# Patient Record
Sex: MALE | Race: WHITE | NOT HISPANIC OR LATINO | Employment: OTHER | URBAN - METROPOLITAN AREA
[De-identification: names, ages, dates, MRNs, and addresses within clinical notes are randomized per-mention and may not be internally consistent; named-entity substitution may affect disease eponyms.]

---

## 2017-01-09 DIAGNOSIS — E78.5 HYPERLIPIDEMIA, UNSPECIFIED HYPERLIPIDEMIA TYPE: ICD-10-CM

## 2017-01-09 DIAGNOSIS — R73.9 HYPERGLYCEMIA: ICD-10-CM

## 2017-01-09 DIAGNOSIS — I10 ESSENTIAL HYPERTENSION: Primary | ICD-10-CM

## 2017-01-09 RX ORDER — METOPROLOL SUCCINATE 50 MG/1
TABLET, EXTENDED RELEASE ORAL
Qty: 90 TABLET | Refills: 1 | Status: SHIPPED | OUTPATIENT
Start: 2017-01-09 | End: 2017-07-08 | Stop reason: SDUPTHER

## 2017-01-11 LAB
ALBUMIN SERPL-MCNC: 4.2 G/DL (ref 3.5–5.2)
ALBUMIN/GLOB SERPL: 1.8 G/DL
ALP SERPL-CCNC: 86 U/L (ref 39–117)
ALT SERPL-CCNC: 22 U/L (ref 1–41)
AST SERPL-CCNC: 20 U/L (ref 1–40)
BILIRUB SERPL-MCNC: 0.4 MG/DL (ref 0.1–1.2)
BUN SERPL-MCNC: 22 MG/DL (ref 8–23)
BUN/CREAT SERPL: 30.6 (ref 7–25)
CALCIUM SERPL-MCNC: 9.2 MG/DL (ref 8.6–10.5)
CHLORIDE SERPL-SCNC: 104 MMOL/L (ref 98–107)
CHOLEST SERPL-MCNC: 144 MG/DL (ref 0–200)
CO2 SERPL-SCNC: 28.8 MMOL/L (ref 22–29)
CREAT SERPL-MCNC: 0.72 MG/DL (ref 0.76–1.27)
GLOBULIN SER CALC-MCNC: 2.4 GM/DL
GLUCOSE SERPL-MCNC: 107 MG/DL (ref 65–99)
HBA1C MFR BLD: 5.68 % (ref 4.8–5.6)
HDLC SERPL-MCNC: 60 MG/DL (ref 40–60)
LDLC SERPL CALC-MCNC: 70 MG/DL (ref 0–100)
LDLC/HDLC SERPL: 1.17 {RATIO}
POTASSIUM SERPL-SCNC: 4.5 MMOL/L (ref 3.5–5.2)
PROT SERPL-MCNC: 6.6 G/DL (ref 6–8.5)
SODIUM SERPL-SCNC: 146 MMOL/L (ref 136–145)
TRIGL SERPL-MCNC: 69 MG/DL (ref 0–150)
VLDLC SERPL CALC-MCNC: 13.8 MG/DL (ref 5–40)

## 2017-01-18 ENCOUNTER — OFFICE VISIT (OUTPATIENT)
Dept: FAMILY MEDICINE CLINIC | Facility: CLINIC | Age: 82
End: 2017-01-18

## 2017-01-18 VITALS
TEMPERATURE: 98 F | WEIGHT: 276 LBS | BODY MASS INDEX: 44.36 KG/M2 | OXYGEN SATURATION: 96 % | SYSTOLIC BLOOD PRESSURE: 140 MMHG | HEIGHT: 66 IN | DIASTOLIC BLOOD PRESSURE: 72 MMHG | RESPIRATION RATE: 16 BRPM | HEART RATE: 75 BPM

## 2017-01-18 DIAGNOSIS — R73.9 HYPERGLYCEMIA: ICD-10-CM

## 2017-01-18 DIAGNOSIS — E66.01 MORBID OBESITY DUE TO EXCESS CALORIES (HCC): ICD-10-CM

## 2017-01-18 DIAGNOSIS — I10 ESSENTIAL HYPERTENSION: ICD-10-CM

## 2017-01-18 DIAGNOSIS — E78.5 HYPERLIPIDEMIA, UNSPECIFIED HYPERLIPIDEMIA TYPE: Primary | ICD-10-CM

## 2017-01-18 PROCEDURE — 99213 OFFICE O/P EST LOW 20 MIN: CPT | Performed by: INTERNAL MEDICINE

## 2017-01-18 NOTE — PROGRESS NOTES
Subjective   Harry Potts is a 86 y.o. male. Patient is here today for   Chief Complaint   Patient presents with   • Hypertension     lab f/u    • Hyperlipidemia          Vitals:    01/18/17 0826   BP: 140/72   Pulse:    Resp:    Temp:    SpO2:        Past Medical History   Diagnosis Date   • GERD (gastroesophageal reflux disease)    • Hyperglycemia    • Hyperlipidemia    • Hypertension    • Obesity    • Spondylolysis, lumbar region       Allergies   Allergen Reactions   • Morphine And Related       Social History     Social History   • Marital status:      Spouse name: N/A   • Number of children: N/A   • Years of education: N/A     Occupational History   • Not on file.     Social History Main Topics   • Smoking status: Never Smoker   • Smokeless tobacco: Never Used   • Alcohol use No   • Drug use: No   • Sexual activity: Not on file     Other Topics Concern   • Not on file     Social History Narrative        Current Outpatient Prescriptions:   •  amLODIPine (NORVASC) 10 MG tablet, TAKE 1 TABLET DAILY, Disp: 90 tablet, Rfl: 1  •  atorvastatin (LIPITOR) 20 MG tablet, TAKE 1 TABLET DAILY, Disp: 90 tablet, Rfl: 1  •  celecoxib (CeleBREX) 200 MG capsule, TAKE 1 CAPSULE DAILY AS NEEDED, Disp: 90 capsule, Rfl: 2  •  doxazosin (CARDURA) 4 MG tablet, TAKE 1 TABLET DAILY AS DIRECTED, Disp: 90 tablet, Rfl: 3  •  esomeprazole (nexIUM) 40 MG capsule, TAKE 1 CAPSULE DAILY, Disp: 90 capsule, Rfl: 2  •  loratadine (CLARITIN) 10 MG tablet, Take  by mouth., Disp: , Rfl:   •  metaxalone (SKELAXIN) 800 MG tablet, Take 1 tablet by mouth 3 (Three) Times a Day., Disp: 270 tablet, Rfl: 4  •  metoprolol succinate XL (TOPROL-XL) 50 MG 24 hr tablet, TAKE 1 TABLET DAILY, Disp: 90 tablet, Rfl: 1  •  traMADol (ULTRAM) 50 MG tablet, Take 1 tablet by mouth every 6 (six) hours as needed for moderate pain (4-6)., Disp: 40 tablet, Rfl: 3  •  triamcinolone (KENALOG) 0.1 % cream, , Disp: , Rfl:   •  valsartan-hydrochlorothiazide (DIOVAN-HCT)  320-25 MG per tablet, TAKE 1 TABLET DAILY, Disp: 90 tablet, Rfl: 1  •  ZETIA 10 MG tablet, TAKE 1 TABLET DAILY, Disp: 90 tablet, Rfl: 2     Objective     HPI Comments: He is here to follow-up on his hypertension, hyperglycemia, and hyper cholesterolemia.    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    Hypertension     Hyperlipidemia          Review of Systems   Constitutional: Negative.    HENT: Negative.    Respiratory: Negative.    Cardiovascular: Negative.    Psychiatric/Behavioral: Negative.        Physical Exam   Constitutional: He is oriented to person, place, and time.   Neatly groomed, alert, pleasant.   HENT:   Head: Normocephalic and atraumatic.   Cardiovascular: Normal rate.    Pulmonary/Chest: Effort normal.   Neurological: He is alert and oriented to person, place, and time.   Psychiatric: He has a normal mood and affect. His behavior is normal. Judgment and thought content normal.   Nursing note and vitals reviewed.        Problem List Items Addressed This Visit        Cardiovascular and Mediastinum    Hyperlipidemia - Primary    Hypertension       Digestive    Morbid obesity       Other    Hyperglycemia            PLAN  His hypertension is well-controlled.    His hypercholesterolemia is well controlled.    He is obese and his work a lot on losing weight and in fact is lost about 14 pounds since he was here last fall.  I encouraged him to keep up his best efforts there.    I like to see him back in about 4 months to recheck a lipid profile and comprehensive metabolic panel and hemoglobin A1c and urinalysis and CBC.      Return in about 4 months (around 5/18/2017) for with labs.

## 2017-01-18 NOTE — MR AVS SNAPSHOT
Harry Potts   1/18/2017 8:15 AM   Office Visit    Dept Phone:  [phone]   Encounter #:  [id number]    Provider:  Harry Luis MD   Department:  [facility]                Your Full Care Plan              Your Updated Medication List          This list is accurate as of: 1/18/17  8:39 AM.  Always use your most recent med list.                amLODIPine 10 MG tablet   Commonly known as:  NORVASC   TAKE 1 TABLET DAILY       atorvastatin 20 MG tablet   Commonly known as:  LIPITOR   TAKE 1 TABLET DAILY       celecoxib 200 MG capsule   Commonly known as:  CeleBREX   TAKE 1 CAPSULE DAILY AS NEEDED       doxazosin 4 MG tablet   Commonly known as:  CARDURA   TAKE 1 TABLET DAILY AS DIRECTED       esomeprazole 40 MG capsule   Commonly known as:  nexIUM   TAKE 1 CAPSULE DAILY       loratadine 10 MG tablet   Commonly known as:  CLARITIN       metaxalone 800 MG tablet   Commonly known as:  SKELAXIN   Take 1 tablet by mouth 3 (Three) Times a Day.       metoprolol succinate XL 50 MG 24 hr tablet   Commonly known as:  TOPROL-XL   TAKE 1 TABLET DAILY       traMADol 50 MG tablet   Commonly known as:  ULTRAM   Take 1 tablet by mouth every 6 (six) hours as needed for moderate pain (4-6).       triamcinolone 0.1 % cream   Commonly known as:  KENALOG       valsartan-hydrochlorothiazide 320-25 MG per tablet   Commonly known as:  DIOVAN-HCT   TAKE 1 TABLET DAILY       ZETIA 10 MG tablet   Generic drug:  ezetimibe   TAKE 1 TABLET DAILY               Instructions     None    Patient Instructions History      Upcoming Appointments     Visit Type Date Time Department    FOLLOW UP 1/18/2017  8:15 AM HANANE CAMPOS      [facility] Signup     [facility] [employer] allows you to send messages to your doctor, view your test results, renew your prescriptions, schedule appointments, and more. To sign up, go to [employer] and click on the Sign Up Now link in  "the New User? box. Enter your [employer] Activation Code exactly as it appears below along with the last four digits of your Social Security Number and your Date of Birth () to complete the sign-up process. If you do not sign up before the expiration date, you must request a new code.    [employer] Activation Code: [email or web address]  Expires: 2017 10:18 AM    If you have questions, you can email [email or web address] or call [phone] to talk to our [employer] staff. Remember, [employer] is NOT to be used for urgent needs. For medical emergencies, dial 911.               Other Info from Your Visit           Allergies     Morphine And Related        Reason for Visit     Hypertension lab f/u     Hyperlipidemia           Vital Signs     Blood Pressure Pulse Temperature Respirations Height Weight    140/72 75 98 °F (36.7 °C) 16 66\" (167.6 cm) 276 lb (125 kg)    Oxygen Saturation Body Mass Index Smoking Status             96% 44.55 kg/m2 Never Smoker           "

## 2017-01-31 RX ORDER — DOXAZOSIN MESYLATE 4 MG/1
TABLET ORAL
Qty: 90 TABLET | Refills: 2 | Status: SHIPPED | OUTPATIENT
Start: 2017-01-31 | End: 2017-10-28 | Stop reason: SDUPTHER

## 2017-03-17 RX ORDER — EZETIMIBE 10 MG/1
TABLET ORAL
Qty: 90 TABLET | Refills: 1 | Status: SHIPPED | OUTPATIENT
Start: 2017-03-17 | End: 2017-09-13 | Stop reason: SDUPTHER

## 2017-03-17 RX ORDER — CELECOXIB 200 MG/1
CAPSULE ORAL
Qty: 90 CAPSULE | Refills: 1 | Status: SHIPPED | OUTPATIENT
Start: 2017-03-17 | End: 2017-09-13 | Stop reason: SDUPTHER

## 2017-04-13 RX ORDER — ATORVASTATIN CALCIUM 20 MG/1
TABLET, FILM COATED ORAL
Qty: 90 TABLET | Refills: 0 | Status: SHIPPED | OUTPATIENT
Start: 2017-04-13 | End: 2017-07-12 | Stop reason: SDUPTHER

## 2017-05-01 RX ORDER — VALSARTAN AND HYDROCHLOROTHIAZIDE 320; 25 MG/1; MG/1
TABLET, FILM COATED ORAL
Qty: 90 TABLET | Refills: 0 | Status: SHIPPED | OUTPATIENT
Start: 2017-05-01 | End: 2017-07-30 | Stop reason: SDUPTHER

## 2017-05-01 RX ORDER — AMLODIPINE BESYLATE 10 MG/1
TABLET ORAL
Qty: 90 TABLET | Refills: 0 | Status: SHIPPED | OUTPATIENT
Start: 2017-05-01 | End: 2017-07-30 | Stop reason: SDUPTHER

## 2017-05-16 DIAGNOSIS — I10 ESSENTIAL HYPERTENSION: Primary | ICD-10-CM

## 2017-05-16 DIAGNOSIS — E78.5 HYPERLIPIDEMIA, UNSPECIFIED HYPERLIPIDEMIA TYPE: ICD-10-CM

## 2017-05-16 DIAGNOSIS — R73.9 HYPERGLYCEMIA: ICD-10-CM

## 2017-05-18 LAB
ALBUMIN SERPL-MCNC: 4.2 G/DL (ref 3.5–5.2)
ALBUMIN/GLOB SERPL: 1.8 G/DL
ALP SERPL-CCNC: 70 U/L (ref 39–117)
ALT SERPL-CCNC: 19 U/L (ref 1–41)
APPEARANCE UR: CLEAR
AST SERPL-CCNC: 23 U/L (ref 1–40)
BACTERIA #/AREA URNS HPF: NORMAL /HPF
BASOPHILS # BLD AUTO: 0.03 10*3/MM3 (ref 0–0.2)
BASOPHILS NFR BLD AUTO: 0.5 % (ref 0–1.5)
BILIRUB SERPL-MCNC: 0.4 MG/DL (ref 0.1–1.2)
BILIRUB UR QL STRIP: NEGATIVE
BUN SERPL-MCNC: 27 MG/DL (ref 8–23)
BUN/CREAT SERPL: 23.7 (ref 7–25)
CALCIUM SERPL-MCNC: 9.4 MG/DL (ref 8.6–10.5)
CASTS URNS MICRO: NORMAL
CHLORIDE SERPL-SCNC: 102 MMOL/L (ref 98–107)
CHOLEST SERPL-MCNC: 140 MG/DL (ref 0–200)
CO2 SERPL-SCNC: 26.6 MMOL/L (ref 22–29)
COLOR UR: YELLOW
CREAT SERPL-MCNC: 1.14 MG/DL (ref 0.76–1.27)
EOSINOPHIL # BLD AUTO: 0.24 10*3/MM3 (ref 0–0.7)
EOSINOPHIL NFR BLD AUTO: 4.1 % (ref 0.3–6.2)
EPI CELLS #/AREA URNS HPF: NORMAL /HPF
ERYTHROCYTE [DISTWIDTH] IN BLOOD BY AUTOMATED COUNT: 14 % (ref 11.5–14.5)
GLOBULIN SER CALC-MCNC: 2.4 GM/DL
GLUCOSE SERPL-MCNC: 112 MG/DL (ref 65–99)
GLUCOSE UR QL: NEGATIVE
HBA1C MFR BLD: 5.8 % (ref 4.8–5.6)
HCT VFR BLD AUTO: 44.9 % (ref 40.4–52.2)
HDLC SERPL-MCNC: 52 MG/DL (ref 40–60)
HGB BLD-MCNC: 14.3 G/DL (ref 13.7–17.6)
HGB UR QL STRIP: NEGATIVE
IMM GRANULOCYTES # BLD: 0 10*3/MM3 (ref 0–0.03)
IMM GRANULOCYTES NFR BLD: 0 % (ref 0–0.5)
KETONES UR QL STRIP: NEGATIVE
LDLC SERPL CALC-MCNC: 71 MG/DL (ref 0–100)
LDLC/HDLC SERPL: 1.37 {RATIO}
LEUKOCYTE ESTERASE UR QL STRIP: NEGATIVE
LYMPHOCYTES # BLD AUTO: 2.18 10*3/MM3 (ref 0.9–4.8)
LYMPHOCYTES NFR BLD AUTO: 37.2 % (ref 19.6–45.3)
MCH RBC QN AUTO: 29.2 PG (ref 27–32.7)
MCHC RBC AUTO-ENTMCNC: 31.8 G/DL (ref 32.6–36.4)
MCV RBC AUTO: 91.8 FL (ref 79.8–96.2)
MONOCYTES # BLD AUTO: 0.47 10*3/MM3 (ref 0.2–1.2)
MONOCYTES NFR BLD AUTO: 8 % (ref 5–12)
NEUTROPHILS # BLD AUTO: 2.94 10*3/MM3 (ref 1.9–8.1)
NEUTROPHILS NFR BLD AUTO: 50.2 % (ref 42.7–76)
NITRITE UR QL STRIP: NEGATIVE
PH UR STRIP: 7 [PH] (ref 5–8)
PLATELET # BLD AUTO: 137 10*3/MM3 (ref 140–500)
POTASSIUM SERPL-SCNC: 4.5 MMOL/L (ref 3.5–5.2)
PROT SERPL-MCNC: 6.6 G/DL (ref 6–8.5)
PROT UR QL STRIP: NEGATIVE
RBC # BLD AUTO: 4.89 10*6/MM3 (ref 4.6–6)
RBC #/AREA URNS HPF: NORMAL /HPF
SODIUM SERPL-SCNC: 143 MMOL/L (ref 136–145)
SP GR UR: (no result) (ref 1–1.03)
TRIGL SERPL-MCNC: 85 MG/DL (ref 0–150)
UROBILINOGEN UR STRIP-MCNC: (no result) MG/DL
VLDLC SERPL CALC-MCNC: 17 MG/DL (ref 5–40)
WBC # BLD AUTO: 5.86 10*3/MM3 (ref 4.5–10.7)
WBC #/AREA URNS HPF: NORMAL /HPF

## 2017-06-02 ENCOUNTER — OFFICE VISIT (OUTPATIENT)
Dept: FAMILY MEDICINE CLINIC | Facility: CLINIC | Age: 82
End: 2017-06-02

## 2017-06-02 VITALS
HEART RATE: 71 BPM | BODY MASS INDEX: 44.2 KG/M2 | DIASTOLIC BLOOD PRESSURE: 64 MMHG | TEMPERATURE: 98 F | HEIGHT: 66 IN | OXYGEN SATURATION: 93 % | SYSTOLIC BLOOD PRESSURE: 130 MMHG | WEIGHT: 275 LBS | RESPIRATION RATE: 16 BRPM

## 2017-06-02 DIAGNOSIS — E66.01 MORBID OBESITY DUE TO EXCESS CALORIES (HCC): ICD-10-CM

## 2017-06-02 DIAGNOSIS — E78.5 HYPERLIPIDEMIA, UNSPECIFIED HYPERLIPIDEMIA TYPE: Primary | ICD-10-CM

## 2017-06-02 DIAGNOSIS — I10 ESSENTIAL HYPERTENSION: ICD-10-CM

## 2017-06-02 PROCEDURE — 99213 OFFICE O/P EST LOW 20 MIN: CPT | Performed by: INTERNAL MEDICINE

## 2017-06-06 NOTE — PROGRESS NOTES
Subjective   Harry Potts is a 86 y.o. male. Patient is here today for   Chief Complaint   Patient presents with   • Hypertension     lab f/u   • Hyperlipidemia          Vitals:    06/02/17 0939   BP: 130/64   Pulse:    Resp:    Temp:    SpO2:        Past Medical History:   Diagnosis Date   • GERD (gastroesophageal reflux disease)    • Hyperglycemia    • Hyperlipidemia    • Hypertension    • Obesity    • Spondylolysis, lumbar region       Allergies   Allergen Reactions   • Morphine And Related       Social History     Social History   • Marital status:      Spouse name: N/A   • Number of children: N/A   • Years of education: N/A     Occupational History   • Not on file.     Social History Main Topics   • Smoking status: Never Smoker   • Smokeless tobacco: Never Used   • Alcohol use No   • Drug use: No   • Sexual activity: Not on file     Other Topics Concern   • Not on file     Social History Narrative        Current Outpatient Prescriptions:   •  amLODIPine (NORVASC) 10 MG tablet, TAKE 1 TABLET DAILY, Disp: 90 tablet, Rfl: 0  •  atorvastatin (LIPITOR) 20 MG tablet, TAKE 1 TABLET DAILY, Disp: 90 tablet, Rfl: 0  •  celecoxib (CeleBREX) 200 MG capsule, TAKE 1 CAPSULE DAILY AS NEEDED, Disp: 90 capsule, Rfl: 1  •  doxazosin (CARDURA) 4 MG tablet, TAKE 1 TABLET DAILY AS DIRECTED, Disp: 90 tablet, Rfl: 2  •  esomeprazole (nexIUM) 40 MG capsule, TAKE 1 CAPSULE DAILY, Disp: 90 capsule, Rfl: 2  •  ezetimibe (ZETIA) 10 MG tablet, TAKE 1 TABLET DAILY, Disp: 90 tablet, Rfl: 1  •  loratadine (CLARITIN) 10 MG tablet, Take  by mouth., Disp: , Rfl:   •  metaxalone (SKELAXIN) 800 MG tablet, Take 1 tablet by mouth 3 (Three) Times a Day., Disp: 270 tablet, Rfl: 4  •  metoprolol succinate XL (TOPROL-XL) 50 MG 24 hr tablet, TAKE 1 TABLET DAILY, Disp: 90 tablet, Rfl: 1  •  traMADol (ULTRAM) 50 MG tablet, Take 1 tablet by mouth every 6 (six) hours as needed for moderate pain (4-6)., Disp: 40 tablet, Rfl: 3  •  triamcinolone  (KENALOG) 0.1 % cream, , Disp: , Rfl:   •  valsartan-hydrochlorothiazide (DIOVAN-HCT) 320-25 MG per tablet, TAKE 1 TABLET DAILY, Disp: 90 tablet, Rfl: 0     Objective     HPI Comments: He is here to follow-up with hypertension and hypercholesterolemia.  He has severe degenerative arthritis in the lumbar spine but he doesn't complain about that today.        Hypertension     Hyperlipidemia          Review of Systems   Constitutional: Negative.    HENT: Negative.    Respiratory: Negative.    Cardiovascular: Negative.    Musculoskeletal: Negative.    Psychiatric/Behavioral: Negative.        Physical Exam   Constitutional: He is oriented to person, place, and time. He appears well-developed and well-nourished.   Pleasant, neatly groomed, BMI 44.   HENT:   Head: Normocephalic and atraumatic.   Cardiovascular: Normal rate and regular rhythm.    Pulmonary/Chest: Effort normal.   Neurological: He is alert and oriented to person, place, and time.   Psychiatric: He has a normal mood and affect.   Nursing note and vitals reviewed.        Problem List Items Addressed This Visit        Cardiovascular and Mediastinum    Hyperlipidemia - Primary    Hypertension       Digestive    Morbid obesity            PLAN  His hypertension is hypercholesterolemia are well controlled.  I urged him to do his best to lose a bit of weight.  Regular aerobic activity is going to be out of the question for him secondary to his severe advanced degenerative arthritis.  He could cut back on his caloric intake however.    It's always a pleasure to see Mr. Olvera.  I asked him to follow-up with me in about 6 months or as needed.    He ought follow-up for a once yearly Medicare wellness visit.  No Follow-up on file.

## 2017-07-11 RX ORDER — METOPROLOL SUCCINATE 50 MG/1
TABLET, EXTENDED RELEASE ORAL
Qty: 90 TABLET | Refills: 0 | Status: SHIPPED | OUTPATIENT
Start: 2017-07-11 | End: 2017-10-09 | Stop reason: SDUPTHER

## 2017-07-12 ENCOUNTER — TELEPHONE (OUTPATIENT)
Dept: FAMILY MEDICINE CLINIC | Facility: CLINIC | Age: 82
End: 2017-07-12

## 2017-07-12 RX ORDER — ATORVASTATIN CALCIUM 20 MG/1
TABLET, FILM COATED ORAL
Qty: 90 TABLET | Refills: 1 | Status: SHIPPED | OUTPATIENT
Start: 2017-07-12 | End: 2018-01-08 | Stop reason: SDUPTHER

## 2017-07-12 NOTE — TELEPHONE ENCOUNTER
Spoke to patient and informed him that two prescriptions were sent recently.  He expressed understanding   ----- Message from Giselle Wolfe sent at 7/11/2017  4:07 PM EDT -----  PT IS CALLING SAYING [facility] SAYS THEY AENT US RENEWAL ON HIS MEDICATION AND HASNT HEARD BACK FROM THE OFFICE     HE SAYS THERE ARE 4 AND HE HAS NO IDEA WHAT THEY ARE     PLEASE CALL PT WITH ANY QUESTIONS     [phone]

## 2017-07-31 RX ORDER — VALSARTAN AND HYDROCHLOROTHIAZIDE 320; 25 MG/1; MG/1
TABLET, FILM COATED ORAL
Qty: 90 TABLET | Refills: 2 | Status: SHIPPED | OUTPATIENT
Start: 2017-07-31 | End: 2018-04-27 | Stop reason: SDUPTHER

## 2017-07-31 RX ORDER — AMLODIPINE BESYLATE 10 MG/1
TABLET ORAL
Qty: 90 TABLET | Refills: 2 | Status: SHIPPED | OUTPATIENT
Start: 2017-07-31 | End: 2018-04-27 | Stop reason: SDUPTHER

## 2017-08-11 RX ORDER — ESOMEPRAZOLE MAGNESIUM 40 MG/1
CAPSULE, DELAYED RELEASE ORAL
Qty: 90 CAPSULE | Refills: 1 | Status: SHIPPED | OUTPATIENT
Start: 2017-08-11 | End: 2018-02-07 | Stop reason: SDUPTHER

## 2017-09-13 RX ORDER — EZETIMIBE 10 MG/1
TABLET ORAL
Qty: 90 TABLET | Refills: 1 | Status: SHIPPED | OUTPATIENT
Start: 2017-09-13 | End: 2018-03-12 | Stop reason: SDUPTHER

## 2017-09-13 RX ORDER — CELECOXIB 200 MG/1
CAPSULE ORAL
Qty: 90 CAPSULE | Refills: 1 | Status: SHIPPED | OUTPATIENT
Start: 2017-09-13 | End: 2018-03-12 | Stop reason: SDUPTHER

## 2017-09-26 DIAGNOSIS — E78.5 HYPERLIPIDEMIA, UNSPECIFIED HYPERLIPIDEMIA TYPE: ICD-10-CM

## 2017-09-26 DIAGNOSIS — I10 ESSENTIAL HYPERTENSION: Primary | ICD-10-CM

## 2017-10-04 LAB
ALBUMIN SERPL-MCNC: 4.5 G/DL (ref 3.5–5.2)
ALBUMIN/GLOB SERPL: 2 G/DL
ALP SERPL-CCNC: 68 U/L (ref 39–117)
ALT SERPL-CCNC: 26 U/L (ref 1–41)
AST SERPL-CCNC: 24 U/L (ref 1–40)
BASOPHILS # BLD AUTO: 0.02 10*3/MM3 (ref 0–0.2)
BASOPHILS NFR BLD AUTO: 0.3 % (ref 0–1.5)
BILIRUB SERPL-MCNC: 0.5 MG/DL (ref 0.1–1.2)
BUN SERPL-MCNC: 22 MG/DL (ref 8–23)
BUN/CREAT SERPL: 29.7 (ref 7–25)
CALCIUM SERPL-MCNC: 9.9 MG/DL (ref 8.6–10.5)
CHLORIDE SERPL-SCNC: 101 MMOL/L (ref 98–107)
CHOLEST SERPL-MCNC: 213 MG/DL (ref 0–200)
CO2 SERPL-SCNC: 29 MMOL/L (ref 22–29)
CREAT SERPL-MCNC: 0.74 MG/DL (ref 0.76–1.27)
EOSINOPHIL # BLD AUTO: 0.21 10*3/MM3 (ref 0–0.7)
EOSINOPHIL NFR BLD AUTO: 3.6 % (ref 0.3–6.2)
ERYTHROCYTE [DISTWIDTH] IN BLOOD BY AUTOMATED COUNT: 13.5 % (ref 11.5–14.5)
GLOBULIN SER CALC-MCNC: 2.3 GM/DL
GLUCOSE SERPL-MCNC: 107 MG/DL (ref 65–99)
HCT VFR BLD AUTO: 45.1 % (ref 40.4–52.2)
HDLC SERPL-MCNC: 52 MG/DL (ref 40–60)
HGB BLD-MCNC: 14.5 G/DL (ref 13.7–17.6)
IMM GRANULOCYTES # BLD: 0 10*3/MM3 (ref 0–0.03)
IMM GRANULOCYTES NFR BLD: 0 % (ref 0–0.5)
LDLC SERPL CALC-MCNC: 144 MG/DL (ref 0–100)
LDLC/HDLC SERPL: 2.77 {RATIO}
LYMPHOCYTES # BLD AUTO: 2.15 10*3/MM3 (ref 0.9–4.8)
LYMPHOCYTES NFR BLD AUTO: 37.1 % (ref 19.6–45.3)
MCH RBC QN AUTO: 30.2 PG (ref 27–32.7)
MCHC RBC AUTO-ENTMCNC: 32.2 G/DL (ref 32.6–36.4)
MCV RBC AUTO: 94 FL (ref 79.8–96.2)
MONOCYTES # BLD AUTO: 0.5 10*3/MM3 (ref 0.2–1.2)
MONOCYTES NFR BLD AUTO: 8.6 % (ref 5–12)
NEUTROPHILS # BLD AUTO: 2.92 10*3/MM3 (ref 1.9–8.1)
NEUTROPHILS NFR BLD AUTO: 50.4 % (ref 42.7–76)
PLATELET # BLD AUTO: 150 10*3/MM3 (ref 140–500)
POTASSIUM SERPL-SCNC: 4.7 MMOL/L (ref 3.5–5.2)
PROT SERPL-MCNC: 6.8 G/DL (ref 6–8.5)
RBC # BLD AUTO: 4.8 10*6/MM3 (ref 4.6–6)
SODIUM SERPL-SCNC: 144 MMOL/L (ref 136–145)
TRIGL SERPL-MCNC: 86 MG/DL (ref 0–150)
VLDLC SERPL CALC-MCNC: 17.2 MG/DL (ref 5–40)
WBC # BLD AUTO: 5.8 10*3/MM3 (ref 4.5–10.7)

## 2017-10-09 RX ORDER — METOPROLOL SUCCINATE 50 MG/1
TABLET, EXTENDED RELEASE ORAL
Qty: 90 TABLET | Refills: 0 | Status: SHIPPED | OUTPATIENT
Start: 2017-10-09 | End: 2018-01-07 | Stop reason: SDUPTHER

## 2017-10-11 ENCOUNTER — OFFICE VISIT (OUTPATIENT)
Dept: FAMILY MEDICINE CLINIC | Facility: CLINIC | Age: 82
End: 2017-10-11

## 2017-10-11 VITALS
TEMPERATURE: 98 F | HEIGHT: 66 IN | OXYGEN SATURATION: 98 % | HEART RATE: 70 BPM | WEIGHT: 278 LBS | DIASTOLIC BLOOD PRESSURE: 80 MMHG | RESPIRATION RATE: 16 BRPM | SYSTOLIC BLOOD PRESSURE: 138 MMHG | BODY MASS INDEX: 44.68 KG/M2

## 2017-10-11 DIAGNOSIS — E66.01 MORBID OBESITY (HCC): ICD-10-CM

## 2017-10-11 DIAGNOSIS — E78.5 HYPERLIPIDEMIA, UNSPECIFIED HYPERLIPIDEMIA TYPE: ICD-10-CM

## 2017-10-11 DIAGNOSIS — I10 ESSENTIAL HYPERTENSION: Primary | ICD-10-CM

## 2017-10-11 PROCEDURE — 99213 OFFICE O/P EST LOW 20 MIN: CPT | Performed by: INTERNAL MEDICINE

## 2017-10-11 NOTE — PROGRESS NOTES
Subjective   Harry Potts is a 86 y.o. male. Patient is here today for   Chief Complaint   Patient presents with   • Hypertension     lab f/u    • Hyperlipidemia          Vitals:    10/11/17 0835   BP: 138/80   Pulse: 70   Resp: 16   Temp: 98 °F (36.7 °C)   SpO2: 98%       Past Medical History:   Diagnosis Date   • GERD (gastroesophageal reflux disease)    • Hyperglycemia    • Hyperlipidemia    • Hypertension    • Obesity    • Spondylolysis, lumbar region       Allergies   Allergen Reactions   • Morphine And Related       Social History     Social History   • Marital status:      Spouse name: N/A   • Number of children: N/A   • Years of education: N/A     Occupational History   • Not on file.     Social History Main Topics   • Smoking status: Never Smoker   • Smokeless tobacco: Never Used   • Alcohol use No   • Drug use: No   • Sexual activity: Not on file     Other Topics Concern   • Not on file     Social History Narrative        Current Outpatient Prescriptions:   •  amLODIPine (NORVASC) 10 MG tablet, TAKE 1 TABLET DAILY, Disp: 90 tablet, Rfl: 2  •  atorvastatin (LIPITOR) 20 MG tablet, TAKE 1 TABLET DAILY, Disp: 90 tablet, Rfl: 1  •  celecoxib (CeleBREX) 200 MG capsule, TAKE 1 CAPSULE DAILY AS NEEDED, Disp: 90 capsule, Rfl: 1  •  doxazosin (CARDURA) 4 MG tablet, TAKE 1 TABLET DAILY AS DIRECTED, Disp: 90 tablet, Rfl: 2  •  esomeprazole (nexIUM) 40 MG capsule, TAKE 1 CAPSULE DAILY, Disp: 90 capsule, Rfl: 1  •  ezetimibe (ZETIA) 10 MG tablet, TAKE 1 TABLET DAILY, Disp: 90 tablet, Rfl: 1  •  loratadine (CLARITIN) 10 MG tablet, Take  by mouth., Disp: , Rfl:   •  metaxalone (SKELAXIN) 800 MG tablet, Take 1 tablet by mouth 3 (Three) Times a Day., Disp: 270 tablet, Rfl: 4  •  metoprolol succinate XL (TOPROL-XL) 50 MG 24 hr tablet, TAKE 1 TABLET DAILY, Disp: 90 tablet, Rfl: 0  •  traMADol (ULTRAM) 50 MG tablet, Take 1 tablet by mouth every 6 (six) hours as needed for moderate pain (4-6)., Disp: 40 tablet, Rfl:  3  •  triamcinolone (KENALOG) 0.1 % cream, , Disp: , Rfl:   •  valsartan-hydrochlorothiazide (DIOVAN-HCT) 320-25 MG per tablet, TAKE 1 TABLET DAILY, Disp: 90 tablet, Rfl: 2     Objective     HPI Comments: He is here to follow-up with hypertension and hypercholesterolemia.    He tells me that he feels well.    Hypertension     Hyperlipidemia          Review of Systems   Constitutional: Negative.    HENT: Negative.    Respiratory: Negative.    Cardiovascular: Negative.    Musculoskeletal: Negative.    Psychiatric/Behavioral: Negative.        Physical Exam   Constitutional: He is oriented to person, place, and time. He appears well-developed and well-nourished.   Pleasant, neatly groomed, BMI 44.   HENT:   Head: Normocephalic and atraumatic.   Pulmonary/Chest: Effort normal.   Musculoskeletal:   Ambulates with a stooped over posture.  He also ambulates with a walker.   Neurological: He is alert and oriented to person, place, and time.   Psychiatric: He has a normal mood and affect. His behavior is normal.   Nursing note and vitals reviewed.        Problem List Items Addressed This Visit        Cardiovascular and Mediastinum    Hyperlipidemia    Hypertension - Primary       Digestive    Morbid obesity            PLAN  His hypertension is well-controlled.    His hypercholesterolemia is well-controlled.    He does have hyperglycemia, and this appears to be stable.    He has morbid obesity and I encouraged him to do his best to lose weight via regular aerobic activity decreased caloric intake.    I like him back in 3-4 months to see how is getting along and to check some fasting labs including: Lipid profile, comprehensive metabolic panel, urinalysis, CBC.  No Follow-up on file.

## 2017-10-30 RX ORDER — DOXAZOSIN MESYLATE 4 MG/1
TABLET ORAL
Qty: 90 TABLET | Refills: 2 | Status: SHIPPED | OUTPATIENT
Start: 2017-10-30 | End: 2018-07-27 | Stop reason: SDUPTHER

## 2018-01-02 DIAGNOSIS — I10 ESSENTIAL HYPERTENSION: Primary | ICD-10-CM

## 2018-01-02 DIAGNOSIS — E78.5 HYPERLIPIDEMIA, UNSPECIFIED HYPERLIPIDEMIA TYPE: ICD-10-CM

## 2018-01-04 LAB
ALBUMIN SERPL-MCNC: 4 G/DL (ref 3.5–5.2)
ALBUMIN/GLOB SERPL: 1.4 G/DL
ALP SERPL-CCNC: 77 U/L (ref 39–117)
ALT SERPL-CCNC: 27 U/L (ref 1–41)
APPEARANCE UR: CLEAR
AST SERPL-CCNC: 25 U/L (ref 1–40)
BACTERIA #/AREA URNS HPF: NORMAL /HPF
BASOPHILS # BLD AUTO: 0.03 10*3/MM3 (ref 0–0.2)
BASOPHILS NFR BLD AUTO: 0.5 % (ref 0–1.5)
BILIRUB SERPL-MCNC: 0.4 MG/DL (ref 0.1–1.2)
BILIRUB UR QL STRIP: NEGATIVE
BUN SERPL-MCNC: 22 MG/DL (ref 8–23)
BUN/CREAT SERPL: 31.9 (ref 7–25)
CALCIUM SERPL-MCNC: 9.1 MG/DL (ref 8.6–10.5)
CASTS URNS MICRO: NORMAL
CHLORIDE SERPL-SCNC: 103 MMOL/L (ref 98–107)
CHOLEST SERPL-MCNC: 206 MG/DL (ref 0–200)
CO2 SERPL-SCNC: 27.2 MMOL/L (ref 22–29)
COLOR UR: YELLOW
CREAT SERPL-MCNC: 0.69 MG/DL (ref 0.76–1.27)
EOSINOPHIL # BLD AUTO: 0.24 10*3/MM3 (ref 0–0.7)
EOSINOPHIL NFR BLD AUTO: 4.3 % (ref 0.3–6.2)
EPI CELLS #/AREA URNS HPF: NORMAL /HPF
ERYTHROCYTE [DISTWIDTH] IN BLOOD BY AUTOMATED COUNT: 13.8 % (ref 11.5–14.5)
GLOBULIN SER CALC-MCNC: 2.8 GM/DL
GLUCOSE SERPL-MCNC: 112 MG/DL (ref 65–99)
GLUCOSE UR QL: NEGATIVE
HCT VFR BLD AUTO: 45.3 % (ref 40.4–52.2)
HDLC SERPL-MCNC: 54 MG/DL (ref 40–60)
HGB BLD-MCNC: 14.1 G/DL (ref 13.7–17.6)
HGB UR QL STRIP: NEGATIVE
IMM GRANULOCYTES # BLD: 0 10*3/MM3 (ref 0–0.03)
IMM GRANULOCYTES NFR BLD: 0 % (ref 0–0.5)
KETONES UR QL STRIP: NEGATIVE
LDLC SERPL CALC-MCNC: 137 MG/DL (ref 0–100)
LDLC/HDLC SERPL: 2.54 {RATIO}
LEUKOCYTE ESTERASE UR QL STRIP: NEGATIVE
LYMPHOCYTES # BLD AUTO: 1.8 10*3/MM3 (ref 0.9–4.8)
LYMPHOCYTES NFR BLD AUTO: 32.5 % (ref 19.6–45.3)
MCH RBC QN AUTO: 29.5 PG (ref 27–32.7)
MCHC RBC AUTO-ENTMCNC: 31.1 G/DL (ref 32.6–36.4)
MCV RBC AUTO: 94.8 FL (ref 79.8–96.2)
MONOCYTES # BLD AUTO: 0.47 10*3/MM3 (ref 0.2–1.2)
MONOCYTES NFR BLD AUTO: 8.5 % (ref 5–12)
NEUTROPHILS # BLD AUTO: 2.99 10*3/MM3 (ref 1.9–8.1)
NEUTROPHILS NFR BLD AUTO: 54.2 % (ref 42.7–76)
NITRITE UR QL STRIP: NEGATIVE
PH UR STRIP: 6.5 [PH] (ref 5–8)
PLATELET # BLD AUTO: 157 10*3/MM3 (ref 140–500)
POTASSIUM SERPL-SCNC: 4 MMOL/L (ref 3.5–5.2)
PROT SERPL-MCNC: 6.8 G/DL (ref 6–8.5)
PROT UR QL STRIP: (no result)
RBC # BLD AUTO: 4.78 10*6/MM3 (ref 4.6–6)
RBC #/AREA URNS HPF: NORMAL /HPF
SODIUM SERPL-SCNC: 142 MMOL/L (ref 136–145)
SP GR UR: 1.03 (ref 1–1.03)
TRIGL SERPL-MCNC: 74 MG/DL (ref 0–150)
UROBILINOGEN UR STRIP-MCNC: (no result) MG/DL
VLDLC SERPL CALC-MCNC: 14.8 MG/DL (ref 5–40)
WBC # BLD AUTO: 5.53 10*3/MM3 (ref 4.5–10.7)
WBC #/AREA URNS HPF: NORMAL /HPF

## 2018-01-08 RX ORDER — METOPROLOL SUCCINATE 50 MG/1
TABLET, EXTENDED RELEASE ORAL
Qty: 90 TABLET | Refills: 0 | Status: SHIPPED | OUTPATIENT
Start: 2018-01-08 | End: 2018-04-07 | Stop reason: SDUPTHER

## 2018-01-08 RX ORDER — ATORVASTATIN CALCIUM 20 MG/1
TABLET, FILM COATED ORAL
Qty: 90 TABLET | Refills: 1 | Status: SHIPPED | OUTPATIENT
Start: 2018-01-08 | End: 2018-07-07 | Stop reason: SDUPTHER

## 2018-01-11 ENCOUNTER — OFFICE VISIT (OUTPATIENT)
Dept: FAMILY MEDICINE CLINIC | Facility: CLINIC | Age: 83
End: 2018-01-11

## 2018-01-11 VITALS
OXYGEN SATURATION: 95 % | WEIGHT: 276 LBS | RESPIRATION RATE: 16 BRPM | SYSTOLIC BLOOD PRESSURE: 140 MMHG | BODY MASS INDEX: 41.83 KG/M2 | DIASTOLIC BLOOD PRESSURE: 70 MMHG | HEART RATE: 68 BPM | HEIGHT: 68 IN

## 2018-01-11 DIAGNOSIS — I10 ESSENTIAL HYPERTENSION: Primary | ICD-10-CM

## 2018-01-11 DIAGNOSIS — R05.9 COUGH: ICD-10-CM

## 2018-01-11 DIAGNOSIS — M47.896 OTHER OSTEOARTHRITIS OF SPINE, LUMBAR REGION: ICD-10-CM

## 2018-01-11 DIAGNOSIS — E78.5 HYPERLIPIDEMIA, UNSPECIFIED HYPERLIPIDEMIA TYPE: ICD-10-CM

## 2018-01-11 PROCEDURE — 99213 OFFICE O/P EST LOW 20 MIN: CPT | Performed by: INTERNAL MEDICINE

## 2018-01-11 RX ORDER — METAXALONE 800 MG/1
800 TABLET ORAL 3 TIMES DAILY
Qty: 270 TABLET | Refills: 4 | Status: SHIPPED | OUTPATIENT
Start: 2018-01-11 | End: 2019-04-16 | Stop reason: SDUPTHER

## 2018-01-12 ENCOUNTER — TELEPHONE (OUTPATIENT)
Dept: FAMILY MEDICINE CLINIC | Facility: CLINIC | Age: 83
End: 2018-01-12

## 2018-01-12 DIAGNOSIS — M79.601 PAIN OF RIGHT UPPER EXTREMITY: Primary | ICD-10-CM

## 2018-01-12 NOTE — TELEPHONE ENCOUNTER
ORDER WAS PLACED AND PATIENT WAS INFORMED TO  Monday 1/15       from Giselle Wolfe sent at 1/11/2018  1:51 PM EST -----  DR GUTIÉRREZ WANTED PT ON HIS ARM AND HE WANTS TO GO TO [facility] [address] [place] [address] [phone]    HE NEEDS A ORDER PUT IN THE COMPUTER FOR THIS     PLEASE CALL PT WHEN THE ORDER IS COMPLETES SO WE CAN PRINT IT FOR HIM AND HE CAN TAKE THE THE APPT WITH HIM     [phone]

## 2018-01-14 NOTE — PROGRESS NOTES
Subjective   Harry Potts is a 87 y.o. male. Patient is here today for   Chief Complaint   Patient presents with   • Cough   • Hyperlipidemia          Vitals:    01/11/18 0810   BP: 140/70   Pulse: 68   Resp: 16   SpO2: 95%       Past Medical History:   Diagnosis Date   • GERD (gastroesophageal reflux disease)    • Hyperglycemia    • Hyperlipidemia    • Hypertension    • Obesity    • Spondylolysis, lumbar region       Allergies   Allergen Reactions   • Morphine And Related       Social History     Social History   • Marital status:      Spouse name: N/A   • Number of children: N/A   • Years of education: N/A     Occupational History   • Not on file.     Social History Main Topics   • Smoking status: Never Smoker   • Smokeless tobacco: Never Used   • Alcohol use No   • Drug use: No   • Sexual activity: Not on file     Other Topics Concern   • Not on file     Social History Narrative        Current Outpatient Prescriptions:   •  amLODIPine (NORVASC) 10 MG tablet, TAKE 1 TABLET DAILY, Disp: 90 tablet, Rfl: 2  •  atorvastatin (LIPITOR) 20 MG tablet, TAKE 1 TABLET DAILY, Disp: 90 tablet, Rfl: 1  •  celecoxib (CeleBREX) 200 MG capsule, TAKE 1 CAPSULE DAILY AS NEEDED, Disp: 90 capsule, Rfl: 1  •  doxazosin (CARDURA) 4 MG tablet, TAKE 1 TABLET DAILY AS DIRECTED, Disp: 90 tablet, Rfl: 2  •  esomeprazole (nexIUM) 40 MG capsule, TAKE 1 CAPSULE DAILY, Disp: 90 capsule, Rfl: 1  •  ezetimibe (ZETIA) 10 MG tablet, TAKE 1 TABLET DAILY, Disp: 90 tablet, Rfl: 1  •  loratadine (CLARITIN) 10 MG tablet, Take  by mouth., Disp: , Rfl:   •  metaxalone (SKELAXIN) 800 MG tablet, Take 1 tablet by mouth 3 (Three) Times a Day., Disp: 270 tablet, Rfl: 4  •  metoprolol succinate XL (TOPROL-XL) 50 MG 24 hr tablet, TAKE 1 TABLET DAILY, Disp: 90 tablet, Rfl: 0  •  traMADol (ULTRAM) 50 MG tablet, Take 1 tablet by mouth every 6 (six) hours as needed for moderate pain (4-6)., Disp: 40 tablet, Rfl: 3  •  triamcinolone (KENALOG) 0.1 % cream, ,  Disp: , Rfl:   •  valsartan-hydrochlorothiazide (DIOVAN-HCT) 320-25 MG per tablet, TAKE 1 TABLET DAILY, Disp: 90 tablet, Rfl: 2     Objective     HPI Comments: He had a little bit of a cough last week which was nonproductive.  It's much better now and he feels it's on his way out.    He is here today to follow-up on his hypercholesterolemia and hypertension.    Apart from his severe chronic lumbar spine pain secondary to degenerative arthritis of the lumbar spine, he has complaints.    Cough     Hyperlipidemia          Review of Systems   Constitutional: Negative.    HENT: Negative.    Respiratory: Positive for cough.    Cardiovascular: Negative.    Musculoskeletal:        He has severe chronic lumbar spine pain secondary to degenerative arthritis.   Psychiatric/Behavioral: Negative.        Physical Exam   Constitutional: He is oriented to person, place, and time. He appears well-developed and well-nourished.   Pleasant, neatly groomed, ambulates with the aid we'll walker.   HENT:   Head: Normocephalic and atraumatic.   Cardiovascular: Normal rate and regular rhythm.    Pulmonary/Chest: Effort normal.   Neurological: He is alert and oriented to person, place, and time.   Psychiatric: He has a normal mood and affect. His behavior is normal.   Nursing note and vitals reviewed.        Problem List Items Addressed This Visit        Cardiovascular and Mediastinum    Hyperlipidemia    Hypertension - Primary       Respiratory    Cough       Musculoskeletal and Integument    Osteoarthritis of lumbar spine            PLAN  His cough is secondary to passing viral upper respiratory tract infection.  He appears to be getting better and he will let me know if he takes a turn for the worse.    His hypertension is well-controlled.    His hypercholesterolemia is with excellent control on atorvastatin.    He has severe degenerative disc disease and osteoarthritis of the lumbar spine.  He takes tramadol infrequently for this  pain.    I asked him to follow-up in about 6 months.   No Follow-up on file.

## 2018-02-07 RX ORDER — ESOMEPRAZOLE MAGNESIUM 40 MG/1
CAPSULE, DELAYED RELEASE ORAL
Qty: 90 CAPSULE | Refills: 1 | Status: SHIPPED | OUTPATIENT
Start: 2018-02-07 | End: 2018-08-06 | Stop reason: SDUPTHER

## 2018-03-12 RX ORDER — EZETIMIBE 10 MG/1
TABLET ORAL
Qty: 90 TABLET | Refills: 1 | Status: SHIPPED | OUTPATIENT
Start: 2018-03-12 | End: 2018-09-08 | Stop reason: SDUPTHER

## 2018-03-12 RX ORDER — CELECOXIB 200 MG/1
CAPSULE ORAL
Qty: 90 CAPSULE | Refills: 1 | Status: SHIPPED | OUTPATIENT
Start: 2018-03-12 | End: 2018-09-08 | Stop reason: SDUPTHER

## 2018-04-02 ENCOUNTER — OFFICE VISIT (OUTPATIENT)
Dept: FAMILY MEDICINE CLINIC | Facility: CLINIC | Age: 83
End: 2018-04-02

## 2018-04-02 VITALS
DIASTOLIC BLOOD PRESSURE: 70 MMHG | BODY MASS INDEX: 42.89 KG/M2 | HEIGHT: 68 IN | RESPIRATION RATE: 20 BRPM | WEIGHT: 283 LBS | TEMPERATURE: 97.8 F | SYSTOLIC BLOOD PRESSURE: 142 MMHG | HEART RATE: 84 BPM | OXYGEN SATURATION: 94 %

## 2018-04-02 DIAGNOSIS — J40 BRONCHITIS: ICD-10-CM

## 2018-04-02 DIAGNOSIS — R05.9 COUGH: Primary | ICD-10-CM

## 2018-04-02 PROCEDURE — 99214 OFFICE O/P EST MOD 30 MIN: CPT | Performed by: INTERNAL MEDICINE

## 2018-04-02 PROCEDURE — 71046 X-RAY EXAM CHEST 2 VIEWS: CPT | Performed by: INTERNAL MEDICINE

## 2018-04-02 RX ORDER — AZITHROMYCIN 250 MG/1
TABLET, FILM COATED ORAL
Qty: 6 TABLET | Refills: 0 | Status: SHIPPED | OUTPATIENT
Start: 2018-04-02 | End: 2018-04-11

## 2018-04-02 NOTE — PROGRESS NOTES
Subjective   Harry Potts is a 87 y.o. male. Patient is here today for   Chief Complaint   Patient presents with   • Bronchitis     follow up          Vitals:    04/02/18 1259   BP: 142/70   Pulse: 84   Resp: 20   Temp: 97.8 °F (36.6 °C)   SpO2: 94%     The following portions of the patient's history were reviewed and updated as appropriate: allergies, current medications, past family history, past medical history, past social history, past surgical history and problem list.    Past Medical History:   Diagnosis Date   • GERD (gastroesophageal reflux disease)    • Hyperglycemia    • Hyperlipidemia    • Hypertension    • Obesity    • Spondylolysis, lumbar region       Allergies   Allergen Reactions   • Morphine And Related       Social History     Social History   • Marital status:      Spouse name: N/A   • Number of children: N/A   • Years of education: N/A     Occupational History   • Not on file.     Social History Main Topics   • Smoking status: Never Smoker   • Smokeless tobacco: Never Used   • Alcohol use No   • Drug use: No   • Sexual activity: Not on file     Other Topics Concern   • Not on file     Social History Narrative   • No narrative on file        Current Outpatient Prescriptions:   •  amLODIPine (NORVASC) 10 MG tablet, TAKE 1 TABLET DAILY, Disp: 90 tablet, Rfl: 2  •  atorvastatin (LIPITOR) 20 MG tablet, TAKE 1 TABLET DAILY, Disp: 90 tablet, Rfl: 1  •  celecoxib (CeleBREX) 200 MG capsule, TAKE 1 CAPSULE DAILY AS NEEDED, Disp: 90 capsule, Rfl: 1  •  doxazosin (CARDURA) 4 MG tablet, TAKE 1 TABLET DAILY AS DIRECTED, Disp: 90 tablet, Rfl: 2  •  esomeprazole (nexIUM) 40 MG capsule, TAKE 1 CAPSULE DAILY, Disp: 90 capsule, Rfl: 1  •  ezetimibe (ZETIA) 10 MG tablet, TAKE 1 TABLET DAILY, Disp: 90 tablet, Rfl: 1  •  loratadine (CLARITIN) 10 MG tablet, Take  by mouth., Disp: , Rfl:   •  metaxalone (SKELAXIN) 800 MG tablet, Take 1 tablet by mouth 3 (Three) Times a Day., Disp: 270 tablet, Rfl: 4  •   metoprolol succinate XL (TOPROL-XL) 50 MG 24 hr tablet, TAKE 1 TABLET DAILY, Disp: 90 tablet, Rfl: 0  •  traMADol (ULTRAM) 50 MG tablet, Take 1 tablet by mouth every 6 (six) hours as needed for moderate pain (4-6)., Disp: 40 tablet, Rfl: 3  •  triamcinolone (KENALOG) 0.1 % cream, , Disp: , Rfl:   •  valsartan-hydrochlorothiazide (DIOVAN-HCT) 320-25 MG per tablet, TAKE 1 TABLET DAILY, Disp: 90 tablet, Rfl: 2  •  azithromycin (ZITHROMAX) 250 MG tablet, Take 2 tablets the first day, then 1 tablet daily for 4 days., Disp: 6 tablet, Rfl: 0     Objective     History of Present Illness Harry complains of nasal congestion and cough that started about a week ago.  He went to an urgent care and was given prednisone.  He continues to cough and have some wheezing.  He really doesn't feel bad.  He denies fever, chills, headache, shortness of breath, or myalgias.    Review of Systems   HENT: Positive for congestion.    Respiratory: Positive for cough and wheezing. Negative for shortness of breath.    Neurological: Negative.    Psychiatric/Behavioral: Negative.        Physical Exam   Constitutional: He appears well-developed and well-nourished.   HENT:   Nose: Nose normal.   Mouth/Throat: Oropharynx is clear and moist.   Cardiovascular: Normal rate and regular rhythm.    Pulmonary/Chest: Effort normal. He has no wheezes. He has rales (left base).   Vitals reviewed.      ASSESSMENT     Problem List Items Addressed This Visit        Respiratory    Cough - Primary    Relevant Orders    XR Chest PA & Lateral (Completed)    Bronchitis      Other Visit Diagnoses    None.         PLAN  Patient Instructions   Chest x-ray is suboptimal due to patient rotation do not appreciate any infiltrates.  Drink plenty of fluids and start azithromycin as instructed.    Return if symptoms worsen or fail to improve.

## 2018-04-02 NOTE — PATIENT INSTRUCTIONS
Chest x-ray is suboptimal due to patient rotation do not appreciate any infiltrates.  Drink plenty of fluids and start azithromycin as instructed.

## 2018-04-10 RX ORDER — METOPROLOL SUCCINATE 50 MG/1
TABLET, EXTENDED RELEASE ORAL
Qty: 90 TABLET | Refills: 0 | Status: SHIPPED | OUTPATIENT
Start: 2018-04-10 | End: 2018-07-07 | Stop reason: SDUPTHER

## 2018-04-11 ENCOUNTER — OFFICE VISIT (OUTPATIENT)
Dept: FAMILY MEDICINE CLINIC | Facility: CLINIC | Age: 83
End: 2018-04-11

## 2018-04-11 VITALS
DIASTOLIC BLOOD PRESSURE: 77 MMHG | WEIGHT: 287 LBS | RESPIRATION RATE: 20 BRPM | TEMPERATURE: 98.3 F | SYSTOLIC BLOOD PRESSURE: 142 MMHG | HEIGHT: 68 IN | BODY MASS INDEX: 43.5 KG/M2

## 2018-04-11 DIAGNOSIS — J06.9 VIRAL URI WITH COUGH: Primary | ICD-10-CM

## 2018-04-11 PROCEDURE — 99213 OFFICE O/P EST LOW 20 MIN: CPT | Performed by: INTERNAL MEDICINE

## 2018-04-12 ENCOUNTER — TELEPHONE (OUTPATIENT)
Dept: FAMILY MEDICINE CLINIC | Facility: CLINIC | Age: 83
End: 2018-04-12

## 2018-04-12 NOTE — TELEPHONE ENCOUNTER
Attempted to call pt and let him know he is not due for labs till July. Please reschedule pt for July.

## 2018-04-13 DIAGNOSIS — R73.9 HYPERGLYCEMIA: ICD-10-CM

## 2018-04-13 DIAGNOSIS — E78.5 HYPERLIPIDEMIA, UNSPECIFIED HYPERLIPIDEMIA TYPE: ICD-10-CM

## 2018-04-13 LAB
ALBUMIN SERPL-MCNC: 3.9 G/DL (ref 3.5–5.2)
ALBUMIN/GLOB SERPL: 1.6 G/DL
ALP SERPL-CCNC: 71 U/L (ref 39–117)
ALT SERPL-CCNC: 28 U/L (ref 1–41)
AST SERPL-CCNC: 23 U/L (ref 1–40)
BASOPHILS # BLD AUTO: 0.02 10*3/MM3 (ref 0–0.2)
BASOPHILS NFR BLD AUTO: 0.3 % (ref 0–1.5)
BILIRUB SERPL-MCNC: 0.4 MG/DL (ref 0.1–1.2)
BUN SERPL-MCNC: 23 MG/DL (ref 8–23)
BUN/CREAT SERPL: 35.4 (ref 7–25)
CALCIUM SERPL-MCNC: 9.1 MG/DL (ref 8.6–10.5)
CHLORIDE SERPL-SCNC: 101 MMOL/L (ref 98–107)
CHOLEST SERPL-MCNC: 155 MG/DL (ref 0–200)
CO2 SERPL-SCNC: 30.6 MMOL/L (ref 22–29)
CREAT SERPL-MCNC: 0.65 MG/DL (ref 0.76–1.27)
EOSINOPHIL # BLD AUTO: 0.31 10*3/MM3 (ref 0–0.7)
EOSINOPHIL NFR BLD AUTO: 4.4 % (ref 0.3–6.2)
ERYTHROCYTE [DISTWIDTH] IN BLOOD BY AUTOMATED COUNT: 14 % (ref 11.5–14.5)
GFR SERPLBLD CREATININE-BSD FMLA CKD-EPI: 116 ML/MIN/1.73
GFR SERPLBLD CREATININE-BSD FMLA CKD-EPI: 141 ML/MIN/1.73
GLOBULIN SER CALC-MCNC: 2.5 GM/DL
GLUCOSE SERPL-MCNC: 106 MG/DL (ref 65–99)
HCT VFR BLD AUTO: 44.6 % (ref 40.4–52.2)
HDLC SERPL-MCNC: 57 MG/DL (ref 40–60)
HGB BLD-MCNC: 14.2 G/DL (ref 13.7–17.6)
IMM GRANULOCYTES # BLD: 0.02 10*3/MM3 (ref 0–0.03)
IMM GRANULOCYTES NFR BLD: 0.3 % (ref 0–0.5)
LDLC SERPL CALC-MCNC: 84 MG/DL (ref 0–100)
LDLC/HDLC SERPL: 1.47 {RATIO}
LYMPHOCYTES # BLD AUTO: 1.98 10*3/MM3 (ref 0.9–4.8)
LYMPHOCYTES NFR BLD AUTO: 28.1 % (ref 19.6–45.3)
MCH RBC QN AUTO: 30 PG (ref 27–32.7)
MCHC RBC AUTO-ENTMCNC: 31.8 G/DL (ref 32.6–36.4)
MCV RBC AUTO: 94.3 FL (ref 79.8–96.2)
MONOCYTES # BLD AUTO: 0.56 10*3/MM3 (ref 0.2–1.2)
MONOCYTES NFR BLD AUTO: 8 % (ref 5–12)
NEUTROPHILS # BLD AUTO: 4.15 10*3/MM3 (ref 1.9–8.1)
NEUTROPHILS NFR BLD AUTO: 58.9 % (ref 42.7–76)
PLATELET # BLD AUTO: 150 10*3/MM3 (ref 140–500)
POTASSIUM SERPL-SCNC: 4.4 MMOL/L (ref 3.5–5.2)
PROT SERPL-MCNC: 6.4 G/DL (ref 6–8.5)
RBC # BLD AUTO: 4.73 10*6/MM3 (ref 4.6–6)
SODIUM SERPL-SCNC: 143 MMOL/L (ref 136–145)
TRIGL SERPL-MCNC: 70 MG/DL (ref 0–150)
VLDLC SERPL CALC-MCNC: 14 MG/DL (ref 5–40)
WBC # BLD AUTO: 7.04 10*3/MM3 (ref 4.5–10.7)

## 2018-04-20 ENCOUNTER — OFFICE VISIT (OUTPATIENT)
Dept: FAMILY MEDICINE CLINIC | Facility: CLINIC | Age: 83
End: 2018-04-20

## 2018-04-20 VITALS
WEIGHT: 285 LBS | OXYGEN SATURATION: 97 % | HEART RATE: 63 BPM | SYSTOLIC BLOOD PRESSURE: 124 MMHG | RESPIRATION RATE: 16 BRPM | DIASTOLIC BLOOD PRESSURE: 70 MMHG | HEIGHT: 68 IN | BODY MASS INDEX: 43.19 KG/M2 | TEMPERATURE: 97.5 F

## 2018-04-20 DIAGNOSIS — I10 ESSENTIAL HYPERTENSION: ICD-10-CM

## 2018-04-20 DIAGNOSIS — E78.00 HYPERCHOLESTEROLEMIA: Primary | ICD-10-CM

## 2018-04-20 PROCEDURE — 99213 OFFICE O/P EST LOW 20 MIN: CPT | Performed by: INTERNAL MEDICINE

## 2018-04-20 NOTE — PROGRESS NOTES
Subjective   Harry Potts is a 87 y.o. male. Patient is here today for   Chief Complaint   Patient presents with   • Follow-up     hyperlipidemia           Vitals:    04/20/18 0830   BP: 124/70   Pulse: 63   Resp: 16   Temp: 97.5 °F (36.4 °C)   SpO2: 97%       Past Medical History:   Diagnosis Date   • GERD (gastroesophageal reflux disease)    • Hyperglycemia    • Hyperlipidemia    • Hypertension    • Obesity    • Spondylolysis, lumbar region       Allergies   Allergen Reactions   • Morphine And Related       Social History     Social History   • Marital status:      Spouse name: N/A   • Number of children: N/A   • Years of education: N/A     Occupational History   • Not on file.     Social History Main Topics   • Smoking status: Never Smoker   • Smokeless tobacco: Never Used   • Alcohol use No   • Drug use: No   • Sexual activity: Not on file     Other Topics Concern   • Not on file     Social History Narrative   • No narrative on file        Current Outpatient Prescriptions:   •  amLODIPine (NORVASC) 10 MG tablet, TAKE 1 TABLET DAILY, Disp: 90 tablet, Rfl: 2  •  atorvastatin (LIPITOR) 20 MG tablet, TAKE 1 TABLET DAILY, Disp: 90 tablet, Rfl: 1  •  celecoxib (CeleBREX) 200 MG capsule, TAKE 1 CAPSULE DAILY AS NEEDED, Disp: 90 capsule, Rfl: 1  •  doxazosin (CARDURA) 4 MG tablet, TAKE 1 TABLET DAILY AS DIRECTED, Disp: 90 tablet, Rfl: 2  •  esomeprazole (nexIUM) 40 MG capsule, TAKE 1 CAPSULE DAILY, Disp: 90 capsule, Rfl: 1  •  ezetimibe (ZETIA) 10 MG tablet, TAKE 1 TABLET DAILY, Disp: 90 tablet, Rfl: 1  •  loratadine (CLARITIN) 10 MG tablet, Take  by mouth., Disp: , Rfl:   •  metaxalone (SKELAXIN) 800 MG tablet, Take 1 tablet by mouth 3 (Three) Times a Day., Disp: 270 tablet, Rfl: 4  •  metoprolol succinate XL (TOPROL-XL) 50 MG 24 hr tablet, TAKE 1 TABLET DAILY, Disp: 90 tablet, Rfl: 0  •  traMADol (ULTRAM) 50 MG tablet, Take 1 tablet by mouth every 6 (six) hours as needed for moderate pain (4-6)., Disp: 40  tablet, Rfl: 3  •  triamcinolone (KENALOG) 0.1 % cream, , Disp: , Rfl:   •  valsartan-hydrochlorothiazide (DIOVAN-HCT) 320-25 MG per tablet, TAKE 1 TABLET DAILY, Disp: 90 tablet, Rfl: 2     Objective     He is here to follow-up on his hypercholesterolemia and hypertension.    She has no complaints.    He does ambulate with a 3 wheeled walker.  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  Nevertheless, he does not complain about it.         Review of Systems   Constitutional: Negative.    HENT: Negative.    Respiratory: Negative.    Cardiovascular: Negative.    Musculoskeletal: Negative.    Psychiatric/Behavioral: Negative.        Physical Exam   Constitutional: He is oriented to person, place, and time. He appears well-developed and well-nourished.   HENT:   Head: Normocephalic and atraumatic.   Pulmonary/Chest: Effort normal.   Neurological: He is alert and oriented to person, place, and time.   Psychiatric: He has a normal mood and affect.   Nursing note and vitals reviewed.        Problem List Items Addressed This Visit        Cardiovascular and Mediastinum    Hypercholesterolemia - Primary    Hypertension      Other Visit Diagnoses    None.           PLAN  His hypercholesterolemia is well-controlled.    His hypertension is well-controlled.    I like to see him back in about 4 months.  No Follow-up on file.

## 2018-04-27 RX ORDER — VALSARTAN AND HYDROCHLOROTHIAZIDE 320; 25 MG/1; MG/1
TABLET, FILM COATED ORAL
Qty: 90 TABLET | Refills: 2 | Status: SHIPPED | OUTPATIENT
Start: 2018-04-27 | End: 2018-08-23 | Stop reason: ALTCHOICE

## 2018-04-27 RX ORDER — AMLODIPINE BESYLATE 10 MG/1
TABLET ORAL
Qty: 90 TABLET | Refills: 2 | Status: SHIPPED | OUTPATIENT
Start: 2018-04-27 | End: 2019-01-22 | Stop reason: SDUPTHER

## 2018-05-01 PROBLEM — J06.9 VIRAL URI WITH COUGH: Status: ACTIVE | Noted: 2018-05-01

## 2018-05-01 NOTE — PROGRESS NOTES
Subjective   Harry Potts is a 87 y.o. male. Patient is here today for   Chief Complaint   Patient presents with   • Follow-up     bronchitius    • Cough          Vitals:    04/11/18 1512   BP: 142/77   Resp: 20   Temp: 98.3 °F (36.8 °C)       Past Medical History:   Diagnosis Date   • GERD (gastroesophageal reflux disease)    • Hyperglycemia    • Hyperlipidemia    • Hypertension    • Obesity    • Spondylolysis, lumbar region       Allergies   Allergen Reactions   • Morphine And Related       Social History     Social History   • Marital status:      Spouse name: N/A   • Number of children: N/A   • Years of education: N/A     Occupational History   • Not on file.     Social History Main Topics   • Smoking status: Never Smoker   • Smokeless tobacco: Never Used   • Alcohol use No   • Drug use: No   • Sexual activity: Not on file     Other Topics Concern   • Not on file     Social History Narrative   • No narrative on file        Current Outpatient Prescriptions:   •  amLODIPine (NORVASC) 10 MG tablet, TAKE 1 TABLET DAILY, Disp: 90 tablet, Rfl: 2  •  atorvastatin (LIPITOR) 20 MG tablet, TAKE 1 TABLET DAILY, Disp: 90 tablet, Rfl: 1  •  celecoxib (CeleBREX) 200 MG capsule, TAKE 1 CAPSULE DAILY AS NEEDED, Disp: 90 capsule, Rfl: 1  •  doxazosin (CARDURA) 4 MG tablet, TAKE 1 TABLET DAILY AS DIRECTED, Disp: 90 tablet, Rfl: 2  •  esomeprazole (nexIUM) 40 MG capsule, TAKE 1 CAPSULE DAILY, Disp: 90 capsule, Rfl: 1  •  ezetimibe (ZETIA) 10 MG tablet, TAKE 1 TABLET DAILY, Disp: 90 tablet, Rfl: 1  •  loratadine (CLARITIN) 10 MG tablet, Take  by mouth., Disp: , Rfl:   •  metaxalone (SKELAXIN) 800 MG tablet, Take 1 tablet by mouth 3 (Three) Times a Day., Disp: 270 tablet, Rfl: 4  •  metoprolol succinate XL (TOPROL-XL) 50 MG 24 hr tablet, TAKE 1 TABLET DAILY, Disp: 90 tablet, Rfl: 0  •  traMADol (ULTRAM) 50 MG tablet, Take 1 tablet by mouth every 6 (six) hours as needed for moderate pain (4-6)., Disp: 40 tablet, Rfl: 3  •   triamcinolone (KENALOG) 0.1 % cream, , Disp: , Rfl:   •  valsartan-hydrochlorothiazide (DIOVAN-HCT) 320-25 MG per tablet, TAKE 1 TABLET DAILY, Disp: 90 tablet, Rfl: 2     Objective     He has had a cough for a week or so.  Since improved.  He denies any production to his cough.    He denies any dyspnea, fevers, chills, wheezing etc.      Cough          Review of Systems   Constitutional: Negative.    HENT: Negative.    Respiratory: Positive for cough.    Cardiovascular: Negative.    Musculoskeletal: Negative.    Psychiatric/Behavioral: Negative.        Physical Exam   Constitutional: He appears well-developed and well-nourished.   HENT:   Head: Normocephalic and atraumatic.   Cardiovascular: Normal rate, regular rhythm, normal heart sounds and intact distal pulses.  Exam reveals no gallop and no friction rub.    No murmur heard.  Pulmonary/Chest: Effort normal and breath sounds normal. No respiratory distress. He has no wheezes. He has no rales. He exhibits no tenderness.   Skin: Skin is warm and dry.   Psychiatric: He has a normal mood and affect.   Nursing note and vitals reviewed.        Problem List Items Addressed This Visit        Respiratory    Viral URI with cough - Primary      Other Visit Diagnoses    None.           PLAN  He is had a viral upper respiratory tract infection with a cough for the last week to 10 days which appears to be getting better.  He will let me know if he takes a turn for the worse or fails to improve 100 percent.    Follow-up as previously arranged.  No Follow-up on file.

## 2018-07-09 RX ORDER — ATORVASTATIN CALCIUM 20 MG/1
TABLET, FILM COATED ORAL
Qty: 90 TABLET | Refills: 1 | Status: SHIPPED | OUTPATIENT
Start: 2018-07-09 | End: 2019-01-05 | Stop reason: SDUPTHER

## 2018-07-09 RX ORDER — METOPROLOL SUCCINATE 50 MG/1
TABLET, EXTENDED RELEASE ORAL
Qty: 90 TABLET | Refills: 0 | Status: SHIPPED | OUTPATIENT
Start: 2018-07-09 | End: 2018-10-07 | Stop reason: SDUPTHER

## 2018-07-27 RX ORDER — DOXAZOSIN MESYLATE 4 MG/1
TABLET ORAL
Qty: 90 TABLET | Refills: 2 | Status: SHIPPED | OUTPATIENT
Start: 2018-07-27 | End: 2019-04-23 | Stop reason: SDUPTHER

## 2018-08-06 RX ORDER — ESOMEPRAZOLE MAGNESIUM 40 MG/1
CAPSULE, DELAYED RELEASE ORAL
Qty: 90 CAPSULE | Refills: 1 | Status: SHIPPED | OUTPATIENT
Start: 2018-08-06 | End: 2019-02-02 | Stop reason: SDUPTHER

## 2018-08-14 DIAGNOSIS — E78.00 HYPERCHOLESTEROLEMIA: Primary | ICD-10-CM

## 2018-08-14 DIAGNOSIS — R73.9 HYPERGLYCEMIA: ICD-10-CM

## 2018-08-15 LAB
ALBUMIN SERPL-MCNC: 4.2 G/DL (ref 3.5–5.2)
ALBUMIN/GLOB SERPL: 1.7 G/DL
ALP SERPL-CCNC: 70 U/L (ref 39–117)
ALT SERPL-CCNC: 22 U/L (ref 1–41)
APPEARANCE UR: CLEAR
AST SERPL-CCNC: 22 U/L (ref 1–40)
BACTERIA #/AREA URNS HPF: NORMAL /HPF
BASOPHILS # BLD AUTO: 0.03 10*3/MM3 (ref 0–0.2)
BASOPHILS NFR BLD AUTO: 0.5 % (ref 0–1.5)
BILIRUB SERPL-MCNC: 0.6 MG/DL (ref 0.1–1.2)
BILIRUB UR QL STRIP: NEGATIVE
BUN SERPL-MCNC: 24 MG/DL (ref 8–23)
BUN/CREAT SERPL: 32.4 (ref 7–25)
CALCIUM SERPL-MCNC: 9.3 MG/DL (ref 8.6–10.5)
CASTS URNS MICRO: NORMAL
CHLORIDE SERPL-SCNC: 99 MMOL/L (ref 98–107)
CHOLEST SERPL-MCNC: 149 MG/DL (ref 0–200)
CO2 SERPL-SCNC: 30 MMOL/L (ref 22–29)
COLOR UR: (no result)
CREAT SERPL-MCNC: 0.74 MG/DL (ref 0.76–1.27)
EOSINOPHIL # BLD AUTO: 0.3 10*3/MM3 (ref 0–0.7)
EOSINOPHIL NFR BLD AUTO: 4.6 % (ref 0.3–6.2)
EPI CELLS #/AREA URNS HPF: NORMAL /HPF
ERYTHROCYTE [DISTWIDTH] IN BLOOD BY AUTOMATED COUNT: 13.7 % (ref 11.5–14.5)
GLOBULIN SER CALC-MCNC: 2.5 GM/DL
GLUCOSE SERPL-MCNC: 106 MG/DL (ref 65–99)
GLUCOSE UR QL: NEGATIVE
HBA1C MFR BLD: 5.84 % (ref 4.8–5.6)
HCT VFR BLD AUTO: 46 % (ref 40.4–52.2)
HDLC SERPL-MCNC: 54 MG/DL (ref 40–60)
HGB BLD-MCNC: 14.7 G/DL (ref 13.7–17.6)
HGB UR QL STRIP: NEGATIVE
IMM GRANULOCYTES # BLD: 0.01 10*3/MM3 (ref 0–0.03)
IMM GRANULOCYTES NFR BLD: 0.2 % (ref 0–0.5)
KETONES UR QL STRIP: NEGATIVE
LDLC SERPL CALC-MCNC: 77 MG/DL (ref 0–100)
LDLC/HDLC SERPL: 1.42 {RATIO}
LEUKOCYTE ESTERASE UR QL STRIP: NEGATIVE
LYMPHOCYTES # BLD AUTO: 2.32 10*3/MM3 (ref 0.9–4.8)
LYMPHOCYTES NFR BLD AUTO: 35.2 % (ref 19.6–45.3)
MCH RBC QN AUTO: 29.9 PG (ref 27–32.7)
MCHC RBC AUTO-ENTMCNC: 32 G/DL (ref 32.6–36.4)
MCV RBC AUTO: 93.5 FL (ref 79.8–96.2)
MONOCYTES # BLD AUTO: 0.54 10*3/MM3 (ref 0.2–1.2)
MONOCYTES NFR BLD AUTO: 8.2 % (ref 5–12)
NEUTROPHILS # BLD AUTO: 3.4 10*3/MM3 (ref 1.9–8.1)
NEUTROPHILS NFR BLD AUTO: 51.5 % (ref 42.7–76)
NITRITE UR QL STRIP: NEGATIVE
PH UR STRIP: 6 [PH] (ref 5–8)
PLATELET # BLD AUTO: 157 10*3/MM3 (ref 140–500)
POTASSIUM SERPL-SCNC: 4.2 MMOL/L (ref 3.5–5.2)
PROT SERPL-MCNC: 6.7 G/DL (ref 6–8.5)
PROT UR QL STRIP: NEGATIVE
RBC # BLD AUTO: 4.92 10*6/MM3 (ref 4.6–6)
RBC #/AREA URNS HPF: NORMAL /HPF
SODIUM SERPL-SCNC: 141 MMOL/L (ref 136–145)
SP GR UR: 1.03 (ref 1–1.03)
TRIGL SERPL-MCNC: 91 MG/DL (ref 0–150)
UROBILINOGEN UR STRIP-MCNC: (no result) MG/DL
VLDLC SERPL CALC-MCNC: 18.2 MG/DL (ref 5–40)
WBC # BLD AUTO: 6.59 10*3/MM3 (ref 4.5–10.7)
WBC #/AREA URNS HPF: NORMAL /HPF

## 2018-08-23 ENCOUNTER — OFFICE VISIT (OUTPATIENT)
Dept: FAMILY MEDICINE CLINIC | Facility: CLINIC | Age: 83
End: 2018-08-23

## 2018-08-23 VITALS
RESPIRATION RATE: 16 BRPM | DIASTOLIC BLOOD PRESSURE: 64 MMHG | WEIGHT: 282.4 LBS | HEIGHT: 68 IN | BODY MASS INDEX: 42.8 KG/M2 | SYSTOLIC BLOOD PRESSURE: 120 MMHG | HEART RATE: 62 BPM | OXYGEN SATURATION: 95 % | TEMPERATURE: 97.7 F

## 2018-08-23 DIAGNOSIS — M47.896 OTHER OSTEOARTHRITIS OF SPINE, LUMBAR REGION: ICD-10-CM

## 2018-08-23 DIAGNOSIS — R73.9 HYPERGLYCEMIA: ICD-10-CM

## 2018-08-23 DIAGNOSIS — I10 ESSENTIAL HYPERTENSION: Primary | ICD-10-CM

## 2018-08-23 DIAGNOSIS — E78.00 HYPERCHOLESTEROLEMIA: ICD-10-CM

## 2018-08-23 DIAGNOSIS — E66.01 MORBID OBESITY (HCC): ICD-10-CM

## 2018-08-23 PROCEDURE — 99214 OFFICE O/P EST MOD 30 MIN: CPT | Performed by: INTERNAL MEDICINE

## 2018-08-23 RX ORDER — ASPIRIN 81 MG/1
81 TABLET ORAL DAILY
Status: ON HOLD | COMMUNITY

## 2018-08-26 NOTE — PROGRESS NOTES
Subjective   Harry Potts is a 87 y.o. male. Patient is here today for   Chief Complaint   Patient presents with   • Follow-up     hypercholesterolemia           Vitals:    08/23/18 0801   BP: 120/64   Pulse: 62   Resp: 16   Temp: 97.7 °F (36.5 °C)   SpO2: 95%       Past Medical History:   Diagnosis Date   • GERD (gastroesophageal reflux disease)    • Hyperglycemia    • Hyperlipidemia    • Hypertension    • Obesity    • Spondylolysis, lumbar region       Allergies   Allergen Reactions   • Morphine And Related       Social History     Social History   • Marital status:      Spouse name: N/A   • Number of children: N/A   • Years of education: N/A     Occupational History   • Not on file.     Social History Main Topics   • Smoking status: Never Smoker   • Smokeless tobacco: Never Used   • Alcohol use No   • Drug use: No   • Sexual activity: Not on file     Other Topics Concern   • Not on file     Social History Narrative   • No narrative on file        Current Outpatient Prescriptions:   •  amLODIPine (NORVASC) 10 MG tablet, TAKE 1 TABLET DAILY, Disp: 90 tablet, Rfl: 2  •  aspirin 81 MG EC tablet, Take 81 mg by mouth Daily., Disp: , Rfl:   •  atorvastatin (LIPITOR) 20 MG tablet, TAKE 1 TABLET DAILY, Disp: 90 tablet, Rfl: 1  •  celecoxib (CeleBREX) 200 MG capsule, TAKE 1 CAPSULE DAILY AS NEEDED, Disp: 90 capsule, Rfl: 1  •  doxazosin (CARDURA) 4 MG tablet, TAKE 1 TABLET DAILY AS DIRECTED, Disp: 90 tablet, Rfl: 2  •  esomeprazole (nexIUM) 40 MG capsule, TAKE 1 CAPSULE DAILY, Disp: 90 capsule, Rfl: 1  •  ezetimibe (ZETIA) 10 MG tablet, TAKE 1 TABLET DAILY, Disp: 90 tablet, Rfl: 1  •  loratadine (CLARITIN) 10 MG tablet, Take  by mouth., Disp: , Rfl:   •  metaxalone (SKELAXIN) 800 MG tablet, Take 1 tablet by mouth 3 (Three) Times a Day., Disp: 270 tablet, Rfl: 4  •  metoprolol succinate XL (TOPROL-XL) 50 MG 24 hr tablet, TAKE 1 TABLET DAILY, Disp: 90 tablet, Rfl: 0     Objective     Is here today to follow-up on  hypertension and hypercholesterolemia.    He is recently .    He is happy and feels that he is doing relatively well.    He has severe degenerative arthritis affecting the back, knees, hips etc.  In spite of this, he has no complaints today.         Review of Systems   Constitutional: Negative.    HENT: Negative.    Respiratory: Negative.    Cardiovascular: Negative.    Musculoskeletal:        He has severe lumbar spine pain, hip pain, knee pain bilaterally.  He ambulates with a wheeled walker.   Psychiatric/Behavioral: Negative.        Physical Exam   Constitutional: He is oriented to person, place, and time. He appears well-developed and well-nourished.   Pleasant, neatly groomed, BMI 42.   HENT:   Head: Normocephalic and atraumatic.   Cardiovascular: Normal rate, regular rhythm and normal heart sounds.  Exam reveals no friction rub.    No murmur heard.  Pulmonary/Chest: Effort normal and breath sounds normal. No respiratory distress.   Neurological: He is alert and oriented to person, place, and time.   Psychiatric: He has a normal mood and affect. His behavior is normal.   Nursing note and vitals reviewed.        Problem List Items Addressed This Visit        Cardiovascular and Mediastinum    Hypercholesterolemia    Hypertension - Primary       Digestive    Morbid obesity (CMS/HCC)       Musculoskeletal and Integument    Osteoarthritis of lumbar spine       Other    Hyperglycemia            PLAN  She has good control of his hypertension today.    He has hyperglycemia and is obese.  We will just watch his glucoses for now.    I've encouraged weight loss via decrease caloric intake.    He has osteoarthritis diffusely.  He takes Celebrex occasionally and find it useful.    Hypercholesterolemia with excellent control.    I asked him to follow-up for a Medicare wellness visit once yearly.    I like to see him back in 4 months.  No Follow-up on file.

## 2018-09-10 RX ORDER — CELECOXIB 200 MG/1
CAPSULE ORAL
Qty: 90 CAPSULE | Refills: 1 | Status: SHIPPED | OUTPATIENT
Start: 2018-09-10 | End: 2019-03-09 | Stop reason: SDUPTHER

## 2018-09-10 RX ORDER — EZETIMIBE 10 MG/1
TABLET ORAL
Qty: 90 TABLET | Refills: 1 | Status: SHIPPED | OUTPATIENT
Start: 2018-09-10 | End: 2019-03-09 | Stop reason: SDUPTHER

## 2018-10-08 RX ORDER — METOPROLOL SUCCINATE 50 MG/1
TABLET, EXTENDED RELEASE ORAL
Qty: 90 TABLET | Refills: 0 | Status: SHIPPED | OUTPATIENT
Start: 2018-10-08 | End: 2019-01-05 | Stop reason: SDUPTHER

## 2018-11-13 DIAGNOSIS — E78.00 HYPERCHOLESTEROLEMIA: Primary | ICD-10-CM

## 2018-11-13 DIAGNOSIS — R73.9 HYPERGLYCEMIA: ICD-10-CM

## 2018-11-14 LAB
ALBUMIN SERPL-MCNC: 4.3 G/DL (ref 3.5–5.2)
ALBUMIN/GLOB SERPL: 1.6 G/DL
ALP SERPL-CCNC: 77 U/L (ref 39–117)
ALT SERPL-CCNC: 22 U/L (ref 1–41)
APPEARANCE UR: CLEAR
AST SERPL-CCNC: 20 U/L (ref 1–40)
BACTERIA #/AREA URNS HPF: ABNORMAL /HPF
BASOPHILS # BLD AUTO: 0.03 10*3/MM3 (ref 0–0.2)
BASOPHILS NFR BLD AUTO: 0.6 % (ref 0–1.5)
BILIRUB SERPL-MCNC: 0.5 MG/DL (ref 0.1–1.2)
BILIRUB UR QL STRIP: NEGATIVE
BUN SERPL-MCNC: 20 MG/DL (ref 8–23)
BUN/CREAT SERPL: 25.3 (ref 7–25)
CALCIUM SERPL-MCNC: 9.6 MG/DL (ref 8.6–10.5)
CASTS URNS MICRO: ABNORMAL
CHLORIDE SERPL-SCNC: 102 MMOL/L (ref 98–107)
CHOLEST SERPL-MCNC: 152 MG/DL (ref 0–200)
CO2 SERPL-SCNC: 31.3 MMOL/L (ref 22–29)
COLOR UR: YELLOW
CREAT SERPL-MCNC: 0.79 MG/DL (ref 0.76–1.27)
EOSINOPHIL # BLD AUTO: 0.2 10*3/MM3 (ref 0–0.7)
EOSINOPHIL NFR BLD AUTO: 3.8 % (ref 0.3–6.2)
EPI CELLS #/AREA URNS HPF: ABNORMAL /HPF
ERYTHROCYTE [DISTWIDTH] IN BLOOD BY AUTOMATED COUNT: 13.5 % (ref 11.5–14.5)
GLOBULIN SER CALC-MCNC: 2.7 GM/DL
GLUCOSE SERPL-MCNC: 112 MG/DL (ref 65–99)
GLUCOSE UR QL: NEGATIVE
HBA1C MFR BLD: 5.97 % (ref 4.8–5.6)
HCT VFR BLD AUTO: 46.2 % (ref 40.4–52.2)
HDLC SERPL-MCNC: 55 MG/DL (ref 40–60)
HGB BLD-MCNC: 14.7 G/DL (ref 13.7–17.6)
HGB UR QL STRIP: NEGATIVE
IMM GRANULOCYTES # BLD: 0.01 10*3/MM3 (ref 0–0.03)
IMM GRANULOCYTES NFR BLD: 0.2 % (ref 0–0.5)
KETONES UR QL STRIP: (no result)
LDLC SERPL CALC-MCNC: 83 MG/DL (ref 0–100)
LDLC/HDLC SERPL: 1.51 {RATIO}
LEUKOCYTE ESTERASE UR QL STRIP: NEGATIVE
LYMPHOCYTES # BLD AUTO: 1.74 10*3/MM3 (ref 0.9–4.8)
LYMPHOCYTES NFR BLD AUTO: 32.8 % (ref 19.6–45.3)
MCH RBC QN AUTO: 29.9 PG (ref 27–32.7)
MCHC RBC AUTO-ENTMCNC: 31.8 G/DL (ref 32.6–36.4)
MCV RBC AUTO: 94.1 FL (ref 79.8–96.2)
MONOCYTES # BLD AUTO: 0.59 10*3/MM3 (ref 0.2–1.2)
MONOCYTES NFR BLD AUTO: 11.1 % (ref 5–12)
NEUTROPHILS # BLD AUTO: 2.75 10*3/MM3 (ref 1.9–8.1)
NEUTROPHILS NFR BLD AUTO: 51.7 % (ref 42.7–76)
NITRITE UR QL STRIP: NEGATIVE
PH UR STRIP: 5.5 [PH] (ref 5–8)
PLATELET # BLD AUTO: 139 10*3/MM3 (ref 140–500)
POTASSIUM SERPL-SCNC: 4.7 MMOL/L (ref 3.5–5.2)
PROT SERPL-MCNC: 7 G/DL (ref 6–8.5)
PROT UR QL STRIP: (no result)
RBC # BLD AUTO: 4.91 10*6/MM3 (ref 4.6–6)
RBC #/AREA URNS HPF: ABNORMAL /HPF
SODIUM SERPL-SCNC: 142 MMOL/L (ref 136–145)
SP GR UR: 1.03 (ref 1–1.03)
TRIGL SERPL-MCNC: 70 MG/DL (ref 0–150)
UROBILINOGEN UR STRIP-MCNC: (no result) MG/DL
VLDLC SERPL CALC-MCNC: 14 MG/DL (ref 5–40)
WBC # BLD AUTO: 5.31 10*3/MM3 (ref 4.5–10.7)
WBC #/AREA URNS HPF: ABNORMAL /HPF

## 2018-11-15 ENCOUNTER — OFFICE VISIT (OUTPATIENT)
Dept: FAMILY MEDICINE CLINIC | Facility: CLINIC | Age: 83
End: 2018-11-15

## 2018-11-15 VITALS
BODY MASS INDEX: 43.19 KG/M2 | OXYGEN SATURATION: 94 % | SYSTOLIC BLOOD PRESSURE: 158 MMHG | WEIGHT: 285 LBS | HEIGHT: 68 IN | TEMPERATURE: 98.2 F | RESPIRATION RATE: 20 BRPM | DIASTOLIC BLOOD PRESSURE: 80 MMHG | HEART RATE: 68 BPM

## 2018-11-15 DIAGNOSIS — J40 BRONCHITIS: Primary | ICD-10-CM

## 2018-11-15 PROCEDURE — 99213 OFFICE O/P EST LOW 20 MIN: CPT | Performed by: INTERNAL MEDICINE

## 2018-11-15 RX ORDER — PREDNISONE 20 MG/1
20 TABLET ORAL 2 TIMES DAILY
Qty: 10 TABLET | Refills: 0 | Status: SHIPPED | OUTPATIENT
Start: 2018-11-15 | End: 2018-11-21

## 2018-11-15 RX ORDER — AZITHROMYCIN 250 MG/1
TABLET, FILM COATED ORAL
Qty: 6 TABLET | Refills: 0 | Status: SHIPPED | OUTPATIENT
Start: 2018-11-15 | End: 2018-11-21

## 2018-11-15 NOTE — PROGRESS NOTES
Subjective   Harry Potts is a 88 y.o. male. Patient is here today for   Chief Complaint   Patient presents with   • Cough     x 2 days           Vitals:    11/15/18 1251   BP: 158/80   Pulse: 68   Resp: 20   Temp: 98.2 °F (36.8 °C)   SpO2: 94%     The following portions of the patient's history were reviewed and updated as appropriate: allergies, current medications, past family history, past medical history, past social history, past surgical history and problem list.    Past Medical History:   Diagnosis Date   • GERD (gastroesophageal reflux disease)    • Hyperglycemia    • Hyperlipidemia    • Hypertension    • Obesity    • Spondylolysis, lumbar region       Allergies   Allergen Reactions   • Morphine And Related       Social History     Socioeconomic History   • Marital status:      Spouse name: Not on file   • Number of children: Not on file   • Years of education: Not on file   • Highest education level: Not on file   Social Needs   • Financial resource strain: Not on file   • Food insecurity - worry: Not on file   • Food insecurity - inability: Not on file   • Transportation needs - medical: Not on file   • Transportation needs - non-medical: Not on file   Occupational History   • Not on file   Tobacco Use   • Smoking status: Never Smoker   • Smokeless tobacco: Never Used   Substance and Sexual Activity   • Alcohol use: No   • Drug use: No   • Sexual activity: Not on file   Other Topics Concern   • Not on file   Social History Narrative   • Not on file        Current Outpatient Medications:   •  amLODIPine (NORVASC) 10 MG tablet, TAKE 1 TABLET DAILY, Disp: 90 tablet, Rfl: 2  •  aspirin 81 MG EC tablet, Take 81 mg by mouth Daily., Disp: , Rfl:   •  atorvastatin (LIPITOR) 20 MG tablet, TAKE 1 TABLET DAILY, Disp: 90 tablet, Rfl: 1  •  celecoxib (CeleBREX) 200 MG capsule, TAKE 1 CAPSULE DAILY AS NEEDED, Disp: 90 capsule, Rfl: 1  •  doxazosin (CARDURA) 4 MG tablet, TAKE 1 TABLET DAILY AS DIRECTED, Disp:  90 tablet, Rfl: 2  •  esomeprazole (nexIUM) 40 MG capsule, TAKE 1 CAPSULE DAILY, Disp: 90 capsule, Rfl: 1  •  ezetimibe (ZETIA) 10 MG tablet, TAKE 1 TABLET DAILY, Disp: 90 tablet, Rfl: 1  •  loratadine (CLARITIN) 10 MG tablet, Take  by mouth., Disp: , Rfl:   •  metaxalone (SKELAXIN) 800 MG tablet, Take 1 tablet by mouth 3 (Three) Times a Day., Disp: 270 tablet, Rfl: 4  •  metoprolol succinate XL (TOPROL-XL) 50 MG 24 hr tablet, TAKE 1 TABLET DAILY, Disp: 90 tablet, Rfl: 0  •  azithromycin (ZITHROMAX) 250 MG tablet, Take 2 tablets the first day, then 1 tablet daily for 4 days., Disp: 6 tablet, Rfl: 0  •  predniSONE (DELTASONE) 20 MG tablet, Take 1 tablet by mouth 2 (Two) Times a Day., Disp: 10 tablet, Rfl: 0     Objective     History of Present Illness Harry complains of a cough and wheezing that started a day or so ago.  He was on an airplane returning from [place].  Days ago.  He denies fever, chest pain, or shortness of breath.  He denies sore throat or sinus congestion.  He has been taking Robitussin-DM.  He had a CBC done yesterday and his white blood cell count was normal.    Review of Systems   Constitutional: Negative for fatigue and fever.   HENT: Negative for congestion and sore throat.    Respiratory: Positive for cough and wheezing. Negative for shortness of breath.        Physical Exam   Constitutional: He appears well-developed and well-nourished.   HENT:   Right Ear: Tympanic membrane normal.   Left Ear: Tympanic membrane normal.   Nose: Nose normal.   Mouth/Throat: Oropharynx is clear and moist.   Pulmonary/Chest: Effort normal. He has wheezes. He has no rales.   Vitals reviewed.      ASSESSMENT     Problem List Items Addressed This Visit        Respiratory    Bronchitis - Primary          PLAN  Patient Instructions   And plenty of fluids.  Start prednisone and azithromycin as directed.    Return if symptoms worsen or fail to improve.

## 2018-11-21 ENCOUNTER — OFFICE VISIT (OUTPATIENT)
Dept: FAMILY MEDICINE CLINIC | Facility: CLINIC | Age: 83
End: 2018-11-21

## 2018-11-21 VITALS
DIASTOLIC BLOOD PRESSURE: 82 MMHG | TEMPERATURE: 99.4 F | OXYGEN SATURATION: 95 % | HEIGHT: 68 IN | WEIGHT: 285.2 LBS | SYSTOLIC BLOOD PRESSURE: 138 MMHG | RESPIRATION RATE: 16 BRPM | BODY MASS INDEX: 43.22 KG/M2 | HEART RATE: 74 BPM

## 2018-11-21 DIAGNOSIS — E78.00 HYPERCHOLESTEROLEMIA: ICD-10-CM

## 2018-11-21 DIAGNOSIS — R05.9 COUGH: ICD-10-CM

## 2018-11-21 DIAGNOSIS — R73.9 HYPERGLYCEMIA: ICD-10-CM

## 2018-11-21 DIAGNOSIS — I10 ESSENTIAL HYPERTENSION: ICD-10-CM

## 2018-11-21 DIAGNOSIS — R06.2 WHEEZING: Primary | ICD-10-CM

## 2018-11-21 PROCEDURE — 99214 OFFICE O/P EST MOD 30 MIN: CPT | Performed by: INTERNAL MEDICINE

## 2018-11-21 PROCEDURE — 85027 COMPLETE CBC AUTOMATED: CPT | Performed by: INTERNAL MEDICINE

## 2018-11-21 PROCEDURE — 71046 X-RAY EXAM CHEST 2 VIEWS: CPT | Performed by: INTERNAL MEDICINE

## 2018-11-28 ENCOUNTER — OFFICE VISIT (OUTPATIENT)
Dept: FAMILY MEDICINE CLINIC | Facility: CLINIC | Age: 83
End: 2018-11-28

## 2018-11-28 VITALS
DIASTOLIC BLOOD PRESSURE: 72 MMHG | OXYGEN SATURATION: 94 % | WEIGHT: 285 LBS | RESPIRATION RATE: 16 BRPM | TEMPERATURE: 98.4 F | HEIGHT: 68 IN | SYSTOLIC BLOOD PRESSURE: 138 MMHG | BODY MASS INDEX: 43.19 KG/M2 | HEART RATE: 72 BPM

## 2018-11-28 DIAGNOSIS — J40 BRONCHITIS: Primary | ICD-10-CM

## 2018-11-28 PROCEDURE — 99213 OFFICE O/P EST LOW 20 MIN: CPT | Performed by: INTERNAL MEDICINE

## 2018-12-02 NOTE — PROGRESS NOTES
Subjective   Harry Potts is a 88 y.o. male. Patient is here today for   Chief Complaint   Patient presents with   • Follow-up     wheezing           Vitals:    11/28/18 0840   BP: 138/72   Pulse: 72   Resp: 16   Temp: 98.4 °F (36.9 °C)   SpO2: 94%       Past Medical History:   Diagnosis Date   • GERD (gastroesophageal reflux disease)    • Hyperglycemia    • Hyperlipidemia    • Hypertension    • Obesity    • Spondylolysis, lumbar region       Allergies   Allergen Reactions   • Morphine And Related       Social History     Socioeconomic History   • Marital status:      Spouse name: Not on file   • Number of children: Not on file   • Years of education: Not on file   • Highest education level: Not on file   Social Needs   • Financial resource strain: Not on file   • Food insecurity - worry: Not on file   • Food insecurity - inability: Not on file   • Transportation needs - medical: Not on file   • Transportation needs - non-medical: Not on file   Occupational History   • Not on file   Tobacco Use   • Smoking status: Never Smoker   • Smokeless tobacco: Never Used   Substance and Sexual Activity   • Alcohol use: No   • Drug use: No   • Sexual activity: Not on file   Other Topics Concern   • Not on file   Social History Narrative   • Not on file        Current Outpatient Medications:   •  amLODIPine (NORVASC) 10 MG tablet, TAKE 1 TABLET DAILY, Disp: 90 tablet, Rfl: 2  •  aspirin 81 MG EC tablet, Take 81 mg by mouth Daily., Disp: , Rfl:   •  atorvastatin (LIPITOR) 20 MG tablet, TAKE 1 TABLET DAILY, Disp: 90 tablet, Rfl: 1  •  celecoxib (CeleBREX) 200 MG capsule, TAKE 1 CAPSULE DAILY AS NEEDED, Disp: 90 capsule, Rfl: 1  •  doxazosin (CARDURA) 4 MG tablet, TAKE 1 TABLET DAILY AS DIRECTED, Disp: 90 tablet, Rfl: 2  •  esomeprazole (nexIUM) 40 MG capsule, TAKE 1 CAPSULE DAILY, Disp: 90 capsule, Rfl: 1  •  ezetimibe (ZETIA) 10 MG tablet, TAKE 1 TABLET DAILY, Disp: 90 tablet, Rfl: 1  •  loratadine (CLARITIN) 10 MG  tablet, Take  by mouth., Disp: , Rfl:   •  metaxalone (SKELAXIN) 800 MG tablet, Take 1 tablet by mouth 3 (Three) Times a Day., Disp: 270 tablet, Rfl: 4  •  metoprolol succinate XL (TOPROL-XL) 50 MG 24 hr tablet, TAKE 1 TABLET DAILY, Disp: 90 tablet, Rfl: 0     Objective     He is here to follow-up on his acute bronchitis/asthmatic bronchitis at his last visit week ago.    He feels much better.  He no longer has a cough.         Review of Systems   Constitutional: Negative.    HENT: Negative.    Respiratory: Negative.    Cardiovascular: Negative.    Musculoskeletal: Negative.    Psychiatric/Behavioral: Negative.        Physical Exam   Constitutional: He is oriented to person, place, and time. He appears well-developed and well-nourished.   HENT:   Head: Normocephalic and atraumatic.   Cardiovascular: Normal rate, regular rhythm and normal heart sounds.   Pulmonary/Chest: Effort normal and breath sounds normal. He has no wheezes. He has no rales.   Neurological: He is alert and oriented to person, place, and time.   Psychiatric: He has a normal mood and affect. His behavior is normal.   Nursing note and vitals reviewed.        Problem List Items Addressed This Visit        Respiratory    Bronchitis - Primary            MARIAH is here follow-up on his asthmatic bronchitis from last week.  He feels much better.  I had meant for him to get an in-home many nebulizer to use 4 times a day when necessary.  This has not yet been arranged for him.  I don't feel like he needs it any longer.    His bronchitis appears to have resolved.    Follow-up as previously arranged.    No Follow-up on file.

## 2019-01-07 RX ORDER — ATORVASTATIN CALCIUM 20 MG/1
TABLET, FILM COATED ORAL
Qty: 90 TABLET | Refills: 2 | Status: SHIPPED | OUTPATIENT
Start: 2019-01-07 | End: 2019-10-06 | Stop reason: SDUPTHER

## 2019-01-07 RX ORDER — METOPROLOL SUCCINATE 50 MG/1
TABLET, EXTENDED RELEASE ORAL
Qty: 90 TABLET | Refills: 2 | Status: SHIPPED | OUTPATIENT
Start: 2019-01-07 | End: 2019-10-06 | Stop reason: SDUPTHER

## 2019-01-22 RX ORDER — VALSARTAN AND HYDROCHLOROTHIAZIDE 320; 25 MG/1; MG/1
TABLET, FILM COATED ORAL
Qty: 90 TABLET | Refills: 2 | OUTPATIENT
Start: 2019-01-22

## 2019-01-22 RX ORDER — AMLODIPINE BESYLATE 10 MG/1
TABLET ORAL
Qty: 90 TABLET | Refills: 2 | Status: SHIPPED | OUTPATIENT
Start: 2019-01-22 | End: 2019-10-20 | Stop reason: SDUPTHER

## 2019-02-04 RX ORDER — ESOMEPRAZOLE MAGNESIUM 40 MG/1
CAPSULE, DELAYED RELEASE ORAL
Qty: 90 CAPSULE | Refills: 2 | Status: SHIPPED | OUTPATIENT
Start: 2019-02-04 | End: 2019-11-01 | Stop reason: SDUPTHER

## 2019-02-26 DIAGNOSIS — R73.9 HYPERGLYCEMIA: ICD-10-CM

## 2019-02-26 DIAGNOSIS — E78.00 HYPERCHOLESTEROLEMIA: ICD-10-CM

## 2019-02-26 DIAGNOSIS — E78.00 HYPERCHOLESTEROLEMIA: Primary | ICD-10-CM

## 2019-02-28 LAB
ALBUMIN SERPL-MCNC: 4.3 G/DL (ref 3.5–5.2)
ALBUMIN/GLOB SERPL: 1.8 G/DL
ALP SERPL-CCNC: 65 U/L (ref 39–117)
ALT SERPL-CCNC: 23 U/L (ref 1–41)
APPEARANCE UR: CLEAR
AST SERPL-CCNC: 23 U/L (ref 1–40)
BACTERIA #/AREA URNS HPF: NORMAL /HPF
BASOPHILS # BLD AUTO: 0.03 10*3/MM3 (ref 0–0.2)
BASOPHILS # BLD MANUAL: 0 10*3/MM3 (ref 0–0.2)
BASOPHILS NFR BLD AUTO: 0.5 % (ref 0–1.5)
BASOPHILS NFR BLD MANUAL: 0 % (ref 0–1.5)
BILIRUB SERPL-MCNC: 0.5 MG/DL (ref 0.1–1.2)
BILIRUB UR QL STRIP: NEGATIVE
BUN SERPL-MCNC: 30 MG/DL (ref 8–23)
BUN/CREAT SERPL: 40 (ref 7–25)
CALCIUM SERPL-MCNC: 9.5 MG/DL (ref 8.6–10.5)
CASTS URNS MICRO: NORMAL
CHLORIDE SERPL-SCNC: 99 MMOL/L (ref 98–107)
CHOLEST SERPL-MCNC: 145 MG/DL (ref 0–200)
CO2 SERPL-SCNC: 27.5 MMOL/L (ref 22–29)
COLOR UR: YELLOW
CREAT SERPL-MCNC: 0.75 MG/DL (ref 0.76–1.27)
DIFFERENTIAL COMMENT: NORMAL
EOSINOPHIL # BLD AUTO: 0.24 10*3/MM3 (ref 0–0.4)
EOSINOPHIL # BLD MANUAL: 0.29 10*3/MM3 (ref 0–0.4)
EOSINOPHIL NFR BLD AUTO: 4.1 % (ref 0.3–6.2)
EOSINOPHIL NFR BLD MANUAL: 5 % (ref 0.3–6.2)
EPI CELLS #/AREA URNS HPF: NORMAL /HPF
ERYTHROCYTE [DISTWIDTH] IN BLOOD BY AUTOMATED COUNT: 13.4 % (ref 12.3–15.4)
GLOBULIN SER CALC-MCNC: 2.4 GM/DL
GLUCOSE SERPL-MCNC: 110 MG/DL (ref 65–99)
GLUCOSE UR QL: NEGATIVE
HBA1C MFR BLD: 5.93 % (ref 4.8–5.6)
HCT VFR BLD AUTO: 48.6 % (ref 37.5–51)
HDLC SERPL-MCNC: 53 MG/DL (ref 40–60)
HGB BLD-MCNC: 14.9 G/DL (ref 13–17.7)
HGB UR QL STRIP: NEGATIVE
IMM GRANULOCYTES # BLD AUTO: 0.01 10*3/MM3 (ref 0–0.05)
IMM GRANULOCYTES NFR BLD AUTO: 0.2 % (ref 0–0.5)
KETONES UR QL STRIP: NEGATIVE
LDLC SERPL CALC-MCNC: 73 MG/DL (ref 0–100)
LDLC/HDLC SERPL: 1.38 {RATIO}
LEUKOCYTE ESTERASE UR QL STRIP: NEGATIVE
LYMPHOCYTES # BLD AUTO: 2.09 10*3/MM3 (ref 0.7–3.1)
LYMPHOCYTES # BLD MANUAL: 1.88 10*3/MM3 (ref 0.7–3.1)
LYMPHOCYTES NFR BLD AUTO: 35.6 % (ref 19.6–45.3)
LYMPHOCYTES NFR BLD MANUAL: 32 % (ref 19.6–45.3)
MCH RBC QN AUTO: 28.9 PG (ref 26.6–33)
MCHC RBC AUTO-ENTMCNC: 30.7 G/DL (ref 31.5–35.7)
MCV RBC AUTO: 94.2 FL (ref 79–97)
MONOCYTES # BLD AUTO: 0.51 10*3/MM3 (ref 0.1–0.9)
MONOCYTES # BLD MANUAL: 0.35 10*3/MM3 (ref 0.1–0.9)
MONOCYTES NFR BLD AUTO: 8.7 % (ref 5–12)
MONOCYTES NFR BLD MANUAL: 6 % (ref 5–12)
NEUTROPHILS # BLD AUTO: 2.99 10*3/MM3 (ref 1.4–7)
NEUTROPHILS # BLD MANUAL: 3.35 10*3/MM3 (ref 1.4–7)
NEUTROPHILS NFR BLD AUTO: 50.9 % (ref 42.7–76)
NEUTROPHILS NFR BLD MANUAL: 57 % (ref 42.7–76)
NITRITE UR QL STRIP: NEGATIVE
PH UR STRIP: 6 [PH] (ref 5–8)
PLATELET # BLD AUTO: 138 10*3/MM3 (ref 140–450)
PLATELET BLD QL SMEAR: NORMAL
POTASSIUM SERPL-SCNC: 4.4 MMOL/L (ref 3.5–5.2)
PROT SERPL-MCNC: 6.7 G/DL (ref 6–8.5)
PROT UR QL STRIP: NEGATIVE
RBC # BLD AUTO: 5.16 10*6/MM3 (ref 4.14–5.8)
RBC #/AREA URNS HPF: NORMAL /HPF
RBC MORPH BLD: NORMAL
SODIUM SERPL-SCNC: 141 MMOL/L (ref 136–145)
SP GR UR: 1.03 (ref 1–1.03)
TRIGL SERPL-MCNC: 93 MG/DL (ref 0–150)
UROBILINOGEN UR STRIP-MCNC: (no result) MG/DL
VLDLC SERPL CALC-MCNC: 18.6 MG/DL (ref 5–40)
WBC # BLD AUTO: 5.87 10*3/MM3 (ref 3.4–10.8)
WBC #/AREA URNS HPF: NORMAL /HPF

## 2019-03-06 ENCOUNTER — OFFICE VISIT (OUTPATIENT)
Dept: FAMILY MEDICINE CLINIC | Facility: CLINIC | Age: 84
End: 2019-03-06

## 2019-03-06 VITALS
SYSTOLIC BLOOD PRESSURE: 140 MMHG | DIASTOLIC BLOOD PRESSURE: 76 MMHG | OXYGEN SATURATION: 95 % | RESPIRATION RATE: 16 BRPM | BODY MASS INDEX: 43.95 KG/M2 | HEIGHT: 68 IN | WEIGHT: 290 LBS | HEART RATE: 65 BPM

## 2019-03-06 DIAGNOSIS — E78.00 HYPERCHOLESTEROLEMIA: ICD-10-CM

## 2019-03-06 DIAGNOSIS — E66.01 MORBID OBESITY (HCC): ICD-10-CM

## 2019-03-06 DIAGNOSIS — R73.9 HYPERGLYCEMIA: ICD-10-CM

## 2019-03-06 DIAGNOSIS — I10 ESSENTIAL HYPERTENSION: Primary | ICD-10-CM

## 2019-03-06 PROCEDURE — 99214 OFFICE O/P EST MOD 30 MIN: CPT | Performed by: INTERNAL MEDICINE

## 2019-03-10 NOTE — PROGRESS NOTES
Subjective   Harry Potts is a 88 y.o. male. Patient is here today for   Chief Complaint   Patient presents with   • Follow-up     hypercholesterolemia           Vitals:    03/06/19 0909   BP: 140/76   Pulse: 65   Resp: 16   SpO2: 95%       Past Medical History:   Diagnosis Date   • GERD (gastroesophageal reflux disease)    • Hyperglycemia    • Hyperlipidemia    • Hypertension    • Obesity    • Spondylolysis, lumbar region       Allergies   Allergen Reactions   • Morphine And Related       Social History     Socioeconomic History   • Marital status:      Spouse name: Not on file   • Number of children: Not on file   • Years of education: Not on file   • Highest education level: Not on file   Social Needs   • Financial resource strain: Not on file   • Food insecurity - worry: Not on file   • Food insecurity - inability: Not on file   • Transportation needs - medical: Not on file   • Transportation needs - non-medical: Not on file   Occupational History   • Not on file   Tobacco Use   • Smoking status: Never Smoker   • Smokeless tobacco: Never Used   Substance and Sexual Activity   • Alcohol use: No   • Drug use: No   • Sexual activity: Not on file   Other Topics Concern   • Not on file   Social History Narrative   • Not on file        Current Outpatient Medications:   •  amLODIPine (NORVASC) 10 MG tablet, TAKE 1 TABLET DAILY, Disp: 90 tablet, Rfl: 2  •  aspirin 81 MG EC tablet, Take 81 mg by mouth Daily., Disp: , Rfl:   •  atorvastatin (LIPITOR) 20 MG tablet, TAKE 1 TABLET DAILY, Disp: 90 tablet, Rfl: 2  •  celecoxib (CeleBREX) 200 MG capsule, TAKE 1 CAPSULE DAILY AS NEEDED, Disp: 90 capsule, Rfl: 1  •  doxazosin (CARDURA) 4 MG tablet, TAKE 1 TABLET DAILY AS DIRECTED, Disp: 90 tablet, Rfl: 2  •  esomeprazole (nexIUM) 40 MG capsule, TAKE 1 CAPSULE DAILY, Disp: 90 capsule, Rfl: 2  •  ezetimibe (ZETIA) 10 MG tablet, TAKE 1 TABLET DAILY, Disp: 90 tablet, Rfl: 1  •  loratadine (CLARITIN) 10 MG tablet, Take  by  mouth., Disp: , Rfl:   •  metaxalone (SKELAXIN) 800 MG tablet, Take 1 tablet by mouth 3 (Three) Times a Day., Disp: 270 tablet, Rfl: 4  •  metoprolol succinate XL (TOPROL-XL) 50 MG 24 hr tablet, TAKE 1 TABLET DAILY, Disp: 90 tablet, Rfl: 2     Objective     This pleasant patient is here to follow-up on lab work done last week.    He never complains but he does have severe lumbar spinal stenosis.  He has severe diffuse degenerative arthritis of multiple joints.  He ambulates with the aid of a wheeled walker.         Review of Systems   Constitutional: Negative.    HENT: Negative.    Respiratory: Negative.    Cardiovascular: Negative.    Musculoskeletal:        He has back pain.   Psychiatric/Behavioral: Negative.        Physical Exam   Constitutional: He is oriented to person, place, and time. He appears well-developed and well-nourished.   Pleasant, neatly groomed, BMI 44.   HENT:   Head: Normocephalic and atraumatic.   Neck:   No carotid bruits   Cardiovascular: Normal rate, regular rhythm and normal heart sounds.   No murmur heard.  Pulmonary/Chest: Effort normal and breath sounds normal. No respiratory distress. He has no wheezes. He has no rales.   Neurological: He is alert and oriented to person, place, and time.   Psychiatric: He has a normal mood and affect. His behavior is normal.   Nursing note and vitals reviewed.        Problem List Items Addressed This Visit        Cardiovascular and Mediastinum    Hypercholesterolemia    Hypertension - Primary       Digestive    Morbid obesity (CMS/HCC)       Other    Hyperglycemia            PLAN  He and I reviewed his labs.    He has excellent control of his hypertension.    His hypercholesterolemia is well controlled.    He is morbidly obese.  We briefly discussed the need for weight loss.    He has hyperglycemia which is stable.  We will continue to monitor this.    I would like to have him back in about 4-6 months to see how he is getting along.    He should  follow-up for a Medicare wellness visit once yearly.  No Follow-up on file.

## 2019-03-11 RX ORDER — CELECOXIB 200 MG/1
CAPSULE ORAL
Qty: 90 CAPSULE | Refills: 1 | Status: SHIPPED | OUTPATIENT
Start: 2019-03-11 | End: 2019-09-07 | Stop reason: SDUPTHER

## 2019-03-11 RX ORDER — EZETIMIBE 10 MG/1
TABLET ORAL
Qty: 90 TABLET | Refills: 1 | Status: SHIPPED | OUTPATIENT
Start: 2019-03-11 | End: 2019-09-07 | Stop reason: SDUPTHER

## 2019-03-13 ENCOUNTER — TELEPHONE (OUTPATIENT)
Dept: FAMILY MEDICINE CLINIC | Facility: CLINIC | Age: 84
End: 2019-03-13

## 2019-03-13 NOTE — TELEPHONE ENCOUNTER
ATTEMPTED TO CALL PT LM, WAITING FOR CALL BACK    PT WILL NEED A SLEEP STUDY. HE WILL ALSO NEED TO FOLLOW UP IN OFFICE TO SEE DR. VALDEZ AFTER GETTING SLEEP STUDY DONE TO GO OVER SLEEP STUDY. TO SHOW THAT HE HAS BEEN CPAP COMPLIANT IN DR. VALDEZ'S NOTES.    ----- Message from Clary Jj sent at 3/12/2019  2:54 PM EDT -----  PT NEEDS SCRIPT FOR NEW CPAP MACHINE PER CURRENT ONE IS OUT OF DATE.  PLEASE SEND SCRIPT FOR NEW CPAP MACHINE TO [facility] [address] [place] [address].  PHONE: [phone] FAX: [phone] PLEASE SEND ATTN: AKILAH ROGERS.    IF YOU HAVE ANY QUESTIONS PLEASE CONTACT PT [phone]

## 2019-04-17 RX ORDER — METAXALONE 800 MG/1
TABLET ORAL
Qty: 270 TABLET | Refills: 4 | Status: SHIPPED | OUTPATIENT
Start: 2019-04-17 | End: 2020-08-21

## 2019-04-23 RX ORDER — DOXAZOSIN MESYLATE 4 MG/1
TABLET ORAL
Qty: 90 TABLET | Refills: 2 | Status: SHIPPED | OUTPATIENT
Start: 2019-04-23 | End: 2020-01-20

## 2019-07-05 DIAGNOSIS — R73.9 HYPERGLYCEMIA: ICD-10-CM

## 2019-07-05 DIAGNOSIS — E78.00 HYPERCHOLESTEROLEMIA: Primary | ICD-10-CM

## 2019-07-11 LAB
ALBUMIN SERPL-MCNC: 4.1 G/DL (ref 3.5–5.2)
ALBUMIN/GLOB SERPL: 1.8 G/DL
ALP SERPL-CCNC: 74 U/L (ref 39–117)
ALT SERPL-CCNC: 19 U/L (ref 1–41)
APPEARANCE UR: CLEAR
AST SERPL-CCNC: 22 U/L (ref 1–40)
BACTERIA #/AREA URNS HPF: NORMAL /HPF
BASOPHILS # BLD AUTO: 0.04 10*3/MM3 (ref 0–0.2)
BASOPHILS NFR BLD AUTO: 0.7 % (ref 0–1.5)
BILIRUB SERPL-MCNC: 0.6 MG/DL (ref 0.2–1.2)
BILIRUB UR QL STRIP: NEGATIVE
BUN SERPL-MCNC: 13 MG/DL (ref 8–23)
BUN/CREAT SERPL: 19.1 (ref 7–25)
CALCIUM SERPL-MCNC: 9.1 MG/DL (ref 8.6–10.5)
CASTS URNS MICRO: NORMAL
CHLORIDE SERPL-SCNC: 103 MMOL/L (ref 98–107)
CHOLEST SERPL-MCNC: 139 MG/DL (ref 0–200)
CO2 SERPL-SCNC: 32.6 MMOL/L (ref 22–29)
COLOR UR: YELLOW
CREAT SERPL-MCNC: 0.68 MG/DL (ref 0.76–1.27)
EOSINOPHIL # BLD AUTO: 0.28 10*3/MM3 (ref 0–0.4)
EOSINOPHIL NFR BLD AUTO: 5.2 % (ref 0.3–6.2)
EPI CELLS #/AREA URNS HPF: NORMAL /HPF
ERYTHROCYTE [DISTWIDTH] IN BLOOD BY AUTOMATED COUNT: 13.6 % (ref 12.3–15.4)
GLOBULIN SER CALC-MCNC: 2.3 GM/DL
GLUCOSE SERPL-MCNC: 105 MG/DL (ref 65–99)
GLUCOSE UR QL: NEGATIVE
HBA1C MFR BLD: 6.1 % (ref 4.8–5.6)
HCT VFR BLD AUTO: 46.5 % (ref 37.5–51)
HDLC SERPL-MCNC: 53 MG/DL (ref 40–60)
HGB BLD-MCNC: 14.3 G/DL (ref 13–17.7)
HGB UR QL STRIP: NEGATIVE
IMM GRANULOCYTES # BLD AUTO: 0.01 10*3/MM3 (ref 0–0.05)
IMM GRANULOCYTES NFR BLD AUTO: 0.2 % (ref 0–0.5)
KETONES UR QL STRIP: NEGATIVE
LDLC SERPL CALC-MCNC: 71 MG/DL (ref 0–100)
LDLC/HDLC SERPL: 1.35 {RATIO}
LEUKOCYTE ESTERASE UR QL STRIP: NEGATIVE
LYMPHOCYTES # BLD AUTO: 1.72 10*3/MM3 (ref 0.7–3.1)
LYMPHOCYTES NFR BLD AUTO: 31.7 % (ref 19.6–45.3)
MCH RBC QN AUTO: 29.1 PG (ref 26.6–33)
MCHC RBC AUTO-ENTMCNC: 30.8 G/DL (ref 31.5–35.7)
MCV RBC AUTO: 94.7 FL (ref 79–97)
MONOCYTES # BLD AUTO: 0.43 10*3/MM3 (ref 0.1–0.9)
MONOCYTES NFR BLD AUTO: 7.9 % (ref 5–12)
NEUTROPHILS # BLD AUTO: 2.95 10*3/MM3 (ref 1.7–7)
NEUTROPHILS NFR BLD AUTO: 54.3 % (ref 42.7–76)
NITRITE UR QL STRIP: NEGATIVE
NRBC BLD AUTO-RTO: 0 /100 WBC (ref 0–0.2)
PH UR STRIP: 7 [PH] (ref 5–8)
PLATELET # BLD AUTO: 140 10*3/MM3 (ref 140–450)
POTASSIUM SERPL-SCNC: 4.4 MMOL/L (ref 3.5–5.2)
PROT SERPL-MCNC: 6.4 G/DL (ref 6–8.5)
PROT UR QL STRIP: NEGATIVE
RBC # BLD AUTO: 4.91 10*6/MM3 (ref 4.14–5.8)
RBC #/AREA URNS HPF: NORMAL /HPF
SODIUM SERPL-SCNC: 142 MMOL/L (ref 136–145)
SP GR UR: 1.02 (ref 1–1.03)
TRIGL SERPL-MCNC: 73 MG/DL (ref 0–150)
UROBILINOGEN UR STRIP-MCNC: (no result) MG/DL
VLDLC SERPL CALC-MCNC: 14.6 MG/DL
WBC # BLD AUTO: 5.43 10*3/MM3 (ref 3.4–10.8)
WBC #/AREA URNS HPF: NORMAL /HPF

## 2019-07-17 ENCOUNTER — OFFICE VISIT (OUTPATIENT)
Dept: FAMILY MEDICINE CLINIC | Facility: CLINIC | Age: 84
End: 2019-07-17

## 2019-07-17 VITALS
TEMPERATURE: 97.6 F | RESPIRATION RATE: 16 BRPM | SYSTOLIC BLOOD PRESSURE: 138 MMHG | DIASTOLIC BLOOD PRESSURE: 68 MMHG | HEIGHT: 68 IN | HEART RATE: 35 BPM | WEIGHT: 289.2 LBS | BODY MASS INDEX: 43.83 KG/M2 | OXYGEN SATURATION: 95 %

## 2019-07-17 DIAGNOSIS — J30.9 ALLERGIC RHINITIS, UNSPECIFIED SEASONALITY, UNSPECIFIED TRIGGER: Primary | ICD-10-CM

## 2019-07-17 DIAGNOSIS — E66.01 MORBID OBESITY (HCC): ICD-10-CM

## 2019-07-17 DIAGNOSIS — R73.9 HYPERGLYCEMIA: ICD-10-CM

## 2019-07-17 DIAGNOSIS — E78.00 HYPERCHOLESTEROLEMIA: ICD-10-CM

## 2019-07-17 DIAGNOSIS — M47.16 OSTEOARTHRITIS OF LUMBAR SPINE WITH MYELOPATHY: ICD-10-CM

## 2019-07-17 DIAGNOSIS — I10 ESSENTIAL HYPERTENSION: ICD-10-CM

## 2019-07-17 PROCEDURE — 99214 OFFICE O/P EST MOD 30 MIN: CPT | Performed by: INTERNAL MEDICINE

## 2019-07-17 RX ORDER — CHLORAL HYDRATE 500 MG
CAPSULE ORAL
Status: ON HOLD | COMMUNITY

## 2019-07-17 NOTE — PROGRESS NOTES
Subjective   Harry Potts is a 88 y.o. male. Patient is here today for   Chief Complaint   Patient presents with   • Follow-up     HYPERCHOLESTEREOLEMIA           Vitals:    07/17/19 0837   BP: 138/68   Pulse: (!) 35   Resp: 16   Temp: 97.6 °F (36.4 °C)   SpO2: 95%       Past Medical History:   Diagnosis Date   • GERD (gastroesophageal reflux disease)    • Hyperglycemia    • Hyperlipidemia    • Hypertension    • Obesity    • Spondylolysis, lumbar region       Allergies   Allergen Reactions   • Morphine And Related       Social History     Socioeconomic History   • Marital status:      Spouse name: Not on file   • Number of children: Not on file   • Years of education: Not on file   • Highest education level: Not on file   Tobacco Use   • Smoking status: Never Smoker   • Smokeless tobacco: Never Used   Substance and Sexual Activity   • Alcohol use: No   • Drug use: No        Current Outpatient Medications:   •  amLODIPine (NORVASC) 10 MG tablet, TAKE 1 TABLET DAILY, Disp: 90 tablet, Rfl: 2  •  aspirin 81 MG EC tablet, Take 81 mg by mouth Daily., Disp: , Rfl:   •  atorvastatin (LIPITOR) 20 MG tablet, TAKE 1 TABLET DAILY, Disp: 90 tablet, Rfl: 2  •  celecoxib (CeleBREX) 200 MG capsule, TAKE 1 CAPSULE DAILY AS NEEDED, Disp: 90 capsule, Rfl: 1  •  doxazosin (CARDURA) 4 MG tablet, TAKE 1 TABLET DAILY AS DIRECTED, Disp: 90 tablet, Rfl: 2  •  esomeprazole (nexIUM) 40 MG capsule, TAKE 1 CAPSULE DAILY, Disp: 90 capsule, Rfl: 2  •  ezetimibe (ZETIA) 10 MG tablet, TAKE 1 TABLET DAILY, Disp: 90 tablet, Rfl: 1  •  loratadine (CLARITIN) 10 MG tablet, Take  by mouth., Disp: , Rfl:   •  metaxalone (SKELAXIN) 800 MG tablet, TAKE 1 TABLET THREE TIMES A DAY, Disp: 270 tablet, Rfl: 4  •  metoprolol succinate XL (TOPROL-XL) 50 MG 24 hr tablet, TAKE 1 TABLET DAILY, Disp: 90 tablet, Rfl: 2  •  Omega-3 Fatty Acids (FISH OIL) 1000 MG capsule capsule, Take  by mouth Daily With Breakfast., Disp: , Rfl:      Objective     This patient  is here accompanied by his wife.  She is concerned that he is gained weight.    He has terrible pain in his lumbar spine.  He has lumbar degenerative joint disease and degenerative disc.    He does not wish to take any medication for pain.         Review of Systems   Constitutional: Negative.    HENT: Negative.    Respiratory: Negative.    Cardiovascular: Negative.    Musculoskeletal: Negative.    Psychiatric/Behavioral: Negative.        Physical Exam   Constitutional: He is oriented to person, place, and time. He appears well-developed and well-nourished.   Pleasant, neatly groomed, BMI 44.   HENT:   Head: Normocephalic and atraumatic.   Neck:   No carotid bruit   Cardiovascular: Normal rate, regular rhythm and normal heart sounds. Exam reveals no friction rub.   No murmur heard.  Pulmonary/Chest: Effort normal and breath sounds normal.   Musculoskeletal:   He has a stooped over posture.  He ambulates with wheeled walker.   Neurological: He is alert and oriented to person, place, and time.   Psychiatric: He has a normal mood and affect. His behavior is normal.   Nursing note and vitals reviewed.        Problem List Items Addressed This Visit        Cardiovascular and Mediastinum    Hypercholesterolemia    Hypertension       Digestive    Morbid obesity (CMS/HCC)       Nervous and Auditory    Osteoarthritis of lumbar spine with myelopathy       Other    Hyperglycemia      Other Visit Diagnoses     Allergic rhinitis, unspecified seasonality, unspecified trigger    -  Primary    Relevant Orders    Ambulatory Referral to Allergy            PLAN  He and I reviewed his labs.  His hypercholesterolemia is well controlled.    His hypertension is relatively well controlled.    Arthritis of his lumbar spine.  He has severe pain as a consequence.  He does not wish to go for physical therapy, he does not wish to take a narcotic analgesic to help control his pain.    He has hyperglycemia.    He is morbidly obese with a BMI of 44.   I have encouraged his weight loss.  I recommended weight watchers.    He is allergic rhinitis.  His symptoms have been giving him considerable difficulty this year.  I asked him to follow-up with Dr. Steph French and have made a referral for him.    I asked him to follow-up in about 3 months.  No Follow-up on file.

## 2019-09-09 RX ORDER — CELECOXIB 200 MG/1
CAPSULE ORAL
Qty: 90 CAPSULE | Refills: 4 | Status: SHIPPED | OUTPATIENT
Start: 2019-09-09 | End: 2020-12-02

## 2019-09-09 RX ORDER — EZETIMIBE 10 MG/1
TABLET ORAL
Qty: 90 TABLET | Refills: 4 | Status: SHIPPED | OUTPATIENT
Start: 2019-09-09 | End: 2020-12-02

## 2019-10-07 RX ORDER — METOPROLOL SUCCINATE 50 MG/1
TABLET, EXTENDED RELEASE ORAL
Qty: 90 TABLET | Refills: 4 | Status: SHIPPED | OUTPATIENT
Start: 2019-10-07 | End: 2020-12-30

## 2019-10-07 RX ORDER — ATORVASTATIN CALCIUM 20 MG/1
TABLET, FILM COATED ORAL
Qty: 90 TABLET | Refills: 4 | Status: SHIPPED | OUTPATIENT
Start: 2019-10-07 | End: 2020-12-30

## 2019-10-17 ENCOUNTER — OFFICE VISIT (OUTPATIENT)
Dept: CARDIOLOGY | Facility: CLINIC | Age: 84
End: 2019-10-17

## 2019-10-17 VITALS
BODY MASS INDEX: 41.37 KG/M2 | HEART RATE: 78 BPM | DIASTOLIC BLOOD PRESSURE: 78 MMHG | WEIGHT: 273 LBS | SYSTOLIC BLOOD PRESSURE: 162 MMHG | HEIGHT: 68 IN

## 2019-10-17 DIAGNOSIS — E78.00 HYPERCHOLESTEROLEMIA: ICD-10-CM

## 2019-10-17 DIAGNOSIS — I10 ESSENTIAL HYPERTENSION: ICD-10-CM

## 2019-10-17 DIAGNOSIS — R06.09 DYSPNEA ON EXERTION: Primary | ICD-10-CM

## 2019-10-17 DIAGNOSIS — I49.3 PVC (PREMATURE VENTRICULAR CONTRACTION): ICD-10-CM

## 2019-10-17 PROCEDURE — 99204 OFFICE O/P NEW MOD 45 MIN: CPT | Performed by: INTERNAL MEDICINE

## 2019-10-17 PROCEDURE — 93000 ELECTROCARDIOGRAM COMPLETE: CPT | Performed by: INTERNAL MEDICINE

## 2019-10-17 RX ORDER — AZELASTINE 1 MG/ML
1 SPRAY, METERED NASAL DAILY
COMMUNITY
Start: 2019-10-15 | End: 2021-10-05 | Stop reason: SDUPTHER

## 2019-10-17 RX ORDER — FLUTICASONE PROPIONATE 50 MCG
1 SPRAY, SUSPENSION (ML) NASAL NIGHTLY
COMMUNITY
Start: 2019-10-15 | End: 2022-02-02 | Stop reason: SDUPTHER

## 2019-10-17 NOTE — PROGRESS NOTES
Subjective:     Encounter Date:10/17/2019      Patient ID: Harry Potts is a 88 y.o. male.    Chief Complaint:  History of Present Illness    This is an 88-year-old with a history of hypertension, hyperlipidemia, obesity, recently diagnosed asthma, who presents for an evaluation of dyspnea on exertion, bradycardia, and orthopnea.    Patient reports that recently began having issues with sneezing, wheezing, coughing, and shortness of breath.  His symptoms initially seemed concerning for allergic rhinitis so Dr. Luis to Dr. French.  I do not have Dr. French's office note to review but I do have a copy of his referral to our office.  This indicated that he was concerned about the patient's reportedly worsening dyspnea on exertion and 4 pillow orthopnea.  Additionally the patient and his wife report that he was noted to be bradycardic during his office visit with Dr. French.  Based on his symptoms Dr. French felt that he needed to be further evaluated for pulmonary hypertension, valvular heart disease, and possibly coronary artery disease.    Since he saw Dr. French the patient was started on Breo and fluticasone which she has been using on a regular basis.  With this the patient reports that his symptoms have greatly improved.  He denies any significant dyspnea on exertion.  He reports that his wheezing and cough have resolved.  His 4 pillow orthopnea has declined to 2 pillows and he thinks soon he will be down to one.  He otherwise denies any chest pain, palpitations, PND orthopnea, near-syncope or syncope, or lower extremity edema.    In the past he was evaluated by Dr. Jones who he saw in 1/2006 for preoperative clearance before knee surgery.  He was noted to have an abnormal EKG at that time.  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  He subsequently underwent cardiac catheterization that showed evidence of mild nonobstructive coronary artery disease.    Review of Systems    Constitution: Negative for weakness and malaise/fatigue.   HENT: Negative for hearing loss, hoarse voice, nosebleeds and sore throat.    Eyes: Negative for pain.   Cardiovascular: Positive for dyspnea on exertion. Negative for chest pain, claudication, cyanosis, irregular heartbeat, leg swelling, near-syncope, orthopnea, palpitations, paroxysmal nocturnal dyspnea and syncope.   Respiratory: Negative for shortness of breath and snoring.    Endocrine: Negative for cold intolerance, heat intolerance, polydipsia, polyphagia and polyuria.   Skin: Negative for itching and rash.   Musculoskeletal: Negative for arthritis, falls, joint pain, joint swelling, muscle cramps, muscle weakness and myalgias.   Gastrointestinal: Negative for constipation, diarrhea, dysphagia, heartburn, hematemesis, hematochezia, melena, nausea and vomiting.   Genitourinary: Negative for frequency, hematuria and hesitancy.   Neurological: Negative for excessive daytime sleepiness, dizziness, headaches, light-headedness and numbness.   Psychiatric/Behavioral: Negative for depression. The patient is not nervous/anxious.         Current Outpatient Medications:   •  amLODIPine (NORVASC) 10 MG tablet, TAKE 1 TABLET DAILY, Disp: 90 tablet, Rfl: 2  •  aspirin 81 MG EC tablet, Take 81 mg by mouth Daily., Disp: , Rfl:   •  atorvastatin (LIPITOR) 20 MG tablet, TAKE 1 TABLET DAILY, Disp: 90 tablet, Rfl: 4  •  azelastine (ASTELIN) 0.1 % nasal spray, , Disp: , Rfl:   •  celecoxib (CeleBREX) 200 MG capsule, TAKE 1 CAPSULE DAILY AS NEEDED, Disp: 90 capsule, Rfl: 4  •  doxazosin (CARDURA) 4 MG tablet, TAKE 1 TABLET DAILY AS DIRECTED, Disp: 90 tablet, Rfl: 2  •  esomeprazole (nexIUM) 40 MG capsule, TAKE 1 CAPSULE DAILY, Disp: 90 capsule, Rfl: 2  •  ezetimibe (ZETIA) 10 MG tablet, TAKE 1 TABLET DAILY, Disp: 90 tablet, Rfl: 4  •  fluticasone (FLONASE) 50 MCG/ACT nasal spray, , Disp: , Rfl:   •  Fluticasone Furoate-Vilanterol (BREO ELLIPTA IN), Inhale., Disp: , Rfl:  "  •  loratadine (CLARITIN) 10 MG tablet, Take  by mouth., Disp: , Rfl:   •  metaxalone (SKELAXIN) 800 MG tablet, TAKE 1 TABLET THREE TIMES A DAY (Patient taking differently: TAKE 1 TABLET TWO TIMES A DAY), Disp: 270 tablet, Rfl: 4  •  metoprolol succinate XL (TOPROL-XL) 50 MG 24 hr tablet, TAKE 1 TABLET DAILY (Patient taking differently: TAKE 1/2 TAB DAILY), Disp: 90 tablet, Rfl: 4  •  Omega-3 Fatty Acids (FISH OIL) 1000 MG capsule capsule, Take  by mouth Daily With Breakfast., Disp: , Rfl:     Past Medical History:   Diagnosis Date   • GERD (gastroesophageal reflux disease)    • Hyperglycemia    • Hyperlipidemia    • Hypertension    • Obesity    • Spondylolysis, lumbar region      Past Surgical History:   Procedure Laterality Date   • EYE SURGERY     • HERNIA REPAIR     • REPLACEMENT TOTAL KNEE BILATERAL       Family History   Family history unknown: Yes     Social History     Tobacco Use   • Smoking status: Never Smoker   • Smokeless tobacco: Never Used   Substance Use Topics   • Alcohol use: No   • Drug use: No           ECG 12 Lead  Date/Time: 10/17/2019 6:00 PM  Performed by: Azra Gomes MD  Authorized by: Azra Gomes MD   Comparison: compared with previous ECG   Comparison to previous ECG: PVCs, RBBB, and LAFB are new  Rhythm: sinus rhythm  Ectopy: unifocal PVCs  Conduction: right bundle branch block and left anterior fascicular block               Objective:         Visit Vitals  /78   Pulse 78   Ht 172.7 cm (68\")   Wt 124 kg (273 lb)   BMI 41.51 kg/m²          Physical Exam   Constitutional: He is oriented to person, place, and time. He appears well-developed and well-nourished.   HENT:   Head: Normocephalic and atraumatic.   Neck: No JVD present. Carotid bruit is not present.   Cardiovascular: Normal rate, regular rhythm, S1 normal and S2 normal. Exam reveals no gallop.   No murmur heard.  Pulses:       Radial pulses are 2+ on the right side, and 2+ on the left side.   No bilateral lower " extremity edema   Pulmonary/Chest: Effort normal and breath sounds normal.   Abdominal: Soft. Normal appearance.   Neurological: He is alert and oriented to person, place, and time.   Skin: Skin is warm, dry and intact.   Psychiatric: He has a normal mood and affect.       Lab Review:       Assessment:          Diagnosis Plan   1. Dyspnea on exertion  Adult Transthoracic Echo Complete W/ Cont if Necessary Per Protocol    Stress Test With Pet Myocardial Perfusion (MULTI STUDY, REST AND STRESS)   2. Essential hypertension     3. Hypercholesterolemia     4. PVC (premature ventricular contraction)            Plan:       1.  PVCs.  He has frequent PVCs on his EKG today and appears at times to be an ventricular bigeminy.  This is likely responsible for his reported bradycardia on exam.  Patient is asymptomatic with this.  With these findings and his risk factors I think we should proceed with further work-up with an echocardiogram and a stress test.  The patient and his wife are agreeable with proceeding.  2.  Shortness of breath.  This has improved with treatment of his asthma.  With this improvement it seems less likely that a cardiac issue is responsible for his symptoms.  However there are plans for work-up as above.  3.  Hypertension.  Elevated in the office but it is normally well controlled.  Will monitor for now.  4.  Hyperlipidemia.  On atorvastatin which is managed by Dr. Luis.  5.  Asthma.  Managed by Dr. French.  6.  Morbid obesity    We will call and discuss the results of the stress test and echocardiogram and give further recommendations based on those results.

## 2019-10-21 DIAGNOSIS — E78.00 HYPERCHOLESTEROLEMIA: ICD-10-CM

## 2019-10-21 DIAGNOSIS — R73.9 HYPERGLYCEMIA: ICD-10-CM

## 2019-10-21 RX ORDER — AMLODIPINE BESYLATE 10 MG/1
TABLET ORAL
Qty: 90 TABLET | Refills: 1 | Status: SHIPPED | OUTPATIENT
Start: 2019-10-21 | End: 2020-04-17

## 2019-10-24 DIAGNOSIS — R06.09 DOE (DYSPNEA ON EXERTION): Primary | ICD-10-CM

## 2019-10-24 LAB
ALBUMIN SERPL-MCNC: 4.5 G/DL (ref 3.5–5.2)
ALBUMIN/GLOB SERPL: 2.4 G/DL
ALP SERPL-CCNC: 79 U/L (ref 39–117)
ALT SERPL-CCNC: 25 U/L (ref 1–41)
AST SERPL-CCNC: 23 U/L (ref 1–40)
BASOPHILS # BLD AUTO: 0.03 10*3/MM3 (ref 0–0.2)
BASOPHILS NFR BLD AUTO: 0.6 % (ref 0–1.5)
BILIRUB SERPL-MCNC: 0.7 MG/DL (ref 0.2–1.2)
BUN SERPL-MCNC: 15 MG/DL (ref 8–23)
BUN/CREAT SERPL: 22.7 (ref 7–25)
CALCIUM SERPL-MCNC: 9 MG/DL (ref 8.6–10.5)
CHLORIDE SERPL-SCNC: 102 MMOL/L (ref 98–107)
CHOLEST SERPL-MCNC: 145 MG/DL (ref 0–200)
CO2 SERPL-SCNC: 29.6 MMOL/L (ref 22–29)
CREAT SERPL-MCNC: 0.66 MG/DL (ref 0.76–1.27)
EOSINOPHIL # BLD AUTO: 0.21 10*3/MM3 (ref 0–0.4)
EOSINOPHIL NFR BLD AUTO: 4.3 % (ref 0.3–6.2)
ERYTHROCYTE [DISTWIDTH] IN BLOOD BY AUTOMATED COUNT: 12.9 % (ref 12.3–15.4)
GLOBULIN SER CALC-MCNC: 1.9 GM/DL
GLUCOSE SERPL-MCNC: 101 MG/DL (ref 65–99)
HBA1C MFR BLD: 6 % (ref 4.8–5.6)
HCT VFR BLD AUTO: 42.3 % (ref 37.5–51)
HDLC SERPL-MCNC: 59 MG/DL (ref 40–60)
HGB BLD-MCNC: 14 G/DL (ref 13–17.7)
IMM GRANULOCYTES # BLD AUTO: 0.01 10*3/MM3 (ref 0–0.05)
IMM GRANULOCYTES NFR BLD AUTO: 0.2 % (ref 0–0.5)
LDLC SERPL CALC-MCNC: 75 MG/DL (ref 0–100)
LDLC/HDLC SERPL: 1.26 {RATIO}
LYMPHOCYTES # BLD AUTO: 1.47 10*3/MM3 (ref 0.7–3.1)
LYMPHOCYTES NFR BLD AUTO: 30.3 % (ref 19.6–45.3)
MCH RBC QN AUTO: 29.8 PG (ref 26.6–33)
MCHC RBC AUTO-ENTMCNC: 33.1 G/DL (ref 31.5–35.7)
MCV RBC AUTO: 90 FL (ref 79–97)
MONOCYTES # BLD AUTO: 0.43 10*3/MM3 (ref 0.1–0.9)
MONOCYTES NFR BLD AUTO: 8.9 % (ref 5–12)
NEUTROPHILS # BLD AUTO: 2.7 10*3/MM3 (ref 1.7–7)
NEUTROPHILS NFR BLD AUTO: 55.7 % (ref 42.7–76)
NRBC BLD AUTO-RTO: 0 /100 WBC (ref 0–0.2)
PLATELET # BLD AUTO: 131 10*3/MM3 (ref 140–450)
POTASSIUM SERPL-SCNC: 4.3 MMOL/L (ref 3.5–5.2)
PROT SERPL-MCNC: 6.4 G/DL (ref 6–8.5)
RBC # BLD AUTO: 4.7 10*6/MM3 (ref 4.14–5.8)
SODIUM SERPL-SCNC: 140 MMOL/L (ref 136–145)
TRIGL SERPL-MCNC: 57 MG/DL (ref 0–150)
VLDLC SERPL CALC-MCNC: 11.4 MG/DL
WBC # BLD AUTO: 4.85 10*3/MM3 (ref 3.4–10.8)

## 2019-10-25 LAB
D DIMER PPP FEU-MCNC: 2.17 MCGFEU/ML (ref 0–0.49)
NT-PROBNP SERPL-MCNC: 112 PG/ML (ref 0–486)

## 2019-10-29 DIAGNOSIS — R79.89 POSITIVE D DIMER: Primary | ICD-10-CM

## 2019-10-31 ENCOUNTER — OFFICE VISIT (OUTPATIENT)
Dept: FAMILY MEDICINE CLINIC | Facility: CLINIC | Age: 84
End: 2019-10-31

## 2019-10-31 VITALS
WEIGHT: 292.2 LBS | DIASTOLIC BLOOD PRESSURE: 64 MMHG | OXYGEN SATURATION: 94 % | HEART RATE: 46 BPM | BODY MASS INDEX: 44.28 KG/M2 | HEIGHT: 68 IN | SYSTOLIC BLOOD PRESSURE: 148 MMHG

## 2019-10-31 DIAGNOSIS — E78.00 HYPERCHOLESTEROLEMIA: Primary | ICD-10-CM

## 2019-10-31 DIAGNOSIS — E66.01 MORBID OBESITY (HCC): ICD-10-CM

## 2019-10-31 DIAGNOSIS — R73.9 HYPERGLYCEMIA: ICD-10-CM

## 2019-10-31 DIAGNOSIS — I10 ESSENTIAL HYPERTENSION: ICD-10-CM

## 2019-10-31 PROCEDURE — 99214 OFFICE O/P EST MOD 30 MIN: CPT | Performed by: INTERNAL MEDICINE

## 2019-10-31 RX ORDER — ALBUTEROL SULFATE 90 UG/1
2 AEROSOL, METERED RESPIRATORY (INHALATION) EVERY 6 HOURS PRN
COMMUNITY
Start: 2019-10-23 | End: 2021-03-31 | Stop reason: SDUPTHER

## 2019-11-01 RX ORDER — ESOMEPRAZOLE MAGNESIUM 40 MG/1
CAPSULE, DELAYED RELEASE ORAL
Qty: 90 CAPSULE | Refills: 3 | Status: SHIPPED | OUTPATIENT
Start: 2019-11-01 | End: 2020-10-26

## 2019-11-03 NOTE — PROGRESS NOTES
Subjective   Harry Potts is a 88 y.o. male. Patient is here today for   Chief Complaint   Patient presents with   • Hypertension     Follow Up Labs          Vitals:    10/31/19 0900   BP: 148/64   Pulse: (!) 46   SpO2: 94%     Body mass index is 44.43 kg/m².      Past Medical History:   Diagnosis Date   • GERD (gastroesophageal reflux disease)    • Hyperglycemia    • Hyperlipidemia    • Hypertension    • Obesity    • Spondylolysis, lumbar region       Allergies   Allergen Reactions   • Morphine And Related       Social History     Socioeconomic History   • Marital status:      Spouse name: Not on file   • Number of children: Not on file   • Years of education: Not on file   • Highest education level: Not on file   Tobacco Use   • Smoking status: Never Smoker   • Smokeless tobacco: Never Used   Substance and Sexual Activity   • Alcohol use: No   • Drug use: No        Current Outpatient Medications:   •  amLODIPine (NORVASC) 10 MG tablet, TAKE 1 TABLET DAILY, Disp: 90 tablet, Rfl: 1  •  aspirin 81 MG EC tablet, Take 81 mg by mouth Daily., Disp: , Rfl:   •  atorvastatin (LIPITOR) 20 MG tablet, TAKE 1 TABLET DAILY, Disp: 90 tablet, Rfl: 4  •  azelastine (ASTELIN) 0.1 % nasal spray, , Disp: , Rfl:   •  celecoxib (CeleBREX) 200 MG capsule, TAKE 1 CAPSULE DAILY AS NEEDED, Disp: 90 capsule, Rfl: 4  •  doxazosin (CARDURA) 4 MG tablet, TAKE 1 TABLET DAILY AS DIRECTED, Disp: 90 tablet, Rfl: 2  •  ezetimibe (ZETIA) 10 MG tablet, TAKE 1 TABLET DAILY, Disp: 90 tablet, Rfl: 4  •  fluticasone (FLONASE) 50 MCG/ACT nasal spray, , Disp: , Rfl:   •  Fluticasone Furoate-Vilanterol (BREO ELLIPTA IN), Inhale., Disp: , Rfl:   •  loratadine (CLARITIN) 10 MG tablet, Take  by mouth., Disp: , Rfl:   •  metaxalone (SKELAXIN) 800 MG tablet, TAKE 1 TABLET THREE TIMES A DAY (Patient taking differently: TAKE 1 TABLET TWO TIMES A DAY), Disp: 270 tablet, Rfl: 4  •  metoprolol succinate XL (TOPROL-XL) 50 MG 24 hr tablet, TAKE 1 TABLET DAILY  (Patient taking differently: TAKE 1/2 TAB DAILY), Disp: 90 tablet, Rfl: 4  •  Omega-3 Fatty Acids (FISH OIL) 1000 MG capsule capsule, Take  by mouth Daily With Breakfast., Disp: , Rfl:   •  VENTOLIN  (90 Base) MCG/ACT inhaler, , Disp: , Rfl:   •  esomeprazole (nexIUM) 40 MG capsule, TAKE 1 CAPSULE DAILY, Disp: 90 capsule, Rfl: 3     Objective      He is here today to review some labs and a checkup on his hypertension.    Recently, I asked him to see Dr. Kevan French regarding some symptoms I thought may be due to asthma/allergy.  Dr. French checked a d-dimer on this patient and called to check with me about the abnormal result that he was given.  This number was elevated.  He had seen the patient and had provided him with a inhaler.  Checked his lab work and turned him loose.  In the meantime, the patient was seen by his cardiologist and claimed that his dyspnea had improved with the use of the inhaler however the positive d-dimer needs to be addressed.  After discussing this with Dr. French prior to meeting with the patient today and then discussing it with the patient today, I think it would like to get a CT angiogram of his chest to make sure that he does not have a pulmonary embolus.    The patient does corroborate that his shortness of breath has improved since seeing Dr. French.         Review of Systems   Constitutional: Negative.    HENT: Negative.    Respiratory: Positive for shortness of breath.    Cardiovascular: Negative.    Musculoskeletal: Negative.    Psychiatric/Behavioral: Negative.        Physical Exam   Constitutional: He is oriented to person, place, and time. He appears well-developed and well-nourished.   Pleasant, neatly groomed, BMI 44.   HENT:   Head: Normocephalic and atraumatic.   Cardiovascular: Normal rate, regular rhythm and normal heart sounds.   Pulmonary/Chest: Effort normal and breath sounds normal.   Neurological: He is alert and oriented to person, place, and time.    Psychiatric: He has a normal mood and affect. His behavior is normal. Thought content normal.   Nursing note and vitals reviewed.        Problem List Items Addressed This Visit        Cardiovascular and Mediastinum    Hypercholesterolemia - Primary    Hypertension       Digestive    Morbid obesity (CMS/HCC)       Other    Hyperglycemia            PNo Follow-up on file.     His hypertension appears to be well controlled.    His hyperglycemia is stable and unchanged.    He has a positive d-dimer.  I am going to check a CT angiogram chest.    I will discuss the results of his CT when the results are available.    In the meantime, I would like to see him back in about 3 to 4 months.

## 2019-11-04 ENCOUNTER — HOSPITAL ENCOUNTER (OUTPATIENT)
Dept: CT IMAGING | Facility: HOSPITAL | Age: 84
Discharge: HOME | End: 2019-11-04
Admitting: INTERNAL MEDICINE

## 2019-11-04 DIAGNOSIS — R79.89 POSITIVE D DIMER: ICD-10-CM

## 2019-11-04 LAB — CREAT BLDA-MCNC: 0.6 MG/DL (ref 0.6–1.3)

## 2019-11-04 PROCEDURE — 71275 CT ANGIOGRAPHY CHEST: CPT

## 2019-11-04 PROCEDURE — 0 IOPAMIDOL PER 1 ML: Performed by: INTERNAL MEDICINE

## 2019-11-04 PROCEDURE — 82565 ASSAY OF CREATININE: CPT

## 2019-11-04 RX ADMIN — IOPAMIDOL 85 ML: 755 INJECTION, SOLUTION INTRAVENOUS at 10:27

## 2019-11-04 NOTE — NURSING NOTE
Dr. Rueda called and stated the patient my leave.  Patient was told to follow up with his MD, Dr. Luis.

## 2019-11-06 ENCOUNTER — HOSPITAL ENCOUNTER (OUTPATIENT)
Dept: CARDIOLOGY | Facility: HOSPITAL | Age: 84
Discharge: HOME | End: 2019-11-06

## 2019-11-06 ENCOUNTER — HOSPITAL ENCOUNTER (OUTPATIENT)
Dept: CARDIOLOGY | Facility: HOSPITAL | Age: 84
Discharge: HOME | End: 2019-11-06
Admitting: INTERNAL MEDICINE

## 2019-11-06 VITALS
HEART RATE: 42 BPM | WEIGHT: 292 LBS | OXYGEN SATURATION: 93 % | BODY MASS INDEX: 44.25 KG/M2 | DIASTOLIC BLOOD PRESSURE: 109 MMHG | SYSTOLIC BLOOD PRESSURE: 178 MMHG | HEIGHT: 68 IN

## 2019-11-06 LAB
AORTIC ROOT ANNULUS: 2.3 CM
ASCENDING AORTA: 3.6 CM
BH CV ECHO MEAS - ACS: 2.4 CM
BH CV ECHO MEAS - AO MAX PG (FULL): 1.2 MMHG
BH CV ECHO MEAS - AO MAX PG: 8.2 MMHG
BH CV ECHO MEAS - AO MEAN PG (FULL): 1 MMHG
BH CV ECHO MEAS - AO MEAN PG: 4 MMHG
BH CV ECHO MEAS - AO ROOT AREA (BSA CORRECTED): 1.6
BH CV ECHO MEAS - AO ROOT AREA: 11.9 CM^2
BH CV ECHO MEAS - AO ROOT DIAM: 3.9 CM
BH CV ECHO MEAS - AO V2 MAX: 143 CM/SEC
BH CV ECHO MEAS - AO V2 MEAN: 93.2 CM/SEC
BH CV ECHO MEAS - AO V2 VTI: 28.4 CM
BH CV ECHO MEAS - AVA(I,A): 3.9 CM^2
BH CV ECHO MEAS - AVA(I,D): 3.9 CM^2
BH CV ECHO MEAS - AVA(V,A): 3.8 CM^2
BH CV ECHO MEAS - AVA(V,D): 3.8 CM^2
BH CV ECHO MEAS - BSA(HAYCOCK): 2.6 M^2
BH CV ECHO MEAS - BSA: 2.4 M^2
BH CV ECHO MEAS - BZI_BMI: 44.4 KILOGRAMS/M^2
BH CV ECHO MEAS - BZI_METRIC_HEIGHT: 172.7 CM
BH CV ECHO MEAS - BZI_METRIC_WEIGHT: 132.5 KG
BH CV ECHO MEAS - EDV(MOD-SP2): 129 ML
BH CV ECHO MEAS - EDV(MOD-SP4): 131 ML
BH CV ECHO MEAS - EDV(TEICH): 173.9 ML
BH CV ECHO MEAS - EF(CUBED): 75.9 %
BH CV ECHO MEAS - EF(MOD-BP): 61 %
BH CV ECHO MEAS - EF(MOD-SP2): 57.4 %
BH CV ECHO MEAS - EF(MOD-SP4): 62.6 %
BH CV ECHO MEAS - EF(TEICH): 67 %
BH CV ECHO MEAS - ESV(MOD-SP2): 55 ML
BH CV ECHO MEAS - ESV(MOD-SP4): 49 ML
BH CV ECHO MEAS - ESV(TEICH): 57.4 ML
BH CV ECHO MEAS - FS: 37.7 %
BH CV ECHO MEAS - IVS/LVPW: 1
BH CV ECHO MEAS - IVSD: 1.1 CM
BH CV ECHO MEAS - LAT PEAK E' VEL: 8 CM/SEC
BH CV ECHO MEAS - LV DIASTOLIC VOL/BSA (35-75): 54.6 ML/M^2
BH CV ECHO MEAS - LV MASS(C)D: 262.9 GRAMS
BH CV ECHO MEAS - LV MASS(C)DI: 109.5 GRAMS/M^2
BH CV ECHO MEAS - LV MAX PG: 7 MMHG
BH CV ECHO MEAS - LV MEAN PG: 3 MMHG
BH CV ECHO MEAS - LV SYSTOLIC VOL/BSA (12-30): 20.4 ML/M^2
BH CV ECHO MEAS - LV V1 MAX: 132 CM/SEC
BH CV ECHO MEAS - LV V1 MEAN: 83.4 CM/SEC
BH CV ECHO MEAS - LV V1 VTI: 26.9 CM
BH CV ECHO MEAS - LVIDD: 5.9 CM
BH CV ECHO MEAS - LVIDS: 3.7 CM
BH CV ECHO MEAS - LVLD AP2: 7.1 CM
BH CV ECHO MEAS - LVLD AP4: 7.7 CM
BH CV ECHO MEAS - LVLS AP2: 6.7 CM
BH CV ECHO MEAS - LVLS AP4: 6.2 CM
BH CV ECHO MEAS - LVOT AREA (M): 4.2 CM^2
BH CV ECHO MEAS - LVOT AREA: 4.2 CM^2
BH CV ECHO MEAS - LVOT DIAM: 2.3 CM
BH CV ECHO MEAS - LVPWD: 1.1 CM
BH CV ECHO MEAS - MED PEAK E' VEL: 7 CM/SEC
BH CV ECHO MEAS - MV A DUR: 0.07 SEC
BH CV ECHO MEAS - MV A MAX VEL: 98.1 CM/SEC
BH CV ECHO MEAS - MV DEC SLOPE: 435 CM/SEC^2
BH CV ECHO MEAS - MV DEC TIME: 0.16 SEC
BH CV ECHO MEAS - MV E MAX VEL: 68.6 CM/SEC
BH CV ECHO MEAS - MV E/A: 0.7
BH CV ECHO MEAS - MV MAX PG: 5 MMHG
BH CV ECHO MEAS - MV MEAN PG: 3 MMHG
BH CV ECHO MEAS - MV P1/2T MAX VEL: 85.5 CM/SEC
BH CV ECHO MEAS - MV P1/2T: 57.6 MSEC
BH CV ECHO MEAS - MV V2 MAX: 112 CM/SEC
BH CV ECHO MEAS - MV V2 MEAN: 85.9 CM/SEC
BH CV ECHO MEAS - MV V2 VTI: 36.8 CM
BH CV ECHO MEAS - MVA P1/2T LCG: 2.6 CM^2
BH CV ECHO MEAS - MVA(P1/2T): 3.8 CM^2
BH CV ECHO MEAS - MVA(VTI): 3 CM^2
BH CV ECHO MEAS - PA MAX PG (FULL): 2.8 MMHG
BH CV ECHO MEAS - PA MAX PG: 8.9 MMHG
BH CV ECHO MEAS - PA V2 MAX: 149 CM/SEC
BH CV ECHO MEAS - PULM A REVS DUR: 0.09 SEC
BH CV ECHO MEAS - PULM A REVS VEL: 38.6 CM/SEC
BH CV ECHO MEAS - PULM DIAS VEL: 49.7 CM/SEC
BH CV ECHO MEAS - PULM S/D: 1.1
BH CV ECHO MEAS - PULM SYS VEL: 54.4 CM/SEC
BH CV ECHO MEAS - PVA(V,A): 4.7 CM^2
BH CV ECHO MEAS - PVA(V,D): 4.7 CM^2
BH CV ECHO MEAS - QP/QS: 1.3
BH CV ECHO MEAS - RV MAX PG: 6.1 MMHG
BH CV ECHO MEAS - RV MEAN PG: 3 MMHG
BH CV ECHO MEAS - RV V1 MAX: 123 CM/SEC
BH CV ECHO MEAS - RV V1 MEAN: 76.7 CM/SEC
BH CV ECHO MEAS - RV V1 VTI: 25.1 CM
BH CV ECHO MEAS - RVOT AREA: 5.7 CM^2
BH CV ECHO MEAS - RVOT DIAM: 2.7 CM
BH CV ECHO MEAS - SI(AO): 141.3 ML/M^2
BH CV ECHO MEAS - SI(CUBED): 65.2 ML/M^2
BH CV ECHO MEAS - SI(LVOT): 46.6 ML/M^2
BH CV ECHO MEAS - SI(MOD-SP2): 30.8 ML/M^2
BH CV ECHO MEAS - SI(MOD-SP4): 34.2 ML/M^2
BH CV ECHO MEAS - SI(TEICH): 48.5 ML/M^2
BH CV ECHO MEAS - SV(AO): 339.3 ML
BH CV ECHO MEAS - SV(CUBED): 156.6 ML
BH CV ECHO MEAS - SV(LVOT): 111.8 ML
BH CV ECHO MEAS - SV(MOD-SP2): 74 ML
BH CV ECHO MEAS - SV(MOD-SP4): 82 ML
BH CV ECHO MEAS - SV(RVOT): 143.7 ML
BH CV ECHO MEAS - SV(TEICH): 116.5 ML
BH CV ECHO MEAS - TAPSE (>1.6): 2.5 CM2
BH CV ECHO MEASUREMENTS AVERAGE E/E' RATIO: 9.15
BH CV NUCLEAR PRIOR STUDY: 3
BH CV STRESS BP STAGE 1: NORMAL
BH CV STRESS COMMENTS STAGE 1: NORMAL
BH CV STRESS DOSE REGADENOSON STAGE 1: 0.4
BH CV STRESS DURATION MIN STAGE 1: 0
BH CV STRESS DURATION SEC STAGE 1: 10
BH CV STRESS HR STAGE 1: 88
BH CV STRESS PROTOCOL 1: NORMAL
BH CV STRESS RECOVERY BP: NORMAL MMHG
BH CV STRESS RECOVERY HR: 81 BPM
BH CV STRESS STAGE 1: 1
BH CV XLRA - RV BASE: 3.4 CM
BH CV XLRA - TDI S': 12 CM/SEC
LEFT ATRIUM VOLUME INDEX: 32 ML/M2
LV EF 2D ECHO EST: 61 %
LV EF NUC BP: 62 %
MAXIMAL PREDICTED HEART RATE: 132 BPM
MAXIMAL PREDICTED HEART RATE: 132 BPM
PERCENT MAX PREDICTED HR: 66.67 %
SINUS: 3.5 CM
STJ: 3 CM
STRESS BASELINE BP: NORMAL MMHG
STRESS BASELINE HR: 80 BPM
STRESS PERCENT HR: 78 %
STRESS POST EXERCISE DUR SEC: 10 SEC
STRESS POST PEAK BP: NORMAL MMHG
STRESS POST PEAK HR: 88 BPM
STRESS TARGET HR: 112 BPM
STRESS TARGET HR: 112 BPM

## 2019-11-06 PROCEDURE — 93017 CV STRESS TEST TRACING ONLY: CPT

## 2019-11-06 PROCEDURE — 93306 TTE W/DOPPLER COMPLETE: CPT | Performed by: INTERNAL MEDICINE

## 2019-11-06 PROCEDURE — 93016 CV STRESS TEST SUPVJ ONLY: CPT | Performed by: INTERNAL MEDICINE

## 2019-11-06 PROCEDURE — 25010000002 REGADENOSON 0.4 MG/5ML SOLUTION: Performed by: INTERNAL MEDICINE

## 2019-11-06 PROCEDURE — 93018 CV STRESS TEST I&R ONLY: CPT | Performed by: INTERNAL MEDICINE

## 2019-11-06 PROCEDURE — 25010000002 PERFLUTREN (DEFINITY) 8.476 MG IN SODIUM CHLORIDE 0.9 % 10 ML INJECTION: Performed by: INTERNAL MEDICINE

## 2019-11-06 PROCEDURE — 78492 MYOCRD IMG PET MLT RST&STRS: CPT

## 2019-11-06 PROCEDURE — A9555 RB82 RUBIDIUM: HCPCS | Performed by: INTERNAL MEDICINE

## 2019-11-06 PROCEDURE — 78492 MYOCRD IMG PET MLT RST&STRS: CPT | Performed by: INTERNAL MEDICINE

## 2019-11-06 PROCEDURE — 0 RUBIDIUM CHLORIDE: Performed by: INTERNAL MEDICINE

## 2019-11-06 PROCEDURE — 93306 TTE W/DOPPLER COMPLETE: CPT

## 2019-11-06 RX ADMIN — PERFLUTREN 2 ML: 6.52 INJECTION, SUSPENSION INTRAVENOUS at 11:13

## 2019-11-06 RX ADMIN — RUBIDIUM CHLORIDE RB-82 1 DOSE: 150 INJECTION, SOLUTION INTRAVENOUS at 11:45

## 2019-11-06 RX ADMIN — REGADENOSON 0.4 MG: 0.08 INJECTION, SOLUTION INTRAVENOUS at 12:04

## 2019-11-06 RX ADMIN — RUBIDIUM CHLORIDE RB-82 1 DOSE: 150 INJECTION, SOLUTION INTRAVENOUS at 12:04

## 2020-01-20 RX ORDER — DOXAZOSIN MESYLATE 4 MG/1
TABLET ORAL
Qty: 90 TABLET | Refills: 0 | Status: SHIPPED | OUTPATIENT
Start: 2020-01-20 | End: 2020-04-20

## 2020-02-24 DIAGNOSIS — I10 ESSENTIAL HYPERTENSION: ICD-10-CM

## 2020-02-24 DIAGNOSIS — R73.9 HYPERGLYCEMIA: ICD-10-CM

## 2020-02-24 DIAGNOSIS — E78.00 HYPERCHOLESTEROLEMIA: Primary | ICD-10-CM

## 2020-02-27 LAB
ALBUMIN SERPL-MCNC: 4.2 G/DL (ref 3.5–5.2)
ALBUMIN/GLOB SERPL: 1.8 G/DL
ALP SERPL-CCNC: 82 U/L (ref 39–117)
ALT SERPL-CCNC: 22 U/L (ref 1–41)
AST SERPL-CCNC: 21 U/L (ref 1–40)
BASOPHILS # BLD AUTO: 0.03 10*3/MM3 (ref 0–0.2)
BASOPHILS NFR BLD AUTO: 0.5 % (ref 0–1.5)
BILIRUB SERPL-MCNC: 0.6 MG/DL (ref 0.2–1.2)
BUN SERPL-MCNC: 18 MG/DL (ref 8–23)
BUN/CREAT SERPL: 27.3 (ref 7–25)
CALCIUM SERPL-MCNC: 9 MG/DL (ref 8.6–10.5)
CHLORIDE SERPL-SCNC: 101 MMOL/L (ref 98–107)
CHOLEST SERPL-MCNC: 143 MG/DL (ref 0–200)
CO2 SERPL-SCNC: 29.9 MMOL/L (ref 22–29)
CREAT SERPL-MCNC: 0.66 MG/DL (ref 0.76–1.27)
EOSINOPHIL # BLD AUTO: 0.19 10*3/MM3 (ref 0–0.4)
EOSINOPHIL NFR BLD AUTO: 3.4 % (ref 0.3–6.2)
ERYTHROCYTE [DISTWIDTH] IN BLOOD BY AUTOMATED COUNT: 12.5 % (ref 12.3–15.4)
GLOBULIN SER CALC-MCNC: 2.4 GM/DL
GLUCOSE SERPL-MCNC: 108 MG/DL (ref 65–99)
HBA1C MFR BLD: 6.1 % (ref 4.8–5.6)
HCT VFR BLD AUTO: 43 % (ref 37.5–51)
HDLC SERPL-MCNC: 59 MG/DL (ref 40–60)
HGB BLD-MCNC: 14.4 G/DL (ref 13–17.7)
IMM GRANULOCYTES # BLD AUTO: 0.03 10*3/MM3 (ref 0–0.05)
IMM GRANULOCYTES NFR BLD AUTO: 0.5 % (ref 0–0.5)
LDLC SERPL CALC-MCNC: 71 MG/DL (ref 0–100)
LDLC/HDLC SERPL: 1.2 {RATIO}
LYMPHOCYTES # BLD AUTO: 1.47 10*3/MM3 (ref 0.7–3.1)
LYMPHOCYTES NFR BLD AUTO: 26.1 % (ref 19.6–45.3)
MCH RBC QN AUTO: 29.6 PG (ref 26.6–33)
MCHC RBC AUTO-ENTMCNC: 33.5 G/DL (ref 31.5–35.7)
MCV RBC AUTO: 88.3 FL (ref 79–97)
MONOCYTES # BLD AUTO: 0.46 10*3/MM3 (ref 0.1–0.9)
MONOCYTES NFR BLD AUTO: 8.2 % (ref 5–12)
NEUTROPHILS # BLD AUTO: 3.45 10*3/MM3 (ref 1.7–7)
NEUTROPHILS NFR BLD AUTO: 61.3 % (ref 42.7–76)
NRBC BLD AUTO-RTO: 0 /100 WBC (ref 0–0.2)
PLATELET # BLD AUTO: 141 10*3/MM3 (ref 140–450)
POTASSIUM SERPL-SCNC: 4.8 MMOL/L (ref 3.5–5.2)
PROT SERPL-MCNC: 6.6 G/DL (ref 6–8.5)
RBC # BLD AUTO: 4.87 10*6/MM3 (ref 4.14–5.8)
SODIUM SERPL-SCNC: 141 MMOL/L (ref 136–145)
TRIGL SERPL-MCNC: 66 MG/DL (ref 0–150)
VLDLC SERPL CALC-MCNC: 13.2 MG/DL
WBC # BLD AUTO: 5.63 10*3/MM3 (ref 3.4–10.8)

## 2020-02-28 ENCOUNTER — OFFICE VISIT (OUTPATIENT)
Dept: FAMILY MEDICINE CLINIC | Facility: CLINIC | Age: 85
End: 2020-02-28

## 2020-02-28 VITALS
OXYGEN SATURATION: 96 % | TEMPERATURE: 97.5 F | WEIGHT: 296.4 LBS | RESPIRATION RATE: 18 BRPM | BODY MASS INDEX: 44.92 KG/M2 | HEART RATE: 61 BPM | SYSTOLIC BLOOD PRESSURE: 140 MMHG | DIASTOLIC BLOOD PRESSURE: 62 MMHG | HEIGHT: 68 IN

## 2020-02-28 DIAGNOSIS — I10 ESSENTIAL HYPERTENSION: ICD-10-CM

## 2020-02-28 DIAGNOSIS — M47.896 OTHER OSTEOARTHRITIS OF SPINE, LUMBAR REGION: ICD-10-CM

## 2020-02-28 DIAGNOSIS — R73.9 HYPERGLYCEMIA: ICD-10-CM

## 2020-02-28 DIAGNOSIS — E78.00 HYPERCHOLESTEROLEMIA: Primary | ICD-10-CM

## 2020-02-28 PROCEDURE — 99214 OFFICE O/P EST MOD 30 MIN: CPT | Performed by: INTERNAL MEDICINE

## 2020-02-28 NOTE — PROGRESS NOTES
Subjective   Harry Potts is a 89 y.o. male. Patient is here today for   Chief Complaint   Patient presents with   • Hyperlipidemia     follow up on labs   • Hypertension          Vitals:    02/28/20 0814   BP: 140/62   Pulse: 61   Resp: 18   Temp: 97.5 °F (36.4 °C)   SpO2: 96%     Body mass index is 45.08 kg/m².      Past Medical History:   Diagnosis Date   • GERD (gastroesophageal reflux disease)    • Hyperglycemia    • Hyperlipidemia    • Hypertension    • Obesity    • Spondylolysis, lumbar region       Allergies   Allergen Reactions   • Morphine And Related       Social History     Socioeconomic History   • Marital status:      Spouse name: Not on file   • Number of children: Not on file   • Years of education: Not on file   • Highest education level: Not on file   Tobacco Use   • Smoking status: Never Smoker   • Smokeless tobacco: Never Used   Substance and Sexual Activity   • Alcohol use: No   • Drug use: No        Current Outpatient Medications:   •  amLODIPine (NORVASC) 10 MG tablet, TAKE 1 TABLET DAILY, Disp: 90 tablet, Rfl: 1  •  aspirin 81 MG EC tablet, Take 81 mg by mouth Daily., Disp: , Rfl:   •  atorvastatin (LIPITOR) 20 MG tablet, TAKE 1 TABLET DAILY, Disp: 90 tablet, Rfl: 4  •  azelastine (ASTELIN) 0.1 % nasal spray, , Disp: , Rfl:   •  celecoxib (CeleBREX) 200 MG capsule, TAKE 1 CAPSULE DAILY AS NEEDED, Disp: 90 capsule, Rfl: 4  •  doxazosin (CARDURA) 4 MG tablet, TAKE 1 TABLET DAILY AS DIRECTED, Disp: 90 tablet, Rfl: 0  •  esomeprazole (nexIUM) 40 MG capsule, TAKE 1 CAPSULE DAILY, Disp: 90 capsule, Rfl: 3  •  ezetimibe (ZETIA) 10 MG tablet, TAKE 1 TABLET DAILY, Disp: 90 tablet, Rfl: 4  •  fluticasone (FLONASE) 50 MCG/ACT nasal spray, , Disp: , Rfl:   •  Fluticasone Furoate-Vilanterol (BREO ELLIPTA IN), Inhale., Disp: , Rfl:   •  loratadine (CLARITIN) 10 MG tablet, Take  by mouth., Disp: , Rfl:   •  metaxalone (SKELAXIN) 800 MG tablet, TAKE 1 TABLET THREE TIMES A DAY (Patient taking  differently: TAKE 1 TABLET TWO TIMES A DAY), Disp: 270 tablet, Rfl: 4  •  metoprolol succinate XL (TOPROL-XL) 50 MG 24 hr tablet, TAKE 1 TABLET DAILY (Patient taking differently: TAKE 1/2 TAB DAILY), Disp: 90 tablet, Rfl: 4  •  Multiple Vitamins-Minerals (CENTRUM ADULTS PO), Take  by mouth Daily., Disp: , Rfl:   •  Omega-3 Fatty Acids (FISH OIL) 1000 MG capsule capsule, Take  by mouth Daily With Breakfast., Disp: , Rfl:   •  VENTOLIN  (90 Base) MCG/ACT inhaler, , Disp: , Rfl:      Objective     Labs done last week, hypertension, etc.    He tells me he is feeling relatively well.  Back in November, he had a CT angiogram of his chest which showed an 11 mm groundglass opacity in his left lung base.  It was recommended that he follow-up in about 4 months for repeat CT scan of his chest.  Is upcoming next month.    Saw cardiology who feels that his cardiac function is good.           Review of Systems   Constitutional: Negative.    HENT: Negative.    Respiratory: Negative.    Cardiovascular: Negative.    Musculoskeletal:        He has severe lumbar spine degenerative changes, lumbar spine stenosis and diffuse degenerative arthritis.  He ambulates with a wheeled walker.   Psychiatric/Behavioral: Negative.        Physical Exam   Constitutional: He is oriented to person, place, and time. He appears well-developed and well-nourished. No distress.   Pleasant, neatly groomed, BMI 45.1.   HENT:   Head: Normocephalic and atraumatic.   Cardiovascular: Normal rate, regular rhythm and normal heart sounds.   Pulmonary/Chest: Effort normal and breath sounds normal.   Neurological: He is alert and oriented to person, place, and time.   Psychiatric: He has a normal mood and affect. His behavior is normal. Thought content normal.   Nursing note and vitals reviewed.        Problem List Items Addressed This Visit        Cardiovascular and Mediastinum    Hypercholesterolemia - Primary    Hypertension       Musculoskeletal and  Integument    Osteoarthritis of lumbar spine       Other    Hyperglycemia            PLAN  He and I reviewed his labs.  He has good control of his hypercholesterolemia.  He does have hyperglycemia which we will monitor for now.    He has severe osteoarthritis diffusely and in his lumbar spine which creates lumbar spinal stenosis.  He ambulates well with a walker.    He is morbidly obese and at the age of 89 with an upcoming birthday in November I do not think it is likely that he is going to be losing any weight though I did suggest to him that he might try.    He has hyperglycemia related to his morbid obesity.  I am not to do anything about that for the time being.    I did ask him to follow-up in about 6 months.  He should have fasting labs prior to that visit to include lipid profile, comprehensive metabolic panel, CBC, urinalysis.      No follow-ups on file.

## 2020-03-16 ENCOUNTER — TRANSCRIBE ORDERS (OUTPATIENT)
Dept: ADMINISTRATIVE | Facility: HOSPITAL | Age: 85
End: 2020-03-16

## 2020-03-16 DIAGNOSIS — R91.1 PULMONARY NODULE: Primary | ICD-10-CM

## 2020-04-08 ENCOUNTER — APPOINTMENT (OUTPATIENT)
Dept: CT IMAGING | Facility: HOSPITAL | Age: 85
End: 2020-04-08

## 2020-04-17 RX ORDER — AMLODIPINE BESYLATE 10 MG/1
TABLET ORAL
Qty: 90 TABLET | Refills: 3 | Status: SHIPPED | OUTPATIENT
Start: 2020-04-17 | End: 2021-04-26

## 2020-04-20 RX ORDER — DOXAZOSIN MESYLATE 4 MG/1
TABLET ORAL
Qty: 90 TABLET | Refills: 3 | Status: SHIPPED | OUTPATIENT
Start: 2020-04-20 | End: 2021-04-26

## 2020-08-06 DIAGNOSIS — E78.00 HYPERCHOLESTEROLEMIA: Primary | ICD-10-CM

## 2020-08-21 RX ORDER — METAXALONE 800 MG/1
TABLET ORAL
Qty: 270 TABLET | Refills: 3 | Status: SHIPPED | OUTPATIENT
Start: 2020-08-21 | End: 2021-10-04 | Stop reason: SDUPTHER

## 2020-08-24 ENCOUNTER — TELEPHONE (OUTPATIENT)
Dept: FAMILY MEDICINE CLINIC | Facility: CLINIC | Age: 85
End: 2020-08-24

## 2020-08-24 NOTE — TELEPHONE ENCOUNTER
PATIENT IS CALLING BECAUSE HE NEEDS HELP WITH A MEDICATION  OF:      metaxalone (SKELAXIN) 800 MG tablet  HE IS COMPLETELY OUT. HE USING THE PHARMACY OF FILE OF: [facility].    BEST #: [phone]

## 2020-09-09 LAB
ALBUMIN SERPL-MCNC: 4.4 G/DL (ref 3.5–5.2)
ALBUMIN/GLOB SERPL: 2.3 G/DL
ALP SERPL-CCNC: 88 U/L (ref 39–117)
ALT SERPL-CCNC: 26 U/L (ref 1–41)
APPEARANCE UR: CLEAR
AST SERPL-CCNC: 24 U/L (ref 1–40)
BACTERIA #/AREA URNS HPF: ABNORMAL /HPF
BASOPHILS # BLD AUTO: 0.05 10*3/MM3 (ref 0–0.2)
BASOPHILS NFR BLD AUTO: 1 % (ref 0–1.5)
BILIRUB SERPL-MCNC: 0.5 MG/DL (ref 0–1.2)
BILIRUB UR QL STRIP: NEGATIVE
BUN SERPL-MCNC: 14 MG/DL (ref 8–23)
BUN/CREAT SERPL: 20.9 (ref 7–25)
CALCIUM SERPL-MCNC: 8.8 MG/DL (ref 8.6–10.5)
CASTS URNS MICRO: ABNORMAL
CHLORIDE SERPL-SCNC: 102 MMOL/L (ref 98–107)
CHOLEST SERPL-MCNC: 149 MG/DL (ref 0–200)
CO2 SERPL-SCNC: 28.3 MMOL/L (ref 22–29)
COLOR UR: YELLOW
CREAT SERPL-MCNC: 0.67 MG/DL (ref 0.76–1.27)
EOSINOPHIL # BLD AUTO: 0.24 10*3/MM3 (ref 0–0.4)
EOSINOPHIL NFR BLD AUTO: 4.6 % (ref 0.3–6.2)
EPI CELLS #/AREA URNS HPF: ABNORMAL /HPF
ERYTHROCYTE [DISTWIDTH] IN BLOOD BY AUTOMATED COUNT: 12.7 % (ref 12.3–15.4)
GLOBULIN SER CALC-MCNC: 1.9 GM/DL
GLUCOSE SERPL-MCNC: 103 MG/DL (ref 65–99)
GLUCOSE UR QL: NEGATIVE
HCT VFR BLD AUTO: 44.2 % (ref 37.5–51)
HDLC SERPL-MCNC: 60 MG/DL (ref 40–60)
HGB BLD-MCNC: 14.7 G/DL (ref 13–17.7)
HGB UR QL STRIP: NEGATIVE
IMM GRANULOCYTES # BLD AUTO: 0.02 10*3/MM3 (ref 0–0.05)
IMM GRANULOCYTES NFR BLD AUTO: 0.4 % (ref 0–0.5)
KETONES UR QL STRIP: NEGATIVE
LDLC SERPL CALC-MCNC: 74 MG/DL (ref 0–100)
LDLC/HDLC SERPL: 1.24 {RATIO}
LEUKOCYTE ESTERASE UR QL STRIP: NEGATIVE
LYMPHOCYTES # BLD AUTO: 1.63 10*3/MM3 (ref 0.7–3.1)
LYMPHOCYTES NFR BLD AUTO: 31.3 % (ref 19.6–45.3)
MCH RBC QN AUTO: 29.2 PG (ref 26.6–33)
MCHC RBC AUTO-ENTMCNC: 33.3 G/DL (ref 31.5–35.7)
MCV RBC AUTO: 87.7 FL (ref 79–97)
MONOCYTES # BLD AUTO: 0.46 10*3/MM3 (ref 0.1–0.9)
MONOCYTES NFR BLD AUTO: 8.8 % (ref 5–12)
NEUTROPHILS # BLD AUTO: 2.81 10*3/MM3 (ref 1.7–7)
NEUTROPHILS NFR BLD AUTO: 53.9 % (ref 42.7–76)
NITRITE UR QL STRIP: NEGATIVE
NRBC BLD AUTO-RTO: 0 /100 WBC (ref 0–0.2)
PH UR STRIP: 8 [PH] (ref 5–8)
PLATELET # BLD AUTO: 146 10*3/MM3 (ref 140–450)
POTASSIUM SERPL-SCNC: 4.1 MMOL/L (ref 3.5–5.2)
PROT SERPL-MCNC: 6.3 G/DL (ref 6–8.5)
PROT UR QL STRIP: NEGATIVE
RBC # BLD AUTO: 5.04 10*6/MM3 (ref 4.14–5.8)
RBC #/AREA URNS HPF: ABNORMAL /HPF
SODIUM SERPL-SCNC: 139 MMOL/L (ref 136–145)
SP GR UR: 1.02 (ref 1–1.03)
TRIGL SERPL-MCNC: 73 MG/DL (ref 0–150)
UROBILINOGEN UR STRIP-MCNC: NORMAL MG/DL
VLDLC SERPL CALC-MCNC: 14.6 MG/DL
WBC # BLD AUTO: 5.21 10*3/MM3 (ref 3.4–10.8)
WBC #/AREA URNS HPF: ABNORMAL /HPF

## 2020-09-16 ENCOUNTER — OFFICE VISIT (OUTPATIENT)
Dept: FAMILY MEDICINE CLINIC | Facility: CLINIC | Age: 85
End: 2020-09-16

## 2020-09-16 VITALS
SYSTOLIC BLOOD PRESSURE: 166 MMHG | TEMPERATURE: 98 F | HEIGHT: 68 IN | DIASTOLIC BLOOD PRESSURE: 82 MMHG | OXYGEN SATURATION: 93 % | RESPIRATION RATE: 18 BRPM | HEART RATE: 82 BPM | WEIGHT: 298.2 LBS | BODY MASS INDEX: 45.19 KG/M2

## 2020-09-16 DIAGNOSIS — E78.00 HYPERCHOLESTEROLEMIA: Primary | ICD-10-CM

## 2020-09-16 DIAGNOSIS — E66.01 MORBID OBESITY (HCC): ICD-10-CM

## 2020-09-16 DIAGNOSIS — M47.896 OTHER OSTEOARTHRITIS OF SPINE, LUMBAR REGION: ICD-10-CM

## 2020-09-16 DIAGNOSIS — I10 ESSENTIAL HYPERTENSION: ICD-10-CM

## 2020-09-16 PROCEDURE — 99214 OFFICE O/P EST MOD 30 MIN: CPT | Performed by: INTERNAL MEDICINE

## 2020-09-16 NOTE — PROGRESS NOTES
Subjective   Harry Potts is a 89 y.o. male. Patient is here today for   Chief Complaint   Patient presents with   • Hyperlipidemia     f/u labs.    • Hypertension          Vitals:    09/16/20 0848   BP: 166/82   Pulse: 82   Resp: 18   Temp: 98 °F (36.7 °C)   SpO2: 93%     Body mass index is 45.35 kg/m².      Past Medical History:   Diagnosis Date   • GERD (gastroesophageal reflux disease)    • Hyperglycemia    • Hyperlipidemia    • Hypertension    • Obesity    • Spondylolysis, lumbar region       Allergies   Allergen Reactions   • Morphine And Related       Social History     Socioeconomic History   • Marital status:      Spouse name: Not on file   • Number of children: Not on file   • Years of education: Not on file   • Highest education level: Not on file   Tobacco Use   • Smoking status: Never Smoker   • Smokeless tobacco: Never Used   Substance and Sexual Activity   • Alcohol use: No   • Drug use: No        Current Outpatient Medications:   •  amLODIPine (NORVASC) 10 MG tablet, TAKE 1 TABLET DAILY, Disp: 90 tablet, Rfl: 3  •  aspirin 81 MG EC tablet, Take 81 mg by mouth Daily., Disp: , Rfl:   •  atorvastatin (LIPITOR) 20 MG tablet, TAKE 1 TABLET DAILY, Disp: 90 tablet, Rfl: 4  •  azelastine (ASTELIN) 0.1 % nasal spray, , Disp: , Rfl:   •  celecoxib (CeleBREX) 200 MG capsule, TAKE 1 CAPSULE DAILY AS NEEDED, Disp: 90 capsule, Rfl: 4  •  doxazosin (CARDURA) 4 MG tablet, TAKE 1 TABLET DAILY AS DIRECTED, Disp: 90 tablet, Rfl: 3  •  esomeprazole (nexIUM) 40 MG capsule, TAKE 1 CAPSULE DAILY, Disp: 90 capsule, Rfl: 3  •  ezetimibe (ZETIA) 10 MG tablet, TAKE 1 TABLET DAILY, Disp: 90 tablet, Rfl: 4  •  fluticasone (FLONASE) 50 MCG/ACT nasal spray, , Disp: , Rfl:   •  Fluticasone Furoate-Vilanterol (BREO ELLIPTA IN), Inhale., Disp: , Rfl:   •  loratadine (CLARITIN) 10 MG tablet, Take  by mouth., Disp: , Rfl:   •  metaxalone (SKELAXIN) 800 MG tablet, TAKE 1 TABLET THREE TIMES A DAY, Disp: 270 tablet, Rfl: 3  •   metoprolol succinate XL (TOPROL-XL) 50 MG 24 hr tablet, TAKE 1 TABLET DAILY (Patient taking differently: TAKE 1/2 TAB DAILY), Disp: 90 tablet, Rfl: 4  •  Multiple Vitamins-Minerals (CENTRUM ADULTS PO), Take  by mouth Daily., Disp: , Rfl:   •  Omega-3 Fatty Acids (FISH OIL) 1000 MG capsule capsule, Take  by mouth Daily With Breakfast., Disp: , Rfl:   •  VENTOLIN  (90 Base) MCG/ACT inhaler, , Disp: , Rfl:      Objective     He is here to follow-up on hypertension and hyperlipidemia.    He is not a complainer.  He has terrible degenerative arthritis I think he has lumbar spine.  He ambulates with a weight of a walker.  He did not mention this once during his visit.    He was exceedingly pleasant per usual during this visit.    Hyperlipidemia    Hypertension         Review of Systems   Constitutional: Negative.    HENT: Negative.    Respiratory: Negative.    Cardiovascular: Negative.    Musculoskeletal: Negative.    Psychiatric/Behavioral: Negative.        Physical Exam  Constitutional:       Appearance: Normal appearance. He is obese.      Comments: Pleasant, neatly groomed, in no distress.   HENT:      Head: Normocephalic.   Neck:      Vascular: No carotid bruit.   Cardiovascular:      Rate and Rhythm: Normal rate and regular rhythm.      Heart sounds: No murmur.   Pulmonary:      Effort: Pulmonary effort is normal. No respiratory distress.      Breath sounds: Normal breath sounds. No stridor. No wheezing, rhonchi or rales.   Neurological:      Mental Status: He is alert.   Psychiatric:         Mood and Affect: Mood normal.         Behavior: Behavior normal.         Thought Content: Thought content normal.           Problem List Items Addressed This Visit        Cardiovascular and Mediastinum    Hypercholesterolemia - Primary    Hypertension       Digestive    Morbid obesity (CMS/HCC)       Musculoskeletal and Integument    Osteoarthritis of lumbar spine            PLAN  He and I reviewed his labs today.  His  hypokalemia is mildly controlled.    He has hypertension and did not so well controlled today.  He says his blood pressure cuff at home reads a.  I asked him to follow-up in about 3 months and to bring his blood pressure cuff with him so that we could reassess this issue.    He is morbidly obese with a BMI of 45 and I encouraged weight loss today regular aerobic activities difficult for him because he has severe diffuse arthritis.  Decrease his caloric intake some however.    He has chronic osteoarthritis of his lumbar spine.  He does complain about this once today.    Follow-up 3 months.  Fasting lab visit should include: CBC, comprehensive metabolic panel, urinalysis, lipid well.  No follow-ups on file.

## 2020-10-06 ENCOUNTER — FLU SHOT (OUTPATIENT)
Dept: FAMILY MEDICINE CLINIC | Facility: CLINIC | Age: 85
End: 2020-10-06

## 2020-10-06 DIAGNOSIS — Z23 NEED FOR INFLUENZA VACCINATION: ICD-10-CM

## 2020-10-06 PROCEDURE — 90694 VACC AIIV4 NO PRSRV 0.5ML IM: CPT | Performed by: INTERNAL MEDICINE

## 2020-10-06 PROCEDURE — G0008 ADMIN INFLUENZA VIRUS VAC: HCPCS | Performed by: INTERNAL MEDICINE

## 2020-10-26 RX ORDER — ESOMEPRAZOLE MAGNESIUM 40 MG/1
CAPSULE, DELAYED RELEASE ORAL
Qty: 90 CAPSULE | Refills: 3 | Status: SHIPPED | OUTPATIENT
Start: 2020-10-26 | End: 2021-10-21

## 2020-11-30 DIAGNOSIS — E78.00 HYPERCHOLESTEROLEMIA: Primary | ICD-10-CM

## 2020-11-30 DIAGNOSIS — R73.9 HYPERGLYCEMIA: ICD-10-CM

## 2020-11-30 DIAGNOSIS — I10 ESSENTIAL HYPERTENSION: ICD-10-CM

## 2020-12-02 LAB
ALBUMIN SERPL-MCNC: 4.4 G/DL (ref 3.5–5.2)
ALBUMIN/GLOB SERPL: 1.8 G/DL
ALP SERPL-CCNC: 82 U/L (ref 39–117)
ALT SERPL-CCNC: 24 U/L (ref 1–41)
APPEARANCE UR: CLEAR
AST SERPL-CCNC: 22 U/L (ref 1–40)
BACTERIA #/AREA URNS HPF: NORMAL /HPF
BASOPHILS # BLD AUTO: 0.03 10*3/MM3 (ref 0–0.2)
BASOPHILS NFR BLD AUTO: 0.6 % (ref 0–1.5)
BILIRUB SERPL-MCNC: 0.6 MG/DL (ref 0–1.2)
BILIRUB UR QL STRIP: NEGATIVE
BUN SERPL-MCNC: 12 MG/DL (ref 8–23)
BUN/CREAT SERPL: 17.4 (ref 7–25)
CALCIUM SERPL-MCNC: 9 MG/DL (ref 8.2–9.6)
CASTS URNS MICRO: NORMAL
CHLORIDE SERPL-SCNC: 101 MMOL/L (ref 98–107)
CHOLEST SERPL-MCNC: 163 MG/DL (ref 0–200)
CO2 SERPL-SCNC: 29.5 MMOL/L (ref 22–29)
COLOR UR: YELLOW
CREAT SERPL-MCNC: 0.69 MG/DL (ref 0.76–1.27)
EOSINOPHIL # BLD AUTO: 0.19 10*3/MM3 (ref 0–0.4)
EOSINOPHIL NFR BLD AUTO: 4 % (ref 0.3–6.2)
EPI CELLS #/AREA URNS HPF: NORMAL /HPF
ERYTHROCYTE [DISTWIDTH] IN BLOOD BY AUTOMATED COUNT: 12.4 % (ref 12.3–15.4)
GLOBULIN SER CALC-MCNC: 2.4 GM/DL
GLUCOSE SERPL-MCNC: 98 MG/DL (ref 65–99)
GLUCOSE UR QL: NEGATIVE
HBA1C MFR BLD: 6 % (ref 4.8–5.6)
HCT VFR BLD AUTO: 45.7 % (ref 37.5–51)
HDLC SERPL-MCNC: 71 MG/DL (ref 40–60)
HGB BLD-MCNC: 15.2 G/DL (ref 13–17.7)
HGB UR QL STRIP: NEGATIVE
IMM GRANULOCYTES # BLD AUTO: 0.02 10*3/MM3 (ref 0–0.05)
IMM GRANULOCYTES NFR BLD AUTO: 0.4 % (ref 0–0.5)
KETONES UR QL STRIP: NEGATIVE
LDLC SERPL CALC-MCNC: 79 MG/DL (ref 0–100)
LDLC/HDLC SERPL: 1.1 {RATIO}
LEUKOCYTE ESTERASE UR QL STRIP: NEGATIVE
LYMPHOCYTES # BLD AUTO: 1.45 10*3/MM3 (ref 0.7–3.1)
LYMPHOCYTES NFR BLD AUTO: 30.5 % (ref 19.6–45.3)
MCH RBC QN AUTO: 28.9 PG (ref 26.6–33)
MCHC RBC AUTO-ENTMCNC: 33.3 G/DL (ref 31.5–35.7)
MCV RBC AUTO: 86.9 FL (ref 79–97)
MONOCYTES # BLD AUTO: 0.41 10*3/MM3 (ref 0.1–0.9)
MONOCYTES NFR BLD AUTO: 8.6 % (ref 5–12)
NEUTROPHILS # BLD AUTO: 2.65 10*3/MM3 (ref 1.7–7)
NEUTROPHILS NFR BLD AUTO: 55.9 % (ref 42.7–76)
NITRITE UR QL STRIP: NEGATIVE
NRBC BLD AUTO-RTO: 0 /100 WBC (ref 0–0.2)
PH UR STRIP: 7.5 [PH] (ref 5–8)
PLATELET # BLD AUTO: 141 10*3/MM3 (ref 140–450)
POTASSIUM SERPL-SCNC: 4 MMOL/L (ref 3.5–5.2)
PROT SERPL-MCNC: 6.8 G/DL (ref 6–8.5)
PROT UR QL STRIP: ABNORMAL
RBC # BLD AUTO: 5.26 10*6/MM3 (ref 4.14–5.8)
RBC #/AREA URNS HPF: NORMAL /HPF
SODIUM SERPL-SCNC: 139 MMOL/L (ref 136–145)
SP GR UR: 1.01 (ref 1–1.03)
TRIGL SERPL-MCNC: 69 MG/DL (ref 0–150)
UROBILINOGEN UR STRIP-MCNC: ABNORMAL MG/DL
VLDLC SERPL CALC-MCNC: 13 MG/DL (ref 5–40)
WBC # BLD AUTO: 4.75 10*3/MM3 (ref 3.4–10.8)
WBC #/AREA URNS HPF: NORMAL /HPF

## 2020-12-02 RX ORDER — CELECOXIB 200 MG/1
CAPSULE ORAL
Qty: 90 CAPSULE | Refills: 3 | Status: SHIPPED | OUTPATIENT
Start: 2020-12-02 | End: 2021-12-01

## 2020-12-02 RX ORDER — EZETIMIBE 10 MG/1
TABLET ORAL
Qty: 90 TABLET | Refills: 3 | Status: SHIPPED | OUTPATIENT
Start: 2020-12-02 | End: 2021-12-01

## 2020-12-09 ENCOUNTER — OFFICE VISIT (OUTPATIENT)
Dept: FAMILY MEDICINE CLINIC | Facility: CLINIC | Age: 85
End: 2020-12-09

## 2020-12-09 VITALS
OXYGEN SATURATION: 97 % | SYSTOLIC BLOOD PRESSURE: 160 MMHG | RESPIRATION RATE: 18 BRPM | HEIGHT: 68 IN | WEIGHT: 306 LBS | BODY MASS INDEX: 46.38 KG/M2 | TEMPERATURE: 97.3 F | HEART RATE: 68 BPM | DIASTOLIC BLOOD PRESSURE: 72 MMHG

## 2020-12-09 DIAGNOSIS — E78.00 HYPERCHOLESTEROLEMIA: ICD-10-CM

## 2020-12-09 DIAGNOSIS — M47.896 OTHER OSTEOARTHRITIS OF SPINE, LUMBAR REGION: ICD-10-CM

## 2020-12-09 DIAGNOSIS — I10 ESSENTIAL HYPERTENSION: Primary | ICD-10-CM

## 2020-12-09 DIAGNOSIS — E66.01 MORBID OBESITY (HCC): ICD-10-CM

## 2020-12-09 PROCEDURE — 99214 OFFICE O/P EST MOD 30 MIN: CPT | Performed by: INTERNAL MEDICINE

## 2020-12-09 NOTE — PROGRESS NOTES
Subjective   Harry Potts is a 89 y.o. male. Patient is here today for   Chief Complaint   Patient presents with   • Hyperlipidemia     f/u labs.    • Hypertension   • Heartburn          Vitals:    12/09/20 1557   BP: 160/72   Pulse: 68   Resp: 18   Temp: 97.3 °F (36.3 °C)   SpO2: 97%     Body mass index is 46.54 kg/m².      Past Medical History:   Diagnosis Date   • GERD (gastroesophageal reflux disease)    • Hyperglycemia    • Hyperlipidemia    • Hypertension    • Obesity    • Spondylolysis, lumbar region       Allergies   Allergen Reactions   • Morphine And Related       Social History     Socioeconomic History   • Marital status:      Spouse name: Not on file   • Number of children: Not on file   • Years of education: Not on file   • Highest education level: Not on file   Tobacco Use   • Smoking status: Never Smoker   • Smokeless tobacco: Never Used   Substance and Sexual Activity   • Alcohol use: No   • Drug use: No        Current Outpatient Medications:   •  amLODIPine (NORVASC) 10 MG tablet, TAKE 1 TABLET DAILY, Disp: 90 tablet, Rfl: 3  •  aspirin 81 MG EC tablet, Take 81 mg by mouth Daily., Disp: , Rfl:   •  atorvastatin (LIPITOR) 20 MG tablet, TAKE 1 TABLET DAILY, Disp: 90 tablet, Rfl: 4  •  azelastine (ASTELIN) 0.1 % nasal spray, , Disp: , Rfl:   •  celecoxib (CeleBREX) 200 MG capsule, TAKE 1 CAPSULE DAILY AS NEEDED, Disp: 90 capsule, Rfl: 3  •  doxazosin (CARDURA) 4 MG tablet, TAKE 1 TABLET DAILY AS DIRECTED, Disp: 90 tablet, Rfl: 3  •  esomeprazole (nexIUM) 40 MG capsule, TAKE 1 CAPSULE DAILY, Disp: 90 capsule, Rfl: 3  •  ezetimibe (ZETIA) 10 MG tablet, TAKE 1 TABLET DAILY, Disp: 90 tablet, Rfl: 3  •  fluticasone (FLONASE) 50 MCG/ACT nasal spray, , Disp: , Rfl:   •  Fluticasone Furoate-Vilanterol (BREO ELLIPTA IN), Inhale., Disp: , Rfl:   •  loratadine (CLARITIN) 10 MG tablet, Take  by mouth., Disp: , Rfl:   •  metaxalone (SKELAXIN) 800 MG tablet, TAKE 1 TABLET THREE TIMES A DAY, Disp: 270  tablet, Rfl: 3  •  metoprolol succinate XL (TOPROL-XL) 50 MG 24 hr tablet, TAKE 1 TABLET DAILY (Patient taking differently: TAKE 1/2 TAB DAILY), Disp: 90 tablet, Rfl: 4  •  Multiple Vitamins-Minerals (CENTRUM ADULTS PO), Take  by mouth Daily., Disp: , Rfl:   •  Omega-3 Fatty Acids (FISH OIL) 1000 MG capsule capsule, Take  by mouth Daily With Breakfast., Disp: , Rfl:   •  VENTOLIN  (90 Base) MCG/ACT inhaler, , Disp: , Rfl:      Objective     He is here today to follow-up on lab work.    He has no complaints.       Review of Systems   Constitutional: Negative.    HENT: Negative.    Respiratory: Negative.    Cardiovascular: Negative.    Musculoskeletal: Negative.    Psychiatric/Behavioral: Negative.        Physical Exam  Vitals signs and nursing note reviewed.   Constitutional:       Appearance: Normal appearance. He is obese.      Comments: Pleasant, neatly groomed, in no distress.   Neck:      Vascular: No carotid bruit.   Cardiovascular:      Rate and Rhythm: Normal rate and regular rhythm.      Heart sounds: Normal heart sounds. No murmur. No gallop.    Pulmonary:      Effort: No respiratory distress.      Breath sounds: Normal breath sounds. No wheezing or rales.   Musculoskeletal:      Comments: He is severe lumbar spine stenosis and diffuse degenerative lumbar spine and thoracic spine arthritis.  He ambulates with a cane routinely.   Neurological:      Mental Status: He is alert and oriented to person, place, and time.   Psychiatric:         Mood and Affect: Mood normal.         Behavior: Behavior normal.         Thought Content: Thought content normal.         Judgment: Judgment normal.           Problems Addressed this Visit        Cardiovascular and Mediastinum    Hypercholesterolemia    Hypertension - Primary       Digestive    Morbid obesity (CMS/HCC)       Musculoskeletal and Integument    Osteoarthritis of lumbar spine      Diagnoses       Codes Comments    Essential hypertension    -  Primary  ICD-10-CM: I10  ICD-9-CM: 401.9     Hypercholesterolemia     ICD-10-CM: E78.00  ICD-9-CM: 272.0     Morbid obesity (CMS/HCC)     ICD-10-CM: E66.01  ICD-9-CM: 278.01     Other osteoarthritis of spine, lumbar region     ICD-10-CM: M47.896  ICD-9-CM: 721.3             PLAN  His hypercholesterolemia is well controlled.    His hypertension is well controlled.  When I rechecked his blood pressure I got 136/72.    He is morbidly obese.  It would be good for him to lose weight.  Exercise is difficult.  He is going to have to do his best to cut down on his caloric intake.    He has severe arthritis in his lumbar and thoracic spine.  He does not complain of pain but I know he has it.  He does continue to ambulate with a cane.    I asked him to follow-up in about 6 months.  No follow-ups on file.

## 2020-12-30 RX ORDER — ATORVASTATIN CALCIUM 20 MG/1
TABLET, FILM COATED ORAL
Qty: 90 TABLET | Refills: 3 | Status: SHIPPED | OUTPATIENT
Start: 2020-12-30 | End: 2021-03-09 | Stop reason: HOSPADM

## 2020-12-30 RX ORDER — METOPROLOL SUCCINATE 50 MG/1
25 TABLET, EXTENDED RELEASE ORAL DAILY
Qty: 90 TABLET | Refills: 1 | Status: SHIPPED | OUTPATIENT
Start: 2020-12-30 | End: 2021-11-23

## 2021-03-02 DIAGNOSIS — R73.9 HYPERGLYCEMIA: ICD-10-CM

## 2021-03-02 DIAGNOSIS — I10 ESSENTIAL HYPERTENSION: ICD-10-CM

## 2021-03-02 DIAGNOSIS — E78.00 HYPERCHOLESTEROLEMIA: Primary | ICD-10-CM

## 2021-03-05 ENCOUNTER — APPOINTMENT (OUTPATIENT)
Dept: CT IMAGING | Facility: HOSPITAL | Age: 86
End: 2021-03-05

## 2021-03-05 ENCOUNTER — APPOINTMENT (OUTPATIENT)
Dept: GENERAL RADIOLOGY | Facility: HOSPITAL | Age: 86
End: 2021-03-05

## 2021-03-05 ENCOUNTER — HOSPITAL ENCOUNTER (OUTPATIENT)
Facility: HOSPITAL | Age: 86
Setting detail: OBSERVATION
Discharge: HOME HEALTH | End: 2021-03-09
Attending: EMERGENCY MEDICINE | Admitting: INTERNAL MEDICINE

## 2021-03-05 ENCOUNTER — TELEPHONE (OUTPATIENT)
Dept: FAMILY MEDICINE CLINIC | Facility: CLINIC | Age: 86
End: 2021-03-05

## 2021-03-05 DIAGNOSIS — M62.82 NON-TRAUMATIC RHABDOMYOLYSIS: Primary | ICD-10-CM

## 2021-03-05 DIAGNOSIS — J96.01 ACUTE RESPIRATORY FAILURE WITH HYPOXIA (HCC): ICD-10-CM

## 2021-03-05 DIAGNOSIS — R29.898 WEAKNESS OF BOTH LOWER EXTREMITIES: ICD-10-CM

## 2021-03-05 DIAGNOSIS — M47.16 OSTEOARTHRITIS OF LUMBAR SPINE WITH MYELOPATHY: ICD-10-CM

## 2021-03-05 LAB
ALBUMIN SERPL-MCNC: 4.3 G/DL (ref 3.5–5.2)
ALBUMIN/GLOB SERPL: 1.5 G/DL
ALP SERPL-CCNC: 97 U/L (ref 39–117)
ALT SERPL W P-5'-P-CCNC: 33 U/L (ref 1–41)
ANION GAP SERPL CALCULATED.3IONS-SCNC: 9 MMOL/L (ref 5–15)
AST SERPL-CCNC: 56 U/L (ref 1–40)
BACTERIA UR QL AUTO: NORMAL /HPF
BASOPHILS # BLD AUTO: 0.03 10*3/MM3 (ref 0–0.2)
BASOPHILS NFR BLD AUTO: 0.4 % (ref 0–1.5)
BILIRUB SERPL-MCNC: 0.6 MG/DL (ref 0–1.2)
BILIRUB UR QL STRIP: NEGATIVE
BUN SERPL-MCNC: 12 MG/DL (ref 8–23)
BUN/CREAT SERPL: 21.8 (ref 7–25)
CALCIUM SPEC-SCNC: 9 MG/DL (ref 8.2–9.6)
CHLORIDE SERPL-SCNC: 101 MMOL/L (ref 98–107)
CK SERPL-CCNC: 1406 U/L (ref 20–200)
CLARITY UR: CLEAR
CO2 SERPL-SCNC: 29 MMOL/L (ref 22–29)
COLOR UR: YELLOW
CREAT SERPL-MCNC: 0.55 MG/DL (ref 0.76–1.27)
DEPRECATED RDW RBC AUTO: 39 FL (ref 37–54)
EOSINOPHIL # BLD AUTO: 0.01 10*3/MM3 (ref 0–0.4)
EOSINOPHIL NFR BLD AUTO: 0.1 % (ref 0.3–6.2)
ERYTHROCYTE [DISTWIDTH] IN BLOOD BY AUTOMATED COUNT: 12.4 % (ref 12.3–15.4)
GFR SERPL CREATININE-BSD FRML MDRD: 140 ML/MIN/1.73
GLOBULIN UR ELPH-MCNC: 2.8 GM/DL
GLUCOSE SERPL-MCNC: 130 MG/DL (ref 65–99)
GLUCOSE UR STRIP-MCNC: NEGATIVE MG/DL
HCT VFR BLD AUTO: 46.2 % (ref 37.5–51)
HGB BLD-MCNC: 15.6 G/DL (ref 13–17.7)
HGB UR QL STRIP.AUTO: ABNORMAL
HOLD SPECIMEN: NORMAL
HOLD SPECIMEN: NORMAL
HYALINE CASTS UR QL AUTO: NORMAL /LPF
IMM GRANULOCYTES # BLD AUTO: 0.02 10*3/MM3 (ref 0–0.05)
IMM GRANULOCYTES NFR BLD AUTO: 0.2 % (ref 0–0.5)
KETONES UR QL STRIP: ABNORMAL
LEUKOCYTE ESTERASE UR QL STRIP.AUTO: NEGATIVE
LYMPHOCYTES # BLD AUTO: 0.95 10*3/MM3 (ref 0.7–3.1)
LYMPHOCYTES NFR BLD AUTO: 11.6 % (ref 19.6–45.3)
MAGNESIUM SERPL-MCNC: 1.9 MG/DL (ref 1.6–2.4)
MCH RBC QN AUTO: 29.4 PG (ref 26.6–33)
MCHC RBC AUTO-ENTMCNC: 33.8 G/DL (ref 31.5–35.7)
MCV RBC AUTO: 87 FL (ref 79–97)
MONOCYTES # BLD AUTO: 0.59 10*3/MM3 (ref 0.1–0.9)
MONOCYTES NFR BLD AUTO: 7.2 % (ref 5–12)
NEUTROPHILS NFR BLD AUTO: 6.61 10*3/MM3 (ref 1.7–7)
NEUTROPHILS NFR BLD AUTO: 80.5 % (ref 42.7–76)
NITRITE UR QL STRIP: NEGATIVE
NRBC BLD AUTO-RTO: 0 /100 WBC (ref 0–0.2)
PH UR STRIP.AUTO: 7.5 [PH] (ref 5–8)
PLATELET # BLD AUTO: 147 10*3/MM3 (ref 140–450)
PMV BLD AUTO: 10.6 FL (ref 6–12)
POTASSIUM SERPL-SCNC: 4.2 MMOL/L (ref 3.5–5.2)
PROT SERPL-MCNC: 7.1 G/DL (ref 6–8.5)
PROT UR QL STRIP: ABNORMAL
QT INTERVAL: 407 MS
RBC # BLD AUTO: 5.31 10*6/MM3 (ref 4.14–5.8)
RBC # UR: NORMAL /HPF
REF LAB TEST METHOD: NORMAL
SARS-COV-2 ORF1AB RESP QL NAA+PROBE: NOT DETECTED
SODIUM SERPL-SCNC: 139 MMOL/L (ref 136–145)
SP GR UR STRIP: 1.02 (ref 1–1.03)
SQUAMOUS #/AREA URNS HPF: NORMAL /HPF
TROPONIN T SERPL-MCNC: 0.01 NG/ML (ref 0–0.03)
UROBILINOGEN UR QL STRIP: ABNORMAL
WBC # BLD AUTO: 8.21 10*3/MM3 (ref 3.4–10.8)
WBC UR QL AUTO: NORMAL /HPF
WHOLE BLOOD HOLD SPECIMEN: NORMAL
WHOLE BLOOD HOLD SPECIMEN: NORMAL

## 2021-03-05 PROCEDURE — U0004 COV-19 TEST NON-CDC HGH THRU: HCPCS | Performed by: EMERGENCY MEDICINE

## 2021-03-05 PROCEDURE — 80053 COMPREHEN METABOLIC PANEL: CPT | Performed by: NURSE PRACTITIONER

## 2021-03-05 PROCEDURE — 93010 ELECTROCARDIOGRAM REPORT: CPT | Performed by: INTERNAL MEDICINE

## 2021-03-05 PROCEDURE — 93005 ELECTROCARDIOGRAM TRACING: CPT | Performed by: NURSE PRACTITIONER

## 2021-03-05 PROCEDURE — 83735 ASSAY OF MAGNESIUM: CPT | Performed by: NURSE PRACTITIONER

## 2021-03-05 PROCEDURE — 70450 CT HEAD/BRAIN W/O DYE: CPT

## 2021-03-05 PROCEDURE — G0378 HOSPITAL OBSERVATION PER HR: HCPCS

## 2021-03-05 PROCEDURE — 82550 ASSAY OF CK (CPK): CPT | Performed by: PHYSICIAN ASSISTANT

## 2021-03-05 PROCEDURE — 96360 HYDRATION IV INFUSION INIT: CPT

## 2021-03-05 PROCEDURE — 84484 ASSAY OF TROPONIN QUANT: CPT | Performed by: NURSE PRACTITIONER

## 2021-03-05 PROCEDURE — 81001 URINALYSIS AUTO W/SCOPE: CPT | Performed by: NURSE PRACTITIONER

## 2021-03-05 PROCEDURE — 99284 EMERGENCY DEPT VISIT MOD MDM: CPT

## 2021-03-05 PROCEDURE — C9803 HOPD COVID-19 SPEC COLLECT: HCPCS

## 2021-03-05 PROCEDURE — 71045 X-RAY EXAM CHEST 1 VIEW: CPT

## 2021-03-05 PROCEDURE — 96361 HYDRATE IV INFUSION ADD-ON: CPT

## 2021-03-05 PROCEDURE — 99283 EMERGENCY DEPT VISIT LOW MDM: CPT

## 2021-03-05 PROCEDURE — 85025 COMPLETE CBC W/AUTO DIFF WBC: CPT | Performed by: NURSE PRACTITIONER

## 2021-03-05 RX ORDER — AMLODIPINE BESYLATE 10 MG/1
10 TABLET ORAL DAILY
Status: DISCONTINUED | OUTPATIENT
Start: 2021-03-05 | End: 2021-03-09 | Stop reason: HOSPADM

## 2021-03-05 RX ORDER — ONDANSETRON 2 MG/ML
4 INJECTION INTRAMUSCULAR; INTRAVENOUS EVERY 6 HOURS PRN
Status: DISCONTINUED | OUTPATIENT
Start: 2021-03-05 | End: 2021-03-09 | Stop reason: HOSPADM

## 2021-03-05 RX ORDER — SODIUM CHLORIDE 0.9 % (FLUSH) 0.9 %
10 SYRINGE (ML) INJECTION AS NEEDED
Status: DISCONTINUED | OUTPATIENT
Start: 2021-03-05 | End: 2021-03-09 | Stop reason: HOSPADM

## 2021-03-05 RX ORDER — TERAZOSIN 5 MG/1
5 CAPSULE ORAL NIGHTLY
Status: DISCONTINUED | OUTPATIENT
Start: 2021-03-05 | End: 2021-03-09 | Stop reason: HOSPADM

## 2021-03-05 RX ORDER — BUDESONIDE AND FORMOTEROL FUMARATE DIHYDRATE 80; 4.5 UG/1; UG/1
2 AEROSOL RESPIRATORY (INHALATION)
Status: DISCONTINUED | OUTPATIENT
Start: 2021-03-05 | End: 2021-03-09 | Stop reason: HOSPADM

## 2021-03-05 RX ORDER — FLUTICASONE PROPIONATE 50 MCG
1 SPRAY, SUSPENSION (ML) NASAL NIGHTLY
Status: DISCONTINUED | OUTPATIENT
Start: 2021-03-05 | End: 2021-03-09 | Stop reason: HOSPADM

## 2021-03-05 RX ORDER — SODIUM CHLORIDE 9 MG/ML
75 INJECTION, SOLUTION INTRAVENOUS CONTINUOUS
Status: DISCONTINUED | OUTPATIENT
Start: 2021-03-05 | End: 2021-03-08

## 2021-03-05 RX ORDER — ACETAMINOPHEN 325 MG/1
650 TABLET ORAL EVERY 4 HOURS PRN
Status: DISCONTINUED | OUTPATIENT
Start: 2021-03-05 | End: 2021-03-09 | Stop reason: HOSPADM

## 2021-03-05 RX ORDER — METOPROLOL SUCCINATE 50 MG/1
50 TABLET, EXTENDED RELEASE ORAL DAILY
Status: DISCONTINUED | OUTPATIENT
Start: 2021-03-05 | End: 2021-03-06

## 2021-03-05 RX ORDER — ATORVASTATIN CALCIUM 20 MG/1
20 TABLET, FILM COATED ORAL DAILY
Status: DISCONTINUED | OUTPATIENT
Start: 2021-03-05 | End: 2021-03-05

## 2021-03-05 RX ORDER — ACETAMINOPHEN 160 MG/5ML
650 SOLUTION ORAL EVERY 4 HOURS PRN
Status: DISCONTINUED | OUTPATIENT
Start: 2021-03-05 | End: 2021-03-09 | Stop reason: HOSPADM

## 2021-03-05 RX ORDER — MULTIPLE VITAMINS W/ MINERALS TAB 9MG-400MCG
1 TAB ORAL DAILY
Status: DISCONTINUED | OUTPATIENT
Start: 2021-03-05 | End: 2021-03-09 | Stop reason: HOSPADM

## 2021-03-05 RX ORDER — PANTOPRAZOLE SODIUM 40 MG/1
40 TABLET, DELAYED RELEASE ORAL EVERY MORNING
Status: DISCONTINUED | OUTPATIENT
Start: 2021-03-06 | End: 2021-03-09 | Stop reason: HOSPADM

## 2021-03-05 RX ORDER — METAXALONE 800 MG/1
800 TABLET ORAL 3 TIMES DAILY
Status: DISCONTINUED | OUTPATIENT
Start: 2021-03-05 | End: 2021-03-09 | Stop reason: HOSPADM

## 2021-03-05 RX ORDER — ASPIRIN 81 MG/1
81 TABLET ORAL DAILY
Status: DISCONTINUED | OUTPATIENT
Start: 2021-03-05 | End: 2021-03-09 | Stop reason: HOSPADM

## 2021-03-05 RX ORDER — ACETAMINOPHEN 650 MG/1
650 SUPPOSITORY RECTAL EVERY 4 HOURS PRN
Status: DISCONTINUED | OUTPATIENT
Start: 2021-03-05 | End: 2021-03-09 | Stop reason: HOSPADM

## 2021-03-05 RX ADMIN — AMLODIPINE BESYLATE 10 MG: 10 TABLET ORAL at 21:24

## 2021-03-05 RX ADMIN — SODIUM CHLORIDE 125 ML/HR: 9 INJECTION, SOLUTION INTRAVENOUS at 16:30

## 2021-03-05 RX ADMIN — FLUTICASONE PROPIONATE 1 SPRAY: 50 SPRAY, METERED NASAL at 21:25

## 2021-03-05 RX ADMIN — METAXALONE 800 MG: 800 TABLET ORAL at 21:26

## 2021-03-05 RX ADMIN — TERAZOSIN HYDROCHLORIDE 5 MG: 5 CAPSULE ORAL at 21:32

## 2021-03-05 NOTE — ED TRIAGE NOTES
Pt reports muscle spasms in BLE when standing up, states can not stay standing up. Patient masked in first look triage. I was wearing mask and goggles.

## 2021-03-05 NOTE — ED PROVIDER NOTES
MD ATTESTATION NOTE    The AMARI and I have discussed this patient's history, physical exam, and treatment plan.  I have reviewed the documentation and personally had a face to face interaction with the patient. I affirm the documentation and agree with the treatment and plan.  The attached note describes my personal findings.      History  9-year-old male presents with bilateral lower extremity weakness.  He denies any injury.  He does take Lipitor for cholesterol.    Physical Exam  Vital Signs reviewed  Alert, Well Appearing in NAD  Lower extremities-mild swelling which is chronic per patient and daughter.  Good distal pulses.  Neuro-there is mildly decreased drink in bilateral lower extremities.    Disposition  I discussed treatment evaluation this patient with ALAN hSepard.  Labs been reviewed and notable for elevated creatinine kinase of greater than 1400 which is suggestive of rhabdomyolysis.  This could be related to Lipitor usage or could have other etiologies.  Would go ahead and admit this patient is having increased weakness for further evaluation and treatment in the hospital.     Manuel Hoover MD  03/05/21 [id number]

## 2021-03-05 NOTE — TELEPHONE ENCOUNTER
Caller: Harry Potts    Relationship to patient: Self    Best call back number: [phone]    Patient is needing: PATIENT IS CALLING IN REGARDS TO HAVING MUSCLE SPASMS IN HIS LEGS. HE STATED THAT IT HAPPENED THIS MORNING AND IT HURTS WHEN HE WALKS. HE WOULD LIKE MD VALDEZ TO CONTACT HIM WHEN POSSIBLE.     PLEASE ADVISE

## 2021-03-05 NOTE — TELEPHONE ENCOUNTER
Patient's wife called back and stated they took him to the ER for the spasms     Call back   [phone]

## 2021-03-05 NOTE — PLAN OF CARE
Problem: Fall Injury Risk  Goal: Absence of Fall and Fall-Related Injury  Outcome: Ongoing, Progressing  Intervention: Identify and Manage Contributors to Fall Injury Risk  Recent Flowsheet Documentation  Taken 3/5/2021 1700 by Niya Douglass RN  Medication Review/Management: medications reviewed  Intervention: Promote Injury-Free Environment  Recent Flowsheet Documentation  Taken 3/5/2021 1700 by Niya Douglass RN  Safety Promotion/Fall Prevention:   activity supervised   assistive device/personal items within reach   clutter free environment maintained   fall prevention program maintained   nonskid shoes/slippers when out of bed   room organization consistent   safety round/check completed     Problem: Adult Inpatient Plan of Care  Goal: Plan of Care Review  Outcome: Ongoing, Progressing  Flowsheets (Taken 3/5/2021 1839)  Plan of Care Reviewed With:   patient   daughter  Outcome Summary: Pt admitted from home after having weakness and spasms in Bilateral LE.  Pt states he lowered himself to the floor but did not fall.  CPK found to be elevated and MD ? if rhabdo d/t lipitor.  Pt to room with no c/o.  Has audible wheezing the daughter states is baseline.  Has Moisture associated redness to bilateral groin areas.  Daughter states he has history of sundowning in hospital and to call if needed and she will stay with him.  IVFs going.  will CTM.  Goal: Patient-Specific Goal (Individualized)  Outcome: Ongoing, Progressing  Goal: Absence of Hospital-Acquired Illness or Injury  Outcome: Ongoing, Progressing  Intervention: Identify and Manage Fall Risk  Recent Flowsheet Documentation  Taken 3/5/2021 1700 by Niya Douglass RN  Safety Promotion/Fall Prevention:   activity supervised   assistive device/personal items within reach   clutter free environment maintained   fall prevention program maintained   nonskid shoes/slippers when out of bed   room organization consistent   safety round/check  completed  Intervention: Prevent Skin Injury  Recent Flowsheet Documentation  Taken 3/5/2021 1700 by Niya Douglass RN  Body Position: sitting up in bed  Intervention: Prevent Infection  Recent Flowsheet Documentation  Taken 3/5/2021 1700 by Niya Douglass RN  Infection Prevention:   personal protective equipment utilized   hand hygiene promoted  Goal: Optimal Comfort and Wellbeing  Outcome: Ongoing, Progressing  Intervention: Provide Person-Centered Care  Recent Flowsheet Documentation  Taken 3/5/2021 1700 by Niya Douglass RN  Trust Relationship/Rapport:   care explained   choices provided   questions answered   questions encouraged   reassurance provided   thoughts/feelings acknowledged  Goal: Readiness for Transition of Care  Outcome: Ongoing, Progressing  Intervention: Mutually Develop Transition Plan  Recent Flowsheet Documentation  Taken 3/5/2021 1829 by Niya Douglass RN  Transportation Anticipated: family or friend will provide  Patient/Family Anticipated Services at Transition: none  Patient/Family Anticipates Transition to: home with family  Taken 3/5/2021 1750 by Niya Douglass RN  Equipment Currently Used at Home: none     Problem: Skin Injury Risk Increased  Goal: Skin Health and Integrity  Outcome: Ongoing, Progressing  Intervention: Optimize Skin Protection  Recent Flowsheet Documentation  Taken 3/5/2021 1700 by Niya Douglass RN  Head of Bed (HOB): HOB elevated   Goal Outcome Evaluation:  Plan of Care Reviewed With: patient, daughter     Outcome Summary: Pt admitted from home after having weakness and spasms in Bilateral LE.  Pt states he lowered himself to the floor but did not fall.  CPK found to be elevated and MD ? if rhabdo d/t lipitor.  Pt to room with no c/o.  Has audible wheezing the daughter states is baseline.  Has Moisture associated redness to bilateral groin areas.  Daughter states he has history of sundowning in hospital and to call if needed and she will stay with  him.  IVFs going.  will CTM.

## 2021-03-05 NOTE — ED NOTES
Patient wearing mask, nurse wearing mask, n95, protective eyewear, face shield, gown, and gloves for care.  Hand hygiene performed after appropriate doffing of PPE.      Natanael Raymond RN  03/05/21 4516

## 2021-03-06 ENCOUNTER — APPOINTMENT (OUTPATIENT)
Dept: MRI IMAGING | Facility: HOSPITAL | Age: 86
End: 2021-03-06

## 2021-03-06 PROBLEM — E66.9 OBESITY (BMI 30-39.9): Status: ACTIVE | Noted: 2021-03-06

## 2021-03-06 PROBLEM — M54.50 CHRONIC LOW BACK PAIN: Status: ACTIVE | Noted: 2021-03-06

## 2021-03-06 PROBLEM — G89.29 CHRONIC LOW BACK PAIN: Status: ACTIVE | Noted: 2021-03-06

## 2021-03-06 PROBLEM — R53.1 WEAKNESS: Status: ACTIVE | Noted: 2021-03-06

## 2021-03-06 PROBLEM — R60.9 EDEMA: Status: ACTIVE | Noted: 2021-03-06

## 2021-03-06 LAB
ANION GAP SERPL CALCULATED.3IONS-SCNC: 9.7 MMOL/L (ref 5–15)
BASOPHILS # BLD AUTO: 0.03 10*3/MM3 (ref 0–0.2)
BASOPHILS NFR BLD AUTO: 0.6 % (ref 0–1.5)
BUN SERPL-MCNC: 6 MG/DL (ref 8–23)
BUN/CREAT SERPL: 12 (ref 7–25)
CALCIUM SPEC-SCNC: 8.5 MG/DL (ref 8.2–9.6)
CHLORIDE SERPL-SCNC: 102 MMOL/L (ref 98–107)
CK SERPL-CCNC: 1363 U/L (ref 20–200)
CK SERPL-CCNC: 1729 U/L (ref 20–200)
CO2 SERPL-SCNC: 26.3 MMOL/L (ref 22–29)
CREAT SERPL-MCNC: 0.5 MG/DL (ref 0.76–1.27)
DEPRECATED RDW RBC AUTO: 38.2 FL (ref 37–54)
EOSINOPHIL # BLD AUTO: 0.06 10*3/MM3 (ref 0–0.4)
EOSINOPHIL NFR BLD AUTO: 1.1 % (ref 0.3–6.2)
ERYTHROCYTE [DISTWIDTH] IN BLOOD BY AUTOMATED COUNT: 12 % (ref 12.3–15.4)
GFR SERPL CREATININE-BSD FRML MDRD: >150 ML/MIN/1.73
GLUCOSE SERPL-MCNC: 151 MG/DL (ref 65–99)
HCT VFR BLD AUTO: 45.2 % (ref 37.5–51)
HGB BLD-MCNC: 15.2 G/DL (ref 13–17.7)
IMM GRANULOCYTES # BLD AUTO: 0.01 10*3/MM3 (ref 0–0.05)
IMM GRANULOCYTES NFR BLD AUTO: 0.2 % (ref 0–0.5)
LYMPHOCYTES # BLD AUTO: 1.06 10*3/MM3 (ref 0.7–3.1)
LYMPHOCYTES NFR BLD AUTO: 19.8 % (ref 19.6–45.3)
MCH RBC QN AUTO: 29.3 PG (ref 26.6–33)
MCHC RBC AUTO-ENTMCNC: 33.6 G/DL (ref 31.5–35.7)
MCV RBC AUTO: 87.3 FL (ref 79–97)
MONOCYTES # BLD AUTO: 0.36 10*3/MM3 (ref 0.1–0.9)
MONOCYTES NFR BLD AUTO: 6.7 % (ref 5–12)
NEUTROPHILS NFR BLD AUTO: 3.83 10*3/MM3 (ref 1.7–7)
NEUTROPHILS NFR BLD AUTO: 71.6 % (ref 42.7–76)
NRBC BLD AUTO-RTO: 0 /100 WBC (ref 0–0.2)
PLATELET # BLD AUTO: 141 10*3/MM3 (ref 140–450)
PMV BLD AUTO: 10.9 FL (ref 6–12)
POTASSIUM SERPL-SCNC: 3.7 MMOL/L (ref 3.5–5.2)
RBC # BLD AUTO: 5.18 10*6/MM3 (ref 4.14–5.8)
SODIUM SERPL-SCNC: 138 MMOL/L (ref 136–145)
TSH SERPL DL<=0.05 MIU/L-ACNC: 1.69 UIU/ML (ref 0.27–4.2)
VIT B12 BLD-MCNC: 875 PG/ML (ref 211–946)
WBC # BLD AUTO: 5.35 10*3/MM3 (ref 3.4–10.8)

## 2021-03-06 PROCEDURE — 94640 AIRWAY INHALATION TREATMENT: CPT

## 2021-03-06 PROCEDURE — 80048 BASIC METABOLIC PNL TOTAL CA: CPT | Performed by: INTERNAL MEDICINE

## 2021-03-06 PROCEDURE — 94799 UNLISTED PULMONARY SVC/PX: CPT

## 2021-03-06 PROCEDURE — 84443 ASSAY THYROID STIM HORMONE: CPT | Performed by: PSYCHIATRY & NEUROLOGY

## 2021-03-06 PROCEDURE — 97110 THERAPEUTIC EXERCISES: CPT

## 2021-03-06 PROCEDURE — G0378 HOSPITAL OBSERVATION PER HR: HCPCS

## 2021-03-06 PROCEDURE — 72148 MRI LUMBAR SPINE W/O DYE: CPT

## 2021-03-06 PROCEDURE — 99204 OFFICE O/P NEW MOD 45 MIN: CPT | Performed by: PSYCHIATRY & NEUROLOGY

## 2021-03-06 PROCEDURE — 82550 ASSAY OF CK (CPK): CPT | Performed by: INTERNAL MEDICINE

## 2021-03-06 PROCEDURE — 97530 THERAPEUTIC ACTIVITIES: CPT

## 2021-03-06 PROCEDURE — 97162 PT EVAL MOD COMPLEX 30 MIN: CPT

## 2021-03-06 PROCEDURE — 82607 VITAMIN B-12: CPT | Performed by: PSYCHIATRY & NEUROLOGY

## 2021-03-06 PROCEDURE — 82550 ASSAY OF CK (CPK): CPT | Performed by: NURSE PRACTITIONER

## 2021-03-06 PROCEDURE — 96361 HYDRATE IV INFUSION ADD-ON: CPT

## 2021-03-06 PROCEDURE — 85025 COMPLETE CBC W/AUTO DIFF WBC: CPT | Performed by: INTERNAL MEDICINE

## 2021-03-06 PROCEDURE — 97116 GAIT TRAINING THERAPY: CPT

## 2021-03-06 RX ORDER — HYDRALAZINE HYDROCHLORIDE 10 MG/1
10 TABLET, FILM COATED ORAL EVERY 12 HOURS SCHEDULED
Status: DISCONTINUED | OUTPATIENT
Start: 2021-03-06 | End: 2021-03-06

## 2021-03-06 RX ORDER — NYSTATIN 100000 [USP'U]/G
POWDER TOPICAL EVERY 12 HOURS SCHEDULED
Status: DISCONTINUED | OUTPATIENT
Start: 2021-03-06 | End: 2021-03-09 | Stop reason: HOSPADM

## 2021-03-06 RX ORDER — HYDROCODONE BITARTRATE AND ACETAMINOPHEN 5; 325 MG/1; MG/1
1 TABLET ORAL ONCE
Status: COMPLETED | OUTPATIENT
Start: 2021-03-06 | End: 2021-03-06

## 2021-03-06 RX ORDER — METOPROLOL SUCCINATE 50 MG/1
50 TABLET, EXTENDED RELEASE ORAL DAILY
Status: DISCONTINUED | OUTPATIENT
Start: 2021-03-07 | End: 2021-03-09 | Stop reason: HOSPADM

## 2021-03-06 RX ORDER — HYDRALAZINE HYDROCHLORIDE 10 MG/1
10 TABLET, FILM COATED ORAL EVERY 6 HOURS
Status: DISCONTINUED | OUTPATIENT
Start: 2021-03-06 | End: 2021-03-08

## 2021-03-06 RX ADMIN — SODIUM CHLORIDE 125 ML/HR: 9 INJECTION, SOLUTION INTRAVENOUS at 01:45

## 2021-03-06 RX ADMIN — BUDESONIDE AND FORMOTEROL FUMARATE DIHYDRATE 2 PUFF: 80; 4.5 AEROSOL RESPIRATORY (INHALATION) at 07:26

## 2021-03-06 RX ADMIN — METAXALONE 800 MG: 800 TABLET ORAL at 15:05

## 2021-03-06 RX ADMIN — METAXALONE 800 MG: 800 TABLET ORAL at 20:57

## 2021-03-06 RX ADMIN — ASPIRIN 81 MG: 81 TABLET, COATED ORAL at 08:33

## 2021-03-06 RX ADMIN — METAXALONE 800 MG: 800 TABLET ORAL at 08:33

## 2021-03-06 RX ADMIN — NYSTATIN: 100000 POWDER TOPICAL at 21:09

## 2021-03-06 RX ADMIN — TERAZOSIN HYDROCHLORIDE 5 MG: 5 CAPSULE ORAL at 20:57

## 2021-03-06 RX ADMIN — NYSTATIN: 100000 POWDER TOPICAL at 15:05

## 2021-03-06 RX ADMIN — HYDRALAZINE HYDROCHLORIDE 10 MG: 10 TABLET, FILM COATED ORAL at 20:57

## 2021-03-06 RX ADMIN — AMLODIPINE BESYLATE 10 MG: 10 TABLET ORAL at 08:33

## 2021-03-06 RX ADMIN — HYDRALAZINE HYDROCHLORIDE 10 MG: 10 TABLET, FILM COATED ORAL at 15:05

## 2021-03-06 RX ADMIN — PANTOPRAZOLE SODIUM 40 MG: 40 TABLET, DELAYED RELEASE ORAL at 06:39

## 2021-03-06 RX ADMIN — HYDROCODONE BITARTRATE AND ACETAMINOPHEN 1 TABLET: 5; 325 TABLET ORAL at 17:20

## 2021-03-06 RX ADMIN — FLUTICASONE PROPIONATE 1 SPRAY: 50 SPRAY, METERED NASAL at 21:00

## 2021-03-06 RX ADMIN — METOPROLOL SUCCINATE 50 MG: 50 TABLET, EXTENDED RELEASE ORAL at 08:33

## 2021-03-06 RX ADMIN — Medication 1 TABLET: at 08:33

## 2021-03-06 RX ADMIN — SODIUM CHLORIDE 75 ML/HR: 9 INJECTION, SOLUTION INTRAVENOUS at 20:57

## 2021-03-06 RX ADMIN — SODIUM CHLORIDE, PRESERVATIVE FREE 10 ML: 5 INJECTION INTRAVENOUS at 20:57

## 2021-03-06 RX ADMIN — BUDESONIDE AND FORMOTEROL FUMARATE DIHYDRATE 2 PUFF: 80; 4.5 AEROSOL RESPIRATORY (INHALATION) at 21:23

## 2021-03-06 NOTE — PLAN OF CARE
Goal Outcome Evaluation:  Plan of Care Reviewed With: patient, daughter  Progress: no change  Outcome Summary: Pt is 89 yo M adm to [facility] 03/05/2021 after presenting to ED with LE weakness and spasms, curently being treated for non-traumatic rhabdomyolisis. PMH sig for basal cell CA, COPD, GERD, hyperglycemia, HLD, HTN, obesity, sleep apnea, and spondylosis. He presents with decreased mobility and an evident fear of falling resulting from his recent fall in the home. He was able to complete several STS transfers with CGA only and cueing for posture, sequencing, foot placement, and head position and ambulated twice in room for 20 ft while trialing two different assistive devices. Pt responded well to using a rolling walker with BUE platforms, which allowed him to place weight through his elbows/forearms as he would usually lean far forward onto his rollator at home to do. Posture ands safety of movement was improved by using pltform walker and pt is agreeable to continuing use. He does however present with several functional deficits requiring skilled PT intervention including decreased BLE endurance, poor activity tolerance, wide ROSE and poor foot clearance B during gait, as well as decreased independence with mobility. Lengthy conversation about AD use and d/c disposition, including suggestion of skilled nursing was conducted including pt and his daughter in the room, they are very clear that they want hime to return home with HH therapy at time of hospital d/c.  Patient was intermittently wearing a face mask during this therapy encounter. Therapist used appropriate personal protective equipment including eye protection, mask, and gloves.  Mask used was standard procedure mask. Appropriate PPE was worn during the entire therapy session. Hand hygiene was completed before and after therapy session. Patient is not in enhanced droplet precautions.

## 2021-03-06 NOTE — H&P
HISTORY AND PHYSICAL   [facility]        Patient Identification:  Name: Harry Potts  Age: 89 y.o.  Sex: male  :  1930  MRN: [id number]                     Primary Care Physician: Harry Luis MD    Chief Complaint:  89 year old gentleman who presented to the emergency room with lower extremity weakness and spasms; he was in the bathroom and his legs suddenly felt weak so he lowered himself to the ground; he could not get up by himself and is not able to walk; he has a history of low back problems; he usually walks with a walker; no change in bowel or bladder habits    History of Present Illness:   As above    Past Medical History:  Past Medical History:   Diagnosis Date   • Cancer (CMS/HCC)     basal cell    • COPD (chronic obstructive pulmonary disease) (CMS/HCC)    • Elevated cholesterol    • GERD (gastroesophageal reflux disease)    • Hyperglycemia    • Hyperlipidemia    • Hypertension    • Obesity    • Sleep apnea    • Spondylolysis, lumbar region      Past Surgical History:  Past Surgical History:   Procedure Laterality Date   • EYE SURGERY     • HERNIA REPAIR     • JOINT REPLACEMENT     • REPLACEMENT TOTAL KNEE BILATERAL        Home Meds:  Medications Prior to Admission   Medication Sig Dispense Refill Last Dose   • amLODIPine (NORVASC) 10 MG tablet TAKE 1 TABLET DAILY 90 tablet 3 3/5/2021 at Unknown time   • aspirin 81 MG EC tablet Take 81 mg by mouth Daily.   3/5/2021 at Unknown time   • atorvastatin (LIPITOR) 20 MG tablet TAKE 1 TABLET DAILY 90 tablet 3 3/5/2021 at Unknown time   • azelastine (ASTELIN) 0.1 % nasal spray 1 spray into the nostril(s) as directed by provider Daily.   3/5/2021 at Unknown time   • celecoxib (CeleBREX) 200 MG capsule TAKE 1 CAPSULE DAILY AS NEEDED (Patient taking differently: 200 mg Daily.) 90 capsule 3 3/5/2021 at Unknown time   • doxazosin (CARDURA) 4 MG tablet TAKE 1 TABLET DAILY AS DIRECTED (Patient taking differently: Every Night.) 90 tablet 3  3/5/2021 at Unknown time   • esomeprazole (nexIUM) 40 MG capsule TAKE 1 CAPSULE DAILY 90 capsule 3 3/5/2021 at Unknown time   • ezetimibe (ZETIA) 10 MG tablet TAKE 1 TABLET DAILY 90 tablet 3 3/5/2021 at Unknown time   • fluticasone (FLONASE) 50 MCG/ACT nasal spray 1 spray Every Night.   3/4/2021 at Unknown time   • Fluticasone Furoate-Vilanterol (BREO ELLIPTA IN) Inhale Daily.   3/5/2021 at Unknown time   • loratadine (CLARITIN) 10 MG tablet Take  by mouth Daily.   3/5/2021 at Unknown time   • metaxalone (SKELAXIN) 800 MG tablet TAKE 1 TABLET THREE TIMES A  tablet 3 3/5/2021 at Unknown time   • metoprolol succinate XL (TOPROL-XL) 50 MG 24 hr tablet Take 0.5 tablets by mouth Daily. (Patient taking differently: Take 50 mg by mouth Daily. Takes whole tab for 50 mg daily) 90 tablet 1 3/5/2021 at Unknown time   • Multiple Vitamins-Minerals (CENTRUM ADULTS PO) Take  by mouth Daily.   3/5/2021 at Unknown time   • Omega-3 Fatty Acids (FISH OIL) 1000 MG capsule capsule Take  by mouth Daily With Breakfast.   3/5/2021 at Unknown time   • VENTOLIN  (90 Base) MCG/ACT inhaler 2 puffs Every 6 (Six) Hours As Needed for Wheezing. Pt wont take d/t cost          Allergies:  Allergies   Allergen Reactions   • Morphine And Related      Immunizations:  Immunization History   Administered Date(s) Administered   • Fluad Quad 65+ 10/06/2020   • Fluzone High Dose =>65 Years ([employer] ONLY) 10/04/2017, 09/20/2018   • Pneumococcal Polysaccharide (PPSV23) 12/05/2003   • Shingrix 01/03/2019, 04/12/2019   • Tdap 10/04/2017     Social History:   Social History     Social History Narrative   • Not on file     Social History     Socioeconomic History   • Marital status:      Spouse name: Not on file   • Number of children: Not on file   • Years of education: Not on file   • Highest education level: Not on file   Tobacco Use   • Smoking status: Never Smoker   • Smokeless tobacco: Never Used   Substance and Sexual Activity   •  "Alcohol use: No   • Drug use: No   • Sexual activity: Defer       Family History:  Family History   Family history unknown: Yes        Review of Systems  See history of present illness and past medical history.  Patient denies headache, dizziness, syncope, falls, trauma, change in vision, change in hearing, change in taste, changes in weight, changes in appetite, focal weakness, numbness, or paresthesia.  Patient denies chest pain, palpitations, dyspnea, orthopnea, PND, cough, sinus pressure, rhinorrhea, epistaxis, hemoptysis, nausea, vomiting,hematemesis, diarrhea, constipation or hematchezia.  Denies cold or heat intolerance, polydipsia, polyuria, polyphagia. Denies hematuria, pyuria, dysuria, hesitancy, frequency or urgency. Denies consumption of raw and under cooked meats foods or change in water source.  Denies fever, chills, sweats, night sweats.  Denies missing any routine medications. Remainder of ROS is negative.    Objective:  T Max 24 hrs: Temp (24hrs), Av.4 °F (36.3 °C), Min:96.5 °F (35.8 °C), Max:98.3 °F (36.8 °C)    Vitals Ranges:   Temp:  [96.5 °F (35.8 °C)-98.3 °F (36.8 °C)] 98.3 °F (36.8 °C)  Heart Rate:  [50-77] 50  Resp:  [18-20] 20  BP: (154-179)/() 154/80      Exam:  /80 (BP Location: Left arm, Patient Position: Lying)   Pulse 50   Temp 98.3 °F (36.8 °C) (Oral)   Resp 20   Ht 182.9 cm (72\")   Wt 133 kg (292 lb 5.3 oz)   SpO2 90%   BMI 39.65 kg/m²     General Appearance:    Alert, cooperative, no distress, appears stated age   Head:    Normocephalic, without obvious abnormality, atraumatic   Eyes:    PERRL, conjunctivae/corneas clear, EOM's intact, both eyes   Ears:    Normal external ear canals, both ears   Nose:   Nares normal, septum midline, mucosa normal, no drainage    or sinus tenderness   Throat:   Lips, mucosa, and tongue normal   Neck:   Supple, symmetrical, trachea midline, no adenopathy;     thyroid:  no enlargement/tenderness/nodules; no carotid    bruit or JVD "   Back:     Symmetric, no curvature, ROM normal, no CVA tenderness   Lungs:     Decreased breath sounds bilaterally, respirations unlabored   Chest Wall:    No tenderness or deformity    Heart:    Regular rate and rhythm, S1 and S2 normal, no murmur, rub   or gallop   Abdomen:     Soft, nontender, bowel sounds active all four quadrants,     no masses, no hepatomegaly, no splenomegaly   Extremities:   Extremities normal, atraumatic, no cyanosis or edema   Pulses:   2+ and symmetric all extremities   Skin:   Skin color, texture, turgor normal, no rashes or lesions   Lymph nodes:   Cervical, supraclavicular, and axillary nodes normal   Neurologic:   CNII-XII intact, normal strength, sensation intact throughout      .    Data Review:  Labs in chart were reviewed.  WBC   Date Value Ref Range Status   03/05/2021 8.21 3.40 - 10.80 10*3/mm3 Final     Hemoglobin   Date Value Ref Range Status   03/05/2021 15.6 13.0 - 17.7 g/dL Final     Hematocrit   Date Value Ref Range Status   03/05/2021 46.2 37.5 - 51.0 % Final     Platelets   Date Value Ref Range Status   03/05/2021 147 140 - 450 10*3/mm3 Final     Sodium   Date Value Ref Range Status   03/05/2021 139 136 - 145 mmol/L Final     Potassium   Date Value Ref Range Status   03/05/2021 4.2 3.5 - 5.2 mmol/L Final     Chloride   Date Value Ref Range Status   03/05/2021 101 98 - 107 mmol/L Final     CO2   Date Value Ref Range Status   03/05/2021 29.0 22.0 - 29.0 mmol/L Final     BUN   Date Value Ref Range Status   03/05/2021 12 8 - 23 mg/dL Final     Creatinine   Date Value Ref Range Status   03/05/2021 0.55 (L) 0.76 - 1.27 mg/dL Final     Glucose   Date Value Ref Range Status   03/05/2021 130 (H) 65 - 99 mg/dL Final     Calcium   Date Value Ref Range Status   03/05/2021 9.0 8.2 - 9.6 mg/dL Final     Magnesium   Date Value Ref Range Status   03/05/2021 1.9 1.6 - 2.4 mg/dL Final     AST (SGOT)   Date Value Ref Range Status   03/05/2021 56 (H) 1 - 40 U/L Final     ALT (SGPT)   Date  Value Ref Range Status   03/05/2021 33 1 - 41 U/L Final     Alkaline Phosphatase   Date Value Ref Range Status   03/05/2021 97 39 - 117 U/L Final     No results found for: APTT, INR             Imaging Results (All)     Procedure Component Value Units Date/Time    CT Head Without Contrast [[id number]] Collected: 03/05/21 1525     Updated: 03/05/21 1532    Narrative:      CT HEAD WITHOUT CONTRAST     HISTORY: Bilateral leg weakness.     COMPARISON: None.     FINDINGS: The brain and ventricles are symmetrical. There is  age-appropriate atrophy. Moderate to severe small vessel ischemic  disease is appreciated. There is no evidence of hemorrhage or of a focal  area of decreased attenuation to suggest acute infarction. Moderate  vascular calcification is noted.       Impression:      No evidence of hemorrhage or of acute infarction. Atrophy,  small vessel ischemic disease and vascular calcification is noted as  described above. Further evaluation could be performed with a MRI  examination the brain as indicated.           Radiation dose reduction techniques were utilized, including automated  exposure control and exposure modulation based on body size.     This report was finalized on 3/5/2021 3:29 PM by Dr. Klever Willingham M.D.       XR Chest 1 View [[id number]] Collected: 03/05/21 1436     Updated: 03/05/21 1440    Narrative:      XR CHEST 1 VW-  3/5/2021     HISTORY: Weakness.     Heart size is within normal limits. Lungs are underinflated with some  vascular crowding. No focal infiltrates are seen. No pneumothorax is  seen.       Impression:      No acute process.     This report was finalized on 3/5/2021 2:37 PM by Dr. Jimenez Rueda M.D.           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

## 2021-03-06 NOTE — CONSULTS
Neurology Consult Note    Referring Provider: Dr. Rocha  Reason for Consultation: leg weakness    History of present illness:    The patient is a 89 year old man who generally walks with a walker due to low back pain, who had sudden onset of inability to ambulate yesterday. He awakened and was able to walk to bathroom with walker as usual. After urinating he was standing at sink when he felt his legs get very weak and he was unable to stand. He sank to the his knees. He was unable to get up. His wife was unable to help him up. He stayed in that position, on his knees for at least an hour.     He received second Covid vaccine on 2/25/2021.    He reports no recent medication changes, no alcohol use.    There are no prior lumbar MRI studies at [facility] or on [facility].      Past Medical History  Past Medical History:   Diagnosis Date   • Cancer (CMS/HCC)     basal cell    • COPD (chronic obstructive pulmonary disease) (CMS/[facility])    • Elevated cholesterol    • GERD (gastroesophageal reflux disease)    • Hyperglycemia    • Hyperlipidemia    • Hypertension    • Obesity    • Sleep apnea    • Spondylolysis, lumbar region        Past Surgical History  Past Surgical History:   Procedure Laterality Date   • EYE SURGERY     • HERNIA REPAIR     • JOINT REPLACEMENT     • REPLACEMENT TOTAL KNEE BILATERAL         Family History  Family History   Family history unknown: Yes       Allergies   Allergen Reactions   • Morphine And Related        Social History  Social History     Socioeconomic History   • Marital status:      Spouse name: Not on file   • Number of children: Not on file   • Years of education: Not on file   • Highest education level: Not on file   Tobacco Use   • Smoking status: Never Smoker   • Smokeless tobacco: Never Used   Substance and Sexual Activity   • Alcohol use: No   • Drug use: No   • Sexual activity: Defer       Review of Systems   Constitutional: Positive for activity change.   HENT:  Negative.    Eyes: Negative.    Respiratory: Positive for shortness of breath.    Cardiovascular: Negative.    Gastrointestinal: Negative.    Genitourinary: Positive for frequency.   Musculoskeletal: Positive for back pain.   Skin: Negative.    Psychiatric/Behavioral: Negative.        Medications  Scheduled Meds:amLODIPine, 10 mg, Oral, Daily  aspirin, 81 mg, Oral, Daily  budesonide-formoterol, 2 puff, Inhalation, BID - RT  fluticasone, 1 spray, Each Nare, Nightly  metaxalone, 800 mg, Oral, TID  metoprolol succinate XL, 50 mg, Oral, Daily  multivitamin with minerals, 1 tablet, Oral, Daily  pantoprazole, 40 mg, Oral, QAM  terazosin, 5 mg, Oral, Nightly      Continuous Infusions:sodium chloride, 75 mL/hr, Last Rate: 75 mL/hr (03/06/21 0834)      PRN Meds:.•  acetaminophen **OR** acetaminophen **OR** acetaminophen  •  ondansetron  •  [COMPLETED] Insert peripheral IV **AND** sodium chloride    Vital Signs   Temp:  [96.5 °F (35.8 °C)-99.3 °F (37.4 °C)] 99.3 °F (37.4 °C)  Heart Rate:  [50-77] 69  Resp:  [18-20] 18  BP: (152-180)/() 180/83    Examination:  Constitutional: Well-groomed, obese  HENT:  normal  Eyes: Normal conjunctivae  CVS:  Regular rate and rhythm.  No murmurs.  Good peripheral perfusion.   Resp :   Non labored respirations  Musculoskeletal:  No signs of peripheral edema  Skin:  No rash, normal turgor  Neurologic:   Alert oriented and fluent   No dysarthria   Cranial nerves intact   Pupils symmetric and equally reactive   Normal neck flexion power   Normal power in all extremities proximally and distally, except bilateral hip flexion 5-/5   Normal coordination   Reflexes absent in lower extremities   Plantar responses flexor   Sensory exam grossly intact   Gait not tested  Psychiatric: No anxiety, normal mood    Results Review:  Results from last 7 days   Lab Units 03/06/21  0827 03/05/21  1430   WBC 10*3/mm3 5.35 8.21   HEMOGLOBIN g/dL 15.2 15.6   HEMATOCRIT % 45.2 46.2   PLATELETS 10*3/mm3 141 147         Results from last 7 days   Lab Units 03/06/21  0827 03/05/21  1430   SODIUM mmol/L 138 139   POTASSIUM mmol/L 3.7 4.2   CHLORIDE mmol/L 102 101   CO2 mmol/L 26.3 29.0   BUN mg/dL 6* 12   CREATININE mg/dL 0.50* 0.55*   CALCIUM mg/dL 8.5 9.0   BILIRUBIN mg/dL  --  0.6   ALK PHOS U/L  --  97   ALT (SGPT) U/L  --  33   AST (SGOT) U/L  --  56*   GLUCOSE mg/dL 151* 130*      Magnesium 1.9  CPK   1729    Head CT shows no acute abnormalities   Images reviewed independently    Medical Decision Making and Recommendations  LE weakness  Mild proximal weakness is likely secondary to rhabdomyolysis  Rhabdo is probably secondary to immobilization for an hour, and not the primary cause of the initial leg weakness  Also check B12 and thyroid levels    Chronic low back pain  Needs lumbar MRI to evaluate for cauda equina syndrome  Pre-medicate with [place] for MRI otherwise will not be able to tolerate study  Morphine listed as allergy, patient report confusion with opiates when taken regularly  Should be able to tolerate single dose    I discussed these findings and my recommendations with patient and nursing staff    I used full protective equipment while examining this patient.  This included N95 face mask, gloves and protective eyewear.  I washed my hands before entering the room and immediately upon leaving the room.  Patient was wearing a surgical mask.    Arelis Mcneal MD  03/06/21  09:42 EST

## 2021-03-06 NOTE — PLAN OF CARE
Goal Outcome Evaluation:  Plan of Care Reviewed With: patient  Progress: no change  Outcome Summary: Pt has been a&ox4 during the night, NS at 100cc/hr, VSS, denies pain or nausea, anxious about not being about able to walk, has remained in bed during the night.  Incontinent of urine mostly, if awake, may ask for urinal.

## 2021-03-06 NOTE — PROGRESS NOTES
Name: Harry Potts ADMIT: 3/5/2021   : 1930  PCP: Harry Luis MD    MRN: [id number] LOS: 0 days   AGE/SEX: 89 y.o. male  ROOM: [facility]     Subjective   Subjective   Patient appears relatively comfortable and in no apparent distress.  Reports chronic low back pain and bilateral lower extremity weakness without recent injury.  Denies bowel or bladder dysfunction.    Review of Systems   Respiratory: Negative for cough and shortness of breath.    Cardiovascular: Negative for chest pain and leg swelling.   Gastrointestinal: Negative for abdominal pain, constipation, diarrhea, nausea and vomiting.   Genitourinary: Negative for difficulty urinating and dysuria.   Musculoskeletal: Positive for back pain (chronic low back pain) and gait problem (2/2 BLE weakness).   Neurological: Positive for weakness (BLE). Negative for numbness.   Psychiatric/Behavioral: Negative for confusion and hallucinations.   All other systems reviewed and are negative.       Objective   Objective   Vital Signs  Temp:  [96.5 °F (35.8 °C)-99.3 °F (37.4 °C)] 99.3 °F (37.4 °C)  Heart Rate:  [50-77] 69  Resp:  [18-20] 18  BP: (152-180)/() 180/83  SpO2:  [90 %-93 %] 92 %  on   ;   Device (Oxygen Therapy): room air  Body mass index is 39.65 kg/m².     Physical Exam  Constitutional:       General: He is not in acute distress.     Appearance: He is obese.      Comments: Unkempt re: foot hygiene between toes--fungal   HENT:      Head: Normocephalic and atraumatic.   Eyes:      Extraocular Movements: Extraocular movements intact.      Conjunctiva/sclera: Conjunctivae normal.   Cardiovascular:      Rate and Rhythm: Normal rate.      Heart sounds: Normal heart sounds.   Pulmonary:      Effort: Pulmonary effort is normal.      Comments: Diminished on expiration throughout all lung fields  Abdominal:      General: Bowel sounds are normal. There is distension.      Palpations: Abdomen is soft.      Tenderness: There is no abdominal  tenderness.   Musculoskeletal:         General: No tenderness.      Cervical back: Normal range of motion.      Right lower leg: Edema (trace pitting edema BLE) present.      Left lower leg: Edema present.   Skin:     General: Skin is warm and dry.      Findings: Erythema (very mild erythema of RLE) present.      Comments: Hyperpigmentation of BLE   Neurological:      Mental Status: He is alert and oriented to person, place, and time.      Cranial Nerves: No cranial nerve deficit.   Psychiatric:         Behavior: Behavior normal.         Thought Content: Thought content normal.         Results Review     I reviewed the patient's new clinical results.  Results from last 7 days   Lab Units 03/06/21  0827 03/05/21  1430   WBC 10*3/mm3 5.35 8.21   HEMOGLOBIN g/dL 15.2 15.6   PLATELETS 10*3/mm3 141 147     Results from last 7 days   Lab Units 03/06/21  0827 03/05/21  1430   SODIUM mmol/L 138 139   POTASSIUM mmol/L 3.7 4.2   CHLORIDE mmol/L 102 101   CO2 mmol/L 26.3 29.0   BUN mg/dL 6* 12   CREATININE mg/dL 0.50* 0.55*   GLUCOSE mg/dL 151* 130*   Estimated Creatinine Clearance: 86.6 mL/min (A) (by C-G formula based on SCr of 0.5 mg/dL (L)).  Results from last 7 days   Lab Units 03/05/21  1430   ALBUMIN g/dL 4.30   BILIRUBIN mg/dL 0.6   ALK PHOS U/L 97   AST (SGOT) U/L 56*   ALT (SGPT) U/L 33     Results from last 7 days   Lab Units 03/06/21  0827 03/05/21  1430   CALCIUM mg/dL 8.5 9.0   ALBUMIN g/dL  --  4.30   MAGNESIUM mg/dL  --  1.9       COVID19   Date Value Ref Range Status   03/05/2021 Not Detected Not Detected - Ref. Range Final     No results found for: HGBA1C, POCGLU    CT Head Without Contrast  Narrative: CT HEAD WITHOUT CONTRAST     HISTORY: Bilateral leg weakness.     COMPARISON: None.     FINDINGS: The brain and ventricles are symmetrical. There is  age-appropriate atrophy. Moderate to severe small vessel ischemic  disease is appreciated. There is no evidence of hemorrhage or of a focal  area of decreased  attenuation to suggest acute infarction. Moderate  vascular calcification is noted.     Impression: No evidence of hemorrhage or of acute infarction. Atrophy,  small vessel ischemic disease and vascular calcification is noted as  described above. Further evaluation could be performed with a MRI  examination the brain as indicated.           Radiation dose reduction techniques were utilized, including automated  exposure control and exposure modulation based on body size.     This report was finalized on 3/5/2021 3:29 PM by Dr. Klever Willingham M.D.     XR Chest 1 View  Narrative: XR CHEST 1 VW-  3/5/2021     HISTORY: Weakness.     Heart size is within normal limits. Lungs are underinflated with some  vascular crowding. No focal infiltrates are seen. No pneumothorax is  seen.     Impression: No acute process.     This report was finalized on 3/5/2021 2:37 PM by Dr. Jimenez Rueda M.D.       Scheduled Medications  amLODIPine, 10 mg, Oral, Daily  aspirin, 81 mg, Oral, Daily  budesonide-formoterol, 2 puff, Inhalation, BID - RT  fluticasone, 1 spray, Each Nare, Nightly  HYDROcodone-acetaminophen, 1 tablet, Oral, Once  metaxalone, 800 mg, Oral, TID  metoprolol succinate XL, 50 mg, Oral, Daily  multivitamin with minerals, 1 tablet, Oral, Daily  nystatin, , Topical, Q12H  pantoprazole, 40 mg, Oral, QAM  terazosin, 5 mg, Oral, Nightly    Infusions  sodium chloride, 75 mL/hr, Last Rate: 75 mL/hr (03/06/21 1126)    Diet  Diet Regular; Cardiac       Assessment/Plan     Active Hospital Problems    Diagnosis  POA   • **Non-traumatic rhabdomyolysis [M62.82]  Yes   • Obesity (BMI 30-39.9) [E66.9]  Yes   • Chronic low back pain [M54.5, G89.29]  Yes   • Weakness [R53.1]  Yes   • Edema, bilateral lower extremities [R60.9]  Yes   • Gastroesophageal reflux disease [K21.9]  Yes   • Hypertension [I10]  Yes   • Osteoarthritis of lumbar spine [M47.816]  Yes      Resolved Hospital Problems   No resolved problems to display.       89 y.o.  male admitted with Non-traumatic rhabdomyolysis.      Non-traumatic rhabdomyolysis.  Note serum creatinine kinase >1500 trending upward.  Gentle IV fluid hydration, repeat CK today and in a.m.  Suspect statin--held on admission.    Edema, bilateral lower extremities.  Possibly secondary to amlodipine 10 mg p.o. daily.  Noted ejection fraction 61% October 2019.  Reports adherence to compression stockings in outpatient setting.  Recommend avoid dependent positioning.  Consider initiating furosemide 20 mg p.o. daily versus HCTZ; however, patient currently getting gentle IV fluid hydration for nontraumatic rhabdomyolysis; therefore, will defer diuretic at present time.  Ordering compression stockings for now.    Gastroesophageal reflux disease.  Stable appearance, PPI.    Hypertension.  BP greater than optimal despite amlodipine 10 mg p.o. daily, metoprolol succinate 50 mg p.o. daily.  Ordering hydralazine 10 mg PO BID for now.  Monitor BP for changes.    Osteoarthritis of lumbar spine /chronic low back pain /weakness.  Neurology following--recommend MRI lumbar spine evaluate for cauda equina syndrome.  PT consult.    Obesity (BMI 30-39.9).  Complicating all problems.      · SCD for DVT prophylaxis.  · CPR  · Discussed with Dr. Chiu.  · Anticipate discharge pending clinical course / CCP consulted for anticipated needs at [facility].      DANNIE Hand  [facility]  03/06/21  12:54 EST

## 2021-03-06 NOTE — THERAPY EVALUATION
Patient Name: Harry Potts  : 1930    MRN: [id number]                              Today's Date: 3/6/2021       Admit Date: 3/5/2021    Visit Dx:     ICD-10-CM ICD-9-CM   1. Non-traumatic rhabdomyolysis  M62.82 728.88   2. Weakness of both lower extremities  R29.898 729.89     Patient Active Problem List   Diagnosis   • Dysfunction of eustachian tube   • Gastroesophageal reflux disease   • Hyperglycemia   • Hypercholesterolemia   • Hypertension   • Morbid obesity (CMS/HCC)   • Osteoarthritis of lumbar spine with myelopathy   • Osteoarthritis of lumbar spine   • Bronchitis   • Viral URI with cough   • PVC (premature ventricular contraction)   • Non-traumatic rhabdomyolysis   • Obesity (BMI 30-39.9)   • Chronic low back pain   • Weakness   • Edema, bilateral lower extremities     Past Medical History:   Diagnosis Date   • Cancer (CMS/HCC)     basal cell    • COPD (chronic obstructive pulmonary disease) (CMS/[facility])    • Elevated cholesterol    • GERD (gastroesophageal reflux disease)    • Hyperglycemia    • Hyperlipidemia    • Hypertension    • Obesity    • Sleep apnea    • Spondylolysis, lumbar region      Past Surgical History:   Procedure Laterality Date   • EYE SURGERY     • HERNIA REPAIR     • JOINT REPLACEMENT     • REPLACEMENT TOTAL KNEE BILATERAL       General Information     Row Name 21 1425          Physical Therapy Time and Intention    Document Type  evaluation  -AP     Mode of Treatment  physical therapy  -AP     Row Name 21 1425          General Information    Patient Profile Reviewed  yes  -AP     Existing Precautions/Restrictions  fall  -AP     Row Name 21 1425          Living Environment    Lives With  spouse  -AP     Row Name 21 1425          Home Main Entrance    Number of Stairs, Main Entrance  none  -AP     Row Name 21 1425          Stairs Within Home, Primary    Number of Stairs, Within Home, Primary  none  -AP     Row Name 21 1425          Cognition     Orientation Status (Cognition)  oriented x 4  -AP     Row Name 03/06/21 1425          Safety Issues, Functional Mobility    Impairments Affecting Function (Mobility)  balance;endurance/activity tolerance;shortness of breath;pain;strength  -AP       User Key  (r) = Recorded By, (t) = Taken By, (c) = Cosigned By    Initials Name Provider Type    AP Estrella Lehman PT Physical Therapist        Mobility     Row Name 03/06/21 1500          Bed Mobility    Bed Mobility  bed mobility (all) activities  -AP     All Activities, [place] (Bed Mobility)  contact guard assist;1 person assist  -AP     Assistive Device (Bed Mobility)  bed rails  -AP     Row Name 03/06/21 1500          Transfers    Comment (Transfers)  STS completed x 6 throughout therapy with CGA  and vc for posture and sequencing, pt seems fearful of his legs giving out from underneath him again despite displaying excellent BLE strength  -AP     Row Name 03/06/21 1500          Bed-Chair Transfer    Bed-Chair [place] (Transfers)  contact guard;1 person assist  -AP     Assistive Device (Bed-Chair Transfers)  walker, front-wheeled;walker, platform  -AP     Row Name 03/06/21 1500          Sit-Stand Transfer    Sit-Stand [place] (Transfers)  contact guard  -AP     Assistive Device (Sit-Stand Transfers)  walker, front-wheeled  -AP     Row Name 03/06/21 1500          Gait/Stairs (Locomotion)    [place] Level (Gait)  minimum assist (75% patient effort)  -AP     Assistive Device (Gait)  walker, front-wheeled;walker, platform  -AP     Distance in Feet (Gait)  completed 20 ft walk with front wheeled walker and another 20 ft walk with FWW with B arm paltforms for improved posture ans safety  -AP     Deviations/Abnormal Patterns (Gait)  bilateral deviations;stride length decreased;weight shifting decreased;gait speed decreased;base of support, wide  -AP     Bilateral Gait Deviations  forward flexed posture;heel strike decreased  -AP     Comment (Gait/Stairs)   Pt has wide ROSE with both feet externally rotated out to the side, unable to get legs close enough together to fit inside rolling walker prior to standing but after standng up he is able to bring the legs closer togerher.  -AP       User Key  (r) = Recorded By, (t) = Taken By, (c) = Cosigned By    Initials Name Provider Type    AP Estrella Lehmna PT Physical Therapist        Obj/Interventions     Row Name 03/06/21 1511          Range of Motion Comprehensive    General Range of Motion  no range of motion deficits identified  -AP     Row Name 03/06/21 1511          Strength Comprehensive (MMT)    General Manual Muscle Testing (MMT) Assessment  no strength deficits identified  -AP     Comment, General Manual Muscle Testing (MMT) Assessment  Pt tested 5/5 gor each MMT of BLE but still reports fearfullness of the legs becoming weak and giving out underneath him, attributed to decreased endurance rather than decreased strength  -AP     Row Name 03/06/21 1511          Motor Skills    Therapeutic Exercise  other (see comments) seated BLE therex including LAQ, marches, and AP, standing mini marches, weight shift, and squat hold all 5-10  -AP     Row Name 03/06/21 1511          Balance    Balance Assessment  sitting static balance;sitting dynamic balance;standing static balance;standing dynamic balance  -AP     Static Sitting Balance  WFL  -AP     Dynamic Sitting Balance  WFL  -AP     Static Standing Balance  mild impairment mild impairment upon initial standing, improved after finding his center of mass and adjusting to proper standing position  -AP     Dynamic Standing Balance  mild impairment;moderate impairment  -AP     Balance Interventions  standing;weight shifting activity;dynamic  -AP     Row Name 03/06/21 1511          Sensory Assessment (Somatosensory)    Sensory Assessment (Somatosensory)  sensation intact  -AP       User Key  (r) = Recorded By, (t) = Taken By, (c) = Cosigned By    Initials Name Provider Type    AP  Estrella Lehman, PT Physical Therapist        Goals/Plan     Row Name 03/06/21 1520          Bed Mobility Goal 1 (PT)    Activity/Assistive Device (Bed Mobility Goal 1, PT)  bed mobility activities, all  -AP     [place] Level/Cues Needed (Bed Mobility Goal 1, PT)  modified independence  -AP     Time Frame (Bed Mobility Goal 1, PT)  short term goal (STG);1 week  -AP     Row Name 03/06/21 1520          Transfer Goal 1 (PT)    Activity/Assistive Device (Transfer Goal 1, PT)  transfers, all  -AP     [place] Level/Cues Needed (Transfer Goal 1, PT)  supervision required  -AP     Time Frame (Transfer Goal 1, PT)  short term goal (STG);1 week  -AP     Row Name 03/06/21 1520          Gait Training Goal 1 (PT)    Activity/Assistive Device (Gait Training Goal 1, PT)  gait (walking locomotion)  -AP     [place] Level (Gait Training Goal 1, PT)  standby assist  -AP     Distance (Gait Training Goal 1, PT)  100  -AP     Time Frame (Gait Training Goal 1, PT)  short term goal (STG);1 week  -AP     Row Name 03/06/21 1520          Patient Education Goal (PT)    Activity (Patient Education Goal, PT)  Verbalize and demosntrate understanding of falls risk and associated precautions.  -AP     [place]/Cues/Accuracy (Memory Goal 2, PT)  demonstrates adequately  -AP     Time Frame (Patient Education Goal, PT)  short term goal (STG);1 week  -AP       User Key  (r) = Recorded By, (t) = Taken By, (c) = Cosigned By    Initials Name Provider Type    AP Estrella Lehman PT Physical Therapist        Clinical Impression     Row Name 03/06/21 1513          Pain    Additional Documentation  Pain Scale: Numbers Pre/Post-Treatment (Group)  -AP     Row Name 03/06/21 1513          Pain Scale: Numbers Pre/Post-Treatment    Pretreatment Pain Rating  0/10 - no pain  -AP     Posttreatment Pain Rating  0/10 - no pain  -AP     Pre/Posttreatment Pain Comment  reports tightness in B quadriceps area  -AP     Pain Intervention(s)  Medication (See  MAR);Ambulation/increased activity;Repositioned  -AP     Row Name 03/06/21 1513          Plan of Care Review    Plan of Care Reviewed With  patient;daughter  -AP     Progress  no change  -AP     Outcome Summary  Pt is 89 yo M adm to [facility]/2021 after presenting to ED with LE weakness and spasms, curently being treated for non-traumatic rhabdomyolisis. PMH sig for basal cell CA, COPD, GERD, hyperglycemia, HLD, HTN, obesity, sleep apnea, and spondylosis. He presents with decreased mobility and an evident fear of falling resulting from his recent fall in the home. He was able to complete several STS transfers with CGA only and cueing for posture, sequencing, foot placement, and head position and ambulated twice in room for 20 ft while trialing two different assistive devices. Pt responded well to using a rolling walker with BUE platforms, which allowed him to place weight through his elbows/forearms as he would usually lean far forward onto his rollator at home to do. Posture ands safety of movement was improved by using pltform walker and pt is agreeable to continuing use. He does however present with several functional deficits requiring skilled PT intervention including decreased BLE endurance, poor activity tolerance, wide ROSE and poor foot clearance B during gait, as well as decreased independence with mobility. Lengthy conversation about AD use and d/c disposition, including suggestion of skilled nursing was conducted including pt and his daughter in the room, they are very clear that they want hime to return home with  therapy at time of hospital d/c.  -AP     Row Name 03/06/21 8828          Therapy Assessment/Plan (PT)    Patient/Family Therapy Goals Statement (PT)  Get back home and walk safely.  -AP     Rehab Potential (PT)  good, to achieve stated therapy goals  -AP     Criteria for Skilled Interventions Met (PT)  yes  -AP     Predicted Duration of Therapy Intervention (PT)  1 week  -AP     Row Name  03/06/21 1513          Positioning and Restraints    Pre-Treatment Position  in bed  -AP     Post Treatment Position  chair  -AP     In Chair  notified nsg;sitting;call light within reach;encouraged to call for assist;with nsg;with family/caregiver  -AP       User Key  (r) = Recorded By, (t) = Taken By, (c) = Cosigned By    Initials Name Provider Type    Estrella Ortiz PT Physical Therapist        Outcome Measures     Row Name 03/06/21 1521          How much help from another person do you currently need...    Turning from your back to your side while in flat bed without using bedrails?  3  -AP     Moving from lying on back to sitting on the side of a flat bed without bedrails?  3  -AP     Moving to and from a bed to a chair (including a wheelchair)?  3  -AP     Standing up from a chair using your arms (e.g., wheelchair, bedside chair)?  3  -AP     Climbing 3-5 steps with a railing?  1  -AP     To walk in hospital room?  3  -AP     AM-PAC 6 Clicks Score (PT)  16  -AP     Row Name 03/06/21 1521          Functional Assessment    Outcome Measure Options  AM-PAC 6 Clicks Basic Mobility (PT)  -AP       User Key  (r) = Recorded By, (t) = Taken By, (c) = Cosigned By    Initials Name Provider Type    Estrella Ortiz PT Physical Therapist        Physical Therapy Education                 Title: PT OT SLP Therapies (Done)     Topic: Physical Therapy (Done)     Point: Mobility training (Done)     Learning Progress Summary           Patient Acceptance, E,TB, VU by AP at 3/6/2021 1522                   Point: Home exercise program (Done)     Learning Progress Summary           Patient Acceptance, E,TB, VU by AP at 3/6/2021 1522                   Point: Body mechanics (Done)     Learning Progress Summary           Patient Acceptance, E,TB, VU by AP at 3/6/2021 1522                   Point: Precautions (Done)     Learning Progress Summary           Patient Acceptance, E,TB, VU by AP at 3/6/2021 1522                                User Key     Initials Effective Dates Name Provider Type Discipline     09/24/20 -  Estrella Lehman, PT Physical Therapist PT              PT Recommendation and Plan  Planned Therapy Interventions (PT): balance training, bed mobility training, gait training, home exercise program, patient/family education, orthotic fitting/training, neuromuscular re-education, motor coordination training, postural re-education, ROM (range of motion), strengthening, stretching, transfer training  Plan of Care Reviewed With: patient, daughter  Progress: no change  Outcome Summary: Pt is 89 yo M adm to [facility] 03/05/2021 after presenting to ED with LE weakness and spasms, curently being treated for non-traumatic rhabdomyolisis. PMH sig for basal cell CA, COPD, GERD, hyperglycemia, HLD, HTN, obesity, sleep apnea, and spondylosis. He presents with decreased mobility and an evident fear of falling resulting from his recent fall in the home. He was able to complete several STS transfers with CGA only and cueing for posture, sequencing, foot placement, and head position and ambulated twice in room for 20 ft while trialing two different assistive devices. Pt responded well to using a rolling walker with BUE platforms, which allowed him to place weight through his elbows/forearms as he would usually lean far forward onto his rollator at home to do. Posture ands safety of movement was improved by using pltform walker and pt is agreeable to continuing use. He does however present with several functional deficits requiring skilled PT intervention including decreased BLE endurance, poor activity tolerance, wide ROSE and poor foot clearance B during gait, as well as decreased independence with mobility. Lengthy conversation about AD use and d/c disposition, including suggestion of skilled nursing was conducted including pt and his daughter in the room, they are very clear that they want hime to return home with  therapy at time of hospital  d/c.     Time Calculation:   PT Charges     Row Name 03/06/21 1525             Time Calculation    Start Time  1400  -AP      Stop Time  1459  -AP      Time Calculation (min)  59 min  -AP      PT Received On  03/06/21  -AP      PT - Next Appointment  03/07/21  -AP      PT Goal Re-Cert Due Date  03/13/21  -AP         Time Calculation- PT    Total Timed Code Minutes- PT  40 minute(s)  -AP        User Key  (r) = Recorded By, (t) = Taken By, (c) = Cosigned By    Initials Name Provider Type    AP Estrella Lehman, PT Physical Therapist        Therapy Charges for Today     Code Description Service Date Service Provider Modifiers Qty    62144802093 HC PT EVAL MOD COMPLEXITY 2 3/6/2021 Estrella Lehman, PT GP 1    44798910112 HC PT THERAPEUTIC ACT EA 15 MIN 3/6/2021 Estrella Lehman, PT GP 1    62789381539 HC PT THER PROC EA 15 MIN 3/6/2021 Estrella Lehman, PT GP 1    30085025283 HC GAIT TRAINING EA 15 MIN 3/6/2021 Estrella Lehman, PT GP 1          PT G-Codes  Outcome Measure Options: AM-PAC 6 Clicks Basic Mobility (PT)  AM-PAC 6 Clicks Score (PT): 16    Etsrella Lehman PT  3/6/2021

## 2021-03-06 NOTE — PLAN OF CARE
Goal Outcome Evaluation:  Plan of Care Reviewed With: patient  Progress: no change  Outcome Summary: Pt has had no c/o pain, worked with PT today. Voiding per urinal. Plans for MRI today. Will continue to monitor.

## 2021-03-07 LAB
ANION GAP SERPL CALCULATED.3IONS-SCNC: 7.6 MMOL/L (ref 5–15)
BASOPHILS # BLD AUTO: 0.04 10*3/MM3 (ref 0–0.2)
BASOPHILS NFR BLD AUTO: 0.7 % (ref 0–1.5)
BUN SERPL-MCNC: 7 MG/DL (ref 8–23)
BUN/CREAT SERPL: 13.7 (ref 7–25)
CALCIUM SPEC-SCNC: 8.1 MG/DL (ref 8.2–9.6)
CHLORIDE SERPL-SCNC: 104 MMOL/L (ref 98–107)
CK SERPL-CCNC: 706 U/L (ref 20–200)
CK SERPL-CCNC: 935 U/L (ref 20–200)
CO2 SERPL-SCNC: 27.4 MMOL/L (ref 22–29)
CREAT SERPL-MCNC: 0.51 MG/DL (ref 0.76–1.27)
DEPRECATED RDW RBC AUTO: 39.6 FL (ref 37–54)
EOSINOPHIL # BLD AUTO: 0.15 10*3/MM3 (ref 0–0.4)
EOSINOPHIL NFR BLD AUTO: 2.8 % (ref 0.3–6.2)
ERYTHROCYTE [DISTWIDTH] IN BLOOD BY AUTOMATED COUNT: 12.2 % (ref 12.3–15.4)
GFR SERPL CREATININE-BSD FRML MDRD: >150 ML/MIN/1.73
GLUCOSE SERPL-MCNC: 101 MG/DL (ref 65–99)
HCT VFR BLD AUTO: 43.9 % (ref 37.5–51)
HGB BLD-MCNC: 14.5 G/DL (ref 13–17.7)
IMM GRANULOCYTES # BLD AUTO: 0.02 10*3/MM3 (ref 0–0.05)
IMM GRANULOCYTES NFR BLD AUTO: 0.4 % (ref 0–0.5)
LYMPHOCYTES # BLD AUTO: 1.09 10*3/MM3 (ref 0.7–3.1)
LYMPHOCYTES NFR BLD AUTO: 20.4 % (ref 19.6–45.3)
MCH RBC QN AUTO: 29.5 PG (ref 26.6–33)
MCHC RBC AUTO-ENTMCNC: 33 G/DL (ref 31.5–35.7)
MCV RBC AUTO: 89.4 FL (ref 79–97)
MONOCYTES # BLD AUTO: 0.47 10*3/MM3 (ref 0.1–0.9)
MONOCYTES NFR BLD AUTO: 8.8 % (ref 5–12)
NEUTROPHILS NFR BLD AUTO: 3.58 10*3/MM3 (ref 1.7–7)
NEUTROPHILS NFR BLD AUTO: 66.9 % (ref 42.7–76)
NRBC BLD AUTO-RTO: 0 /100 WBC (ref 0–0.2)
PLATELET # BLD AUTO: 147 10*3/MM3 (ref 140–450)
PMV BLD AUTO: 10.8 FL (ref 6–12)
POTASSIUM SERPL-SCNC: 3.7 MMOL/L (ref 3.5–5.2)
RBC # BLD AUTO: 4.91 10*6/MM3 (ref 4.14–5.8)
SODIUM SERPL-SCNC: 139 MMOL/L (ref 136–145)
WBC # BLD AUTO: 5.35 10*3/MM3 (ref 3.4–10.8)

## 2021-03-07 PROCEDURE — G0378 HOSPITAL OBSERVATION PER HR: HCPCS

## 2021-03-07 PROCEDURE — 94799 UNLISTED PULMONARY SVC/PX: CPT

## 2021-03-07 PROCEDURE — 97110 THERAPEUTIC EXERCISES: CPT

## 2021-03-07 PROCEDURE — 80048 BASIC METABOLIC PNL TOTAL CA: CPT | Performed by: NURSE PRACTITIONER

## 2021-03-07 PROCEDURE — 99213 OFFICE O/P EST LOW 20 MIN: CPT | Performed by: NURSE PRACTITIONER

## 2021-03-07 PROCEDURE — 82550 ASSAY OF CK (CPK): CPT | Performed by: NURSE PRACTITIONER

## 2021-03-07 PROCEDURE — 97116 GAIT TRAINING THERAPY: CPT

## 2021-03-07 PROCEDURE — 96361 HYDRATE IV INFUSION ADD-ON: CPT

## 2021-03-07 PROCEDURE — 97530 THERAPEUTIC ACTIVITIES: CPT

## 2021-03-07 PROCEDURE — 85025 COMPLETE CBC W/AUTO DIFF WBC: CPT | Performed by: NURSE PRACTITIONER

## 2021-03-07 RX ORDER — IPRATROPIUM BROMIDE AND ALBUTEROL SULFATE 2.5; .5 MG/3ML; MG/3ML
3 SOLUTION RESPIRATORY (INHALATION) EVERY 6 HOURS PRN
Status: DISCONTINUED | OUTPATIENT
Start: 2021-03-07 | End: 2021-03-09 | Stop reason: HOSPADM

## 2021-03-07 RX ADMIN — SODIUM CHLORIDE, PRESERVATIVE FREE 10 ML: 5 INJECTION INTRAVENOUS at 21:06

## 2021-03-07 RX ADMIN — TERAZOSIN HYDROCHLORIDE 5 MG: 5 CAPSULE ORAL at 21:06

## 2021-03-07 RX ADMIN — BUDESONIDE AND FORMOTEROL FUMARATE DIHYDRATE 2 PUFF: 80; 4.5 AEROSOL RESPIRATORY (INHALATION) at 10:24

## 2021-03-07 RX ADMIN — FLUTICASONE PROPIONATE 1 SPRAY: 50 SPRAY, METERED NASAL at 21:06

## 2021-03-07 RX ADMIN — HYDRALAZINE HYDROCHLORIDE 10 MG: 10 TABLET, FILM COATED ORAL at 21:06

## 2021-03-07 RX ADMIN — NYSTATIN: 100000 POWDER TOPICAL at 09:23

## 2021-03-07 RX ADMIN — HYDRALAZINE HYDROCHLORIDE 10 MG: 10 TABLET, FILM COATED ORAL at 09:21

## 2021-03-07 RX ADMIN — BUDESONIDE AND FORMOTEROL FUMARATE DIHYDRATE 2 PUFF: 80; 4.5 AEROSOL RESPIRATORY (INHALATION) at 19:44

## 2021-03-07 RX ADMIN — NYSTATIN: 100000 POWDER TOPICAL at 21:07

## 2021-03-07 RX ADMIN — METAXALONE 800 MG: 800 TABLET ORAL at 21:06

## 2021-03-07 RX ADMIN — HYDRALAZINE HYDROCHLORIDE 10 MG: 10 TABLET, FILM COATED ORAL at 03:46

## 2021-03-07 RX ADMIN — METOPROLOL SUCCINATE 50 MG: 50 TABLET, EXTENDED RELEASE ORAL at 09:21

## 2021-03-07 RX ADMIN — AMLODIPINE BESYLATE 10 MG: 10 TABLET ORAL at 09:21

## 2021-03-07 RX ADMIN — METAXALONE 800 MG: 800 TABLET ORAL at 15:20

## 2021-03-07 RX ADMIN — Medication 1 TABLET: at 09:21

## 2021-03-07 RX ADMIN — METAXALONE 800 MG: 800 TABLET ORAL at 09:21

## 2021-03-07 RX ADMIN — PANTOPRAZOLE SODIUM 40 MG: 40 TABLET, DELAYED RELEASE ORAL at 06:54

## 2021-03-07 RX ADMIN — HYDRALAZINE HYDROCHLORIDE 10 MG: 10 TABLET, FILM COATED ORAL at 15:20

## 2021-03-07 RX ADMIN — ASPIRIN 81 MG: 81 TABLET, COATED ORAL at 09:21

## 2021-03-07 RX ADMIN — SODIUM CHLORIDE 75 ML/HR: 9 INJECTION, SOLUTION INTRAVENOUS at 09:20

## 2021-03-07 NOTE — THERAPY TREATMENT NOTE
Patient Name: Harry Potts  : 1930    MRN: [id number]                              Today's Date: 3/7/2021       Admit Date: 3/5/2021    Visit Dx:     ICD-10-CM ICD-9-CM   1. Non-traumatic rhabdomyolysis  M62.82 728.88   2. Weakness of both lower extremities  R29.898 729.89     Patient Active Problem List   Diagnosis   • Dysfunction of eustachian tube   • Gastroesophageal reflux disease   • Hyperglycemia   • Hypercholesterolemia   • Hypertension   • Morbid obesity (CMS/HCC)   • Osteoarthritis of lumbar spine with myelopathy   • Osteoarthritis of lumbar spine   • Bronchitis   • Viral URI with cough   • PVC (premature ventricular contraction)   • Non-traumatic rhabdomyolysis   • Obesity (BMI 30-39.9)   • Chronic low back pain   • Weakness   • Edema, bilateral lower extremities     Past Medical History:   Diagnosis Date   • Cancer (CMS/HCC)     basal cell    • COPD (chronic obstructive pulmonary disease) (CMS/HCC)    • Elevated cholesterol    • GERD (gastroesophageal reflux disease)    • Hyperglycemia    • Hyperlipidemia    • Hypertension    • Obesity    • Sleep apnea    • Spondylolysis, lumbar region      Past Surgical History:   Procedure Laterality Date   • EYE SURGERY     • HERNIA REPAIR     • JOINT REPLACEMENT     • REPLACEMENT TOTAL KNEE BILATERAL       General Information     Row Name 21 1536          Physical Therapy Time and Intention    Document Type  therapy note (daily note)  -AP     Mode of Treatment  physical therapy;individual therapy  -AP     Row Name 21 153          General Information    Patient Profile Reviewed  yes  -AP     Existing Precautions/Restrictions  fall  -AP     Row Name 21 1536          Cognition    Orientation Status (Cognition)  oriented x 4  -AP     Row Name 21 1536          Safety Issues, Functional Mobility    Impairments Affecting Function (Mobility)  balance;endurance/activity tolerance;shortness of breath;pain;strength  -AP       User Key  (r) =  Recorded By, (t) = Taken By, (c) = Cosigned By    Initials Name Provider Type    AP Estrella Lehman, PT Physical Therapist        Mobility     Row Name 03/07/21 1536          Bed Mobility    Comment (Bed Mobility)  deferred, pt UIC and requested to return to chair following treatment.  -AP     Row Name 03/07/21 1536          Transfers    Comment (Transfers)  STS completed x 8 throughout therapy treatment with CGA for each progressing to SBA only, vc provided for posture upon standing and sequencing of bringing the arms to platforms in his walker.  -AP     Row Name 03/07/21 1536          Bed-Chair Transfer    Bed-Chair Balsam (Transfers)  contact guard;1 person assist  -AP     Assistive Device (Bed-Chair Transfers)  walker, front-wheeled;walker, platform  -AP     Row Name 03/07/21 1536          Sit-Stand Transfer    Sit-Stand Balsam (Transfers)  contact guard;standby assist;1 person assist  -AP     Assistive Device (Sit-Stand Transfers)  walker, front-wheeled  -AP     Row Name 03/07/21 1536          Gait/Stairs (Locomotion)    Balsam Level (Gait)  contact guard;1 person assist  -AP     Assistive Device (Gait)  walker, front-wheeled;walker, platform  -AP     Distance in Feet (Gait)  completed 6-7 laps in room approx 150 ft total  -AP     Deviations/Abnormal Patterns (Gait)  bilateral deviations;stride length decreased;weight shifting decreased;gait speed decreased;base of support, wide  -AP     Bilateral Gait Deviations  forward flexed posture;heel strike decreased  -AP     Comment (Gait/Stairs)  Pts stability in standing was much improved today, he seems to feel much more comfortable having platforms to rest his UEs in to allow for some relief of LBP. He was able to demonstrate improved foot clearance B and specifically with the Lt foot, having no overt LOB while walking and able to maintain balance within the walker mostly indpendently throughout entire session. legs remain externally rotated and he  reports pain in knees, hips, and low back when walking, and gait mechanics did decrease the longer he walked, but overall much imroved especially during first 50 ft of walking  -AP       User Key  (r) = Recorded By, (t) = Taken By, (c) = Cosigned By    Initials Name Provider Type    Estrella Ortiz PT Physical Therapist        Obj/Interventions     Row Name 03/07/21 154          Motor Skills    Therapeutic Exercise  other (see comments) BLE therex including LAQ, marches, AP, SLR in recliner, mini squats, squat holds, tandem standing, sinlge leg balance.  -AP     Row Name 03/07/21 1542          Balance    Balance Assessment  sitting static balance;sitting dynamic balance;standing static balance;standing dynamic balance  -AP     Static Sitting Balance  WFL  -AP     Dynamic Sitting Balance  WFL  -AP     Static Standing Balance  WFL;supported;standing  -AP     Dynamic Standing Balance  mild impairment;supported;standing  -AP     Balance Interventions  standing;weight shifting activity;single limb stance;tandem standing;narrowed base of support;supported  -AP     Comment, Balance  accepted balance challenges well and demonstrated the ability to maintain balance within his walker without tactile intervention of therapist, CGA only  -AP       User Key  (r) = Recorded By, (t) = Taken By, (c) = Cosigned By    Initials Name Provider Type    Estrella Ortiz PT Physical Therapist        Goals/Plan    No documentation.       Clinical Impression     Row Name 03/07/21 8978          Pain    Additional Documentation  Pain Scale: Numbers Pre/Post-Treatment (Group)  -AP     Row Name 03/07/21 1373          Pain Scale: Numbers Pre/Post-Treatment    Pretreatment Pain Rating  0/10 - no pain  -AP     Posttreatment Pain Rating  0/10 - no pain  -AP     Pre/Posttreatment Pain Comment  does report LBP during some motions in standing but no pain rating given, no pain at rest  -AP     Pain Intervention(s)  Ambulation/increased  activity;Repositioned  -AP     Row Name 03/07/21 1544          Plan of Care Review    Plan of Care Reviewed With  patient;daughter  -AP     Progress  improving  -AP     Outcome Summary  Pt showed good improvement in standing stability and independence with gait, showing improved mechanics including higher step clearance B, ROSE within the walker and no tripping over either foot, slightly improved standing posture, and increased functional endurance allowing for walking trial of approx 150 ft with only CGA. His gait mechanics did begin to decline the further he walked but no over LOB or deficits requiring more than CGA were noted. He will continue to benefit from skilled PT in the acute setting to further address remaining functional deficits in endurance, balance, gait mechanics, and safety awareness, Anticipate d/c to home with  services d/t pt being homebound and requiring further skilled intervention.  -AP     Row Name 03/07/21 1544          Positioning and Restraints    In Chair  notified nsg;sitting;call light within reach;encouraged to call for assist;with family/caregiver  -AP       User Key  (r) = Recorded By, (t) = Taken By, (c) = Cosigned By    Initials Name Provider Type    AP Estrella Lehman, PT Physical Therapist        Outcome Measures     Row Name 03/07/21 1548          How much help from another person do you currently need...    Turning from your back to your side while in flat bed without using bedrails?  3  -AP     Moving from lying on back to sitting on the side of a flat bed without bedrails?  3  -AP     Moving to and from a bed to a chair (including a wheelchair)?  3  -AP     Standing up from a chair using your arms (e.g., wheelchair, bedside chair)?  3  -AP     Climbing 3-5 steps with a railing?  1  -AP     To walk in hospital room?  3  -AP     AM-PAC 6 Clicks Score (PT)  16  -AP     Row Name 03/07/21 1548          Functional Assessment    Outcome Measure Options  AM-PAC 6 Clicks Basic Mobility  (PT)  -       User Key  (r) = Recorded By, (t) = Taken By, (c) = Cosigned By    Initials Name Provider Type    AP Estrella Lehman PT Physical Therapist        Physical Therapy Education                 Title: PT OT SLP Therapies (Done)     Topic: Physical Therapy (Done)     Point: Mobility training (Done)     Learning Progress Summary           Patient Acceptance, E,TB, VU by AP at 3/7/2021 1548    Acceptance, E,TB, VU by AP at 3/6/2021 1522                   Point: Home exercise program (Done)     Learning Progress Summary           Patient Acceptance, E,TB, VU by AP at 3/7/2021 1548    Acceptance, E,TB, VU by AP at 3/6/2021 1522                   Point: Body mechanics (Done)     Learning Progress Summary           Patient Acceptance, E,TB, VU by AP at 3/7/2021 1548    Acceptance, E,TB, VU by AP at 3/6/2021 1522                   Point: Precautions (Done)     Learning Progress Summary           Patient Acceptance, E,TB, VU by AP at 3/7/2021 1548    Acceptance, E,TB, VU by AP at 3/6/2021 1522                               User Key     Initials Effective Dates Name Provider Type Discipline     09/24/20 -  Estrella Lehman PT Physical Therapist PT              PT Recommendation and Plan  Planned Therapy Interventions (PT): balance training, bed mobility training, gait training, home exercise program, patient/family education, orthotic fitting/training, neuromuscular re-education, motor coordination training, postural re-education, ROM (range of motion), strengthening, stretching, transfer training  Plan of Care Reviewed With: patient, daughter  Progress: improving  Outcome Summary: Pt showed good improvement in standing stability and independence with gait, showing improved mechanics including higher step clearance B, ROSE within the walker and no tripping over either foot, slightly improved standing posture, and increased functional endurance allowing for walking trial of approx 150 ft with only CGA. His gait  mechanics did begin to decline the further he walked but no over LOB or deficits requiring more than CGA were noted. He will continue to benefit from skilled PT in the acute setting to further address remaining functional deficits in endurance, balance, gait mechanics, and safety awareness, Anticipate d/c to home with  services d/t pt being homebound and requiring further skilled intervention.     Time Calculation:   PT Charges     Row Name 03/07/21 1549             Time Calculation    Start Time  1435  -AP      Stop Time  1519  -AP      Time Calculation (min)  44 min  -AP      PT Received On  03/07/21  -AP      PT - Next Appointment  03/08/21  -AP        User Key  (r) = Recorded By, (t) = Taken By, (c) = Cosigned By    Initials Name Provider Type    AP Estrella Lehman, PT Physical Therapist        Therapy Charges for Today     Code Description Service Date Service Provider Modifiers Qty    62204604597 HC PT EVAL MOD COMPLEXITY 2 3/6/2021 Lehman, Estrella, PT GP 1    40248278901 HC PT THERAPEUTIC ACT EA 15 MIN 3/6/2021 Lehman, Estrella, PT GP 1    36714572835 HC PT THER PROC EA 15 MIN 3/6/2021 Lehman, Estrella, PT GP 1    43313784704 HC GAIT TRAINING EA 15 MIN 3/6/2021 Lehman, Estrella, PT GP 1    32822999009 HC PT THERAPEUTIC ACT EA 15 MIN 3/7/2021 Lehman, Estrella, PT GP 1    27070663943 HC PT THER PROC EA 15 MIN 3/7/2021 Lehman, Estrella, PT GP 1    42089467808 HC GAIT TRAINING EA 15 MIN 3/7/2021 Lehman, Estrella, PT GP 1          PT G-Codes  Outcome Measure Options: AM-PAC 6 Clicks Basic Mobility (PT)  AM-PAC 6 Clicks Score (PT): 16    Estrella Lehman, PT  3/7/2021

## 2021-03-07 NOTE — PLAN OF CARE
Goal Outcome Evaluation:  Plan of Care Reviewed With: patient  Progress: no change  Outcome Summary: Pt wore O2 at 2l nc during night due to not having own c-pap, wheezes often, is SOA with any exertion. Hydralazine every 6 hrs to control BP.  No complaints of pain of nausea.

## 2021-03-07 NOTE — PROGRESS NOTES
DOS: 3/7/2021  NAME: Harry Potts   : 1930  PCP: Harry Luis MD    Chief Complaint   Patient presents with   • Spasms         Subjective: Pt seen in follow up, however the problem is new to me.  Doing well, daughter at bedside.  Reports no pain, or numbness or tingling in his lower extremities.  Was having some muscle soreness that he states occurred after his MRI L-spine but that has since gotten better.  Feels the weakness in his legs have gotten better denies any new weakness, numbness, speech or visual disturbances, or headaches.    Objective:  Vital signs:   Vitals:    21 2123 21 0357 21 0733 21 1024   BP:  152/77 145/66    BP Location:  Left arm Left arm    Patient Position:  Lying Lying    Pulse: 70 72 73 64   Resp: 18 20 18 18   Temp:  97.3 °F (36.3 °C) 96.8 °F (36 °C)    TempSrc:  Oral Skin    SpO2: 90% 92% 90% 93%   Weight:       Height:           Current Facility-Administered Medications:   •  acetaminophen (TYLENOL) tablet 650 mg, 650 mg, Oral, Q4H PRN **OR** acetaminophen (TYLENOL) 160 MG/5ML solution 650 mg, 650 mg, Oral, Q4H PRN **OR** acetaminophen (TYLENOL) suppository 650 mg, 650 mg, Rectal, Q4H PRN, Lexy Rocha MD  •  amLODIPine (NORVASC) tablet 10 mg, 10 mg, Oral, Daily, Lexy Rocha MD, 10 mg at 21 0921  •  aspirin EC tablet 81 mg, 81 mg, Oral, Daily, Lexy Rocha MD, 81 mg at 21 0921  •  budesonide-formoterol (SYMBICORT) 80-4.5 MCG/ACT inhaler 2 puff, 2 puff, Inhalation, BID - RT, Lexy Rocha MD, 2 puff at 21 1024  •  fluticasone (FLONASE) 50 MCG/ACT nasal spray 1 spray, 1 spray, Each Nare, Nightly, Lexy Rocha MD, 1 spray at 21 2100  •  hydrALAZINE (APRESOLINE) tablet 10 mg, 10 mg, Oral, Q6H, Itz Whitten APRN, 10 mg at 21 0921  •  ipratropium-albuterol (DUO-NEB) nebulizer solution 3 mL, 3 mL, Nebulization, Q6H PRN, Itz Whitten APRN  •  metaxalone (SKELAXIN)  tablet 800 mg, 800 mg, Oral, TID, Lexy Rocha MD, 800 mg at 03/07/21 0921  •  metoprolol succinate XL (TOPROL-XL) 24 hr tablet 50 mg, 50 mg, Oral, Daily, Itz Whitten APRN, 50 mg at 03/07/21 0921  •  multivitamin with minerals 1 tablet, 1 tablet, Oral, Daily, Lexy Rocha MD, 1 tablet at 03/07/21 0921  •  nystatin (MYCOSTATIN) powder, , Topical, Q12H, Itz Whitten APRN, Given at 03/07/21 0923  •  ondansetron (ZOFRAN) injection 4 mg, 4 mg, Intravenous, Q6H PRN, Lexy Rocha MD  •  pantoprazole (PROTONIX) EC tablet 40 mg, 40 mg, Oral, QAM, Lexy Rocha MD, 40 mg at 03/07/21 0654  •  [COMPLETED] Insert peripheral IV, , , Once **AND** sodium chloride 0.9 % flush 10 mL, 10 mL, Intravenous, PRN, Emilie Motta APRN, 10 mL at 03/06/21 2057  •  sodium chloride 0.9 % infusion, 75 mL/hr, Intravenous, Continuous, Glo Chiu MD, Last Rate: 75 mL/hr at 03/07/21 0920, 75 mL/hr at 03/07/21 0920  •  terazosin (HYTRIN) capsule 5 mg, 5 mg, Oral, Nightly, Lexy Rocha MD, 5 mg at 03/06/21 2057    PRN meds  •  acetaminophen **OR** acetaminophen **OR** acetaminophen  •  ipratropium-albuterol  •  ondansetron  •  [COMPLETED] Insert peripheral IV **AND** sodium chloride    No current facility-administered medications on file prior to encounter.     Current Outpatient Medications on File Prior to Encounter   Medication Sig   • amLODIPine (NORVASC) 10 MG tablet TAKE 1 TABLET DAILY   • aspirin 81 MG EC tablet Take 81 mg by mouth Daily.   • atorvastatin (LIPITOR) 20 MG tablet TAKE 1 TABLET DAILY   • azelastine (ASTELIN) 0.1 % nasal spray 1 spray into the nostril(s) as directed by provider Daily.   • celecoxib (CeleBREX) 200 MG capsule TAKE 1 CAPSULE DAILY AS NEEDED (Patient taking differently: 200 mg Daily.)   • doxazosin (CARDURA) 4 MG tablet TAKE 1 TABLET DAILY AS DIRECTED (Patient taking differently: Every Night.)   • esomeprazole (nexIUM) 40 MG capsule TAKE 1 CAPSULE  DAILY   • ezetimibe (ZETIA) 10 MG tablet TAKE 1 TABLET DAILY   • fluticasone (FLONASE) 50 MCG/ACT nasal spray 1 spray Every Night.   • Fluticasone Furoate-Vilanterol (BREO ELLIPTA IN) Inhale Daily.   • loratadine (CLARITIN) 10 MG tablet Take  by mouth Daily.   • metaxalone (SKELAXIN) 800 MG tablet TAKE 1 TABLET THREE TIMES A DAY   • metoprolol succinate XL (TOPROL-XL) 50 MG 24 hr tablet Take 0.5 tablets by mouth Daily. (Patient taking differently: Take 50 mg by mouth Daily. Takes whole tab for 50 mg daily)   • Multiple Vitamins-Minerals (CENTRUM ADULTS PO) Take  by mouth Daily.   • Omega-3 Fatty Acids (FISH OIL) 1000 MG capsule capsule Take  by mouth Daily With Breakfast.   • VENTOLIN  (90 Base) MCG/ACT inhaler 2 puffs Every 6 (Six) Hours As Needed for Wheezing. Pt wont take d/t cost       General appearance: Obese elderly male, pleasant, NAD, alert and cooperative, well groomed  HEENT: Normocephalic, atraumatic, PERRL, no masses or tenderness  COR: RRR  Resp: Even, expiratory wheezing  Extremities: BLE edema  Skin: warm, dry, chronic ulcer on left toe    Neurological:   MS: oriented x3, recent/remote memory intact, normal attention/concentration, language intact, no neglect, normal fund of knowledge  CN: visual fields full, EOMI, facial sensation equal, no facial droop, [place] bilaterally, palate elevates symmetrically, shoulder shrug equal, tongue midline  Motor: 5/5 in upper extremities, 4/5 in lower extremities  Reflexes: Downgoing toe on left, no response on right  Sensory: light touch sensation intact in all 4 ext.  Coordination: Normal finger to nose test  Gait and station: 2-3 person assist secondary to weakness and obesity  Rapid alternating movements: normal finger to thumb tap    Laboratory results:  Lab Results   Component Value Date    TSH 1.690 03/06/2021     Lab Results   Component Value Date    LITDJTOA29 875 03/06/2021     Lab Results   Component Value Date    HGBA1C 6.00 (H) 12/02/2020     Lab  Results   Component Value Date    GLUCOSE 101 (H) 03/07/2021    BUN 7 (L) 03/07/2021    CREATININE 0.51 (L) 03/07/2021    EGFRIFNONA >150 03/07/2021    EGFRIFAFRI 131 12/02/2020    BCR 13.7 03/07/2021    K 3.7 03/07/2021    CO2 27.4 03/07/2021    CALCIUM 8.1 (L) 03/07/2021    PROTENTOTREF 6.8 12/02/2020    ALBUMIN 4.30 03/05/2021    LABIL2 1.8 12/02/2020    AST 56 (H) 03/05/2021    ALT 33 03/05/2021     Lab Results   Component Value Date    WBC 5.35 03/07/2021    HGB 14.5 03/07/2021    HCT 43.9 03/07/2021    MCV 89.4 03/07/2021     03/07/2021     Brief Urine Lab Results  (Last result in the past 365 days)      Color   Clarity   Blood   Leuk Est   Nitrite   Protein   CREAT   Urine HCG        03/05/21 1435 Yellow Clear Moderate (2+) Negative Negative 100 mg/dL (2+)             No results found for: ACANTHNAEG, AFBCX, BPERTUSSISCX, BLOODCX  No results found for: BCIDPCR, CXREFLEX, CSFCX, CULTURETIS  No results found for: CULTURES, HSVCX, URCX  No results found for: EYECULTURE, GCCX, HSVCULTURE, LABHSV  No results found for: LEGIONELLA, MRSACX, MUMPSCX, MYCOPLASCX  No results found for: NOCARDIACX, STOOLCX  No results found for: THROATCX, UNSTIMCULT, URINECX, CULTURE, VZVCULTUR  No results found for: VIRALCULTU, WOUNDCX  Pain Management Panel    There is no flowsheet data to display.         Review and interpretation of imaging:  CT Head Without Contrast    Result Date: 3/5/2021  No evidence of hemorrhage or of acute infarction. Atrophy, small vessel ischemic disease and vascular calcification is noted as described above. Further evaluation could be performed with a MRI examination the brain as indicated.    Radiation dose reduction techniques were utilized, including automated exposure control and exposure modulation based on body size.  This report was finalized on 3/5/2021 3:29 PM by Dr. Klever Willingham M.D.      MRI Lumbar Spine Without Contrast    Result Date: 3/6/2021  Electronically signed by Redd Barclay,  M.D. on 03-06-21 at 2022    XR Chest 1 View    Result Date: 3/5/2021  No acute process.  This report was finalized on 3/5/2021 2:37 PM by Dr. Jimenez Rueda M.D.      Results for orders placed in visit on 10/17/19    Adult Transthoracic Echo Complete W/ Cont if Necessary Per Protocol    Interpretation Summary  · The study is technically difficult for diagnosis  · Left ventricular systolic function is normal. Calculated EF = 61%. Estimated EF was in agreement with the calculated EF. Estimated EF = 61%. Normal left ventricular cavity size noted. All left ventricular wall segments contract normally. Left ventricular wall thickness is consistent with mild concentric hypertrophy. Left ventricular diastolic dysfunction is noted (grade I) consistent with impaired relaxation.  · Mild mitral valve regurgitation is present. Mitral regurgitation is likely underestimated as image quality is limited.  · Limited 2D assessment of cardiac valves      Impression/Assessment:  This is a 89-year-old male with past medical history of COPD, HTN, HLD, diabetes, JENISE, lumbar spondylosis who presented to the hospital on 3/5/2021 with complaints of lower extremity weakness and not being able to get back up after lowering himself to the ground.  Patient reports that he was able to walk to the bathroom when he woke up the morning of admission with his walker however after he used the bathroom he felt that his legs became very weak and he was unable to stand therefore he lowered himself down to his knees and was unable to get up.  He was in that position for at least an hour may be more.  He did note that he had his second COVID-19 vaccine on 2/25/2021.  BP on arrival 179/104, HR 77.  EKG with predominantly NSR.  .  Temp 96.5.  He was found to have an elevated CK level of 1406 on admission.    Diagnosis: BLE weakness, rhabdomyolysis, chronic low back pain with history of lumbar spondylosis    Work up to date:  Labs: Mg 1.9, U/A  unremarkable for infection, B12 875, TSH 1.690, CK 1406/1729/1363/935, COVID-19 negative  3/5 CT head WO: No acute intracranial abnormalities, age-appropriate atrophy, moderate to severe small vessel disease and moderate vascular calcification noted.  3/6 MRI Lspine WO: L3-4 disc protrusion into the left neural foramina causing moderate narrowing, large left paravertebral osteophyte may be touching the exiting nerve root, L4-5 disc bulge with severe right neuroforaminal narrowing and moderate left neural foraminal narrowing, L5-S1 with moderate bilateral neuroforaminal narrowing and central canal narrowing.    Plan:  MRI L-spine showing multilevel foraminal narrowing with the most severe at L4/5 on the right with disc bulging.  Patient symptoms have improved per his report.    Recommend PT at discharge and if symptoms recur or worsen he can follow-up with neurosurgery outpatient.    At this point he states he is not interested in any neurosurgical intervention.  I will arrange f/u outpatient in our office in 2-3 months incase he develops radicular symptoms.  CK is improving, he reports his muscle pain was initially worse after his MRI L-spine but improved at this point.  B12 and thyroid levels were normal.  We will sign off, will see again per request.    Case discussed with patient, daughter, and Dr. Mcneal, and she agrees with plan above.   DANNIE Rodriguez

## 2021-03-07 NOTE — PLAN OF CARE
Goal Outcome Evaluation:  Plan of Care Reviewed With: patient  Progress: no change  Outcome Summary: A&Ox4. Up with walker and assist x1. No c/o pain, n/v/d. 2L NC at night, Has expiratory wheezes. VSS. Will continue to monitor.

## 2021-03-07 NOTE — PLAN OF CARE
Goal Outcome Evaluation:  Plan of Care Reviewed With: patient, daughter  Progress: improving  Outcome Summary: Pt showed good improvement in standing stability and independence with gait, showing improved mechanics including higher step clearance B, ROSE within the walker and no tripping over either foot, slightly improved standing posture, and increased functional endurance allowing for walking trial of approx 150 ft with only CGA. His gait mechanics did begin to decline the further he walked but no over LOB or deficits requiring more than CGA were noted. He will continue to benefit from skilled PT in the acute setting to further address remaining functional deficits in endurance, balance, gait mechanics, and safety awareness, Anticipate d/c to home with  services d/t pt being homebound and requiring further skilled intervention.  Patient was intermittently wearing a face mask during this therapy encounter. Therapist used appropriate personal protective equipment including eye protection, mask, and gloves.  Mask used was standard procedure mask. Appropriate PPE was worn during the entire therapy session. Hand hygiene was completed before and after therapy session. Patient is not in enhanced droplet precautions.

## 2021-03-07 NOTE — PROGRESS NOTES
Name: Harry Potts ADMIT: 3/5/2021   : 1930  PCP: Harry Luis MD    MRN: [id number] LOS: 0 days   AGE/SEX: 89 y.o. male  ROOM: [facility]     Subjective   Subjective   Patient appears relatively comfortable and in no apparent distress.  Patient's daughter at bedside.  Reports chronic low back pain and bilateral lower extremity weakness without recent injury.  Denies bowel or bladder dysfunction.    Review of Systems   Respiratory: Negative for cough and shortness of breath.    Cardiovascular: Negative for chest pain and leg swelling.   Gastrointestinal: Negative for abdominal pain, constipation, diarrhea, nausea and vomiting.   Genitourinary: Negative for difficulty urinating and dysuria.   Musculoskeletal: Positive for back pain (chronic low back pain, stable) and gait problem (2/2 BLE weakness--improving with PT & cessation statin).   Neurological: Positive for weakness (BLE--improving). Negative for numbness.   Psychiatric/Behavioral: Negative for confusion and hallucinations.   All other systems reviewed and are negative.       Objective   Objective   Vital Signs  Temp:  [96.8 °F (36 °C)-98.7 °F (37.1 °C)] 96.8 °F (36 °C)  Heart Rate:  [64-73] 64  Resp:  [18-20] 18  BP: (145-189)/(66-92) 145/66  SpO2:  [90 %-93 %] 93 %  on  Flow (L/min):  [1.5-2] 2;   Device (Oxygen Therapy): nasal cannula  Body mass index is 39.65 kg/m².     Physical Exam  Constitutional:       General: He is not in acute distress.     Appearance: He is obese.      Comments: Unkempt re: foot hygiene between toes--fungal   HENT:      Head: Normocephalic and atraumatic.   Eyes:      Extraocular Movements: Extraocular movements intact.      Conjunctiva/sclera: Conjunctivae normal.   Cardiovascular:      Rate and Rhythm: Normal rate.      Heart sounds: Normal heart sounds.   Pulmonary:      Effort: Pulmonary effort is normal.      Comments: Diminished on expiration throughout all lung fields  Abdominal:      General: Bowel sounds  are normal. There is distension.      Palpations: Abdomen is soft.      Tenderness: There is no abdominal tenderness.   Musculoskeletal:         General: No tenderness.      Cervical back: Normal range of motion.      Right lower leg: Edema (trace pitting edema BLE) present.      Left lower leg: Edema present.   Skin:     General: Skin is warm and dry.      Findings: Erythema (very mild erythema of RLE) present.      Comments: Hyperpigmentation of BLE   Neurological:      Mental Status: He is alert and oriented to person, place, and time.      Cranial Nerves: No cranial nerve deficit.   Psychiatric:         Behavior: Behavior normal.         Thought Content: Thought content normal.         Results Review     I reviewed the patient's new clinical results.  Results from last 7 days   Lab Units 03/07/21  0542 03/06/21  0827 03/05/21  1430   WBC 10*3/mm3 5.35 5.35 8.21   HEMOGLOBIN g/dL 14.5 15.2 15.6   PLATELETS 10*3/mm3 147 141 147     Results from last 7 days   Lab Units 03/07/21  0542 03/06/21  0827 03/05/21  1430   SODIUM mmol/L 139 138 139   POTASSIUM mmol/L 3.7 3.7 4.2   CHLORIDE mmol/L 104 102 101   CO2 mmol/L 27.4 26.3 29.0   BUN mg/dL 7* 6* 12   CREATININE mg/dL 0.51* 0.50* 0.55*   GLUCOSE mg/dL 101* 151* 130*   Estimated Creatinine Clearance: 86.6 mL/min (A) (by C-G formula based on SCr of 0.51 mg/dL (L)).  Results from last 7 days   Lab Units 03/05/21  1430   ALBUMIN g/dL 4.30   BILIRUBIN mg/dL 0.6   ALK PHOS U/L 97   AST (SGOT) U/L 56*   ALT (SGPT) U/L 33     Results from last 7 days   Lab Units 03/07/21  0542 03/06/21  0827 03/05/21  1430   CALCIUM mg/dL 8.1* 8.5 9.0   ALBUMIN g/dL  --   --  4.30   MAGNESIUM mg/dL  --   --  1.9       COVID19   Date Value Ref Range Status   03/05/2021 Not Detected Not Detected - Ref. Range Final     No results found for: HGBA1C, POCGLU    MRI Lumbar Spine Without Contrast  Narrative: Patient: SAJI VENTURA  Time Out: 20:22  Exam(s): MRI L SPINE Without Contrast      EXAM:    MR Lumbar Spine Without Intravenous Contrast    CLINICAL HISTORY:     Rhabdomyolysis. Other symptoms and signs involving the musculoskeletal   system. Bilateral leg weakness. No ca, sx. : leg weakness.    TECHNIQUE:    Magnetic resonance images of the lumbar spine without intravenous   contrast in multiple planes.    COMPARISON:    lumbar spine plain films 5 25 12, CT abdomen and pelvis 3 24 04    FINDINGS:    Vertebrae:  Endplate sclerosis degenerative changes with large anterior   osteophytes from L2-S1.  Grade 1 anterolisthesis of L5 on S1.  No acute   fracture.    Interspaces:  Moderate severe disc space narrowing from L2-S1.    Spinal cord:  Unremarkable.  Normal signal.    Soft tissues:  Mild subcutaneous edema lower back.     DISCS SPINAL CANAL NEURAL FORAMINA:    L1-L2:  Unremarkable.  No significant disc disease.  No stenosis.    L2-L3:  Unremarkable.  No significant disc disease.  No stenosis.    L3-L4:  L3-4 disc protrusion with mild to moderate central canal   narrowing.  Disc protruding into the left neural foramina resulting in   moderate  left neural foraminal narrowing.  Large left paravertebral   osteophyte may be touching the exiting nerve root.    L4-L5:  L4-5 broad-based disc bulge with mild central canal narrowing.    Ligamentum flavum thickening and facet arthropathy.  Large right   paravertebral osteophyte may be touching the right exiting nerve root.    Severe right neural foraminal narrowing and moderate left neural   foraminal narrowing.    L5-S1:  L5-S1 moderate bilateral neural foraminal narrowing.  Facet   arthropathy.  Moderate central canal narrowing.    IMPRESSION:       1.  L3-4 disc protrusion with mild to moderate central canal narrowing.    Disc protruding into the left neural foramina resulting in moderate  left   neural foraminal narrowing.  Large left paravertebral osteophyte may be   touching the exiting nerve root.  2.  L4-5 broad-based disc bulge with mild  central canal narrowing.    Ligamentum flavum thickening and facet arthropathy.  Large right   paravertebral osteophyte may be touching the right exiting nerve root.    Severe right neural foraminal narrowing and moderate left neural   foraminal narrowing.  3.  L5-S1 moderate bilateral neural foraminal narrowing.  Facet   arthropathy.  Moderate central canal narrowing.  4.  Grade 1 anterolisthesis of L5 on S1.  Marked degenerative changes L2-  S1.  Impression: Electronically signed by Redd Barclay M.D. on 03-06-21 at 2022    Scheduled Medications  amLODIPine, 10 mg, Oral, Daily  aspirin, 81 mg, Oral, Daily  budesonide-formoterol, 2 puff, Inhalation, BID - RT  fluticasone, 1 spray, Each Nare, Nightly  hydrALAZINE, 10 mg, Oral, Q6H  metaxalone, 800 mg, Oral, TID  metoprolol succinate XL, 50 mg, Oral, Daily  multivitamin with minerals, 1 tablet, Oral, Daily  nystatin, , Topical, Q12H  pantoprazole, 40 mg, Oral, QAM  terazosin, 5 mg, Oral, Nightly    Infusions  sodium chloride, 75 mL/hr, Last Rate: 75 mL/hr (03/07/21 0920)    Diet  Diet Regular; Cardiac       Assessment/Plan     Active Hospital Problems    Diagnosis  POA   • **Non-traumatic rhabdomyolysis [M62.82]  Yes   • Obesity (BMI 30-39.9) [E66.9]  Yes   • Chronic low back pain [M54.5, G89.29]  Yes   • Weakness [R53.1]  Yes   • Edema, bilateral lower extremities [R60.9]  Yes   • Gastroesophageal reflux disease [K21.9]  Yes   • Hypertension [I10]  Yes   • Osteoarthritis of lumbar spine [M47.816]  Yes      Resolved Hospital Problems   No resolved problems to display.       89 y.o. male admitted with Non-traumatic rhabdomyolysis.      Non-traumatic rhabdomyolysis.  Note serum creatinine kinase >1500--> 1700-->1300-->930 following gentle IV fluid hydration, repeat CK today and in a.m.  Suspect statin--held on admission.    Edema, bilateral lower extremities.  Possibly secondary to amlodipine 10 mg p.o. daily.  Noted ejection fraction 61% October 2019.  Reports  adherence to compression stockings in outpatient setting.  Recommend avoid dependent positioning.  Consider initiating furosemide 20 mg p.o. daily versus HCTZ; however, patient currently getting gentle IV fluid hydration for nontraumatic rhabdomyolysis; therefore, will defer diuretic at present time.  Compression stockings ordered & not currently applied--discussed with RN.    Gastroesophageal reflux disease.  Stable appearance, PPI.    Hypertension.  BP much improved following addition of hydralazine 10 mg PO four times daily & continued amlodipine 10 mg p.o. daily, metoprolol succinate 50 mg p.o. daily.      Osteoarthritis of lumbar spine /chronic low back pain /weakness.  Neurology following--unremarkable serum vitamin B12 875, TSH 1.690.  MRI lumbar spine reported findings--very right neuroforaminal narrowing (see report for full details), plan consult [facility] to advise further.  PT following--patient tolerating with activity with walker & assist x1, plan return home with home health /PT at discharge.    Obesity (BMI 30-39.9).  Complicating all problems.      · SCD for DVT prophylaxis.  · CPR  · Discussed with Dr. Chiu.  · Anticipate discharge possibly tomorrow pending [facility] recommendations & clinical course / CCP consulted for anticipated needs at [place].      DANNIE Hand  [facility]  03/07/21  11:26 EST

## 2021-03-07 NOTE — CONSULTS
[facility], [place], operated by [facility] NEUROSURGERY CONSULT NOTE    Patient name: Harry Potts  Referring Provider: Dr. Lexy Rocha  Reason for Consultation: Abnormal MRI spine, leg weakness    Patient Care Team:  Harry Luis MD as PCP - General    Chief complaint: Leg weakness  Subjective     History of present illness:    Patient is a 89 y.o. right handed male presented to the emergency room lower extremity weakness and spasms.  He reports he was in the bathroom and his legs suddenly felt weak so he sat himself on the ground and was unable to get up by himself.  He states he was unable to walk and then he has a history of low back problems, usually uses a walker.  Denies any bowel/bladder incontinence.  He was admitted for nontraumatic rhabdomyolysis.  History of COPD/bronchitis, bilateral knee replacements.      Review of Systems  Review of Systems   Constitutional: Positive for activity change.   Respiratory: Positive for shortness of breath ( on 2L n/c) and wheezing ( audible). Negative for chest tightness.    Gastrointestinal: Negative for diarrhea and nausea.   Genitourinary: Negative for difficulty urinating.   Musculoskeletal: Positive for back pain, gait problem and myalgias.   Neurological: Negative for weakness and numbness.   All other systems reviewed and are negative.      History  PAST MEDICAL HISTORY  Past Medical History:   Diagnosis Date   • Cancer (CMS/HCC)     basal cell    • COPD (chronic obstructive pulmonary disease) (CMS/HCC)    • Elevated cholesterol    • GERD (gastroesophageal reflux disease)    • Hyperglycemia    • Hyperlipidemia    • Hypertension    • Obesity    • Sleep apnea    • Spondylolysis, lumbar region        PAST SURGICAL HISTORY  Past Surgical History:   Procedure Laterality Date   • EYE SURGERY     • HERNIA REPAIR     • JOINT REPLACEMENT     • REPLACEMENT TOTAL KNEE BILATERAL         FAMILY HISTORY  Family History   Family history unknown: Yes       SOCIAL HISTORY  Social History     Tobacco Use    • Smoking status: Never Smoker   • Smokeless tobacco: Never Used   Substance Use Topics   • Alcohol use: No   • Drug use: No       retired  Is with wife    Allergies:  Morphine and related    MEDICATIONS:  Medications Prior to Admission   Medication Sig Dispense Refill Last Dose   • amLODIPine (NORVASC) 10 MG tablet TAKE 1 TABLET DAILY 90 tablet 3 3/5/2021 at Unknown time   • aspirin 81 MG EC tablet Take 81 mg by mouth Daily.   3/5/2021 at Unknown time   • atorvastatin (LIPITOR) 20 MG tablet TAKE 1 TABLET DAILY 90 tablet 3 3/5/2021 at Unknown time   • azelastine (ASTELIN) 0.1 % nasal spray 1 spray into the nostril(s) as directed by provider Daily.   3/5/2021 at Unknown time   • celecoxib (CeleBREX) 200 MG capsule TAKE 1 CAPSULE DAILY AS NEEDED (Patient taking differently: 200 mg Daily.) 90 capsule 3 3/5/2021 at Unknown time   • doxazosin (CARDURA) 4 MG tablet TAKE 1 TABLET DAILY AS DIRECTED (Patient taking differently: Every Night.) 90 tablet 3 3/5/2021 at Unknown time   • esomeprazole (nexIUM) 40 MG capsule TAKE 1 CAPSULE DAILY 90 capsule 3 3/5/2021 at Unknown time   • ezetimibe (ZETIA) 10 MG tablet TAKE 1 TABLET DAILY 90 tablet 3 3/5/2021 at Unknown time   • fluticasone (FLONASE) 50 MCG/ACT nasal spray 1 spray Every Night.   3/4/2021 at Unknown time   • Fluticasone Furoate-Vilanterol (BREO ELLIPTA IN) Inhale Daily.   3/5/2021 at Unknown time   • loratadine (CLARITIN) 10 MG tablet Take  by mouth Daily.   3/5/2021 at Unknown time   • metaxalone (SKELAXIN) 800 MG tablet TAKE 1 TABLET THREE TIMES A  tablet 3 3/5/2021 at Unknown time   • metoprolol succinate XL (TOPROL-XL) 50 MG 24 hr tablet Take 0.5 tablets by mouth Daily. (Patient taking differently: Take 50 mg by mouth Daily. Takes whole tab for 50 mg daily) 90 tablet 1 3/5/2021 at Unknown time   • Multiple Vitamins-Minerals (CENTRUM ADULTS PO) Take  by mouth Daily.   3/5/2021 at Unknown time   • Omega-3 Fatty Acids (FISH OIL) 1000 MG capsule capsule  Take  by mouth Daily With Breakfast.   3/5/2021 at Unknown time   • VENTOLIN  (90 Base) MCG/ACT inhaler 2 puffs Every 6 (Six) Hours As Needed for Wheezing. Pt wont take d/t cost          Objective     Results Review:  LABS:    Admission on 03/05/2021   Component Date Value Ref Range Status   • QT Interval 03/05/2021 407  ms Final   • Glucose 03/05/2021 130* 65 - 99 mg/dL Final   • BUN 03/05/2021 12  8 - 23 mg/dL Final   • Creatinine 03/05/2021 0.55* 0.76 - 1.27 mg/dL Final   • Sodium 03/05/2021 139  136 - 145 mmol/L Final   • Potassium 03/05/2021 4.2  3.5 - 5.2 mmol/L Final   • Chloride 03/05/2021 101  98 - 107 mmol/L Final   • CO2 03/05/2021 29.0  22.0 - 29.0 mmol/L Final   • Calcium 03/05/2021 9.0  8.2 - 9.6 mg/dL Final   • Total Protein 03/05/2021 7.1  6.0 - 8.5 g/dL Final   • Albumin 03/05/2021 4.30  3.50 - 5.20 g/dL Final   • ALT (SGPT) 03/05/2021 33  1 - 41 U/L Final   • AST (SGOT) 03/05/2021 56* 1 - 40 U/L Final   • Alkaline Phosphatase 03/05/2021 97  39 - 117 U/L Final   • Total Bilirubin 03/05/2021 0.6  0.0 - 1.2 mg/dL Final   • eGFR Non African Amer 03/05/2021 140  >60 mL/min/1.73 Final   • Globulin 03/05/2021 2.8  gm/dL Final   • A/G Ratio 03/05/2021 1.5  g/dL Final   • BUN/Creatinine Ratio 03/05/2021 21.8  7.0 - 25.0 Final   • Anion Gap 03/05/2021 9.0  5.0 - 15.0 mmol/L Final   • Color, UA 03/05/2021 Yellow  Yellow, Straw Final   • Appearance, UA 03/05/2021 Clear  Clear Final   • pH, UA 03/05/2021 7.5  5.0 - 8.0 Final   • Specific Gravity, UA 03/05/2021 1.017  1.005 - 1.030 Final   • Glucose, UA 03/05/2021 Negative  Negative Final   • Ketones, UA 03/05/2021 15 mg/dL (1+)* Negative Final   • Bilirubin, UA 03/05/2021 Negative  Negative Final   • Blood, UA 03/05/2021 Moderate (2+)* Negative Final   • Protein, UA 03/05/2021 100 mg/dL (2+)* Negative Final   • Leuk Esterase, UA 03/05/2021 Negative  Negative Final   • Nitrite, UA 03/05/2021 Negative  Negative Final   • Urobilinogen, UA 03/05/2021 1.0  E.U./dL  0.2 - 1.0 E.U./dL Final   • WBC 03/05/2021 8.21  3.40 - 10.80 10*3/mm3 Final   • RBC 03/05/2021 5.31  4.14 - 5.80 10*6/mm3 Final   • Hemoglobin 03/05/2021 15.6  13.0 - 17.7 g/dL Final   • Hematocrit 03/05/2021 46.2  37.5 - 51.0 % Final   • MCV 03/05/2021 87.0  79.0 - 97.0 fL Final   • MCH 03/05/2021 29.4  26.6 - 33.0 pg Final   • MCHC 03/05/2021 33.8  31.5 - 35.7 g/dL Final   • RDW 03/05/2021 12.4  12.3 - 15.4 % Final   • RDW-SD 03/05/2021 39.0  37.0 - 54.0 fl Final   • MPV 03/05/2021 10.6  6.0 - 12.0 fL Final   • Platelets 03/05/2021 147  140 - 450 10*3/mm3 Final   • Neutrophil % 03/05/2021 80.5* 42.7 - 76.0 % Final   • Lymphocyte % 03/05/2021 11.6* 19.6 - 45.3 % Final   • Monocyte % 03/05/2021 7.2  5.0 - 12.0 % Final   • Eosinophil % 03/05/2021 0.1* 0.3 - 6.2 % Final   • Basophil % 03/05/2021 0.4  0.0 - 1.5 % Final   • Immature Grans % 03/05/2021 0.2  0.0 - 0.5 % Final   • Neutrophils, Absolute 03/05/2021 6.61  1.70 - 7.00 10*3/mm3 Final   • Lymphocytes, Absolute 03/05/2021 0.95  0.70 - 3.10 10*3/mm3 Final   • Monocytes, Absolute 03/05/2021 0.59  0.10 - 0.90 10*3/mm3 Final   • Eosinophils, Absolute 03/05/2021 0.01  0.00 - 0.40 10*3/mm3 Final   • Basophils, Absolute 03/05/2021 0.03  0.00 - 0.20 10*3/mm3 Final   • Immature Grans, Absolute 03/05/2021 0.02  0.00 - 0.05 10*3/mm3 Final   • nRBC 03/05/2021 0.0  0.0 - 0.2 /100 WBC Final   • Extra Tube 03/05/2021 hold for add-on   Final    Auto resulted   • Extra Tube 03/05/2021 Hold for add-ons.   Final    Auto resulted.   • Extra Tube 03/05/2021 hold for add-on   Final    Auto resulted   • Extra Tube 03/05/2021 Hold for add-ons.   Final    Auto resulted.   • Troponin T 03/05/2021 0.014  0.000 - 0.030 ng/mL Final   • Magnesium 03/05/2021 1.9  1.6 - 2.4 mg/dL Final   • RBC, UA 03/05/2021 0-2  None Seen, 0-2 /HPF Final   • WBC, UA 03/05/2021 0-2  None Seen, 0-2 /HPF Final   • Bacteria, UA 03/05/2021 None Seen  None Seen /HPF Final   • Squamous Epithelial Cells, UA  03/05/2021 0-2  None Seen, 0-2 /HPF Final   • Hyaline Casts, UA 03/05/2021 None Seen  None Seen /LPF Final   • Methodology 03/05/2021 Automated Microscopy   Final   • Creatine Kinase 03/05/2021 1,406* 20 - 200 U/L Final   • COVID19 03/05/2021 Not Detected  Not Detected - Ref. Range Final   • Glucose 03/06/2021 151* 65 - 99 mg/dL Final   • BUN 03/06/2021 6* 8 - 23 mg/dL Final   • Creatinine 03/06/2021 0.50* 0.76 - 1.27 mg/dL Final   • Sodium 03/06/2021 138  136 - 145 mmol/L Final   • Potassium 03/06/2021 3.7  3.5 - 5.2 mmol/L Final   • Chloride 03/06/2021 102  98 - 107 mmol/L Final   • CO2 03/06/2021 26.3  22.0 - 29.0 mmol/L Final   • Calcium 03/06/2021 8.5  8.2 - 9.6 mg/dL Final   • eGFR Non African Amer 03/06/2021 >150  >60 mL/min/1.73 Final   • BUN/Creatinine Ratio 03/06/2021 12.0  7.0 - 25.0 Final   • Anion Gap 03/06/2021 9.7  5.0 - 15.0 mmol/L Final   • WBC 03/06/2021 5.35  3.40 - 10.80 10*3/mm3 Final   • RBC 03/06/2021 5.18  4.14 - 5.80 10*6/mm3 Final   • Hemoglobin 03/06/2021 15.2  13.0 - 17.7 g/dL Final   • Hematocrit 03/06/2021 45.2  37.5 - 51.0 % Final   • MCV 03/06/2021 87.3  79.0 - 97.0 fL Final   • MCH 03/06/2021 29.3  26.6 - 33.0 pg Final   • MCHC 03/06/2021 33.6  31.5 - 35.7 g/dL Final   • RDW 03/06/2021 12.0* 12.3 - 15.4 % Final   • RDW-SD 03/06/2021 38.2  37.0 - 54.0 fl Final   • MPV 03/06/2021 10.9  6.0 - 12.0 fL Final   • Platelets 03/06/2021 141  140 - 450 10*3/mm3 Final   • Neutrophil % 03/06/2021 71.6  42.7 - 76.0 % Final   • Lymphocyte % 03/06/2021 19.8  19.6 - 45.3 % Final   • Monocyte % 03/06/2021 6.7  5.0 - 12.0 % Final   • Eosinophil % 03/06/2021 1.1  0.3 - 6.2 % Final   • Basophil % 03/06/2021 0.6  0.0 - 1.5 % Final   • Immature Grans % 03/06/2021 0.2  0.0 - 0.5 % Final   • Neutrophils, Absolute 03/06/2021 3.83  1.70 - 7.00 10*3/mm3 Final   • Lymphocytes, Absolute 03/06/2021 1.06  0.70 - 3.10 10*3/mm3 Final   • Monocytes, Absolute 03/06/2021 0.36  0.10 - 0.90 10*3/mm3 Final   •  Eosinophils, Absolute 03/06/2021 0.06  0.00 - 0.40 10*3/mm3 Final   • Basophils, Absolute 03/06/2021 0.03  0.00 - 0.20 10*3/mm3 Final   • Immature Grans, Absolute 03/06/2021 0.01  0.00 - 0.05 10*3/mm3 Final   • nRBC 03/06/2021 0.0  0.0 - 0.2 /100 WBC Final   • Creatine Kinase 03/06/2021 1,729* 20 - 200 U/L Final   • Vitamin B-12 03/06/2021 875  211 - 946 pg/mL Final   • TSH 03/06/2021 1.690  0.270 - 4.200 uIU/mL Final   • Creatine Kinase 03/06/2021 1,363* 20 - 200 U/L Final   • Glucose 03/07/2021 101* 65 - 99 mg/dL Final   • BUN 03/07/2021 7* 8 - 23 mg/dL Final   • Creatinine 03/07/2021 0.51* 0.76 - 1.27 mg/dL Final   • Sodium 03/07/2021 139  136 - 145 mmol/L Final   • Potassium 03/07/2021 3.7  3.5 - 5.2 mmol/L Final   • Chloride 03/07/2021 104  98 - 107 mmol/L Final   • CO2 03/07/2021 27.4  22.0 - 29.0 mmol/L Final   • Calcium 03/07/2021 8.1* 8.2 - 9.6 mg/dL Final   • eGFR Non African Amer 03/07/2021 >150  >60 mL/min/1.73 Final   • BUN/Creatinine Ratio 03/07/2021 13.7  7.0 - 25.0 Final   • Anion Gap 03/07/2021 7.6  5.0 - 15.0 mmol/L Final   • Creatine Kinase 03/07/2021 935* 20 - 200 U/L Final   • WBC 03/07/2021 5.35  3.40 - 10.80 10*3/mm3 Final   • RBC 03/07/2021 4.91  4.14 - 5.80 10*6/mm3 Final   • Hemoglobin 03/07/2021 14.5  13.0 - 17.7 g/dL Final   • Hematocrit 03/07/2021 43.9  37.5 - 51.0 % Final   • MCV 03/07/2021 89.4  79.0 - 97.0 fL Final   • MCH 03/07/2021 29.5  26.6 - 33.0 pg Final   • MCHC 03/07/2021 33.0  31.5 - 35.7 g/dL Final   • RDW 03/07/2021 12.2* 12.3 - 15.4 % Final   • RDW-SD 03/07/2021 39.6  37.0 - 54.0 fl Final   • MPV 03/07/2021 10.8  6.0 - 12.0 fL Final   • Platelets 03/07/2021 147  140 - 450 10*3/mm3 Final   • Neutrophil % 03/07/2021 66.9  42.7 - 76.0 % Final   • Lymphocyte % 03/07/2021 20.4  19.6 - 45.3 % Final   • Monocyte % 03/07/2021 8.8  5.0 - 12.0 % Final   • Eosinophil % 03/07/2021 2.8  0.3 - 6.2 % Final   • Basophil % 03/07/2021 0.7  0.0 - 1.5 % Final   • Immature Grans % 03/07/2021  0.4  0.0 - 0.5 % Final   • Neutrophils, Absolute 03/07/2021 3.58  1.70 - 7.00 10*3/mm3 Final   • Lymphocytes, Absolute 03/07/2021 1.09  0.70 - 3.10 10*3/mm3 Final   • Monocytes, Absolute 03/07/2021 0.47  0.10 - 0.90 10*3/mm3 Final   • Eosinophils, Absolute 03/07/2021 0.15  0.00 - 0.40 10*3/mm3 Final   • Basophils, Absolute 03/07/2021 0.04  0.00 - 0.20 10*3/mm3 Final   • Immature Grans, Absolute 03/07/2021 0.02  0.00 - 0.05 10*3/mm3 Final   • nRBC 03/07/2021 0.0  0.0 - 0.2 /100 WBC Final       DIAGNOSTICS:  MRI lumbar spine reviewed with Dr. Huerta and reveals L3-4 disc protrusion, with mild to moderate central canal narrowing.  Disc protrudes into the left neural foramina resulting in moderate left neural foraminal narrowing.  Large left para vertebral osteophyte may be touching the exiting nerve root.  4-5 broad-based disc bulge with mild central canal narrowing ligamentum flavum thickening and facet arthropathy.  Large right vertebral osteophyte may be touching the right exiting nerve root.  Severe right neuroforaminal narrowing and moderate left neural foraminal narrowing.  L5-S1 moderate bilateral neuroforaminal narrowing.  Facet arthropathy.  Moderate central canal narrowing.  Grade 1 anterolisthesis L5 on S1.  Marked degenerative changes L2-S1.    Results Review:   I reviewed the patient's new clinical results.  I personally reviewed the patient's MRI lumbar spine without contrast and report.    Vital Signs   Temp:  [96.8 °F (36 °C)-98.7 °F (37.1 °C)] 97.8 °F (36.6 °C)  Heart Rate:  [64-76] 76  Resp:  [16-20] 16  BP: (137-189)/(66-92) 137/74    Physical Exam:  Physical Exam  Vitals and nursing note reviewed.   Constitutional:       Appearance: Normal appearance. He is obese.   HENT:      Head: Normocephalic and atraumatic.      Mouth/Throat:      Mouth: Mucous membranes are moist.   Eyes:      Pupils: Pupils are equal, round, and reactive to light.   Cardiovascular:      Rate and Rhythm: Normal rate.    Pulmonary:      Effort: Pulmonary effort is normal.   Musculoskeletal:      Right shoulder: Normal.      Left shoulder: Normal.      Right upper arm: Normal.      Left upper arm: Normal.      Right hand: Normal.      Left hand: Normal.      Cervical back: Normal.      Thoracic back: Normal.      Lumbar back: Normal.      Right upper leg: Normal.      Left upper leg: Normal.      Right lower leg: Normal.      Left lower leg: Normal.      Right foot: Normal.      Left foot: Normal.      Comments: No pain with palpation   Skin:     General: Skin is warm and dry.   Neurological:      General: No focal deficit present.      Mental Status: He is alert and oriented to person, place, and time.      GCS: GCS eye subscore is 4. GCS verbal subscore is 5. GCS motor subscore is 6.      Sensory: Sensation is intact.      Motor: Motor function is intact.      Gait: Gait abnormal.      Deep Tendon Reflexes: Strength normal.      Comments: able to complete several STS transfers with CGA only and cueing for posture, sequencing, foot placement, and head position and ambulated twice in room for 20 ft while trialing two different assistive devices, per PT.  Pt responded well to using a rolling walker with BUE platforms, which allowed him to place weight through his elbows/forearms as he would usually lean far forward onto his rollator at home    Psychiatric:         Attention and Perception: Attention normal.         Mood and Affect: Mood normal.         Speech: Speech normal.         Behavior: Behavior normal. Behavior is cooperative.         Thought Content: Thought content normal.         Cognition and Memory: Cognition and memory normal.         Judgment: Judgment normal.       Neurologic Exam     Mental Status   Oriented to person, place, and time.   Attention: normal. Concentration: normal.   Speech: speech is normal   Level of consciousness: alert  Knowledge: good.     Cranial Nerves     CN III, IV, VI   Pupils are equal,  round, and reactive to light.    Motor Exam   Muscle bulk: normal  Overall muscle tone: normal    Strength   Strength 5/5 throughout.     Sensory Exam   Light touch normal.     Gait, Coordination, and Reflexes     Tremor   Resting tremor: absent  Intention tremor: absent  Action tremor: absent    Reflexes   Right Barrera: absent  Left Barrera: absent  Right ankle clonus: absent  Left pendular knee jerk: absent      Assessment/Plan   He is sitting at side of bed, in chair, with daughter present.  He states he is feeling much better than he was when he came in through the emergency room.  His daughter states that physician thought possibly weakness could be related to Lipitor, which they have currently stopped.  He has been walking with physical therapy and is anxious to continue with them only.  Moving all extremities well, very strong, strength 5/5 in all extremities.  Denies any numbness/tingling, or problems with incontinence of bowel or bladder.      Non-traumatic rhabdomyolysis    Gastroesophageal reflux disease    Hypertension    Osteoarthritis of lumbar spine    Obesity (BMI 30-39.9)    Chronic low back pain    Weakness    Edema, bilateral lower extremities    Patient is a 89 y.o. right handed male presented to the emergency room lower extremity weakness and spasms.    PLAN:   Went over MRI with patient and daughter, he is not interested in pursuing any type of neurosurgical intervention, or any type of epidural steroid injections at this time.  He is however, interested in physical therapy, which is recommended at time of discharge.  He is unable to drive, at this time, and his wife is unable to drive him as he is too large for her car, so home health PT would be beneficial.  We will sign off, I left a card with daughter, if they would like to follow-up as an outpatient.  Please call with any questions, or concerns    I discussed the patient's findings and my recommendations with patient, family, nursing  "staff and Dr. Bradley Jenkins, APRN  03/07/21  13:59 EST    \"Dictated utilizing Dragon dictation\".      "

## 2021-03-08 ENCOUNTER — TELEPHONE (OUTPATIENT)
Dept: FAMILY MEDICINE CLINIC | Facility: CLINIC | Age: 86
End: 2021-03-08

## 2021-03-08 LAB — CK SERPL-CCNC: 360 U/L (ref 20–200)

## 2021-03-08 PROCEDURE — G0378 HOSPITAL OBSERVATION PER HR: HCPCS

## 2021-03-08 PROCEDURE — 94799 UNLISTED PULMONARY SVC/PX: CPT

## 2021-03-08 PROCEDURE — 82550 ASSAY OF CK (CPK): CPT | Performed by: NURSE PRACTITIONER

## 2021-03-08 PROCEDURE — 94618 PULMONARY STRESS TESTING: CPT

## 2021-03-08 PROCEDURE — 96361 HYDRATE IV INFUSION ADD-ON: CPT

## 2021-03-08 PROCEDURE — 97116 GAIT TRAINING THERAPY: CPT

## 2021-03-08 PROCEDURE — 97530 THERAPEUTIC ACTIVITIES: CPT

## 2021-03-08 RX ORDER — HYDRALAZINE HYDROCHLORIDE 25 MG/1
25 TABLET, FILM COATED ORAL EVERY 6 HOURS PRN
Status: DISCONTINUED | OUTPATIENT
Start: 2021-03-08 | End: 2021-03-09 | Stop reason: HOSPADM

## 2021-03-08 RX ORDER — CHLORTHALIDONE 25 MG/1
25 TABLET ORAL DAILY
Status: DISCONTINUED | OUTPATIENT
Start: 2021-03-08 | End: 2021-03-09 | Stop reason: HOSPADM

## 2021-03-08 RX ADMIN — METAXALONE 800 MG: 800 TABLET ORAL at 09:25

## 2021-03-08 RX ADMIN — PANTOPRAZOLE SODIUM 40 MG: 40 TABLET, DELAYED RELEASE ORAL at 06:07

## 2021-03-08 RX ADMIN — SODIUM CHLORIDE, PRESERVATIVE FREE 10 ML: 5 INJECTION INTRAVENOUS at 20:05

## 2021-03-08 RX ADMIN — METAXALONE 800 MG: 800 TABLET ORAL at 18:28

## 2021-03-08 RX ADMIN — HYDRALAZINE HYDROCHLORIDE 10 MG: 10 TABLET, FILM COATED ORAL at 09:25

## 2021-03-08 RX ADMIN — FLUTICASONE PROPIONATE 1 SPRAY: 50 SPRAY, METERED NASAL at 20:05

## 2021-03-08 RX ADMIN — TERAZOSIN HYDROCHLORIDE 5 MG: 5 CAPSULE ORAL at 20:05

## 2021-03-08 RX ADMIN — SODIUM CHLORIDE 75 ML/HR: 9 INJECTION, SOLUTION INTRAVENOUS at 01:11

## 2021-03-08 RX ADMIN — METOPROLOL SUCCINATE 50 MG: 50 TABLET, EXTENDED RELEASE ORAL at 09:25

## 2021-03-08 RX ADMIN — BUDESONIDE AND FORMOTEROL FUMARATE DIHYDRATE 2 PUFF: 80; 4.5 AEROSOL RESPIRATORY (INHALATION) at 07:31

## 2021-03-08 RX ADMIN — METAXALONE 800 MG: 800 TABLET ORAL at 20:05

## 2021-03-08 RX ADMIN — AMLODIPINE BESYLATE 10 MG: 10 TABLET ORAL at 09:26

## 2021-03-08 RX ADMIN — NYSTATIN: 100000 POWDER TOPICAL at 20:05

## 2021-03-08 RX ADMIN — ASPIRIN 81 MG: 81 TABLET, COATED ORAL at 09:25

## 2021-03-08 RX ADMIN — CHLORTHALIDONE 25 MG: 25 TABLET ORAL at 20:07

## 2021-03-08 RX ADMIN — BUDESONIDE AND FORMOTEROL FUMARATE DIHYDRATE 2 PUFF: 80; 4.5 AEROSOL RESPIRATORY (INHALATION) at 19:42

## 2021-03-08 RX ADMIN — NYSTATIN: 100000 POWDER TOPICAL at 09:26

## 2021-03-08 NOTE — PROGRESS NOTES
Discharge Planning Assessment  [facility]     Patient Name: Harry Potts  MRN: [id number]  Today's Date: 3/8/2021    Admit Date: 3/5/2021    Discharge Needs Assessment     Row Name 03/08/21 1441       Living Environment    Lives With  spouse    Current Living Arrangements  home/apartment/condo    Potentially Unsafe Housing Conditions  other (see comments) no concerns    Primary Care Provided by  self    Provides Primary Care For  no one    Family Caregiver if Needed  child(jay), adult    Quality of Family Relationships  helpful;involved;supportive    Able to Return to Prior Arrangements  yes       Resource/Environmental Concerns    Resource/Environmental Concerns  none    Transportation Concerns  car, none       Transition Planning    Patient/Family Anticipates Transition to  home with family    Patient/Family Anticipated Services at Transition  home health care    Transportation Anticipated  family or friend will provide       Discharge Needs Assessment    Readmission Within the Last 30 Days  no previous admission in last 30 days    Current Outpatient/Agency/Support Group  homecare agency    Equipment Currently Used at Home  none    Concerns to be Addressed  discharge planning    Anticipated Changes Related to Illness  none    Equipment Needed After Discharge  walker, rolling;commode;oxygen    Outpatient/Agency/Support Group Needs  homecare agency    Discharge Facility/Level of Care Needs  home with home health        Discharge Plan     Row Name 03/08/21 1443       Plan    Plan  Home with home health    Patient/Family in Agreement with Plan  yes    Plan Comments  CCP met with patient and patient’s daughterEmilie at bedside. CCP role explained and discharge planning discussed. Face sheet verified and CUMMINS noted. Patient’s PCP is DR. Luis. Patient lives with his wife, with no interior/exterior steps. Patient and daughter states he will need platform walker, 02 and 3 in 1 commode at discharge. CCP discussed  PT recommendations of home health and reviewed Medicare ratings; patient and daughter would like referral to [facility]. If [facility] is unable to accept, they would like [facility] or [facility] . Patient and daughter confirm plan is to return home. CCP spoke with Lianna/Rocael for walker, 02 and 3 in 1 commode and [facility]. CCP will follow to see if [facility] is able to accept and assist as needed. Acacia SORTO        Continued Care and Services - Admitted Since 3/5/2021     Home Medical Care     Service Provider Request Status Selected Services Address Phone Fax Patient Preferred    [facility]  Pending - Request Sent N/A [id number] [address] [phone] --                Demographic Summary     Row Name 03/08/21 1410       General Information    Admission Type  observation    Arrived From  emergency department    Required Notices Provided  Observation Status Notice    Referral Source  admission list    Reason for Consult  discharge planning    Preferred Language  English     Used During This Interaction  no        Functional Status     Row Name 03/08/21 1436       Functional Status    Usual Activity Tolerance  good    Current Activity Tolerance  moderate       Functional Status, IADL    Medications  assistive equipment    Meal Preparation  assistive equipment    Housekeeping  assistive equipment    Laundry  assistive equipment    Shopping  assistive equipment       Mental Status    General Appearance WDL  WDL       Mental Status Summary    Recent Changes in Mental Status/Cognitive Functioning  no changes        Psychosocial    No documentation.       Abuse/Neglect    No documentation.       Legal    No documentation.       Substance Abuse    No documentation.       Patient Forms    No documentation.           MACARIO Benoit

## 2021-03-08 NOTE — TELEPHONE ENCOUNTER
LUIS YU AT [facility]. PT IS BEING DISCHARGED FROM HOSPITAL 3/4. PT NEEDING NURSING AND PHYSICAL THERAPY. I GAVE OK.    LUIS YU # [phone]

## 2021-03-08 NOTE — PLAN OF CARE
Goal Outcome Evaluation:  Plan of Care Reviewed With: patient, daughter  Progress: improving  Outcome Summary: Pt continuing to show improvement in standing stability and independence with ambulation of increasing distances. This visit, he completed 120 ft walk in [place] with rolling walker with BUE platform, requiring only CGA and vc for continued upright posture and reminders to take breaks as needed. He continues to display external rotation of BLE in stance phase, most pronounced with the RLE but has not showed evidence of this lending to increased tripping or balance disturbance. Extensive education has been provided to pt about safety in the home as he and his daughter are determined that he go straight home after hospital d/c and HH therapy services should continue to follow for increased endurance and safe maneuvering around the home. He will continue to benefit from skilled PT in the acute setting to further address remaining functional deficits in activity toleramce/endurance, standing posture, pain mangement while mobilizing, and gait deviations as listed. Anticipate d/c to home with HH therapy.  Patient was intermittently wearing a face mask during this therapy encounter. Therapist used appropriate personal protective equipment including eye protection, mask, and gloves.  Mask used was standard procedure mask. Appropriate PPE was worn during the entire therapy session. Hand hygiene was completed before and after therapy session. Patient is not in enhanced droplet precautions.

## 2021-03-08 NOTE — THERAPY TREATMENT NOTE
Patient Name: Harry Potts  : 1930    MRN: [id number]                              Today's Date: 3/8/2021       Admit Date: 3/5/2021    Visit Dx:     ICD-10-CM ICD-9-CM   1. Non-traumatic rhabdomyolysis  M62.82 728.88   2. Weakness of both lower extremities  R29.898 729.89     Patient Active Problem List   Diagnosis   • Dysfunction of eustachian tube   • Gastroesophageal reflux disease   • Hyperglycemia   • Hypercholesterolemia   • Hypertension   • Morbid obesity (CMS/HCC)   • Osteoarthritis of lumbar spine with myelopathy   • Osteoarthritis of lumbar spine   • Bronchitis   • Viral URI with cough   • PVC (premature ventricular contraction)   • Non-traumatic rhabdomyolysis   • Obesity (BMI 30-39.9)   • Chronic low back pain   • Weakness   • Edema, bilateral lower extremities     Past Medical History:   Diagnosis Date   • Cancer (CMS/HCC)     basal cell    • COPD (chronic obstructive pulmonary disease) (CMS/HCC)    • Elevated cholesterol    • GERD (gastroesophageal reflux disease)    • Hyperglycemia    • Hyperlipidemia    • Hypertension    • Obesity    • Sleep apnea    • Spondylolysis, lumbar region      Past Surgical History:   Procedure Laterality Date   • EYE SURGERY     • HERNIA REPAIR     • JOINT REPLACEMENT     • REPLACEMENT TOTAL KNEE BILATERAL       General Information     Row Name 21          Physical Therapy Time and Intention    Document Type  therapy note (daily note)  -AP     Mode of Treatment  physical therapy;individual therapy  -AP     Row Name 21          General Information    Patient Profile Reviewed  yes  -AP     Existing Precautions/Restrictions  fall  -AP     Row Name 21          Cognition    Orientation Status (Cognition)  oriented x 4  -AP     Row Name 21          Safety Issues, Functional Mobility    Impairments Affecting Function (Mobility)  balance;endurance/activity tolerance;shortness of breath;pain;strength  -AP       User Key  (r) =  Recorded By, (t) = Taken By, (c) = Cosigned By    Initials Name Provider Type    Estrella Ortiz PT Physical Therapist        Mobility     Row Name 03/08/21 0911          Bed Mobility    Comment (Bed Mobility)  deferred, pt UIC  -AP     Row Name 03/08/21 0911          Transfers    Comment (Transfers)  STS completed x 3 with SBA only and single vc for posture and review of sequencing, pt showing increased independence and safety with these tasks.  -AP     Row Name 03/08/21 0911          Bed-Chair Transfer    Bed-Chair [place] (Transfers)  contact guard;1 person assist  -AP     Assistive Device (Bed-Chair Transfers)  walker, front-wheeled;walker, platform  -AP     Row Name 03/08/21 0911          Sit-Stand Transfer    Sit-Stand [place] (Transfers)  standby assist;1 person assist  -AP     Assistive Device (Sit-Stand Transfers)  walker, front-wheeled;walker, platform  -AP     Row Name 03/08/21 0911          Gait/Stairs (Locomotion)    [place] Level (Gait)  contact guard;1 person assist  -AP     Assistive Device (Gait)  walker, front-wheeled;walker, platform  -AP     Distance in Feet (Gait)  Walked into [place] approx 120 ft  -AP     Deviations/Abnormal Patterns (Gait)  bilateral deviations;weight shifting decreased;gait speed decreased;base of support, wide  -AP     Bilateral Gait Deviations  forward flexed posture;heel strike decreased  -AP     Comment (Gait/Stairs)  Stride length improved this visit using roling platform walker and pt is able to maintain more upright posture by resting BUE on the armrests of platform walker, slightly improved gait speed and good improvement in stability while walking this visit  -AP       User Key  (r) = Recorded By, (t) = Taken By, (c) = Cosigned By    Initials Name Provider Type    Estrella Ortiz PT Physical Therapist        Obj/Interventions     Row Name 03/08/21 0914          Motor Skills    Therapeutic Exercise  other (see comments) seated BLE therex including LAQ  ADWOA noguera, SLR, standing weight shift and standing marches all x 10  -AP     Row Name 03/08/21 0914          Balance    Balance Assessment  sitting static balance;sitting dynamic balance;standing static balance;standing dynamic balance  -AP     Static Sitting Balance  WFL  -AP     Dynamic Sitting Balance  WFL  -AP     Static Standing Balance  WFL;supported;standing  -AP     Dynamic Standing Balance  mild impairment;supported;standing  -AP     Comment, Balance  recommend supervision when completing mobility around the home at time of d/c  -AP       User Key  (r) = Recorded By, (t) = Taken By, (c) = Cosigned By    Initials Name Provider Type    Estrella Ortiz PT Physical Therapist        Goals/Plan    No documentation.       Clinical Impression     Row Name 03/08/21 0917          Pain    Additional Documentation  Pain Scale: Numbers Pre/Post-Treatment (Group)  -AP     Row Name 03/08/21 0917          Pain Scale: Numbers Pre/Post-Treatment    Pretreatment Pain Rating  0/10 - no pain  -AP     Posttreatment Pain Rating  0/10 - no pain  -AP     Pain Intervention(s)  Ambulation/increased activity;Repositioned  -AP     Row Name 03/08/21 0917          Plan of Care Review    Plan of Care Reviewed With  patient;daughter  -AP     Progress  improving  -AP     Outcome Summary  Pt continuing to show improvement in standing stability and independence with ambulation of increasing distances. This visit, he completed 120 ft walk in [place] with rolling walker with BUE platform, requiring only CGA and vc for continued upright posture and reminders to take breaks as needed. He continues to display external rotation of BLE in stance phase, most pronounced with the RLE but has not showed evidence of this lending to increased tripping or balance disturbance. Extensive education has been provided to pt about safety in the home as he and his daughter are determined that he go straight home after hospital d/c and  therapy services should  continue to follow for increased endurance and safe maneuvering around the home. He will continue to benefit from skilled PT in the acute setting to further address remaining functional deficits in activity toleramce/endurance, standing posture, pain mangement while mobilizing, and gait deviations as listed. Anticipate d/c to home with HH therapy.  -AP     Row Name 03/08/21 0917          Vital Signs    O2 Delivery Pre Treatment  supplemental O2  -AP     Row Name 03/08/21 0917          Positioning and Restraints    Pre-Treatment Position  sitting in chair/recliner  -AP     Post Treatment Position  chair  -AP     In Chair  notified nsg;sitting;call light within reach;encouraged to call for assist;with family/caregiver  -AP       User Key  (r) = Recorded By, (t) = Taken By, (c) = Cosigned By    Initials Name Provider Type    Estrella Ortiz PT Physical Therapist        Outcome Measures     Row Name 03/08/21 1016          How much help from another person do you currently need...    Turning from your back to your side while in flat bed without using bedrails?  3  -AP     Moving from lying on back to sitting on the side of a flat bed without bedrails?  3  -AP     Moving to and from a bed to a chair (including a wheelchair)?  3  -AP     Standing up from a chair using your arms (e.g., wheelchair, bedside chair)?  3  -AP     Climbing 3-5 steps with a railing?  1  -AP     To walk in hospital room?  3  -AP     AM-PAC 6 Clicks Score (PT)  16  -AP     Row Name 03/08/21 1016          Functional Assessment    Outcome Measure Options  AM-PAC 6 Clicks Basic Mobility (PT)  -AP       User Key  (r) = Recorded By, (t) = Taken By, (c) = Cosigned By    Initials Name Provider Type    Estrella Ortiz PT Physical Therapist        Physical Therapy Education                 Title: PT OT SLP Therapies (Done)     Topic: Physical Therapy (Done)     Point: Mobility training (Done)     Learning Progress Summary           Patient Acceptance,  E,TB, VU by AP at 3/8/2021 1016    Acceptance, E,TB, VU by AP at 3/7/2021 1548    Acceptance, E,TB, VU by AP at 3/6/2021 1522   Family Acceptance, E,TB, VU by AP at 3/8/2021 1016                   Point: Home exercise program (Done)     Learning Progress Summary           Patient Acceptance, E,TB, VU by AP at 3/8/2021 1016    Acceptance, E,TB, VU by AP at 3/7/2021 1548    Acceptance, E,TB, VU by AP at 3/6/2021 1522   Family Acceptance, E,TB, VU by AP at 3/8/2021 1016                   Point: Body mechanics (Done)     Learning Progress Summary           Patient Acceptance, E,TB, VU by AP at 3/8/2021 1016    Acceptance, E,TB, VU by AP at 3/7/2021 1548    Acceptance, E,TB, VU by AP at 3/6/2021 1522   Family Acceptance, E,TB, VU by AP at 3/8/2021 1016                   Point: Precautions (Done)     Learning Progress Summary           Patient Acceptance, E,TB, VU by AP at 3/8/2021 1016    Acceptance, E,TB, VU by AP at 3/7/2021 1548    Acceptance, E,TB, VU by AP at 3/6/2021 1522   Family Acceptance, E,TB, VU by AP at 3/8/2021 1016                               User Key     Initials Effective Dates Name Provider Type Discipline     09/24/20 -  Estrella Lehman, PT Physical Therapist PT              PT Recommendation and Plan  Planned Therapy Interventions (PT): balance training, bed mobility training, gait training, home exercise program, patient/family education, orthotic fitting/training, neuromuscular re-education, motor coordination training, postural re-education, ROM (range of motion), strengthening, stretching, transfer training  Plan of Care Reviewed With: patient, daughter  Progress: improving  Outcome Summary: Pt continuing to show improvement in standing stability and independence with ambulation of increasing distances. This visit, he completed 120 ft walk in [place] with rolling walker with BUE platform, requiring only CGA and vc for continued upright posture and reminders to take breaks as needed. He continues to  display external rotation of BLE in stance phase, most pronounced with the RLE but has not showed evidence of this lending to increased tripping or balance disturbance. Extensive education has been provided to pt about safety in the home as he and his daughter are determined that he go straight home after hospital d/c and HH therapy services should continue to follow for increased endurance and safe maneuvering around the home. He will continue to benefit from skilled PT in the acute setting to further address remaining functional deficits in activity toleramce/endurance, standing posture, pain mangement while mobilizing, and gait deviations as listed. Anticipate d/c to home with HH therapy.     Time Calculation:   PT Charges     Row Name 03/08/21 1016             Time Calculation    Start Time  0830  -AP      Stop Time  0858  -AP      Time Calculation (min)  28 min  -AP      PT Received On  03/08/21  -AP      PT - Next Appointment  03/09/21  -AP        User Key  (r) = Recorded By, (t) = Taken By, (c) = Cosigned By    Initials Name Provider Type    AP Estrella Lehman, PT Physical Therapist        Therapy Charges for Today     Code Description Service Date Service Provider Modifiers Qty    47325939986 HC PT THERAPEUTIC ACT EA 15 MIN 3/7/2021 LehmanEstrella stuart, PT GP 1    77035555468 HC PT THER PROC EA 15 MIN 3/7/2021 Estrella Lehman, PT GP 1    57928167966 HC GAIT TRAINING EA 15 MIN 3/7/2021 Ewelina Lehmansa, PT GP 1    56250630262 HC PT THERAPEUTIC ACT EA 15 MIN 3/8/2021 Estrella Lehman, PT GP 1    22829237781 HC GAIT TRAINING EA 15 MIN 3/8/2021 Estrella Lehman, PT GP 1          PT G-Codes  Outcome Measure Options: AM-PAC 6 Clicks Basic Mobility (PT)  AM-PAC 6 Clicks Score (PT): 16    Estrella Lehman PT  3/8/2021

## 2021-03-08 NOTE — PLAN OF CARE
Goal Outcome Evaluation:      VSS. Blood pressure elevated. Hydralazine stopped and chlorthalidone started. Patient had walking oximetry done today and will qualify for home oxygen. Walker and bedside toilet ordered for home. CK still elevated awaiting for level to drop below 300 before discharge. Home in the next 1-2 days.

## 2021-03-08 NOTE — DISCHARGE PLACEMENT REQUEST
"Saji Potts (89 y.o. Male)     Date of Birth Social Security Number Address Home Phone MRN    11/14/1930  [id number] [id number]  [id number]    Hindu Marital Status          Hinduism        Admission Date Admission Type Admitting Provider Attending Provider Department, Room/Bed    3/5/21 Emergency StinglLexy MD Baumann, Patrick D, MD [facility] [address] [place], P379/1    Discharge Date Discharge Disposition Discharge Destination                       Attending Provider: Kennedy Morales MD    Allergies: Morphine And Related    Isolation: None   Infection: None   Code Status: CPR    Ht: 182.9 cm (72\")   Wt: 133 kg (292 lb 5.3 oz)    Admission Cmt: None   Principal Problem: Non-traumatic rhabdomyolysis [M62.82]                 Active Insurance as of 3/5/2021     Primary Coverage     Payor Plan Insurance Group Employer/Plan Group    [facility] MEDICARE REPLACEMENT [facility] MEDICARE REPLACEMENT [id number]     Payor Plan Address Payor Plan Phone Number Payor Plan Fax Number Effective Dates    [address] [address]   1/1/2016 - None Entered    [facility] [id number]       Subscriber Name Subscriber Birth Date Member ID       SAJI POTTS 11/14/1930 [id number]                 Emergency Contacts      (Rel.) Home Phone Work Phone Mobile Phone    OhVenita camarena (Other) [phone] -- --    TRAMAINE SAUCEDO (Daughter) [phone] -- --            "

## 2021-03-08 NOTE — PROGRESS NOTES
Exercise Oximetry    Patient Name:Harry Potts   MRN: [id number]   Date: 03/08/21             ROOM AIR BASELINE   SpO2% 60   Heart Rate 66   Blood Pressure      EXERCISE ON ROOM AIR SpO2% EXERCISE ON O2 @ 3 LPM SpO2%   1 MINUTE 87 1 MINUTE    2 MINUTES  2 MINUTES 89   3 MINUTES  3 MINUTES 91   4 MINUTES  4 MINUTES 90   5 MINUTES  5 MINUTES 92   6 MINUTES  6 MINUTES 91              Distance Walked  25' Distance Walked  75   Dyspnea (Caesar Scale)   Dyspnea (Caesar Scale) 5   Fatigue (Caesar Scale)   Fatigue (Caesar Scale) 5   SpO2% Post Exercise   SpO2% Post Exercise  89   HR Post Exercise   HR Post Exercise 88   Time to Recovery   Time to Recovery  2 min     Comments: Patient walked with aide of walker, 25' on room air, Sp02 dropping to 87, 2L 02 placed on patient via nasal canula, Sp02 87 after 2 minutes.  Increased patient to 3L 02, Sp02 remained in the low 90's for remained of exercise test.  HR remained within acceptable ranges as well.    Patient tolerated well

## 2021-03-08 NOTE — PROGRESS NOTES
Name: Harry Potts ADMIT: 3/5/2021   : 1930  PCP: Harry Luis MD    MRN: [id number] LOS: 0 days   AGE/SEX: 89 y.o. male  ROOM: [facility]   Subjective   Chief Complaint   Patient presents with   • Spasms      Feeling improved. No CP SOA NVD or muscle pain. He is on 2L during exam and reported having walking oximetry test with desat on room air.    Objective   Vital Signs  Temp:  [97.7 °F (36.5 °C)-99.6 °F (37.6 °C)] 98.5 °F (36.9 °C)  Heart Rate:  [51-87] 68  Resp:  [16-20] 20  BP: (133-167)/(66-79) 154/78  SpO2:  [90 %-96 %] 95 %  on  Flow (L/min):  [2] 2;   Device (Oxygen Therapy): nasal cannula  Body mass index is 39.65 kg/m².    Physical Exam  Vitals and nursing note reviewed.   Constitutional:       General: He is not in acute distress.     Appearance: He is obese.   HENT:      Head: Normocephalic and atraumatic.   Eyes:      Extraocular Movements: Extraocular movements intact.      Conjunctiva/sclera: Conjunctivae normal.   Cardiovascular:      Rate and Rhythm: Normal rate and regular rhythm.      Heart sounds: No murmur.   Pulmonary:      Effort: Pulmonary effort is normal.      Breath sounds: Decreased breath sounds present. No wheezing.   Abdominal:      Palpations: Abdomen is soft.      Tenderness: There is no abdominal tenderness. There is no guarding or rebound.   Musculoskeletal:         General: No tenderness.      Cervical back: Neck supple. No tenderness.      Right lower leg: Edema present.      Left lower leg: Edema present.   Skin:     General: Skin is warm and dry.      Comments: Venous stasis changes   Neurological:      Mental Status: He is alert.      Cranial Nerves: No cranial nerve deficit.   Psychiatric:         Mood and Affect: Mood normal.         Behavior: Behavior normal.         Results Review:       I reviewed the patient's new clinical results.     I reviewed imaging, agree with interpretation.      Results from last 7 days   Lab Units 21  0542 21  0861  03/05/21  1430   WBC 10*3/mm3 5.35 5.35 8.21   HEMOGLOBIN g/dL 14.5 15.2 15.6   PLATELETS 10*3/mm3 147 141 147     Results from last 7 days   Lab Units 03/07/21  0542 03/06/21  0827 03/05/21  1430   SODIUM mmol/L 139 138 139   POTASSIUM mmol/L 3.7 3.7 4.2   CHLORIDE mmol/L 104 102 101   CO2 mmol/L 27.4 26.3 29.0   BUN mg/dL 7* 6* 12   CREATININE mg/dL 0.51* 0.50* 0.55*   GLUCOSE mg/dL 101* 151* 130*   Estimated Creatinine Clearance: 86.6 mL/min (A) (by C-G formula based on SCr of 0.51 mg/dL (L)).  Results from last 7 days   Lab Units 03/07/21 0542 03/06/21  0827 03/05/21  1430   CALCIUM mg/dL 8.1* 8.5 9.0   ALBUMIN g/dL  --   --  4.30   MAGNESIUM mg/dL  --   --  1.9          amLODIPine, 10 mg, Oral, Daily  aspirin, 81 mg, Oral, Daily  budesonide-formoterol, 2 puff, Inhalation, BID - RT  fluticasone, 1 spray, Each Nare, Nightly  hydrALAZINE, 10 mg, Oral, Q6H  metaxalone, 800 mg, Oral, TID  metoprolol succinate XL, 50 mg, Oral, Daily  multivitamin with minerals, 1 tablet, Oral, Daily  nystatin, , Topical, Q12H  pantoprazole, 40 mg, Oral, QAM  terazosin, 5 mg, Oral, Nightly       Diet Regular; Cardiac    Assessment/Plan      Active Hospital Problems    Diagnosis  POA   • **Non-traumatic rhabdomyolysis [M62.82]  Yes   • Obesity (BMI 30-39.9) [E66.9]  Yes   • Chronic low back pain [M54.5, G89.29]  Yes   • Weakness [R53.1]  Yes   • Edema, bilateral lower extremities [R60.9]  Yes   • Gastroesophageal reflux disease [K21.9]  Yes   • Hypertension [I10]  Yes   • Osteoarthritis of lumbar spine [M47.816]  Yes      Resolved Hospital Problems   No resolved problems to display.       · Rhabdomyolysis: Statin stopped. Improved CPK with IVF. Will stop fluids and repeat labs to monitor. Renal function was ok yesterday.  · Chronic Low Back Pain/Lumbar Disc Disease: Neurology and [facility] have evaluated. No acute surgical or epidural intervention is planned per patient preference. Continue PT. Follow up with Neurology in 2-3 months and  with [facility] if needed.  · HTN: I do not think he will be able to follow QID dosing of hydralazine. He is on metoprolol and amlodipine. Will start thiazide and change hydralazine to prn.  · Edema: Monitor.  · Hypoxia: O2 at discharge planned.  · Disposition: HH/possibly tomorrow    Kennedy Morales MD  [facility]  03/08/21  14:30 EST    Dictated portions using Dragon dictation software.    During the entire encounter, I was wearing recommended PPE including face mask and eye protection. Hand sanitization was performed prior to entering room and upon exit.

## 2021-03-09 ENCOUNTER — READMISSION MANAGEMENT (OUTPATIENT)
Dept: CALL CENTER | Facility: HOSPITAL | Age: 86
End: 2021-03-09

## 2021-03-09 VITALS
SYSTOLIC BLOOD PRESSURE: 174 MMHG | BODY MASS INDEX: 39.6 KG/M2 | HEIGHT: 72 IN | DIASTOLIC BLOOD PRESSURE: 84 MMHG | WEIGHT: 292.33 LBS | HEART RATE: 68 BPM | TEMPERATURE: 98.1 F | RESPIRATION RATE: 18 BRPM | OXYGEN SATURATION: 94 %

## 2021-03-09 LAB
ALBUMIN SERPL-MCNC: 3.7 G/DL (ref 3.5–5.2)
ALBUMIN/GLOB SERPL: 1.5 G/DL
ALP SERPL-CCNC: 76 U/L (ref 39–117)
ALT SERPL W P-5'-P-CCNC: 43 U/L (ref 1–41)
ANION GAP SERPL CALCULATED.3IONS-SCNC: 7.6 MMOL/L (ref 5–15)
AST SERPL-CCNC: 49 U/L (ref 1–40)
BILIRUB SERPL-MCNC: 0.6 MG/DL (ref 0–1.2)
BUN SERPL-MCNC: 8 MG/DL (ref 8–23)
BUN/CREAT SERPL: 17 (ref 7–25)
CALCIUM SPEC-SCNC: 8.6 MG/DL (ref 8.2–9.6)
CHLORIDE SERPL-SCNC: 99 MMOL/L (ref 98–107)
CK SERPL-CCNC: 235 U/L (ref 20–200)
CO2 SERPL-SCNC: 32.4 MMOL/L (ref 22–29)
CREAT SERPL-MCNC: 0.47 MG/DL (ref 0.76–1.27)
DEPRECATED RDW RBC AUTO: 40.3 FL (ref 37–54)
ERYTHROCYTE [DISTWIDTH] IN BLOOD BY AUTOMATED COUNT: 12.4 % (ref 12.3–15.4)
GFR SERPL CREATININE-BSD FRML MDRD: >150 ML/MIN/1.73
GLOBULIN UR ELPH-MCNC: 2.5 GM/DL
GLUCOSE SERPL-MCNC: 104 MG/DL (ref 65–99)
HCT VFR BLD AUTO: 43.6 % (ref 37.5–51)
HGB BLD-MCNC: 14.5 G/DL (ref 13–17.7)
MCH RBC QN AUTO: 29.5 PG (ref 26.6–33)
MCHC RBC AUTO-ENTMCNC: 33.3 G/DL (ref 31.5–35.7)
MCV RBC AUTO: 88.8 FL (ref 79–97)
PLATELET # BLD AUTO: 141 10*3/MM3 (ref 140–450)
PMV BLD AUTO: 10.3 FL (ref 6–12)
POTASSIUM SERPL-SCNC: 4 MMOL/L (ref 3.5–5.2)
PROT SERPL-MCNC: 6.2 G/DL (ref 6–8.5)
RBC # BLD AUTO: 4.91 10*6/MM3 (ref 4.14–5.8)
SODIUM SERPL-SCNC: 139 MMOL/L (ref 136–145)
WBC # BLD AUTO: 5.38 10*3/MM3 (ref 3.4–10.8)

## 2021-03-09 PROCEDURE — 94799 UNLISTED PULMONARY SVC/PX: CPT

## 2021-03-09 PROCEDURE — 85027 COMPLETE CBC AUTOMATED: CPT | Performed by: INTERNAL MEDICINE

## 2021-03-09 PROCEDURE — 82550 ASSAY OF CK (CPK): CPT | Performed by: INTERNAL MEDICINE

## 2021-03-09 PROCEDURE — G0378 HOSPITAL OBSERVATION PER HR: HCPCS

## 2021-03-09 PROCEDURE — 80053 COMPREHEN METABOLIC PANEL: CPT | Performed by: INTERNAL MEDICINE

## 2021-03-09 RX ORDER — CHLORTHALIDONE 25 MG/1
25 TABLET ORAL DAILY
Qty: 30 TABLET | Refills: 0 | Status: SHIPPED | OUTPATIENT
Start: 2021-03-10 | End: 2021-03-31 | Stop reason: SDUPTHER

## 2021-03-09 RX ADMIN — METOPROLOL SUCCINATE 50 MG: 50 TABLET, EXTENDED RELEASE ORAL at 09:01

## 2021-03-09 RX ADMIN — METAXALONE 800 MG: 800 TABLET ORAL at 09:01

## 2021-03-09 RX ADMIN — Medication 1 TABLET: at 09:02

## 2021-03-09 RX ADMIN — AMLODIPINE BESYLATE 10 MG: 10 TABLET ORAL at 09:01

## 2021-03-09 RX ADMIN — CHLORTHALIDONE 25 MG: 25 TABLET ORAL at 09:01

## 2021-03-09 RX ADMIN — BUDESONIDE AND FORMOTEROL FUMARATE DIHYDRATE 2 PUFF: 80; 4.5 AEROSOL RESPIRATORY (INHALATION) at 09:12

## 2021-03-09 RX ADMIN — ASPIRIN 81 MG: 81 TABLET, COATED ORAL at 09:02

## 2021-03-09 RX ADMIN — PANTOPRAZOLE SODIUM 40 MG: 40 TABLET, DELAYED RELEASE ORAL at 06:14

## 2021-03-09 RX ADMIN — NYSTATIN: 100000 POWDER TOPICAL at 09:02

## 2021-03-09 NOTE — DISCHARGE SUMMARY
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       Presenting Problem/History of Present Illness:  Non-traumatic rhabdomyolysis [M62.82]  Weakness of both lower extremities [R29.898]     89 year old gentleman who presented to the emergency room with lower extremity weakness and spasms; he was in the bathroom and his legs suddenly felt weak so he lowered himself to the ground; he could not get up by himself and is not able to walk; he has a history of low back problems; he usually walks with a walker; no change in bowel or bladder habits    Hospital Course:  The patient is a 89 y.o. male who presented with nontraumatic rhabdomyolysis and lumbar disc disease with weakness.  He was admitted statin was stopped and he was given fluids.  His CPK improved and did not have any renal dysfunction.  He is going to be discharged off of the statin.  Neurology and neurosurgery evaluated him.  He was offered epidural intervention but he did not wish to pursue any procedure at this time.  He worked with physical therapy.  He is going to follow-up with neurology in 2 to 3 months and can see neurosurgery as needed.  He will need to continue physical therapy at home and home health has been ordered for this.  Home health also ordered for hypoxia and he is going to need oxygen at discharge.  He did desaturate on walking oximetry study yesterday.  He had uncontrolled hypertension and required hydralazine.   Have started chlorthalidone with some improvement.  He still above goal but would give him several more days to have the chlorthalidone equilibrate.  He will need to follow-up with his primary care physician for additional titration.    Exam Today:  Constitutional:       General: He is not in acute distress.     Appearance: He is obese.   HENT:      Head: Normocephalic and atraumatic.   Eyes:      Extraocular Movements: Extraocular movements intact.      Conjunctiva/sclera: Conjunctivae normal.   Cardiovascular:      Rate and Rhythm: Normal rate and regular rhythm.      Heart sounds: No murmur.   Pulmonary:      Effort: Pulmonary effort is normal.      Breath sounds: Decreased breath sounds present. No wheezing.   Abdominal:      Palpations: Abdomen is soft.      Tenderness: There is no abdominal tenderness. There is no guarding or rebound.   Musculoskeletal:         General: No tenderness.      Cervical back: Neck supple. No tenderness.      Right lower leg: Edema present.      Left lower leg: Edema present.   Skin:     General: Skin is warm and dry.      Comments: Venous stasis changes   Neurological:      Mental Status: He is alert.      Cranial Nerves: No cranial nerve deficit.   Psychiatric:         Mood and Affect: Mood normal.         Behavior: Behavior normal.     Results:  CXR  No acute process.    CT head  No evidence of hemorrhage or of acute infarction. Atrophy,  small vessel ischemic disease and vascular calcification is noted as  described above. Further evaluation could be performed with a MRI  examination the brain as indicated.    MRI Lumbar Spine  1.  L3-4 disc protrusion with mild to moderate central canal narrowing.    Disc protruding into the left neural foramina resulting in moderate  left   neural foraminal narrowing.  Large left paravertebral osteophyte may be   touching the exiting nerve root.  2.  L4-5 broad-based disc bulge with mild central canal narrowing.    Ligamentum flavum thickening  and facet arthropathy.  Large right   paravertebral osteophyte may be touching the right exiting nerve root.    Severe right neural foraminal narrowing and moderate left neural   foraminal narrowing.  3.  L5-S1 moderate bilateral neural foraminal narrowing.  Facet   arthropathy.  Moderate central canal narrowing.  4.  Grade 1 anterolisthesis of L5 on S1.  Marked degenerative changes L2-  S1.    Procedures Performed:         Consults:   Consults     Date and Time Order Name Status Description    3/7/2021 10:30 AM Inpatient Neurosurgery Consult Completed     3/5/2021 10:49 PM Inpatient Neurology Consult General Completed     3/5/2021  3:55 PM [facility] (on-call MD unless specified) Details Completed            Discharge Disposition:  Home-Health Care [facility]    Discharge Medications:     Discharge Medications      New Medications      Instructions Start Date   chlorthalidone 25 MG tablet  Commonly known as: HYGROTON   25 mg, Oral, Daily   Start Date: March 10, 2021        Changes to Medications      Instructions Start Date   celecoxib 200 MG capsule  Commonly known as: CeleBREX  What changed:   · how to take this  · when to take this   TAKE 1 CAPSULE DAILY AS NEEDED      doxazosin 4 MG tablet  Commonly known as: CARDURA  What changed:   · how much to take  · how to take this  · when to take this  · additional instructions   TAKE 1 TABLET DAILY AS DIRECTED      metoprolol succinate XL 50 MG 24 hr tablet  Commonly known as: TOPROL-XL  What changed:   · how much to take  · additional instructions   25 mg, Oral, Daily         Continue These Medications      Instructions Start Date   amLODIPine 10 MG tablet  Commonly known as: NORVASC   TAKE 1 TABLET DAILY      aspirin 81 MG EC tablet   81 mg, Oral, Daily      azelastine 0.1 % nasal spray  Commonly known as: ASTELIN   1 spray, Nasal, Daily      BREO ELLIPTA IN   Inhalation, Daily      esomeprazole 40 MG capsule  Commonly known as: nexIUM   TAKE 1 CAPSULE DAILY      ezetimibe 10 MG  tablet  Commonly known as: ZETIA   TAKE 1 TABLET DAILY      fish oil 1000 MG capsule capsule   Oral, Daily With Breakfast      fluticasone 50 MCG/ACT nasal spray  Commonly known as: FLONASE   1 spray, Nightly      loratadine 10 MG tablet  Commonly known as: CLARITIN   Oral, Daily      metaxalone 800 MG tablet  Commonly known as: SKELAXIN   TAKE 1 TABLET THREE TIMES A DAY      multivitamin with minerals tablet tablet   Oral, Daily      Ventolin  (90 Base) MCG/ACT inhaler  Generic drug: albuterol sulfate HFA   2 puffs, Every 6 Hours PRN, Pt wont take d/t cost         Stop These Medications    atorvastatin 20 MG tablet  Commonly known as: LIPITOR            Discharge Diet:   Diet Instructions     Advance Diet As Tolerated            Activity at Discharge:   Activity Instructions     Activity as Tolerated            Follow-up Appointments:  Additional Instructions for the Follow-ups that You Need to Schedule     Ambulatory Referral to Home Health   As directed      Face to Face Visit Date: 3/9/2021    Follow-up provider for Plan of Care?: I treated the patient in an acute care facility and will not continue treatment after discharge.    Follow-up provider: SAJI LUIS [[id number]]    Reason/Clinical Findings: lumbar pain, weakness, hypoxia    Describe mobility limitations that make leaving home difficult: see above    Nursing/Therapeutic Services Requested: Skilled Nursing Physical Therapy Occupational Therapy    Skilled nursing orders: O2 instruction    PT orders: Therapeutic exercise Strengthening    Occupational orders: Strengthening    Frequency: 1 Week 1           Follow-up Information     Saji Luis MD Follow up.    Specialty: Internal Medicine  Contact information:  [address] [facility] [address] [phone]             Hiwot Mittal APRN Follow up.    Specialties: Nurse Practitioner, Neurology  Why: 2-3 months  Contact information:  [address]  [address]  [phone]                   Test Results Pending at Discharge:       Kennedy Morales MD  03/09/21  11:25 EST    Time Spent on Discharge Activities: >30 minutes    Dictated portions using Dragon dictation software.  During the entire encounter, I was wearing recommended PPE including face mask and eye protection. Hand sanitization was performed prior to entering room and upon exit.

## 2021-03-09 NOTE — PROGRESS NOTES
Case Management Discharge Note      Final Note: Discharge home with [facility] following. Lianna with [employer] delievered portable oxygen to patient room. 3:1 commode, concentrator and platform walker attachments to be delivered to patient's home         Selected Continued Care - Discharged on 3/9/2021 Admission date: 3/5/2021 - Discharge disposition: Home-Health Care Svc    Destination    No services have been selected for the patient.              Durable Medical Equipment    No services have been selected for the patient.              Dialysis/Infusion    No services have been selected for the patient.              Home Medical Care    No services have been selected for the patient.              Therapy    No services have been selected for the patient.              Community Resources    No services have been selected for the patient.                       Final Discharge Disposition Code: 06 - home with home health care

## 2021-03-10 ENCOUNTER — TRANSITIONAL CARE MANAGEMENT TELEPHONE ENCOUNTER (OUTPATIENT)
Dept: CALL CENTER | Facility: HOSPITAL | Age: 86
End: 2021-03-10

## 2021-03-10 NOTE — OUTREACH NOTE
"Call Center TCM Note      Responses   [facility] patient discharged from?  [place]   Does the patient have one of the following disease processes/diagnoses(primary or secondary)?  Other   TCM attempt successful?  Yes   Discharge diagnosis  Non-traumatic rhabdomyolysis    Is patient permission given to speak with other caregiver?  Yes   List who call center can speak with  Spoke with pt and wife   Meds reviewed with patient/caregiver?  Yes   Is the patient having any side effects they believe may be caused by any medication additions or changes?  No   Does the patient have all medications ordered at discharge?  Yes   Is the patient taking all medications as directed (includes completed medication regime)?  Yes   Does the patient have a primary care provider?   Yes   Does the patient have an appointment with their PCP within 7 days of discharge?  No   Comments regarding PCP  Harry Luis MD  Pt wishes to sched TCM [facility] and they will call PCP ofc tomorrow    Has the patient kept scheduled appointments due by today?  N/A   What is the Home health agency?   [facility]   Has home health visited the patient within 72 hours of discharge?  Yes   What DME was ordered?  [employer], 3:1 commode, concentrator, platform walker attachments   Has all DME been delivered?  No   DME comments  Pt got Home O2 and walker, but no arm rest attachments or \"skies\" for bottom of walker. Jennifer could not come back with these today, pt dtr going by to p/u from [place] this evening.   Psychosocial issues?  No   Did the patient receive a copy of their discharge instructions?  Yes   Nursing interventions  Reviewed instructions with patient   What is the patient's perception of their health status since discharge?  Improving   Is the patient/caregiver able to teach back signs and symptoms related to disease process for when to call PCP?  Yes   Is the patient/caregiver able to teach back signs and symptoms related to disease process for when to call " 911?  Yes   Is the patient/caregiver able to teach back the hierarchy of who to call/visit for symptoms/problems? PCP, Specialist, Home health nurse, Urgent Care, ED, 911  Yes   If the patient is a current smoker, are they able to teach back resources for cessation?  Not a smoker   TCM call completed?  Yes   Wrap up additional comments  Pt says he is feeling excellent! No questions or concerns (except re: [employer] delivery and this is being resolved) New med in place. HH coming tomorrow.  Pt wishes to sched TCM [facility] and they will call PCP [facility] tomorrow           Lianna Knapp MA    3/10/2021, 16:36 EST

## 2021-03-10 NOTE — OUTREACH NOTE
Prep Survey      Responses   [facility] patient discharged from?  [place]   Is LACE score < 7 ?  Yes   Emergency Room discharge w/ pulse ox?  No   Eligibility  [facility]   Date of Admission  03/05/21   Date of Discharge  03/09/21   Discharge Disposition  Home-Health Care [facility]   Discharge diagnosis  Non-traumatic rhabdomyolysis    Does the patient have one of the following disease processes/diagnoses(primary or secondary)?  Other   Does the patient have Home health ordered?  Yes   What is the Home health agency?   [facility]   Is there a DME ordered?  Yes   What DME was ordered?  [employer], 3:1 commode, concentrator, platform walker attachments   Prep survey completed?  Yes          Shivani Bravo RN

## 2021-03-11 ENCOUNTER — TELEPHONE (OUTPATIENT)
Dept: FAMILY MEDICINE CLINIC | Facility: CLINIC | Age: 86
End: 2021-03-11

## 2021-03-15 ENCOUNTER — BULK ORDERING (OUTPATIENT)
Dept: CASE MANAGEMENT | Facility: OTHER | Age: 86
End: 2021-03-15

## 2021-03-15 DIAGNOSIS — Z23 IMMUNIZATION DUE: ICD-10-CM

## 2021-03-18 ENCOUNTER — TELEPHONE (OUTPATIENT)
Dept: FAMILY MEDICINE CLINIC | Facility: CLINIC | Age: 86
End: 2021-03-18

## 2021-03-18 NOTE — TELEPHONE ENCOUNTER
Caller: Harry Potts    Relationship: Self    Best call back number: [phone]    What orders are you requesting (i.e. lab or imaging): PRESCRIPTION FOR C-PAP SUPPLIES    In what timeframe would the patient need to come in: ASAP    Where will you receive your lab/imaging services: [facility]    Additional notes: THE PATIENT STATES THAT HE WOULD LIKE A PRESCRIPTION FOR C-PAP SUPPLIES AT [facility]  THE PATIENT PROVIDED THE   PHONE NUMBER: [phone]  FAX NUMBER: [phone]

## 2021-03-31 ENCOUNTER — OFFICE VISIT (OUTPATIENT)
Dept: FAMILY MEDICINE CLINIC | Facility: CLINIC | Age: 86
End: 2021-03-31

## 2021-03-31 VITALS
TEMPERATURE: 98 F | WEIGHT: 284 LBS | OXYGEN SATURATION: 94 % | BODY MASS INDEX: 38.47 KG/M2 | HEART RATE: 77 BPM | HEIGHT: 72 IN | DIASTOLIC BLOOD PRESSURE: 78 MMHG | SYSTOLIC BLOOD PRESSURE: 144 MMHG | RESPIRATION RATE: 16 BRPM

## 2021-03-31 DIAGNOSIS — M62.82 NON-TRAUMATIC RHABDOMYOLYSIS: Primary | ICD-10-CM

## 2021-03-31 DIAGNOSIS — I10 ESSENTIAL HYPERTENSION: ICD-10-CM

## 2021-03-31 PROCEDURE — 99214 OFFICE O/P EST MOD 30 MIN: CPT | Performed by: INTERNAL MEDICINE

## 2021-03-31 RX ORDER — ATORVASTATIN CALCIUM 20 MG/1
TABLET, FILM COATED ORAL
COMMUNITY
Start: 2021-03-30 | End: 2022-08-19 | Stop reason: ALTCHOICE

## 2021-03-31 RX ORDER — ALBUTEROL SULFATE 90 UG/1
2 AEROSOL, METERED RESPIRATORY (INHALATION) EVERY 6 HOURS PRN
Qty: 18 G | Refills: 3 | Status: SHIPPED | OUTPATIENT
Start: 2021-03-31 | End: 2022-06-06 | Stop reason: SDUPTHER

## 2021-03-31 RX ORDER — CHLORTHALIDONE 25 MG/1
25 TABLET ORAL DAILY
Qty: 90 TABLET | Refills: 3 | Status: SHIPPED | OUTPATIENT
Start: 2021-03-31 | End: 2022-02-21

## 2021-04-01 LAB
ALBUMIN SERPL-MCNC: 4.3 G/DL (ref 3.5–5.2)
ALBUMIN/GLOB SERPL: 1.9 G/DL
ALP SERPL-CCNC: 83 U/L (ref 39–117)
ALT SERPL-CCNC: 28 U/L (ref 1–41)
APPEARANCE UR: CLEAR
AST SERPL-CCNC: 30 U/L (ref 1–40)
BACTERIA #/AREA URNS HPF: NORMAL /HPF
BASOPHILS # BLD AUTO: 0.03 10*3/MM3 (ref 0–0.2)
BASOPHILS NFR BLD AUTO: 0.5 % (ref 0–1.5)
BILIRUB SERPL-MCNC: 0.5 MG/DL (ref 0–1.2)
BILIRUB UR QL STRIP: NEGATIVE
BUN SERPL-MCNC: 14 MG/DL (ref 8–23)
BUN/CREAT SERPL: 20.6 (ref 7–25)
CALCIUM SERPL-MCNC: 9.3 MG/DL (ref 8.2–9.6)
CASTS URNS MICRO: NORMAL
CHLORIDE SERPL-SCNC: 97 MMOL/L (ref 98–107)
CHOLEST SERPL-MCNC: 200 MG/DL (ref 0–200)
CO2 SERPL-SCNC: 27.6 MMOL/L (ref 22–29)
COLOR UR: YELLOW
CREAT SERPL-MCNC: 0.68 MG/DL (ref 0.76–1.27)
EOSINOPHIL # BLD AUTO: 0.1 10*3/MM3 (ref 0–0.4)
EOSINOPHIL NFR BLD AUTO: 1.8 % (ref 0.3–6.2)
EPI CELLS #/AREA URNS HPF: NORMAL /HPF
ERYTHROCYTE [DISTWIDTH] IN BLOOD BY AUTOMATED COUNT: 13.3 % (ref 12.3–15.4)
GLOBULIN SER CALC-MCNC: 2.3 GM/DL
GLUCOSE SERPL-MCNC: 106 MG/DL (ref 65–99)
GLUCOSE UR QL: NEGATIVE
HBA1C MFR BLD: 6 % (ref 4.8–5.6)
HCT VFR BLD AUTO: 47.5 % (ref 37.5–51)
HDLC SERPL-MCNC: 57 MG/DL (ref 40–60)
HGB BLD-MCNC: 15.6 G/DL (ref 13–17.7)
HGB UR QL STRIP: NEGATIVE
IMM GRANULOCYTES # BLD AUTO: 0 10*3/MM3 (ref 0–0.05)
IMM GRANULOCYTES NFR BLD AUTO: 0 % (ref 0–0.5)
KETONES UR QL STRIP: NEGATIVE
LDLC SERPL CALC-MCNC: 125 MG/DL (ref 0–100)
LDLC/HDLC SERPL: 2.15 {RATIO}
LEUKOCYTE ESTERASE UR QL STRIP: NEGATIVE
LYMPHOCYTES # BLD AUTO: 1.44 10*3/MM3 (ref 0.7–3.1)
LYMPHOCYTES NFR BLD AUTO: 26.2 % (ref 19.6–45.3)
MCH RBC QN AUTO: 29.4 PG (ref 26.6–33)
MCHC RBC AUTO-ENTMCNC: 32.8 G/DL (ref 31.5–35.7)
MCV RBC AUTO: 89.5 FL (ref 79–97)
MONOCYTES # BLD AUTO: 0.5 10*3/MM3 (ref 0.1–0.9)
MONOCYTES NFR BLD AUTO: 9.1 % (ref 5–12)
NEUTROPHILS # BLD AUTO: 3.43 10*3/MM3 (ref 1.7–7)
NEUTROPHILS NFR BLD AUTO: 62.4 % (ref 42.7–76)
NITRITE UR QL STRIP: NEGATIVE
NRBC BLD AUTO-RTO: 0 /100 WBC (ref 0–0.2)
PH UR STRIP: 7 [PH] (ref 5–8)
PLATELET # BLD AUTO: 143 10*3/MM3 (ref 140–450)
POTASSIUM SERPL-SCNC: 3.5 MMOL/L (ref 3.5–5.2)
PROT SERPL-MCNC: 6.6 G/DL (ref 6–8.5)
PROT UR QL STRIP: NEGATIVE
RBC # BLD AUTO: 5.31 10*6/MM3 (ref 4.14–5.8)
RBC #/AREA URNS HPF: NORMAL /HPF
SODIUM SERPL-SCNC: 137 MMOL/L (ref 136–145)
SP GR UR: 1.02 (ref 1–1.03)
TRIGL SERPL-MCNC: 101 MG/DL (ref 0–150)
UROBILINOGEN UR STRIP-MCNC: NORMAL MG/DL
VLDLC SERPL CALC-MCNC: 18 MG/DL (ref 5–40)
WBC # BLD AUTO: 5.5 10*3/MM3 (ref 3.4–10.8)
WBC #/AREA URNS HPF: NORMAL /HPF

## 2021-04-05 ENCOUNTER — TELEPHONE (OUTPATIENT)
Dept: FAMILY MEDICINE CLINIC | Facility: CLINIC | Age: 86
End: 2021-04-05

## 2021-04-05 RX ORDER — ALBUTEROL SULFATE 90 UG/1
2 AEROSOL, METERED RESPIRATORY (INHALATION) EVERY 6 HOURS PRN
Qty: 18 G | Refills: 3 | Status: CANCELLED | OUTPATIENT
Start: 2021-04-05

## 2021-04-22 NOTE — PROGRESS NOTES
Subjective   Harry Potts is a 89 y.o. male. Patient is here today for   Chief Complaint   Patient presents with   • Leg Problem     unable to stand; [facility] 3/2021       (Not on file)-  Risk for Readmission (LACE) No data recorded         Vitals:    03/31/21 1312   BP: 144/78   Pulse: 77   Resp: 16   Temp: 98 °F (36.7 °C)   SpO2: 94%     The following portions of the patient's history were reviewed and updated as appropriate: allergies, current medications, past family history, past medical history, past social history, past surgical history and problem list.    Past Medical History:   Diagnosis Date   • Cancer (CMS/HCC)     basal cell    • COPD (chronic obstructive pulmonary disease) (CMS/HCC)    • Elevated cholesterol    • GERD (gastroesophageal reflux disease)    • Hyperglycemia    • Hyperlipidemia    • Hypertension    • Obesity    • Sleep apnea    • Spondylolysis, lumbar region       Allergies   Allergen Reactions   • Morphine And Related       Social History     Socioeconomic History   • Marital status:      Spouse name: Not on file   • Number of children: Not on file   • Years of education: Not on file   • Highest education level: Not on file   Tobacco Use   • Smoking status: Never Smoker   • Smokeless tobacco: Never Used   Substance and Sexual Activity   • Alcohol use: No   • Drug use: No   • Sexual activity: Defer        Current Outpatient Medications:   •  amLODIPine (NORVASC) 10 MG tablet, TAKE 1 TABLET DAILY, Disp: 90 tablet, Rfl: 3  •  aspirin 81 MG EC tablet, Take 81 mg by mouth Daily., Disp: , Rfl:   •  atorvastatin (LIPITOR) 20 MG tablet, , Disp: , Rfl:   •  azelastine (ASTELIN) 0.1 % nasal spray, 1 spray into the nostril(s) as directed by provider Daily., Disp: , Rfl:   •  Breo Ellipta 100-25 MCG/INH inhaler, , Disp: , Rfl:   •  celecoxib (CeleBREX) 200 MG capsule, TAKE 1 CAPSULE DAILY AS NEEDED (Patient taking differently: 200 mg Daily.), Disp: 90 capsule, Rfl: 3  •  chlorthalidone (HYGROTON)  25 MG tablet, Take 1 tablet by mouth Daily., Disp: 90 tablet, Rfl: 3  •  doxazosin (CARDURA) 4 MG tablet, TAKE 1 TABLET DAILY AS DIRECTED (Patient taking differently: Every Night.), Disp: 90 tablet, Rfl: 3  •  esomeprazole (nexIUM) 40 MG capsule, TAKE 1 CAPSULE DAILY, Disp: 90 capsule, Rfl: 3  •  ezetimibe (ZETIA) 10 MG tablet, TAKE 1 TABLET DAILY, Disp: 90 tablet, Rfl: 3  •  fluticasone (FLONASE) 50 MCG/ACT nasal spray, 1 spray Every Night., Disp: , Rfl:   •  Fluticasone Furoate-Vilanterol (BREO ELLIPTA IN), Inhale Daily., Disp: , Rfl:   •  loratadine (CLARITIN) 10 MG tablet, Take  by mouth Daily., Disp: , Rfl:   •  metaxalone (SKELAXIN) 800 MG tablet, TAKE 1 TABLET THREE TIMES A DAY, Disp: 270 tablet, Rfl: 3  •  metoprolol succinate XL (TOPROL-XL) 50 MG 24 hr tablet, Take 0.5 tablets by mouth Daily. (Patient taking differently: Take 50 mg by mouth Daily. Takes whole tab for 50 mg daily), Disp: 90 tablet, Rfl: 1  •  Multiple Vitamins-Minerals (CENTRUM ADULTS PO), Take  by mouth Daily., Disp: , Rfl:   •  Omega-3 Fatty Acids (FISH OIL) 1000 MG capsule capsule, Take  by mouth Daily With Breakfast., Disp: , Rfl:   •  Ventolin  (90 Base) MCG/ACT inhaler, Inhale 2 puffs Every 6 (Six) Hours As Needed for Wheezing. Pt wont take d/t cost, Disp: 18 g, Rfl: 3     Objective     This patient was admitted on 5 March to [facility] with nontraumatic rhabdomyolysis.    He presented to the emergency room with complaints of lower extremity weakness and spasms.  He had found himself at home in his restroom and his legs suddenly became very weak.  He lowered himself to the ground but he could not stand up on his own.    After he was admitted, his statin medication was stopped.  His CPK improved and he did not develop any renal dysfunction.  He was discharged off of his statin.    Neurology and neurosurgery evaluated him.  He was offered epidural intervention for his lumbar spinal stenosis but he did not wish to pursue that  at this point.    He worked with physical therapy in the hospital and was discharged with home health and physical therapy.    He is accompanied by his daughter.    He does not have any complaints today which is typical for him.  He ambulates with a wheeled walker per usual.       Review of Systems   Constitutional: Negative.    HENT: Negative.    Respiratory: Negative.    Cardiovascular: Negative.    Musculoskeletal: Negative.    Psychiatric/Behavioral: Negative.        Physical Exam  Vitals and nursing note reviewed.   Constitutional:       Appearance: Normal appearance. He is obese. He is not ill-appearing or diaphoretic.      Comments: Pleasant as always, cheerful, neatly groomed, in no distress.   Cardiovascular:      Rate and Rhythm: Regular rhythm.      Heart sounds: Normal heart sounds. No murmur heard.   No gallop.    Pulmonary:      Effort: No respiratory distress.      Breath sounds: Normal breath sounds. No wheezing or rales.   Neurological:      Mental Status: He is alert.      Comments: He has lumbar spinal stenosis.  He ambulates with a walker.   Psychiatric:         Mood and Affect: Mood normal.         Behavior: Behavior normal.         Thought Content: Thought content normal.         ASSESSMENT     Problems Addressed this Visit        Musculoskeletal and Injuries    Non-traumatic rhabdomyolysis - Primary      Diagnoses       Codes Comments    Non-traumatic rhabdomyolysis    -  Primary ICD-10-CM: M62.82  ICD-9-CM: 728.88           Current outpatient and discharge medications have been reconciled for the patient.  Reviewed by: Harry Luis MD      PLAN  He is working with physical therapy in his home.  He feels he is making progress in spite of his chronic lumbar spine pain with severe degenerative arthritis and degenerative disc disease producing multilevel neural foraminal stenosis and central canal narrowing.    His rhabdomyolysis has resolved.    His blood pressure is relatively well  controlled 40/80 and this 89-year-old in his right arm as he was seated.  I am not going to make any changes to his medications at this point.    He is due for some labs for his Hypercholesterolemia and he is fasting so I will check that today.    He has an upcoming appointment to see Dr. Stevens of neurology in June.    I would like to see him back in 3 to 4 months.    I directed his daughter and himself to give me a call if he feels he needs attention sooner than that.      There are no Patient Instructions on file for this visit.  No follow-ups on file.

## 2021-04-23 ENCOUNTER — TELEPHONE (OUTPATIENT)
Dept: FAMILY MEDICINE CLINIC | Facility: CLINIC | Age: 86
End: 2021-04-23

## 2021-04-23 NOTE — TELEPHONE ENCOUNTER
PATIENT CALLED REGARDING THIS REFILL REQUEST. IT STILL HAS NOT BEEN FILLED, PLEASE APPROVE AND SEND IN AS SOON AS POSSIBLE.     PLEASE CALL PATIENT IF ANY QUESTIONS. [phone]

## 2021-04-23 NOTE — TELEPHONE ENCOUNTER
Caller: Harry Potts    Relationship: Self    Best call back number: [phone]    What is the best time to reach you: ANY    Who are you requesting to speak with (clinical staff, provider,  specific staff member): DR VALDEZ    Do you require a callback: JAYDE #3-PATIENT WOULD LIKE TO DISCUSS GETTING ONE OF THESE WITH DR VALDEZ. PATIENT SEES THE NEED FOR ONE BUT IS CONCERNED ABOUT WHERE TO GET IT AND THE COST. PLEASE CALL TO DISCUSS WITH PATIENT.

## 2021-04-26 RX ORDER — DOXAZOSIN MESYLATE 4 MG/1
TABLET ORAL
Qty: 90 TABLET | Refills: 3 | Status: SHIPPED | OUTPATIENT
Start: 2021-04-26 | End: 2022-04-21

## 2021-04-26 RX ORDER — AMLODIPINE BESYLATE 10 MG/1
TABLET ORAL
Qty: 90 TABLET | Refills: 3 | Status: SHIPPED | OUTPATIENT
Start: 2021-04-26 | End: 2022-04-21

## 2021-06-02 ENCOUNTER — OFFICE VISIT (OUTPATIENT)
Dept: NEUROLOGY | Facility: CLINIC | Age: 86
End: 2021-06-02

## 2021-06-02 VITALS — HEIGHT: 72 IN | HEART RATE: 93 BPM | BODY MASS INDEX: 37.93 KG/M2 | OXYGEN SATURATION: 96 % | WEIGHT: 280 LBS

## 2021-06-02 DIAGNOSIS — M54.50 CHRONIC BILATERAL LOW BACK PAIN WITHOUT SCIATICA: Primary | ICD-10-CM

## 2021-06-02 DIAGNOSIS — G89.29 CHRONIC BILATERAL LOW BACK PAIN WITHOUT SCIATICA: Primary | ICD-10-CM

## 2021-06-02 DIAGNOSIS — M54.16 LUMBAR RADICULOPATHY: ICD-10-CM

## 2021-06-02 PROCEDURE — 99214 OFFICE O/P EST MOD 30 MIN: CPT | Performed by: PSYCHIATRY & NEUROLOGY

## 2021-06-02 NOTE — PROGRESS NOTES
Chief Complaint   Patient presents with   • Lumbar Radiculopathy       Patient ID: Harry Potts is a 89 y.o. male.    HPI: I have had the pleasure of seeing your patient today.  As you may know he is a 89-year-old gentleman with a history of chronic lower back pain.  The patient apparently was admitted to the hospital after experiencing a fall with significant muscle spasms in both lower extremities.  Upon admission to the hospital he was noted to have nontraumatic rhabdomyolysis as well as lumbar disc disease with some weakness.  He was admitted.  A thorough chart review was performed to gather information about his treatment there at the hospital.  They did discontinue the statin medication he was on.  His CK level improved.  He was seen by neurology and neurosurgery for his lower back issues.  They did discuss epidural interventions however he did not want that at that time.  He was discharged with physical therapy.  He has completed PT and continues to do some of the exercises at home.  He denies any significant change in his bowel or bladder habits.  MRI of his lumbosacral spine during hospitalization showed a L3-4 disc protrusion and a large left paravertebral osteophyte that appeared to be touching the exiting nerve root.  There is also a broad-based disc bulge at L4-5.  There is a large right paravertebral osteophyte at that level possibly touching the right exiting nerve root.  He says that at this point the majority of his lower back pain is experienced if he is standing for long periods of time.  He does use a 3 wheeled walker with brakes to assist with ambulation long distances.  He has no pain in his lower back if he sitting or lying down.  No recent falls since discharge.    The following portions of the patient's history were reviewed and updated as appropriate: allergies, current medications, past family history, past medical history, past social history, past surgical history and problem  list.    Review of Systems   Constitutional: Positive for activity change. Negative for appetite change and fatigue.   HENT: Positive for hearing loss. Negative for tinnitus, trouble swallowing and voice change.    Eyes: Negative for photophobia, pain and visual disturbance.   Respiratory: Positive for apnea and shortness of breath. Negative for wheezing.    Cardiovascular: Negative for chest pain, palpitations and leg swelling.   Gastrointestinal: Negative for abdominal pain, nausea and vomiting.   Endocrine: Negative for cold intolerance, heat intolerance and polydipsia.   Musculoskeletal: Positive for back pain and gait problem. Negative for neck pain.   Skin: Negative for color change, rash and wound.   Allergic/Immunologic: Positive for environmental allergies. Negative for food allergies and immunocompromised state.   Neurological: Negative for dizziness, tremors, seizures, syncope, facial asymmetry, speech difficulty, weakness, light-headedness, numbness and headaches.   Hematological: Negative for adenopathy. Does not bruise/bleed easily.   Psychiatric/Behavioral: Negative for agitation, behavioral problems, confusion, decreased concentration, dysphoric mood, hallucinations, self-injury, sleep disturbance and suicidal ideas. The patient is not nervous/anxious and is not hyperactive.       I have reviewed the review of systems above performed by my medical assistant.      Vitals:    06/02/21 1527   Pulse: 93   SpO2: 96%       Neurologic Exam     Mental Status   Oriented to person, place, and time.   Concentration: normal.   Level of consciousness: alert  Knowledge: consistent with education (No deficits found.).     Cranial Nerves     CN II   Visual fields full to confrontation.     CN III, IV, VI   Pupils are equal, round, and reactive to light.  Extraocular motions are normal.   CN III: no CN III palsy  CN VI: no CN VI palsy    CN V   Facial sensation intact.     CN VII   Facial expression full, symmetric.      CN VIII   CN VIII normal.     CN IX, X   CN IX normal.   CN X normal.     CN XI   CN XI normal.     CN XII   CN XII normal.     Motor Exam     Strength   Right neck flexion: 5/5  Left neck flexion: 5/5  Right neck extension: 5/5  Left neck extension: 5/5  Right deltoid: 5/5  Left deltoid: 5/5  Right biceps: 5/5  Left biceps: 5/5  Right triceps: 5/5  Left triceps: 5/5  Right wrist flexion: 5/5  Left wrist flexion: 5/5  Right wrist extension: 5/5  Left wrist extension: 5/5  Right interossei: 5/5  Left interossei: 5/5  Right abdominals: 5/5  Left abdominals: 5/5  Right iliopsoas: 5/5  Left iliopsoas: 5/5  Right quadriceps: 5/5  Left quadriceps: 5/5  Right hamstrin/5  Left hamstrin/5  Right glutei: 5/5  Left glutei: 5/5  Right anterior tibial: 5/5  Left anterior tibial: 5/5  Right posterior tibial: 5/5  Left posterior tibial: 5/5  Right peroneal: 5/5  Left peroneal: 5/5  Right gastroc: 5/5  Left gastroc: 5/5    Sensory Exam   Light touch normal.   Vibration normal.     Gait, Coordination, and Reflexes     Gait  Gait: wide-based    Reflexes   Right brachioradialis: 2+  Left brachioradialis: 2+  Right biceps: 2+  Left biceps: 2+  Right triceps: 2+  Left triceps: 2+  Right patellar: 0  Left patellar: 0  Right achilles: 0  Left achilles: 0  Right : 2+  Left : 2+Station is normal.       Physical Exam  Vitals reviewed.   Constitutional:       Appearance: He is well-developed.   HENT:      Head: Normocephalic and atraumatic.   Eyes:      Extraocular Movements: EOM normal.      Pupils: Pupils are equal, round, and reactive to light.   Cardiovascular:      Rate and Rhythm: Normal rate and regular rhythm.   Pulmonary:      Breath sounds: Normal breath sounds.   Musculoskeletal:         General: Normal range of motion.   Skin:     General: Skin is warm.   Neurological:      Mental Status: He is oriented to person, place, and time.      Deep Tendon Reflexes:      Reflex Scores:       Tricep reflexes are 2+ on  the right side and 2+ on the left side.       Bicep reflexes are 2+ on the right side and 2+ on the left side.       Brachioradialis reflexes are 2+ on the right side and 2+ on the left side.       Patellar reflexes are 0 on the right side and 0 on the left side.       Achilles reflexes are 0 on the right side and 0 on the left side.        Procedures    Assessment/Plan: He does acknowledge significant improvement with respect to balance with physical therapy.  He would like to hold off on further PT at this time however we will see him back in approximately 6 months to reevaluate that.  He does not want to move forward with epidural injections for back pain.  We will see him back in 6 months or sooner if needed.       Diagnoses and all orders for this visit:    1. Chronic bilateral low back pain without sciatica (Primary)    2. Lumbar radiculopathy           Leander Stevens II, MD

## 2021-08-05 ENCOUNTER — TELEPHONE (OUTPATIENT)
Dept: FAMILY MEDICINE CLINIC | Facility: CLINIC | Age: 86
End: 2021-08-05

## 2021-08-05 NOTE — TELEPHONE ENCOUNTER
Caller: Harry Potts    Relationship: Self    Best call back number: [phone]    What was the call regarding: PATIENT STATED THAT HE HAS BEEN UNABLE TO LOCATE AN [employer] MACHINE (THAT DR. VALDEZ RECOMMENDED) LOCALLY AND HAS FOUND ONE FOR PURCHASE OUT OF STATE. ELIUD MORENO WILL BE CONTACTING OFFICE TO GET INFORMATION TO START THE PURCHASE PROCESS. PATIENT IS PAYING OUT OF POCKET (NOT THROUGH INSURANCE). PATIENT STATED IT WAS OK TO GIVE ELIUD ANY NEEDED INFORMATION.     Do you require a callback: NO

## 2021-08-05 NOTE — TELEPHONE ENCOUNTER
Caller: [facility]AN[facility]    Relationship: Other    Best call back number: [phone]    Equipment requested: [employer]OGEN     Reason for the request:     Prescribing Provider: DR VALDEZ    Additional information or concerns: PATIENT REQUESTING ORDER BE SENT TO [facility] TO ALLOW FOR SHIPMENT.      BLANCA SENT FAX WITH REQUEST EARLIER TODAY AND IS FOLLOWING UP WITH A PHONE CALL TO BE SURE THE FAX WAS RECEIVED.  CONFIRMED FAX AND [facility] RECEIVED CONFIRMATION ON HER END.    FLOW SETTING NEEDS TO BE CIRCLED SIGNED AND DATED.

## 2021-08-09 ENCOUNTER — TELEPHONE (OUTPATIENT)
Dept: FAMILY MEDICINE CLINIC | Facility: CLINIC | Age: 86
End: 2021-08-09

## 2021-08-09 NOTE — TELEPHONE ENCOUNTER
I CALLED [facility] NO ANSWER I LEFT HER VM STATING THAT AS WE SPOKE ABOUT LAST WEEK  DR. VALDEZ IS OUT OF THE OFFICE AND I CAN GET HIM TO SIGN FORMS ONCE HE RETURNS, I HAVE SPOKEN WITH TO PT AND HE STATED HE CAN WAIT UNTIL  RETURNS AND HE WILL CALL OFFICE BACK IF ANYTHING CHANGES.

## 2021-08-09 NOTE — TELEPHONE ENCOUNTER
Caller: [facility]ANCA    Relationship: Other    Best call back number: [phone]    Equipment requested:OXYGEN MACHINE    Reason for the request: PATIENT IN NEED OF O2    Prescribing Provider: DR VALDEZ     Additional information or concerns: BLANCA FROM [employer] HAS CALLED SEVERAL TIMES AND IS CHECKING ON THE REQUEST FOR THE PATIENT.    PLEASE CALL WHEN FORM IS FAXED BACK.

## 2021-08-16 NOTE — TELEPHONE ENCOUNTER
KIERA FROM [employer] CALLED TO GET AN UPDATE ON FORMS THAT MD SAJI VALDEZ NEEDED TO SIGN.  PLEASE CALL AND ADVISE. THANK YOU.

## 2021-09-07 ENCOUNTER — TELEPHONE (OUTPATIENT)
Dept: FAMILY MEDICINE CLINIC | Facility: CLINIC | Age: 86
End: 2021-09-07

## 2021-09-07 NOTE — TELEPHONE ENCOUNTER
PATIENT RECEIVED COVID VACCINE IN FEB 2021 AND NOW ITS RED.  DOES NOT HURT.      CALL BACK #: [phone]

## 2021-09-10 ENCOUNTER — OFFICE VISIT (OUTPATIENT)
Dept: FAMILY MEDICINE CLINIC | Facility: CLINIC | Age: 86
End: 2021-09-10

## 2021-09-10 VITALS
TEMPERATURE: 97 F | HEIGHT: 72 IN | OXYGEN SATURATION: 93 % | DIASTOLIC BLOOD PRESSURE: 84 MMHG | BODY MASS INDEX: 39.68 KG/M2 | HEART RATE: 76 BPM | SYSTOLIC BLOOD PRESSURE: 170 MMHG | RESPIRATION RATE: 18 BRPM | WEIGHT: 293 LBS

## 2021-09-10 DIAGNOSIS — L03.114 CELLULITIS OF LEFT UPPER EXTREMITY: Primary | ICD-10-CM

## 2021-09-10 PROCEDURE — 99213 OFFICE O/P EST LOW 20 MIN: CPT | Performed by: STUDENT IN AN ORGANIZED HEALTH CARE EDUCATION/TRAINING PROGRAM

## 2021-09-10 RX ORDER — DOXYCYCLINE HYCLATE 100 MG/1
100 CAPSULE ORAL 2 TIMES DAILY
Qty: 10 CAPSULE | Refills: 0 | Status: SHIPPED | OUTPATIENT
Start: 2021-09-10 | End: 2021-09-15

## 2021-09-10 NOTE — PROGRESS NOTES
"Chief Complaint  Rash (left arm bump and redness)    Subjective          Harry Potts presents to [facility]  History of Present Illness   Miguel 89-year-old male who presented to the clinic with chief complaint of rash extensor surface of his left arm for 3 days.  Rash is red in color associated with mild itching and mild discharge for 3 days.  Patient is unaware how rash starts.  He was thinking he bumped into the wall and after that his rash appeared.  Review of systems negative for fever, headache, chest pain, shortness of breath, palpitation, nausea, vomiting, abdominal pain, any recent change in bowel and bladder habits.  Objective   Vital Signs:   /84 (BP Location: Right arm, Patient Position: Sitting)   Pulse 76   Temp 97 °F (36.1 °C) (Oral)   Resp 18   Ht 182.9 cm (72.01\")   Wt 133 kg (293 lb)   SpO2 93%   BMI 39.73 kg/m²     Physical Exam  Constitutional:       Appearance: Normal appearance.   HENT:      Head: Normocephalic and atraumatic.      Ears:      Comments: Patient was rearing hearing aids in his both ear  Eyes:      Extraocular Movements: Extraocular movements intact.      Conjunctiva/sclera: Conjunctivae normal.      Pupils: Pupils are equal, round, and reactive to light.   Cardiovascular:      Rate and Rhythm: Normal rate.      Pulses: Normal pulses.      Heart sounds: Normal heart sounds.   Pulmonary:      Effort: Pulmonary effort is normal.      Breath sounds: Normal breath sounds.   Musculoskeletal:      Cervical back: Normal range of motion and neck supple.   Skin:     General: Skin is warm.      Comments: Red color macular patch present on the left  side on the extensor surface of upper ARM.  1 nodule was also seen in the middle of red macular rash with seems like an insect bite.   Neurological:      General: No focal deficit present.      Mental Status: He is alert and oriented to person, place, and time.   Psychiatric:         Mood and " Affect: Mood normal.        Result Review :                 Assessment and Plan    Diagnoses and all orders for this visit:    1. Cellulitis of left upper extremity (Primary)  Comments:  Prescribed doxycycline 100 mg p.o. twice daily for 5 days.  Return to clinic if rash worsens or remain the same.  Orders:  -     doxycycline (VIBRAMYCIN) 100 MG capsule; Take 1 capsule by mouth 2 (Two) Times a Day for 5 days.  Dispense: 10 capsule; Refill: 0        Follow Up   No follow-ups on file.  Patient was given instructions and counseling regarding his condition or for health maintenance advice. Please see specific information pulled into the AVS if appropriate.

## 2021-09-14 DIAGNOSIS — E78.00 HYPERCHOLESTEROLEMIA: ICD-10-CM

## 2021-09-14 DIAGNOSIS — R73.9 HYPERGLYCEMIA: ICD-10-CM

## 2021-09-16 LAB
ALBUMIN SERPL-MCNC: 4.5 G/DL (ref 3.5–5.2)
ALBUMIN/GLOB SERPL: 2.4 G/DL
ALP SERPL-CCNC: 69 U/L (ref 39–117)
ALT SERPL-CCNC: 23 U/L (ref 1–41)
AST SERPL-CCNC: 26 U/L (ref 1–40)
BILIRUB SERPL-MCNC: 0.5 MG/DL (ref 0–1.2)
BUN SERPL-MCNC: 17 MG/DL (ref 8–23)
BUN/CREAT SERPL: 27 (ref 7–25)
CALCIUM SERPL-MCNC: 9.6 MG/DL (ref 8.2–9.6)
CHLORIDE SERPL-SCNC: 97 MMOL/L (ref 98–107)
CHOLEST SERPL-MCNC: 224 MG/DL (ref 0–200)
CO2 SERPL-SCNC: 30.9 MMOL/L (ref 22–29)
CREAT SERPL-MCNC: 0.63 MG/DL (ref 0.76–1.27)
GLOBULIN SER CALC-MCNC: 1.9 GM/DL
GLUCOSE SERPL-MCNC: 102 MG/DL (ref 65–99)
HBA1C MFR BLD: 6.1 % (ref 4.8–5.6)
HDLC SERPL-MCNC: 59 MG/DL (ref 40–60)
LDLC SERPL CALC-MCNC: 149 MG/DL (ref 0–100)
LDLC/HDLC SERPL: 2.48 {RATIO}
POTASSIUM SERPL-SCNC: 3.9 MMOL/L (ref 3.5–5.2)
PROT SERPL-MCNC: 6.4 G/DL (ref 6–8.5)
SODIUM SERPL-SCNC: 138 MMOL/L (ref 136–145)
TRIGL SERPL-MCNC: 92 MG/DL (ref 0–150)
VLDLC SERPL CALC-MCNC: 16 MG/DL (ref 5–40)

## 2021-10-04 ENCOUNTER — OFFICE VISIT (OUTPATIENT)
Dept: FAMILY MEDICINE CLINIC | Facility: CLINIC | Age: 86
End: 2021-10-04

## 2021-10-04 VITALS
BODY MASS INDEX: 40.09 KG/M2 | SYSTOLIC BLOOD PRESSURE: 148 MMHG | OXYGEN SATURATION: 96 % | DIASTOLIC BLOOD PRESSURE: 80 MMHG | TEMPERATURE: 97.5 F | HEIGHT: 72 IN | WEIGHT: 296 LBS | HEART RATE: 73 BPM | RESPIRATION RATE: 18 BRPM

## 2021-10-04 DIAGNOSIS — E78.00 HYPERCHOLESTEROLEMIA: ICD-10-CM

## 2021-10-04 DIAGNOSIS — I10 PRIMARY HYPERTENSION: Primary | ICD-10-CM

## 2021-10-04 DIAGNOSIS — J43.9 PULMONARY EMPHYSEMA, UNSPECIFIED EMPHYSEMA TYPE (HCC): ICD-10-CM

## 2021-10-04 PROCEDURE — 90662 IIV NO PRSV INCREASED AG IM: CPT | Performed by: INTERNAL MEDICINE

## 2021-10-04 PROCEDURE — G0008 ADMIN INFLUENZA VIRUS VAC: HCPCS | Performed by: INTERNAL MEDICINE

## 2021-10-04 PROCEDURE — 99214 OFFICE O/P EST MOD 30 MIN: CPT | Performed by: INTERNAL MEDICINE

## 2021-10-04 RX ORDER — METAXALONE 800 MG/1
800 TABLET ORAL 3 TIMES DAILY
Qty: 270 TABLET | Refills: 3 | Status: SHIPPED | OUTPATIENT
Start: 2021-10-04 | End: 2022-02-02 | Stop reason: SDUPTHER

## 2021-10-04 NOTE — PROGRESS NOTES
Subjective   Harry Potts is a 89 y.o. male. Patient is here today for   Chief Complaint   Patient presents with   • Hypertension     hypercholesterolemia, lab follow up    • Hyperlipidemia          Vitals:    10/04/21 1129   BP: 148/80   Pulse: 73   Resp: 18   Temp: 97.5 °F (36.4 °C)   SpO2: 96%     Body mass index is 40.14 kg/m².      Past Medical History:   Diagnosis Date   • Arthritis    • Cancer (HCC)     basal cell    • COPD (chronic obstructive pulmonary disease) (HCC)    • Difficulty walking    • Elevated cholesterol    • Environmental allergies    • GERD (gastroesophageal reflux disease)    • HL (hearing loss)    • Hyperglycemia    • Hyperlipidemia    • Hypertension    • Obesity    • Sleep apnea    • Spondylolysis, lumbar region       Allergies   Allergen Reactions   • Atorvastatin Other (See Comments)     SPASMS LEG      • Morphine And Related Unknown - Low Severity      Social History     Socioeconomic History   • Marital status:      Spouse name: Not on file   • Number of children: Not on file   • Years of education: Not on file   • Highest education level: Not on file   Tobacco Use   • Smoking status: Never Smoker   • Smokeless tobacco: Never Used   Vaping Use   • Vaping Use: Never used   Substance and Sexual Activity   • Alcohol use: No   • Drug use: No   • Sexual activity: Defer        Current Outpatient Medications:   •  amLODIPine (NORVASC) 10 MG tablet, TAKE 1 TABLET DAILY, Disp: 90 tablet, Rfl: 3  •  aspirin 81 MG EC tablet, Take 81 mg by mouth Daily., Disp: , Rfl:   •  atorvastatin (LIPITOR) 20 MG tablet, , Disp: , Rfl:   •  azelastine (ASTELIN) 0.1 % nasal spray, 1 spray into the nostril(s) as directed by provider Daily., Disp: , Rfl:   •  Breo Ellipta 100-25 MCG/INH inhaler, , Disp: , Rfl:   •  celecoxib (CeleBREX) 200 MG capsule, TAKE 1 CAPSULE DAILY AS NEEDED (Patient taking differently: 200 mg Daily.), Disp: 90 capsule, Rfl: 3  •  chlorthalidone (HYGROTON) 25 MG tablet, Take 1  tablet by mouth Daily., Disp: 90 tablet, Rfl: 3  •  doxazosin (CARDURA) 4 MG tablet, TAKE 1 TABLET DAILY AS DIRECTED, Disp: 90 tablet, Rfl: 3  •  esomeprazole (nexIUM) 40 MG capsule, TAKE 1 CAPSULE DAILY, Disp: 90 capsule, Rfl: 3  •  ezetimibe (ZETIA) 10 MG tablet, TAKE 1 TABLET DAILY, Disp: 90 tablet, Rfl: 3  •  fluticasone (FLONASE) 50 MCG/ACT nasal spray, 1 spray Every Night., Disp: , Rfl:   •  Fluticasone Furoate-Vilanterol (BREO ELLIPTA IN), Inhale Daily., Disp: , Rfl:   •  loratadine (CLARITIN) 10 MG tablet, Take  by mouth Daily., Disp: , Rfl:   •  metaxalone (SKELAXIN) 800 MG tablet, Take 1 tablet by mouth 3 (Three) Times a Day., Disp: 270 tablet, Rfl: 3  •  metoprolol succinate XL (TOPROL-XL) 50 MG 24 hr tablet, Take 0.5 tablets by mouth Daily. (Patient taking differently: Take 50 mg by mouth Daily. Takes whole tab for 50 mg daily), Disp: 90 tablet, Rfl: 1  •  Multiple Vitamins-Minerals (CENTRUM ADULTS PO), Take  by mouth Daily., Disp: , Rfl:   •  Omega-3 Fatty Acids (FISH OIL) 1000 MG capsule capsule, Take  by mouth Daily With Breakfast., Disp: , Rfl:   •  Ventolin  (90 Base) MCG/ACT inhaler, Inhale 2 puffs Every 6 (Six) Hours As Needed for Wheezing. Pt wont take d/t cost, Disp: 18 g, Rfl: 3     Objective     Mr. Olvera is here to follow-up on labs done last week.    He is a portable oxygen concentrator.  He wears oxygen when he is ambulating.  He also uses it at night when he goes to bed and when he wakes up in the morning he tends to take it off.  He receives his oxygen via nasal cannula.    He has severe chronic lumbar spine pain secondary to lumbar spinal stenosis.  He has bilateral lower extremity radicular pain.    Apart from these issues, he has no complaints.    He brought a record of his home blood pressures.  He checks his blood pressures frequently.  His systolic blood pressures are generally less than 120 and his diastolics are less than 70.       Review of Systems   Constitutional:  Negative.    HENT: Negative.    Respiratory: Negative.    Cardiovascular: Negative.    Musculoskeletal: Negative.    Psychiatric/Behavioral: Negative.        Physical Exam  Vitals and nursing note reviewed.   Constitutional:       Appearance: Normal appearance. He is obese.      Comments: Pleasant, neatly groomed, no distress.   Neck:      Vascular: No carotid bruit.   Cardiovascular:      Rate and Rhythm: Regular rhythm.      Heart sounds: Normal heart sounds. No murmur heard.   No gallop.    Pulmonary:      Effort: No respiratory distress.      Breath sounds: Normal breath sounds. No wheezing or rales.   Neurological:      Mental Status: He is alert and oriented to person, place, and time.   Psychiatric:         Mood and Affect: Mood normal.         Behavior: Behavior normal.         Thought Content: Thought content normal.           Problems Addressed this Visit        Cardiac and Vasculature    Hypercholesterolemia    Hypertension - Primary       Pulmonary and Pneumonias    COPD (chronic obstructive pulmonary disease) ([facility])      Diagnoses       Codes Comments    Primary hypertension    -  Primary ICD-10-CM: I10  ICD-9-CM: 401.9     Hypercholesterolemia     ICD-10-CM: E78.00  ICD-9-CM: 272.0     Pulmonary emphysema, unspecified emphysema type ([facility])     ICD-10-CM: J43.9  ICD-9-CM: 492.8             PLAN  He and I reviewed his labs.  His hypercholesterolemia appears to be well controlled.  Generally.  His LDL cholesterol was 149 when we checked it last.  Before that it was 125, before that it was 79.  Generally I would like to see this less than 100 and a maximum.    I asked him to follow-up in about 3 months when I will check it again about a week before that visit he will get the following labs: Lipid profile, comprehensive metabolic panel, CBC, urinalysis.    He has emphysema.  He is on Breo Ellipta daily and albuterol as needed this seems to work well for him.  His only has to use his overall inhaler.    His  hypertension is well controlled.    We gave him a flu shot today.    I like to see him back for an annual wellness visit in about 3 months.        No follow-ups on file.

## 2021-10-06 RX ORDER — AZELASTINE 1 MG/ML
1 SPRAY, METERED NASAL DAILY
Qty: 1 EACH | Refills: 11 | Status: SHIPPED | OUTPATIENT
Start: 2021-10-06 | End: 2022-10-31

## 2021-10-21 RX ORDER — ESOMEPRAZOLE MAGNESIUM 40 MG/1
CAPSULE, DELAYED RELEASE ORAL
Qty: 90 CAPSULE | Refills: 3 | Status: SHIPPED | OUTPATIENT
Start: 2021-10-21 | End: 2022-10-17

## 2021-11-23 RX ORDER — METOPROLOL SUCCINATE 50 MG/1
50 TABLET, EXTENDED RELEASE ORAL DAILY
Qty: 90 TABLET | Refills: 3 | Status: SHIPPED | OUTPATIENT
Start: 2021-11-23 | End: 2022-08-19 | Stop reason: ALTCHOICE

## 2021-11-23 NOTE — TELEPHONE ENCOUNTER
Rx Refill Note  Requested Prescriptions     Pending Prescriptions Disp Refills   • metoprolol succinate XL (TOPROL-XL) 50 MG 24 hr tablet [Pharmacy Med Name: METOPROLOL SUCCINATE ER TABS 50MG] 90 tablet 3     Sig: TAKE ONE-HALF (1/2) TABLET DAILY      Last office visit with prescribing clinician: 10/4/2021      Next office visit with prescribing clinician: 1/19/2022            Clary Callejas MA  11/23/21, 08:42 EST

## 2021-12-01 RX ORDER — CELECOXIB 200 MG/1
CAPSULE ORAL
Qty: 90 CAPSULE | Refills: 3 | Status: SHIPPED | OUTPATIENT
Start: 2021-12-01 | End: 2022-11-22

## 2021-12-01 RX ORDER — EZETIMIBE 10 MG/1
TABLET ORAL
Qty: 90 TABLET | Refills: 3 | Status: SHIPPED | OUTPATIENT
Start: 2021-12-01 | End: 2022-11-22

## 2021-12-01 NOTE — TELEPHONE ENCOUNTER
Rx Refill Note  Requested Prescriptions     Pending Prescriptions Disp Refills   • celecoxib (CeleBREX) 200 MG capsule [Pharmacy Med Name: CELECOXIB CAPS 200MG] 90 capsule 3     Sig: TAKE 1 CAPSULE DAILY AS NEEDED   • ezetimibe (ZETIA) 10 MG tablet [Pharmacy Med Name: EZETIMIBE TABS 10MG] 90 tablet 3     Sig: TAKE 1 TABLET DAILY      Last office visit with prescribing clinician: 10/4/2021      Next office visit with prescribing clinician: 1/19/2022            Clary Callejas MA  12/01/21, 10:43 EST

## 2021-12-03 ENCOUNTER — TELEPHONE (OUTPATIENT)
Dept: FAMILY MEDICINE CLINIC | Facility: CLINIC | Age: 86
End: 2021-12-03

## 2022-01-10 DIAGNOSIS — R73.9 HYPERGLYCEMIA: ICD-10-CM

## 2022-01-10 DIAGNOSIS — E78.00 HYPERCHOLESTEROLEMIA: Primary | ICD-10-CM

## 2022-01-13 LAB
ALBUMIN SERPL-MCNC: 4.5 G/DL (ref 3.5–4.6)
ALBUMIN/GLOB SERPL: 2 {RATIO} (ref 1.2–2.2)
ALP SERPL-CCNC: 80 IU/L (ref 44–121)
ALT SERPL-CCNC: 24 IU/L (ref 0–44)
AST SERPL-CCNC: 25 IU/L (ref 0–40)
BASOPHILS # BLD AUTO: 0.1 X10E3/UL (ref 0–0.2)
BASOPHILS NFR BLD AUTO: 1 %
BILIRUB SERPL-MCNC: 0.5 MG/DL (ref 0–1.2)
BUN SERPL-MCNC: 18 MG/DL (ref 10–36)
BUN/CREAT SERPL: 26 (ref 10–24)
CALCIUM SERPL-MCNC: 9.5 MG/DL (ref 8.6–10.2)
CHLORIDE SERPL-SCNC: 97 MMOL/L (ref 96–106)
CHOLEST SERPL-MCNC: 226 MG/DL (ref 100–199)
CO2 SERPL-SCNC: 30 MMOL/L (ref 20–29)
CREAT SERPL-MCNC: 0.68 MG/DL (ref 0.76–1.27)
EOSINOPHIL # BLD AUTO: 0.3 X10E3/UL (ref 0–0.4)
EOSINOPHIL NFR BLD AUTO: 6 %
ERYTHROCYTE [DISTWIDTH] IN BLOOD BY AUTOMATED COUNT: 12.1 % (ref 11.6–15.4)
GLOBULIN SER CALC-MCNC: 2.3 G/DL (ref 1.5–4.5)
GLUCOSE SERPL-MCNC: 110 MG/DL (ref 65–99)
HBA1C MFR BLD: 6.2 % (ref 4.8–5.6)
HCT VFR BLD AUTO: 44.6 % (ref 37.5–51)
HDLC SERPL-MCNC: 58 MG/DL
HGB BLD-MCNC: 15.5 G/DL (ref 13–17.7)
IMM GRANULOCYTES # BLD AUTO: 0 X10E3/UL (ref 0–0.1)
IMM GRANULOCYTES NFR BLD AUTO: 0 %
LDLC SERPL CALC-MCNC: 150 MG/DL (ref 0–99)
LDLC/HDLC SERPL: 2.6 RATIO (ref 0–3.6)
LYMPHOCYTES # BLD AUTO: 1.6 X10E3/UL (ref 0.7–3.1)
LYMPHOCYTES NFR BLD AUTO: 28 %
MCH RBC QN AUTO: 29.8 PG (ref 26.6–33)
MCHC RBC AUTO-ENTMCNC: 34.8 G/DL (ref 31.5–35.7)
MCV RBC AUTO: 86 FL (ref 79–97)
MONOCYTES # BLD AUTO: 0.5 X10E3/UL (ref 0.1–0.9)
MONOCYTES NFR BLD AUTO: 8 %
NEUTROPHILS # BLD AUTO: 3.2 X10E3/UL (ref 1.4–7)
NEUTROPHILS NFR BLD AUTO: 57 %
PLATELET # BLD AUTO: 156 X10E3/UL (ref 150–450)
POTASSIUM SERPL-SCNC: 4.2 MMOL/L (ref 3.5–5.2)
PROT SERPL-MCNC: 6.8 G/DL (ref 6–8.5)
RBC # BLD AUTO: 5.21 X10E6/UL (ref 4.14–5.8)
SODIUM SERPL-SCNC: 142 MMOL/L (ref 134–144)
TRIGL SERPL-MCNC: 99 MG/DL (ref 0–149)
VLDLC SERPL CALC-MCNC: 18 MG/DL (ref 5–40)
WBC # BLD AUTO: 5.7 X10E3/UL (ref 3.4–10.8)

## 2022-02-02 NOTE — TELEPHONE ENCOUNTER
Caller: Harry Potts    Relationship: Self    Best call back number: [phone]    Requested Prescriptions:   Requested Prescriptions     Pending Prescriptions Disp Refills   • fluticasone (FLONASE) 50 MCG/ACT nasal spray       Si spray Every Night.   • metaxalone (SKELAXIN) 800 MG tablet 270 tablet 3     Sig: Take 1 tablet by mouth 3 (Three) Times a Day.        Pharmacy where request should be sent: [facility] [address] [phone] [facility] [phone]      Additional details provided by patient: PATIENT STATED THAT [facility] NEEDS APPROVAL FOR BOTH MEDICATIONS.     Does the patient have less than a 3 day supply:  [] Yes  [x] No    Phuc Osman Rep   22 16:03 EST

## 2022-02-03 RX ORDER — METAXALONE 800 MG/1
800 TABLET ORAL 3 TIMES DAILY
Qty: 270 TABLET | Refills: 3 | Status: SHIPPED | OUTPATIENT
Start: 2022-02-03 | End: 2022-08-19 | Stop reason: ALTCHOICE

## 2022-02-03 RX ORDER — FLUTICASONE PROPIONATE 50 MCG
1 SPRAY, SUSPENSION (ML) NASAL DAILY
Qty: 11.1 ML | Refills: 2 | Status: ON HOLD | OUTPATIENT
Start: 2022-02-03

## 2022-02-03 NOTE — TELEPHONE ENCOUNTER
Rx Refill Note  Requested Prescriptions     Pending Prescriptions Disp Refills   • fluticasone (FLONASE) 50 MCG/ACT nasal spray       Si spray Every Night.   • metaxalone (SKELAXIN) 800 MG tablet 270 tablet 3     Sig: Take 1 tablet by mouth 3 (Three) Times a Day.      Last office visit with prescribing clinician: 10/4/2021      Next office visit with prescribing clinician: 2022            Clary Callejas MA  22, 09:34 EST

## 2022-02-14 ENCOUNTER — OFFICE VISIT (OUTPATIENT)
Dept: FAMILY MEDICINE CLINIC | Facility: CLINIC | Age: 87
End: 2022-02-14

## 2022-02-14 VITALS
HEART RATE: 94 BPM | BODY MASS INDEX: 40.9 KG/M2 | HEIGHT: 72 IN | RESPIRATION RATE: 18 BRPM | TEMPERATURE: 96.1 F | DIASTOLIC BLOOD PRESSURE: 80 MMHG | WEIGHT: 302 LBS | OXYGEN SATURATION: 95 % | SYSTOLIC BLOOD PRESSURE: 136 MMHG

## 2022-02-14 DIAGNOSIS — E78.00 HYPERCHOLESTEROLEMIA: ICD-10-CM

## 2022-02-14 DIAGNOSIS — I10 PRIMARY HYPERTENSION: Primary | ICD-10-CM

## 2022-02-14 DIAGNOSIS — R73.9 HYPERGLYCEMIA: ICD-10-CM

## 2022-02-14 PROCEDURE — 99214 OFFICE O/P EST MOD 30 MIN: CPT | Performed by: INTERNAL MEDICINE

## 2022-02-14 RX ORDER — TIZANIDINE HYDROCHLORIDE 2 MG/1
2 CAPSULE, GELATIN COATED ORAL DAILY PRN
Qty: 90 CAPSULE | Refills: 3 | Status: SHIPPED | OUTPATIENT
Start: 2022-02-14 | End: 2022-10-31 | Stop reason: SDUPTHER

## 2022-02-14 NOTE — PROGRESS NOTES
Subjective   Harry Potts is a 89 y.o. male. Patient is here today for   Chief Complaint   Patient presents with   • Hypertension     lab follow up           Vitals:    02/14/22 0935   BP: 136/80   Pulse: 94   Resp: 18   Temp: 96.1 °F (35.6 °C)   SpO2: 95%     Body mass index is 40.95 kg/m².      Past Medical History:   Diagnosis Date   • Arthritis    • Cancer (HCC)     basal cell    • COPD (chronic obstructive pulmonary disease) (HCC)    • Difficulty walking    • Elevated cholesterol    • Environmental allergies    • GERD (gastroesophageal reflux disease)    • HL (hearing loss)    • Hyperglycemia    • Hyperlipidemia    • Hypertension    • Obesity    • Sleep apnea    • Spondylolysis, lumbar region       Allergies   Allergen Reactions   • Atorvastatin Other (See Comments)     SPASMS LEG      • Morphine And Related Unknown - Low Severity      Social History     Socioeconomic History   • Marital status:    Tobacco Use   • Smoking status: Never Smoker   • Smokeless tobacco: Never Used   Vaping Use   • Vaping Use: Never used   Substance and Sexual Activity   • Alcohol use: No   • Drug use: No   • Sexual activity: Defer        Current Outpatient Medications:   •  amLODIPine (NORVASC) 10 MG tablet, TAKE 1 TABLET DAILY, Disp: 90 tablet, Rfl: 3  •  aspirin 81 MG EC tablet, Take 81 mg by mouth Daily., Disp: , Rfl:   •  atorvastatin (LIPITOR) 20 MG tablet, , Disp: , Rfl:   •  azelastine (ASTELIN) 0.1 % nasal spray, 1 spray into the nostril(s) as directed by provider Daily., Disp: 1 each, Rfl: 11  •  celecoxib (CeleBREX) 200 MG capsule, TAKE 1 CAPSULE DAILY AS NEEDED, Disp: 90 capsule, Rfl: 3  •  chlorthalidone (HYGROTON) 25 MG tablet, Take 1 tablet by mouth Daily., Disp: 90 tablet, Rfl: 3  •  doxazosin (CARDURA) 4 MG tablet, TAKE 1 TABLET DAILY AS DIRECTED, Disp: 90 tablet, Rfl: 3  •  esomeprazole (nexIUM) 40 MG capsule, TAKE 1 CAPSULE DAILY, Disp: 90 capsule, Rfl: 3  •  ezetimibe (ZETIA) 10 MG tablet, TAKE 1 TABLET  DAILY, Disp: 90 tablet, Rfl: 3  •  fluticasone (FLONASE) 50 MCG/ACT nasal spray, 1 spray into the nostril(s) as directed by provider Daily., Disp: 11.1 mL, Rfl: 2  •  loratadine (CLARITIN) 10 MG tablet, Take  by mouth Daily., Disp: , Rfl:   •  metaxalone (SKELAXIN) 800 MG tablet, Take 1 tablet by mouth 3 (Three) Times a Day., Disp: 270 tablet, Rfl: 3  •  metoprolol succinate XL (TOPROL-XL) 50 MG 24 hr tablet, Take 1 tablet by mouth Daily. Takes whole tab for 50 mg daily, Disp: 90 tablet, Rfl: 3  •  Multiple Vitamins-Minerals (CENTRUM ADULTS PO), Take  by mouth Daily., Disp: , Rfl:   •  Omega-3 Fatty Acids (FISH OIL) 1000 MG capsule capsule, Take  by mouth Daily With Breakfast., Disp: , Rfl:   •  Ventolin  (90 Base) MCG/ACT inhaler, Inhale 2 puffs Every 6 (Six) Hours As Needed for Wheezing. Pt wont take d/t cost, Disp: 18 g, Rfl: 3  •  fluticasone-salmeterol (Advair Diskus) 250-50 MCG/DOSE DISKUS, Inhale 1 puff 2 (Two) Times a Day., Disp: 180 each, Rfl: 3  •  TiZANidine (ZANAFLEX) 2 MG capsule, Take 1 capsule by mouth Daily As Needed for Muscle Spasms., Disp: 90 capsule, Rfl: 3     Objective     He is here to follow-up on labs from last week.    He has no complaints.    Hypertension         Review of Systems   Constitutional: Negative.    HENT: Negative.    Respiratory: Negative.    Cardiovascular: Negative.    Musculoskeletal: Negative.    Psychiatric/Behavioral: Negative.        Physical Exam  Vitals and nursing note reviewed.   Constitutional:       Appearance: Normal appearance. He is obese.      Comments: Pleasant, neatly groomed, no distress.   Cardiovascular:      Rate and Rhythm: Regular rhythm.      Heart sounds: Normal heart sounds. No murmur heard.  No gallop.    Pulmonary:      Effort: No respiratory distress.      Breath sounds: Normal breath sounds. No wheezing or rales.   Neurological:      Mental Status: He is alert and oriented to person, place, and time.   Psychiatric:         Mood and  Affect: Mood normal.         Behavior: Behavior normal.         Thought Content: Thought content normal.           Problems Addressed this Visit        Cardiac and Vasculature    Hypercholesterolemia    Hypertension - Primary       Endocrine and Metabolic    Hyperglycemia      Diagnoses       Codes Comments    Primary hypertension    -  Primary ICD-10-CM: I10  ICD-9-CM: 401.9     Hypercholesterolemia     ICD-10-CM: E78.00  ICD-9-CM: 272.0     Hyperglycemia     ICD-10-CM: R73.9  ICD-9-CM: 790.29             PLAN  His hypertension is well controlled.    He had an episode of abdominal lysis about a year ago.  His statin was discontinued at that time.  He continues to take Zetia.  His cholesterol looks okay.  (He has had no further myalgias).    He has chronic degenerative changes.  He ambulates with a walker.  That is stable.    He has hyperglycemia.  His hemoglobin A1c 4 weeks ago was 6.2% reflecting pretty good control of his hyperglycemia.    Overall, I think he is doing pretty well.  I asked him to follow-up with me in about 4 months.  No follow-ups on file.

## 2022-02-21 RX ORDER — CHLORTHALIDONE 25 MG/1
TABLET ORAL
Qty: 90 TABLET | Refills: 3 | Status: SHIPPED | OUTPATIENT
Start: 2022-02-21 | End: 2023-02-14

## 2022-04-21 RX ORDER — DOXAZOSIN MESYLATE 4 MG/1
TABLET ORAL
Qty: 90 TABLET | Refills: 3 | Status: ON HOLD | OUTPATIENT
Start: 2022-04-21

## 2022-04-21 RX ORDER — AMLODIPINE BESYLATE 10 MG/1
TABLET ORAL
Qty: 90 TABLET | Refills: 3 | Status: ON HOLD | OUTPATIENT
Start: 2022-04-21

## 2022-06-06 NOTE — TELEPHONE ENCOUNTER
Rx Refill Note  Requested Prescriptions     Pending Prescriptions Disp Refills   • Ventolin  (90 Base) MCG/ACT inhaler 18 g 3     Sig: Inhale 2 puffs Every 6 (Six) Hours As Needed for Wheezing. Pt wont take d/t cost      Last office visit with prescribing clinician: 2/14/2022      Next office visit with prescribing clinician: 6/20/2022            Clary Callejas MA  06/06/22, 11:12 EDT

## 2022-06-07 RX ORDER — ALBUTEROL SULFATE 90 UG/1
2 AEROSOL, METERED RESPIRATORY (INHALATION) EVERY 6 HOURS PRN
Qty: 18 G | Refills: 3 | Status: SHIPPED | OUTPATIENT
Start: 2022-06-07 | End: 2022-06-20

## 2022-06-08 NOTE — TELEPHONE ENCOUNTER
PT CALLED IN STATING WAS ADVISED BY [facility] TODAY THAT PA IS NEED FOR Ventolin  (90 Base) MCG/ACT inhaler     PT ADVISED THAT INSURANCE SUGGESTED ALBUTEROL 90MCG INHALER.  PLEASE CHECK WITH DR AND SEE IF HE WOULD LIKE TO CHANGE MEDICATION OR WHAT HE RECOMMENDS.    IF YOU HAVE ANY QUESTIONS PLEASE CONTACT PT [phone]

## 2022-06-09 DIAGNOSIS — R73.9 HYPERGLYCEMIA: ICD-10-CM

## 2022-06-09 DIAGNOSIS — E78.00 HYPERCHOLESTEROLEMIA: Primary | ICD-10-CM

## 2022-06-20 ENCOUNTER — OFFICE VISIT (OUTPATIENT)
Dept: FAMILY MEDICINE CLINIC | Facility: CLINIC | Age: 87
End: 2022-06-20

## 2022-06-20 VITALS
HEIGHT: 72 IN | OXYGEN SATURATION: 92 % | RESPIRATION RATE: 14 BRPM | SYSTOLIC BLOOD PRESSURE: 118 MMHG | HEART RATE: 48 BPM | DIASTOLIC BLOOD PRESSURE: 70 MMHG | WEIGHT: 298.4 LBS | BODY MASS INDEX: 40.42 KG/M2 | TEMPERATURE: 97.1 F

## 2022-06-20 DIAGNOSIS — M47.896 OTHER OSTEOARTHRITIS OF SPINE, LUMBAR REGION: ICD-10-CM

## 2022-06-20 DIAGNOSIS — I10 PRIMARY HYPERTENSION: Primary | ICD-10-CM

## 2022-06-20 DIAGNOSIS — M54.50 CHRONIC LOW BACK PAIN WITHOUT SCIATICA, UNSPECIFIED BACK PAIN LATERALITY: ICD-10-CM

## 2022-06-20 DIAGNOSIS — R73.9 HYPERGLYCEMIA: ICD-10-CM

## 2022-06-20 DIAGNOSIS — E66.01 MORBID OBESITY: ICD-10-CM

## 2022-06-20 DIAGNOSIS — G89.29 CHRONIC LOW BACK PAIN WITHOUT SCIATICA, UNSPECIFIED BACK PAIN LATERALITY: ICD-10-CM

## 2022-06-20 DIAGNOSIS — E78.00 HYPERCHOLESTEROLEMIA: ICD-10-CM

## 2022-06-20 DIAGNOSIS — J43.9 PULMONARY EMPHYSEMA, UNSPECIFIED EMPHYSEMA TYPE: ICD-10-CM

## 2022-06-20 PROCEDURE — 99214 OFFICE O/P EST MOD 30 MIN: CPT | Performed by: INTERNAL MEDICINE

## 2022-06-20 RX ORDER — ALBUTEROL SULFATE 90 UG/1
2 AEROSOL, METERED RESPIRATORY (INHALATION) EVERY 4 HOURS PRN
Qty: 18 G | Refills: 5 | Status: SHIPPED | OUTPATIENT
Start: 2022-06-20 | End: 2022-11-01 | Stop reason: SDUPTHER

## 2022-06-20 NOTE — PROGRESS NOTES
Subjective   Harry Potts is a 89 y.o. male. Patient is here today for   Chief Complaint   Patient presents with   • Follow-up     Pt here for 4 month follow up.          Vitals:    06/20/22 1027   BP: 118/70   Pulse: (!) 48   Resp: 14   Temp: 97.1 °F (36.2 °C)   SpO2: 92%     Body mass index is 40.46 kg/m².      Past Medical History:   Diagnosis Date   • Arthritis    • Cancer (HCC)     basal cell    • COPD (chronic obstructive pulmonary disease) (HCC)    • Difficulty walking    • Elevated cholesterol    • Environmental allergies    • GERD (gastroesophageal reflux disease)    • HL (hearing loss)    • Hyperglycemia    • Hyperlipidemia    • Hypertension    • Obesity    • Sleep apnea    • Spondylolysis, lumbar region       Allergies   Allergen Reactions   • Atorvastatin Other (See Comments)     SPASMS LEG      • Morphine And Related Unknown - Low Severity      Social History     Socioeconomic History   • Marital status:    Tobacco Use   • Smoking status: Never Smoker   • Smokeless tobacco: Never Used   Vaping Use   • Vaping Use: Never used   Substance and Sexual Activity   • Alcohol use: No   • Drug use: No   • Sexual activity: Defer        Current Outpatient Medications:   •  amLODIPine (NORVASC) 10 MG tablet, TAKE 1 TABLET DAILY, Disp: 90 tablet, Rfl: 3  •  aspirin 81 MG EC tablet, Take 81 mg by mouth Daily., Disp: , Rfl:   •  azelastine (ASTELIN) 0.1 % nasal spray, 1 spray into the nostril(s) as directed by provider Daily., Disp: 1 each, Rfl: 11  •  celecoxib (CeleBREX) 200 MG capsule, TAKE 1 CAPSULE DAILY AS NEEDED, Disp: 90 capsule, Rfl: 3  •  chlorthalidone (HYGROTON) 25 MG tablet, TAKE 1 TABLET DAILY, Disp: 90 tablet, Rfl: 3  •  doxazosin (CARDURA) 4 MG tablet, TAKE 1 TABLET DAILY AS DIRECTED, Disp: 90 tablet, Rfl: 3  •  esomeprazole (nexIUM) 40 MG capsule, TAKE 1 CAPSULE DAILY, Disp: 90 capsule, Rfl: 3  •  ezetimibe (ZETIA) 10 MG tablet, TAKE 1 TABLET DAILY, Disp: 90 tablet, Rfl: 3  •  fluticasone  (FLONASE) 50 MCG/ACT nasal spray, 1 spray into the nostril(s) as directed by provider Daily., Disp: 11.1 mL, Rfl: 2  •  fluticasone-salmeterol (Advair Diskus) 250-50 MCG/DOSE DISKUS, Inhale 1 puff 2 (Two) Times a Day., Disp: 180 each, Rfl: 3  •  loratadine (CLARITIN) 10 MG tablet, Take  by mouth Daily., Disp: , Rfl:   •  metaxalone (SKELAXIN) 800 MG tablet, Take 1 tablet by mouth 3 (Three) Times a Day., Disp: 270 tablet, Rfl: 3  •  metoprolol succinate XL (TOPROL-XL) 50 MG 24 hr tablet, Take 1 tablet by mouth Daily. Takes whole tab for 50 mg daily, Disp: 90 tablet, Rfl: 3  •  Multiple Vitamins-Minerals (CENTRUM ADULTS PO), Take  by mouth Daily., Disp: , Rfl:   •  Omega-3 Fatty Acids (FISH OIL) 1000 MG capsule capsule, Take  by mouth Daily With Breakfast., Disp: , Rfl:   •  TiZANidine (ZANAFLEX) 2 MG capsule, Take 1 capsule by mouth Daily As Needed for Muscle Spasms., Disp: 90 capsule, Rfl: 3  •  albuterol sulfate  (90 Base) MCG/ACT inhaler, Inhale 2 puffs Every 4 (Four) Hours As Needed for Wheezing., Disp: 18 g, Rfl: 5  •  atorvastatin (LIPITOR) 20 MG tablet, , Disp: , Rfl:      Objective     He is here to follow-up on hypertension.    Somehow, he had a prescription for Ventolin (name branded albuterol) and paid a premium price get this name branded prescription.  He uses this medication 4 times a day as needed.  He is accompanied by his wife.    Uses Advair Diskus 2 elations daily as well.    He had some labs done on Kiersten 15 which we reviewed today.    He never complains but I know he has severe degenerative arthritis in his knees and his low back.  He ambulates with a walker.       Review of Systems   Constitutional: Negative.    HENT: Negative.    Respiratory: Negative.    Cardiovascular: Negative.    Musculoskeletal:        He has chronic lumbar spine pain and bilateral knee osteoarthritis.   Psychiatric/Behavioral: Negative.        Physical Exam  Vitals and nursing note reviewed.   Constitutional:        Appearance: Normal appearance. He is obese.      Comments: Pleasant, neatly groomed, no distress.  BMI 40.   HENT:      Head: Normocephalic and atraumatic.   Neck:      Vascular: No carotid bruit.   Cardiovascular:      Rate and Rhythm: Regular rhythm.      Heart sounds: Normal heart sounds. No murmur heard.    No gallop.   Pulmonary:      Effort: No respiratory distress.      Breath sounds: Normal breath sounds. No wheezing or rales.   Neurological:      Mental Status: He is alert and oriented to person, place, and time.   Psychiatric:         Mood and Affect: Mood normal.         Behavior: Behavior normal.         Thought Content: Thought content normal.           Problems Addressed this Visit        Cardiac and Vasculature    Hypercholesterolemia    Hypertension - Primary       Endocrine and Metabolic    Hyperglycemia    Morbid obesity (HCC)       Musculoskeletal and Injuries    Chronic low back pain       Neuro    Osteoarthritis of lumbar spine       Pulmonary and Pneumonias    COPD (chronic obstructive pulmonary disease) (HCC)    Relevant Medications    albuterol sulfate  (90 Base) MCG/ACT inhaler      Diagnoses       Codes Comments    Primary hypertension    -  Primary ICD-10-CM: I10  ICD-9-CM: 401.9     Hypercholesterolemia     ICD-10-CM: E78.00  ICD-9-CM: 272.0     Hyperglycemia     ICD-10-CM: R73.9  ICD-9-CM: 790.29     Morbid obesity (HCC)     ICD-10-CM: E66.01  ICD-9-CM: 278.01     Chronic low back pain without sciatica, unspecified back pain laterality     ICD-10-CM: M54.50, G89.29  ICD-9-CM: 724.2, 338.29     Other osteoarthritis of spine, lumbar region     ICD-10-CM: M47.896  ICD-9-CM: 721.3     Pulmonary emphysema, unspecified emphysema type (HCC)     ICD-10-CM: J43.9  ICD-9-CM: 492.8             PLAN  He and I reviewed his labs.  He has excellent control of his hypercholesterolemia.    He has hyperglycemia and his hemoglobin A1c is less than 6%.    He is morbidly obese with a BMI of 40.    I  have asked him to do what he could do to lose weight though he is limited on exercise so he is going to have to eat a little less.  Caloric restriction would help with weight loss.    He has chronic back pain secondary to severe end-stage degenerative arthritis in his lumbar spine.  Even so, he had no complaints about this today.    He has pulmonary emphysema.  He takes Advair 1 inhalation twice daily and albuterol 2 elations 4 times daily as needed.  I made sure to send a prescription for albuterol (not Ventolin) to his pharmacy.    Would like to have him back in 3 to 4 months for an annual wellness visit.  Fasting labs prior to that visit should include, hemoglobin A1c, comprehensive metabolic panel, CBC, urinalysis, vitamin D level would be good to screen for vitamin D deficiency.      No follow-ups on file.

## 2022-07-13 ENCOUNTER — TELEPHONE (OUTPATIENT)
Dept: FAMILY MEDICINE CLINIC | Facility: CLINIC | Age: 87
End: 2022-07-13

## 2022-07-13 NOTE — TELEPHONE ENCOUNTER
Caller: Harry Potts    Relationship: Self    Best call back number: [phone] (H)    PATIENT IS CALLING REQUESTING DR VALDEZ CALL HIM BACK FOR THE FOLLOWING REASONS:    PATIENT FELL ON 7.12.22 AND SAYS NOTHING IS BROKEN BUT HE HAD TO GET HELP FROM HIS DAUGHTER DUE TO HIS KNEES. HE ALSO STATES THAT HIS HEART RATE IS IN THE 40'S WHEN IT SHOULD BE IN THE 60'S. HE WENT FROM ONE TABLET TO A HALF OF ONE ON HIS MEDICATION metoprolol succinate XL (TOPROL-XL) 50 MG 24 hr tablet.   HE IS ALSO WONDERING IF HE CAN GET OXYGEN IN HIS CPAP MACHINE BECAUSE HE HAS TO SLEEP WITH 3 PILLOWS AT NIGHT DUE TO HIS ALLERGIES. HE STATES HE IS FINE AND JUST WANTS TO ASK DR VALDEZ ABOUT THESE.     PLEASE CALL PATIENT AND ADVISE.

## 2022-07-13 NOTE — TELEPHONE ENCOUNTER
Pt would need a appointment to discuss concerns. Called pt no answer left VM to call office back to schedule a appointment.

## 2022-07-14 ENCOUNTER — TELEPHONE (OUTPATIENT)
Dept: FAMILY MEDICINE CLINIC | Facility: CLINIC | Age: 87
End: 2022-07-14

## 2022-07-14 ENCOUNTER — NURSE TRIAGE (OUTPATIENT)
Dept: CALL CENTER | Facility: HOSPITAL | Age: 87
End: 2022-07-14

## 2022-07-14 NOTE — TELEPHONE ENCOUNTER
PT CALLED IN TO REPORT  HEART RATE AT 40 3 DAYS AGO. PT STARTED TAKING HALF OF metoprolol succinate XL (TOPROL-XL) 50 MG AND STATED HIS HEART RATE HAS MAINTAINED AT 60 SINCE THEN. PT STATED HE FELL. SCHEDULED PATIENT FOR AN APPOINTMENT TO DISCUSS WITH .  DOES PT NEED TO BE SOONER THAN 7/19/2022, PLEAS ADVISE ?

## 2022-07-14 NOTE — TELEPHONE ENCOUNTER
HUB    HR was in the 40's a few days ago but now 60's for 3 days. Feels ok and thinks medications should be adjusted  Has cut medication in half hisself and heart rate doing much better has been in the 60's  Warm transfer to MD office.        Reason for Disposition  • Age > 60 years (Exception: brief heartbeat symptoms that went away and now feels well)    Additional Information  • Negative: Passed out (i.e., lost consciousness, collapsed and was not responding)  • Negative: Shock suspected (e.g., cold/pale/clammy skin, too weak to stand, low BP, rapid pulse)  • Negative: Difficult to awaken or acting confused (e.g., disoriented, slurred speech)  • Negative: Visible sweat on face or sweat dripping down face  • Negative: Unable to walk, or can only walk with assistance (e.g., requires support)  • Negative: [1] Received SHOCK from implantable cardiac defibrillator AND [2] persisting symptoms (i.e., palpitations, lightheadedness)  • Negative: [1] Dizziness, lightheadedness, or weakness AND [2] heart beating very rapidly (e.g., > 140 / minute)  • Negative: [1] Dizziness, lightheadedness, or weakness AND [2] heart beating very slowly  (e.g., < 50 / minute)  • Negative: Sounds like a life-threatening emergency to the triager  • Negative: Chest pain  • Negative: Implantable Cardiac Defibrillator (ICD) or a pacemaker symptoms or questions  • Negative: Difficulty breathing  • Negative: Dizziness, lightheadedness, or weakness  • Negative: [1] Heart beating very rapidly (e.g., > 140 / minute) AND [2] present now  (Exception: during exercise)  • Negative: Heart beating very slowly (e.g., < 50 / minute)  (Exception: athlete and heart rate normal for caller)  • Negative: New or worsened shortness of breath with activity (dyspnea on exertion)  • Negative: Patient sounds very sick or weak to the triager  • Negative: [1] Heart beating very rapidly (e.g., > 140 / minute) AND [2] not present now  (Exception: during exercise)  •  "Negative: [1] Skipped or extra beat(s) AND [2] increases with exercise or exertion  • Negative: [1] Skipped or extra beat(s) AND [2] occurs 4 or more times per minute  • Negative: New or worsened ankle swelling  • Negative: History of heart disease  (i.e., heart attack, bypass surgery, angina, angioplasty, CHF) (Exception: brief heartbeat symptoms that went away and now feels well)    Answer Assessment - Initial Assessment Questions  1. DESCRIPTION: \"Please describe your heart rate or heartbeat that you are having\" (e.g., fast/slow, regular/irregular, skipped or extra beats, \"palpitations\")     Slow heart rate   2. ONSET: \"When did it start?\" (Minutes, hours or days)   3 days ago  3. DURATION: \"How long does it last\" (e.g., seconds, minutes, hours)   just a few minutes maybe 5 minutes a few days ago  4. PATTERN \"Does it come and go, or has it been constant since it started?\"  \"Does it get worse with exertion?\"   \"Are you feeling it now?\"      *No Answer*  5. TAP: \"Using your hand, can you tap out what you are feeling on a chair or table in front of you, so that I can hear?\" (Note: not all patients can do this)        *No Answer*  6. HEART RATE: \"Can you tell me your heart rate?\" \"How many beats in 15 seconds?\"  (Note: not all patients can do this)      40's for about 5 minutes.  7. RECURRENT SYMPTOM: \"Have you ever had this before?\" If Yes, ask: \"When was the last time?\" and \"What happened that time?\"       *No Answer*  8. CAUSE: \"What do you think is causing the palpitations?\"      *No Answer*  9. CARDIAC HISTORY: \"Do you have any history of heart disease?\" (e.g., heart attack, angina, bypass surgery, angioplasty, arrhythmia)       *No Answer*  10. OTHER SYMPTOMS: \"Do you have any other symptoms?\" (e.g., dizziness, chest pain, sweating, difficulty breathing)  denied  11. PREGNANCY: \"Is there any chance you are pregnant?\" \"When was your last menstrual period?\"        *No Answer*    Protocols used: HEART RATE AND " HEARTBEAT QUESTIONS-ADULT-AH

## 2022-07-19 ENCOUNTER — OFFICE VISIT (OUTPATIENT)
Dept: FAMILY MEDICINE CLINIC | Facility: CLINIC | Age: 87
End: 2022-07-19

## 2022-07-19 VITALS
TEMPERATURE: 97 F | DIASTOLIC BLOOD PRESSURE: 80 MMHG | RESPIRATION RATE: 18 BRPM | SYSTOLIC BLOOD PRESSURE: 144 MMHG | HEIGHT: 72 IN | BODY MASS INDEX: 40.36 KG/M2 | WEIGHT: 298 LBS

## 2022-07-19 DIAGNOSIS — I10 PRIMARY HYPERTENSION: Primary | ICD-10-CM

## 2022-07-19 DIAGNOSIS — I49.9 IRREGULARLY IRREGULAR CARDIAC RHYTHM: ICD-10-CM

## 2022-07-19 PROCEDURE — 99214 OFFICE O/P EST MOD 30 MIN: CPT | Performed by: INTERNAL MEDICINE

## 2022-07-19 NOTE — PROGRESS NOTES
Subjective   Harry Potts is a 89 y.o. male. Patient is here today for   Chief Complaint   Patient presents with   • Fall     Pt had a fall and heart rate has been low in the 40s, discuss medication           Vitals:    07/19/22 1137   BP: 144/80   Resp: 18   Temp: 97 °F (36.1 °C)     Body mass index is 40.41 kg/m².      Past Medical History:   Diagnosis Date   • Arthritis    • Cancer (HCC)     basal cell    • COPD (chronic obstructive pulmonary disease) (HCC)    • Difficulty walking    • Elevated cholesterol    • Environmental allergies    • GERD (gastroesophageal reflux disease)    • HL (hearing loss)    • Hyperglycemia    • Hyperlipidemia    • Hypertension    • Obesity    • Sleep apnea    • Spondylolysis, lumbar region       Allergies   Allergen Reactions   • Atorvastatin Other (See Comments)     SPASMS LEG      • Morphine And Related Unknown - Low Severity      Social History     Socioeconomic History   • Marital status:    Tobacco Use   • Smoking status: Never Smoker   • Smokeless tobacco: Never Used   Vaping Use   • Vaping Use: Never used   Substance and Sexual Activity   • Alcohol use: No   • Drug use: No   • Sexual activity: Defer        Current Outpatient Medications:   •  albuterol sulfate  (90 Base) MCG/ACT inhaler, Inhale 2 puffs Every 4 (Four) Hours As Needed for Wheezing., Disp: 18 g, Rfl: 5  •  amLODIPine (NORVASC) 10 MG tablet, TAKE 1 TABLET DAILY, Disp: 90 tablet, Rfl: 3  •  aspirin 81 MG EC tablet, Take 81 mg by mouth Daily., Disp: , Rfl:   •  atorvastatin (LIPITOR) 20 MG tablet, , Disp: , Rfl:   •  azelastine (ASTELIN) 0.1 % nasal spray, 1 spray into the nostril(s) as directed by provider Daily., Disp: 1 each, Rfl: 11  •  celecoxib (CeleBREX) 200 MG capsule, TAKE 1 CAPSULE DAILY AS NEEDED, Disp: 90 capsule, Rfl: 3  •  chlorthalidone (HYGROTON) 25 MG tablet, TAKE 1 TABLET DAILY, Disp: 90 tablet, Rfl: 3  •  doxazosin (CARDURA) 4 MG tablet, TAKE 1 TABLET DAILY AS DIRECTED, Disp: 90  tablet, Rfl: 3  •  esomeprazole (nexIUM) 40 MG capsule, TAKE 1 CAPSULE DAILY, Disp: 90 capsule, Rfl: 3  •  ezetimibe (ZETIA) 10 MG tablet, TAKE 1 TABLET DAILY, Disp: 90 tablet, Rfl: 3  •  fluticasone (FLONASE) 50 MCG/ACT nasal spray, 1 spray into the nostril(s) as directed by provider Daily., Disp: 11.1 mL, Rfl: 2  •  fluticasone-salmeterol (Advair Diskus) 250-50 MCG/DOSE DISKUS, Inhale 1 puff 2 (Two) Times a Day., Disp: 180 each, Rfl: 3  •  loratadine (CLARITIN) 10 MG tablet, Take  by mouth Daily., Disp: , Rfl:   •  metaxalone (SKELAXIN) 800 MG tablet, Take 1 tablet by mouth 3 (Three) Times a Day., Disp: 270 tablet, Rfl: 3  •  metoprolol succinate XL (TOPROL-XL) 50 MG 24 hr tablet, Take 1 tablet by mouth Daily. Takes whole tab for 50 mg daily, Disp: 90 tablet, Rfl: 3  •  Multiple Vitamins-Minerals (CENTRUM ADULTS PO), Take  by mouth Daily., Disp: , Rfl:   •  Omega-3 Fatty Acids (FISH OIL) 1000 MG capsule capsule, Take  by mouth Daily With Breakfast., Disp: , Rfl:   •  TiZANidine (ZANAFLEX) 2 MG capsule, Take 1 capsule by mouth Daily As Needed for Muscle Spasms., Disp: 90 capsule, Rfl: 3     Objective     This patient fell at home.  He tells me that he did not want him injuring himself but he is concerned that his slow heart rate may have had something to do with his falling.    He did not develop any syncope.  He did not have any vertigo.  He landed on his left knee when he fell.    He is able to get back up on his feet pretty easily.  He ambulates with a walker around the house.    His daughter is a patient of mine she is a pharmacist.  He should jested to her dad that he cut back on his Toprol-XL 50 mg to 1/2 tablet daily which she did.  He has had no episodes since then.           Review of Systems   Constitutional: Negative.    HENT: Negative.    Respiratory: Negative.    Cardiovascular: Negative.  Negative for palpitations.   Musculoskeletal: Negative.    Psychiatric/Behavioral: Negative.        Physical  Exam  Vitals and nursing note reviewed.   Constitutional:       Appearance: Normal appearance. He is obese.      Comments: Pleasant, neatly groomed, in no distress.   Cardiovascular:      Rate and Rhythm: Regular rhythm.      Heart sounds: Normal heart sounds. No murmur heard.    No gallop.      Comments: Is an irregularly irregular rhythm with a regular rate.  Pulmonary:      Effort: Pulmonary effort is normal.      Breath sounds: Normal breath sounds.   Neurological:      Mental Status: He is alert and oriented to person, place, and time.   Psychiatric:         Mood and Affect: Mood normal.         Behavior: Behavior normal.         Thought Content: Thought content normal.           Problems Addressed this Visit        Cardiac and Vasculature    Hypertension - Primary    Irregularly irregular cardiac rhythm      Diagnoses       Codes Comments    Primary hypertension    -  Primary ICD-10-CM: I10  ICD-9-CM: 401.9     Irregularly irregular cardiac rhythm     ICD-10-CM: I49.9  ICD-9-CM: 427.9             PLAN  He has an irregularly irregular rhythm with a controlled ventricular response.  I think it was a good idea to decrease his metoprolol XL from 50 mg to 25 mg daily.  He will continue 0.5 mg daily.    I referred him to see his cardiologist.  Its been a while since he saw them last.    He has an appointment to follow-up with me in October.  I asked him to call let me know if he is having any difficulty in the meantime.    His hypertension appears relatively well controlled in spite of decreasing his Toprol to 25 mg daily.          No follow-ups on file.

## 2022-08-19 ENCOUNTER — OFFICE VISIT (OUTPATIENT)
Dept: CARDIOLOGY | Facility: CLINIC | Age: 87
End: 2022-08-19

## 2022-08-19 VITALS
DIASTOLIC BLOOD PRESSURE: 92 MMHG | HEART RATE: 45 BPM | HEIGHT: 70 IN | BODY MASS INDEX: 41.66 KG/M2 | WEIGHT: 291 LBS | OXYGEN SATURATION: 92 % | SYSTOLIC BLOOD PRESSURE: 162 MMHG

## 2022-08-19 DIAGNOSIS — Z99.89 OSA ON CPAP: ICD-10-CM

## 2022-08-19 DIAGNOSIS — E66.01 MORBID OBESITY: ICD-10-CM

## 2022-08-19 DIAGNOSIS — J43.9 PULMONARY EMPHYSEMA, UNSPECIFIED EMPHYSEMA TYPE: ICD-10-CM

## 2022-08-19 DIAGNOSIS — G47.33 OSA ON CPAP: ICD-10-CM

## 2022-08-19 DIAGNOSIS — I10 PRIMARY HYPERTENSION: Primary | ICD-10-CM

## 2022-08-19 DIAGNOSIS — I49.3 PVC (PREMATURE VENTRICULAR CONTRACTION): ICD-10-CM

## 2022-08-19 DIAGNOSIS — E78.00 HYPERCHOLESTEROLEMIA: ICD-10-CM

## 2022-08-19 PROCEDURE — 93000 ELECTROCARDIOGRAM COMPLETE: CPT | Performed by: INTERNAL MEDICINE

## 2022-08-19 PROCEDURE — 99214 OFFICE O/P EST MOD 30 MIN: CPT | Performed by: INTERNAL MEDICINE

## 2022-08-19 RX ORDER — METOPROLOL SUCCINATE 50 MG/1
TABLET, EXTENDED RELEASE ORAL
COMMUNITY
Start: 2022-07-14 | End: 2022-10-31

## 2022-08-19 NOTE — PROGRESS NOTES
Subjective:     Encounter Date:08/19/2022      Patient ID: Harry Potts is a 89 y.o. male.    Chief Complaint:  History of Present Illness    This is an 89-year-old with hypertension, hyperlipidemia, obesity, asthma, chronic hypoxic respiratory failure on chronic O2 at 2 L nasal cannula, PVCs, who presents for an evaluation of bradycardia.    The patient had been evaluated by Dr. Jones in 1/2006 for preoperative clearance.  He had an abnormal EKG at that time.  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      I saw the patient initially in 10/2019 when he presented for complaints of dyspnea on exertion, bradycardia, and orthopnea.  Prior to that office visit he was evaluated by Dr. French who began treating him for asthma.  During his exam he was noted to be bradycardia.  There was also some concerns for orthopnea.  At his office visit with me he was noted to have frequent PVCs on his EKG including ventricular bigeminy.  I suspect that that this is responsible for the findings of bradycardia.  I recommended proceeding with further work-up with a stress test and an echocardiogram.  These were performed on 11/6/2019.  His echocardiogram showed normal left ventricular systolic function wall motion with an EF of 61%, mild mitral regurgitation, and grade 1 diastolic dysfunction.  His stress test was negative for ischemia.  At that time I recommended follow-up in 6 months.    Patient was lost to follow-up.  This is his first office visit since then.  The patient has closely been followed by Dr. Luis.  He has been started on oxygen and uses this continuously.  He continues to use his CPAP for diagnosis for sleep apnea although he admits that he is not followed up with sleep medicine or had his CPAP reevaluated in several years.  He is wondering if he requires oxygen at night with his  CPAP.    He reports that a few weeks ago he suffered a fall while in the kitchen helping his wife prepare dinner.  It is unclear if he tripped and lost his balance and fell or if there was any loss of consciousness.  The patient cannot remember exactly what happened.  Regardless he did not lose consciousness following his fall and was awake and alert.  He began checking his heart rates and blood pressure after that fall.  He noticed that his heart rates were running low in the 40s.  Because of this his daughter who is a pharmacist suggested that he decrease his metoprolol succinate to 25 mg daily.    Following the decrease the metoprolol succinate the patient reports that his systolic pressures have remained in the 140s over 80s.  He does not know what his blood pressures were doing prior to decreasing the metoprolol.  It does not sound like he has had any further significant issues with his heart rates.  Overall though he does feel better on the lower dose of metoprolol feeling less fatigued and sluggish.    He otherwise denies any chest pain, palpitations, orthopnea, near-syncope or syncope, worsening lower extremity edema or worsening shortness of breath.    Review of Systems   Constitutional: Positive for malaise/fatigue.   HENT: Negative for hearing loss, hoarse voice, nosebleeds and sore throat.    Eyes: Negative for pain.   Cardiovascular: Positive for dyspnea on exertion. Negative for chest pain, claudication, cyanosis, irregular heartbeat, leg swelling, near-syncope, orthopnea, palpitations, paroxysmal nocturnal dyspnea and syncope.   Respiratory: Negative for shortness of breath and snoring.    Endocrine: Negative for cold intolerance, heat intolerance, polydipsia, polyphagia and polyuria.   Skin: Negative for itching and rash.   Musculoskeletal: Negative for arthritis, falls, joint pain, joint swelling, muscle cramps, muscle weakness and myalgias.   Gastrointestinal: Negative for constipation, diarrhea,  dysphagia, heartburn, hematemesis, hematochezia, melena, nausea and vomiting.   Genitourinary: Negative for frequency, hematuria and hesitancy.   Neurological: Positive for loss of balance. Negative for excessive daytime sleepiness, dizziness, headaches, light-headedness, numbness and weakness.   Psychiatric/Behavioral: Negative for depression. The patient is not nervous/anxious.          Current Outpatient Medications:   •  albuterol sulfate  (90 Base) MCG/ACT inhaler, Inhale 2 puffs Every 4 (Four) Hours As Needed for Wheezing., Disp: 18 g, Rfl: 5  •  amLODIPine (NORVASC) 10 MG tablet, TAKE 1 TABLET DAILY, Disp: 90 tablet, Rfl: 3  •  aspirin 81 MG EC tablet, Take 81 mg by mouth Daily., Disp: , Rfl:   •  azelastine (ASTELIN) 0.1 % nasal spray, 1 spray into the nostril(s) as directed by provider Daily., Disp: 1 each, Rfl: 11  •  celecoxib (CeleBREX) 200 MG capsule, TAKE 1 CAPSULE DAILY AS NEEDED, Disp: 90 capsule, Rfl: 3  •  chlorthalidone (HYGROTON) 25 MG tablet, TAKE 1 TABLET DAILY, Disp: 90 tablet, Rfl: 3  •  doxazosin (CARDURA) 4 MG tablet, TAKE 1 TABLET DAILY AS DIRECTED, Disp: 90 tablet, Rfl: 3  •  esomeprazole (nexIUM) 40 MG capsule, TAKE 1 CAPSULE DAILY, Disp: 90 capsule, Rfl: 3  •  ezetimibe (ZETIA) 10 MG tablet, TAKE 1 TABLET DAILY, Disp: 90 tablet, Rfl: 3  •  fluticasone (FLONASE) 50 MCG/ACT nasal spray, 1 spray into the nostril(s) as directed by provider Daily., Disp: 11.1 mL, Rfl: 2  •  fluticasone-salmeterol (Advair Diskus) 250-50 MCG/DOSE DISKUS, Inhale 1 puff 2 (Two) Times a Day., Disp: 180 each, Rfl: 3  •  loratadine (CLARITIN) 10 MG tablet, Take  by mouth Daily., Disp: , Rfl:   •  metoprolol succinate XL (TOPROL-XL) 50 MG 24 hr tablet, , Disp: , Rfl:   •  Multiple Vitamins-Minerals (CENTRUM ADULTS PO), Take  by mouth Daily., Disp: , Rfl:   •  Omega-3 Fatty Acids (FISH OIL) 1000 MG capsule capsule, Take  by mouth Daily With Breakfast., Disp: , Rfl:   •  TiZANidine (ZANAFLEX) 2 MG capsule, Take  "1 capsule by mouth Daily As Needed for Muscle Spasms., Disp: 90 capsule, Rfl: 3    Past Medical History:   Diagnosis Date   • Arthritis    • Cancer (HCC)     basal cell    • COPD (chronic obstructive pulmonary disease) (HCC)    • Difficulty walking    • Elevated cholesterol    • Environmental allergies    • GERD (gastroesophageal reflux disease)    • HL (hearing loss)    • Hyperglycemia    • Hyperlipidemia    • Hypertension    • Obesity    • Sleep apnea    • Spondylolysis, lumbar region        Past Surgical History:   Procedure Laterality Date   • EYE SURGERY     • HERNIA REPAIR     • JOINT REPLACEMENT     • REPLACEMENT TOTAL KNEE BILATERAL         Family History   Family history unknown: Yes       Social History     Tobacco Use   • Smoking status: Never Smoker   • Smokeless tobacco: Never Used   Vaping Use   • Vaping Use: Never used   Substance Use Topics   • Alcohol use: No     Comment: caffeine use   • Drug use: No         ECG 12 Lead    Date/Time: 8/19/2022 5:00 PM  Performed by: Azra Gomes MD  Authorized by: Azra Gomes MD   Comparison: compared with previous ECG   Similar to previous ECG  Rhythm: sinus rhythm  Ectopy: multifocal PVCs  Conduction: right bundle branch block and left anterior fascicular block               Objective:     Visit Vitals  /92 (BP Location: Left arm, Patient Position: Sitting, Cuff Size: Large Adult)   Pulse (!) 45   Ht 177.8 cm (70\")   Wt 132 kg (291 lb)   SpO2 92%   BMI 41.75 kg/m²         Constitutional:       Appearance: Normal appearance. Well-developed.   HENT:      Head: Normocephalic and atraumatic.   Neck:      Vascular: No carotid bruit or JVD.   Pulmonary:      Effort: Pulmonary effort is normal.      Breath sounds: Normal breath sounds.   Cardiovascular:      Normal rate. Frequent ectopic beats. Regular rhythm.      No gallop.   Pulses:     Radial: 2+ bilaterally.  Edema:     Peripheral edema absent.   Abdominal:      Palpations: Abdomen is soft.   Skin:   "   General: Skin is warm and dry.   Neurological:      Mental Status: Alert and oriented to person, place, and time.             Assessment:          Diagnosis Plan   1. Primary hypertension     2. Hypercholesterolemia     3. PVC (premature ventricular contraction)     4. Morbid obesity (HCC)     5. Pulmonary emphysema, unspecified emphysema type (HCC)            Plan:       1.  Bradycardia.  I think this is a false reading by his pulse oximeter and automatic blood pressure cuffs due to frequent PVCs.  Even today our pulse oximeter read his heart rate at 45 while his EKG reads a heart rate of 91 with frequent PVCs.  Regardless I think is reasonable to decrease his metoprolol succinate to 25 mg daily as long as his blood pressures are well controlled since this appears to help his symptoms of sluggishness and fatigue.  I also cannot discount that his metoprolol may have been causing issues with low blood pressures that may have led to his fall.  2.  PVCs.  He has frequent PVCs on his EKG today including couplets.  He is asymptomatic.  He had a prior cardiac work-up for ventricular ectopy with a stress test and an echocardiogram in 2019 that were both unremarkable.  At this point since he is largely stable I do not think we need to repeat a work-up.  As above I think it be reasonable to keep him on the lower dose of metoprolol for now.  3.  Hypertension.  Elevated in the office today but appears to be better controlled at home.  Continue current regimen.  4.  Hyperlipidemia.  On ezetimibe which is managed by Dr. Luis.  5.  COPD.  Managed by Dr. Luis.  6.  Morbid obesity.  The patient is working on losing weight.  Encouraged him on his continued efforts.  7.  Obstructive sleep apnea.  Patient is compliant with CPAP although he has not had his CPAP evaluated in several years.  I recommended a referral to sleep medicine to ensure he is being adequately treated especially since inadequate treatment could be contributing to  his ventricular ectopy.  Referral to sleep medicine made.    I will plan on seeing the patient back again in 6 months.

## 2022-09-29 DIAGNOSIS — R73.9 HYPERGLYCEMIA: ICD-10-CM

## 2022-09-29 DIAGNOSIS — Z12.5 SPECIAL SCREENING FOR MALIGNANT NEOPLASM OF PROSTATE: ICD-10-CM

## 2022-09-29 DIAGNOSIS — E78.00 HYPERCHOLESTEROLEMIA: Primary | ICD-10-CM

## 2022-10-01 LAB
ALBUMIN SERPL-MCNC: 4.3 G/DL (ref 3.5–5.2)
ALBUMIN/GLOB SERPL: 2 G/DL
ALP SERPL-CCNC: 81 U/L (ref 39–117)
ALT SERPL-CCNC: 25 U/L (ref 1–41)
APPEARANCE UR: CLEAR
AST SERPL-CCNC: 26 U/L (ref 1–40)
BACTERIA #/AREA URNS HPF: NORMAL /HPF
BASOPHILS # BLD AUTO: 0.04 10*3/MM3 (ref 0–0.2)
BASOPHILS NFR BLD AUTO: 0.7 % (ref 0–1.5)
BILIRUB SERPL-MCNC: 0.6 MG/DL (ref 0–1.2)
BILIRUB UR QL STRIP: NEGATIVE
BUN SERPL-MCNC: 17 MG/DL (ref 8–23)
BUN/CREAT SERPL: 26.6 (ref 7–25)
CALCIUM SERPL-MCNC: 9.4 MG/DL (ref 8.2–9.6)
CASTS URNS MICRO: NORMAL
CHLORIDE SERPL-SCNC: 97 MMOL/L (ref 98–107)
CHOLEST SERPL-MCNC: 209 MG/DL (ref 0–200)
CO2 SERPL-SCNC: 34.5 MMOL/L (ref 22–29)
COLOR UR: YELLOW
CREAT SERPL-MCNC: 0.64 MG/DL (ref 0.76–1.27)
EGFRCR SERPLBLD CKD-EPI 2021: 89.4 ML/MIN/1.73
EOSINOPHIL # BLD AUTO: 0.25 10*3/MM3 (ref 0–0.4)
EOSINOPHIL NFR BLD AUTO: 4.5 % (ref 0.3–6.2)
EPI CELLS #/AREA URNS HPF: NORMAL /HPF
ERYTHROCYTE [DISTWIDTH] IN BLOOD BY AUTOMATED COUNT: 12.7 % (ref 12.3–15.4)
GLOBULIN SER CALC-MCNC: 2.2 GM/DL
GLUCOSE SERPL-MCNC: 104 MG/DL (ref 65–99)
GLUCOSE UR QL STRIP: NEGATIVE
HBA1C MFR BLD: 6 % (ref 4.8–5.6)
HCT VFR BLD AUTO: 45.3 % (ref 37.5–51)
HDLC SERPL-MCNC: 59 MG/DL (ref 40–60)
HGB BLD-MCNC: 15.5 G/DL (ref 13–17.7)
HGB UR QL STRIP: NEGATIVE
IMM GRANULOCYTES # BLD AUTO: 0.02 10*3/MM3 (ref 0–0.05)
IMM GRANULOCYTES NFR BLD AUTO: 0.4 % (ref 0–0.5)
KETONES UR QL STRIP: NEGATIVE
LDLC SERPL CALC-MCNC: 135 MG/DL (ref 0–100)
LDLC/HDLC SERPL: 2.25 {RATIO}
LEUKOCYTE ESTERASE UR QL STRIP: NEGATIVE
LYMPHOCYTES # BLD AUTO: 1.84 10*3/MM3 (ref 0.7–3.1)
LYMPHOCYTES NFR BLD AUTO: 33 % (ref 19.6–45.3)
MCH RBC QN AUTO: 29.7 PG (ref 26.6–33)
MCHC RBC AUTO-ENTMCNC: 34.2 G/DL (ref 31.5–35.7)
MCV RBC AUTO: 86.8 FL (ref 79–97)
MONOCYTES # BLD AUTO: 0.44 10*3/MM3 (ref 0.1–0.9)
MONOCYTES NFR BLD AUTO: 7.9 % (ref 5–12)
NEUTROPHILS # BLD AUTO: 2.99 10*3/MM3 (ref 1.7–7)
NEUTROPHILS NFR BLD AUTO: 53.5 % (ref 42.7–76)
NITRITE UR QL STRIP: NEGATIVE
NRBC BLD AUTO-RTO: 0 /100 WBC (ref 0–0.2)
PH UR STRIP: 7.5 [PH] (ref 5–8)
PLATELET # BLD AUTO: 154 10*3/MM3 (ref 140–450)
POTASSIUM SERPL-SCNC: 4.1 MMOL/L (ref 3.5–5.2)
PROT SERPL-MCNC: 6.5 G/DL (ref 6–8.5)
PROT UR QL STRIP: ABNORMAL
PSA SERPL-MCNC: 0.2 NG/ML (ref 0–4)
RBC # BLD AUTO: 5.22 10*6/MM3 (ref 4.14–5.8)
RBC #/AREA URNS HPF: NORMAL /HPF
SODIUM SERPL-SCNC: 141 MMOL/L (ref 136–145)
SP GR UR STRIP: 1.02 (ref 1–1.03)
TRIGL SERPL-MCNC: 86 MG/DL (ref 0–150)
UROBILINOGEN UR STRIP-MCNC: ABNORMAL MG/DL
VLDLC SERPL CALC-MCNC: 15 MG/DL (ref 5–40)
WBC # BLD AUTO: 5.58 10*3/MM3 (ref 3.4–10.8)
WBC #/AREA URNS HPF: NORMAL /HPF

## 2022-10-04 ENCOUNTER — OFFICE VISIT (OUTPATIENT)
Dept: FAMILY MEDICINE CLINIC | Facility: CLINIC | Age: 87
End: 2022-10-04

## 2022-10-04 VITALS
TEMPERATURE: 98 F | OXYGEN SATURATION: 92 % | RESPIRATION RATE: 18 BRPM | BODY MASS INDEX: 41.8 KG/M2 | DIASTOLIC BLOOD PRESSURE: 70 MMHG | SYSTOLIC BLOOD PRESSURE: 136 MMHG | HEIGHT: 70 IN | WEIGHT: 292 LBS | HEART RATE: 63 BPM

## 2022-10-04 DIAGNOSIS — G47.33 OSA ON CPAP: ICD-10-CM

## 2022-10-04 DIAGNOSIS — G89.29 CHRONIC LOW BACK PAIN WITHOUT SCIATICA, UNSPECIFIED BACK PAIN LATERALITY: Primary | ICD-10-CM

## 2022-10-04 DIAGNOSIS — I10 PRIMARY HYPERTENSION: ICD-10-CM

## 2022-10-04 DIAGNOSIS — Z99.89 OSA ON CPAP: ICD-10-CM

## 2022-10-04 DIAGNOSIS — R53.1 WEAKNESS: ICD-10-CM

## 2022-10-04 DIAGNOSIS — M47.16 OSTEOARTHRITIS OF LUMBAR SPINE WITH MYELOPATHY: ICD-10-CM

## 2022-10-04 DIAGNOSIS — E78.00 HYPERCHOLESTEROLEMIA: ICD-10-CM

## 2022-10-04 DIAGNOSIS — E66.01 MORBID OBESITY: ICD-10-CM

## 2022-10-04 DIAGNOSIS — M54.50 CHRONIC LOW BACK PAIN WITHOUT SCIATICA, UNSPECIFIED BACK PAIN LATERALITY: Primary | ICD-10-CM

## 2022-10-04 DIAGNOSIS — Z00.00 MEDICARE ANNUAL WELLNESS VISIT, SUBSEQUENT: ICD-10-CM

## 2022-10-04 DIAGNOSIS — J43.9 PULMONARY EMPHYSEMA, UNSPECIFIED EMPHYSEMA TYPE: ICD-10-CM

## 2022-10-04 PROCEDURE — G0008 ADMIN INFLUENZA VIRUS VAC: HCPCS | Performed by: INTERNAL MEDICINE

## 2022-10-04 PROCEDURE — 90662 IIV NO PRSV INCREASED AG IM: CPT | Performed by: INTERNAL MEDICINE

## 2022-10-04 PROCEDURE — 1159F MED LIST DOCD IN RCRD: CPT | Performed by: INTERNAL MEDICINE

## 2022-10-04 PROCEDURE — 1170F FXNL STATUS ASSESSED: CPT | Performed by: INTERNAL MEDICINE

## 2022-10-04 PROCEDURE — G0439 PPPS, SUBSEQ VISIT: HCPCS | Performed by: INTERNAL MEDICINE

## 2022-10-04 NOTE — PROGRESS NOTES
The ABCs of the Annual Wellness Visit  Initial Medicare Wellness Visit    Chief Complaint   Patient presents with   • Medicare Wellness-Initial Visit     wellness     Subjective   History of Present Illness:  Harry Potts is a 89 y.o. male who presents for an Initial Medicare Wellness Visit.    The following portions of the patient's history were reviewed and   updated as appropriate: allergies, current medications, past family history, past medical history, past social history, past surgical history and problem list.     Compared to one year ago, the patient feels his physical   health is the same.    Compared to one year ago, the patient feels his mental   health is the same.    Recent Hospitalizations:  He was not admitted to the hospital during the last year.       Current Medical Providers:  Patient Care Team:  Harry Luis MD as PCP - General    Outpatient Medications Prior to Visit   Medication Sig Dispense Refill   • albuterol sulfate  (90 Base) MCG/ACT inhaler Inhale 2 puffs Every 4 (Four) Hours As Needed for Wheezing. 18 g 5   • amLODIPine (NORVASC) 10 MG tablet TAKE 1 TABLET DAILY 90 tablet 3   • aspirin 81 MG EC tablet Take 81 mg by mouth Daily.     • azelastine (ASTELIN) 0.1 % nasal spray 1 spray into the nostril(s) as directed by provider Daily. 1 each 11   • celecoxib (CeleBREX) 200 MG capsule TAKE 1 CAPSULE DAILY AS NEEDED 90 capsule 3   • chlorthalidone (HYGROTON) 25 MG tablet TAKE 1 TABLET DAILY 90 tablet 3   • doxazosin (CARDURA) 4 MG tablet TAKE 1 TABLET DAILY AS DIRECTED 90 tablet 3   • esomeprazole (nexIUM) 40 MG capsule TAKE 1 CAPSULE DAILY 90 capsule 3   • ezetimibe (ZETIA) 10 MG tablet TAKE 1 TABLET DAILY 90 tablet 3   • fluticasone (FLONASE) 50 MCG/ACT nasal spray 1 spray into the nostril(s) as directed by provider Daily. 11.1 mL 2   • fluticasone-salmeterol (Advair Diskus) 250-50 MCG/DOSE DISKUS Inhale 1 puff 2 (Two) Times a Day. 180 each 3   • loratadine (CLARITIN) 10 MG  tablet Take  by mouth Daily.     • metoprolol succinate XL (TOPROL-XL) 50 MG 24 hr tablet      • Multiple Vitamins-Minerals (CENTRUM ADULTS PO) Take  by mouth Daily.     • Omega-3 Fatty Acids (FISH OIL) 1000 MG capsule capsule Take  by mouth Daily With Breakfast.     • TiZANidine (ZANAFLEX) 2 MG capsule Take 1 capsule by mouth Daily As Needed for Muscle Spasms. 90 capsule 3     No facility-administered medications prior to visit.       No opioid medication identified on active medication list. I have reviewed chart for other potential  high risk medication/s and harmful drug interactions in the elderly.          Aspirin is on active medication list. Aspirin use is not indicated based on review of current medical condition/s. Risk of harm outweighs potential benefits. Patient instructed to discontinue this medication.  .      Patient Active Problem List   Diagnosis   • Dysfunction of eustachian tube   • Gastroesophageal reflux disease   • Hyperglycemia   • Hypercholesterolemia   • Hypertension   • Morbid obesity (HCC)   • Osteoarthritis of lumbar spine with myelopathy   • Osteoarthritis of lumbar spine   • Bronchitis   • Viral URI with cough   • PVC (premature ventricular contraction)   • Non-traumatic rhabdomyolysis   • Obesity (BMI 30-39.9)   • Chronic low back pain   • Weakness   • Edema, bilateral lower extremities   • COPD (chronic obstructive pulmonary disease) (HCC)   • Irregularly irregular cardiac rhythm   • JENISE on CPAP   • Medicare annual wellness visit, subsequent     Advance Care Planning  Advance Directive is on file.  ACP discussion was held with the patient during this visit. Patient has an advance directive in EMR which is still valid.     Review of Systems   HENT: Negative.    Respiratory: Negative.    Cardiovascular: Negative.    Musculoskeletal: Negative.    Psychiatric/Behavioral: Negative.         Objective       Vitals:    10/04/22 1103   BP: 136/70   Pulse: 63   Resp: 18   Temp: 98 °F (36.7 °C)  "  SpO2: 92%   Weight: 132 kg (292 lb)   Height: 177.8 cm (70\")     Estimated body mass index is 41.9 kg/m² as calculated from the following:    Height as of this encounter: 177.8 cm (70\").    Weight as of this encounter: 132 kg (292 lb).    Class 3 Severe Obesity (BMI >=40). Obesity-related health conditions include the following: obstructive sleep apnea, hypertension, coronary heart disease and osteoarthritis. Obesity is unchanged. BMI is is above average; no BMI management plan is appropriate. We discussed portion control and increasing exercise.      Does the patient have evidence of cognitive impairment? No    Physical Exam  Vitals and nursing note reviewed.   Constitutional:       Appearance: Normal appearance. He is obese. He is not ill-appearing or diaphoretic.      Comments: Pleasant, neatly groomed, no distress.  BMI 41.9.   Cardiovascular:      Rate and Rhythm: Regular rhythm.      Heart sounds: Normal heart sounds. No murmur heard.    No gallop.   Neurological:      Mental Status: He is alert.       Lab Results   Component Value Date    CHLPL 209 (H) 09/30/2022    TRIG 86 09/30/2022    HDL 59 09/30/2022     (H) 09/30/2022    VLDL 15 09/30/2022    HGBA1C 6.00 (H) 09/30/2022          HEALTH RISK ASSESSMENT    Smoking Status:  Social History     Tobacco Use   Smoking Status Never Smoker   Smokeless Tobacco Never Used     Alcohol Consumption:  Social History     Substance and Sexual Activity   Alcohol Use No    Comment: caffeine use     Fall Risk Screen:    STEADI Fall Risk Assessment was completed, and patient is at MODERATE risk for falls. Assessment completed on:10/4/2022    Depression Screen:   PHQ-2/PHQ-9 Depression Screening 10/4/2022   Retired PHQ-9 Total Score -   Retired Total Score -   Little Interest or Pleasure in Doing Things 0-->not at all   Feeling Down, Depressed or Hopeless 0-->not at all   PHQ-9: Brief Depression Severity Measure Score 0       Health Habits and Functional and " Cognitive Screening:  Functional & Cognitive Status 10/4/2022   Do you have difficulty preparing food and eating? No   Do you have difficulty bathing yourself, getting dressed or grooming yourself? No   Do you have difficulty using the toilet? No   Do you have difficulty moving around from place to place? No   Do you have trouble with steps or getting out of a bed or a chair? No   Current Diet Well Balanced Diet   Dental Exam Up to date   Eye Exam Up to date   Exercise (times per week) 0 times per week   Current Exercises Include No Regular Exercise   Do you need help using the phone?  No   Are you deaf or do you have serious difficulty hearing?  Yes   Do you need help with transportation? Yes   Do you need help shopping? No   Do you need help preparing meals?  No   Do you need help with housework?  No   Do you need help with laundry? No   Do you need help taking your medications? No   Do you need help managing money? No   Do you ever drive or ride in a car without wearing a seat belt? No   Have you felt unusual stress, anger or loneliness in the last month? Yes   Who do you live with? Spouse   If you need help, do you have trouble finding someone available to you? No   Have you been bothered in the last four weeks by sexual problems? No   Do you have difficulty concentrating, remembering or making decisions? No       Age-appropriate Screening Schedule:  Refer to the list below for future screening recommendations based on patient's age, sex and/or medical conditions. Orders for these recommended tests are listed in the plan section. The patient has been provided with a written plan.    Health Maintenance   Topic Date Due   • LIPID PANEL  09/30/2023   • TDAP/TD VACCINES (2 - Td or Tdap) 10/04/2027   • INFLUENZA VACCINE  Completed   • ZOSTER VACCINE  Completed            Assessment & Plan   CMS Preventative Services Quick Reference  Risk Factors Identified During Encounter  Inactivity/Sedentary  Obesity/Overweight    The above risks/problems have been discussed with the patient.  Follow up actions/plans if indicated are seen below in the Assessment/Plan Section.  Pertinent information has been shared with the patient in the After Visit Summary.    Diagnoses and all orders for this visit:    1. Weakness (Primary)    2. JENISE on CPAP    3. Pulmonary emphysema, unspecified emphysema type (HCC)    4. Osteoarthritis of lumbar spine with myelopathy    5. Chronic low back pain without sciatica, unspecified back pain laterality    6. Morbid obesity (HCC)    7. Medicare annual wellness visit, subsequent    8. Primary hypertension    9. Hypercholesterolemia    Other orders  -     Fluzone High-Dose 65+yrs ([id number])      It is always fine to see this patient.    He is 89.  He will be 89 years old next month.  He has lumbar spinal stenosis with bilateral lower extremity radicular pain for many years.  He has become gradually more sedentary over the years and he does not have much endurance.  He ambulates with the help of a walker.  I feel that he would benefit from physical therapy to help strengthen him up some and to decrease his risk for falling.  He feels better if he can be more active.    He has a history of chronic struct pulmonary disease.  Is been a few years since he saw a sleep specialist for his obstructive sleep apnea.  His cardiologist actually made a referral for him to be seen by sleep medicine to make sure that the CPAP device that he is wearing is appropriate for him.    He has chronic obstructive pulmonary disease and this appears to be well controlled on albuterol as needed and Advair discus daily.    His hypertension is fairly well controlled.    We gave him a flu shot today.      Follow Up:  No follow-ups on file.     An After Visit Summary and PPPS were made available to the patient.

## 2022-10-17 RX ORDER — ESOMEPRAZOLE MAGNESIUM 40 MG/1
CAPSULE, DELAYED RELEASE ORAL
Qty: 90 CAPSULE | Refills: 3 | Status: ON HOLD | OUTPATIENT
Start: 2022-10-17

## 2022-10-28 ENCOUNTER — APPOINTMENT (OUTPATIENT)
Dept: SLEEP MEDICINE | Facility: HOSPITAL | Age: 87
End: 2022-10-28

## 2022-10-31 RX ORDER — AZELASTINE HYDROCHLORIDE 137 UG/1
SPRAY, METERED NASAL
Qty: 30 ML | Refills: 2 | Status: ON HOLD | OUTPATIENT
Start: 2022-10-31

## 2022-10-31 RX ORDER — METOPROLOL SUCCINATE 50 MG/1
TABLET, EXTENDED RELEASE ORAL
Qty: 90 TABLET | Refills: 3 | Status: SHIPPED | OUTPATIENT
Start: 2022-10-31 | End: 2023-03-02 | Stop reason: SDUPTHER

## 2022-11-01 RX ORDER — TIZANIDINE HYDROCHLORIDE 2 MG/1
2 CAPSULE, GELATIN COATED ORAL DAILY PRN
Qty: 90 CAPSULE | Refills: 3 | Status: SHIPPED | OUTPATIENT
Start: 2022-11-01 | End: 2023-01-16 | Stop reason: SDUPTHER

## 2022-11-01 RX ORDER — ALBUTEROL SULFATE 90 UG/1
2 AEROSOL, METERED RESPIRATORY (INHALATION) EVERY 4 HOURS PRN
Qty: 18 G | Refills: 5 | Status: ON HOLD | OUTPATIENT
Start: 2022-11-01

## 2022-11-22 RX ORDER — EZETIMIBE 10 MG/1
TABLET ORAL
Qty: 90 TABLET | Refills: 3 | Status: ON HOLD | OUTPATIENT
Start: 2022-11-22

## 2022-11-22 RX ORDER — CELECOXIB 200 MG/1
CAPSULE ORAL
Qty: 90 CAPSULE | Refills: 3 | Status: ON HOLD | OUTPATIENT
Start: 2022-11-22

## 2022-12-05 ENCOUNTER — TELEPHONE (OUTPATIENT)
Dept: FAMILY MEDICINE CLINIC | Facility: CLINIC | Age: 87
End: 2022-12-05

## 2022-12-05 NOTE — TELEPHONE ENCOUNTER
Caller: Harry Potts    Relationship to patient: Self    Best call back number: [phone]    Date of positive COVID19 test: 12/5    COVID19 symptoms: CURRENTLY NO SYMPTOMS    Additional information or concerns: PATIENT FEELS FINE AS OF TODAY, PLEASE ADVISE IF DR VALDEZ RECOMMENDS HIM TO TAKE OR DO ANYTHING DUE TO TESTING POSITIVE.     What is the patients preferred pharmacy: [facility] [id number] - [place], [place] - [address] [address] [address] [facility] [address] AT [facility] [address] - [phone] [facility] [phone] FX

## 2023-01-10 DIAGNOSIS — E78.00 HYPERCHOLESTEROLEMIA: ICD-10-CM

## 2023-01-10 DIAGNOSIS — I10 PRIMARY HYPERTENSION: Primary | ICD-10-CM

## 2023-01-12 LAB
ALBUMIN SERPL-MCNC: 4.4 G/DL (ref 3.5–5.2)
ALBUMIN/GLOB SERPL: 1.9 G/DL
ALP SERPL-CCNC: 87 U/L (ref 39–117)
ALT SERPL-CCNC: 29 U/L (ref 1–41)
AST SERPL-CCNC: 28 U/L (ref 1–40)
BASOPHILS # BLD AUTO: 0.05 10*3/MM3 (ref 0–0.2)
BASOPHILS NFR BLD AUTO: 0.9 % (ref 0–1.5)
BILIRUB SERPL-MCNC: 0.5 MG/DL (ref 0–1.2)
BUN SERPL-MCNC: 17 MG/DL (ref 8–23)
BUN/CREAT SERPL: 25.4 (ref 7–25)
CALCIUM SERPL-MCNC: 9.2 MG/DL (ref 8.2–9.6)
CHLORIDE SERPL-SCNC: 99 MMOL/L (ref 98–107)
CHOLEST SERPL-MCNC: 192 MG/DL (ref 0–200)
CHOLEST/HDLC SERPL: 3.49 {RATIO}
CO2 SERPL-SCNC: 34 MMOL/L (ref 22–29)
CREAT SERPL-MCNC: 0.67 MG/DL (ref 0.76–1.27)
EGFRCR SERPLBLD CKD-EPI 2021: 87.6 ML/MIN/1.73
EOSINOPHIL # BLD AUTO: 0.25 10*3/MM3 (ref 0–0.4)
EOSINOPHIL NFR BLD AUTO: 4.5 % (ref 0.3–6.2)
ERYTHROCYTE [DISTWIDTH] IN BLOOD BY AUTOMATED COUNT: 12.8 % (ref 12.3–15.4)
GLOBULIN SER CALC-MCNC: 2.3 GM/DL
GLUCOSE SERPL-MCNC: 109 MG/DL (ref 65–99)
HCT VFR BLD AUTO: 44.3 % (ref 37.5–51)
HDLC SERPL-MCNC: 55 MG/DL (ref 40–60)
HGB BLD-MCNC: 15 G/DL (ref 13–17.7)
IMM GRANULOCYTES # BLD AUTO: 0.01 10*3/MM3 (ref 0–0.05)
IMM GRANULOCYTES NFR BLD AUTO: 0.2 % (ref 0–0.5)
LDLC SERPL CALC-MCNC: 122 MG/DL (ref 0–100)
LYMPHOCYTES # BLD AUTO: 1.77 10*3/MM3 (ref 0.7–3.1)
LYMPHOCYTES NFR BLD AUTO: 31.7 % (ref 19.6–45.3)
MCH RBC QN AUTO: 29 PG (ref 26.6–33)
MCHC RBC AUTO-ENTMCNC: 33.9 G/DL (ref 31.5–35.7)
MCV RBC AUTO: 85.5 FL (ref 79–97)
MONOCYTES # BLD AUTO: 0.46 10*3/MM3 (ref 0.1–0.9)
MONOCYTES NFR BLD AUTO: 8.2 % (ref 5–12)
NEUTROPHILS # BLD AUTO: 3.05 10*3/MM3 (ref 1.7–7)
NEUTROPHILS NFR BLD AUTO: 54.5 % (ref 42.7–76)
NRBC BLD AUTO-RTO: 0 /100 WBC (ref 0–0.2)
PLATELET # BLD AUTO: 178 10*3/MM3 (ref 140–450)
POTASSIUM SERPL-SCNC: 4.1 MMOL/L (ref 3.5–5.2)
PROT SERPL-MCNC: 6.7 G/DL (ref 6–8.5)
RBC # BLD AUTO: 5.18 10*6/MM3 (ref 4.14–5.8)
SODIUM SERPL-SCNC: 141 MMOL/L (ref 136–145)
TRIGL SERPL-MCNC: 80 MG/DL (ref 0–150)
UNABLE TO VOID: NORMAL
VLDLC SERPL CALC-MCNC: 15 MG/DL (ref 5–40)
WBC # BLD AUTO: 5.59 10*3/MM3 (ref 3.4–10.8)

## 2023-01-16 ENCOUNTER — OFFICE VISIT (OUTPATIENT)
Dept: FAMILY MEDICINE CLINIC | Facility: CLINIC | Age: 88
End: 2023-01-16
Payer: MEDICARE

## 2023-01-16 VITALS
WEIGHT: 301 LBS | DIASTOLIC BLOOD PRESSURE: 70 MMHG | BODY MASS INDEX: 45.62 KG/M2 | HEIGHT: 68 IN | OXYGEN SATURATION: 93 % | TEMPERATURE: 98.2 F | HEART RATE: 80 BPM | SYSTOLIC BLOOD PRESSURE: 124 MMHG

## 2023-01-16 DIAGNOSIS — Z23 IMMUNIZATION DUE: ICD-10-CM

## 2023-01-16 DIAGNOSIS — G47.33 OSA ON CPAP: ICD-10-CM

## 2023-01-16 DIAGNOSIS — M47.16 OSTEOARTHRITIS OF LUMBAR SPINE WITH MYELOPATHY: Primary | ICD-10-CM

## 2023-01-16 DIAGNOSIS — Z99.89 OSA ON CPAP: ICD-10-CM

## 2023-01-16 DIAGNOSIS — I10 PRIMARY HYPERTENSION: ICD-10-CM

## 2023-01-16 DIAGNOSIS — J43.9 PULMONARY EMPHYSEMA, UNSPECIFIED EMPHYSEMA TYPE: ICD-10-CM

## 2023-01-16 DIAGNOSIS — E66.01 MORBID OBESITY: ICD-10-CM

## 2023-01-16 DIAGNOSIS — E78.00 HYPERCHOLESTEROLEMIA: ICD-10-CM

## 2023-01-16 PROCEDURE — 99214 OFFICE O/P EST MOD 30 MIN: CPT | Performed by: INTERNAL MEDICINE

## 2023-01-16 PROCEDURE — 90677 PCV20 VACCINE IM: CPT | Performed by: INTERNAL MEDICINE

## 2023-01-16 PROCEDURE — G0009 ADMIN PNEUMOCOCCAL VACCINE: HCPCS | Performed by: INTERNAL MEDICINE

## 2023-01-16 RX ORDER — TIZANIDINE HYDROCHLORIDE 2 MG/1
2 CAPSULE, GELATIN COATED ORAL DAILY PRN
Qty: 90 CAPSULE | Refills: 3 | Status: ON HOLD | OUTPATIENT
Start: 2023-01-16

## 2023-01-16 NOTE — PROGRESS NOTES
Subjective   Harry Potts is a 89 y.o. male. Patient is here today for   Chief Complaint   Patient presents with   • Hypertension     Loss of appetite, coughing spells    • Hyperlipidemia          Vitals:    01/16/23 0853   BP: 124/70   Pulse: 80   Temp: 98.2 °F (36.8 °C)   SpO2: 93%     Body mass index is 45.77 kg/m².      Past Medical History:   Diagnosis Date   • Arthritis    • Cancer (HCC)     basal cell    • COPD (chronic obstructive pulmonary disease) (HCC)    • Difficulty walking    • Elevated cholesterol    • Environmental allergies    • GERD (gastroesophageal reflux disease)    • HL (hearing loss)    • Hyperglycemia    • Hyperlipidemia    • Hypertension    • Obesity    • Sleep apnea    • Spondylolysis, lumbar region       Allergies   Allergen Reactions   • Atorvastatin Other (See Comments)     SPASMS LEG      • Morphine And Related Unknown - Low Severity      Social History     Socioeconomic History   • Marital status:    Tobacco Use   • Smoking status: Never   • Smokeless tobacco: Never   Vaping Use   • Vaping Use: Never used   Substance and Sexual Activity   • Alcohol use: No     Comment: caffeine use   • Drug use: No   • Sexual activity: Defer        Current Outpatient Medications:   •  albuterol sulfate  (90 Base) MCG/ACT inhaler, Inhale 2 puffs Every 4 (Four) Hours As Needed for Wheezing., Disp: 18 g, Rfl: 5  •  amLODIPine (NORVASC) 10 MG tablet, TAKE 1 TABLET DAILY, Disp: 90 tablet, Rfl: 3  •  aspirin 81 MG EC tablet, Take 81 mg by mouth Daily., Disp: , Rfl:   •  Azelastine HCl 137 MCG/SPRAY solution, USE 1 SPRAY IN EACH NOSTRIL AS DIRECTED BY PROVIDER DAILY, Disp: 30 mL, Rfl: 2  •  celecoxib (CeleBREX) 200 MG capsule, TAKE 1 CAPSULE DAILY AS NEEDED, Disp: 90 capsule, Rfl: 3  •  chlorthalidone (HYGROTON) 25 MG tablet, TAKE 1 TABLET DAILY, Disp: 90 tablet, Rfl: 3  •  doxazosin (CARDURA) 4 MG tablet, TAKE 1 TABLET DAILY AS DIRECTED, Disp: 90 tablet, Rfl: 3  •  esomeprazole (nexIUM) 40 MG  capsule, TAKE 1 CAPSULE DAILY, Disp: 90 capsule, Rfl: 3  •  ezetimibe (ZETIA) 10 MG tablet, TAKE 1 TABLET DAILY, Disp: 90 tablet, Rfl: 3  •  fluticasone (FLONASE) 50 MCG/ACT nasal spray, 1 spray into the nostril(s) as directed by provider Daily., Disp: 11.1 mL, Rfl: 2  •  fluticasone-salmeterol (Advair Diskus) 250-50 MCG/DOSE DISKUS, Inhale 1 puff 2 (Two) Times a Day., Disp: 180 each, Rfl: 3  •  loratadine (CLARITIN) 10 MG tablet, Take  by mouth Daily., Disp: , Rfl:   •  metoprolol succinate XL (TOPROL-XL) 50 MG 24 hr tablet, TAKE 1 TABLET DAILY (WHOLE), Disp: 90 tablet, Rfl: 3  •  Multiple Vitamins-Minerals (CENTRUM ADULTS PO), Take  by mouth Daily., Disp: , Rfl:   •  Omega-3 Fatty Acids (FISH OIL) 1000 MG capsule capsule, Take  by mouth Daily With Breakfast., Disp: , Rfl:   •  TiZANidine (ZANAFLEX) 2 MG capsule, Take 1 capsule by mouth Daily As Needed for Muscle Spasms., Disp: 90 capsule, Rfl: 3     Objective     History of Present Illness  He is here to follow-up on hypertension and hypercholesterolemia.    He has obstructive sleep apnea.  He is compliant with use of CPAP.    He was seen by cardiology in August who recommended that he have a repeat sleep study.  He is touched base with the sleep specialist and they have asked him to go for sleep study in the lab at the hospital.  He asked me if I thought that was a good idea.    He has atherosclerotic coronary artery disease.  I had him on a statin medication and couple years he developed rhabdomyolysis and this was felt to be potentially cause for this issue.  He is currently only taking Zetia for control of his hypercholesterolemia where an LDL cholesterol of less than 70 would be more appropriate.       Review of Systems   Constitutional: Negative.    HENT: Negative.    Respiratory: Negative.    Cardiovascular: Negative.    Genitourinary: Negative.    Psychiatric/Behavioral: Negative.        Physical Exam  Vitals and nursing note reviewed.   Constitutional:        Appearance: Normal appearance. He is obese. He is not ill-appearing or diaphoretic.      Comments: Pleasant, neatly groomed, no distress.  BMI 45.  Weight 301 pounds.   Cardiovascular:      Rate and Rhythm: Regular rhythm.      Heart sounds: Normal heart sounds. No murmur heard.    No gallop.   Pulmonary:      Effort: No respiratory distress.      Breath sounds: Normal breath sounds. No wheezing or rales.   Neurological:      Mental Status: He is alert and oriented to person, place, and time.   Psychiatric:         Mood and Affect: Mood normal.         Behavior: Behavior normal.         Thought Content: Thought content normal.           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    Relevant Medications    TiZANidine (ZANAFLEX) 2 MG capsule       Pulmonary and Pneumonias    COPD (chronic obstructive pulmonary disease) ([facility])       Sleep    JENISE on CPAP   Other Visit Diagnoses     Immunization due        Relevant Orders    Pneumococcal Conjugate Vaccine 20-Valent (PCV20) (Completed)      Diagnoses       Codes Comments    Osteoarthritis of lumbar spine with myelopathy    -  Primary ICD-10-CM: M47.16  ICD-9-CM: 721.42     Immunization due     ICD-10-CM: Z23  ICD-9-CM: V05.9     Primary hypertension     ICD-10-CM: I10  ICD-9-CM: 401.9     Hypercholesterolemia     ICD-10-CM: E78.00  ICD-9-CM: 272.0     Morbid obesity ([facility])     ICD-10-CM: E66.01  ICD-9-CM: 278.01     Pulmonary emphysema, unspecified emphysema type ([facility])     ICD-10-CM: J43.9  ICD-9-CM: 492.8     JENISE on CPAP     ICD-10-CM: G47.33, Z99.89  ICD-9-CM: 327.23, V46.8             PLAN  He finds Zanaflex to be pretty useful for the occasional left thigh spasm attributable to his  No follow-ups on file.  Osteoarthritis of his lumbar spine.    His hypertension is well  controlled.    He has obstructive sleep apnea is compliant with use of CPAP but I told him that I thought it would be a great idea for him to follow-up with his sleep specialist he is going to call them and arrange that appointment.    He is morbidly obese.  His weight is stable.    He has pulmonary emphysema.  His lungs are clear on auscultation today.  He continues to take Advair discus and albuterol metered-dose inhaler as needed.    I asked him to follow-up in about 3 months.  I will check a comprehensive metabolic panel and lipid profile before that visit.  I consider asking him to start taking Praluent for his hypercholesterolemia in light of my reservations about him taking statins.

## 2023-02-14 RX ORDER — CHLORTHALIDONE 25 MG/1
TABLET ORAL
Qty: 90 TABLET | Refills: 3 | Status: ON HOLD | OUTPATIENT
Start: 2023-02-14

## 2023-03-02 ENCOUNTER — OFFICE VISIT (OUTPATIENT)
Dept: CARDIOLOGY | Facility: CLINIC | Age: 88
End: 2023-03-02
Payer: MEDICARE

## 2023-03-02 VITALS
BODY MASS INDEX: 45.16 KG/M2 | OXYGEN SATURATION: 91 % | HEIGHT: 68 IN | SYSTOLIC BLOOD PRESSURE: 140 MMHG | HEART RATE: 75 BPM | WEIGHT: 298 LBS | DIASTOLIC BLOOD PRESSURE: 80 MMHG

## 2023-03-02 DIAGNOSIS — I49.3 PVC (PREMATURE VENTRICULAR CONTRACTION): Primary | ICD-10-CM

## 2023-03-02 DIAGNOSIS — E78.00 HYPERCHOLESTEROLEMIA: ICD-10-CM

## 2023-03-02 DIAGNOSIS — G47.33 OSA ON CPAP: ICD-10-CM

## 2023-03-02 DIAGNOSIS — Z99.89 OSA ON CPAP: ICD-10-CM

## 2023-03-02 DIAGNOSIS — J43.9 PULMONARY EMPHYSEMA, UNSPECIFIED EMPHYSEMA TYPE: ICD-10-CM

## 2023-03-02 DIAGNOSIS — I10 PRIMARY HYPERTENSION: ICD-10-CM

## 2023-03-02 PROCEDURE — 93000 ELECTROCARDIOGRAM COMPLETE: CPT | Performed by: INTERNAL MEDICINE

## 2023-03-02 PROCEDURE — 99214 OFFICE O/P EST MOD 30 MIN: CPT | Performed by: INTERNAL MEDICINE

## 2023-03-02 RX ORDER — TIZANIDINE 2 MG/1
TABLET ORAL
Status: ON HOLD | COMMUNITY
Start: 2023-02-10

## 2023-03-02 RX ORDER — METOPROLOL SUCCINATE 50 MG/1
25 TABLET, EXTENDED RELEASE ORAL DAILY
Qty: 90 TABLET | Refills: 3 | Status: ON HOLD
Start: 2023-03-02

## 2023-03-02 NOTE — PROGRESS NOTES
Subjective:     Encounter Date:03/02/2023      Patient ID: Harry Potts is a 89 y.o. male.    Chief Complaint:  History of Present Illness    This is an 89-year-old with hypertension, hyperlipidemia, obesity, asthma, obstructive sleep apnea on CPAP, PVCs, who presents for follow up.     He presents today for routine follow-up.  He was scheduled to see sleep medicine in October but canceled the appointment.  He was worried that if he required a sleep study that neither he or his wife would be able to drive him since neither of them can or like to drive at night.  He denies any chest pain, worsening shortness of breath, palpitations, orthopnea, near-syncope or syncope, worsening lower extremity edema or recurrent falls.  He still taking the metoprolol succinate at 25 mg daily.  It appears that he is only using his oxygen as needed now.  Is unclear how often he is having to use it.  He remains compliant with CPAP.    Prior History:  The patient had been evaluated by Dr. Jones in 1/2006 for preoperative clearance.  He had an abnormal EKG at that time.  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       I saw the patient initially in 10/2019 when he presented for complaints of dyspnea on exertion, bradycardia, and orthopnea.  Prior to that office visit he was evaluated by Dr. French who began treating him for asthma.  During his exam he was noted to be bradycardia.  There was also some concerns for orthopnea.  At his office visit with me he was noted to have frequent PVCs on his EKG including ventricular bigeminy.  I suspect that that this is responsible for the findings of bradycardia.  I recommended proceeding with further work-up with a stress test and an echocardiogram.  These were performed on 11/6/2019.  His echocardiogram showed normal left ventricular systolic function wall  motion with an EF of 61%, mild mitral regurgitation, and grade 1 diastolic dysfunction.  His stress test was negative for ischemia.  At that time I recommended follow-up in 6 months.     Patient was lost to follow-up between 10/2019 and 8/2022.    He presented back to the office for evaluation of bradycardia.  He is closely followed by Dr. Luis.    At some point between his visits with me he had been started on oxygen which he was using continuously at the time of his visit in 8/2022.  He also reported compliance with CPAP although he had not been evaluated by sleep medicine for several years.      Prior to that office visit he suffered a fall while in the kitchen helping his wife prepare dinner.    It was unclear at that time if he lost his balance and fell or if there was any loss of consciousness.  He began checking his heart rates and blood pressure after that fall.  He noticed that his heart rates were running low in the 40s.  Because of this his daughter who is a pharmacist suggested that he decrease his metoprolol succinate to 25 mg daily.  Despite the decrease in the metoprolol his blood pressures were fairly well controlled.  He did feel better on the lower dose of metoprolol due to improvement in fatigue and sluggishness.  I felt that his reported bradycardia was likely due to frequent PVCs that were noted at the time of his office visit.  However I agreed with keeping him on the lower dose of the metoprolol since overall he felt better.  I also encouraged the patient to see sleep medicine to ensure that he was being adequately treated for his sleep apnea in light of his ventricular ectopy.       Review of Systems   Constitutional: Negative for malaise/fatigue.   HENT: Negative for hearing loss, hoarse voice, nosebleeds and sore throat.    Eyes: Negative for pain.   Cardiovascular: Positive for dyspnea on exertion and leg swelling. Negative for chest pain, claudication, cyanosis, irregular heartbeat,  near-syncope, orthopnea, palpitations, paroxysmal nocturnal dyspnea and syncope.   Respiratory: Negative for shortness of breath and snoring.    Endocrine: Negative for cold intolerance, heat intolerance, polydipsia, polyphagia and polyuria.   Skin: Negative for itching and rash.   Musculoskeletal: Negative for arthritis, falls, joint pain, joint swelling, muscle cramps, muscle weakness and myalgias.   Gastrointestinal: Negative for constipation, diarrhea, dysphagia, heartburn, hematemesis, hematochezia, melena, nausea and vomiting.   Genitourinary: Negative for frequency, hematuria and hesitancy.   Neurological: Negative for excessive daytime sleepiness, dizziness, headaches, light-headedness, numbness and weakness.   Psychiatric/Behavioral: Negative for depression. The patient is not nervous/anxious.          Current Outpatient Medications:   •  albuterol sulfate  (90 Base) MCG/ACT inhaler, Inhale 2 puffs Every 4 (Four) Hours As Needed for Wheezing., Disp: 18 g, Rfl: 5  •  amLODIPine (NORVASC) 10 MG tablet, TAKE 1 TABLET DAILY, Disp: 90 tablet, Rfl: 3  •  aspirin 81 MG EC tablet, Take 1 tablet by mouth Daily., Disp: , Rfl:   •  Azelastine HCl 137 MCG/SPRAY solution, USE 1 SPRAY IN EACH NOSTRIL AS DIRECTED BY PROVIDER DAILY, Disp: 30 mL, Rfl: 2  •  celecoxib (CeleBREX) 200 MG capsule, TAKE 1 CAPSULE DAILY AS NEEDED, Disp: 90 capsule, Rfl: 3  •  chlorthalidone (HYGROTON) 25 MG tablet, TAKE 1 TABLET DAILY, Disp: 90 tablet, Rfl: 3  •  doxazosin (CARDURA) 4 MG tablet, TAKE 1 TABLET DAILY AS DIRECTED, Disp: 90 tablet, Rfl: 3  •  esomeprazole (nexIUM) 40 MG capsule, TAKE 1 CAPSULE DAILY, Disp: 90 capsule, Rfl: 3  •  ezetimibe (ZETIA) 10 MG tablet, TAKE 1 TABLET DAILY, Disp: 90 tablet, Rfl: 3  •  fluticasone (FLONASE) 50 MCG/ACT nasal spray, 1 spray into the nostril(s) as directed by provider Daily., Disp: 11.1 mL, Rfl: 2  •  fluticasone-salmeterol (Advair Diskus) 250-50 MCG/DOSE DISKUS, Inhale 1 puff 2 (Two) Times  a Day., Disp: 180 each, Rfl: 3  •  loratadine (CLARITIN) 10 MG tablet, Take  by mouth Daily., Disp: , Rfl:   •  metoprolol succinate XL (TOPROL-XL) 50 MG 24 hr tablet, TAKE 1 TABLET DAILY (WHOLE), Disp: 90 tablet, Rfl: 3  •  Multiple Vitamins-Minerals (CENTRUM ADULTS PO), Take  by mouth Daily., Disp: , Rfl:   •  Omega-3 Fatty Acids (FISH OIL) 1000 MG capsule capsule, Take  by mouth Daily With Breakfast., Disp: , Rfl:   •  TiZANidine (ZANAFLEX) 2 MG capsule, Take 1 capsule by mouth Daily As Needed for Muscle Spasms., Disp: 90 capsule, Rfl: 3  •  tiZANidine (ZANAFLEX) 2 MG tablet, , Disp: , Rfl:     Past Medical History:   Diagnosis Date   • Arthritis    • Asthma March, 2021    Oxygen at home   • Cancer (HCC)     basal cell    • COPD (chronic obstructive pulmonary disease) (HCC)    • Difficulty walking    • Elevated cholesterol    • Environmental allergies    • GERD (gastroesophageal reflux disease)    • HL (hearing loss)    • Hyperglycemia    • Hyperlipidemia    • Hypertension    • Obesity    • Sleep apnea    • Spondylolysis, lumbar region        Past Surgical History:   Procedure Laterality Date   • CARDIAC CATHETERIZATION  circa 2008    [facility]   • EYE SURGERY     • HERNIA REPAIR     • JOINT REPLACEMENT     • REPLACEMENT TOTAL KNEE BILATERAL         Family History   Problem Relation Age of Onset   • Heart attack Father         age 80       Social History     Tobacco Use   • Smoking status: Never   • Smokeless tobacco: Never   Vaping Use   • Vaping Use: Never used   Substance Use Topics   • Alcohol use: No     Comment: caffeine use   • Drug use: No         ECG 12 Lead    Date/Time: 3/2/2023 12:06 PM  Performed by: Azra Gomes MD  Authorized by: Azra Gomes MD   Comparison: compared with previous ECG   Comparison to previous ECG: PACs are new  Rhythm: sinus rhythm  Ectopy: unifocal PVCs and atrial premature contractions  Conduction: right bundle branch block and left anterior fascicular  "block               Objective:     Visit Vitals  /80 (BP Location: Left arm, Patient Position: Sitting, Cuff Size: Adult)   Pulse 75   Ht 172.7 cm (68\")   Wt 135 kg (298 lb)   SpO2 91%   BMI 45.31 kg/m²         Constitutional:       Appearance: Normal appearance. Well-developed.   HENT:      Head: Normocephalic and atraumatic.   Neck:      Vascular: No carotid bruit or JVD.   Pulmonary:      Effort: Pulmonary effort is normal.      Breath sounds: Normal breath sounds.   Cardiovascular:      Normal rate. Regular rhythm.      No gallop.   Pulses:     Radial: 2+ bilaterally.  Edema:     Peripheral edema absent.   Abdominal:      Palpations: Abdomen is soft.   Skin:     General: Skin is warm and dry.   Neurological:      Mental Status: Alert and oriented to person, place, and time.             Assessment:          Diagnosis Plan   1. PVC (premature ventricular contraction)        2. Primary hypertension        3. Hypercholesterolemia        4. Pulmonary emphysema, unspecified emphysema type (HCC)        5. JENISE on CPAP               Plan:       1.  PVCs/PACs.  He has evidence of both on his EKG today.  Remains asymptomatic.  Is on a low-dose of metoprolol.  2.  Hypertension.  Remains fairly well controlled on his current regimen of medications.  Continue the same.  3.  Hyperlipidemia.  On ezetimibe.  He has a history of statin intolerance.  4.  History of mild nonobstructive coronary artery disease.  On treatment with aspirin and ezetimibe.  5.  Obstructive sleep apnea.  Reports compliance with CPAP.  He is willing to see sleep medicine again.  I will place another referral.    We will plan on seeing the patient back again in 6 months.         "

## 2023-04-03 ENCOUNTER — HOSPITAL ENCOUNTER (OUTPATIENT)
Dept: GENERAL RADIOLOGY | Facility: HOSPITAL | Age: 88
Discharge: HOME | DRG: 189 | End: 2023-04-03
Admitting: INTERNAL MEDICINE
Payer: MEDICARE

## 2023-04-03 ENCOUNTER — OFFICE VISIT (OUTPATIENT)
Dept: FAMILY MEDICINE CLINIC | Facility: CLINIC | Age: 88
End: 2023-04-03
Payer: MEDICARE

## 2023-04-03 VITALS
OXYGEN SATURATION: 87 % | WEIGHT: 294.6 LBS | TEMPERATURE: 96.4 F | BODY MASS INDEX: 44.65 KG/M2 | HEART RATE: 81 BPM | DIASTOLIC BLOOD PRESSURE: 72 MMHG | HEIGHT: 68 IN | SYSTOLIC BLOOD PRESSURE: 138 MMHG

## 2023-04-03 DIAGNOSIS — I10 PRIMARY HYPERTENSION: Primary | ICD-10-CM

## 2023-04-03 DIAGNOSIS — R06.09 DYSPNEA ON EXERTION: ICD-10-CM

## 2023-04-03 DIAGNOSIS — R41.0 CONFUSION: ICD-10-CM

## 2023-04-03 DIAGNOSIS — R09.02 HYPOXIA: ICD-10-CM

## 2023-04-03 DIAGNOSIS — E66.01 MORBID OBESITY: ICD-10-CM

## 2023-04-03 PROCEDURE — 71046 X-RAY EXAM CHEST 2 VIEWS: CPT

## 2023-04-03 NOTE — PROGRESS NOTES
Subjective   Harry Potts is a 89 y.o. male. Patient is here today for   Chief Complaint   Patient presents with   • Shortness of Breath     Low oxygen levels          Vitals:    04/03/23 1426   BP: 138/72   Pulse: 81   Temp: 96.4 °F (35.8 °C)   SpO2: (!) 87%     Body mass index is 44.8 kg/m².      Past Medical History:   Diagnosis Date   • Arthritis    • Asthma March, 2021    Oxygen at home   • Cancer     basal cell    • COPD (chronic obstructive pulmonary disease)    • Difficulty walking    • Elevated cholesterol    • Environmental allergies    • GERD (gastroesophageal reflux disease)    • HL (hearing loss)    • Hyperglycemia    • Hyperlipidemia    • Hypertension    • Obesity    • Sleep apnea    • Spondylolysis, lumbar region       Allergies   Allergen Reactions   • Atorvastatin Other (See Comments)     SPASMS LEG      • Morphine And Related Unknown - Low Severity      Social History     Socioeconomic History   • Marital status:    Tobacco Use   • Smoking status: Never   • Smokeless tobacco: Never   Vaping Use   • Vaping Use: Never used   Substance and Sexual Activity   • Alcohol use: No     Comment: caffeine use   • Drug use: No   • Sexual activity: Not Currently     Partners: Female        Current Outpatient Medications:   •  albuterol sulfate  (90 Base) MCG/ACT inhaler, Inhale 2 puffs Every 4 (Four) Hours As Needed for Wheezing., Disp: 18 g, Rfl: 5  •  amLODIPine (NORVASC) 10 MG tablet, TAKE 1 TABLET DAILY, Disp: 90 tablet, Rfl: 3  •  aspirin 81 MG EC tablet, Take 1 tablet by mouth Daily., Disp: , Rfl:   •  Azelastine HCl 137 MCG/SPRAY solution, USE 1 SPRAY IN EACH NOSTRIL AS DIRECTED BY PROVIDER DAILY, Disp: 30 mL, Rfl: 2  •  celecoxib (CeleBREX) 200 MG capsule, TAKE 1 CAPSULE DAILY AS NEEDED, Disp: 90 capsule, Rfl: 3  •  chlorthalidone (HYGROTON) 25 MG tablet, TAKE 1 TABLET DAILY, Disp: 90 tablet, Rfl: 3  •  doxazosin (CARDURA) 4 MG tablet, TAKE 1 TABLET DAILY AS DIRECTED, Disp: 90 tablet,  Rfl: 3  •  esomeprazole (nexIUM) 40 MG capsule, TAKE 1 CAPSULE DAILY, Disp: 90 capsule, Rfl: 3  •  ezetimibe (ZETIA) 10 MG tablet, TAKE 1 TABLET DAILY, Disp: 90 tablet, Rfl: 3  •  fluticasone (FLONASE) 50 MCG/ACT nasal spray, 1 spray into the nostril(s) as directed by provider Daily., Disp: 11.1 mL, Rfl: 2  •  fluticasone-salmeterol (Advair Diskus) 250-50 MCG/DOSE DISKUS, Inhale 1 puff 2 (Two) Times a Day., Disp: 180 each, Rfl: 3  •  loratadine (CLARITIN) 10 MG tablet, Take  by mouth Daily., Disp: , Rfl:   •  metoprolol succinate XL (TOPROL-XL) 50 MG 24 hr tablet, Take 0.5 tablets by mouth Daily., Disp: 90 tablet, Rfl: 3  •  Multiple Vitamins-Minerals (CENTRUM ADULTS PO), Take  by mouth Daily., Disp: , Rfl:   •  Omega-3 Fatty Acids (FISH OIL) 1000 MG capsule capsule, Take  by mouth Daily With Breakfast., Disp: , Rfl:   •  TiZANidine (ZANAFLEX) 2 MG capsule, Take 1 capsule by mouth Daily As Needed for Muscle Spasms., Disp: 90 capsule, Rfl: 3  •  tiZANidine (ZANAFLEX) 2 MG tablet, , Disp: , Rfl:      Objective     History of Present Illness  This patient and his wife made an urgent appointment to see me today about shortness of breath.  When I came in the room and asked him while he was here his wife suggested that she was concerned that he was fatigued and slept too much and has been acting himself.  He is not wearing oxygen at today's visit although he does have a portable oxygen tank.  He tells me that he is breathing easily.  His room air oxygen saturation is 85%.  Shortness of Breath         Review of Systems   Constitutional: Negative.    HENT: Negative.    Respiratory: Positive for shortness of breath.    Genitourinary: Negative.    Musculoskeletal: Negative.    Psychiatric/Behavioral: Negative.        Physical Exam  Vitals and nursing note reviewed.   Constitutional:       Appearance: Normal appearance. He is obese.      Comments: Pleasant, neatly groomed, no distress.    He does appear to be a bit pale.  His  face seems to be a bit swollen.   HENT:      Head: Normocephalic and atraumatic.   Cardiovascular:      Rate and Rhythm: Normal rate and regular rhythm.      Heart sounds: Normal heart sounds. No murmur heard.    No gallop.   Pulmonary:      Effort: No respiratory distress.      Breath sounds: Normal breath sounds. No wheezing or rales.   Skin:     Coloration: Skin is pale.   Neurological:      General: No focal deficit present.      Mental Status: He is alert and oriented to person, place, and time.   Psychiatric:         Mood and Affect: Mood normal.         Behavior: Behavior normal.         Thought Content: Thought content normal.           Problems Addressed this Visit        Cardiac and Vasculature    Hypertension - Primary       Endocrine and Metabolic    Morbid obesity   Other Visit Diagnoses     Confusion        Relevant Orders    CBC & Differential    Comprehensive Metabolic Panel    TSH    T4    Urinalysis With Microscopic If Indicated (No Culture) - Urine, Clean Catch    Vitamin B12    Hypoxia        Relevant Orders    CBC & Differential    XR Chest PA & Lateral    BNP ([facility] Only)    Dyspnea on exertion        Relevant Orders    BNP ([facility] Only)      Diagnoses       Codes Comments    Primary hypertension    -  Primary ICD-10-CM: I10  ICD-9-CM: 401.9     Morbid obesity     ICD-10-CM: E66.01  ICD-9-CM: 278.01     Confusion     ICD-10-CM: R41.0  ICD-9-CM: 298.9     Hypoxia     ICD-10-CM: R09.02  ICD-9-CM: 799.02     Dyspnea on exertion     ICD-10-CM: R06.09  ICD-9-CM: 786.09             PLAN  He has had some hypoxia.  His daughter at home had turned up his supplemental oxygen to 4 L and at this level of oxygen flow his oxygen saturation at home was 95%.    In light of the reasons stated for his being here it surprising that he does not wear his oxygen today.    I Debbie check some labs on him: CBC, comprehensive metabolic panel, CBC, BNP.  Would like to send him for a PA and lateral chest x-ray.    I  will discuss results with the patient tomorrow.  I asked him to please wear his oxygen continuously.  If his dyspnea should worsen, he should go to the emergency room.  No follow-ups on file.

## 2023-04-04 ENCOUNTER — APPOINTMENT (OUTPATIENT)
Dept: GENERAL RADIOLOGY | Facility: HOSPITAL | Age: 88
DRG: 189 | End: 2023-04-04
Payer: MEDICARE

## 2023-04-04 ENCOUNTER — TELEPHONE (OUTPATIENT)
Dept: FAMILY MEDICINE CLINIC | Facility: CLINIC | Age: 88
End: 2023-04-04

## 2023-04-04 ENCOUNTER — APPOINTMENT (OUTPATIENT)
Dept: CT IMAGING | Facility: HOSPITAL | Age: 88
DRG: 189 | End: 2023-04-04
Payer: MEDICARE

## 2023-04-04 ENCOUNTER — HOSPITAL ENCOUNTER (INPATIENT)
Facility: HOSPITAL | Age: 88
LOS: 19 days | Discharge: NURSING, REHAB OR LONG-TERM CARE | DRG: 189 | End: 2023-04-24
Attending: EMERGENCY MEDICINE | Admitting: HOSPITALIST
Payer: MEDICARE

## 2023-04-04 DIAGNOSIS — R53.81 PHYSICAL DECONDITIONING: ICD-10-CM

## 2023-04-04 DIAGNOSIS — R53.1 GENERAL WEAKNESS: ICD-10-CM

## 2023-04-04 DIAGNOSIS — I71.21 ANEURYSM OF ASCENDING AORTA WITHOUT RUPTURE: ICD-10-CM

## 2023-04-04 DIAGNOSIS — R09.02 HYPOXIA: Primary | ICD-10-CM

## 2023-04-04 DIAGNOSIS — E87.6 HYPOKALEMIA: ICD-10-CM

## 2023-04-04 DIAGNOSIS — R41.0 CONFUSION: ICD-10-CM

## 2023-04-04 DIAGNOSIS — I48.91 ATRIAL FIBRILLATION, UNSPECIFIED TYPE: ICD-10-CM

## 2023-04-04 LAB
ALBUMIN SERPL-MCNC: 4.1 G/DL (ref 3.5–5.2)
ALBUMIN SERPL-MCNC: 4.2 G/DL (ref 3.5–4.6)
ALBUMIN/GLOB SERPL: 1.6 G/DL
ALBUMIN/GLOB SERPL: 1.9 {RATIO} (ref 1.2–2.2)
ALP SERPL-CCNC: 74 IU/L (ref 44–121)
ALP SERPL-CCNC: 78 U/L (ref 39–117)
ALT SERPL W P-5'-P-CCNC: 34 U/L (ref 1–41)
ALT SERPL-CCNC: 32 IU/L (ref 0–44)
ANION GAP SERPL CALCULATED.3IONS-SCNC: 10.5 MMOL/L (ref 5–15)
AST SERPL-CCNC: 23 IU/L (ref 0–40)
AST SERPL-CCNC: 24 U/L (ref 1–40)
BASOPHILS # BLD AUTO: 0 X10E3/UL (ref 0–0.2)
BASOPHILS # BLD AUTO: 0.02 10*3/MM3 (ref 0–0.2)
BASOPHILS NFR BLD AUTO: 0 %
BASOPHILS NFR BLD AUTO: 0.3 % (ref 0–1.5)
BILIRUB SERPL-MCNC: 0.5 MG/DL (ref 0–1.2)
BILIRUB SERPL-MCNC: 0.6 MG/DL (ref 0–1.2)
BILIRUB UR QL STRIP: NEGATIVE
BNP SERPL-MCNC: 109.1 PG/ML (ref 0–100)
BUN SERPL-MCNC: 15 MG/DL (ref 10–36)
BUN SERPL-MCNC: 21 MG/DL (ref 8–23)
BUN/CREAT SERPL: 20 (ref 10–24)
BUN/CREAT SERPL: 32.8 (ref 7–25)
CALCIUM SERPL-MCNC: 9.5 MG/DL (ref 8.6–10.2)
CALCIUM SPEC-SCNC: 8.9 MG/DL (ref 8.2–9.6)
CHLORIDE SERPL-SCNC: 94 MMOL/L (ref 98–107)
CHLORIDE SERPL-SCNC: 95 MMOL/L (ref 96–106)
CK SERPL-CCNC: 117 U/L (ref 20–200)
CLARITY UR: CLEAR
CO2 SERPL-SCNC: 31 MMOL/L (ref 20–29)
CO2 SERPL-SCNC: 33.5 MMOL/L (ref 22–29)
COLOR UR: YELLOW
CREAT SERPL-MCNC: 0.64 MG/DL (ref 0.76–1.27)
CREAT SERPL-MCNC: 0.75 MG/DL (ref 0.76–1.27)
DEPRECATED RDW RBC AUTO: 46.1 FL (ref 37–54)
EGFRCR SERPLBLD CKD-EPI 2021: 85 ML/MIN/1.73
EGFRCR SERPLBLD CKD-EPI 2021: 88.8 ML/MIN/1.73
EOSINOPHIL # BLD AUTO: 0.08 10*3/MM3 (ref 0–0.4)
EOSINOPHIL # BLD AUTO: 0.1 X10E3/UL (ref 0–0.4)
EOSINOPHIL NFR BLD AUTO: 1 %
EOSINOPHIL NFR BLD AUTO: 1.3 % (ref 0.3–6.2)
ERYTHROCYTE [DISTWIDTH] IN BLOOD BY AUTOMATED COUNT: 12.8 % (ref 11.6–15.4)
ERYTHROCYTE [DISTWIDTH] IN BLOOD BY AUTOMATED COUNT: 13.4 % (ref 12.3–15.4)
FLUAV SUBTYP SPEC NAA+PROBE: NOT DETECTED
FLUBV RNA ISLT QL NAA+PROBE: NOT DETECTED
GEN 5 2HR TROPONIN T REFLEX: 22 NG/L
GLOBULIN SER CALC-MCNC: 2.2 G/DL (ref 1.5–4.5)
GLOBULIN UR ELPH-MCNC: 2.5 GM/DL
GLUCOSE SERPL-MCNC: 126 MG/DL (ref 70–99)
GLUCOSE SERPL-MCNC: 159 MG/DL (ref 65–99)
GLUCOSE UR STRIP-MCNC: NEGATIVE MG/DL
HCT VFR BLD AUTO: 41 % (ref 37.5–51)
HCT VFR BLD AUTO: 43.2 % (ref 37.5–51)
HGB BLD-MCNC: 13.3 G/DL (ref 13–17.7)
HGB BLD-MCNC: 14.2 G/DL (ref 13–17.7)
HGB UR QL STRIP.AUTO: NEGATIVE
HOLD SPECIMEN: NORMAL
HOLD SPECIMEN: NORMAL
IMM GRANULOCYTES # BLD AUTO: 0 X10E3/UL (ref 0–0.1)
IMM GRANULOCYTES # BLD AUTO: 0.01 10*3/MM3 (ref 0–0.05)
IMM GRANULOCYTES NFR BLD AUTO: 0 %
IMM GRANULOCYTES NFR BLD AUTO: 0.2 % (ref 0–0.5)
KETONES UR QL STRIP: NEGATIVE
LEUKOCYTE ESTERASE UR QL STRIP.AUTO: NEGATIVE
LYMPHOCYTES # BLD AUTO: 1.2 X10E3/UL (ref 0.7–3.1)
LYMPHOCYTES # BLD AUTO: 1.35 10*3/MM3 (ref 0.7–3.1)
LYMPHOCYTES NFR BLD AUTO: 17 %
LYMPHOCYTES NFR BLD AUTO: 21.9 % (ref 19.6–45.3)
MCH RBC QN AUTO: 28.7 PG (ref 26.6–33)
MCH RBC QN AUTO: 30.6 PG (ref 26.6–33)
MCHC RBC AUTO-ENTMCNC: 32.4 G/DL (ref 31.5–35.7)
MCHC RBC AUTO-ENTMCNC: 32.9 G/DL (ref 31.5–35.7)
MCV RBC AUTO: 88 FL (ref 79–97)
MCV RBC AUTO: 93.1 FL (ref 79–97)
MONOCYTES # BLD AUTO: 0.57 10*3/MM3 (ref 0.1–0.9)
MONOCYTES # BLD AUTO: 0.6 X10E3/UL (ref 0.1–0.9)
MONOCYTES NFR BLD AUTO: 9 %
MONOCYTES NFR BLD AUTO: 9.2 % (ref 5–12)
NEUTROPHILS # BLD AUTO: 4.9 X10E3/UL (ref 1.4–7)
NEUTROPHILS NFR BLD AUTO: 4.14 10*3/MM3 (ref 1.7–7)
NEUTROPHILS NFR BLD AUTO: 67.1 % (ref 42.7–76)
NEUTROPHILS NFR BLD AUTO: 73 %
NITRITE UR QL STRIP: NEGATIVE
NT-PROBNP SERPL-MCNC: 466.2 PG/ML (ref 0–1800)
PH UR STRIP.AUTO: 5.5 [PH] (ref 5–8)
PLATELET # BLD AUTO: 133 10*3/MM3 (ref 140–450)
PLATELET # BLD AUTO: 137 X10E3/UL (ref 150–450)
PMV BLD AUTO: 10.3 FL (ref 6–12)
POTASSIUM SERPL-SCNC: 3.3 MMOL/L (ref 3.5–5.2)
POTASSIUM SERPL-SCNC: 3.7 MMOL/L (ref 3.5–5.2)
PROT SERPL-MCNC: 6.4 G/DL (ref 6–8.5)
PROT SERPL-MCNC: 6.6 G/DL (ref 6–8.5)
PROT UR QL STRIP: ABNORMAL
RBC # BLD AUTO: 4.64 10*6/MM3 (ref 4.14–5.8)
RBC # BLD AUTO: 4.64 X10E6/UL (ref 4.14–5.8)
SARS-COV-2 RNA RESP QL NAA+PROBE: NOT DETECTED
SODIUM SERPL-SCNC: 138 MMOL/L (ref 136–145)
SODIUM SERPL-SCNC: 140 MMOL/L (ref 134–144)
SP GR UR STRIP: >=1.03 (ref 1–1.03)
T4 SERPL-MCNC: 6.9 UG/DL (ref 4.5–12)
TROPONIN T DELTA: -2 NG/L
TROPONIN T SERPL HS-MCNC: 24 NG/L
TSH SERPL DL<=0.005 MIU/L-ACNC: 1.97 UIU/ML (ref 0.45–4.5)
UROBILINOGEN UR QL STRIP: ABNORMAL
VIT B12 SERPL-MCNC: 887 PG/ML (ref 232–1245)
WBC # BLD AUTO: 6.8 X10E3/UL (ref 3.4–10.8)
WBC NRBC COR # BLD: 6.17 10*3/MM3 (ref 3.4–10.8)
WHOLE BLOOD HOLD COAG: NORMAL
WHOLE BLOOD HOLD SPECIMEN: NORMAL

## 2023-04-04 PROCEDURE — 71275 CT ANGIOGRAPHY CHEST: CPT

## 2023-04-04 PROCEDURE — 70450 CT HEAD/BRAIN W/O DYE: CPT

## 2023-04-04 PROCEDURE — 99285 EMERGENCY DEPT VISIT HI MDM: CPT | Performed by: EMERGENCY MEDICINE

## 2023-04-04 PROCEDURE — 93010 ELECTROCARDIOGRAM REPORT: CPT | Performed by: INTERNAL MEDICINE

## 2023-04-04 PROCEDURE — 82550 ASSAY OF CK (CPK): CPT | Performed by: EMERGENCY MEDICINE

## 2023-04-04 PROCEDURE — 99285 EMERGENCY DEPT VISIT HI MDM: CPT

## 2023-04-04 PROCEDURE — 81003 URINALYSIS AUTO W/O SCOPE: CPT | Performed by: EMERGENCY MEDICINE

## 2023-04-04 PROCEDURE — 93005 ELECTROCARDIOGRAM TRACING: CPT | Performed by: EMERGENCY MEDICINE

## 2023-04-04 PROCEDURE — 93010 ELECTROCARDIOGRAM REPORT: CPT | Performed by: EMERGENCY MEDICINE

## 2023-04-04 PROCEDURE — 84484 ASSAY OF TROPONIN QUANT: CPT | Performed by: EMERGENCY MEDICINE

## 2023-04-04 PROCEDURE — 71045 X-RAY EXAM CHEST 1 VIEW: CPT

## 2023-04-04 PROCEDURE — 85025 COMPLETE CBC W/AUTO DIFF WBC: CPT | Performed by: EMERGENCY MEDICINE

## 2023-04-04 PROCEDURE — 36415 COLL VENOUS BLD VENIPUNCTURE: CPT

## 2023-04-04 PROCEDURE — 83880 ASSAY OF NATRIURETIC PEPTIDE: CPT | Performed by: EMERGENCY MEDICINE

## 2023-04-04 PROCEDURE — 87636 SARSCOV2 & INF A&B AMP PRB: CPT | Performed by: EMERGENCY MEDICINE

## 2023-04-04 PROCEDURE — 25510000001 IOPAMIDOL PER 1 ML: Performed by: STUDENT IN AN ORGANIZED HEALTH CARE EDUCATION/TRAINING PROGRAM

## 2023-04-04 PROCEDURE — 80053 COMPREHEN METABOLIC PANEL: CPT | Performed by: EMERGENCY MEDICINE

## 2023-04-04 RX ORDER — POTASSIUM CHLORIDE 750 MG/1
20 TABLET, FILM COATED, EXTENDED RELEASE ORAL ONCE
Status: COMPLETED | OUTPATIENT
Start: 2023-04-04 | End: 2023-04-04

## 2023-04-04 RX ORDER — SODIUM CHLORIDE 0.9 % (FLUSH) 0.9 %
10 SYRINGE (ML) INJECTION AS NEEDED
Status: DISCONTINUED | OUTPATIENT
Start: 2023-04-04 | End: 2023-04-24 | Stop reason: HOSPADM

## 2023-04-04 RX ADMIN — IOPAMIDOL 100 ML: 755 INJECTION, SOLUTION INTRAVENOUS at 19:04

## 2023-04-04 RX ADMIN — POTASSIUM CHLORIDE 20 MEQ: 750 TABLET, EXTENDED RELEASE ORAL at 19:09

## 2023-04-04 NOTE — FSED PROVIDER NOTE
EMERGENCY DEPARTMENT ATTENDING PROGRESS NOTE    Sign out History and Plan:  Patient with a past medical history of hyperlipidemia, hypertension, COPD, asthma was signed out to me by the outgoing emergency medicine, Dr Narvaez.  This is a 89-year-old male who presents to the emergency department with intermittent confusion, slowness, and shortness of breath over the past couple of days.  Patient is on 2 L nasal cannula at baseline intermittently at home, but has needed 4 L nasal cannula over the past couple of days.  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  He was seen by his primary care physician yesterday who ordered an outpatient work-up but symptoms continue to worsen which is what prompted his visit to the emergency department today.  He denies chest pain or shortness of breath.    Patient sees Dr. Gomes with cardiology.  PCP is Dr. Luis.      Labs:   Labs Reviewed   COMPREHENSIVE METABOLIC PANEL - Abnormal; Notable for the following components:       Result Value    Glucose 159 (*)     Creatinine 0.64 (*)     Potassium 3.3 (*)     Chloride 94 (*)     CO2 33.5 (*)     BUN/Creatinine Ratio 32.8 (*)     All other components within normal limits    Narrative:     GFR Normal >60  Chronic Kidney Disease <60  Kidney Failure <15    The GFR formula is only valid for adults with stable renal function between ages 18 and 70.   TROPONIN - Abnormal; Notable for the following components:    HS Troponin T 24 (*)     All other components within normal limits    Narrative:     High Sensitive Troponin T Reference Range:  <10.0 ng/L- Negative Female for AMI  <15.0 ng/L- Negative Male for AMI  >=10 - Abnormal Female indicating possible myocardial injury.  >=15 - Abnormal Male indicating possible myocardial injury.   Clinicians would have to utilize clinical acumen, EKG, Troponin, and serial changes to determine if it is an Acute Myocardial  Infarction or myocardial injury due to an underlying chronic condition.        URINALYSIS WITHOUT MICROSCOPIC (NO CULTURE) - Abnormal; Notable for the following components:    Protein,  mg/dL (2+) (*)     All other components within normal limits   CBC WITH AUTO DIFFERENTIAL - Abnormal; Notable for the following components:    Platelets 133 (*)     All other components within normal limits   HIGH SENSITIVITIY TROPONIN T 2HR - Abnormal; Notable for the following components:    HS Troponin T 22 (*)     All other components within normal limits    Narrative:     High Sensitive Troponin T Reference Range:  <10.0 ng/L- Negative Female for AMI  <15.0 ng/L- Negative Male for AMI  >=10 - Abnormal Female indicating possible myocardial injury.  >=15 - Abnormal Male indicating possible myocardial injury.   Clinicians would have to utilize clinical acumen, EKG, Troponin, and serial changes to determine if it is an Acute Myocardial Infarction or myocardial injury due to an underlying chronic condition.        COVID-19 AND FLU A/B, NP SWAB IN TRANSPORT MEDIA 8-12 HR TAT - Normal    Narrative:     Fact sheet for providers: https://www.fda.gov/media/773283/download    Fact sheet for patients: https://www.fda.gov/media/734393/download    Test performed by PCR.   BNP (IN-HOUSE) - Normal    Narrative:     Among patients with dyspnea, NT-proBNP is highly sensitive for the detection of acute congestive heart failure. In addition NT-proBNP of <300 pg/ml effectively rules out acute congestive heart failure with 99% negative predictive value.    Results may be falsely decreased if patient taking Biotin.     CK - Normal   RAINBOW DRAW    Narrative:     The following orders were created for panel order [place] Draw.  Procedure                               Abnormality         Status                     ---------                               -----------         ------                     Green Top (Gel)[[id number]]                                   Final result               Lavender Top[[id number]]                                     Final result               Gold Top - SST[[id number]]                                   Final result               Light Blue Top[[id number]]                                   Final result               Green Top (Gel)[[id number]]                                                               Please view results for these tests on the individual orders.   BLOOD GAS, ARTERIAL   CBC AND DIFFERENTIAL    Narrative:     The following orders were created for panel order CBC & Differential.  Procedure                               Abnormality         Status                     ---------                               -----------         ------                     CBC Auto Differential[[id number]]        Abnormal            Final result                 Please view results for these tests on the individual orders.   GREEN TOP   LAVENDER TOP   GOLD TOP - SST   LIGHT BLUE TOP         Radiology:   CT Angiogram Chest   Final Result       Dilated ascending aorta, 4.1 cm. No aortic dissection. Chronic   prominence of caliber of the central pulmonary arteries suggests   pulmonary arterial hypertension.       This report was finalized on 4/4/2023 7:28 PM by Dr. Leander Kay M.D.          CT Head Without Contrast   Final Result       No acute intracranial hemorrhage or hydrocephalus. Chronic changes of   the brain. If there is further clinical concern, MRI could be considered   for further evaluation.       This report was finalized on 4/4/2023 7:21 PM by Dr. Leander Kay M.D.          XR Chest 1 View   Final Result   No focal pulmonary consolidation. Cardiomegaly with mild   prominence of vascular markings. Ectatic appearing aorta. Follow-up as   clinically indicated.       This report was finalized on 4/4/2023 6:10 PM by Dr. Leander Kay M.D.            ED Course as of 04/05/23 0027   Tue Apr 04, 2023 1916 I spoke  with hospitalist, Dr. Benoit Epstein was agreed to accept the patient in transfer for admission. [JS]   1944 Spoke with the patient, wife, and daughter at bedside and updated them on all results.  Discussed my recommendation for admission.  They are in agreement.  Calls been placed to the hospitalist for admission. [JS]   Wed Apr 05, 2023   0015 Transport here to take the patient at this time [JS]      ED Course User Index  [JS] Ronny Saldaña MD       Procedures: None      Medical Decision Making:  I have reviewed the previous medical records including prior labs and outside hospital records from family medicine visit outpatient.    Harry Potts is a 89 y.o. male who presents to the ED with intermittent confusion, fatigue, shortness of breath concerning for electrolyte abnormality, dehydration, intracranial abnormality, PE, ACS, CHF, pneumonia, viral illness, COVID-19, influenza, others    I have reviewed and interpreted the EKG and it shows: Atrial fibrillation.  Rate 61.  Right bundle branch block.  Nonspecific ST and T wave abnormalities.  No STEMI criteria.    Labs were ordered and reviewed.    Imaging was ordered and reviewed.     Nursing notes were reviewed.    The patient was given potassium replacement    The case was discussed with Dr Epstein, hospitalist      ED Diagnosis:  Hypoxia; Confusion; General weakness; Physical deconditioning; Hypokalemia; Atrial fibrillation, unspecified type; Aneurysm of ascending aorta without rupture      Disposition: Admit      Signed:  Ronny Saldaña MD  Emergency Medicine Physician    Please note that portions of this note were completed with a voice recognition program.

## 2023-04-04 NOTE — TELEPHONE ENCOUNTER
Caller: TRAMAINE SAUCEDO    Relationship: Emergency Contact    Best call back number: [phone]    What is the best time to reach you: ANY    Who are you requesting to speak with (clinical staff, provider,  specific staff member): CLINICAL STAFF     Do you know the name of the person who called: TRAMAINE HILLS     What was the call regarding: PATIENT DAUGHTER TRAMAINE CALLED WANTING TO KNOW THE LAB AND XRAY RESULTS FROM 4/03/23.  SHE STATES THAT HIS OXYGEN IS 96% AND HE KEEPS FALLING ASLEEP WHILE SOMEONE IS TALKING TO HIM OR WHILE HE IS  EATING.  PLEASE CALL BACK.      Do you require a callback: YES

## 2023-04-04 NOTE — FSED PROVIDER NOTE
"Subjective   History of Present Illness  Patient with intermittent confusion and \"slowness\" over the past near 2 weeks.  Doing typical tasks at home takes a much longer time.  There is no injury or trauma.  He does have some discomfort while lying flat and was short of breath today.  He was hypoxic in the mid 80% range.  This was noted by daughter at bedside.  However his supplemental oxygen was not on.  He should wear 2 L nasal cannula.  They did have to bump up the cannula to 4 L.  He is feeling better at this time though.  Feels weak and having difficulty at home.  No chest pain or pressure.        Review of Systems   All other systems reviewed and are negative.      Past Medical History:   Diagnosis Date   • Arthritis    • Asthma March, 2021    Oxygen at home   • Cancer     basal cell    • COPD (chronic obstructive pulmonary disease)    • Difficulty walking    • Elevated cholesterol    • Environmental allergies    • GERD (gastroesophageal reflux disease)    • HL (hearing loss)    • Hyperglycemia    • Hyperlipidemia    • Hypertension    • Obesity    • Sleep apnea    • Spondylolysis, lumbar region        Allergies   Allergen Reactions   • Atorvastatin Other (See Comments)     SPASMS LEG      • Morphine And Related Unknown - Low Severity       Past Surgical History:   Procedure Laterality Date   • CARDIAC CATHETERIZATION  circa 2008    [facility]   • EYE SURGERY     • HERNIA REPAIR     • JOINT REPLACEMENT     • REPLACEMENT TOTAL KNEE BILATERAL         Family History   Problem Relation Age of Onset   • Heart attack Father         age 80       Social History     Socioeconomic History   • Marital status:    Tobacco Use   • Smoking status: Never   • Smokeless tobacco: Never   Vaping Use   • Vaping Use: Never used   Substance and Sexual Activity   • Alcohol use: No     Comment: caffeine use   • Drug use: No   • Sexual activity: Not Currently     Partners: Female           Objective   Physical Exam  Vitals and " nursing note reviewed.   Constitutional:       Appearance: Normal appearance. He is obese.   HENT:      Head: Normocephalic.      Right Ear: External ear normal.      Left Ear: External ear normal.      Nose: Nose normal.   Eyes:      Conjunctiva/sclera: Conjunctivae normal.   Cardiovascular:      Rate and Rhythm: Normal rate. Rhythm irregular.   Pulmonary:      Effort: Pulmonary effort is normal.      Breath sounds: Rales present.   Abdominal:      General: There is no distension.   Musculoskeletal:         General: Swelling (Significant lower extremity edema although appears symmetrical) present.      Cervical back: Normal range of motion.   Skin:     General: Skin is dry.      Findings: No rash.   Neurological:      Mental Status: He is alert and oriented to person, place, and time.   Psychiatric:         Mood and Affect: Mood normal.         Procedures           ED Course  ED Course as of 04/05/23 1027   Tue Apr 04, 2023   1916 I spoke with hospitalist, Dr. Benoit Epstein was agreed to accept the patient in transfer for admission. [JS]   1944 Spoke with the patient, wife, and daughter at bedside and updated them on all results.  Discussed my recommendation for admission.  They are in agreement.  Calls been placed to the hospitalist for admission. [JS]   Wed Apr 05, 2023   0015 Transport here to take the patient at this time [JS]      ED Course User Index  [JS] Ronny Saldaña MD                                           Medical Decision Making  1825 I felt patient had large mediastinum.  Radiology noted aortic abnormality on plain film.  Talked about doing CT with contrast with daughter at bedside, patient, and they consent.  We will plan to admit.    Aneurysm of ascending aorta without rupture: acute illness or injury  Atrial fibrillation, unspecified type: acute illness or injury  Confusion: acute illness or injury  General weakness: acute illness or injury  Hypokalemia: acute illness or injury  Hypoxia: acute  illness or injury  Physical deconditioning: acute illness or injury  Amount and/or Complexity of Data Reviewed  Labs: ordered.  Radiology: ordered.  ECG/medicine tests: ordered.      Risk  Prescription drug management.  Decision regarding hospitalization.          Final diagnoses:   Hypoxia   Confusion   General weakness   Physical deconditioning   Hypokalemia   Atrial fibrillation, unspecified type   Aneurysm of ascending aorta without rupture       ED Disposition  ED Disposition     ED Disposition   Decision to Admit    Condition   --    Comment   Level of Care: Telemetry [5]   Diagnosis: Hypoxia [[id number]]   Admitting Physician: MAGDY CONTRERAS [[id number]]   Attending Physician: MAGDY CONTRERAS [[id number]]   Certification: I Certify That Inpatient Hospital Services Are Medically Necessary For Greater Than 2 Midnights               No follow-up provider specified.       Medication List      No changes were made to your prescriptions during this visit.

## 2023-04-05 ENCOUNTER — APPOINTMENT (OUTPATIENT)
Dept: CARDIOLOGY | Facility: HOSPITAL | Age: 88
DRG: 189 | End: 2023-04-05
Payer: MEDICARE

## 2023-04-05 PROBLEM — J96.11 CHRONIC RESPIRATORY FAILURE WITH HYPOXIA: Status: ACTIVE | Noted: 2023-04-05

## 2023-04-05 LAB
ANION GAP SERPL CALCULATED.3IONS-SCNC: 9.5 MMOL/L (ref 5–15)
ASCENDING AORTA: 3.6 CM
ATMOSPHERIC PRESS: 747 MMHG
B PARAPERT DNA SPEC QL NAA+PROBE: NOT DETECTED
B PERT DNA SPEC QL NAA+PROBE: NOT DETECTED
BASE EXCESS BLDV CALC-SCNC: 7.9 MMOL/L (ref -2–2)
BDY SITE: ABNORMAL
BH CV ECHO MEAS - ACS: 2.43 CM
BH CV ECHO MEAS - AO MAX PG: 14.5 MMHG
BH CV ECHO MEAS - AO MEAN PG: 7.6 MMHG
BH CV ECHO MEAS - AO ROOT DIAM: 3.7 CM
BH CV ECHO MEAS - AO V2 MAX: 190.6 CM/SEC
BH CV ECHO MEAS - AO V2 VTI: 33.2 CM
BH CV ECHO MEAS - AVA(I,D): 2.7 CM2
BH CV ECHO MEAS - EDV(CUBED): 112.9 ML
BH CV ECHO MEAS - EDV(MOD-SP2): 103 ML
BH CV ECHO MEAS - EDV(MOD-SP4): 118 ML
BH CV ECHO MEAS - EF(MOD-BP): 56.4 %
BH CV ECHO MEAS - EF(MOD-SP2): 55.3 %
BH CV ECHO MEAS - EF(MOD-SP4): 57.6 %
BH CV ECHO MEAS - ESV(CUBED): 22.3 ML
BH CV ECHO MEAS - ESV(MOD-SP2): 46 ML
BH CV ECHO MEAS - ESV(MOD-SP4): 50 ML
BH CV ECHO MEAS - FS: 41.7 %
BH CV ECHO MEAS - IVS/LVPW: 0.98 CM
BH CV ECHO MEAS - IVSD: 1.29 CM
BH CV ECHO MEAS - LAT PEAK E' VEL: 14.4 CM/SEC
BH CV ECHO MEAS - LV DIASTOLIC VOL/BSA (35-75): 48.9 CM2
BH CV ECHO MEAS - LV MASS(C)D: 249.2 GRAMS
BH CV ECHO MEAS - LV MAX PG: 7.6 MMHG
BH CV ECHO MEAS - LV MEAN PG: 3.7 MMHG
BH CV ECHO MEAS - LV SYSTOLIC VOL/BSA (12-30): 20.7 CM2
BH CV ECHO MEAS - LV V1 MAX: 138.3 CM/SEC
BH CV ECHO MEAS - LV V1 VTI: 24.3 CM
BH CV ECHO MEAS - LVIDD: 4.8 CM
BH CV ECHO MEAS - LVIDS: 2.8 CM
BH CV ECHO MEAS - LVOT AREA: 3.7 CM2
BH CV ECHO MEAS - LVOT DIAM: 2.18 CM
BH CV ECHO MEAS - LVPWD: 1.32 CM
BH CV ECHO MEAS - MED PEAK E' VEL: 14.5 CM/SEC
BH CV ECHO MEAS - MV DEC SLOPE: 840.9 CM/SEC2
BH CV ECHO MEAS - MV DEC TIME: 0.15 MSEC
BH CV ECHO MEAS - MV E MAX VEL: 137 CM/SEC
BH CV ECHO MEAS - MV MAX PG: 10.3 MMHG
BH CV ECHO MEAS - MV MEAN PG: 3.1 MMHG
BH CV ECHO MEAS - MV P1/2T: 51.7 MSEC
BH CV ECHO MEAS - MV V2 VTI: 35.1 CM
BH CV ECHO MEAS - MVA(P1/2T): 4.3 CM2
BH CV ECHO MEAS - MVA(VTI): 2.6 CM2
BH CV ECHO MEAS - PA ACC TIME: 0.16 SEC
BH CV ECHO MEAS - PA PR(ACCEL): 7.7 MMHG
BH CV ECHO MEAS - PA V2 MAX: 169.7 CM/SEC
BH CV ECHO MEAS - RAP SYSTOLE: 3 MMHG
BH CV ECHO MEAS - RV MAX PG: 5.8 MMHG
BH CV ECHO MEAS - RV V1 MAX: 120.9 CM/SEC
BH CV ECHO MEAS - RV V1 VTI: 25.9 CM
BH CV ECHO MEAS - RVSP: 52 MMHG
BH CV ECHO MEAS - SI(MOD-SP2): 23.6 ML/M2
BH CV ECHO MEAS - SI(MOD-SP4): 28.2 ML/M2
BH CV ECHO MEAS - SV(LVOT): 90.6 ML
BH CV ECHO MEAS - SV(MOD-SP2): 57 ML
BH CV ECHO MEAS - SV(MOD-SP4): 68 ML
BH CV ECHO MEAS - TAPSE (>1.6): 1.52 CM
BH CV ECHO MEAS - TR MAX PG: 49.2 MMHG
BH CV ECHO MEAS - TR MAX VEL: 350.8 CM/SEC
BH CV ECHO MEASUREMENTS AVERAGE E/E' RATIO: 9.48
BH CV XLRA - RV BASE: 4.2 CM
BH CV XLRA - RV LENGTH: 7 CM
BH CV XLRA - RV MID: 4.2 CM
BH CV XLRA - TDI S': 12 CM/SEC
BUN SERPL-MCNC: 14 MG/DL (ref 8–23)
BUN/CREAT SERPL: 26.9 (ref 7–25)
C PNEUM DNA NPH QL NAA+NON-PROBE: NOT DETECTED
CALCIUM SPEC-SCNC: 9.4 MG/DL (ref 8.2–9.6)
CHLORIDE SERPL-SCNC: 97 MMOL/L (ref 98–107)
CO2 SERPL-SCNC: 33.5 MMOL/L (ref 22–29)
CREAT SERPL-MCNC: 0.52 MG/DL (ref 0.76–1.27)
DEPRECATED RDW RBC AUTO: 41.4 FL (ref 37–54)
EGFRCR SERPLBLD CKD-EPI 2021: 94.6 ML/MIN/1.73
ERYTHROCYTE [DISTWIDTH] IN BLOOD BY AUTOMATED COUNT: 12.5 % (ref 12.3–15.4)
FLUAV SUBTYP SPEC NAA+PROBE: NOT DETECTED
FLUBV RNA ISLT QL NAA+PROBE: NOT DETECTED
GAS FLOW AIRWAY: 4 LPM
GLUCOSE SERPL-MCNC: 132 MG/DL (ref 65–99)
HADV DNA SPEC NAA+PROBE: NOT DETECTED
HCO3 BLDV-SCNC: 34.6 MMOL/L (ref 22–28)
HCOV 229E RNA SPEC QL NAA+PROBE: NOT DETECTED
HCOV HKU1 RNA SPEC QL NAA+PROBE: NOT DETECTED
HCOV NL63 RNA SPEC QL NAA+PROBE: NOT DETECTED
HCOV OC43 RNA SPEC QL NAA+PROBE: NOT DETECTED
HCT VFR BLD AUTO: 43.2 % (ref 37.5–51)
HGB BLD-MCNC: 13.8 G/DL (ref 13–17.7)
HMPV RNA NPH QL NAA+NON-PROBE: NOT DETECTED
HPIV1 RNA ISLT QL NAA+PROBE: NOT DETECTED
HPIV2 RNA SPEC QL NAA+PROBE: NOT DETECTED
HPIV3 RNA NPH QL NAA+PROBE: NOT DETECTED
HPIV4 P GENE NPH QL NAA+PROBE: NOT DETECTED
LEFT ATRIUM VOLUME INDEX: 25.4 ML/M2
M PNEUMO IGG SER IA-ACNC: NOT DETECTED
MAXIMAL PREDICTED HEART RATE: 128 BPM
MCH RBC QN AUTO: 29.2 PG (ref 26.6–33)
MCHC RBC AUTO-ENTMCNC: 31.9 G/DL (ref 31.5–35.7)
MCV RBC AUTO: 91.3 FL (ref 79–97)
MODALITY: ABNORMAL
PCO2 BLDV: 54.6 MM HG (ref 41–51)
PH BLDV: 7.41 PH UNITS (ref 7.31–7.41)
PLATELET # BLD AUTO: 141 10*3/MM3 (ref 140–450)
PMV BLD AUTO: 10 FL (ref 6–12)
PO2 BLDV: 79 MM HG (ref 35–45)
POTASSIUM SERPL-SCNC: 3.8 MMOL/L (ref 3.5–5.2)
QT INTERVAL: 450 MS
QT INTERVAL: 455 MS
RBC # BLD AUTO: 4.73 10*6/MM3 (ref 4.14–5.8)
RHINOVIRUS RNA SPEC NAA+PROBE: NOT DETECTED
RSV RNA NPH QL NAA+NON-PROBE: NOT DETECTED
SAO2 % BLDCOA: 95.4 % (ref 92–99)
SARS-COV-2 RNA NPH QL NAA+NON-PROBE: NOT DETECTED
SINUS: 3.3 CM
SODIUM SERPL-SCNC: 140 MMOL/L (ref 136–145)
STJ: 3.4 CM
STRESS TARGET HR: 109 BPM
TOTAL RATE: 24 BREATHS/MINUTE
TROPONIN T SERPL HS-MCNC: 34 NG/L
WBC NRBC COR # BLD: 4.73 10*3/MM3 (ref 3.4–10.8)

## 2023-04-05 PROCEDURE — 82803 BLOOD GASES ANY COMBINATION: CPT

## 2023-04-05 PROCEDURE — 94761 N-INVAS EAR/PLS OXIMETRY MLT: CPT

## 2023-04-05 PROCEDURE — 25510000001 PERFLUTREN (DEFINITY) 8.476 MG IN SODIUM CHLORIDE (PF) 0.9 % 10 ML INJECTION: Performed by: INTERNAL MEDICINE

## 2023-04-05 PROCEDURE — 85027 COMPLETE CBC AUTOMATED: CPT | Performed by: NURSE PRACTITIONER

## 2023-04-05 PROCEDURE — 93010 ELECTROCARDIOGRAM REPORT: CPT | Performed by: INTERNAL MEDICINE

## 2023-04-05 PROCEDURE — 80048 BASIC METABOLIC PNL TOTAL CA: CPT | Performed by: NURSE PRACTITIONER

## 2023-04-05 PROCEDURE — 94660 CPAP INITIATION&MGMT: CPT

## 2023-04-05 PROCEDURE — 94799 UNLISTED PULMONARY SVC/PX: CPT

## 2023-04-05 PROCEDURE — 25010000002 METHYLPREDNISOLONE PER 125 MG: Performed by: NURSE PRACTITIONER

## 2023-04-05 PROCEDURE — 93306 TTE W/DOPPLER COMPLETE: CPT | Performed by: INTERNAL MEDICINE

## 2023-04-05 PROCEDURE — 93005 ELECTROCARDIOGRAM TRACING: CPT | Performed by: INTERNAL MEDICINE

## 2023-04-05 PROCEDURE — 93306 TTE W/DOPPLER COMPLETE: CPT

## 2023-04-05 PROCEDURE — 84484 ASSAY OF TROPONIN QUANT: CPT | Performed by: NURSE PRACTITIONER

## 2023-04-05 PROCEDURE — 94640 AIRWAY INHALATION TREATMENT: CPT

## 2023-04-05 PROCEDURE — 99232 SBSQ HOSP IP/OBS MODERATE 35: CPT | Performed by: INTERNAL MEDICINE

## 2023-04-05 PROCEDURE — 0202U NFCT DS 22 TRGT SARS-COV-2: CPT | Performed by: HOSPITALIST

## 2023-04-05 RX ORDER — NITROGLYCERIN 0.4 MG/1
0.4 TABLET SUBLINGUAL
Status: DISCONTINUED | OUTPATIENT
Start: 2023-04-05 | End: 2023-04-24 | Stop reason: HOSPADM

## 2023-04-05 RX ORDER — METOPROLOL SUCCINATE 25 MG/1
25 TABLET, EXTENDED RELEASE ORAL DAILY
Status: DISCONTINUED | OUTPATIENT
Start: 2023-04-05 | End: 2023-04-17

## 2023-04-05 RX ORDER — CHLORTHALIDONE 25 MG/1
25 TABLET ORAL DAILY
Status: DISCONTINUED | OUTPATIENT
Start: 2023-04-05 | End: 2023-04-08

## 2023-04-05 RX ORDER — ACETAMINOPHEN 160 MG/5ML
650 SOLUTION ORAL EVERY 4 HOURS PRN
Status: DISCONTINUED | OUTPATIENT
Start: 2023-04-05 | End: 2023-04-24 | Stop reason: HOSPADM

## 2023-04-05 RX ORDER — TERAZOSIN 5 MG/1
5 CAPSULE ORAL DAILY
Status: DISCONTINUED | OUTPATIENT
Start: 2023-04-05 | End: 2023-04-17

## 2023-04-05 RX ORDER — ALBUTEROL SULFATE 2.5 MG/3ML
2.5 SOLUTION RESPIRATORY (INHALATION) EVERY 6 HOURS PRN
Status: DISCONTINUED | OUTPATIENT
Start: 2023-04-05 | End: 2023-04-24 | Stop reason: HOSPADM

## 2023-04-05 RX ORDER — ACETAMINOPHEN 325 MG/1
650 TABLET ORAL EVERY 4 HOURS PRN
Status: DISCONTINUED | OUTPATIENT
Start: 2023-04-05 | End: 2023-04-24 | Stop reason: HOSPADM

## 2023-04-05 RX ORDER — IPRATROPIUM BROMIDE AND ALBUTEROL SULFATE 2.5; .5 MG/3ML; MG/3ML
3 SOLUTION RESPIRATORY (INHALATION)
Status: DISCONTINUED | OUTPATIENT
Start: 2023-04-05 | End: 2023-04-24 | Stop reason: HOSPADM

## 2023-04-05 RX ORDER — AMMONIUM LACTATE 120 MG/G
1 CREAM TOPICAL EVERY 12 HOURS
Status: DISCONTINUED | OUTPATIENT
Start: 2023-04-05 | End: 2023-04-24 | Stop reason: HOSPADM

## 2023-04-05 RX ORDER — SODIUM CHLORIDE 0.9 % (FLUSH) 0.9 %
10 SYRINGE (ML) INJECTION AS NEEDED
Status: DISCONTINUED | OUTPATIENT
Start: 2023-04-05 | End: 2023-04-24 | Stop reason: HOSPADM

## 2023-04-05 RX ORDER — ACETAMINOPHEN 650 MG/1
650 SUPPOSITORY RECTAL EVERY 4 HOURS PRN
Status: DISCONTINUED | OUTPATIENT
Start: 2023-04-05 | End: 2023-04-24 | Stop reason: HOSPADM

## 2023-04-05 RX ORDER — CALCIUM CARBONATE 200(500)MG
2 TABLET,CHEWABLE ORAL 2 TIMES DAILY PRN
Status: DISCONTINUED | OUTPATIENT
Start: 2023-04-05 | End: 2023-04-24 | Stop reason: HOSPADM

## 2023-04-05 RX ORDER — METHYLPREDNISOLONE SODIUM SUCCINATE 125 MG/2ML
60 INJECTION, POWDER, LYOPHILIZED, FOR SOLUTION INTRAMUSCULAR; INTRAVENOUS EVERY 12 HOURS
Status: DISCONTINUED | OUTPATIENT
Start: 2023-04-05 | End: 2023-04-06

## 2023-04-05 RX ORDER — FLUTICASONE PROPIONATE 50 MCG
1 SPRAY, SUSPENSION (ML) NASAL DAILY
Status: DISCONTINUED | OUTPATIENT
Start: 2023-04-05 | End: 2023-04-24 | Stop reason: HOSPADM

## 2023-04-05 RX ORDER — ONDANSETRON 2 MG/ML
4 INJECTION INTRAMUSCULAR; INTRAVENOUS EVERY 6 HOURS PRN
Status: DISCONTINUED | OUTPATIENT
Start: 2023-04-05 | End: 2023-04-24 | Stop reason: HOSPADM

## 2023-04-05 RX ORDER — BUDESONIDE AND FORMOTEROL FUMARATE DIHYDRATE 160; 4.5 UG/1; UG/1
2 AEROSOL RESPIRATORY (INHALATION)
Status: DISCONTINUED | OUTPATIENT
Start: 2023-04-05 | End: 2023-04-24 | Stop reason: HOSPADM

## 2023-04-05 RX ORDER — SODIUM CHLORIDE 0.9 % (FLUSH) 0.9 %
10 SYRINGE (ML) INJECTION EVERY 12 HOURS SCHEDULED
Status: DISCONTINUED | OUTPATIENT
Start: 2023-04-05 | End: 2023-04-24 | Stop reason: HOSPADM

## 2023-04-05 RX ORDER — PANTOPRAZOLE SODIUM 40 MG/1
40 TABLET, DELAYED RELEASE ORAL
Status: DISCONTINUED | OUTPATIENT
Start: 2023-04-05 | End: 2023-04-24 | Stop reason: HOSPADM

## 2023-04-05 RX ORDER — SODIUM CHLORIDE 9 MG/ML
40 INJECTION, SOLUTION INTRAVENOUS AS NEEDED
Status: DISCONTINUED | OUTPATIENT
Start: 2023-04-05 | End: 2023-04-24 | Stop reason: HOSPADM

## 2023-04-05 RX ORDER — AMMONIUM LACTATE 120 MG/G
1 CREAM TOPICAL EVERY 12 HOURS PRN
Status: DISCONTINUED | OUTPATIENT
Start: 2023-04-05 | End: 2023-04-05

## 2023-04-05 RX ORDER — IPRATROPIUM BROMIDE AND ALBUTEROL SULFATE 2.5; .5 MG/3ML; MG/3ML
3 SOLUTION RESPIRATORY (INHALATION) EVERY 4 HOURS PRN
Status: DISCONTINUED | OUTPATIENT
Start: 2023-04-05 | End: 2023-04-20

## 2023-04-05 RX ORDER — ONDANSETRON 4 MG/1
4 TABLET, FILM COATED ORAL EVERY 6 HOURS PRN
Status: DISCONTINUED | OUTPATIENT
Start: 2023-04-05 | End: 2023-04-24 | Stop reason: HOSPADM

## 2023-04-05 RX ORDER — ASPIRIN 81 MG/1
81 TABLET ORAL DAILY
Status: DISCONTINUED | OUTPATIENT
Start: 2023-04-05 | End: 2023-04-24 | Stop reason: HOSPADM

## 2023-04-05 RX ORDER — AMLODIPINE BESYLATE 5 MG/1
10 TABLET ORAL DAILY
Status: DISCONTINUED | OUTPATIENT
Start: 2023-04-05 | End: 2023-04-20

## 2023-04-05 RX ADMIN — MICONAZOLE NITRATE: 2 POWDER TOPICAL at 16:29

## 2023-04-05 RX ADMIN — TERAZOSIN HYDROCHLORIDE 5 MG: 5 CAPSULE ORAL at 12:56

## 2023-04-05 RX ADMIN — IPRATROPIUM BROMIDE AND ALBUTEROL SULFATE 3 ML: 2.5; .5 SOLUTION RESPIRATORY (INHALATION) at 18:48

## 2023-04-05 RX ADMIN — AMLODIPINE BESYLATE 10 MG: 10 TABLET ORAL at 12:56

## 2023-04-05 RX ADMIN — AMMONIUM LACTATE 1 APPLICATION: 120 CREAM TOPICAL at 19:53

## 2023-04-05 RX ADMIN — BUDESONIDE AND FORMOTEROL FUMARATE DIHYDRATE 2 PUFF: 160; 4.5 AEROSOL RESPIRATORY (INHALATION) at 21:39

## 2023-04-05 RX ADMIN — IPRATROPIUM BROMIDE AND ALBUTEROL SULFATE 3 ML: 2.5; .5 SOLUTION RESPIRATORY (INHALATION) at 11:57

## 2023-04-05 RX ADMIN — METHYLPREDNISOLONE SODIUM SUCCINATE 60 MG: 125 INJECTION, POWDER, FOR SOLUTION INTRAMUSCULAR; INTRAVENOUS at 16:40

## 2023-04-05 RX ADMIN — IPRATROPIUM BROMIDE AND ALBUTEROL SULFATE 3 ML: 2.5; .5 SOLUTION RESPIRATORY (INHALATION) at 07:15

## 2023-04-05 RX ADMIN — Medication 10 ML: at 08:23

## 2023-04-05 RX ADMIN — Medication 10 ML: at 20:31

## 2023-04-05 RX ADMIN — ASPIRIN 81 MG: 81 TABLET, COATED ORAL at 12:56

## 2023-04-05 RX ADMIN — PERFLUTREN 2 ML: 6.52 INJECTION, SUSPENSION INTRAVENOUS at 14:21

## 2023-04-05 RX ADMIN — FLUTICASONE PROPIONATE 1 SPRAY: 50 SPRAY, METERED NASAL at 12:56

## 2023-04-05 RX ADMIN — Medication 10 ML: at 16:40

## 2023-04-05 RX ADMIN — PANTOPRAZOLE SODIUM 40 MG: 40 TABLET, DELAYED RELEASE ORAL at 12:56

## 2023-04-05 RX ADMIN — MICONAZOLE NITRATE: 2 POWDER TOPICAL at 20:30

## 2023-04-05 RX ADMIN — CHLORTHALIDONE 25 MG: 25 TABLET ORAL at 12:55

## 2023-04-05 RX ADMIN — METOPROLOL SUCCINATE 25 MG: 25 TABLET, EXTENDED RELEASE ORAL at 12:56

## 2023-04-05 RX ADMIN — METHYLPREDNISOLONE SODIUM SUCCINATE 60 MG: 125 INJECTION, POWDER, FOR SOLUTION INTRAMUSCULAR; INTRAVENOUS at 04:26

## 2023-04-05 NOTE — PLAN OF CARE
Goal Outcome Evaluation:               Patient alert and able to answer all orientation questions except he thought he was at [facility], but yet needing to be re-oriented to room and care, asking the same questions repeatedly. Wife at bedside with friend at this time. Patient on 4.5 L/NC and A-Fib on monitor. Patient turned Q2 hours. Echocardiogram completed today. Patient with good appetite- eating 100%. Daughter knows that patient can use his C-PAP and will bring it to the hospital when she returns later today. No acute distress noted at this time, will continue to monitor.

## 2023-04-05 NOTE — ED NOTES
"\"Nursing report ED to floor  Harry Potts  89 y.o.  male    HPI :   Chief Complaint   Patient presents with    Altered Mental Status       Admitting doctor:   Benoit Epstein MD    Admitting diagnosis:   The primary encounter diagnosis was Hypoxia. Diagnoses of Confusion, General weakness, Physical deconditioning, Hypokalemia, and Atrial fibrillation, unspecified type were also pertinent to this visit.    Code status:   Current Code Status       Date Active Code Status Order ID Comments User Context       Prior            Allergies:   Atorvastatin and Morphine and related    Isolation:   No active isolations    Intake and Output  No intake or output data in the 24 hours ending 04/04/23 2048    Weight:       04/04/23 1736   Weight: (!) 137 kg (302 lb 3.2 oz)       Most recent vitals:   Vitals:    04/04/23 1736 04/04/23 1830 04/04/23 1930   BP: 177/88 151/92 122/76   BP Location: Right arm     Patient Position: Sitting     Pulse: 92 90 58   Resp: 22  16   Temp: 98.2 °F (36.8 °C)     TempSrc: Oral     SpO2: 97% 98% 92%   Weight: (!) 137 kg (302 lb 3.2 oz)     Height: 182.9 cm (72\")         Active LDAs/IV Access:   Lines, Drains & Airways       Active LDAs       Name Placement date Placement time Site Days    Peripheral IV 04/04/23 1757 Right Antecubital 04/04/23 1757  Antecubital  less than 1                    Labs (abnormal labs have a star):   Labs Reviewed   COMPREHENSIVE METABOLIC PANEL - Abnormal; Notable for the following components:       Result Value    Glucose 159 (*)     Creatinine 0.64 (*)     Potassium 3.3 (*)     Chloride 94 (*)     CO2 33.5 (*)     BUN/Creatinine Ratio 32.8 (*)     All other components within normal limits    Narrative:     GFR Normal >60  Chronic Kidney Disease <60  Kidney Failure <15    The GFR formula is only valid for adults with stable renal function between ages 18 and 70.   TROPONIN - Abnormal; Notable for the following components:    HS Troponin T 24 (*)     All other " components within normal limits    Narrative:     High Sensitive Troponin T Reference Range:  <10.0 ng/L- Negative Female for AMI  <15.0 ng/L- Negative Male for AMI  >=10 - Abnormal Female indicating possible myocardial injury.  >=15 - Abnormal Male indicating possible myocardial injury.   Clinicians would have to utilize clinical acumen, EKG, Troponin, and serial changes to determine if it is an Acute Myocardial Infarction or myocardial injury due to an underlying chronic condition.        URINALYSIS WITHOUT MICROSCOPIC (NO CULTURE) - Abnormal; Notable for the following components:    Protein,  mg/dL (2+) (*)     All other components within normal limits   CBC WITH AUTO DIFFERENTIAL - Abnormal; Notable for the following components:    Platelets 133 (*)     All other components within normal limits   COVID-19 AND FLU A/B, NP SWAB IN TRANSPORT MEDIA 8-12 HR TAT - Normal    Narrative:     Fact sheet for providers: https://www.fda.gov/media/477750/download    Fact sheet for patients: https://www.fda.gov/media/985145/download    Test performed by PCR.   BNP (IN-HOUSE) - Normal    Narrative:     Among patients with dyspnea, NT-proBNP is highly sensitive for the detection of acute congestive heart failure. In addition NT-proBNP of <300 pg/ml effectively rules out acute congestive heart failure with 99% negative predictive value.    Results may be falsely decreased if patient taking Biotin.     RAINBOW DRAW    Narrative:     The following orders were created for panel order [place] Draw.  Procedure                               Abnormality         Status                     ---------                               -----------         ------                     Green Top (Gel)[[id number]]                                  Final result               Lavender Top[[id number]]                                     Final result               Gold Top - SST[[id number]]                                   Final result                Light Blue Top[[id number]]                                   Final result               Green Top (Gel)[[id number]]                                                               Please view results for these tests on the individual orders.   BLOOD GAS, ARTERIAL   CK   HIGH SENSITIVITIY TROPONIN T 2HR   CBC AND DIFFERENTIAL    Narrative:     The following orders were created for panel order CBC & Differential.  Procedure                               Abnormality         Status                     ---------                               -----------         ------                     CBC Auto Differential[[id number]]        Abnormal            Final result                 Please view results for these tests on the individual orders.   GREEN TOP   LAVENDER TOP   GOLD TOP - SST   LIGHT BLUE TOP       EKG:   ECG 12 Lead Altered Mental Status   Preliminary Result   HEART RATE= 61  bpm   RR Interval= 984  ms   NC Interval=   ms   P Horizontal Axis=   deg   P Front Axis=   deg   QRSD Interval= 183  ms   QT Interval= 450  ms   QRS Axis= -57  deg   T Wave Axis= -8  deg   - ABNORMAL ECG -   Atrial fibrillation   Right bundle branch block   Electronically Signed By:    Date and Time of Study: 2023-04-04 17:44:14          Meds given in ED:   Medications   sodium chloride 0.9 % flush 10 mL (has no administration in time range)   potassium chloride (K-DUR,KLOR-CON) ER tablet 20 mEq (20 mEq Oral Given 4/4/23 1909)   iopamidol (ISOVUE-370) 76 % injection 100 mL (100 mL Intravenous Given 4/4/23 1904)       Imaging results:  CT Head Without Contrast    Result Date: 4/4/2023   No acute intracranial hemorrhage or hydrocephalus. Chronic changes of the brain. If there is further clinical concern, MRI could be considered for further evaluation.  This report was finalized on 4/4/2023 7:21 PM by Dr. Leander Kay M.D.      XR Chest 1 View    Result Date: 4/4/2023  No focal pulmonary consolidation. Cardiomegaly with mild prominence of  vascular markings. Ectatic appearing aorta. Follow-up as clinically indicated.  This report was finalized on 4/4/2023 6:10 PM by Dr. Leander Kay M.D.      CT Angiogram Chest    Result Date: 4/4/2023   Dilated ascending aorta, 4.1 cm. No aortic dissection. Chronic prominence of caliber of the central pulmonary arteries suggests pulmonary arterial hypertension.  This report was finalized on 4/4/2023 7:28 PM by Dr. Leander Kay M.D.      XR Chest PA & Lateral    Result Date: 4/4/2023  Cardiomegaly with pulmonary vascular engorgement but no marlee interstitial edema.  This report was finalized on 4/4/2023 7:10 AM by Dr. Jae Bailey M.D.       Ambulatory status:   - x2 assist    Social issues:   Social History     Socioeconomic History    Marital status:    Tobacco Use    Smoking status: Never    Smokeless tobacco: Never   Vaping Use    Vaping Use: Never used   Substance and Sexual Activity    Alcohol use: No     Comment: caffeine use    Drug use: No    Sexual activity: Not Currently     Partners: Female       NIH Stroke Scale:         Martha Callejas RN  04/04/23 20:48 EDT

## 2023-04-05 NOTE — CONSULTS
[facility] Consult    Patient Name: Harry Potts  Age/Sex: 89 y.o. male  : 1930  MRN: [id number]    Date of Admission: 2023  Date of Encounter Visit: 23  Encounter Provider: Azra Gomes MD  Referring Provider: Harry Luis MD  Place of Service: [facility]  Patient Care Team:  Harry Luis MD as PCP - General    Subjective:     Consulted for: Atrial fibrillation    Chief Complaint: Shortness of breath, altered mental status    History of Present Illness:  Harry Potts is a 89 y.o. male with hypertension, hyperlipidemia, obesity, asthma, obstructive sleep apnea on CPAP, PVCs, chronic hypoxic respiratory failure on O2, who presented to the emergency room with complaints of shortness of breath and confusion.    The patient reports that he has been having more issues with his breathing over the last few days.  Is been associated with just generalized weakness.  5 days ago the patient was noted to be hypoxic on his usual 2 L nasal cannula.  As a result he began using 4 L.  Despite this yesterday he was noted to be hypoxic again.  Around that time he was also noted to be somnolent even falling asleep while he was eating lunch.  His appetite was decreased.  He also appeared to be confused.  Due to the symptoms he was brought to the emergency room.    Upon his arrival he was noted to be in atrial fibrillation with controlled rates.  His troponins were unremarkable.  His EKG showed PVCs but no ischemic changes.  BNP was normal.  CT angiogram of the chest showed no evidence of aortic dissection and a mildly dilated ascending aorta measuring 4.1 cm.  There was no evidence of pulmonary embolism.    The patient denies any chest pain, palpitations, significant lightheadedness, or worsening lower extremity edema.  He denies any recent falls.  His daughter is at bedside and assists with history.      Past Medical History:  Past Medical  History:   Diagnosis Date   • Arthritis    • Asthma March, 2021    Oxygen at home   • Cancer     basal cell    • COPD (chronic obstructive pulmonary disease)    • Difficulty walking    • Elevated cholesterol    • Environmental allergies    • GERD (gastroesophageal reflux disease)    • HL (hearing loss)    • Hyperglycemia    • Hyperlipidemia    • Hypertension    • Obesity    • Sleep apnea    • Spondylolysis, lumbar region        Past Surgical History:   Procedure Laterality Date   • CARDIAC CATHETERIZATION  circa 2008    [facility]   • EYE SURGERY     • HERNIA REPAIR     • JOINT REPLACEMENT     • REPLACEMENT TOTAL KNEE BILATERAL         Home Medications:   Medications Prior to Admission   Medication Sig Dispense Refill Last Dose   • albuterol sulfate  (90 Base) MCG/ACT inhaler Inhale 2 puffs Every 4 (Four) Hours As Needed for Wheezing. 18 g 5    • amLODIPine (NORVASC) 10 MG tablet TAKE 1 TABLET DAILY 90 tablet 3    • aspirin 81 MG EC tablet Take 1 tablet by mouth Daily.      • Azelastine HCl 137 MCG/SPRAY solution USE 1 SPRAY IN EACH NOSTRIL AS DIRECTED BY PROVIDER DAILY 30 mL 2    • celecoxib (CeleBREX) 200 MG capsule TAKE 1 CAPSULE DAILY AS NEEDED 90 capsule 3    • chlorthalidone (HYGROTON) 25 MG tablet TAKE 1 TABLET DAILY 90 tablet 3    • doxazosin (CARDURA) 4 MG tablet TAKE 1 TABLET DAILY AS DIRECTED 90 tablet 3    • esomeprazole (nexIUM) 40 MG capsule TAKE 1 CAPSULE DAILY 90 capsule 3    • ezetimibe (ZETIA) 10 MG tablet TAKE 1 TABLET DAILY 90 tablet 3    • fluticasone (FLONASE) 50 MCG/ACT nasal spray 1 spray into the nostril(s) as directed by provider Daily. 11.1 mL 2    • fluticasone-salmeterol (Advair Diskus) 250-50 MCG/DOSE DISKUS Inhale 1 puff 2 (Two) Times a Day. 180 each 3    • loratadine (CLARITIN) 10 MG tablet Take  by mouth Daily.      • metoprolol succinate XL (TOPROL-XL) 50 MG 24 hr tablet Take 0.5 tablets by mouth Daily. 90 tablet 3    • Multiple Vitamins-Minerals (CENTRUM ADULTS PO) Take   by mouth Daily.      • TiZANidine (ZANAFLEX) 2 MG capsule Take 1 capsule by mouth Daily As Needed for Muscle Spasms. (Patient taking differently: Take 1 capsule by mouth 3 (Three) Times a Day.) 90 capsule 3    • Omega-3 Fatty Acids (FISH OIL) 1000 MG capsule capsule Take  by mouth Daily With Breakfast.      • tiZANidine (ZANAFLEX) 2 MG tablet           Allergies:  Allergies   Allergen Reactions   • Atorvastatin Other (See Comments)     SPASMS LEG      • Morphine And Related Unknown - Low Severity       Past Social History:  Social History     Socioeconomic History   • Marital status:    Tobacco Use   • Smoking status: Never   • Smokeless tobacco: Never   Vaping Use   • Vaping Use: Never used   Substance and Sexual Activity   • Alcohol use: No     Comment: caffeine use   • Drug use: No   • Sexual activity: Not Currently     Partners: Female       Past Family History:  Family History   Problem Relation Age of Onset   • Heart attack Father         age 80       Review of Systems:   All systems reviewed. Pertinent positives identified in HPI. All other systems are negative.    Objective:   Temp:  [97.6 °F (36.4 °C)-98.2 °F (36.8 °C)] 97.6 °F (36.4 °C)  Heart Rate:  [51-92] 61  Resp:  [16-24] 24  BP: ()/(70-92) 96/75   No intake or output data in the 24 hours ending 04/05/23 0732  Body mass index is 44.95 kg/m².      04/04/23  1736 04/05/23  0048   Weight: (!) 137 kg (302 lb 3.2 oz) 134 kg (295 lb 10.2 oz)     Weight change:     Physical Exam:   General Appearance:    Alert, cooperative, in no acute distress   Head:    Normocephalic, without obvious abnormality, atraumatic   Eyes:            Lids and lashes normal, conjunctivae and sclerae normal, no   icterus, no pallor, corneas clear, PERRLA   Ears:    Ears appear intact with no abnormalities noted   Neck:   No adenopathy, supple, trachea midline, no thyromegaly, no   carotid bruit, no JVD   Lungs:     Clear to auscultation,respirations regular, even and  unlabored    Heart:   Irregularly, irregular, normal S1 and S2, no murmur, no gallop, no rub, no click   Chest Wall:    No abnormalities observed   Abdomen:     Normal bowel sounds, no masses, no organomegaly, soft        non-tender, non-distended, no guarding, no rebound  tenderness   Extremities:   Moves all extremities well, no edema, no cyanosis, no redness   Pulses:   Pulses palpable and equal bilaterally. Normal radial, carotid, femoral, dorsalis pedis and posterior tibial pulses bilaterally. Normal abdominal aorta   Skin:  Psychiatric:   No bleeding, bruising or rash    Alert and oriented x 3, normal mood and affect       Lab Review:   Results from last 7 days   Lab Units 04/05/23  0620 04/04/23  1755 04/03/23  1504   SODIUM mmol/L 140 138 140   POTASSIUM mmol/L 3.8 3.3* 3.7   CHLORIDE mmol/L 97* 94* 95*   TOTAL CO2 mmol/L  --   --  31*   CO2 mmol/L 33.5* 33.5*  --    BUN mg/dL 14 21 15   CREATININE mg/dL 0.52* 0.64* 0.75*   GLUCOSE mg/dL 132* 159* 126*   CALCIUM mg/dL 9.4 8.9 9.5   AST (SGOT) U/L  --  24 23   ALT (SGPT) U/L  --  34 32     Results from last 7 days   Lab Units 04/05/23  0620 04/04/23 2001 04/04/23  1755   CK TOTAL U/L  --   --  117   HSTROP T ng/L 34* 22* 24*     Results from last 7 days   Lab Units 04/05/23  0620 04/04/23  1755 04/03/23  1504   WBC 10*3/mm3 4.73 6.17 6.8   HEMOGLOBIN g/dL 13.8 14.2 13.3   HEMATOCRIT % 43.2 43.2 41.0   PLATELETS 10*3/mm3 141 133* 137*                   Invalid input(s): LDLCALC  Results from last 7 days   Lab Units 04/04/23  1755   PROBNP pg/mL 466.2     Results from last 7 days   Lab Units 04/03/23  1504   TSH uIU/mL 1.970       Echo EF Estimated  Lab Results   Component Value Date    ECHOEFEST 61 11/06/2019       EKG:     Imaging:  Imaging Results (Most Recent)     Procedure Component Value Units Date/Time    CT Angiogram Chest [[id number]] Collected: 04/04/23 1922     Updated: 04/04/23 1931    Narrative:      CT ANGIOGRAM CHEST-     INDICATIONS: Abnormal  chest x-ray     TECHNIQUE: Radiation dose reduction techniques were utilized, including  automated exposure control and exposure modulation based on body size.  CT angiography of the chest. Three-dimensional reconstructions.     COMPARISON: 11/04/2019      FINDINGS:     Ascending aorta is dilated, 4.1 cm, not significantly changed. No aortic  dissection. No pulmonary embolism. Prominence of caliber of the central  pulmonary arteries suggests pulmonary arterial hypertension; this  appearance is chronic.     The heart size is enlarged without pericardial effusion. A few small  subcentimeter short axis mediastinal lymph nodes are seen that are not  significant by size criteria. A 1 cm short axis retrocardiac lymph node  on axial image 83 is borderline, nonspecific, stable.     Left thyroid nodularity is noted, suboptimally evaluated with this  technique, grossly similar to prior exam, could be further characterized  with ultrasound if indicated.     The airways appear clear.     No pleural effusion or pneumothorax.     The lungs show mild atelectasis. A 1.1 cm pleural-based groundglass  nodular density of the right lower lobe on axial image 74 is stable.     Upper abdominal structures show no acute findings. Layering stones are  seen in the gallbladder.     Degenerative changes are seen in the spine and shoulders. Bilateral  shoulder effusions are present. No acute fracture is identified.       Impression:         Dilated ascending aorta, 4.1 cm. No aortic dissection. Chronic  prominence of caliber of the central pulmonary arteries suggests  pulmonary arterial hypertension.     This report was finalized on 4/4/2023 7:28 PM by Dr. Leander Kay M.D.       CT Head Without Contrast [[id number]] Collected: 04/04/23 1919     Updated: 04/04/23 1924    Narrative:      CT HEAD WO CONTRAST-     INDICATIONS: Confusion     TECHNIQUE: Radiation dose reduction techniques were utilized, including  automated exposure control and  exposure modulation based on body size.  Noncontrast head CT     COMPARISON: 03/05/2021     FINDINGS:           No acute intracranial hemorrhage, midline shift or mass effect. No acute  territorial infarct is identified. 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     Prominent periventricular hypodensities suggest chronic small vessel  ischemic change in a patient this age.     Arterial calcifications are seen at the base of the brain.     Ventricles, cisterns, cerebral sulci are unremarkable for patient age.     Mild paranasal sinus mucosal thickening is present. The visualized  paranasal sinuses, orbits, mastoid air cells are otherwise are  unremarkable.                   Impression:         No acute intracranial hemorrhage or hydrocephalus. Chronic changes of  the brain. If there is further clinical concern, MRI could be considered  for further evaluation.     This report was finalized on 4/4/2023 7:21 PM by Dr. Leander Kay M.D.       XR Chest 1 View [[id number]] Collected: 04/04/23 1809     Updated: 04/04/23 1814    Narrative:      XR CHEST 1 VW-     HISTORY: Male who is 89 years-old,  short of breath     TECHNIQUE: Frontal view of the chest     COMPARISON: 04/03/2023     FINDINGS: The heart is enlarged, mild prominence of vascular markings.  Aorta appears ectatic. No focal pulmonary consolidation, pleural  effusion, or pneumothorax. No acute osseous process.       Impression:      No focal pulmonary consolidation. Cardiomegaly with mild  prominence of vascular markings. Ectatic appearing aorta. Follow-up as  clinically indicated.     This report was finalized on 4/4/2023 6:10 PM by Dr. Leander Kay M.D.             I personally viewed and interpreted the patient's EKG    Assessment/Plan:     1.  Atrial fibrillation.  New onset.  Rate controlled on metoprolol.  CHADS-VASc score is 3.  2.  Acute on chronic hypoxic respiratory failure.  Normally uses 4 L nasal cannula.  He is now up to 4  L.  No evidence of significant volume overload.  3.  Altered mental status.  Appears clear this morning.  4.  PVCs.  He has a known history of this.  They are asymptomatic.  5.  Obstructive sleep apnea.  On CPAP at home.  6.  Restrictive lung disease  7.  COPD.  With possible exacerbation.  On steroids.    - I doubt his rate controlled atrial fibrillation is responsible for his symptoms.  However this is a new diagnosis.  - Continue metoprolol for rate control.  - Check echocardiogram.  - Consider starting DOAC for anticoagulation depending on the findings of his echocardiogram.    Thank you for allowing me to participate in the care of Harry Potts. Feel free to contact me directly with any further questions or concerns.    Azra Gomes MD  [facility]  04/05/23  07:32 EDT

## 2023-04-05 NOTE — CONSULTS
[facility]    Reason for consult: hypoxemia    HPI:  Mr. Potts is a 89yo male with chronic hypoxemic respiratory failure.  He is usually on 2lpm.  He was brought to ER for generalized weakness.  He says his oxygen was increased to 4lpm a few days back.  He denies shortness of breath to me.  He says he has not had cough or chest pain.  Some of the history listed in the computer is contradictory to what he tells me now.  He says he was   and has no exposure history and is a lifelong nonsmoker.  He is laying pretty flat in bed at the moment, he has no complaints but at times asks some off questions.    Past Medical History:   Diagnosis Date   • Arthritis    • Asthma March, 2021    Oxygen at home   • Cancer     basal cell    • COPD (chronic obstructive pulmonary disease)    • Difficulty walking    • Elevated cholesterol    • Environmental allergies    • GERD (gastroesophageal reflux disease)    • HL (hearing loss)    • Hyperglycemia    • Hyperlipidemia    • Hypertension    • Obesity    • Sleep apnea    • Spondylolysis, lumbar region      Social History     Socioeconomic History   • Marital status:    Tobacco Use   • Smoking status: Never   • Smokeless tobacco: Never   Vaping Use   • Vaping Use: Never used   Substance and Sexual Activity   • Alcohol use: No     Comment: caffeine use   • Drug use: No   • Sexual activity: Not Currently     Partners: Female     Family History   Problem Relation Age of Onset   • Heart attack Father         age 80     MEDS: reviewed as per chart  ALL: atorvastatin, morphine  ROS: 12 point negative except as in HPI    Vital Sign Min/Max for last 24 hours  Temp  Min: 97.6 °F (36.4 °C)  Max: 98.2 °F (36.8 °C)   BP  Min: 96/75  Max: 177/88   Pulse  Min: 51  Max: 92   Resp  Min: 16  Max: 22   SpO2  Min: 90 %  Max: 98 %   Flow (L/min)  Min: 4  Max: 4   Weight  Min: 137 kg (302 lb 3.2 oz)  Max: 137 kg (302 lb 3.2 oz)     GEN:   appears ill, obese, A&O x2  HEENT:  PERRL, EOMI, no icterus, mmm, no JVD, trachea midline, neck supple  CHEST: decreased ae bilat, no wheezes, no crackles, no use of accessory muscles  CV: RRR, no m/g/r  ABD: soft, nt, nd +bs, no hepatosplenomegaly  EXT: no c/c/ 1+edema bilaterally  SKIN: no rashes, no xanthomas, nl turgor  LYMPH: no palpable cervical or supraclavicular lymphadenopathy  NEURO: CN 2-12 intact and symmetric bilaterally  PSYCH: nl affect, nl orientation, nl judgment, nl mood  MSK: +kyphosis, 5/5 strength ue and le bilaterally    Labs: 4/5: Reviewed:  Glucose 152  Bun 21  Cr 0.64  Na 138  k 3.3  Bicarb 33  Wbc 6  hgb 14.2  plts 133  Ct angio: 4/4: report reviewed, I can't view the actual images for some reason;  Reviewed ct angio from 2019    A/P:  1. Chronic hypoxemic respiratory failure -- recent increase in oxygen requirements. Per angio report, lung fields are fairly clear   2. Restrictive lung disease -- he has significant kyphosis and he has obesity  3. Chronic diastolic chf -- consider repeat echo  4. JENISE  -- has home pap, not with him  5. Metabolic alkalosis - will order VBG (note stat abg ordered yesterday has not been done)  6. Obesity  7. Confusion - unclear baseline  8. Lower lobe 1.1cm ggo nodule stable since 2019 at age 89 would advise no further follow up needed.

## 2023-04-05 NOTE — NURSING NOTE
"Per Dr Osei's requested,Nurse spoke with Dr Chamberlain concerning venous blood gases from this am- he said, \"patient is fully compensated-patient is better than normal\" no need to place bi-pap on at this time.  "

## 2023-04-05 NOTE — PROGRESS NOTES
Pulmonary followed up on patient blood gas venous this morning looked really good on 4 L his pH was 7.41 PCO2 was 57 and PO2 was 78.  It is pretty amazing that his pH is this high with a venous gas so he is very well compensated I do not believe hypercapnia is the cause of his lethargy.  His family is bringing in his home CPAP machine I think he like that better than ours.  We will continue to follow.

## 2023-04-05 NOTE — CASE MANAGEMENT/SOCIAL WORK
Discharge Planning Assessment  [facility]     Patient Name: Harry Potts  MRN: [id number]  Today's Date: 4/5/2023    Admit Date: 4/4/2023    Plan: Plan home with family.   IRVING Roberts RN   Discharge Needs Assessment     Row Name 04/05/23 0832       Living Environment    People in Home spouse    Name(s) of People in Home Spouse  ( Venita Potts  [phone])    Current Living Arrangements condominium    Primary Care Provided by self    Provides Primary Care For no one    Family Caregiver if Needed child(jay), adult;spouse    Family Caregiver Names Spouse  ( Venita Potts  [phone]) and Daughter  (Emilie Swenson [phone])    Quality of Family Relationships involved;helpful;supportive    Able to Return to Prior Arrangements yes    Living Arrangement Comments Pt lives with spouse ( Venita Potts  [phone]) in a single story patio home.       Resource/Environmental Concerns    Resource/Environmental Concerns none    Transportation Concerns none       Transition Planning    Patient/Family Anticipates Transition to home with family    Patient/Family Anticipated Services at Transition none    Transportation Anticipated family or friend will provide       Discharge Needs Assessment    Readmission Within the Last 30 Days no previous admission in last 30 days    Equipment Currently Used at Home bp cuff;cpap;cane, straight;commode;grab bar;oxygen;pulse ox;rollator;shower chair;walker, rolling;bath bench    Concerns to be Addressed no discharge needs identified;denies needs/concerns at this time    Anticipated Changes Related to Illness none    Equipment Needed After Discharge bp cuff;cpap;bath bench;cane, straight;commode;grab bar, tub/shower;oxygen;pulse ox;rollator;shower chair;walker, rolling               Discharge Plan     Row Name 04/05/23 0837       Plan    Plan Plan home with family.   IRVING Roberts RN    Patient/Family in Agreement with Plan yes    Plan Comments FACE SHEET VERIFIED/ IM LETTER  SIGNED.   Spoke with pt at bedside.  Pt granted CCP permission to call his daughter.  Called  (Emilie Swenson [phone]) who is pt's daughter and is a pharmacist.  Pt's PCP is Dr. Harry Luis.  Pt lives with spouse ( Venita Potts  [phone]) in a single story patio home.  Pt is independent with ADLs.  Pt has a C PAP and O2 provided by [place].  Pt also has a bath bench,  B/P cuff, cane, BSC, grab bars, pulse ox, rollator, shower chair and 3 wheeled walker for home use if needed.   Pt gets his prescriptions at [facility]  ([facility] & [place]) and [facility].   Pt does not have issues affording medications.  Pt s not current with .  Pt has been in [facility] for SNF.  Pt denies any discharge needs at present.  CCP to follow.  Pt's family can transport pt.   Plan home with family.   IRVING Roberts RN              Continued Care and Services - Admitted Since 4/4/2023    Coordination has not been started for this encounter.       Expected Discharge Date and Time     Expected Discharge Date Expected Discharge Time    Apr 8, 2023          Demographic Summary     Row Name 04/05/23 0831       General Information    Admission Type inpatient    Arrived From emergency department    Required Notices Provided Important Message from Medicare    Referral Source admission list    Reason for Consult discharge planning    Preferred Language English               Functional Status     Row Name 04/05/23 0832       Functional Status    Usual Activity Tolerance moderate    Current Activity Tolerance fair       Functional Status, IADL    Medications independent    Meal Preparation assistive person    Housekeeping assistive person    Laundry assistive person    Shopping assistive person       Mental Status    General Appearance WDL WDL               Psychosocial    No documentation.                Abuse/Neglect    No documentation.                Legal    No documentation.                 Substance Abuse    No documentation.                Patient Forms    No documentation.                   Jeanne Roberts RN

## 2023-04-05 NOTE — PLAN OF CARE
Goal Outcome Evaluation:  Plan of Care Reviewed With: patient      Pt is alert and at times confused. Pt was admitted to unit with daughter at bedside. Pt was educated on safety. Call within reach. Pt is on 4 L O2. Pulmonary and Cardiology called for consultation. New onset Afib on monitor. Will continue to monitor.

## 2023-04-05 NOTE — CASE MANAGEMENT/SOCIAL WORK
Continued Stay Note  [facility]     Patient Name: Harry Potts  MRN: [id number]  Today's Date: 4/5/2023    Admit Date: 4/4/2023    Plan: Plan home with family.   IRVING Roberts RN   Discharge Plan     Row Name 04/05/23 0837       Plan    Plan Plan home with family.   IRVING Roberts RN    Patient/Family in Agreement with Plan yes    Plan Comments FACE SHEET VERIFIED/ IM LETTER SIGNED.   Spoke with pt at bedside.  Pt granted CCP permission to call his daughter.  Called  (Emilie Swenson [phone]) who is pt's daughter and is a pharmacist.  Pt's PCP is Dr. Harry Luis.  Pt lives with spouse ( Venita Potts  [phone]) in a single story patio home.  Pt is independent with ADLs.  Pt has a C PAP and O2 provided by [place].  Pt also has a bath bench,  B/P cuff, cane, BSC, grab bars, pulse ox, rollator, shower chair and 3 wheeled walker for home use if needed.   Pt gets his prescriptions at [facility] [place]) and [facility].   Pt does not have issues affording medications.  Pt s not current with .  Pt has been in [facility] for SNF.  Pt denies any discharge needs at present.  CCP to follow.  Pt's family can transport pt.   Plan home with family.   IRVING Roberts RN               Discharge Codes    No documentation.               Expected Discharge Date and Time     Expected Discharge Date Expected Discharge Time    Apr 8, 2023             Jeanne Roberts RN

## 2023-04-05 NOTE — H&P
HISTORY AND PHYSICAL   [facility]        Date of Admission: 2023  Patient Identification:  Name: Harry Potts  Age: 89 y.o.  Sex: male  :  1930  MRN: [id number]                     Primary Care Physician: Harry Luis MD    Chief Complaint: Shortness of breath/confusion    History of Present Illness:   Mr. Olvera is a pleasant 89-year-old male who is conversational and alert but I question his overall orientation.  I came by and saw this gentleman earlier in the day and ultimately he admits to worsening weakness and shortness of breath.  He was not a good historian whatsoever earlier in the day but he denies any chest pain and currently states that he feels back to normal.  Apparently his O2 requirements have been elevated up to 4 L with normal requirements of 2 L via nasal cannula.  I went back to the room after I had such poor success earlier in the morning because I heard the daughter who is a caretaker and the pharmacist was at bedside.  She was quite helpful and ultimately tells me the patient is not the most consistent with use of CPAP.  When she noticed him to look looking dusky in the face with skin color she increased his O2 levels to 4 L and brought him to the hospital for further evaluation.  CT angiogram demonstrated multiple stable chronic changes of which pulmonary is aware of but did not show any new PE focal consolidation or effusion.  There is not been any issues with fever or chills.  Currently his O2 saturations are right around 91% on 4 L and he is much more alert and conversational and the daughter at bedside is very happy with how much he has improved and she feels that he is pretty much at baseline at this point.  Case also discussed in multidisciplinary rounds.    Past Medical History:  Past Medical History:   Diagnosis Date   • Arthritis    • Asthma     Oxygen at home   • Cancer     basal cell    • COPD (chronic obstructive pulmonary disease)     • Difficulty walking    • Elevated cholesterol    • Environmental allergies    • GERD (gastroesophageal reflux disease)    • HL (hearing loss)    • Hyperglycemia    • Hyperlipidemia    • Hypertension    • Obesity    • Sleep apnea    • Spondylolysis, lumbar region      Past Surgical History:  Past Surgical History:   Procedure Laterality Date   • CARDIAC CATHETERIZATION  circa 2008    [facility]   • EYE SURGERY     • HERNIA REPAIR     • JOINT REPLACEMENT     • REPLACEMENT TOTAL KNEE BILATERAL        Home Meds:  Medications Prior to Admission   Medication Sig Dispense Refill Last Dose   • albuterol sulfate  (90 Base) MCG/ACT inhaler Inhale 2 puffs Every 4 (Four) Hours As Needed for Wheezing. 18 g 5    • amLODIPine (NORVASC) 10 MG tablet TAKE 1 TABLET DAILY 90 tablet 3    • aspirin 81 MG EC tablet Take 1 tablet by mouth Daily.      • Azelastine HCl 137 MCG/SPRAY solution USE 1 SPRAY IN EACH NOSTRIL AS DIRECTED BY PROVIDER DAILY 30 mL 2    • celecoxib (CeleBREX) 200 MG capsule TAKE 1 CAPSULE DAILY AS NEEDED 90 capsule 3    • chlorthalidone (HYGROTON) 25 MG tablet TAKE 1 TABLET DAILY 90 tablet 3    • doxazosin (CARDURA) 4 MG tablet TAKE 1 TABLET DAILY AS DIRECTED 90 tablet 3    • esomeprazole (nexIUM) 40 MG capsule TAKE 1 CAPSULE DAILY 90 capsule 3    • ezetimibe (ZETIA) 10 MG tablet TAKE 1 TABLET DAILY 90 tablet 3    • fluticasone (FLONASE) 50 MCG/ACT nasal spray 1 spray into the nostril(s) as directed by provider Daily. 11.1 mL 2    • fluticasone-salmeterol (Advair Diskus) 250-50 MCG/DOSE DISKUS Inhale 1 puff 2 (Two) Times a Day. 180 each 3    • loratadine (CLARITIN) 10 MG tablet Take  by mouth Daily.      • metoprolol succinate XL (TOPROL-XL) 50 MG 24 hr tablet Take 0.5 tablets by mouth Daily. 90 tablet 3    • Multiple Vitamins-Minerals (CENTRUM ADULTS PO) Take  by mouth Daily.      • TiZANidine (ZANAFLEX) 2 MG capsule Take 1 capsule by mouth Daily As Needed for Muscle Spasms. (Patient taking differently:  Take 1 capsule by mouth 3 (Three) Times a Day.) 90 capsule 3    • Omega-3 Fatty Acids (FISH OIL) 1000 MG capsule capsule Take  by mouth Daily With Breakfast.      • tiZANidine (ZANAFLEX) 2 MG tablet           Allergies:  Allergies   Allergen Reactions   • Atorvastatin Other (See Comments)     SPASMS LEG      • Morphine And Related Unknown - Low Severity     Immunizations:  Immunization History   Administered Date(s) Administered   • COVID-19 (PFIZER) BIVALENT BOOSTER 12+YRS 2022   • COVID-19 (PFIZER) PURPLE CAP 2021, 2021, 10/09/2021   • Covid-19 (Pfizer) Gray Cap 2022   • Fluad Quad 65+ 10/06/2020   • Fluzone High Dose =>65 Years (Vaxcare ONLY) 10/04/2017, 2018   • Fluzone High-Dose 65+yrs 10/04/2021, 10/04/2022   • Pneumococcal Conjugate 20-Valent (PCV20) 2023   • Pneumococcal Polysaccharide (PPSV23) 2003   • Shingrix 2019, 2019   • Tdap 10/04/2017     Social History:   Social History     Social History Narrative   • Not on file     Social History     Socioeconomic History   • Marital status:    Tobacco Use   • Smoking status: Never   • Smokeless tobacco: Never   Vaping Use   • Vaping Use: Never used   Substance and Sexual Activity   • Alcohol use: No     Comment: caffeine use   • Drug use: No   • Sexual activity: Not Currently     Partners: Female       Family History:  Family History   Problem Relation Age of Onset   • Heart attack Father         age 80        Review of Systems  See history of present illness and past medical history.  Not the most reliable but patient denies fever chills night sweats.  Denies any recent infection admits to shortness of breath with occasional cough.  Denies any chest pain palpitations abdominal pain dysuria bruising bleeding or focal loss of function.  Remainder of ROS is negative.    Objective:  T Max 24 hrs: Temp (24hrs), Av.2 °F (36.8 °C), Min:97.6 °F (36.4 °C), Max:98.9 °F (37.2 °C)    Vitals Ranges:   Temp:   "[97.6 °F (36.4 °C)-98.9 °F (37.2 °C)] 98.9 °F (37.2 °C)  Heart Rate:  [51-92] 72  Resp:  [16-24] 22  BP: ()/(70-92) 134/78      Exam:  /78 (BP Location: Left arm, Patient Position: Lying)   Pulse 72   Temp 98.9 °F (37.2 °C) (Oral)   Resp 22   Ht 172.7 cm (68\")   Wt 134 kg (295 lb 10.2 oz)   SpO2 91%   BMI 44.95 kg/m²     General Appearance:    Alert, cooperative and follows commands, not the best of historians with some residual confusion versus underlying dementia   Head:    Normocephalic, without obvious abnormality, atraumatic   Eyes:    PERRL, conjunctivae/corneas clear, EOM's intact, both eyes   Ears:    Normal external ear canals, both ears   Nose:   Nares normal, septum midline, mucosa normal, no drainage    or sinus tenderness   Throat:   Lips, mucosa, and tongue normal   Neck:   Supple, large diameter, no JVD       Lungs:    Diffuse wheeze to auscultation bilaterally, respirations unlabored with conversation and at rest   Chest Wall:    No tenderness or deformity    Heart:    Regular rate and rhythm, S1 and S2 normal   Abdomen:     Soft, nontender, bowel sounds active all four quadrants,   MO with BMI 45   Extremities:  Moving all, chronic changes, no cyanosis or pitting casimiro   Pulses:   2+ and symmetric all extremities           Neurologic:   CNII-XII intact      .    Data Review:  Labs in chart were reviewed.    Results from last 7 days   Lab Units 04/03/23  1504   TSH uIU/mL 1.970          Imaging Results (All)     Procedure Component Value Units Date/Time    CT Angiogram Chest [[id number]] Collected: 04/04/23 1922     Updated: 04/04/23 1931    Narrative:      CT ANGIOGRAM CHEST-     INDICATIONS: Abnormal chest x-ray     TECHNIQUE: Radiation dose reduction techniques were utilized, including  automated exposure control and exposure modulation based on body size.  CT angiography of the chest. Three-dimensional reconstructions.     COMPARISON: 11/04/2019      FINDINGS:     Ascending aorta " is dilated, 4.1 cm, not significantly changed. No aortic  dissection. No pulmonary embolism. Prominence of caliber of the central  pulmonary arteries suggests pulmonary arterial hypertension; this  appearance is chronic.     The heart size is enlarged without pericardial effusion. A few small  subcentimeter short axis mediastinal lymph nodes are seen that are not  significant by size criteria. A 1 cm short axis retrocardiac lymph node  on axial image 83 is borderline, nonspecific, stable.     Left thyroid nodularity is noted, suboptimally evaluated with this  technique, grossly similar to prior exam, could be further characterized  with ultrasound if indicated.     The airways appear clear.     No pleural effusion or pneumothorax.     The lungs show mild atelectasis. A 1.1 cm pleural-based groundglass  nodular density of the right lower lobe on axial image 74 is stable.     Upper abdominal structures show no acute findings. Layering stones are  seen in the gallbladder.     Degenerative changes are seen in the spine and shoulders. Bilateral  shoulder effusions are present. No acute fracture is identified.       Impression:         Dilated ascending aorta, 4.1 cm. No aortic dissection. Chronic  prominence of caliber of the central pulmonary arteries suggests  pulmonary arterial hypertension.     This report was finalized on 4/4/2023 7:28 PM by Dr. Leander Kay M.D.       CT Head Without Contrast [[id number]] Collected: 04/04/23 1919     Updated: 04/04/23 1924    Narrative:      CT HEAD WO CONTRAST-     INDICATIONS: Confusion     TECHNIQUE: Radiation dose reduction techniques were utilized, including  automated exposure control and exposure modulation based on body size.  Noncontrast head CT     COMPARISON: 03/05/2021     FINDINGS:           No acute intracranial hemorrhage, midline shift or mass effect. No acute  territorial infarct is identified. 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      Prominent periventricular hypodensities suggest chronic small vessel  ischemic change in a patient this age.     Arterial calcifications are seen at the base of the brain.     Ventricles, cisterns, cerebral sulci are unremarkable for patient age.     Mild paranasal sinus mucosal thickening is present. The visualized  paranasal sinuses, orbits, mastoid air cells are otherwise are  unremarkable.                   Impression:         No acute intracranial hemorrhage or hydrocephalus. Chronic changes of  the brain. If there is further clinical concern, MRI could be considered  for further evaluation.     This report was finalized on 4/4/2023 7:21 PM by Dr. Leander Kay M.D.       XR Chest 1 View [[id number]] Collected: 04/04/23 1809     Updated: 04/04/23 1814    Narrative:      XR CHEST 1 VW-     HISTORY: Male who is 89 years-old,  short of breath     TECHNIQUE: Frontal view of the chest     COMPARISON: 04/03/2023     FINDINGS: The heart is enlarged, mild prominence of vascular markings.  Aorta appears ectatic. No focal pulmonary consolidation, pleural  effusion, or pneumothorax. No acute osseous process.       Impression:      No focal pulmonary consolidation. Cardiomegaly with mild  prominence of vascular markings. Ectatic appearing aorta. Follow-up as  clinically indicated.     This report was finalized on 4/4/2023 6:10 PM by Dr. Leander Kay M.D.               Assessment:  Active Hospital Problems    Diagnosis  POA   • **Chronic respiratory failure with hypoxia [J96.11]  Unknown   • Hypoxia [R09.02]  Yes   • JENISE on CPAP [G47.33, Z99.89]  Not Applicable   • COPD (chronic obstructive pulmonary disease) [J44.9]  Yes   • Morbid obesity [E66.01]  Yes   • Gastroesophageal reflux disease [K21.9]  Yes      Resolved Hospital Problems   No resolved problems to display.       Plan:    Patient has known chronic hypoxic respiratory failure when she is on 2 L via nasal cannula and was admitted to [facility] for hypoxia  patient currently on 4 L saturating around 91%   -ABG demonstrating some hypoxia as well as CO2 retention -pulmonary following for BiPAP if felt necessary   -Chest x-ray changes led to CTA of the chest with no PE was identified.  There is some lymphadenopathy that pulmonary is aware of and feels overall unchanged but there is no pleural effusion pneumothorax or any focal consolidation  But did show some pulmonary arterial hypertension and known past history of COPD/restrictive lung disease.  Continue chlorthalidone and is unsure of the role for additional IV diuretics needed at this time   -COVID-negative with RVP pending   -Cards consulted with echo pending   -Morbid obesity with a BMI of 45 complicates all the above   -JENISE/compliant with CPAP sometimes but also has issues forgetting as well on multiple occasions.  Defer to pulmonology if patient needs to bleed and supplemental O2 with CPAP at home   -Currently on Solu-Medrol 60 mg twice daily as well as DuoNeb -chronic wheeze per discussion with daughter/pharmacist.  I favor COPD exacerbation compounded by poor compliance with CPAP both of which contributing to some mild changes in CO2 retention as well as further hypoxia.  We can likely adjust to p.o. steroids in the very near future and will await further recommendations per pulmonary      Metabolic encephalopathy without any signs of infection   -Unclear baseline -likely underlying senile aspects of dementia at age 89 with concern for metabolic encephalopathy due to elevated CO2 level    Home MAR reviewed and reconciled      Patient admitted for observation -CCP coordinating discharge needs with overall plan for home with family hopefully tomorrow with continued improvement    Tano Osei MD  4/5/2023  10:22 EDT

## 2023-04-06 PROCEDURE — 25010000002 FUROSEMIDE PER 20 MG: Performed by: HOSPITALIST

## 2023-04-06 PROCEDURE — 99232 SBSQ HOSP IP/OBS MODERATE 35: CPT | Performed by: NURSE PRACTITIONER

## 2023-04-06 PROCEDURE — 94799 UNLISTED PULMONARY SVC/PX: CPT

## 2023-04-06 PROCEDURE — 94660 CPAP INITIATION&MGMT: CPT

## 2023-04-06 PROCEDURE — 99232 SBSQ HOSP IP/OBS MODERATE 35: CPT | Performed by: PSYCHIATRY & NEUROLOGY

## 2023-04-06 PROCEDURE — 97162 PT EVAL MOD COMPLEX 30 MIN: CPT

## 2023-04-06 PROCEDURE — 97530 THERAPEUTIC ACTIVITIES: CPT

## 2023-04-06 PROCEDURE — 25010000002 METHYLPREDNISOLONE PER 125 MG: Performed by: NURSE PRACTITIONER

## 2023-04-06 RX ORDER — QUETIAPINE FUMARATE 25 MG/1
25 TABLET, FILM COATED ORAL ONCE
Status: COMPLETED | OUTPATIENT
Start: 2023-04-06 | End: 2023-04-06

## 2023-04-06 RX ORDER — FUROSEMIDE 10 MG/ML
20 INJECTION INTRAMUSCULAR; INTRAVENOUS ONCE
Status: COMPLETED | OUTPATIENT
Start: 2023-04-06 | End: 2023-04-06

## 2023-04-06 RX ORDER — OLANZAPINE 10 MG/1
2.5 INJECTION, POWDER, LYOPHILIZED, FOR SOLUTION INTRAMUSCULAR ONCE
Status: COMPLETED | OUTPATIENT
Start: 2023-04-06 | End: 2023-04-06

## 2023-04-06 RX ORDER — POTASSIUM CHLORIDE 750 MG/1
40 TABLET, FILM COATED, EXTENDED RELEASE ORAL ONCE
Status: COMPLETED | OUTPATIENT
Start: 2023-04-06 | End: 2023-04-06

## 2023-04-06 RX ADMIN — CHLORTHALIDONE 25 MG: 25 TABLET ORAL at 08:17

## 2023-04-06 RX ADMIN — PANTOPRAZOLE SODIUM 40 MG: 40 TABLET, DELAYED RELEASE ORAL at 05:44

## 2023-04-06 RX ADMIN — AMMONIUM LACTATE 1 APPLICATION: 120 CREAM TOPICAL at 18:02

## 2023-04-06 RX ADMIN — Medication 10 ML: at 08:17

## 2023-04-06 RX ADMIN — AMMONIUM LACTATE 1 APPLICATION: 120 CREAM TOPICAL at 05:45

## 2023-04-06 RX ADMIN — IPRATROPIUM BROMIDE AND ALBUTEROL SULFATE 3 ML: 2.5; .5 SOLUTION RESPIRATORY (INHALATION) at 11:24

## 2023-04-06 RX ADMIN — POTASSIUM CHLORIDE 40 MEQ: 750 TABLET, EXTENDED RELEASE ORAL at 10:18

## 2023-04-06 RX ADMIN — FLUTICASONE PROPIONATE 1 SPRAY: 50 SPRAY, METERED NASAL at 08:16

## 2023-04-06 RX ADMIN — MICONAZOLE NITRATE: 2 POWDER TOPICAL at 10:20

## 2023-04-06 RX ADMIN — ASPIRIN 81 MG: 81 TABLET, COATED ORAL at 08:17

## 2023-04-06 RX ADMIN — AMLODIPINE BESYLATE 10 MG: 10 TABLET ORAL at 08:17

## 2023-04-06 RX ADMIN — APIXABAN 5 MG: 5 TABLET, FILM COATED ORAL at 20:12

## 2023-04-06 RX ADMIN — Medication 10 ML: at 20:13

## 2023-04-06 RX ADMIN — IPRATROPIUM BROMIDE AND ALBUTEROL SULFATE 3 ML: .5; 2.5 SOLUTION RESPIRATORY (INHALATION) at 14:56

## 2023-04-06 RX ADMIN — IPRATROPIUM BROMIDE AND ALBUTEROL SULFATE 3 ML: 2.5; .5 SOLUTION RESPIRATORY (INHALATION) at 18:52

## 2023-04-06 RX ADMIN — APIXABAN 5 MG: 5 TABLET, FILM COATED ORAL at 12:16

## 2023-04-06 RX ADMIN — TERAZOSIN HYDROCHLORIDE 5 MG: 5 CAPSULE ORAL at 08:17

## 2023-04-06 RX ADMIN — OLANZAPINE 2.5 MG: 10 INJECTION, POWDER, FOR SOLUTION INTRAMUSCULAR at 21:39

## 2023-04-06 RX ADMIN — MICONAZOLE NITRATE: 2 POWDER TOPICAL at 20:13

## 2023-04-06 RX ADMIN — METHYLPREDNISOLONE SODIUM SUCCINATE 60 MG: 125 INJECTION, POWDER, FOR SOLUTION INTRAMUSCULAR; INTRAVENOUS at 04:35

## 2023-04-06 RX ADMIN — FUROSEMIDE 20 MG: 10 INJECTION, SOLUTION INTRAMUSCULAR; INTRAVENOUS at 10:18

## 2023-04-06 RX ADMIN — QUETIAPINE FUMARATE 25 MG: 25 TABLET ORAL at 18:02

## 2023-04-06 RX ADMIN — METOPROLOL SUCCINATE 25 MG: 25 TABLET, EXTENDED RELEASE ORAL at 08:17

## 2023-04-06 NOTE — PLAN OF CARE
Goal Outcome Evaluation:  Plan of Care Reviewed With: patient           Outcome Evaluation: 89yo obese white male admitted 4/4/23 with chronic resp failure with hypoxia. PMH includes hbp, chronic LBP with cord compression, afib, COPD, weakness. PLOF: Pt lives in [facility] with ssife and was using a cane or wx for mobility. He req assist for bathing and dressing. He is limited now by generalized weaknes and decreased activity tolerance. Today, he was resting in bed, oriented x 3 but pleasantly confused at times. He req CGA to sit EOB with vc. He stood from EOB with min A of 2 and r wx. Pt amb 30' with r wx and min A of 2 with 6L O2 with sig flexed posture (due to LBP). Pace is slow, balance is unsteady, endurqance is poor and limits further amb distance. Pt req nearly constant vc to keep hands on wx handles (vs leaning on handles with forearms). Pt also keeps wx too far in front and at times with his legs outside of wx base. O2 sats 90% after amb. Pt able to scoot while sitting EOB independently and returned supine with CGA. He was able to reposition himself in bed. He would benefit from skilled PT services to address functional deficits and prepare for d/c.

## 2023-04-06 NOTE — PROGRESS NOTES
"    DAILY PROGRESS NOTE  [facility]    Patient Identification:  Name: Harry Potts  Age: 89 y.o.  Sex: male  :  1930  MRN: [id number]         Primary Care Physician: Harry Luis MD    Subjective:  Interval History: Denies any chest pain troubles breathing nausea chest pain.  I am not sure of the validity of his responses as mentation seems a bit worse today    Objective: Case discussed with RN at bedside.  Patient is definitely more encephalopathic though conversational and pleasant and redirectable.  He does not appear toxic and what I sees almost seems more consistent with underlying dementia    Scheduled Meds:amLODIPine, 10 mg, Oral, Daily  ammonium lactate, 1 application, Topical, Q12H  apixaban, 5 mg, Oral, Q12H  aspirin, 81 mg, Oral, Daily  budesonide-formoterol, 2 puff, Inhalation, BID - RT  chlorthalidone, 25 mg, Oral, Daily  fluticasone, 1 spray, Nasal, Daily  furosemide, 20 mg, Intravenous, Once  ipratropium-albuterol, 3 mL, Nebulization, Q6H While Awake - RT  metoprolol succinate XL, 25 mg, Oral, Daily  miconazole, , Topical, Q12H  pantoprazole, 40 mg, Oral, Q AM  potassium chloride, 40 mEq, Oral, Once  sodium chloride, 10 mL, Intravenous, Q12H  terazosin, 5 mg, Oral, Daily      Continuous Infusions:     Vital signs in last 24 hours:  Temp:  [97.8 °F (36.6 °C)-98.3 °F (36.8 °C)] 98.3 °F (36.8 °C)  Heart Rate:  [61-96] 80  Resp:  [16-22] 18  BP: (130-177)/() 157/103    Intake/Output:    Intake/Output Summary (Last 24 hours) at 2023 0950  Last data filed at 2023 0547  Gross per 24 hour   Intake 720 ml   Output 775 ml   Net -55 ml       Exam:  BP (!) 157/103 (BP Location: Left arm)   Pulse 80   Temp 98.3 °F (36.8 °C) (Oral)   Resp 18   Ht 172.7 cm (68\")   Wt 134 kg (295 lb)   SpO2 92%   BMI 44.85 kg/m²     General Appearance:    Alert/talkative, cooperative/follows command, encephalopathic versus demented                          Head:    Normocephalic, " without obvious abnormality, atraumatic                           Eyes:    PERRL, conjunctivae/corneas clear, EOM's intact, both eyes                         Throat:   Oral mucosa pink and moist                           Neck:   Supple, no rigidity or JVD                         Lungs:    Decreased bases with improved wheeze to auscultation bilaterally, respirations unlabored                 Chest Wall:    No tenderness or deformity                          Heart:  Irregular rate and rhythm, S1 and S2 normal                  Abdomen:     Soft, nontender, bowel sounds active, MO                 Extremities: SCDs, no cyanosis with trace edema                        Pulses:   Pulses palpable in lower extremities                            Skin:   Skin is warm and dry                  Neurologic:   CNII-XII intact     Data Review:  Labs in chart were reviewed.    Assessment:  Active Hospital Problems    Diagnosis  POA   • **Chronic respiratory failure with hypoxia [J96.11]  Unknown   • Hypoxia [R09.02]  Yes   • JENISE on CPAP [G47.33, Z99.89]  Not Applicable   • COPD (chronic obstructive pulmonary disease) [J44.9]  Yes   • Morbid obesity [E66.01]  Yes   • Gastroesophageal reflux disease [K21.9]  Yes      Resolved Hospital Problems   No resolved problems to display.       Plan:    Given new onset of A-fib, perhaps there could have been a role of diastolic dysfunction and he normally maintains on chlorthalidone.  I went to give an additional dose of 20 mg IV Lasix today with lunch as well as some additional potassium and monitor BMP as well as check a uric with a.m. labs   -Echo EF normal with indeterminate diastolic dysfunction    Cardiology following -new onset A-fib and now they are initiating AC with Eliquis noted at 5 mg twice daily    Pulmonology following -CO2 retention not felt to be contributory to mentation    He is definitely more confused today though redirectable and still pleasant -either an element of  metabolic encephalopathy versus underlying aspects of dementia    DC Solu-Medrol -defer further transition of p.o. steroids if indicated to pulmonology    RVP negative    Morbid obesity complicates all of the above as well as intermittent issues of compliance with CPAP        Disposition -CCP to coordinate.  If rate control is improved and patient tolerates initiation of Eliquis as well as no further recommendations per pulmonology and cardiology that we could start prepping this patient for discharge tomorrow      Addendum -in multidisciplinary rounds, RN made me aware that chronic changes noted on CT at admission of the head were unknown to the daughter.  Makes comment about old stroke and that would make sense with current situation as I would anticipate he has underlying aspects of vascular dementia as opposed to metabolic encephalopathy.  CT showed chronic small vessel disease but did make mention of right basal ganglia old infarct.  Likely all related to other comorbidities and chronic hypoxia but while here we will get neurological input if any additional work-up is warranted    Tano Osei MD  4/6/2023  09:50 EDT

## 2023-04-06 NOTE — PLAN OF CARE
Goal Outcome Evaluation:               Patient alert and oriented with disorganization with questions. Daughter reported visual hallucinations about wood on the wall. Bhaskar from respiratory placed patient on home cpap shortly afterwards. Patient up with PT this afternoon. Eliquis started today. Daughter and spouse at bedside most of the day. No acute distress noted, will continue to monitor.

## 2023-04-06 NOTE — CONSULTS
Patient Identification:  NAME:  Harry Potts  Age:  89 y.o.   Sex:  male   :  1930   MRN:  [id number]       Chief complaint: He does not have 1, reason for consult encephalopathy versus dementia    History of present illness: Patient is a 89-year-old ambidextrous white male with a history of obesity, hyperglycemia, hyperlipidemia, asthma, arthritis, COPD is come to the hospital with some new onset A-fib and some CO2 retention.  He has been treated appropriately and is doing well with respect to those problems but has become a bit more drowsy and confused.  Context a patient who is 89 years old and his wife does not think he has any dementia.  Locations not pertinent quality is some sleepiness and confusion.  Associated symptoms he has had some hallucinations before.  According to what he tells me but he is not having any of that now.  His sodium as context appears to be normal.  There is no elevated white count and he is afebrile  Given the new onset A-fib.  He has been placed on Eliquis and seems to be tolerating that nicely    Past medical history:  Past Medical History:   Diagnosis Date   • Arthritis    • Asthma     Oxygen at home   • Cancer     basal cell    • COPD (chronic obstructive pulmonary disease)    • Difficulty walking    • Elevated cholesterol    • Environmental allergies    • GERD (gastroesophageal reflux disease)    • HL (hearing loss)    • Hyperglycemia    • Hyperlipidemia    • Hypertension    • Obesity    • Sleep apnea    • Spondylolysis, lumbar region        Past surgical history:  Past Surgical History:   Procedure Laterality Date   • CARDIAC CATHETERIZATION  circa     [facility]   • EYE SURGERY     • HERNIA REPAIR     • JOINT REPLACEMENT     • REPLACEMENT TOTAL KNEE BILATERAL         Allergies:  Atorvastatin and Morphine and related    Home medications:  Medications Prior to Admission   Medication Sig Dispense Refill Last Dose   • albuterol sulfate  (90 Base)  MCG/ACT inhaler Inhale 2 puffs Every 4 (Four) Hours As Needed for Wheezing. 18 g 5    • amLODIPine (NORVASC) 10 MG tablet TAKE 1 TABLET DAILY 90 tablet 3    • aspirin 81 MG EC tablet Take 1 tablet by mouth Daily.      • Azelastine HCl 137 MCG/SPRAY solution USE 1 SPRAY IN EACH NOSTRIL AS DIRECTED BY PROVIDER DAILY 30 mL 2    • celecoxib (CeleBREX) 200 MG capsule TAKE 1 CAPSULE DAILY AS NEEDED 90 capsule 3    • chlorthalidone (HYGROTON) 25 MG tablet TAKE 1 TABLET DAILY 90 tablet 3    • doxazosin (CARDURA) 4 MG tablet TAKE 1 TABLET DAILY AS DIRECTED 90 tablet 3    • esomeprazole (nexIUM) 40 MG capsule TAKE 1 CAPSULE DAILY 90 capsule 3    • ezetimibe (ZETIA) 10 MG tablet TAKE 1 TABLET DAILY 90 tablet 3    • fluticasone (FLONASE) 50 MCG/ACT nasal spray 1 spray into the nostril(s) as directed by provider Daily. 11.1 mL 2    • fluticasone-salmeterol (Advair Diskus) 250-50 MCG/DOSE DISKUS Inhale 1 puff 2 (Two) Times a Day. 180 each 3    • loratadine (CLARITIN) 10 MG tablet Take  by mouth Daily.      • metoprolol succinate XL (TOPROL-XL) 50 MG 24 hr tablet Take 0.5 tablets by mouth Daily. 90 tablet 3    • Multiple Vitamins-Minerals (CENTRUM ADULTS PO) Take  by mouth Daily.      • TiZANidine (ZANAFLEX) 2 MG capsule Take 1 capsule by mouth Daily As Needed for Muscle Spasms. (Patient taking differently: Take 1 capsule by mouth 3 (Three) Times a Day.) 90 capsule 3    • Omega-3 Fatty Acids (FISH OIL) 1000 MG capsule capsule Take  by mouth Daily With Breakfast.      • tiZANidine (ZANAFLEX) 2 MG tablet            Hospital medications:  amLODIPine, 10 mg, Oral, Daily  ammonium lactate, 1 application, Topical, Q12H  apixaban, 5 mg, Oral, Q12H  aspirin, 81 mg, Oral, Daily  budesonide-formoterol, 2 puff, Inhalation, BID - RT  chlorthalidone, 25 mg, Oral, Daily  fluticasone, 1 spray, Nasal, Daily  ipratropium-albuterol, 3 mL, Nebulization, Q6H While Awake - RT  metoprolol succinate XL, 25 mg, Oral, Daily  miconazole, , Topical,  Q12H  pantoprazole, 40 mg, Oral, Q AM  sodium chloride, 10 mL, Intravenous, Q12H  terazosin, 5 mg, Oral, Daily         •  acetaminophen **OR** acetaminophen **OR** acetaminophen  •  albuterol  •  calcium carbonate  •  ipratropium-albuterol  •  nitroglycerin  •  ondansetron **OR** ondansetron  •  sodium chloride  •  sodium chloride  •  sodium chloride    Family history:  Family History   Problem Relation Age of Onset   • Heart attack Father         age 80       Social history:  Social History     Tobacco Use   • Smoking status: Never   • Smokeless tobacco: Never   Vaping Use   • Vaping Use: Never used   Substance Use Topics   • Alcohol use: No     Comment: caffeine use   • Drug use: No       Review of systems:    He cannot tell me much.  He does admit to seeing things occasionally.  His wife does not think he has any significant degree of dementia.  He denies headache or any focal neurologic complaints.  This is somewhat limited.  But I have reviewed the chart fully for the review of systems    Objective:  Vitals Ranges:   Temp:  [97.8 °F (36.6 °C)-98.5 °F (36.9 °C)] 98.5 °F (36.9 °C)  Heart Rate:  [61-89] 78  Resp:  [16-20] 20  BP: (130-177)/() 138/93      Physical Exam:  Awake lethargic, oriented x1 fund of knowledge poor attention span concentration fair recent remote memory fair language function normal, morbidly obese in no distress pupils 3 constricting to 2-1/2 extraocular movements are full horizontally nasolabial folds palate tongue symmetrical at all the cranial nerves acute kidney does give good  strength and good toe wiggle but motor exam is somewhat limited and he is apraxic.  No atrophy or fasciculations reflexes absent throughout toes downgoing.  Sensation coordination Station and gait he really could not follow any of this heart is regular without murmur neck supple without bruits extremities no clubbing cyanosis there is some mild peripheral edema visual acuity normal at 3 feet    Results  review:   I reviewed the patient's new clinical results.    Data review:  Lab Results (last 24 hours)     ** No results found for the last 24 hours. **           Imaging:  Imaging Results (Last 24 Hours)     ** No results found for the last 24 hours. **         PPE worn at all times washed before washed up afterwards disposed of everything properly is not within 6 feet of them for more than few minutes during my exam no aerosols used at any point    Assessment and Plan:     Neurology has been consulted to see this patient regarding whether he has encephalopathy versus dementia.  In short, I think he has both at the present time.  I believe he has had some mild vascular dementia that would be appropriate for 89 years of age but he does not appear to look like any acute stroke TIA or seizure syndrome.  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  This is not readily obvious on his exam.  I really do not think an MRI scan is going to help us at this point.  Definitely has some metabolic encephalopathy superimposed upon all of this at this point he is drowsy and cannot tell me where we are, etc.  He has had an occasional hallucination but currently is not hallucinating and I am not in favor of adding any neuroleptic medication  TSH is normal.  I will check B12 and folate tomorrow but I think were dealing with the patient who has had mild vascular dementia and now has had respiratory failure has been hospitalized over 48 hours.  Although do not see an arterial blood gas I am confident this patient has had significant hypoxemia intermittently  We will see him again tomorrow and make further recommendations      Jae Becker MD  04/06/23  15:39 EDT

## 2023-04-06 NOTE — PLAN OF CARE
Goal Outcome Evaluation:  Plan of Care Reviewed With: patient      Patient alert and get more confused at night,easy to reorient.Sleeping on and off.Awake most of the night,restless at times.No voiced c/o pain. 4.5L while awake.Home CPAP with O2.IV solumedrol.Turned in bed.Afib with controlled rate.Will cont to monitor.

## 2023-04-06 NOTE — PROGRESS NOTES
Hospital Follow Up    LOS:  LOS: 1 day   Patient Name: Harry Potts  Age/Sex: 89 y.o. male  : 1930  MRN: [id number]    Day of Service: 23   Length of Stay: 1  Encounter Provider: DANNIE Estes  Place of Service: [facility] CARDIOLOGY  Patient Care Team:  Harry Luis MD as PCP - General    Subjective:     Chief Complaint: shortness of breath, new onset AF    Interval History: Feels shortness of breath has improved a little. Denies feeling any chest pain or palpitations.    Objective:     Objective:  Temp:  [97.8 °F (36.6 °C)-98.3 °F (36.8 °C)] 98.3 °F (36.8 °C)  Heart Rate:  [61-96] 80  Resp:  [16-22] 18  BP: (130-177)/() 157/103     Intake/Output Summary (Last 24 hours) at 2023 0808  Last data filed at 2023 0547  Gross per 24 hour   Intake 960 ml   Output 775 ml   Net 185 ml     Body mass index is 44.85 kg/m².      23  1736 23  0048 23  1419   Weight: (!) 137 kg (302 lb 3.2 oz) 134 kg (295 lb 10.2 oz) 134 kg (295 lb)     Weight change: -3.266 kg (-7 lb 3.2 oz)    Physical Exam:   General Appearance:    Awake alert and oriented in no acute distress.   Color:  Skin:  Neuro:  HEENT:    Lungs:     Pink  Warm and dry  No focal, motor or sensory deficits  Neck supple, pupils equal, round and reactive. No JVD, No Bruit  Clear to auscultation,respirations regular, even and                  unlabored    Heart:   Irr/Irr, S1 and S2, no murmur, no gallop, no rub. No edema, DP/PT pulses are 2+   Chest Wall:    No abnormalities observed   Abdomen:     Normal bowel sounds, no masses, no organomegaly, soft        non-tender, non-distended, no guarding, no ascites noted   Extremities:   Moves all extremities well, no edema, no cyanosis, no redness       Lab Review:   Results from last 7 days   Lab Units 23  0620 23  1755 23  1504   SODIUM mmol/L 140 138 140   POTASSIUM mmol/L 3.8 3.3* 3.7   CHLORIDE mmol/L 97* 94* 95*    TOTAL CO2 mmol/L  --   --  31*   CO2 mmol/L 33.5* 33.5*  --    BUN mg/dL 14 21 15   CREATININE mg/dL 0.52* 0.64* 0.75*   GLUCOSE mg/dL 132* 159* 126*   CALCIUM mg/dL 9.4 8.9 9.5   AST (SGOT) U/L  --  24 23   ALT (SGPT) U/L  --  34 32     Results from last 7 days   Lab Units 04/05/23 0620 04/04/23 2001 04/04/23  1755   CK TOTAL U/L  --   --  117   HSTROP T ng/L 34* 22* 24*     Results from last 7 days   Lab Units 04/05/23 0620 04/04/23  1755   WBC 10*3/mm3 4.73 6.17   HEMOGLOBIN g/dL 13.8 14.2   HEMATOCRIT % 43.2 43.2   PLATELETS 10*3/mm3 141 133*                   Invalid input(s): LDLCALC  Results from last 7 days   Lab Units 04/04/23  1755   PROBNP pg/mL 466.2     Results from last 7 days   Lab Units 04/03/23  1504   TSH uIU/mL 1.970     I reviewed the patient's new clinical results.  I personally viewed and interpreted the patient's EKG  Current Medications:   Scheduled Meds:amLODIPine, 10 mg, Oral, Daily  ammonium lactate, 1 application, Topical, Q12H  aspirin, 81 mg, Oral, Daily  budesonide-formoterol, 2 puff, Inhalation, BID - RT  chlorthalidone, 25 mg, Oral, Daily  fluticasone, 1 spray, Nasal, Daily  ipratropium-albuterol, 3 mL, Nebulization, Q6H While Awake - RT  metoprolol succinate XL, 25 mg, Oral, Daily  miconazole, , Topical, Q12H  pantoprazole, 40 mg, Oral, Q AM  sodium chloride, 10 mL, Intravenous, Q12H  terazosin, 5 mg, Oral, Daily      Continuous Infusions:     Allergies:  Allergies   Allergen Reactions   • Atorvastatin Other (See Comments)     SPASMS LEG      • Morphine And Related Unknown - Low Severity       Assessment:       Chronic respiratory failure with hypoxia    Gastroesophageal reflux disease    Morbid obesity    COPD (chronic obstructive pulmonary disease)    JENISE on CPAP    Hypoxia        Plan:      1.  Atrial fibrillation: Remains in afib with controlled rates on metoprolol.  CHADS-VASc score is 3. No reported problems with falling at home. Will start on anticoagulation.  2.  Acute  on chronic hypoxic respiratory failure: Remains on 4 L O2  3.  Altered mental status:resolved  4.  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  They are asymptomatic.  5.  Obstructive sleep apnea.  On CPAP at home.  6.  Restrictive lung disease  7.  COPD with exacerbation: on steroids. Pulmonary following.    Echocardiogram showed a normal LV systolic function, EF 66-70%, moderate pulmonary hypertension  Will start on Eliquis 5 mg BID    DANNIE Estes  04/06/23  08:08 EDT  Electronically signed by DANNIE Estes, 04/06/23, 8:08 AM EDT.

## 2023-04-06 NOTE — THERAPY EVALUATION
Patient Name: Harry Potts  : 1930    MRN: [id number]                              Today's Date: 2023       Admit Date: 2023    Visit Dx:     ICD-10-CM ICD-9-CM   1. Hypoxia  R09.02 799.02   2. Confusion  R41.0 298.9   3. General weakness  R53.1 780.79   4. Physical deconditioning  R53.81 799.3   5. Hypokalemia  E87.6 276.8   6. Atrial fibrillation, unspecified type  I48.91 427.31   7. Aneurysm of ascending aorta without rupture  I71.21 441.2     Patient Active Problem List   Diagnosis   • Dysfunction of eustachian tube   • Gastroesophageal reflux disease   • Hyperglycemia   • Hypercholesterolemia   • Hypertension   • Morbid obesity   • Osteoarthritis of lumbar spine with myelopathy   • Osteoarthritis of lumbar spine   • Bronchitis   • Viral URI with cough   • PVC (premature ventricular contraction)   • Non-traumatic rhabdomyolysis   • Obesity (BMI 30-39.9)   • Chronic low back pain   • Weakness   • Edema, bilateral lower extremities   • COPD (chronic obstructive pulmonary disease)   • Irregularly irregular cardiac rhythm   • JENISE on CPAP   • Medicare annual wellness visit, subsequent   • Hypoxia   • Chronic respiratory failure with hypoxia     Past Medical History:   Diagnosis Date   • Arthritis    • Asthma     Oxygen at home   • Cancer     basal cell    • COPD (chronic obstructive pulmonary disease)    • Difficulty walking    • Elevated cholesterol    • Environmental allergies    • GERD (gastroesophageal reflux disease)    • HL (hearing loss)    • Hyperglycemia    • Hyperlipidemia    • Hypertension    • Obesity    • Sleep apnea    • Spondylolysis, lumbar region      Past Surgical History:   Procedure Laterality Date   • CARDIAC CATHETERIZATION  circa     [facility]   • EYE SURGERY     • HERNIA REPAIR     • JOINT REPLACEMENT     • REPLACEMENT TOTAL KNEE BILATERAL        General Information     Row Name 23 0938          Physical Therapy Time and Intention    Document Type  evaluation  -DJ     Mode of Treatment individual therapy;physical therapy  -DJ     Row Name 04/06/23 0938          General Information    Patient Profile Reviewed yes  -DJ     Prior Level of Function min assist:;ADL's  req assist for bathing and dressing  -DJ     Existing Precautions/Restrictions fall  -DJ     Barriers to Rehab previous functional deficit;cognitive status  -DJ     Row Name 04/06/23 0938          Living Environment    People in Home spouse  -DJ     Row Name 04/06/23 0938          Cognition    Orientation Status (Cognition) oriented x 3;other (see comments)  pleasantly confused at times  -DJ     Row Name 04/06/23 0938          Safety Issues, Functional Mobility    Safety Issues Affecting Function (Mobility) insight into deficits/self-awareness;judgment;safety precaution awareness  -DJ     Impairments Affecting Function (Mobility) balance;cognition;pain;endurance/activity tolerance  -DJ     Cognitive Impairments, Mobility Safety/Performance judgment;safety precaution awareness  -DJ     Comment, Safety Issues/Impairments (Mobility) gt belt, nonskid socks  -DJ           User Key  (r) = Recorded By, (t) = Taken By, (c) = Cosigned By    Initials Name Provider Type    DJ Iwona Jones, PT Physical Therapist               Mobility     Row Name 04/06/23 0939          Bed Mobility    Bed Mobility supine-sit;sit-supine  -DJ     Supine-Sit [place] (Bed Mobility) contact guard;verbal cues  -DJ     Sit-Supine [place] (Bed Mobility) minimum assist (75% patient effort);2 person assist;verbal cues  -DJ     Assistive Device (Bed Mobility) bed rails;head of bed elevated  -DJ     Comment, (Bed Mobility) able to scoot while sitting EOB  -DJ     Row Name 04/06/23 0939          Transfers    Comment, (Transfers) sit/stand from EOB  -DJ     Row Name 04/06/23 0939          Bed-Chair Transfer    Bed-Chair [place] (Transfers) not tested  -DJ     Row Name 04/06/23 0939          Sit-Stand Transfer    Sit-Stand  [place] (Transfers) minimum assist (75% patient effort);2 person assist;verbal cues  -DJ     Assistive Device (Sit-Stand Transfers) walker, front-wheeled  -DJ     Row Name 04/06/23 0939          Gait/Stairs (Locomotion)    [place] Level (Gait) minimum assist (75% patient effort);2 person assist;verbal cues  -DJ     Assistive Device (Gait) walker, front-wheeled  -DJ     Distance in Feet (Gait) 30'  -DJ     Deviations/Abnormal Patterns (Gait) base of support, wide;terrance decreased;festinating/shuffling;gait speed decreased;stride length decreased  -DJ     Bilateral Gait Deviations forward flexed posture  -DJ     [place] Level (Stairs) not tested  -DJ     Comment, (Gait/Stairs) Pt amb 30' with r wx and min A of 2 with sig flexed posture (due to LBP). Pace is slow, balance is unsteady, endurqance is poor and limits further amb distance. Pt req nearly constant vc to keep hands on wx handles (vs leaning on handles with forearms). Pt also keeps wx too far in front and at times with his legs outside of wx base.  -DJ           User Key  (r) = Recorded By, (t) = Taken By, (c) = Cosigned By    Initials Name Provider Type    Iwona Govea, PT Physical Therapist               Obj/Interventions     Row Name 04/06/23 0943          Range of Motion Comprehensive    Comment, General Range of Motion R sh grossly 25% nl due to arthritis  -DJ     Row Name 04/06/23 0943          Strength Comprehensive (MMT)    Comment, General Manual Muscle Testing (MMT) Assessment good extremity strength  -DJ     Row Name 04/06/23 0943          Motor Skills    Motor Skills functional endurance  -DJ     Functional Endurance poor  -DJ     Row Name 04/06/23 0943          Balance    Balance Assessment standing static balance;standing dynamic balance  -DJ     Static Standing Balance contact guard;verbal cues  -DJ     Dynamic Standing Balance minimal assist;2-person assist;verbal cues  -DJ     Position/Device Used, Standing Balance  supported;walker, front-wheeled  -DJ     Balance Interventions sitting;standing;sit to stand;supported;weight shifting activity  -DJ     Comment, Balance unsteady  -DJ     Row Name 04/06/23 0943          Sensory Assessment (Somatosensory)    Sensory Assessment (Somatosensory) not tested  -DJ           User Key  (r) = Recorded By, (t) = Taken By, (c) = Cosigned By    Initials Name Provider Type    DJ Iwona Jones, PT Physical Therapist               Goals/Plan     Row Name 04/06/23 0950          Bed Mobility Goal 1 (PT)    Activity/Assistive Device (Bed Mobility Goal 1, PT) sit to supine;supine to sit  -DJ     [place] Level/Cues Needed (Bed Mobility Goal 1, PT) modified independence;verbal cues required  -DJ     Time Frame (Bed Mobility Goal 1, PT) 10 days  -DJ     Row Name 04/06/23 0950          Transfer Goal 1 (PT)    Activity/Assistive Device (Transfer Goal 1, PT) sit-to-stand/stand-to-sit  -DJ     [place] Level/Cues Needed (Transfer Goal 1, PT) verbal cues required;standby assist  -DJ     Time Frame (Transfer Goal 1, PT) 10 days  -DJ     Row Name 04/06/23 0950          Gait Training Goal 1 (PT)    Activity/Assistive Device (Gait Training Goal 1, PT) gait (walking locomotion);assistive device use;improve balance and speed;increase endurance/gait distance;increase energy conservation;walker, rolling  -DJ     [place] Level (Gait Training Goal 1, PT) standby assist;verbal cues required  -DJ     Distance (Gait Training Goal 1, PT) 100'  -DJ     Time Frame (Gait Training Goal 1, PT) 10 days  -DJ     Row Name 04/06/23 0950          Patient Education Goal (PT)    Activity (Patient Education Goal, PT) HEP  -DJ     [place]/Cues/Accuracy (Memory Goal 2, PT) demonstrates adequately;verbalizes understanding  -DJ     Time Frame (Patient Education Goal, PT) 5 days;short term goal (STG)  -DJ     Row Name 04/06/23 0950          Therapy Assessment/Plan (PT)    Planned Therapy Interventions (PT) balance  training;bed mobility training;gait training;home exercise program;strengthening;patient/family education;postural re-education;transfer training  -DJ           User Key  (r) = Recorded By, (t) = Taken By, (c) = Cosigned By    Initials Name Provider Type    Iwona Govea, PT Physical Therapist               Clinical Impression     Row Name 04/06/23 0944          Pain    Pretreatment Pain Rating 3/10  -DJ     Posttreatment Pain Rating 5/10  -DJ     Pain Location lower  -DJ     Pain Location - back  -DJ     Pain Intervention(s) Repositioned;Rest  -DJ     Row Name 04/06/23 0944          Plan of Care Review    Plan of Care Reviewed With patient  -DJ     Outcome Evaluation 89yo obese white male admitted 4/4/23 with chronic resp failure with hypoxia. PMH includes hbp, chronic LBP with cord compression, afib, COPD, weakness. PLOF: Pt lives in [facility] with ssife and was using a cane or wx for mobility. He req assist for bathing and dressing. He is limited now by generalized weaknes and decreased activity tolerance. Today, he was resting in bed, oriented x 3 but pleasantly confused at times. He req CGA to sit EOB with vc. He stood from EOB with min A of 2 and r wx. Pt amb 30' with r wx and min A of 2 with 6L O2 with sig flexed posture (due to LBP). Pace is slow, balance is unsteady, endurqance is poor and limits further amb distance. Pt req nearly constant vc to keep hands on wx handles (vs leaning on handles with forearms). Pt also keeps wx too far in front and at times with his legs outside of wx base. O2 sats 90% after amb. Pt able to scoot while sitting EOB independently and returned supine with CGA. He was able to reposition himself in bed. He would benefit from skilled PT services to address functional deficits and prepare for d/c.  -DJ     Row Name 04/06/23 0944          Therapy Assessment/Plan (PT)    Rehab Potential (PT) fair, will monitor progress closely  -DJ     Criteria for Skilled Interventions Met (PT)  yes;meets criteria;skilled treatment is necessary  -DJ     Therapy Frequency (PT) 6 times/wk  -DJ     Row Name 04/06/23 0944          Vital Signs    O2 Delivery Pre Treatment supplemental O2  4L  -DJ     O2 Delivery Intra Treatment supplemental O2  6L  -DJ     O2 Delivery Post Treatment supplemental O2  4L  -DJ     Pre Patient Position Supine  -DJ     Intra Patient Position Standing  -DJ     Post Patient Position Supine  -DJ     Row Name 04/06/23 0944          Positioning and Restraints    Pre-Treatment Position in bed  -DJ     Post Treatment Position bed  -DJ     In Bed notified nsg;supine;call light within reach;encouraged to call for assist;exit alarm on;SCD pump applied  -DJ           User Key  (r) = Recorded By, (t) = Taken By, (c) = Cosigned By    Initials Name Provider Type    Iwona Govea PT Physical Therapist               Outcome Measures     Row Name 04/06/23 0951          How much help from another person do you currently need...    Turning from your back to your side while in flat bed without using bedrails? 3  -DJ     Moving from lying on back to sitting on the side of a flat bed without bedrails? 3  -DJ     Moving to and from a bed to a chair (including a wheelchair)? 2  -DJ     Standing up from a chair using your arms (e.g., wheelchair, bedside chair)? 3  -DJ     Climbing 3-5 steps with a railing? 1  -DJ     To walk in hospital room? 2  -DJ     AM-PAC 6 Clicks Score (PT) 14  -DJ     Highest level of mobility 4 --> Transferred to chair/commode  -DJ     Row Name 04/06/23 0951          Functional Assessment    Outcome Measure Options AM-PAC 6 Clicks Basic Mobility (PT)  -DJ           User Key  (r) = Recorded By, (t) = Taken By, (c) = Cosigned By    Initials Name Provider Type    Iwona Govea PT Physical Therapist                             Physical Therapy Education     Title: PT OT SLP Therapies (Done)     Topic: Physical Therapy (Done)     Point: Mobility training (Done)     Learning Progress  Summary           Patient Acceptance, E, VU,NR by YUAN at 4/6/2023 0952                   Point: Home exercise program (Done)     Learning Progress Summary           Patient Acceptance, E, VU,NR by YUAN at 4/6/2023 0952                   Point: Body mechanics (Done)     Learning Progress Summary           Patient Acceptance, E, VU,NR by DJ at 4/6/2023 0952                   Point: Precautions (Done)     Learning Progress Summary           Patient Acceptance, E, VU,NR by  at 4/6/2023 0952                               User Key     Initials Effective Dates Name Provider Type Discipline    YUAN 10/25/19 -  Iwona Jones, PT Physical Therapist PT              PT Recommendation and Plan  Planned Therapy Interventions (PT): balance training, bed mobility training, gait training, home exercise program, strengthening, patient/family education, postural re-education, transfer training  Plan of Care Reviewed With: patient  Outcome Evaluation: 89yo obese white male admitted 4/4/23 with chronic resp failure with hypoxia. PMH includes hbp, chronic LBP with cord compression, afib, COPD, weakness. PLOF: Pt lives in [facility] with ssife and was using a cane or wx for mobility. He req assist for bathing and dressing. He is limited now by generalized weaknes and decreased activity tolerance. Today, he was resting in bed, oriented x 3 but pleasantly confused at times. He req CGA to sit EOB with vc. He stood from EOB with min A of 2 and r wx. Pt amb 30' with r wx and min A of 2 with 6L O2 with sig flexed posture (due to LBP). Pace is slow, balance is unsteady, endurqance is poor and limits further amb distance. Pt req nearly constant vc to keep hands on wx handles (vs leaning on handles with forearms). Pt also keeps wx too far in front and at times with his legs outside of wx base. O2 sats 90% after amb. Pt able to scoot while sitting EOB independently and returned supine with CGA. He was able to reposition himself in bed. He would benefit from  skilled PT services to address functional deficits and prepare for d/c.     Time Calculation:    PT Charges     Row Name 04/06/23 0955             Time Calculation    Start Time 0834  -DJ      Stop Time 0859  -DJ      Time Calculation (min) 25 min  -DJ      PT Non-Billable Time (min) 10 min  -DJ      PT Received On 04/06/23  -YUAN      PT - Next Appointment 04/07/23  -YUAN      PT Goal Re-Cert Due Date 04/16/23  -YUAN            User Key  (r) = Recorded By, (t) = Taken By, (c) = Cosigned By    Initials Name Provider Type    Iwona Govea PT Physical Therapist              Therapy Charges for Today     Code Description Service Date Service Provider Modifiers Qty    02334173127 HC PT EVAL MOD COMPLEXITY 2 4/6/2023 Iwona Jones, PT GP 1    70439183009 HC PT THERAPEUTIC ACT EA 15 MIN 4/6/2023 Iwona Jones, PT GP 2    05681654415 HC PT THER SUPP EA 15 MIN 4/6/2023 Iwona Jones, PT GP 2          PT G-Codes  Outcome Measure Options: AM-PAC 6 Clicks Basic Mobility (PT)  AM-PAC 6 Clicks Score (PT): 14  PT Discharge Summary  Anticipated Discharge Disposition (PT): skilled nursing facility, home with home health (pending progress)    Iwona Jones PT  4/6/2023

## 2023-04-06 NOTE — PROGRESS NOTES
LOS: 1 day   Patient Care Team:  Harry Luis MD as PCP - General    Subjective     Patient did not sleep last night well at all.  Apparently artery tied him on his home PAP machine required 10 L O2 to keep his saturations up interestingly he is on 4 L awake and saturating in the mid 90s.  He does not feel short of breath he keeps taking his oxygen off his oxygen fell into the mid 80s on room air he did not seem to know that he was oxygen offer short of breath.  Not coughing much no chest pain    Review of Systems:   He was a never smoker       Objective     Vital Signs  Vital Sign Min/Max for last 24 hours  Temp  Min: 97.8 °F (36.6 °C)  Max: 98.9 °F (37.2 °C)   BP  Min: 130/71  Max: 148/83   Pulse  Min: 61  Max: 96   Resp  Min: 16  Max: 24   SpO2  Min: 90 %  Max: 94 %   Flow (L/min)  Min: 4  Max: 10   Weight  Min: 134 kg (295 lb)  Max: 134 kg (295 lb)        Ventilator/Non-Invasive Ventilation Settings (From admission, onward)     Start     Ordered    04/05/23 1411  NIPPV (CPAP or BIPAP)  At Bedtime & As Needed-RT        Comments: Started 10 cm and try and adjust to best effect best tolerance until we can get his home machine   Question Answer Comment   Indication: Sleep Apnea    Type: CPAP    Pressure 8    Titrate Oxygen for SpO2 88% - 92%        04/05/23 1410    04/05/23 0523  NIPPV (CPAP or BIPAP)  At Bedtime & As Needed-RT,   Status:  Canceled        Question Answer Comment   Indication: Sleep Apnea    Type: CPAP    Pressure 8    Titrate Oxygen for SpO2 88% - 92%        04/05/23 0522                             Body mass index is 44.85 kg/m².  I/O last 3 completed shifts:  In: 960 [P.O.:960]  Out: 475 [Urine:475]  I/O this shift:  In: -   Out: 300 [Urine:300]        Physical Exam:  General Appearance: Well-developed morbidly obese white male with a BMI over 44 resting in bed does not appear in acute distress  Eyes: Conjunctiva are clear and anicteric  ENT: Mucous membranes are little dry he has a  Mallampati type II airway, nasal septum midline  Neck: No adenopathy or thyromegaly no visible jugular venous tension and trachea midline  Lungs: He has some wheezing primarily in the right base very minimal in the left base no rales no rhonchi no dullness he is not labored at all  Cardiac: irregularly iregular  Abdomen: Obese soft nontender no palpable hepatosplenomegaly  : Not examined  Musculoskeletal: Mild thoracic kyphosis  Skin: Warm and dry no jaundice no petechiae  Neuro: He is a little hard of hearing but he is alert and oriented cooperative he has some problems getting a little confused at times nursing reported he had some orientation issues earlier but he was oriented to year and the president he knew he was in the hospital he thought he was at .  He is moving his extremities  Extremities/P Vascular: No clubbing or cyanosis he does have a little bit of edema in both lower extremities, palpable radial and dorsalis pedis pulses  MSE: Pleasant gentleman has a good spirits       Labs:  Results from last 7 days   Lab Units 04/05/23  0620 04/04/23  1755 04/03/23  1504   GLUCOSE mg/dL 132* 159* 126*   SODIUM mmol/L 140 138 140   POTASSIUM mmol/L 3.8 3.3* 3.7   TOTAL CO2 mmol/L  --   --  31*   CO2 mmol/L 33.5* 33.5*  --    CHLORIDE mmol/L 97* 94* 95*   ANION GAP mmol/L 9.5 10.5  --    CREATININE mg/dL 0.52* 0.64* 0.75*   BUN mg/dL 14 21 15   BUN / CREAT RATIO  26.9* 32.8* 20   CALCIUM mg/dL 9.4 8.9 9.5   ALK PHOS U/L  --  78 74   TOTAL PROTEIN g/dL  --  6.6  --    ALT (SGPT) U/L  --  34 32   AST (SGOT) U/L  --  24 23   BILIRUBIN mg/dL  --  0.5 0.6   ALBUMIN g/dL  --  4.1 4.2   GLOBULIN gm/dL  --  2.5  --    A/G RATIO   --   --  1.9     Estimated Creatinine Clearance: 121.3 mL/min (A) (by C-G formula based on SCr of 0.52 mg/dL (L)).      Results from last 7 days   Lab Units 04/05/23  0620 04/04/23  1755 04/03/23  1504   WBC 10*3/mm3 4.73 6.17 6.8   RBC 10*6/mm3 4.73 4.64 4.64   HEMOGLOBIN  g/dL 13.8 14.2 13.3   HEMATOCRIT % 43.2 43.2 41.0   MCV fL 91.3 93.1 88   MCH pg 29.2 30.6 28.7   MCHC g/dL 31.9 32.9 32.4   RDW % 12.5 13.4 12.8   RDW-SD fl 41.4 46.1  --    MPV fL 10.0 10.3  --    PLATELETS 10*3/mm3 141 133* 137*   NEUTROPHIL % %  --  67.1 73   LYMPHOCYTE % %  --  21.9 17   MONOCYTES % %  --  9.2 9   EOSINOPHIL % %  --  1.3 1   BASOPHIL % %  --  0.3 0   IMM GRAN % %  --  0.2  --    NEUTROS ABS 10*3/mm3  --  4.14 4.9   LYMPHS ABS 10*3/mm3  --  1.35 1.2   MONOS ABS 10*3/mm3  --  0.57 0.6   EOS ABS 10*3/mm3  --  0.08 0.1   BASOS ABS 10*3/mm3  --  0.02 0.0   IMMATURE GRANS (ABS) 10*3/mm3  --  0.01  --          Results from last 7 days   Lab Units 04/05/23  0620 04/04/23 2001 04/04/23  1755   CK TOTAL U/L  --   --  117   HSTROP T ng/L 34* 22* 24*     Results from last 7 days   Lab Units 04/04/23  1755   PROBNP pg/mL 466.2     Results from last 7 days   Lab Units 04/03/23  1504   TSH uIU/mL 1.970             Microbiology Results (last 10 days)     Procedure Component Value - Date/Time    Respiratory Panel PCR w/COVID-19(SARS-CoV-2) [facility]ES[facility]US MANUEL/[facility]AD/MICHAEL In-House, NP Swab in UTM/VTM, 3-4 HR TAT - Swab, Nasopharynx [[id number]]  (Normal) Collected: 04/05/23 1051    Lab Status: Final result Specimen: Swab from Nasopharynx Updated: 04/05/23 1237     ADENOVIRUS, PCR Not Detected     Coronavirus 229E Not Detected     Coronavirus HKU1 Not Detected     Coronavirus NL63 Not Detected     Coronavirus OC43 Not Detected     COVID19 Not Detected     Human Metapneumovirus Not Detected     Human Rhinovirus/Enterovirus Not Detected     Influenza A PCR Not Detected     Influenza B PCR Not Detected     Parainfluenza Virus 1 Not Detected     Parainfluenza Virus 2 Not Detected     Parainfluenza Virus 3 Not Detected     Parainfluenza Virus 4 Not Detected     RSV, PCR Not Detected     Bordetella pertussis pcr Not Detected     Bordetella parapertussis PCR Not Detected     Chlamydophila pneumoniae PCR Not Detected      Mycoplasma pneumo by PCR Not Detected    Narrative:      In the setting of a positive respiratory panel with a viral infection PLUS a negative procalcitonin without other underlying concern for bacterial infection, consider observing off antibiotics or discontinuation of antibiotics and continue supportive care. If the respiratory panel is positive for atypical bacterial infection (Bordetella pertussis, Chlamydophila pneumoniae, or Mycoplasma pneumoniae), consider antibiotic de-escalation to target atypical bacterial infection.    COVID-19 and FLU A/B PCR - Swab, Nasopharynx [[id number]]  (Normal) Collected: 04/04/23 1747    Lab Status: Final result Specimen: Swab from Nasopharynx Updated: 04/04/23 1809     COVID19 Not Detected     Influenza A PCR Not Detected     Influenza B PCR Not Detected    Narrative:      Fact sheet for providers: https://www.fda.gov/media/642119/download    Fact sheet for patients: https://www.fda.gov/media/053216/download    Test performed by PCR.              amLODIPine, 10 mg, Oral, Daily  ammonium lactate, 1 application, Topical, Q12H  aspirin, 81 mg, Oral, Daily  budesonide-formoterol, 2 puff, Inhalation, BID - RT  chlorthalidone, 25 mg, Oral, Daily  fluticasone, 1 spray, Nasal, Daily  ipratropium-albuterol, 3 mL, Nebulization, Q6H While Awake - RT  methylPREDNISolone sodium succinate, 60 mg, Intravenous, Q12H  metoprolol succinate XL, 25 mg, Oral, Daily  miconazole, , Topical, Q12H  pantoprazole, 40 mg, Oral, Q AM  sodium chloride, 10 mL, Intravenous, Q12H  terazosin, 5 mg, Oral, Daily           Diagnostics:  Adult Transthoracic Echo Complete W/ Cont if Necessary Per Protocol    Result Date: 4/5/2023  •  Left ventricular systolic function is normal. Left ventricular ejection fraction appears to be 66 - 70%. •  Left ventricular wall thickness is consistent with mild to moderate concentric hypertrophy. •  Estimated right ventricular systolic pressure from tricuspid regurgitation is  moderately elevated (45-55 mmHg).     CT Head Without Contrast    Result Date: 4/4/2023  CT HEAD WO CONTRAST-  INDICATIONS: Confusion  TECHNIQUE: Radiation dose reduction techniques were utilized, including automated exposure control and exposure modulation based on body size. Noncontrast head CT  COMPARISON: 03/05/2021  FINDINGS:    No acute intracranial hemorrhage, midline shift or mass effect. No acute territorial infarct is identified. 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  Prominent periventricular hypodensities suggest chronic small vessel ischemic change in a patient this age.  Arterial calcifications are seen at the base of the brain.  Ventricles, cisterns, cerebral sulci are unremarkable for patient age.  Mild paranasal sinus mucosal thickening is present. The visualized paranasal sinuses, orbits, mastoid air cells are otherwise are unremarkable.           No acute intracranial hemorrhage or hydrocephalus. Chronic changes of the brain. If there is further clinical concern, MRI could be considered for further evaluation.  This report was finalized on 4/4/2023 7:21 PM by Dr. Leander Kay M.D.      XR Chest 1 View    Result Date: 4/4/2023  XR CHEST 1 VW-  HISTORY: Male who is 89 years-old,  short of breath  TECHNIQUE: Frontal view of the chest  COMPARISON: 04/03/2023  FINDINGS: The heart is enlarged, mild prominence of vascular markings. Aorta appears ectatic. No focal pulmonary consolidation, pleural effusion, or pneumothorax. No acute osseous process.      No focal pulmonary consolidation. Cardiomegaly with mild prominence of vascular markings. Ectatic appearing aorta. Follow-up as clinically indicated.  This report was finalized on 4/4/2023 6:10 PM by Dr. Leander Kay M.D.      CT Angiogram Chest    Result Date: 4/4/2023  CT ANGIOGRAM CHEST-  INDICATIONS: Abnormal chest x-ray  TECHNIQUE: Radiation dose reduction techniques were utilized, including automated exposure control and  exposure modulation based on body size. CT angiography of the chest. Three-dimensional reconstructions.  COMPARISON: 11/04/2019  FINDINGS:  Ascending aorta is dilated, 4.1 cm, not significantly changed. No aortic dissection. No pulmonary embolism. Prominence of caliber of the central pulmonary arteries suggests pulmonary arterial hypertension; this appearance is chronic.  The heart size is enlarged without pericardial effusion. A few small subcentimeter short axis mediastinal lymph nodes are seen that are not significant by size criteria. A 1 cm short axis retrocardiac lymph node on axial image 83 is borderline, nonspecific, stable.  Left thyroid nodularity is noted, suboptimally evaluated with this technique, grossly similar to prior exam, could be further characterized with ultrasound if indicated.  The airways appear clear.  No pleural effusion or pneumothorax.  The lungs show mild atelectasis. A 1.1 cm pleural-based groundglass nodular density of the right lower lobe on axial image 74 is stable.  Upper abdominal structures show no acute findings. Layering stones are seen in the gallbladder.  Degenerative changes are seen in the spine and shoulders. Bilateral shoulder effusions are present. No acute fracture is identified.       Dilated ascending aorta, 4.1 cm. No aortic dissection. Chronic prominence of caliber of the central pulmonary arteries suggests pulmonary arterial hypertension.  This report was finalized on 4/4/2023 7:28 PM by Dr. Leander Kay M.D.      XR Chest PA & Lateral    Result Date: 4/4/2023  PA AND LATERAL CHEST X-RAY  HISTORY: Hypoxia.  Chest x-ray consisting of 3 views is provided. Correlation: Chest x-ray 03/05/2021.  FINDINGS: Cardiomegaly appears similar. Pulmonary vasculature is engorged but there is no interstitial edema. No focal infiltrate or pleural effusion. Extensive degenerative change throughout the thoracic spine and visualized upper lumbar spine.      Cardiomegaly with  pulmonary vascular engorgement but no marlee interstitial edema.  This report was finalized on 4/4/2023 7:10 AM by Dr. Jae Bailey M.D.      Results for orders placed during the hospital encounter of 04/04/23    Adult Transthoracic Echo Complete W/ Cont if Necessary Per Protocol    Interpretation Summary  •  Left ventricular systolic function is normal. Left ventricular ejection fraction appears to be 66 - 70%.  •  Left ventricular wall thickness is consistent with mild to moderate concentric hypertrophy.  •  Estimated right ventricular systolic pressure from tricuspid regurgitation is moderately elevated (45-55 mmHg).          Active Hospital Problems    Diagnosis  POA   • **Chronic respiratory failure with hypoxia [J96.11]  Unknown   • Hypoxia [R09.02]  Yes   • JENISE on CPAP [G47.33, Z99.89]  Not Applicable   • COPD (chronic obstructive pulmonary disease) [J44.9]  Yes   • Morbid obesity [E66.01]  Yes   • Gastroesophageal reflux disease [K21.9]  Yes      Resolved Hospital Problems   No resolved problems to display.         Assessment & Plan     1. Chronic respiratory failure hypoxemic and hypercapnic he is on his home O2 now that had been increased recently from 3 to 4 L he seems to be saturating well on this in fact he may be able to be weaned down slightly  2. Obstructive sleep apnea he uses home PAP machine which can bleed in O2 as necessary will need to see how much O2 he needs because he has not been bleeding and O2 at home.  3. 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 Morbid obesity with a BMI of greater than 44  5. Left lower lobe 1.1 cm groundglass nodule stable for 4 years no follow-up needed  6. Confusion not sure the etiology.  His ABG would suggest its not related to CO2 retention.  7. Hypertension blood pressures have been running a little higher may need a little better control  8. Pulmonary hypertension last echocardiogram 2 days ago RVSP 45-55 this may explain some  of his hypoxia last CT angiogram was negative pressure seem a little high for sleep apnea alone.  Statistically speaking the most likely etiology would be left heart failure he does have mild to moderate concentric LVH and his chronic atrial fibrillation I would suspect that some diastolic dysfunction is present but do not know for certain.  See what cardiology thinks we could always entertain a right heart cath.        Plan for disposition:    Kenneth Chamberlain Jr, MD  04/06/23  06:02 EDT    Time:

## 2023-04-07 LAB
ANION GAP SERPL CALCULATED.3IONS-SCNC: 8.9 MMOL/L (ref 5–15)
ARTERIAL PATENCY WRIST A: POSITIVE
ATMOSPHERIC PRESS: 757.2 MMHG
BASE EXCESS BLDA CALC-SCNC: 18.6 MMOL/L (ref 0–2)
BDY SITE: ABNORMAL
BUN SERPL-MCNC: 23 MG/DL (ref 8–23)
BUN/CREAT SERPL: 42.6 (ref 7–25)
CALCIUM SPEC-SCNC: 9.6 MG/DL (ref 8.2–9.6)
CHLORIDE SERPL-SCNC: 95 MMOL/L (ref 98–107)
CO2 SERPL-SCNC: 39.1 MMOL/L (ref 22–29)
CREAT SERPL-MCNC: 0.54 MG/DL (ref 0.76–1.27)
EGFRCR SERPLBLD CKD-EPI 2021: 93.5 ML/MIN/1.73
FOLATE SERPL-MCNC: >20 NG/ML (ref 4.78–24.2)
GAS FLOW AIRWAY: 4 LPM
GLUCOSE SERPL-MCNC: 153 MG/DL (ref 65–99)
HCO3 BLDA-SCNC: 50.4 MMOL/L (ref 22–28)
MODALITY: ABNORMAL
PCO2 BLDA: 90 MM HG (ref 35–45)
PH BLDA: 7.36 PH UNITS (ref 7.35–7.45)
PO2 BLDA: 67.4 MM HG (ref 80–100)
POTASSIUM SERPL-SCNC: 3.4 MMOL/L (ref 3.5–5.2)
SAO2 % BLDCOA: 90.4 % (ref 92–99)
SODIUM SERPL-SCNC: 143 MMOL/L (ref 136–145)
TOTAL RATE: 30 BREATHS/MINUTE
URATE SERPL-MCNC: 5.7 MG/DL (ref 3.4–7)
VIT B12 BLD-MCNC: 861 PG/ML (ref 211–946)

## 2023-04-07 PROCEDURE — 94799 UNLISTED PULMONARY SVC/PX: CPT

## 2023-04-07 PROCEDURE — 99232 SBSQ HOSP IP/OBS MODERATE 35: CPT | Performed by: NURSE PRACTITIONER

## 2023-04-07 PROCEDURE — 94761 N-INVAS EAR/PLS OXIMETRY MLT: CPT

## 2023-04-07 PROCEDURE — 94760 N-INVAS EAR/PLS OXIMETRY 1: CPT

## 2023-04-07 PROCEDURE — 84550 ASSAY OF BLOOD/URIC ACID: CPT | Performed by: HOSPITALIST

## 2023-04-07 PROCEDURE — 82746 ASSAY OF FOLIC ACID SERUM: CPT | Performed by: PSYCHIATRY & NEUROLOGY

## 2023-04-07 PROCEDURE — 36600 WITHDRAWAL OF ARTERIAL BLOOD: CPT

## 2023-04-07 PROCEDURE — 99232 SBSQ HOSP IP/OBS MODERATE 35: CPT | Performed by: PSYCHIATRY & NEUROLOGY

## 2023-04-07 PROCEDURE — 80048 BASIC METABOLIC PNL TOTAL CA: CPT | Performed by: HOSPITALIST

## 2023-04-07 PROCEDURE — 82607 VITAMIN B-12: CPT | Performed by: PSYCHIATRY & NEUROLOGY

## 2023-04-07 PROCEDURE — 94664 DEMO&/EVAL PT USE INHALER: CPT

## 2023-04-07 PROCEDURE — 82803 BLOOD GASES ANY COMBINATION: CPT

## 2023-04-07 RX ORDER — OLANZAPINE 10 MG/1
5 INJECTION, POWDER, LYOPHILIZED, FOR SOLUTION INTRAMUSCULAR ONCE AS NEEDED
Status: DISCONTINUED | OUTPATIENT
Start: 2023-04-07 | End: 2023-04-08

## 2023-04-07 RX ADMIN — TERAZOSIN HYDROCHLORIDE 5 MG: 5 CAPSULE ORAL at 13:42

## 2023-04-07 RX ADMIN — AMMONIUM LACTATE 1 APPLICATION: 120 CREAM TOPICAL at 18:16

## 2023-04-07 RX ADMIN — IPRATROPIUM BROMIDE AND ALBUTEROL SULFATE 3 ML: 2.5; .5 SOLUTION RESPIRATORY (INHALATION) at 06:46

## 2023-04-07 RX ADMIN — Medication 10 ML: at 20:46

## 2023-04-07 RX ADMIN — ACETAMINOPHEN 650 MG: 325 TABLET, FILM COATED ORAL at 20:46

## 2023-04-07 RX ADMIN — MICONAZOLE NITRATE: 2 POWDER TOPICAL at 13:44

## 2023-04-07 RX ADMIN — MICONAZOLE NITRATE: 2 POWDER TOPICAL at 20:46

## 2023-04-07 RX ADMIN — AMMONIUM LACTATE 1 APPLICATION: 120 CREAM TOPICAL at 06:53

## 2023-04-07 RX ADMIN — APIXABAN 5 MG: 5 TABLET, FILM COATED ORAL at 20:46

## 2023-04-07 RX ADMIN — ASPIRIN 81 MG: 81 TABLET, COATED ORAL at 13:42

## 2023-04-07 RX ADMIN — CHLORTHALIDONE 25 MG: 25 TABLET ORAL at 13:43

## 2023-04-07 RX ADMIN — APIXABAN 5 MG: 5 TABLET, FILM COATED ORAL at 13:43

## 2023-04-07 RX ADMIN — BUDESONIDE AND FORMOTEROL FUMARATE DIHYDRATE 2 PUFF: 160; 4.5 AEROSOL RESPIRATORY (INHALATION) at 20:12

## 2023-04-07 RX ADMIN — Medication 10 ML: at 13:45

## 2023-04-07 RX ADMIN — METOPROLOL SUCCINATE 25 MG: 25 TABLET, EXTENDED RELEASE ORAL at 13:42

## 2023-04-07 RX ADMIN — QUETIAPINE FUMARATE 75 MG: 25 TABLET ORAL at 17:54

## 2023-04-07 NOTE — PROGRESS NOTES
LOS: 2 days   Patient Care Team:  Harry Luis MD as PCP - General    Subjective     Apparently patient did not sleep at night last night he slept a little during the day yesterday and they use the noninvasive ventilator/CPAP while he was sleeping but from about 8 PM until about 8 AM he apparently did not sleep now he is sound asleep on my arrival.  Patient has had several episodes since admission where he will developed acute wheezing now wheezing reported today was audible across the room therefore causes are extrathoracic.  Review of Systems:   He was a never smoker       Objective     Vital Signs  Vital Sign Min/Max for last 24 hours  Temp  Min: 98.2 °F (36.8 °C)  Max: 98.5 °F (36.9 °C)   BP  Min: 116/67  Max: 154/80   Pulse  Min: 63  Max: 91   Resp  Min: 20  Max: 24   SpO2  Min: 90 %  Max: 97 %   Flow (L/min)  Min: 2  Max: 4.5   No data recorded        Ventilator/Non-Invasive Ventilation Settings (From admission, onward)     Start     Ordered    04/05/23 1411  NIPPV (CPAP or BIPAP)  At Bedtime & As Needed-RT        Comments: Started 10 cm and try and adjust to best effect best tolerance until we can get his home machine   Question Answer Comment   Indication: Sleep Apnea    Type: CPAP    Pressure 8    Titrate Oxygen for SpO2 88% - 92%        04/05/23 1410    04/05/23 0523  NIPPV (CPAP or BIPAP)  At Bedtime & As Needed-RT,   Status:  Canceled        Question Answer Comment   Indication: Sleep Apnea    Type: CPAP    Pressure 8    Titrate Oxygen for SpO2 88% - 92%        04/05/23 0522                             Body mass index is 44.85 kg/m².  I/O last 3 completed shifts:  In: 1060 [P.O.:1060]  Out: 400 [Urine:400]  No intake/output data recorded.        Physical Exam:  General Appearance: Well-developed morbidly obese white male with a BMI over 44 resting in bed does not appear in acute distress, he is sleeping very soundly he clearly has obstructive apneas.  He is on 4 L nasal cannula O2 with  oxygen saturations in the mid to upper 90s  Eyes: Conjunctiva are clear and anicteric  ENT: Mucous membranes are little dry he has a Mallampati type II airway, nasal septum midline  Neck: No adenopathy or thyromegaly no visible jugular venous tension and trachea midline  Lungs: He has some wheezing primarily in the right base very minimal in the left base no rales no rhonchi no dullness he is not labored at all  Cardiac: irregularly iregular  Abdomen: Obese soft nontender no palpable hepatosplenomegaly  : Not examined  Musculoskeletal: Mild thoracic kyphosis  Skin: Warm and dry no jaundice no petechiae  Neuro: He was sleeping soundly I did not really attempt to awaken him but I did lift him up to listen behind him and his posterior chest that did not wake him up.  Extremities/P Vascular: No clubbing or cyanosis he does have a little bit of edema in both lower extremities, palpable radial and dorsalis pedis pulses  MSE: Unable to assess today he was sleeping so soundly       Labs:  Results from last 7 days   Lab Units 04/07/23  0708 04/05/23  0620 04/04/23  1755 04/03/23  1504   GLUCOSE mg/dL 153* 132* 159* 126*   SODIUM mmol/L 143 140 138 140   POTASSIUM mmol/L 3.4* 3.8 3.3* 3.7   TOTAL CO2 mmol/L  --   --   --  31*   CO2 mmol/L 39.1* 33.5* 33.5*  --    CHLORIDE mmol/L 95* 97* 94* 95*   ANION GAP mmol/L 8.9 9.5 10.5  --    CREATININE mg/dL 0.54* 0.52* 0.64* 0.75*   BUN mg/dL 23 14 21 15   BUN / CREAT RATIO  42.6* 26.9* 32.8* 20   CALCIUM mg/dL 9.6 9.4 8.9 9.5   ALK PHOS U/L  --   --  78 74   TOTAL PROTEIN g/dL  --   --  6.6  --    ALT (SGPT) U/L  --   --  34 32   AST (SGOT) U/L  --   --  24 23   BILIRUBIN mg/dL  --   --  0.5 0.6   ALBUMIN g/dL  --   --  4.1 4.2   GLOBULIN gm/dL  --   --  2.5  --    A/G RATIO   --   --   --  1.9     Estimated Creatinine Clearance: 116.8 mL/min (A) (by C-G formula based on SCr of 0.54 mg/dL (L)).      Results from last 7 days   Lab Units 04/05/23  0620 04/04/23  3325 04/03/23  6321    WBC 10*3/mm3 4.73 6.17 6.8   RBC 10*6/mm3 4.73 4.64 4.64   HEMOGLOBIN g/dL 13.8 14.2 13.3   HEMATOCRIT % 43.2 43.2 41.0   MCV fL 91.3 93.1 88   MCH pg 29.2 30.6 28.7   MCHC g/dL 31.9 32.9 32.4   RDW % 12.5 13.4 12.8   RDW-SD fl 41.4 46.1  --    MPV fL 10.0 10.3  --    PLATELETS 10*3/mm3 141 133* 137*   NEUTROPHIL % %  --  67.1 73   LYMPHOCYTE % %  --  21.9 17   MONOCYTES % %  --  9.2 9   EOSINOPHIL % %  --  1.3 1   BASOPHIL % %  --  0.3 0   IMM GRAN % %  --  0.2  --    NEUTROS ABS 10*3/mm3  --  4.14 4.9   LYMPHS ABS 10*3/mm3  --  1.35 1.2   MONOS ABS 10*3/mm3  --  0.57 0.6   EOS ABS 10*3/mm3  --  0.08 0.1   BASOS ABS 10*3/mm3  --  0.02 0.0   IMMATURE GRANS (ABS) 10*3/mm3  --  0.01  --          Results from last 7 days   Lab Units 04/05/23  0620 04/04/23 2001 04/04/23  1755   CK TOTAL U/L  --   --  117   HSTROP T ng/L 34* 22* 24*     Results from last 7 days   Lab Units 04/04/23  1755   PROBNP pg/mL 466.2     Results from last 7 days   Lab Units 04/03/23  1504   TSH uIU/mL 1.970             Microbiology Results (last 10 days)     Procedure Component Value - Date/Time    Respiratory Panel PCR w/COVID-19(SARS-CoV-2) LESLY/FRANCESCA/JESUS MANUEL/PAD/COR/MAD/MICHAEL In-House, NP Swab in UTM/VTM, 3-4 HR TAT - Swab, Nasopharynx [[id number]]  (Normal) Collected: 04/05/23 1051    Lab Status: Final result Specimen: Swab from Nasopharynx Updated: 04/05/23 1237     ADENOVIRUS, PCR Not Detected     Coronavirus 229E Not Detected     Coronavirus HKU1 Not Detected     Coronavirus NL63 Not Detected     Coronavirus OC43 Not Detected     COVID19 Not Detected     Human Metapneumovirus Not Detected     Human Rhinovirus/Enterovirus Not Detected     Influenza A PCR Not Detected     Influenza B PCR Not Detected     Parainfluenza Virus 1 Not Detected     Parainfluenza Virus 2 Not Detected     Parainfluenza Virus 3 Not Detected     Parainfluenza Virus 4 Not Detected     RSV, PCR Not Detected     Bordetella pertussis pcr Not Detected     Bordetella  parapertussis PCR Not Detected     Chlamydophila pneumoniae PCR Not Detected     Mycoplasma pneumo by PCR Not Detected    Narrative:      In the setting of a positive respiratory panel with a viral infection PLUS a negative procalcitonin without other underlying concern for bacterial infection, consider observing off antibiotics or discontinuation of antibiotics and continue supportive care. If the respiratory panel is positive for atypical bacterial infection (Bordetella pertussis, Chlamydophila pneumoniae, or Mycoplasma pneumoniae), consider antibiotic de-escalation to target atypical bacterial infection.    COVID-19 and FLU A/B PCR - Swab, Nasopharynx [[id number]]  (Normal) Collected: 04/04/23 1747    Lab Status: Final result Specimen: Swab from Nasopharynx Updated: 04/04/23 1809     COVID19 Not Detected     Influenza A PCR Not Detected     Influenza B PCR Not Detected    Narrative:      Fact sheet for providers: https://www.fda.gov/media/961557/download    Fact sheet for patients: https://www.fda.gov/media/273052/download    Test performed by PCR.              amLODIPine, 10 mg, Oral, Daily  ammonium lactate, 1 application, Topical, Q12H  apixaban, 5 mg, Oral, Q12H  aspirin, 81 mg, Oral, Daily  budesonide-formoterol, 2 puff, Inhalation, BID - RT  chlorthalidone, 25 mg, Oral, Daily  fluticasone, 1 spray, Nasal, Daily  ipratropium-albuterol, 3 mL, Nebulization, Q6H While Awake - RT  metoprolol succinate XL, 25 mg, Oral, Daily  miconazole, , Topical, Q12H  pantoprazole, 40 mg, Oral, Q AM  QUEtiapine, 75 mg, Oral, Daily With Dinner  sodium chloride, 10 mL, Intravenous, Q12H  terazosin, 5 mg, Oral, Daily           Diagnostics:  Adult Transthoracic Echo Complete W/ Cont if Necessary Per Protocol    Result Date: 4/5/2023  •  Left ventricular systolic function is normal. Left ventricular ejection fraction appears to be 66 - 70%. •  Left ventricular wall thickness is consistent with mild to moderate concentric  hypertrophy. •  Estimated right ventricular systolic pressure from tricuspid regurgitation is moderately elevated (45-55 mmHg).     CT Head Without Contrast    Result Date: 4/4/2023  CT HEAD WO CONTRAST-  INDICATIONS: Confusion  TECHNIQUE: Radiation dose reduction techniques were utilized, including automated exposure control and exposure modulation based on body size. Noncontrast head CT  COMPARISON: 03/05/2021  FINDINGS:    No acute intracranial hemorrhage, midline shift or mass effect. No acute territorial infarct is identified. 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  Prominent periventricular hypodensities suggest chronic small vessel ischemic change in a patient this age.  Arterial calcifications are seen at the base of the brain.  Ventricles, cisterns, cerebral sulci are unremarkable for patient age.  Mild paranasal sinus mucosal thickening is present. The visualized paranasal sinuses, orbits, mastoid air cells are otherwise are unremarkable.           No acute intracranial hemorrhage or hydrocephalus. Chronic changes of the brain. If there is further clinical concern, MRI could be considered for further evaluation.  This report was finalized on 4/4/2023 7:21 PM by Dr. Leander Kay M.D.      XR Chest 1 View    Result Date: 4/4/2023  XR CHEST 1 VW-  HISTORY: Male who is 89 years-old,  short of breath  TECHNIQUE: Frontal view of the chest  COMPARISON: 04/03/2023  FINDINGS: The heart is enlarged, mild prominence of vascular markings. Aorta appears ectatic. No focal pulmonary consolidation, pleural effusion, or pneumothorax. No acute osseous process.      No focal pulmonary consolidation. Cardiomegaly with mild prominence of vascular markings. Ectatic appearing aorta. Follow-up as clinically indicated.  This report was finalized on 4/4/2023 6:10 PM by Dr. Leander Kay M.D.      CT Angiogram Chest    Result Date: 4/4/2023  CT ANGIOGRAM CHEST-  INDICATIONS: Abnormal chest x-ray  TECHNIQUE:  Radiation dose reduction techniques were utilized, including automated exposure control and exposure modulation based on body size. CT angiography of the chest. Three-dimensional reconstructions.  COMPARISON: 11/04/2019  FINDINGS:  Ascending aorta is dilated, 4.1 cm, not significantly changed. No aortic dissection. No pulmonary embolism. Prominence of caliber of the central pulmonary arteries suggests pulmonary arterial hypertension; this appearance is chronic.  The heart size is enlarged without pericardial effusion. A few small subcentimeter short axis mediastinal lymph nodes are seen that are not significant by size criteria. A 1 cm short axis retrocardiac lymph node on axial image 83 is borderline, nonspecific, stable.  Left thyroid nodularity is noted, suboptimally evaluated with this technique, grossly similar to prior exam, could be further characterized with ultrasound if indicated.  The airways appear clear.  No pleural effusion or pneumothorax.  The lungs show mild atelectasis. A 1.1 cm pleural-based groundglass nodular density of the right lower lobe on axial image 74 is stable.  Upper abdominal structures show no acute findings. Layering stones are seen in the gallbladder.  Degenerative changes are seen in the spine and shoulders. Bilateral shoulder effusions are present. No acute fracture is identified.       Dilated ascending aorta, 4.1 cm. No aortic dissection. Chronic prominence of caliber of the central pulmonary arteries suggests pulmonary arterial hypertension.  This report was finalized on 4/4/2023 7:28 PM by Dr. Leander Kay M.D.      XR Chest PA & Lateral    Result Date: 4/4/2023  PA AND LATERAL CHEST X-RAY  HISTORY: Hypoxia.  Chest x-ray consisting of 3 views is provided. Correlation: Chest x-ray 03/05/2021.  FINDINGS: Cardiomegaly appears similar. Pulmonary vasculature is engorged but there is no interstitial edema. No focal infiltrate or pleural effusion. Extensive degenerative change  throughout the thoracic spine and visualized upper lumbar spine.      Cardiomegaly with pulmonary vascular engorgement but no marlee interstitial edema.  This report was finalized on 4/4/2023 7:10 AM by Dr. Jae Bailey M.D.      Results for orders placed during the hospital encounter of 04/04/23    Adult Transthoracic Echo Complete W/ Cont if Necessary Per Protocol    Interpretation Summary  •  Left ventricular systolic function is normal. Left ventricular ejection fraction appears to be 66 - 70%.  •  Left ventricular wall thickness is consistent with mild to moderate concentric hypertrophy.  •  Estimated right ventricular systolic pressure from tricuspid regurgitation is moderately elevated (45-55 mmHg).          Active Hospital Problems    Diagnosis  POA   • **Chronic respiratory failure with hypoxia [J96.11]  Unknown   • Hypoxia [R09.02]  Yes   • JENISE on CPAP [G47.33, Z99.89]  Not Applicable   • COPD (chronic obstructive pulmonary disease) [J44.9]  Yes   • Morbid obesity [E66.01]  Yes   • Gastroesophageal reflux disease [K21.9]  Yes      Resolved Hospital Problems   No resolved problems to display.         Assessment & Plan     1. Chronic respiratory failure hypoxemic and hypercapnic he is on his home O2 now that had been increased recently from 3 to 4 L he seems to be saturating well on this in fact he may be able to be weaned down slightly  2. Obstructive sleep apnea he uses home PAP machine which can bleed in O2 as necessary will need to see how much O2 he needs because he has not been bleeding and O2 at home.  3. 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 Morbid obesity with a BMI of greater than 44  5. Left lower lobe 1.1 cm groundglass nodule stable for 4 years no follow-up needed  6. Confusion not sure the etiology.  His ABG would suggest its not related to CO2 retention.  7. Hypertension blood pressures have been running a little higher may need a little better  control  8. Pulmonary hypertension last echocardiogram 2 days ago RVSP 45-55 this may explain some of his hypoxia last CT angiogram was negative pressure seem a little high for sleep apnea alone.  Statistically speaking the most likely etiology would be left heart failure he does have mild to moderate concentric LVH and his chronic atrial fibrillation I would suspect that some diastolic dysfunction is present but do not know for certain.  See what cardiology thinks we could always entertain a right heart cath.  9. Reported episode of audible wheezing today if it is audible without a stethoscope it is almost certainly extrathoracic question is he aspirating or exactly what is going on I have not witnessed 1 of these episodes if I hear 1 I will come evaluate while they are ongoing so I can have a better idea of exactly what is transpiring.        Plan for disposition:    Kenneth Chamberlain Jr, MD  04/07/23  12:46 EDT    Time:

## 2023-04-07 NOTE — PROGRESS NOTES
Hospital Follow Up    LOS:  LOS: 2 days   Patient Name: Harry Potts  Age/Sex: 89 y.o. male  : 1930  MRN: [id number]    Day of Service: 23   Length of Stay: 2  Encounter Provider: DANNIE Estes  Place of Service: [facility]  Patient Care Team:  Harry Luis MD as PCP - General    Subjective:     Chief Complaint: shortness of breath, new onset AF    Interval History: Patient had a difficult night with confusion, combativeness. Currently sleeping and I was asked to not wake the patient for exam as he had just fallen asleep.    Objective:     Objective:  Temp:  [98.2 °F (36.8 °C)-98.5 °F (36.9 °C)] 98.5 °F (36.9 °C)  Heart Rate:  [63-91] 84  Resp:  [20-24] 24  BP: (116-154)/(67-93) 116/67     Intake/Output Summary (Last 24 hours) at 2023 0824  Last data filed at 2023 0435  Gross per 24 hour   Intake 700 ml   Output 100 ml   Net 600 ml     Body mass index is 44.85 kg/m².      23  1736 23  0048 23  1419   Weight: (!) 137 kg (302 lb 3.2 oz) 134 kg (295 lb 10.2 oz) 134 kg (295 lb)     Weight change:     Physical Exam: Unable to perform at request of family member present                              Lab Review:   Results from last 7 days   Lab Units 23  0620 23  1755 23  1504   SODIUM mmol/L 140 138 140   POTASSIUM mmol/L 3.8 3.3* 3.7   CHLORIDE mmol/L 97* 94* 95*   TOTAL CO2 mmol/L  --   --  31*   CO2 mmol/L 33.5* 33.5*  --    BUN mg/dL 14 21 15   CREATININE mg/dL 0.52* 0.64* 0.75*   GLUCOSE mg/dL 132* 159* 126*   CALCIUM mg/dL 9.4 8.9 9.5   AST (SGOT) U/L  --  24 23   ALT (SGPT) U/L  --  34 32     Results from last 7 days   Lab Units 23  0623  1755   CK TOTAL U/L  --   --  117   HSTROP T ng/L 34* 22* 24*     Results from last 7 days   Lab Units 23  1755   WBC 10*3/mm3 4.73 6.17   HEMOGLOBIN g/dL 13.8 14.2   HEMATOCRIT % 43.2 43.2   PLATELETS 10*3/mm3 141  133*                   Invalid input(s): LDLCALC  Results from last 7 days   Lab Units 04/04/23  1755   PROBNP pg/mL 466.2     Results from last 7 days   Lab Units 04/03/23  1504   TSH uIU/mL 1.970     I reviewed the patient's new clinical results.  I personally viewed and interpreted the patient's EKG  Current Medications:   Scheduled Meds:amLODIPine, 10 mg, Oral, Daily  ammonium lactate, 1 application, Topical, Q12H  apixaban, 5 mg, Oral, Q12H  aspirin, 81 mg, Oral, Daily  budesonide-formoterol, 2 puff, Inhalation, BID - RT  chlorthalidone, 25 mg, Oral, Daily  fluticasone, 1 spray, Nasal, Daily  ipratropium-albuterol, 3 mL, Nebulization, Q6H While Awake - RT  metoprolol succinate XL, 25 mg, Oral, Daily  miconazole, , Topical, Q12H  pantoprazole, 40 mg, Oral, Q AM  sodium chloride, 10 mL, Intravenous, Q12H  terazosin, 5 mg, Oral, Daily      Continuous Infusions:     Allergies:  Allergies   Allergen Reactions   • Atorvastatin Other (See Comments)     SPASMS LEG      • Morphine And Related Unknown - Low Severity       Assessment:       Chronic respiratory failure with hypoxia    Gastroesophageal reflux disease    Morbid obesity    COPD (chronic obstructive pulmonary disease)    JENISE on CPAP    Hypoxia        Plan:      1.  Atrial fibrillation: Remains in afib with controlled rates on metoprolol.  CHADS-VASc score is 3. On Eliquis for anticoagulation.  2.  Acute on chronic hypoxic respiratory failure: Remains on 4 L O2. Pulmonology following. He does have episodes of wheezing possibly due to aspiriation. ABGs show that he is compensated.   3.  Altered mental status: neurology following. [place] to be sundowning and psychosis, possibly some vascular dementia as well.   4.  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  They are asymptomatic.  5.  Obstructive sleep apnea.  On CPAP at home. He has been refusing CPAP here.  6.  Restrictive lung disease  7.  COPD with exacerbation: on steroids. Pulmonary following.  8. Pulmonary  hypertension: RVSP 45-55 mmHg per echo. Possibly contributing to his hypoxia. Would probably benefit from a right heart catheterization, however, his mental status and ability to cooperate would need to improve to perform.      DANNIE Estes  04/07/23  08:24 EDT  Electronically signed by DANNIE Estes, 04/06/23, 8:08 AM EDT.

## 2023-04-07 NOTE — CASE MANAGEMENT/SOCIAL WORK
Continued Stay Note  [facility]     Patient Name: Harry Potts  MRN: [id number]  Today's Date: 4/7/2023    Admit Date: 4/4/2023    Plan: CCP following for needs.   IRVING Roberts RN   Discharge Plan     Row Name 04/07/23 1244       Plan    Plan CCP following for needs.   IRVING Roberts RN    Patient/Family in Agreement with Plan yes    Plan Comments Pt with a sitter today.  Awaiting medical stability to access discharge needs.   CCP following pt 's progress.   JUAN MANUEL Nuñez               Discharge Codes    No documentation.               Expected Discharge Date and Time     Expected Discharge Date Expected Discharge Time    Apr 8, 2023             Jeanne Roberts RN

## 2023-04-07 NOTE — PROGRESS NOTES
Called to the patients room. Upon entering the room, audible wheezes where heard. The patient was confused/disoriented. Daughter at bedside. Breathing tx given via blow-by, wheezes decreased. The finger probe on his forehead was replaced with appropriate head SpO2 probe. Increased O2 from 2.5L to 4L. Oxygen saturations stable between 93-95% on 4L. Patient stable upon exiting the room.

## 2023-04-07 NOTE — PROGRESS NOTES
Per Dr. Chamberlain, O2 decreased from 4L to 2L by nursing staff. Patient disoriented and will not leave his PAP mask on. Dr. Chamberlain instructs that the increased Co2 is possibly from a gradual increase in Oxygen flow. Sitter at bedside, will continue to monitor.

## 2023-04-07 NOTE — PLAN OF CARE
Goal Outcome Evaluation:  Plan of Care Reviewed With: patient      Patient was more confused this shift,difficult to reorient.Restless and agitated throughout the night.Some visual hallucinations noted.Awake overnight.[facility] notified,one time dose of Zyprexa IM given with less effect.RT unable to get Stat ABGs with patient not beig cooperative.Sitter and daughter at bedside.On 2-4.5L of oxygen,not tolerating Cpap.Will cont to monitor.

## 2023-04-07 NOTE — PROGRESS NOTES
Patient Identification:  NAME:  Harry Potts  Age:  89 y.o.   Sex:  male   :  1930   MRN:  [id number]       Chief complaint: Vascular dementia, metabolic encephalopathy, psychosis, sundowning    History of present illness: He had a bad night got very confused.  Apparently got some IM Zyprexa, 2.5 mg IM x2 total last night we are increasing the Seroquel from 25 mg at supper up to 75 mg at supper.  He has had some CO2 retention and Seroquel does not cause respiratory depression.  He definitely needs something to get him through the night such as last night.      Past medical history:  Past Medical History:   Diagnosis Date   • Arthritis    • Asthma     Oxygen at home   • Cancer     basal cell    • COPD (chronic obstructive pulmonary disease)    • Difficulty walking    • Elevated cholesterol    • Environmental allergies    • GERD (gastroesophageal reflux disease)    • HL (hearing loss)    • Hyperglycemia    • Hyperlipidemia    • Hypertension    • Obesity    • Sleep apnea    • Spondylolysis, lumbar region        Allergies:  Atorvastatin and Morphine and related    Home medications:  Medications Prior to Admission   Medication Sig Dispense Refill Last Dose   • albuterol sulfate  (90 Base) MCG/ACT inhaler Inhale 2 puffs Every 4 (Four) Hours As Needed for Wheezing. 18 g 5    • amLODIPine (NORVASC) 10 MG tablet TAKE 1 TABLET DAILY 90 tablet 3    • aspirin 81 MG EC tablet Take 1 tablet by mouth Daily.      • Azelastine HCl 137 MCG/SPRAY solution USE 1 SPRAY IN EACH NOSTRIL AS DIRECTED BY PROVIDER DAILY 30 mL 2    • celecoxib (CeleBREX) 200 MG capsule TAKE 1 CAPSULE DAILY AS NEEDED 90 capsule 3    • chlorthalidone (HYGROTON) 25 MG tablet TAKE 1 TABLET DAILY 90 tablet 3    • doxazosin (CARDURA) 4 MG tablet TAKE 1 TABLET DAILY AS DIRECTED 90 tablet 3    • esomeprazole (nexIUM) 40 MG capsule TAKE 1 CAPSULE DAILY 90 capsule 3    • ezetimibe (ZETIA) 10 MG tablet TAKE 1 TABLET DAILY 90 tablet 3    •  "fluticasone (FLONASE) 50 MCG/ACT nasal spray 1 spray into the nostril(s) as directed by provider Daily. 11.1 mL 2    • fluticasone-salmeterol (Advair Diskus) 250-50 MCG/DOSE DISKUS Inhale 1 puff 2 (Two) Times a Day. 180 each 3    • loratadine (CLARITIN) 10 MG tablet Take  by mouth Daily.      • metoprolol succinate XL (TOPROL-XL) 50 MG 24 hr tablet Take 0.5 tablets by mouth Daily. 90 tablet 3    • Multiple Vitamins-Minerals (CENTRUM ADULTS PO) Take  by mouth Daily.      • TiZANidine (ZANAFLEX) 2 MG capsule Take 1 capsule by mouth Daily As Needed for Muscle Spasms. (Patient taking differently: Take 1 capsule by mouth 3 (Three) Times a Day.) 90 capsule 3    • Omega-3 Fatty Acids (FISH OIL) 1000 MG capsule capsule Take  by mouth Daily With Breakfast.      • tiZANidine (ZANAFLEX) 2 MG tablet            Hospital medications:  amLODIPine, 10 mg, Oral, Daily  ammonium lactate, 1 application, Topical, Q12H  apixaban, 5 mg, Oral, Q12H  aspirin, 81 mg, Oral, Daily  budesonide-formoterol, 2 puff, Inhalation, BID - RT  chlorthalidone, 25 mg, Oral, Daily  fluticasone, 1 spray, Nasal, Daily  ipratropium-albuterol, 3 mL, Nebulization, Q6H While Awake - RT  metoprolol succinate XL, 25 mg, Oral, Daily  miconazole, , Topical, Q12H  pantoprazole, 40 mg, Oral, Q AM  QUEtiapine, 75 mg, Oral, Daily With Dinner  sodium chloride, 10 mL, Intravenous, Q12H  terazosin, 5 mg, Oral, Daily         •  acetaminophen **OR** acetaminophen **OR** acetaminophen  •  albuterol  •  calcium carbonate  •  ipratropium-albuterol  •  nitroglycerin  •  ondansetron **OR** ondansetron  •  sodium chloride  •  sodium chloride  •  sodium chloride      Objective:  Vitals Ranges:   Temp:  [98.2 °F (36.8 °C)-98.5 °F (36.9 °C)] 98.5 °F (36.9 °C)  Heart Rate:  [63-91] 84  Resp:  [20-24] 24  BP: (116-154)/(67-92) 116/67      Physical Exam:  Patient is lethargic but reaches out.  Shakes my hand \"I am doing okay.  See you later Francheska\".  He carries on kind of is sleepy type " of affect.  Normal cranial nerves II through VII tongue midline good  strength.  Reflexes trace toes downgoing    Results review:   I reviewed the patient's new clinical results.    Data review:  Lab Results (last 24 hours)     Procedure Component Value Units Date/Time    Uric Acid [[id number]]  (Normal) Collected: 04/07/23 0708    Specimen: Blood Updated: 04/07/23 0906     Uric Acid 5.7 mg/dL     Basic Metabolic Panel [[id number]]  (Abnormal) Collected: 04/07/23 0708    Specimen: Blood Updated: 04/07/23 0848     Glucose 153 mg/dL      BUN 23 mg/dL      Creatinine 0.54 mg/dL      Sodium 143 mmol/L      Potassium 3.4 mmol/L      Chloride 95 mmol/L      CO2 39.1 mmol/L      Calcium 9.6 mg/dL      BUN/Creatinine Ratio 42.6     Anion Gap 8.9 mmol/L      eGFR 93.5 mL/min/1.73     Narrative:      GFR Normal >60  Chronic Kidney Disease <60  Kidney Failure <15    The GFR formula is only valid for adults with stable renal function between ages 18 and 70.    Folate [[id number]]  (Normal) Collected: 04/07/23 0709    Specimen: Blood Updated: 04/07/23 0830     Folate >20.00 ng/mL     Narrative:      Results may be falsely increased if patient taking Biotin.      Vitamin B12 [[id number]]  (Normal) Collected: 04/07/23 0709    Specimen: Blood Updated: 04/07/23 0830     Vitamin B-12 861 pg/mL     Narrative:      Results may be falsely increased if patient taking Biotin.             Imaging:  Imaging Results (Last 24 Hours)     ** No results found for the last 24 hours. **      PPE worn at all times washed before washed up afterwards disposed of everything properly is not within 6 feet of them for more than few minutes during my exam no aerosols used at any time      Assessment and Plan:     Not unexpectedly he became combative last night with sundowning and psychosis.  We are increasing Seroquel from 25 mg at supper up to 75 mg of supper.  He is currently not on Zyprexa and we will see how he does with the Seroquel at the higher  dose.  I have written again for some IM Zyprexa but only on a as needed basis for severe agitation.  The benefits of this greatly outweigh any risks  Again we are dealing with a 89-year-old man with some degree of vascular dementia who has significant metabolic encephalopathy superimposed with elements of psychosis and sundowning.  Note Seroquel is a great medicine for him since it.  Will decrease agitation and anxiety and should not cause respiratory suppression and we are avoiding benzodiazepines      Jae Becker MD  04/07/23  13:25 EDT

## 2023-04-07 NOTE — PROGRESS NOTES
Called regarding ABG  pH is 7.36 PCO2 90 PO2 of 67 on 4 L O2.  Patient is compensated and that is consistent with a serum bicarb of 39 certainly this CO2 retention is not acute I would probably decrease his oxygen a little bit again we want to keep his saturations 88 to 92% because he is almost certainly O2 driven on his respiratory drive.  The unfortunate problem is patient is refusing to use a Pap machine they have tried multiple times per discussion with nursing he keeps ripping the mask off.  Of course we cannot restrain him and sedation runs the risk of worsening his respiratory status.  His confusion should not be caused by the CO2 because his pH is compensated and its the acidosis that causes CNS confusion.  I have to agree with Dr. Becker I think the primary treatment is treating his psychosis with antipsychotics tough situation and its always tougher when there elderly and have some underlying cognitive deficits.    I have been up with the patient and his daughters they had a BiPAP machine of his in the car his CPAP is 10 cm a BiPAP of 5/10 they also have his nasal interface and we put that on and it does take about 7 or 8 L O2 to maintain his saturations on the PAP machine but he was tolerating it very well I am hopeful that with the antipsychotics and using his PAP machine things will improve.  I did talk with the family think they will understand that intubation would be a last resort and is very likely to have a poor outcome if we were to have to take that step.

## 2023-04-07 NOTE — SIGNIFICANT NOTE
04/07/23 1534   OTHER   Discipline physical therapist   Rehab Time/Intention   Session Not Performed unable to treat, medical status change;other (see comments)  (Per nurse request and MD order, hold PT today so pt can rest as much as possible. f/u 4/8 if appropriate)   Recommendation   PT - Next Appointment 04/08/23

## 2023-04-07 NOTE — PROGRESS NOTES
ABG completed per Dr. Osei orders. Patient has a Co2 of 90. RN Soledad at bedside/assisted in ABG. Dr. Chamberlain notified, awaiting orders.

## 2023-04-07 NOTE — PROGRESS NOTES
"    DAILY PROGRESS NOTE  [facility]    Patient Identification:  Name: Harry Potts  Age: 89 y.o.  Sex: male  :  1930  MRN: [id number]         Primary Care Physician: Harry Luis MD    Subjective:  Interval History: History and ROS not obtainable at this time secondary to current sedation and resting and he basically slept through my exam but I want him to sleep as much as possible with minimal interference or agitation and I discussed this with managing RN    Objective: Case discussed in multidisciplinary rounds but also with daughter who is the pharmacists    Scheduled Meds:amLODIPine, 10 mg, Oral, Daily  ammonium lactate, 1 application, Topical, Q12H  apixaban, 5 mg, Oral, Q12H  aspirin, 81 mg, Oral, Daily  budesonide-formoterol, 2 puff, Inhalation, BID - RT  chlorthalidone, 25 mg, Oral, Daily  fluticasone, 1 spray, Nasal, Daily  ipratropium-albuterol, 3 mL, Nebulization, Q6H While Awake - RT  metoprolol succinate XL, 25 mg, Oral, Daily  miconazole, , Topical, Q12H  pantoprazole, 40 mg, Oral, Q AM  QUEtiapine, 75 mg, Oral, Daily With Dinner  sodium chloride, 10 mL, Intravenous, Q12H  terazosin, 5 mg, Oral, Daily      Continuous Infusions:     Vital signs in last 24 hours:  Temp:  [98.2 °F (36.8 °C)-98.5 °F (36.9 °C)] 98.5 °F (36.9 °C)  Heart Rate:  [63-91] 84  Resp:  [20-24] 24  BP: (116-154)/(67-93) 116/67    Intake/Output:    Intake/Output Summary (Last 24 hours) at 2023 1038  Last data filed at 2023 0435  Gross per 24 hour   Intake 700 ml   Output 100 ml   Net 600 ml       Exam:  /67 (BP Location: Left arm, Patient Position: Lying)   Pulse 84   Temp 98.5 °F (36.9 °C) (Oral)   Resp 24   Ht 172.7 cm (68\")   Wt 134 kg (295 lb)   SpO2 91%   BMI 44.85 kg/m²     General Appearance:    Currently resting comfortably                         Throat:   Oral mucosa pink and moist                           Neck:   No JVD                         Lungs:    Decreased bases " otherwise clear to auscultation bilaterally, respirations unlabored                 Chest Wall:    No tenderness or deformity                          Heart:  Irregular rate and rhythm, S1 and S2 normal                  Abdomen:     Soft, nontender, bowel sounds active, MO with BMI 44+                 Extremities: Moving all, no cyanosis or edema                        Pulses:   Pulses palpable in all extremities                            Skin:   Skin is warm and dry                  Neurologic: Unable to fully evaluate at this time though no focal deficits have been reported    Data Review:  Labs in chart were reviewed.    Assessment:  Active Hospital Problems    Diagnosis  POA   • **Chronic respiratory failure with hypoxia [J96.11]  Unknown   • Hypoxia [R09.02]  Yes   • JENISE on CPAP [G47.33, Z99.89]  Not Applicable   • COPD (chronic obstructive pulmonary disease) [J44.9]  Yes   • Morbid obesity [E66.01]  Yes   • Gastroesophageal reflux disease [K21.9]  Yes      Resolved Hospital Problems   No resolved problems to display.       Plan:    New onset A-fib -cardiology managing rate   -Normally on chlorthalidone and pulsed with IV Lasix yesterday   -EF normal with indeterminate diastolic dysfunction   -AC initiated with Eliquis   -New onset A-fib etiology likely secondary to noncompliance with CPAP    Metabolic encephalopathy and I favor vascular dementia made worse by hospitalization   -Neurological input noted and does not feel MRI is indicated.  B12 folate are normal   -Pulmonary following and does not feel that CO2 retention is contributory to mentation.  Positive COPD with Solu-Medrol DC the other day   -Patient had horrible combative behavior overnight.  I empirically tried to stop it by starting Seroquel at 25 mg with supper but tonight I would increase that to 75 mg.  He required a couple additional doses of IM Zyprexa and now he is resting comfortably but unfortunately he is not wearing the BiPAP.  I suggest  we let this gentleman rest is much as we can at this time    Morbid obesity complicating feature and the other is compliance with CPAP    Overall long-term prognosis guarded for this 89-year-old      Addendum -Case discussed in multidisciplinary rounds.  Given the fact that this patient is consistently sleeping without use of BiPAP and now with numerous sedating meds from Seroquel to Zyprexa, his baseline CO2 retention seems to hover around the level of 50-55 and I will repeat this one last time to see if there has been any further changes in that CO2 retention.  Even if there is retention, this patient is not tolerating and is not compliant with his BiPAP so unsure how we would actually be able to manage this.  Discussed with RN as well as with respiratory and appreciate the assistance    Tano Osei MD  4/7/2023  10:38 EDT

## 2023-04-08 ENCOUNTER — APPOINTMENT (OUTPATIENT)
Dept: GENERAL RADIOLOGY | Facility: HOSPITAL | Age: 88
DRG: 189 | End: 2023-04-08
Payer: MEDICARE

## 2023-04-08 LAB
ALBUMIN SERPL-MCNC: 3.9 G/DL (ref 3.5–5.2)
ALBUMIN/GLOB SERPL: 1.3 G/DL
ALP SERPL-CCNC: 66 U/L (ref 39–117)
ALT SERPL W P-5'-P-CCNC: 40 U/L (ref 1–41)
ANION GAP SERPL CALCULATED.3IONS-SCNC: 2 MMOL/L (ref 5–15)
ARTERIAL PATENCY WRIST A: ABNORMAL
ARTERIAL PATENCY WRIST A: POSITIVE
AST SERPL-CCNC: 19 U/L (ref 1–40)
ATMOSPHERIC PRESS: 754.2 MMHG
ATMOSPHERIC PRESS: 756.3 MMHG
BASE EXCESS BLDA CALC-SCNC: 15.1 MMOL/L (ref 0–2)
BASE EXCESS BLDA CALC-SCNC: 17.4 MMOL/L (ref 0–2)
BDY SITE: ABNORMAL
BDY SITE: ABNORMAL
BILIRUB SERPL-MCNC: 0.9 MG/DL (ref 0–1.2)
BUN SERPL-MCNC: 24 MG/DL (ref 8–23)
BUN/CREAT SERPL: 35.3 (ref 7–25)
CALCIUM SPEC-SCNC: 9.5 MG/DL (ref 8.2–9.6)
CHLORIDE SERPL-SCNC: 94 MMOL/L (ref 98–107)
CO2 SERPL-SCNC: 47 MMOL/L (ref 22–29)
CREAT SERPL-MCNC: 0.68 MG/DL (ref 0.76–1.27)
DEPRECATED RDW RBC AUTO: 41.5 FL (ref 37–54)
EGFRCR SERPLBLD CKD-EPI 2021: 87.2 ML/MIN/1.73
ERYTHROCYTE [DISTWIDTH] IN BLOOD BY AUTOMATED COUNT: 12.2 % (ref 12.3–15.4)
GAS FLOW AIRWAY: 4 LPM
GLOBULIN UR ELPH-MCNC: 2.9 GM/DL
GLUCOSE BLDC GLUCOMTR-MCNC: 126 MG/DL (ref 70–130)
GLUCOSE BLDC GLUCOMTR-MCNC: 153 MG/DL (ref 70–130)
GLUCOSE SERPL-MCNC: 164 MG/DL (ref 65–99)
HBV SURFACE AB SER RIA-ACNC: NORMAL
HBV SURFACE AG SERPL QL IA: NORMAL
HCO3 BLDA-SCNC: 44.8 MMOL/L (ref 22–28)
HCO3 BLDA-SCNC: 50.6 MMOL/L (ref 22–28)
HCT VFR BLD AUTO: 46.5 % (ref 37.5–51)
HCV AB SER DONR QL: NORMAL
HGB BLD-MCNC: 14.3 G/DL (ref 13–17.7)
HIV1+2 AB SER QL: NORMAL
INHALED O2 CONCENTRATION: 60 %
MCH RBC QN AUTO: 28.4 PG (ref 26.6–33)
MCHC RBC AUTO-ENTMCNC: 30.8 G/DL (ref 31.5–35.7)
MCV RBC AUTO: 92.3 FL (ref 79–97)
MODALITY: ABNORMAL
MODALITY: ABNORMAL
NT-PROBNP SERPL-MCNC: 776 PG/ML (ref 0–1800)
O2 A-A PPRESDIFF RESPIRATORY: 0.2 MMHG
PCO2 BLDA: 76.3 MM HG (ref 35–45)
PCO2 BLDA: 97.7 MM HG (ref 35–45)
PH BLDA: 7.32 PH UNITS (ref 7.35–7.45)
PH BLDA: 7.38 PH UNITS (ref 7.35–7.45)
PLATELET # BLD AUTO: 122 10*3/MM3 (ref 140–450)
PMV BLD AUTO: 10.1 FL (ref 6–12)
PO2 BLDA: 50 MM HG (ref 80–100)
PO2 BLDA: 77.8 MM HG (ref 80–100)
POTASSIUM SERPL-SCNC: 3.7 MMOL/L (ref 3.5–5.2)
PROT SERPL-MCNC: 6.8 G/DL (ref 6–8.5)
RBC # BLD AUTO: 5.04 10*6/MM3 (ref 4.14–5.8)
SAO2 % BLDCOA: 78 % (ref 92–99)
SAO2 % BLDCOA: 94.1 % (ref 92–99)
SET MECH RESP RATE: 18
SODIUM SERPL-SCNC: 143 MMOL/L (ref 136–145)
TOTAL RATE: 16 BREATHS/MINUTE
TOTAL RATE: 30 BREATHS/MINUTE
VT ON VENT VENT: 639 ML
WBC NRBC COR # BLD: 6.85 10*3/MM3 (ref 3.4–10.8)

## 2023-04-08 PROCEDURE — 82962 GLUCOSE BLOOD TEST: CPT

## 2023-04-08 PROCEDURE — 99232 SBSQ HOSP IP/OBS MODERATE 35: CPT | Performed by: PSYCHIATRY & NEUROLOGY

## 2023-04-08 PROCEDURE — 94799 UNLISTED PULMONARY SVC/PX: CPT

## 2023-04-08 PROCEDURE — 86706 HEP B SURFACE ANTIBODY: CPT | Performed by: HOSPITALIST

## 2023-04-08 PROCEDURE — 94760 N-INVAS EAR/PLS OXIMETRY 1: CPT

## 2023-04-08 PROCEDURE — 80053 COMPREHEN METABOLIC PANEL: CPT | Performed by: HOSPITALIST

## 2023-04-08 PROCEDURE — 99232 SBSQ HOSP IP/OBS MODERATE 35: CPT | Performed by: INTERNAL MEDICINE

## 2023-04-08 PROCEDURE — 36600 WITHDRAWAL OF ARTERIAL BLOOD: CPT

## 2023-04-08 PROCEDURE — 83880 ASSAY OF NATRIURETIC PEPTIDE: CPT | Performed by: INTERNAL MEDICINE

## 2023-04-08 PROCEDURE — 87340 HEPATITIS B SURFACE AG IA: CPT | Performed by: HOSPITALIST

## 2023-04-08 PROCEDURE — 82803 BLOOD GASES ANY COMBINATION: CPT

## 2023-04-08 PROCEDURE — 86803 HEPATITIS C AB TEST: CPT | Performed by: HOSPITALIST

## 2023-04-08 PROCEDURE — 94664 DEMO&/EVAL PT USE INHALER: CPT

## 2023-04-08 PROCEDURE — 85027 COMPLETE CBC AUTOMATED: CPT | Performed by: HOSPITALIST

## 2023-04-08 PROCEDURE — 94761 N-INVAS EAR/PLS OXIMETRY MLT: CPT

## 2023-04-08 PROCEDURE — 94660 CPAP INITIATION&MGMT: CPT

## 2023-04-08 PROCEDURE — G0432 EIA HIV-1/HIV-2 SCREEN: HCPCS | Performed by: HOSPITALIST

## 2023-04-08 PROCEDURE — 25010000002 ENOXAPARIN PER 10 MG: Performed by: INTERNAL MEDICINE

## 2023-04-08 PROCEDURE — 71045 X-RAY EXAM CHEST 1 VIEW: CPT

## 2023-04-08 RX ORDER — SODIUM CHLORIDE 0.9 % (FLUSH) 0.9 %
10 SYRINGE (ML) INJECTION AS NEEDED
Status: DISCONTINUED | OUTPATIENT
Start: 2023-04-08 | End: 2023-04-24 | Stop reason: HOSPADM

## 2023-04-08 RX ORDER — DEXMEDETOMIDINE HYDROCHLORIDE 4 UG/ML
.2-1.5 INJECTION, SOLUTION INTRAVENOUS
Status: DISCONTINUED | OUTPATIENT
Start: 2023-04-08 | End: 2023-04-10

## 2023-04-08 RX ORDER — OLANZAPINE 10 MG/1
5 INJECTION, POWDER, LYOPHILIZED, FOR SOLUTION INTRAMUSCULAR EVERY 4 HOURS PRN
Status: DISCONTINUED | OUTPATIENT
Start: 2023-04-08 | End: 2023-04-24 | Stop reason: HOSPADM

## 2023-04-08 RX ORDER — AMOXICILLIN 250 MG
2 CAPSULE ORAL 2 TIMES DAILY
Status: DISCONTINUED | OUTPATIENT
Start: 2023-04-08 | End: 2023-04-24 | Stop reason: HOSPADM

## 2023-04-08 RX ORDER — SODIUM CHLORIDE 9 MG/ML
40 INJECTION, SOLUTION INTRAVENOUS AS NEEDED
Status: DISCONTINUED | OUTPATIENT
Start: 2023-04-08 | End: 2023-04-24 | Stop reason: HOSPADM

## 2023-04-08 RX ORDER — POTASSIUM CHLORIDE 750 MG/1
40 TABLET, FILM COATED, EXTENDED RELEASE ORAL ONCE
Status: DISCONTINUED | OUTPATIENT
Start: 2023-04-08 | End: 2023-04-24 | Stop reason: HOSPADM

## 2023-04-08 RX ORDER — HYDRALAZINE HYDROCHLORIDE 20 MG/ML
10 INJECTION INTRAMUSCULAR; INTRAVENOUS EVERY 4 HOURS PRN
Status: DISCONTINUED | OUTPATIENT
Start: 2023-04-08 | End: 2023-04-24 | Stop reason: HOSPADM

## 2023-04-08 RX ORDER — BISACODYL 5 MG/1
5 TABLET, DELAYED RELEASE ORAL DAILY PRN
Status: DISCONTINUED | OUTPATIENT
Start: 2023-04-08 | End: 2023-04-24 | Stop reason: HOSPADM

## 2023-04-08 RX ORDER — BISACODYL 10 MG
10 SUPPOSITORY, RECTAL RECTAL DAILY PRN
Status: DISCONTINUED | OUTPATIENT
Start: 2023-04-08 | End: 2023-04-24 | Stop reason: HOSPADM

## 2023-04-08 RX ORDER — ENOXAPARIN SODIUM 150 MG/ML
1 INJECTION SUBCUTANEOUS EVERY 12 HOURS
Status: DISCONTINUED | OUTPATIENT
Start: 2023-04-08 | End: 2023-04-14

## 2023-04-08 RX ORDER — POLYETHYLENE GLYCOL 3350 17 G/17G
17 POWDER, FOR SOLUTION ORAL DAILY PRN
Status: DISCONTINUED | OUTPATIENT
Start: 2023-04-08 | End: 2023-04-24 | Stop reason: HOSPADM

## 2023-04-08 RX ORDER — SODIUM CHLORIDE 0.9 % (FLUSH) 0.9 %
10 SYRINGE (ML) INJECTION EVERY 12 HOURS SCHEDULED
Status: DISCONTINUED | OUTPATIENT
Start: 2023-04-08 | End: 2023-04-24 | Stop reason: HOSPADM

## 2023-04-08 RX ADMIN — IPRATROPIUM BROMIDE AND ALBUTEROL SULFATE 3 ML: 2.5; .5 SOLUTION RESPIRATORY (INHALATION) at 10:47

## 2023-04-08 RX ADMIN — Medication 10 ML: at 22:30

## 2023-04-08 RX ADMIN — IPRATROPIUM BROMIDE AND ALBUTEROL SULFATE 3 ML: 2.5; .5 SOLUTION RESPIRATORY (INHALATION) at 19:55

## 2023-04-08 RX ADMIN — AMMONIUM LACTATE 1 APPLICATION: 120 CREAM TOPICAL at 09:24

## 2023-04-08 RX ADMIN — ENOXAPARIN SODIUM 135 MG: 150 INJECTION SUBCUTANEOUS at 17:58

## 2023-04-08 RX ADMIN — AMMONIUM LACTATE 1 APPLICATION: 120 CREAM TOPICAL at 18:03

## 2023-04-08 RX ADMIN — IPRATROPIUM BROMIDE AND ALBUTEROL SULFATE 3 ML: 2.5; .5 SOLUTION RESPIRATORY (INHALATION) at 07:19

## 2023-04-08 RX ADMIN — DEXMEDETOMIDINE HYDROCHLORIDE 0.2 MCG/KG/HR: 4 INJECTION, SOLUTION INTRAVENOUS at 17:06

## 2023-04-08 RX ADMIN — Medication 10 ML: at 09:02

## 2023-04-08 RX ADMIN — Medication 10 ML: at 09:24

## 2023-04-08 RX ADMIN — DEXMEDETOMIDINE HYDROCHLORIDE 0.4 MCG/KG/HR: 4 INJECTION, SOLUTION INTRAVENOUS at 21:54

## 2023-04-08 RX ADMIN — MICONAZOLE NITRATE: 2 POWDER TOPICAL at 09:23

## 2023-04-08 RX ADMIN — MICONAZOLE NITRATE: 2 POWDER TOPICAL at 23:11

## 2023-04-08 NOTE — PROGRESS NOTES
"[facility] Clinical Pharmacy Services: Enoxaparin Consult    Harry Potts has a pharmacy consult to dose full-dose enoxaparin per Itz Lyle's request.     Indication: A Fib - requiring full anticoagulation  Home Anticoagulation: none     Relevant clinical data and objective history reviewed:  89 y.o. male 172.7 cm (68\") 134 kg (295 lb)   Body mass index is 44.85 kg/m².   Results from last 7 days   Lab Units 04/08/23  0714   PLATELETS 10*3/mm3 122*     Estimated Creatinine Clearance: 92.7 mL/min (A) (by C-G formula based on SCr of 0.68 mg/dL (L)).    Assessment/Plan    Will start patient on  130mg (1mg/kg) subcutaneous every 12 hours, adjusted for renal function. Consult order will be discontinued but pharmacy will continue to follow.     Guanaco Epstein PharmD  Clinical Pharmacist    "

## 2023-04-08 NOTE — PROGRESS NOTES
Patient Identification:  NAME:  Harry Potts  Age:  89 y.o.   Sex:  male   :  1930   MRN:  [id number]       Chief complaint: Metabolic encephalopathy, dementia, sundowning, psychosis  History of present illness: He had a better night without sundowning but today is pretty groggy and is on the facial CPAP.  He does not have a tube so he is going to need either IM or IV medications      Past medical history:  Past Medical History:   Diagnosis Date   • Arthritis    • Asthma     Oxygen at home   • Cancer     basal cell    • COPD (chronic obstructive pulmonary disease)    • Difficulty walking    • Elevated cholesterol    • Environmental allergies    • GERD (gastroesophageal reflux disease)    • HL (hearing loss)    • Hyperglycemia    • Hyperlipidemia    • Hypertension    • Obesity    • Sleep apnea    • Spondylolysis, lumbar region        Allergies:  Atorvastatin and Morphine and related    Home medications:  Medications Prior to Admission   Medication Sig Dispense Refill Last Dose   • albuterol sulfate  (90 Base) MCG/ACT inhaler Inhale 2 puffs Every 4 (Four) Hours As Needed for Wheezing. 18 g 5    • amLODIPine (NORVASC) 10 MG tablet TAKE 1 TABLET DAILY 90 tablet 3    • aspirin 81 MG EC tablet Take 1 tablet by mouth Daily.      • Azelastine HCl 137 MCG/SPRAY solution USE 1 SPRAY IN EACH NOSTRIL AS DIRECTED BY PROVIDER DAILY 30 mL 2    • celecoxib (CeleBREX) 200 MG capsule TAKE 1 CAPSULE DAILY AS NEEDED 90 capsule 3    • chlorthalidone (HYGROTON) 25 MG tablet TAKE 1 TABLET DAILY 90 tablet 3    • doxazosin (CARDURA) 4 MG tablet TAKE 1 TABLET DAILY AS DIRECTED 90 tablet 3    • esomeprazole (nexIUM) 40 MG capsule TAKE 1 CAPSULE DAILY 90 capsule 3    • ezetimibe (ZETIA) 10 MG tablet TAKE 1 TABLET DAILY 90 tablet 3    • fluticasone (FLONASE) 50 MCG/ACT nasal spray 1 spray into the nostril(s) as directed by provider Daily. 11.1 mL 2    • fluticasone-salmeterol (Advair Diskus) 250-50 MCG/DOSE DISKUS  Inhale 1 puff 2 (Two) Times a Day. 180 each 3    • loratadine (CLARITIN) 10 MG tablet Take  by mouth Daily.      • metoprolol succinate XL (TOPROL-XL) 50 MG 24 hr tablet Take 0.5 tablets by mouth Daily. 90 tablet 3    • Multiple Vitamins-Minerals (CENTRUM ADULTS PO) Take  by mouth Daily.      • TiZANidine (ZANAFLEX) 2 MG capsule Take 1 capsule by mouth Daily As Needed for Muscle Spasms. (Patient taking differently: Take 1 capsule by mouth 3 (Three) Times a Day.) 90 capsule 3    • Omega-3 Fatty Acids (FISH OIL) 1000 MG capsule capsule Take  by mouth Daily With Breakfast.      • tiZANidine (ZANAFLEX) 2 MG tablet            Hospital medications:  amLODIPine, 10 mg, Oral, Daily  ammonium lactate, 1 application, Topical, Q12H  apixaban, 5 mg, Oral, Q12H  aspirin, 81 mg, Oral, Daily  budesonide-formoterol, 2 puff, Inhalation, BID - RT  chlorthalidone, 25 mg, Oral, Daily  fluticasone, 1 spray, Nasal, Daily  ipratropium-albuterol, 3 mL, Nebulization, Q6H While Awake - RT  metoprolol succinate XL, 25 mg, Oral, Daily  miconazole, , Topical, Q12H  pantoprazole, 40 mg, Oral, Q AM  potassium chloride, 40 mEq, Oral, Once  senna-docusate sodium, 2 tablet, Oral, BID  sodium chloride, 10 mL, Intravenous, Q12H  sodium chloride, 10 mL, Intravenous, Q12H  terazosin, 5 mg, Oral, Daily      dexmedetomidine, 0.2-1.5 mcg/kg/hr, Last Rate: Stopped (04/08/23 0919)      •  acetaminophen **OR** acetaminophen **OR** acetaminophen  •  albuterol  •  senna-docusate sodium **AND** polyethylene glycol **AND** bisacodyl **AND** bisacodyl  •  calcium carbonate  •  ipratropium-albuterol  •  nitroglycerin  •  OLANZapine  •  ondansetron **OR** ondansetron  •  sodium chloride  •  sodium chloride  •  sodium chloride  •  sodium chloride  •  sodium chloride      Objective:  Vitals Ranges:   Temp:  [98.2 °F (36.8 °C)-99.1 °F (37.3 °C)] 99.1 °F (37.3 °C)  Heart Rate:  [47-86] 60  Resp:  [20-28] 20  BP: (132-158)/(65-92) 132/78      Physical Exam:  Patient is  very groggy has a facial CPAP mask on.  Not following commands.  Equal tone in all 4 extremities reflexes trace toes downgoing    Results review:   I reviewed the patient's new clinical results.    Data review:  Lab Results (last 24 hours)     Procedure Component Value Units Date/Time    POC Glucose Once [[id number]]  (Normal) Collected: 04/08/23 1114    Specimen: Blood Updated: 04/08/23 1115     Glucose 126 mg/dL      Comment: Meter: [id number] : [id number] Rasheed BAIRES       Blood Gas, Arterial - [[id number]]  (Abnormal) Collected: 04/08/23 1020    Specimen: Arterial Blood Updated: 04/08/23 1025     Site Arterial: left brachial     Wyatt's Test N/A     pH, Arterial 7.376 pH units      pCO2, Arterial 76.3 mm Hg      Comment: bipap avaps 15/30 epap 10. O2 sat 92 Meter: [id number] : [id number] TravCritical:Notify Dr dr quiros (08-Apr-23 10:24:01)Read back ok        pO2, Arterial 77.8 mm Hg      HCO3, Arterial 44.8 mmol/L      Base Excess, Arterial 15.1 mmol/L      O2 Saturation Calculated 94.1 %      A-a DO2 0.2 mmHg      Barometric Pressure for Blood Gas 756.3 mmHg      Modality BiPap     FIO2 60 %      Set Tidal Volume 639     Set Mech Resp Rate 18     Rate 16 Breaths/minute     BNP [[id number]]  (Normal) Collected: 04/08/23 0714    Specimen: Blood Updated: 04/08/23 0920     proBNP 776.0 pg/mL     Narrative:      Among patients with dyspnea, NT-proBNP is highly sensitive for the detection of acute congestive heart failure. In addition NT-proBNP of <300 pg/ml effectively rules out acute congestive heart failure with 99% negative predictive value.    Results may be falsely decreased if patient taking Biotin.      HIV-1 / O / 2 Ag / Antibody 4th Generation [[id number]]  (Normal) Collected: 04/08/23 0714    Specimen: Blood Updated: 04/08/23 0841     HIV-1/ HIV-2 Non-Reactive     Comment: A non-reactive test result does not preclude the possibility of exposure to HIV or infection with HIV. An antibody  response to recent exposure may take several months to reach detectable levels.       Narrative:      The HIV antibody/antigen combo assay is a qualitative assay for HIV that includes the p24 antigen as well as antibodies to HIV types 1 and 2. This test is intended to be used as a screening assay in the diagnosis of HIV infection in patients over the age of 2.  Results may be falsely decreased if patient taking Biotin.      Hepatitis B Surface Antigen [[id number]]  (Normal) Collected: 04/08/23 0714    Specimen: Blood Updated: 04/08/23 0807     Hepatitis B Surface Ag Non-Reactive    Hepatitis C Antibody [[id number]]  (Normal) Collected: 04/08/23 0714    Specimen: Blood Updated: 04/08/23 0807     Hepatitis C Ab Non-Reactive    Narrative:      Results may be falsely decreased if patient taking Biotin.      Hepatitis B Surface Antibody [[id number]]  (Normal) Collected: 04/08/23 0714    Specimen: Blood Updated: 04/08/23 0807     Hep B S Ab Non-Reactive    Narrative:      Non-Reactive - Individual is considered to be not immune to infection with HBV.    Equivocal - Unable to determine if anti-HBs is present at levels consistent with immunity. The individual should be further assessed by associated risk factors and the use of additional diagnositc information, or another sample may be collected and tested.    Reactive - Anti-HBs concentration detected at >10 mIU/mL. Individual is considered to be immune to infection with HBV.      Results may be falsely decreased if patient taking Biotin.      Blood Gas, Arterial - [[id number]]  (Abnormal) Collected: 04/08/23 0757    Specimen: Arterial Blood Updated: 04/08/23 0801     Site Arterial: right brachial     Wyatt's Test Positive     pH, Arterial 7.322 pH units      pCO2, Arterial 97.7 mm Hg      Comment: Meter: [id number] : [id number] Reuben Rosadoitical:Jude Babin RN (08-Apr-23 07:59:39)Read back ok        pO2, Arterial 50.0 mm Hg      Comment:  Critical:Notify RN Kailey Babin RN (08-Apr-23 07:59:39)Read back ok        HCO3, Arterial 50.6 mmol/L      Base Excess, Arterial 17.4 mmol/L      O2 Saturation Calculated 78.0 %      Barometric Pressure for Blood Gas 754.2 mmHg      Modality Cannula     Flow Rate 4 lpm      Rate 30 Breaths/minute     Comprehensive Metabolic Panel [[id number]]  (Abnormal) Collected: 04/08/23 0714    Specimen: Blood Updated: 04/08/23 0758     Glucose 164 mg/dL      BUN 24 mg/dL      Creatinine 0.68 mg/dL      Sodium 143 mmol/L      Potassium 3.7 mmol/L      Chloride 94 mmol/L      CO2 47.0 mmol/L      Calcium 9.5 mg/dL      Total Protein 6.8 g/dL      Albumin 3.9 g/dL      ALT (SGPT) 40 U/L      AST (SGOT) 19 U/L      Alkaline Phosphatase 66 U/L      Total Bilirubin 0.9 mg/dL      Globulin 2.9 gm/dL      A/G Ratio 1.3 g/dL      BUN/Creatinine Ratio 35.3     Anion Gap 2.0 mmol/L      eGFR 87.2 mL/min/1.73     Narrative:      GFR Normal >60  Chronic Kidney Disease <60  Kidney Failure <15    The GFR formula is only valid for adults with stable renal function between ages 18 and 70.    CBC (No Diff) [[id number]]  (Abnormal) Collected: 04/08/23 0714    Specimen: Blood Updated: 04/08/23 0742     WBC 6.85 10*3/mm3      RBC 5.04 10*6/mm3      Hemoglobin 14.3 g/dL      Hematocrit 46.5 %      MCV 92.3 fL      MCH 28.4 pg      MCHC 30.8 g/dL      RDW 12.2 %      RDW-SD 41.5 fl      MPV 10.1 fL      Platelets 122 10*3/mm3     POC Glucose Once [[id number]]  (Abnormal) Collected: 04/08/23 0644    Specimen: Blood Updated: 04/08/23 0646     Glucose 153 mg/dL      Comment: Meter: [id number] : [id number] Tali LIMA       Blood Gas, Arterial - [[id number]]  (Abnormal) Collected: 04/07/23 1549    Specimen: Arterial Blood Updated: 04/07/23 9453     Site Arterial: left brachial     Wyatt's Test Positive     pH, Arterial 7.356 pH units      pCO2, Arterial 90.0 mm Hg      Comment: Meter: [id number] : [id number] Marisel  JoshuaCritical:Notify RN Soledad Zhu RN (07-Apr-23 15:51:32)Read back ok        pO2, Arterial 67.4 mm Hg      HCO3, Arterial 50.4 mmol/L      Base Excess, Arterial 18.6 mmol/L      O2 Saturation Calculated 90.4 %      Barometric Pressure for Blood Gas 757.2 mmHg      Modality Cannula     Flow Rate 4 lpm      Rate 30 Breaths/minute            Imaging:  Imaging Results (Last 24 Hours)     Procedure Component Value Units Date/Time    XR Chest 1 View [[id number]] Collected: 04/08/23 0818     Updated: 04/08/23 0822    Narrative:      XR CHEST 1 VW-     HISTORY: Male who is 89 years-old,  dyspnea     TECHNIQUE: Frontal view of the chest     COMPARISON: 4/4/2023     FINDINGS: The heart is enlarged. Aorta is tortuous. Pulmonary  vasculature is congested. Small likely atelectasis in the lower lungs.  No pleural effusion, or pneumothorax is seen, apices are partly obscured  by the chin. No acute osseous process.       Impression:      Small likely atelectasis in the lower lungs. Cardiomegaly  with pulmonary vascular congestion. Tortuous aorta.     This report was finalized on 4/8/2023 8:19 AM by Dr. Leander Kay M.D.            PPE worn at all times washed before washed up afterwards disposed of everything properly is not within 6 feet of them for more than few minutes during my exam no aerosols used     Assessment and Plan:     Patient apparently had a better night but today is pretty drowsy and has the facial CPAP mask on when he is not following commands.  He is very groggy today but still with a nonlateralized exam.  He does not have an NG tube or wait to take p.o. medicines at this point and I am going to discontinue Seroquel but have written for as needed Zyprexa if he needs it at night.  His daughter is here who noted that she was here when he got very very upset and sundown and she does not want that to happen again      Jae Becker MD  04/08/23  13:07 EDT

## 2023-04-08 NOTE — NURSING NOTE
Gave report to RN Ana Luisa, explained that Dr Osei preferred that we do soft restraints vs zyprexa in pm as staff notes indicated.

## 2023-04-08 NOTE — PROGRESS NOTES
Patient was moved to the ICU just prior to being arriving to the floor    Dr. Chamberlain and I discussed case yesterday -thoroughly appreciate ICU assistance and given noncompliance with PAP therapy, agree with Precedex trial and more aggressive noninvasive ventilation    LHA will follow a bit more peripherally while in the unit

## 2023-04-08 NOTE — PLAN OF CARE
Goal Outcome Evaluation:  PT would not allow bipap or cpap to be placed most of shift, very lethargic and unable to follow commands, PC02 remained high and then a redraw revealed pt was crit high at 90, MDs aware, Dr Chamberlain and RN Manjinder and daughter managed to convince pt to wear home bipap, had it on for about 3 1/2 hours til dinner was served, ph was compensated, Dr Osei asked that we tell on coming RN that he wants us to do soft restraints vs zyprexa.  Most meds given later as pat was too lethargic to take PO, md was aware and gave verbal that pt should be left alone to sleep it off as much as possible RN complied.  By late afternoon pt was awake and cooperative, No other complications until 1945 at which point pt was yelling continuously and RN was ordered to give report

## 2023-04-08 NOTE — SIGNIFICANT NOTE
Pt obtunded, opens eyes briefly to sternal rub. Using accessory muscles, tachypneic. O2 sats 80-85% on CPAP w/ 10L O2. RRT called to assess.

## 2023-04-08 NOTE — PLAN OF CARE
Goal Outcome Evaluation:  Plan of Care Reviewed With: patient        Progress: no change  Outcome Evaluation: Patient  restless  and  combative  earlier  this  shift.  pleasantly  confused.   Kept  taking  oxygen  off  and  would  not  buzz  CPAP  on.    Took  His  Eliquis  and  Tylenol  finally    difted  off to  sleep.  Oxygen   level       keeps  drifting off  and on.   Scratched    sitter  this  shift  during    ADL  care.   wearing  CPAP  at  this  time.  Sitter  at bedside.     Afib    on  the  monitor. nursing  will  continue  to monitor

## 2023-04-08 NOTE — PROGRESS NOTES
"   LOS: 3 days   Patient Care Team:  Harry Luis MD as PCP - General    Chief Complaint: respiratory failure     Interval History: He had worsening respiratory status overnight, he got confused and then wouldn't keep his CPAP on, and became very hypercapneic. He is now in the ICU on CPAP; he is extremely groggy and doesn't rouse. He's not sedated.       Objective   Vital Signs  Temp:  [98.2 °F (36.8 °C)-99.1 °F (37.3 °C)] 99.1 °F (37.3 °C)  Heart Rate:  [47-86] 62  Resp:  [20-28] 20  BP: (131-158)/(65-92) 131/80    Intake/Output Summary (Last 24 hours) at 4/8/2023 1420  Last data filed at 4/8/2023 0616  Gross per 24 hour   Intake --   Output 550 ml   Net -550 ml       Last Weight and Admission Weight        04/05/23  1419   Weight: 134 kg (295 lb)     Flowsheet Rows    Flowsheet Row First Filed Value   Admission Height 182.9 cm (72\") Documented at 04/04/2023 1736   Admission Weight 137 kg (302 lb 3.2 oz) Documented at 04/04/2023 1736                  Physical Exam  Constitutional:       Comments: Obese, sleeping, doesn't rouse, on facial CPAP   Cardiovascular:      Comments: Very distant heart sounds, + ectopy  Pulmonary:      Comments: Clear anteriorly  Abdominal:      Palpations: Abdomen is soft.   Musculoskeletal:         General: Swelling (1-2+ edema with stasis change) present.         Results Review:      Results from last 7 days   Lab Units 04/08/23  0714 04/07/23  0708 04/05/23  0620   SODIUM mmol/L 143 143 140   POTASSIUM mmol/L 3.7 3.4* 3.8   CHLORIDE mmol/L 94* 95* 97*   CO2 mmol/L 47.0* 39.1* 33.5*   BUN mg/dL 24* 23 14   CREATININE mg/dL 0.68* 0.54* 0.52*   GLUCOSE mg/dL 164* 153* 132*   CALCIUM mg/dL 9.5 9.6 9.4     Results from last 7 days   Lab Units 04/05/23  0620 04/04/23 2001 04/04/23  1755   CK TOTAL U/L  --   --  117   HSTROP T ng/L 34* 22* 24*     Results from last 7 days   Lab Units 04/08/23  0714 04/05/23  0620 04/04/23  1755   WBC 10*3/mm3 6.85 4.73 6.17   HEMOGLOBIN g/dL 14.3 13.8 " 14.2   HEMATOCRIT % 46.5 43.2 43.2   PLATELETS 10*3/mm3 122* 141 133*                       I reviewed the patient's new clinical results.  I personally viewed and interpreted the patient's EKG/Telemetry data        Medication Review:   amLODIPine, 10 mg, Oral, Daily  ammonium lactate, 1 application, Topical, Q12H  apixaban, 5 mg, Oral, Q12H  aspirin, 81 mg, Oral, Daily  budesonide-formoterol, 2 puff, Inhalation, BID - RT  chlorthalidone, 25 mg, Oral, Daily  fluticasone, 1 spray, Nasal, Daily  ipratropium-albuterol, 3 mL, Nebulization, Q6H While Awake - RT  metoprolol succinate XL, 25 mg, Oral, Daily  miconazole, , Topical, Q12H  pantoprazole, 40 mg, Oral, Q AM  potassium chloride, 40 mEq, Oral, Once  senna-docusate sodium, 2 tablet, Oral, BID  sodium chloride, 10 mL, Intravenous, Q12H  sodium chloride, 10 mL, Intravenous, Q12H  terazosin, 5 mg, Oral, Daily        dexmedetomidine, 0.2-1.5 mcg/kg/hr, Last Rate: Stopped (04/08/23 0919)        Assessment & Plan     1. PAF -- presently in SR with frequent PVCs. There are occasional episodes of AF but it's rate controlled. He got apixaban last night but is now too lethargic for PO meds. Will stop apixaban and switch to enoxaparin for now; we can change back when he can take PO. His HR is presently in the 50s. I think we can hold off on standing IV BB unless AF becomes more of a problem.    2. PVCs -- known history, stable.     3. Acute on chronic hypoxemic respiratory failure -- despite having peripheral volume overload, he does not appear to have intravascular volume overload. His proBNP is WNL for age. His bicarb is steadily increasing. Hold chlorthalidone (he can't take it as he's NPO, as well).    4. HTN -- BP presently 130/80 without morning meds. As above, he's NPO, let's just obgserve today.    5. Moderate PHTN by echo -- his RV function is normal. This is surely multifactorial and due to diastolic dysfunction and obesity hypoventilation. I do not feel that a RHC  will benefit things (and there is definite risk), especially given his age and other comorbidities.    We will follow.         Itz Lyle MD  04/08/23  14:20 EDT

## 2023-04-08 NOTE — NURSING NOTE
Patient Name:  Harry Potts  YOB: 1930  MRN:  [id number]  Admit Date:  4/4/2023    Visit Diagnoses:     ICD-10-CM ICD-9-CM   1. Hypoxia  R09.02 799.02   2. Confusion  R41.0 298.9   3. General weakness  R53.1 780.79   4. Physical deconditioning  R53.81 799.3   5. Hypokalemia  E87.6 276.8   6. Atrial fibrillation, unspecified type  I48.91 427.31   7. Aneurysm of ascending aorta without rupture  I71.21 441.2       Reason For Rapid: Increasing O2 needs    RN Communicated With: Primary RN Emilie, ICU Charge RN Kailey, RT Eliceo, Dr. Chamberlain      Rapid Outcome: Transfer to ICU     Communication From Rapid Team: Patient with increasing O2 requirements, worsening ABG, and compliance issues with Bipap. Updated Dr. Chamberlain and he requests transfer to ICU.       Most Recent Vital Signs  Temp:  [98.2 °F (36.8 °C)-99 °F (37.2 °C)] 98.6 °F (37 °C)  Heart Rate:  [47-86] 86  Resp:  [20-28] 20  BP: (148-158)/(65-92) 158/92  SpO2:  [80 %-94 %] 94 %  on  Flow (L/min):  [3-10] 10;   Device (Oxygen Therapy): NPPV/NIV    Labs:  Results from last 7 days   Lab Units 04/05/23  1051   COVID19  Not Detected     Glucose   Date/Time Value Ref Range Status   04/08/2023 0644 153 (H) 70 - 130 mg/dL Final     Comment:     Meter: [id number] : [id number] Tali OBRIEN      Site   Date Value Ref Range Status   04/08/2023 Arterial: left brachial  Final     Wyatt's Test   Date Value Ref Range Status   04/08/2023 N/A  Final     pH, Arterial   Date Value Ref Range Status   04/08/2023 7.376 7.350 - 7.450 pH units Final     pCO2, Arterial   Date Value Ref Range Status   04/08/2023 76.3 (C) 35.0 - 45.0 mm Hg Final     Comment:     bipap avaps 15/30 epap 10. O2 sat 92 Meter: [id number] : [id number] TravCritical:Notify Dr dr chamberlain (08-Apr-23 10:24:01)Read back ok     pO2, Arterial   Date Value Ref Range Status   04/08/2023 77.8 (L) 80.0 - 100.0 mm Hg Final     HCO3, Arterial   Date Value Ref Range Status   04/08/2023  44.8 (H) 22.0 - 28.0 mmol/L Final     Base Excess, Arterial   Date Value Ref Range Status   04/08/2023 15.1 (H) 0.0 - 2.0 mmol/L Final     Barometric Pressure for Blood Gas   Date Value Ref Range Status   04/08/2023 756.3 mmHg Final     Modality   Date Value Ref Range Status   04/08/2023 BiPap  Final     FIO2   Date Value Ref Range Status   04/08/2023 60 % Final     Results from last 7 days   Lab Units 04/08/23  0714 04/05/23  0620 04/04/23  1755 04/03/23  1504   WBC 10*3/mm3 6.85 4.73 6.17 6.8   HEMOGLOBIN g/dL 14.3 13.8 14.2 13.3   PLATELETS 10*3/mm3 122* 141 133* 137*     Results from last 7 days   Lab Units 04/08/23  0714 04/07/23  0708 04/05/23  0620 04/04/23  1755 04/04/23  1755 04/03/23  1504   SODIUM mmol/L 143 143 140   < > 138 140   POTASSIUM mmol/L 3.7 3.4* 3.8   < > 3.3* 3.7   CHLORIDE mmol/L 94* 95* 97*   < > 94* 95*   TOTAL CO2 mmol/L  --   --   --   --   --  31*   CO2 mmol/L 47.0* 39.1* 33.5*   < > 33.5*  --    BUN mg/dL 24* 23 14   < > 21 15   CREATININE mg/dL 0.68* 0.54* 0.52*   < > 0.64* 0.75*   GLUCOSE mg/dL 164* 153* 132*   < > 159* 126*   ALBUMIN g/dL 3.9  --   --   --  4.1 4.2   BILIRUBIN mg/dL 0.9  --   --   --  0.5 0.6   ALK PHOS U/L 66  --   --   --  78 74   AST (SGOT) U/L 19  --   --   --  24 23   ALT (SGPT) U/L 40  --   --   --  34 32    < > = values in this interval not displayed.   Estimated Creatinine Clearance: 92.7 mL/min (A) (by C-G formula based on SCr of 0.68 mg/dL (L)).  Results from last 7 days   Lab Units 04/08/23  0714 04/07/23  0708 04/05/23  0620 04/04/23 2001 04/04/23  1755   CK TOTAL U/L  --   --   --   --  117   HSTROP T ng/L  --   --  34* 22* 24*   PROBNP pg/mL 776.0  --   --   --  466.2   URIC ACID mg/dL  --  5.7  --   --   --          Results from last 7 days   Lab Units 04/08/23  1020 04/08/23  0757 04/07/23  1549 04/05/23  0917   PH, ARTERIAL pH units 7.376 7.322* 7.356  --    PO2 ART mm Hg 77.8* 50.0* 67.4*  --    PCO2, ARTERIAL mm Hg 76.3* 97.7* 90.0*  --    HCO3  ART mmol/L 44.8* 50.6* 50.4*  --    MODALITY  BiPap Cannula Cannula Cannula   No results found for: STREPPNEUAG, LEGANTIGENUR    Results from last 7 days   Lab Units 04/05/23  1051   ADENOVIRUS DETECTION BY PCR  Not Detected   CORONAVIRUS 229E  Not Detected   CORONAVIRUS HKU1  Not Detected   CORONAVIRUS NL63  Not Detected   CORONAVIRUS OC43  Not Detected   HUMAN METAPNEUMOVIRUS  Not Detected   HUMAN RHINOVIRUS/ENTEROVIRUS  Not Detected   INFLUENZA B PCR  Not Detected   PARAINFLUENZA 1  Not Detected   PARAINFLUENZA VIRUS 2  Not Detected   PARAINFLUENZA VIRUS 3  Not Detected   PARAINFLUENZA VIRUS 4  Not Detected   BORDETELLA PERTUSSIS PCR  Not Detected   CHLAMYDOPHILA PNEUMONIAE PCR  Not Detected   MYCOPLAMA PNEUMO PCR  Not Detected   INFLUENZA A PCR  Not Detected   RSV, PCR  Not Detected       NIH Stroke Scale:                                                         Please refer to full rapid documentation on summary page under Index / Code Timeline

## 2023-04-08 NOTE — PROGRESS NOTES
LOS: 3 days   Patient Care Team:  Harry Luis MD as PCP - General    Subjective     Problems again last night with keeping patient on PAP therapy apparently after it was up there he used it for about 4 hours and then would not use it further his saturations dropped with sleep a turned his oxygen up and weaned back down but he was desaturating and blood gas shows a PO2 of 50 on 4 L his PCO2 was up to 97 his pH was still pretty well compensated at 7.32.  Patient is pretty obtunded right now we went ahead and moved him to the ICU so we can use more aggressive noninvasive ventilation and I will use some Precedex if needed to keep him on the machine.  Review of Systems:   He was a never smoker       Objective     Vital Signs  Vital Sign Min/Max for last 24 hours  Temp  Min: 98.2 °F (36.8 °C)  Max: 99 °F (37.2 °C)   BP  Min: 148/73  Max: 158/92   Pulse  Min: 47  Max: 81   Resp  Min: 20  Max: 28   SpO2  Min: 82 %  Max: 90 %   Flow (L/min)  Min: 3  Max: 10   No data recorded        Ventilator/Non-Invasive Ventilation Settings (From admission, onward)     Start     Ordered    04/05/23 1411  NIPPV (CPAP or BIPAP)  At Bedtime & As Needed-RT        Comments: Started 10 cm and try and adjust to best effect best tolerance until we can get his home machine   Question Answer Comment   Indication: Sleep Apnea    Type: CPAP    Pressure 8    Titrate Oxygen for SpO2 88% - 92%        04/05/23 1410    04/05/23 0523  NIPPV (CPAP or BIPAP)  At Bedtime & As Needed-RT,   Status:  Canceled        Question Answer Comment   Indication: Sleep Apnea    Type: CPAP    Pressure 8    Titrate Oxygen for SpO2 88% - 92%        04/05/23 0522                             Body mass index is 44.85 kg/m².  I/O last 3 completed shifts:  In: 360 [P.O.:360]  Out: 650 [Urine:650]  No intake/output data recorded.        Physical Exam:  General Appearance: Well-developed morbidly obese white male with a BMI over 44 resting in bed does not appear in  acute distress, he is sleeping very soundly he clearly has obstructive apneas.  He is on 4 L nasal cannula O2 with oxygen saturations in the mid to upper 90s  Eyes: Conjunctiva are clear and anicteric  ENT: Mucous membranes are little dry he has a Mallampati type II airway, nasal septum midline  Neck: No adenopathy or thyromegaly no visible jugular venous tension and trachea midline  Lungs: He has some wheezing primarily in the right base very minimal in the left base no rales no rhonchi no dullness he is not labored at all  Cardiac: irregularly iregular  Abdomen: Obese soft nontender no palpable hepatosplenomegaly  : Not examined  Musculoskeletal: Mild thoracic kyphosis  Skin: Warm and dry no jaundice no petechiae  Neuro: He was sleeping soundly I did not really attempt to awaken him but I did lift him up to listen behind him and his posterior chest that did not wake him up.  Extremities/P Vascular: No clubbing or cyanosis he does have a little bit of edema in both lower extremities, palpable radial and dorsalis pedis pulses  MSE: Unable to assess today he was sleeping so soundly       Labs:  Results from last 7 days   Lab Units 04/08/23  0714 04/07/23  0708 04/05/23  0620 04/04/23  1755 04/03/23  1504   GLUCOSE mg/dL 164* 153* 132* 159* 126*   SODIUM mmol/L 143 143 140 138 140   POTASSIUM mmol/L 3.7 3.4* 3.8 3.3* 3.7   TOTAL CO2 mmol/L  --   --   --   --  31*   CO2 mmol/L 47.0* 39.1* 33.5* 33.5*  --    CHLORIDE mmol/L 94* 95* 97* 94* 95*   ANION GAP mmol/L 2.0* 8.9 9.5 10.5  --    CREATININE mg/dL 0.68* 0.54* 0.52* 0.64* 0.75*   BUN mg/dL 24* 23 14 21 15   BUN / CREAT RATIO  35.3* 42.6* 26.9* 32.8* 20   CALCIUM mg/dL 9.5 9.6 9.4 8.9 9.5   ALK PHOS U/L 66  --   --  78 74   TOTAL PROTEIN g/dL 6.8  --   --  6.6  --    ALT (SGPT) U/L 40  --   --  34 32   AST (SGOT) U/L 19  --   --  24 23   BILIRUBIN mg/dL 0.9  --   --  0.5 0.6   ALBUMIN g/dL 3.9  --   --  4.1 4.2   GLOBULIN gm/dL 2.9  --   --  2.5  --    A/G RATIO    --   --   --   --  1.9     Estimated Creatinine Clearance: 92.7 mL/min (A) (by C-G formula based on SCr of 0.68 mg/dL (L)).      Results from last 7 days   Lab Units 04/08/23  0714 04/05/23 0620 04/04/23 1755 04/03/23  1504   WBC 10*3/mm3 6.85 4.73 6.17 6.8   RBC 10*6/mm3 5.04 4.73 4.64 4.64   HEMOGLOBIN g/dL 14.3 13.8 14.2 13.3   HEMATOCRIT % 46.5 43.2 43.2 41.0   MCV fL 92.3 91.3 93.1 88   MCH pg 28.4 29.2 30.6 28.7   MCHC g/dL 30.8* 31.9 32.9 32.4   RDW % 12.2* 12.5 13.4 12.8   RDW-SD fl 41.5 41.4 46.1  --    MPV fL 10.1 10.0 10.3  --    PLATELETS 10*3/mm3 122* 141 133* 137*   NEUTROPHIL % %  --   --  67.1 73   LYMPHOCYTE % %  --   --  21.9 17   MONOCYTES % %  --   --  9.2 9   EOSINOPHIL % %  --   --  1.3 1   BASOPHIL % %  --   --  0.3 0   IMM GRAN % %  --   --  0.2  --    NEUTROS ABS 10*3/mm3  --   --  4.14 4.9   LYMPHS ABS 10*3/mm3  --   --  1.35 1.2   MONOS ABS 10*3/mm3  --   --  0.57 0.6   EOS ABS 10*3/mm3  --   --  0.08 0.1   BASOS ABS 10*3/mm3  --   --  0.02 0.0   IMMATURE GRANS (ABS) 10*3/mm3  --   --  0.01  --      Results from last 7 days   Lab Units 04/08/23  0757   PH, ARTERIAL pH units 7.322*   PO2 ART mm Hg 50.0*   PCO2, ARTERIAL mm Hg 97.7*   HCO3 ART mmol/L 50.6*     Results from last 7 days   Lab Units 04/05/23  0620 04/04/23  2001 04/04/23  1755   CK TOTAL U/L  --   --  117   HSTROP T ng/L 34* 22* 24*     Results from last 7 days   Lab Units 04/04/23  1755   PROBNP pg/mL 466.2     Results from last 7 days   Lab Units 04/03/23  1504   TSH uIU/mL 1.970             Microbiology Results (last 10 days)     Procedure Component Value - Date/Time    Respiratory Panel PCR w/COVID-19(SARS-CoV-2) [facility]US [facility]UEL/[facility]/MICHAEL In-House, NP Swab in UTM/VTM, 3-4 HR TAT - Swab, Nasopharynx [[id number]]  (Normal) Collected: 04/05/23 1051    Lab Status: Final result Specimen: Swab from Nasopharynx Updated: 04/05/23 1237     ADENOVIRUS, PCR Not Detected     Coronavirus 229E Not Detected     Coronavirus HKU1 Not  Detected     Coronavirus NL63 Not Detected     Coronavirus OC43 Not Detected     COVID19 Not Detected     Human Metapneumovirus Not Detected     Human Rhinovirus/Enterovirus Not Detected     Influenza A PCR Not Detected     Influenza B PCR Not Detected     Parainfluenza Virus 1 Not Detected     Parainfluenza Virus 2 Not Detected     Parainfluenza Virus 3 Not Detected     Parainfluenza Virus 4 Not Detected     RSV, PCR Not Detected     Bordetella pertussis pcr Not Detected     Bordetella parapertussis PCR Not Detected     Chlamydophila pneumoniae PCR Not Detected     Mycoplasma pneumo by PCR Not Detected    Narrative:      In the setting of a positive respiratory panel with a viral infection PLUS a negative procalcitonin without other underlying concern for bacterial infection, consider observing off antibiotics or discontinuation of antibiotics and continue supportive care. If the respiratory panel is positive for atypical bacterial infection (Bordetella pertussis, Chlamydophila pneumoniae, or Mycoplasma pneumoniae), consider antibiotic de-escalation to target atypical bacterial infection.    COVID-19 and FLU A/B PCR - Swab, Nasopharynx [[id number]]  (Normal) Collected: 04/04/23 1747    Lab Status: Final result Specimen: Swab from Nasopharynx Updated: 04/04/23 1809     COVID19 Not Detected     Influenza A PCR Not Detected     Influenza B PCR Not Detected    Narrative:      Fact sheet for providers: https://www.fda.gov/media/828202/download    Fact sheet for patients: https://www.fda.gov/media/020498/download    Test performed by PCR.              amLODIPine, 10 mg, Oral, Daily  ammonium lactate, 1 application, Topical, Q12H  apixaban, 5 mg, Oral, Q12H  aspirin, 81 mg, Oral, Daily  budesonide-formoterol, 2 puff, Inhalation, BID - RT  chlorthalidone, 25 mg, Oral, Daily  fluticasone, 1 spray, Nasal, Daily  ipratropium-albuterol, 3 mL, Nebulization, Q6H While Awake - RT  metoprolol succinate XL, 25 mg, Oral,  Daily  miconazole, , Topical, Q12H  pantoprazole, 40 mg, Oral, Q AM  potassium chloride, 40 mEq, Oral, Once  QUEtiapine, 75 mg, Oral, Daily With Dinner  sodium chloride, 10 mL, Intravenous, Q12H  terazosin, 5 mg, Oral, Daily      dexmedetomidine, 0.2-1.5 mcg/kg/hr        Diagnostics:  Adult Transthoracic Echo Complete W/ Cont if Necessary Per Protocol    Result Date: 4/5/2023  •  Left ventricular systolic function is normal. Left ventricular ejection fraction appears to be 66 - 70%. •  Left ventricular wall thickness is consistent with mild to moderate concentric hypertrophy. •  Estimated right ventricular systolic pressure from tricuspid regurgitation is moderately elevated (45-55 mmHg).     CT Head Without Contrast    Result Date: 4/4/2023  CT HEAD WO CONTRAST-  INDICATIONS: Confusion  TECHNIQUE: Radiation dose reduction techniques were utilized, including automated exposure control and exposure modulation based on body size. Noncontrast head CT  COMPARISON: 03/05/2021  FINDINGS:    No acute intracranial hemorrhage, midline shift or mass effect. No acute territorial infarct is identified. 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  Prominent periventricular hypodensities suggest chronic small vessel ischemic change in a patient this age.  Arterial calcifications are seen at the base of the brain.  Ventricles, cisterns, cerebral sulci are unremarkable for patient age.  Mild paranasal sinus mucosal thickening is present. The visualized paranasal sinuses, orbits, mastoid air cells are otherwise are unremarkable.           No acute intracranial hemorrhage or hydrocephalus. Chronic changes of the brain. If there is further clinical concern, MRI could be considered for further evaluation.  This report was finalized on 4/4/2023 7:21 PM by Dr. Leander Kay M.D.      XR Chest 1 View    Result Date: 4/4/2023  XR CHEST 1 VW-  HISTORY: Male who is 89 years-old,  short of breath  TECHNIQUE: Frontal view of the  chest  COMPARISON: 04/03/2023  FINDINGS: The heart is enlarged, mild prominence of vascular markings. Aorta appears ectatic. No focal pulmonary consolidation, pleural effusion, or pneumothorax. No acute osseous process.      No focal pulmonary consolidation. Cardiomegaly with mild prominence of vascular markings. Ectatic appearing aorta. Follow-up as clinically indicated.  This report was finalized on 4/4/2023 6:10 PM by Dr. Leander Kay M.D.      CT Angiogram Chest    Result Date: 4/4/2023  CT ANGIOGRAM CHEST-  INDICATIONS: Abnormal chest x-ray  TECHNIQUE: Radiation dose reduction techniques were utilized, including automated exposure control and exposure modulation based on body size. CT angiography of the chest. Three-dimensional reconstructions.  COMPARISON: 11/04/2019  FINDINGS:  Ascending aorta is dilated, 4.1 cm, not significantly changed. No aortic dissection. No pulmonary embolism. Prominence of caliber of the central pulmonary arteries suggests pulmonary arterial hypertension; this appearance is chronic.  The heart size is enlarged without pericardial effusion. A few small subcentimeter short axis mediastinal lymph nodes are seen that are not significant by size criteria. A 1 cm short axis retrocardiac lymph node on axial image 83 is borderline, nonspecific, stable.  Left thyroid nodularity is noted, suboptimally evaluated with this technique, grossly similar to prior exam, could be further characterized with ultrasound if indicated.  The airways appear clear.  No pleural effusion or pneumothorax.  The lungs show mild atelectasis. A 1.1 cm pleural-based groundglass nodular density of the right lower lobe on axial image 74 is stable.  Upper abdominal structures show no acute findings. Layering stones are seen in the gallbladder.  Degenerative changes are seen in the spine and shoulders. Bilateral shoulder effusions are present. No acute fracture is identified.       Dilated ascending aorta, 4.1 cm. No  aortic dissection. Chronic prominence of caliber of the central pulmonary arteries suggests pulmonary arterial hypertension.  This report was finalized on 4/4/2023 7:28 PM by Dr. Leander Kay M.D.      XR Chest PA & Lateral    Result Date: 4/4/2023  PA AND LATERAL CHEST X-RAY  HISTORY: Hypoxia.  Chest x-ray consisting of 3 views is provided. Correlation: Chest x-ray 03/05/2021.  FINDINGS: Cardiomegaly appears similar. Pulmonary vasculature is engorged but there is no interstitial edema. No focal infiltrate or pleural effusion. Extensive degenerative change throughout the thoracic spine and visualized upper lumbar spine.      Cardiomegaly with pulmonary vascular engorgement but no marlee interstitial edema.  This report was finalized on 4/4/2023 7:10 AM by Dr. Jae Bailey M.D.      Results for orders placed during the hospital encounter of 04/04/23    Adult Transthoracic Echo Complete W/ Cont if Necessary Per Protocol    Interpretation Summary  •  Left ventricular systolic function is normal. Left ventricular ejection fraction appears to be 66 - 70%.  •  Left ventricular wall thickness is consistent with mild to moderate concentric hypertrophy.  •  Estimated right ventricular systolic pressure from tricuspid regurgitation is moderately elevated (45-55 mmHg).          Active Hospital Problems    Diagnosis  POA   • **Chronic respiratory failure with hypoxia [J96.11]  Unknown   • Hypoxia [R09.02]  Yes   • JENISE on CPAP [G47.33, Z99.89]  Not Applicable   • COPD (chronic obstructive pulmonary disease) [J44.9]  Yes   • Morbid obesity [E66.01]  Yes   • Gastroesophageal reflux disease [K21.9]  Yes      Resolved Hospital Problems   No resolved problems to display.   Chest x-ray reviewed looks like worsening basilar atelectasis there may be pulmonary vascular congestion is really hard to tell with the portable film in the lower lung volumes.      Assessment & Plan     1. Chronic respiratory failure hypoxemic and  hypercapnic he is on his home O2 now that had been increased recently from 3 to 4 L were worsening I think some of this is due to atelectasis now and hypoventilation part of that is from noncompliance with PAP which she apparently faithfully used at home with sleep.  We have moved him to the ICU to try more aggressive noninvasive ventilation and use Precedex if necessary to try and keep him calm and keep him on the machine.  2. Obstructive sleep apnea he uses home PAP machine organ to have to move to more aggressive management means we will try noninvasive ventilation  3. 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 Morbid obesity with a BMI of greater than 44  5. Left lower lobe 1.1 cm groundglass nodule stable for 4 years no follow-up needed  6. Confusion and marlee psychosis neurology following he apparently has some underlying dementia that complicates the issues.  Try and avoid any benzodiazepines or respiratory suppressants.  7. Hypertension blood pressures a little high at times but not enough to need adjustments to treatment right now acutely  8. Pulmonary hypertension last echocardiogram 2 days ago RVSP 45-55 this may explain some of his hypoxia last CT angiogram was negative pressure seem a little high for sleep apnea alone.  Statistically speaking the most likely etiology would be left heart failure he does have mild to moderate concentric LVH and his chronic atrial fibrillation I would suspect that some diastolic dysfunction is present but do not know for certain.  See what cardiology thinks we could always entertain a right heart cath.  9. Thrombocytopenia very mild he has been mildly thrombocytopenic since he came in.  10. New onset A-fib cardiology is managing patient is anticoagulated with Eliquis rate is controlled.          Plan for disposition:    Kenneth Chamberlain Jr, MD  04/08/23  08:57 EDT    Time: Critical care time 44 minutes thus far today

## 2023-04-08 NOTE — SIGNIFICANT NOTE
04/08/23 0917   OTHER   Discipline physical therapist   Rehab Time/Intention   Session Not Performed patient unavailable for treatment  (blood gas shows a PO2 of 50 on 4 L his PCO2 was up to 97; pt obtunded and transferred to ICU for more aggressive ventiliation; will f/u tomorrow if appropriate)   Therapy Assessment/Plan (PT)   Criteria for Skilled Interventions Met (PT) yes   Recommendation   PT - Next Appointment 04/09/23

## 2023-04-09 LAB
ANION GAP SERPL CALCULATED.3IONS-SCNC: 7 MMOL/L (ref 5–15)
ARTERIAL PATENCY WRIST A: POSITIVE
ATMOSPHERIC PRESS: 756 MMHG
BASE EXCESS BLDA CALC-SCNC: 18.5 MMOL/L (ref 0–2)
BDY SITE: ABNORMAL
BUN SERPL-MCNC: 26 MG/DL (ref 8–23)
BUN/CREAT SERPL: 43.3 (ref 7–25)
CALCIUM SPEC-SCNC: 9.2 MG/DL (ref 8.2–9.6)
CHLORIDE SERPL-SCNC: 96 MMOL/L (ref 98–107)
CO2 SERPL-SCNC: 41 MMOL/L (ref 22–29)
CREAT SERPL-MCNC: 0.6 MG/DL (ref 0.76–1.27)
DEPRECATED RDW RBC AUTO: 40 FL (ref 37–54)
EGFRCR SERPLBLD CKD-EPI 2021: 90.6 ML/MIN/1.73
ERYTHROCYTE [DISTWIDTH] IN BLOOD BY AUTOMATED COUNT: 12 % (ref 12.3–15.4)
GLUCOSE BLDC GLUCOMTR-MCNC: 113 MG/DL (ref 70–130)
GLUCOSE SERPL-MCNC: 113 MG/DL (ref 65–99)
HCO3 BLDA-SCNC: 47.7 MMOL/L (ref 22–28)
HCT VFR BLD AUTO: 44.6 % (ref 37.5–51)
HGB BLD-MCNC: 14.7 G/DL (ref 13–17.7)
INHALED O2 CONCENTRATION: 45 %
MCH RBC QN AUTO: 29.3 PG (ref 26.6–33)
MCHC RBC AUTO-ENTMCNC: 33 G/DL (ref 31.5–35.7)
MCV RBC AUTO: 89 FL (ref 79–97)
MODALITY: ABNORMAL
O2 A-A PPRESDIFF RESPIRATORY: 0.3 MMHG
PCO2 BLDA: 69.8 MM HG (ref 35–45)
PH BLDA: 7.44 PH UNITS (ref 7.35–7.45)
PLATELET # BLD AUTO: 117 10*3/MM3 (ref 140–450)
PMV BLD AUTO: 10 FL (ref 6–12)
PO2 BLDA: 78.4 MM HG (ref 80–100)
POTASSIUM SERPL-SCNC: 3.5 MMOL/L (ref 3.5–5.2)
RBC # BLD AUTO: 5.01 10*6/MM3 (ref 4.14–5.8)
SAO2 % BLDCOA: 95.2 % (ref 92–99)
SET MECH RESP RATE: 18
SODIUM SERPL-SCNC: 144 MMOL/L (ref 136–145)
TOTAL RATE: 18 BREATHS/MINUTE
VENTILATOR MODE: ABNORMAL
VT ON VENT VENT: 650 ML
WBC NRBC COR # BLD: 6.18 10*3/MM3 (ref 3.4–10.8)

## 2023-04-09 PROCEDURE — 94761 N-INVAS EAR/PLS OXIMETRY MLT: CPT

## 2023-04-09 PROCEDURE — 94664 DEMO&/EVAL PT USE INHALER: CPT

## 2023-04-09 PROCEDURE — 94799 UNLISTED PULMONARY SVC/PX: CPT

## 2023-04-09 PROCEDURE — 94760 N-INVAS EAR/PLS OXIMETRY 1: CPT

## 2023-04-09 PROCEDURE — 85027 COMPLETE CBC AUTOMATED: CPT | Performed by: INTERNAL MEDICINE

## 2023-04-09 PROCEDURE — 25010000002 ENOXAPARIN PER 10 MG: Performed by: INTERNAL MEDICINE

## 2023-04-09 PROCEDURE — 82803 BLOOD GASES ANY COMBINATION: CPT

## 2023-04-09 PROCEDURE — 82962 GLUCOSE BLOOD TEST: CPT

## 2023-04-09 PROCEDURE — 99232 SBSQ HOSP IP/OBS MODERATE 35: CPT | Performed by: PSYCHIATRY & NEUROLOGY

## 2023-04-09 PROCEDURE — 80048 BASIC METABOLIC PNL TOTAL CA: CPT | Performed by: INTERNAL MEDICINE

## 2023-04-09 PROCEDURE — 99232 SBSQ HOSP IP/OBS MODERATE 35: CPT | Performed by: INTERNAL MEDICINE

## 2023-04-09 PROCEDURE — 94660 CPAP INITIATION&MGMT: CPT

## 2023-04-09 PROCEDURE — 25010000002 HYDRALAZINE PER 20 MG

## 2023-04-09 PROCEDURE — 36600 WITHDRAWAL OF ARTERIAL BLOOD: CPT

## 2023-04-09 RX ADMIN — MICONAZOLE NITRATE: 2 POWDER TOPICAL at 21:58

## 2023-04-09 RX ADMIN — HYDRALAZINE HYDROCHLORIDE 10 MG: 20 INJECTION INTRAMUSCULAR; INTRAVENOUS at 08:21

## 2023-04-09 RX ADMIN — Medication 10 ML: at 22:00

## 2023-04-09 RX ADMIN — ENOXAPARIN SODIUM 135 MG: 150 INJECTION SUBCUTANEOUS at 17:01

## 2023-04-09 RX ADMIN — MICONAZOLE NITRATE: 2 POWDER TOPICAL at 08:25

## 2023-04-09 RX ADMIN — Medication 10 ML: at 08:26

## 2023-04-09 RX ADMIN — BUDESONIDE AND FORMOTEROL FUMARATE DIHYDRATE 2 PUFF: 160; 4.5 AEROSOL RESPIRATORY (INHALATION) at 19:45

## 2023-04-09 RX ADMIN — AMMONIUM LACTATE 1 APPLICATION: 120 CREAM TOPICAL at 05:51

## 2023-04-09 RX ADMIN — AMLODIPINE BESYLATE 10 MG: 10 TABLET ORAL at 12:11

## 2023-04-09 RX ADMIN — TERAZOSIN HYDROCHLORIDE 5 MG: 5 CAPSULE ORAL at 12:12

## 2023-04-09 RX ADMIN — METOPROLOL SUCCINATE 25 MG: 25 TABLET, EXTENDED RELEASE ORAL at 12:11

## 2023-04-09 RX ADMIN — DOCUSATE SODIUM 50MG AND SENNOSIDES 8.6MG 2 TABLET: 8.6; 5 TABLET, FILM COATED ORAL at 21:58

## 2023-04-09 RX ADMIN — IPRATROPIUM BROMIDE AND ALBUTEROL SULFATE 3 ML: 2.5; .5 SOLUTION RESPIRATORY (INHALATION) at 19:45

## 2023-04-09 RX ADMIN — AMMONIUM LACTATE 1 APPLICATION: 120 CREAM TOPICAL at 17:03

## 2023-04-09 RX ADMIN — IPRATROPIUM BROMIDE AND ALBUTEROL SULFATE 3 ML: 2.5; .5 SOLUTION RESPIRATORY (INHALATION) at 07:06

## 2023-04-09 RX ADMIN — IPRATROPIUM BROMIDE AND ALBUTEROL SULFATE 3 ML: 2.5; .5 SOLUTION RESPIRATORY (INHALATION) at 11:27

## 2023-04-09 RX ADMIN — ENOXAPARIN SODIUM 135 MG: 150 INJECTION SUBCUTANEOUS at 05:48

## 2023-04-09 NOTE — PLAN OF CARE
Problem: Adult Inpatient Plan of Care  Goal: Plan of Care Review  Outcome: Ongoing, Progressing  Goal: Patient-Specific Goal (Individualized)  Outcome: Ongoing, Progressing  Goal: Absence of Hospital-Acquired Illness or Injury  Outcome: Ongoing, Progressing  Intervention: Identify and Manage Fall Risk  Recent Flowsheet Documentation  Taken 4/9/2023 0000 by Lela Gallagher RN  Safety Promotion/Fall Prevention:   clutter free environment maintained   fall prevention program maintained   nonskid shoes/slippers when out of bed   lighting adjusted   safety round/check completed  Taken 4/8/2023 2200 by Lela Gallagher RN  Safety Promotion/Fall Prevention:   clutter free environment maintained   fall prevention program maintained   nonskid shoes/slippers when out of bed   safety round/check completed   lighting adjusted  Intervention: Prevent Skin Injury  Recent Flowsheet Documentation  Taken 4/9/2023 0000 by Lela Gallagher RN  Body Position: supine  Taken 4/8/2023 2200 by Lela Gallagher RN  Body Position: left  Intervention: Prevent and Manage VTE (Venous Thromboembolism) Risk  Recent Flowsheet Documentation  Taken 4/9/2023 0000 by Lela Gallagher RN  VTE Prevention/Management:   bilateral   sequential compression devices on  Taken 4/8/2023 2000 by Lela Gallagher RN  VTE Prevention/Management:   bilateral   sequential compression devices on  Intervention: Prevent Infection  Recent Flowsheet Documentation  Taken 4/9/2023 0000 by Lela Gallagher RN  Infection Prevention:   environmental surveillance performed   hand hygiene promoted   personal protective equipment utilized   rest/sleep promoted   single patient room provided  Taken 4/8/2023 2200 by Lela Gallagher RN  Infection Prevention:   environmental surveillance performed   hand hygiene promoted   rest/sleep promoted   single patient room provided   personal protective equipment utilized  Goal: Optimal Comfort and Wellbeing  Outcome: Ongoing, Progressing  Goal: Readiness  for Transition of Care  Outcome: Ongoing, Progressing     Problem: Fall Injury Risk  Goal: Absence of Fall and Fall-Related Injury  Outcome: Ongoing, Progressing  Intervention: Identify and Manage Contributors  Recent Flowsheet Documentation  Taken 4/9/2023 0000 by Lela Gallagher RN  Medication Review/Management:   medications reviewed   high-risk medications identified  Taken 4/8/2023 2200 by Lela Gallagher RN  Medication Review/Management:   medications reviewed   high-risk medications identified  Taken 4/8/2023 2000 by Lela Gallagher RN  Medication Review/Management:   medications reviewed   high-risk medications identified  Intervention: Promote Injury-Free Environment  Recent Flowsheet Documentation  Taken 4/9/2023 0000 by Lela Gallagher RN  Safety Promotion/Fall Prevention:   clutter free environment maintained   fall prevention program maintained   nonskid shoes/slippers when out of bed   lighting adjusted   safety round/check completed  Taken 4/8/2023 2200 by Lela Gallagher RN  Safety Promotion/Fall Prevention:   clutter free environment maintained   fall prevention program maintained   nonskid shoes/slippers when out of bed   safety round/check completed   lighting adjusted     Problem: Asthma Comorbidity  Goal: Maintenance of Asthma Control  Outcome: Ongoing, Progressing  Intervention: Maintain Asthma Symptom Control  Recent Flowsheet Documentation  Taken 4/9/2023 0000 by Lela Gallagher RN  Medication Review/Management:   medications reviewed   high-risk medications identified  Taken 4/8/2023 2200 by Lela Gallagher RN  Medication Review/Management:   medications reviewed   high-risk medications identified  Taken 4/8/2023 2000 by Lela Gallagher RN  Medication Review/Management:   medications reviewed   high-risk medications identified     Problem: Behavioral Health Comorbidity  Goal: Maintenance of Behavioral Health Symptom Control  Outcome: Ongoing, Progressing  Intervention: Maintain Behavioral Health  Symptom Control  Recent Flowsheet Documentation  Taken 4/9/2023 0000 by Lela Gallagher RN  Medication Review/Management:   medications reviewed   high-risk medications identified  Taken 4/8/2023 2200 by Lela Gallagher RN  Medication Review/Management:   medications reviewed   high-risk medications identified  Taken 4/8/2023 2000 by Lela Gallagher RN  Medication Review/Management:   medications reviewed   high-risk medications identified     Problem: COPD (Chronic Obstructive Pulmonary Disease) Comorbidity  Goal: Maintenance of COPD Symptom Control  Outcome: Ongoing, Progressing  Intervention: Maintain COPD-Symptom Control  Recent Flowsheet Documentation  Taken 4/9/2023 0000 by Lela Gallagher RN  Medication Review/Management:   medications reviewed   high-risk medications identified  Taken 4/8/2023 2200 by Lela Gallagher RN  Medication Review/Management:   medications reviewed   high-risk medications identified  Taken 4/8/2023 2000 by Lela Gallagher RN  Medication Review/Management:   medications reviewed   high-risk medications identified     Problem: Diabetes Comorbidity  Goal: Blood Glucose Level Within Targeted Range  Outcome: Ongoing, Progressing  Intervention: Monitor and Manage Glycemia  Recent Flowsheet Documentation  Taken 4/8/2023 2000 by Lela Gallagher RN  Glycemic Management: blood glucose monitored     Problem: Heart Failure Comorbidity  Goal: Maintenance of Heart Failure Symptom Control  Outcome: Ongoing, Progressing  Intervention: Maintain Heart Failure-Management  Recent Flowsheet Documentation  Taken 4/9/2023 0000 by Lela Gallagher RN  Medication Review/Management:   medications reviewed   high-risk medications identified  Taken 4/8/2023 2200 by Lela Gallagher RN  Medication Review/Management:   medications reviewed   high-risk medications identified  Taken 4/8/2023 2000 by Lela Gallagher RN  Medication Review/Management:   medications reviewed   high-risk medications identified     Problem:  Hypertension Comorbidity  Goal: Blood Pressure in Desired Range  Outcome: Ongoing, Progressing  Intervention: Maintain Blood Pressure Management  Recent Flowsheet Documentation  Taken 4/9/2023 0000 by Lela Gallagher RN  Medication Review/Management:   medications reviewed   high-risk medications identified  Taken 4/8/2023 2200 by Lela Gallagher RN  Medication Review/Management:   medications reviewed   high-risk medications identified  Taken 4/8/2023 2000 by Lela Gallagher RN  Medication Review/Management:   medications reviewed   high-risk medications identified     Problem: Obstructive Sleep Apnea Risk or Actual Comorbidity Management  Goal: Unobstructed Breathing During Sleep  Outcome: Ongoing, Progressing     Problem: Osteoarthritis Comorbidity  Goal: Maintenance of Osteoarthritis Symptom Control  Outcome: Ongoing, Progressing  Intervention: Maintain Osteoarthritis Symptom Control  Recent Flowsheet Documentation  Taken 4/9/2023 0000 by Lela Gallagher RN  Medication Review/Management:   medications reviewed   high-risk medications identified  Taken 4/8/2023 2200 by Lela Gallagher RN  Medication Review/Management:   medications reviewed   high-risk medications identified  Taken 4/8/2023 2000 by Lela Gallagher RN  Medication Review/Management:   medications reviewed   high-risk medications identified     Problem: Pain Chronic (Persistent) (Comorbidity Management)  Goal: Acceptable Pain Control and Functional Ability  Outcome: Ongoing, Progressing  Intervention: Manage Persistent Pain  Recent Flowsheet Documentation  Taken 4/9/2023 0000 by Lela Gallagher RN  Medication Review/Management:   medications reviewed   high-risk medications identified  Taken 4/8/2023 2200 by Lela Gallgaher RN  Medication Review/Management:   medications reviewed   high-risk medications identified  Taken 4/8/2023 2000 by Lela Gallagher RN  Medication Review/Management:   medications reviewed   high-risk medications identified     Problem:  Seizure Disorder Comorbidity  Goal: Maintenance of Seizure Control  Outcome: Ongoing, Progressing  Intervention: Maintain Seizure-Symptom Control  Recent Flowsheet Documentation  Taken 4/8/2023 2000 by Lela Gallagher RN  Seizure Precautions:   clutter-free environment maintained   emergency equipment at bedside     Problem: Skin Injury Risk Increased  Goal: Skin Health and Integrity  Outcome: Ongoing, Progressing  Intervention: Optimize Skin Protection  Recent Flowsheet Documentation  Taken 4/9/2023 0000 by Lela Gallagher RN  Head of Bed (HOB) Positioning: HOB at 30 degrees  Taken 4/8/2023 2200 by Lela Gallagher RN  Head of Bed (HOB) Positioning: HOB at 30 degrees  Taken 4/8/2023 2000 by Lela Gallagher RN  Pressure Reduction Techniques: weight shift assistance provided  Pressure Reduction Devices: pressure-redistributing mattress utilized   Goal Outcome Evaluation:

## 2023-04-09 NOTE — PROGRESS NOTES
LOS: 4 days   Patient Care Team:  Harry Luis MD as PCP - General    Subjective     Following for respiratory failure.  Patient did well yesterday on noninvasive ventilation at about 530 he started getting confused and agitated they had to start the Precedex.  He apparently ripped his mask off and tore it up twice.  He was on Precedex this morning doing pretty well on 45% oxygen we took him off the noninvasive ventilator and the Precedex he is awake now he tells me he does not know if he can take this much longer.  I asked him if he was ready to quit and he said no not really yet but may be.  He is not having pain currently he is on his home 4 L O2 saturating about 90% currently and denies feeling short of breath.          Review of Systems:   He was a never smoker       Objective     Vital Signs  Vital Sign Min/Max for last 24 hours  Temp  Min: 98.2 °F (36.8 °C)  Max: 99.3 °F (37.4 °C)   BP  Min: 131/80  Max: 178/85   Pulse  Min: 39  Max: 92   Resp  Min: 18  Max: 28   SpO2  Min: 80 %  Max: 98 %   Flow (L/min)  Min: 10  Max: 10   No data recorded        Ventilator/Non-Invasive Ventilation Settings (From admission, onward)     Start     Ordered    04/05/23 1411  NIPPV (CPAP or BIPAP)  At Bedtime & As Needed-RT        Comments: Started 10 cm and try and adjust to best effect best tolerance until we can get his home machine   Question Answer Comment   Indication: Sleep Apnea    Type: CPAP    Pressure 8    Titrate Oxygen for SpO2 88% - 92%        04/05/23 1410    04/05/23 0523  NIPPV (CPAP or BIPAP)  At Bedtime & As Needed-RT,   Status:  Canceled        Question Answer Comment   Indication: Sleep Apnea    Type: CPAP    Pressure 8    Titrate Oxygen for SpO2 88% - 92%        04/05/23 0522                             Body mass index is 44.85 kg/m².  I/O last 3 completed shifts:  In: 37.5 [I.V.:37.5]  Out: 550 [Urine:550]  I/O this shift:  In: 119.1 [I.V.:119.1]  Out: 300 [Urine:300]        Physical  Exam:  General Appearance: Well-developed morbidly obese white male with a BMI over 44 resting in bed does not appear in acute distress, he is sleeping very soundly he clearly has obstructive apneas.  He is on 4 L nasal cannula O2 with oxygen saturations of 90%  Eyes: Conjunctiva are clear and anicteric  ENT: Mucous membranes are little dry he has a Mallampati type II airway, nasal septum midline  Neck: No adenopathy or thyromegaly no visible jugular venous distension and trachea midline  Lungs: He has some wheezing primarily in the right base very minimal in the left base no rales no rhonchi no dullness he is not labored at all  Cardiac: irregularly iregular  Abdomen: Obese soft nontender no palpable hepatosplenomegaly  : Not examined  Musculoskeletal: Mild thoracic kyphosis  Skin: Warm and dry no jaundice no petechiae  Neuro: He is awake and calm this morning.  Check back on him he is been off Precedex now about 2 to 2-1/2 hours and he remains calm and cooperative  Extremities/P Vascular: No clubbing or cyanosis he does have a little bit of edema in both lower extremities, palpable radial and dorsalis pedis pulses  MSE: He seems rather depressed.       Labs:  Results from last 7 days   Lab Units 04/09/23  0432 04/08/23  0714 04/07/23  0708 04/05/23  0620 04/04/23  1755 04/03/23  1504   GLUCOSE mg/dL 113* 164* 153* 132* 159* 126*   SODIUM mmol/L 144 143 143 140 138 140   POTASSIUM mmol/L 3.5 3.7 3.4* 3.8 3.3* 3.7   TOTAL CO2 mmol/L  --   --   --   --   --  31*   CO2 mmol/L 41.0* 47.0* 39.1* 33.5* 33.5*  --    CHLORIDE mmol/L 96* 94* 95* 97* 94* 95*   ANION GAP mmol/L 7.0 2.0* 8.9 9.5 10.5  --    CREATININE mg/dL 0.60* 0.68* 0.54* 0.52* 0.64* 0.75*   BUN mg/dL 26* 24* 23 14 21 15   BUN / CREAT RATIO  43.3* 35.3* 42.6* 26.9* 32.8* 20   CALCIUM mg/dL 9.2 9.5 9.6 9.4 8.9 9.5   ALK PHOS U/L  --  66  --   --  78 74   TOTAL PROTEIN g/dL  --  6.8  --   --  6.6  --    ALT (SGPT) U/L  --  40  --   --  34 32   AST (SGOT)  U/L  --  19  --   --  24 23   BILIRUBIN mg/dL  --  0.9  --   --  0.5 0.6   ALBUMIN g/dL  --  3.9  --   --  4.1 4.2   GLOBULIN gm/dL  --  2.9  --   --  2.5  --    A/G RATIO   --   --   --   --   --  1.9     Estimated Creatinine Clearance: 105.1 mL/min (A) (by C-G formula based on SCr of 0.6 mg/dL (L)).      Results from last 7 days   Lab Units 04/09/23  0432 04/08/23  0714 04/05/23  0620 04/04/23  1755 04/03/23  1504   WBC 10*3/mm3 6.18 6.85 4.73 6.17 6.8   RBC 10*6/mm3 5.01 5.04 4.73 4.64 4.64   HEMOGLOBIN g/dL 14.7 14.3 13.8 14.2 13.3   HEMATOCRIT % 44.6 46.5 43.2 43.2 41.0   MCV fL 89.0 92.3 91.3 93.1 88   MCH pg 29.3 28.4 29.2 30.6 28.7   MCHC g/dL 33.0 30.8* 31.9 32.9 32.4   RDW % 12.0* 12.2* 12.5 13.4 12.8   RDW-SD fl 40.0 41.5 41.4 46.1  --    MPV fL 10.0 10.1 10.0 10.3  --    PLATELETS 10*3/mm3 117* 122* 141 133* 137*   NEUTROPHIL % %  --   --   --  67.1 73   LYMPHOCYTE % %  --   --   --  21.9 17   MONOCYTES % %  --   --   --  9.2 9   EOSINOPHIL % %  --   --   --  1.3 1   BASOPHIL % %  --   --   --  0.3 0   IMM GRAN % %  --   --   --  0.2  --    NEUTROS ABS 10*3/mm3  --   --   --  4.14 4.9   LYMPHS ABS 10*3/mm3  --   --   --  1.35 1.2   MONOS ABS 10*3/mm3  --   --   --  0.57 0.6   EOS ABS 10*3/mm3  --   --   --  0.08 0.1   BASOS ABS 10*3/mm3  --   --   --  0.02 0.0   IMMATURE GRANS (ABS) 10*3/mm3  --   --   --  0.01  --      Results from last 7 days   Lab Units 04/09/23  0330   PH, ARTERIAL pH units 7.443   PO2 ART mm Hg 78.4*   PCO2, ARTERIAL mm Hg 69.8*   HCO3 ART mmol/L 47.7*     Results from last 7 days   Lab Units 04/05/23  0620 04/04/23 2001 04/04/23  1755   CK TOTAL U/L  --   --  117   HSTROP T ng/L 34* 22* 24*     Results from last 7 days   Lab Units 04/08/23  0714 04/04/23  1755   PROBNP pg/mL 776.0 466.2     Results from last 7 days   Lab Units 04/03/23  1504   TSH uIU/mL 1.970             Microbiology Results (last 10 days)     Procedure Component Value - Date/Time    Respiratory Panel PCR  w/COVID-19(SARS-CoV-2) [facility]/FRANCESCA/JESUS MANUEL/[facility]/MAD/MICHAEL In-House, NP Swab in UTM/VTM, 3-4 HR TAT - Swab, Nasopharynx [[id number]]  (Normal) Collected: 04/05/23 1051    Lab Status: Final result Specimen: Swab from Nasopharynx Updated: 04/05/23 1237     ADENOVIRUS, PCR Not Detected     Coronavirus 229E Not Detected     Coronavirus HKU1 Not Detected     Coronavirus NL63 Not Detected     Coronavirus OC43 Not Detected     COVID19 Not Detected     Human Metapneumovirus Not Detected     Human Rhinovirus/Enterovirus Not Detected     Influenza A PCR Not Detected     Influenza B PCR Not Detected     Parainfluenza Virus 1 Not Detected     Parainfluenza Virus 2 Not Detected     Parainfluenza Virus 3 Not Detected     Parainfluenza Virus 4 Not Detected     RSV, PCR Not Detected     Bordetella pertussis pcr Not Detected     Bordetella parapertussis PCR Not Detected     Chlamydophila pneumoniae PCR Not Detected     Mycoplasma pneumo by PCR Not Detected    Narrative:      In the setting of a positive respiratory panel with a viral infection PLUS a negative procalcitonin without other underlying concern for bacterial infection, consider observing off antibiotics or discontinuation of antibiotics and continue supportive care. If the respiratory panel is positive for atypical bacterial infection (Bordetella pertussis, Chlamydophila pneumoniae, or Mycoplasma pneumoniae), consider antibiotic de-escalation to target atypical bacterial infection.    COVID-19 and FLU A/B PCR - Swab, Nasopharynx [[id number]]  (Normal) Collected: 04/04/23 1747    Lab Status: Final result Specimen: Swab from Nasopharynx Updated: 04/04/23 1809     COVID19 Not Detected     Influenza A PCR Not Detected     Influenza B PCR Not Detected    Narrative:      Fact sheet for providers: https://www.fda.gov/media/546810/download    Fact sheet for patients: https://www.fda.gov/media/960911/download    Test performed by PCR.              amLODIPine, 10 mg, Oral, Daily  ammonium  lactate, 1 application, Topical, Q12H  aspirin, 81 mg, Oral, Daily  budesonide-formoterol, 2 puff, Inhalation, BID - RT  enoxaparin, 1 mg/kg, Subcutaneous, Q12H  fluticasone, 1 spray, Nasal, Daily  ipratropium-albuterol, 3 mL, Nebulization, Q6H While Awake - RT  metoprolol succinate XL, 25 mg, Oral, Daily  miconazole, , Topical, Q12H  pantoprazole, 40 mg, Oral, Q AM  potassium chloride, 40 mEq, Oral, Once  senna-docusate sodium, 2 tablet, Oral, BID  sodium chloride, 10 mL, Intravenous, Q12H  sodium chloride, 10 mL, Intravenous, Q12H  terazosin, 5 mg, Oral, Daily      dexmedetomidine, 0.2-1.5 mcg/kg/hr, Last Rate: 0.2 mcg/kg/hr (04/09/23 0600)        Diagnostics:  Adult Transthoracic Echo Complete W/ Cont if Necessary Per Protocol    Result Date: 4/5/2023  •  Left ventricular systolic function is normal. Left ventricular ejection fraction appears to be 66 - 70%. •  Left ventricular wall thickness is consistent with mild to moderate concentric hypertrophy. •  Estimated right ventricular systolic pressure from tricuspid regurgitation is moderately elevated (45-55 mmHg).     CT Head Without Contrast    Result Date: 4/4/2023  CT HEAD WO CONTRAST-  INDICATIONS: Confusion  TECHNIQUE: Radiation dose reduction techniques were utilized, including automated exposure control and exposure modulation based on body size. Noncontrast head CT  COMPARISON: 03/05/2021  FINDINGS:    No acute intracranial hemorrhage, midline shift or mass effect. No acute territorial infarct is identified. 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  Prominent periventricular hypodensities suggest chronic small vessel ischemic change in a patient this age.  Arterial calcifications are seen at the base of the brain.  Ventricles, cisterns, cerebral sulci are unremarkable for patient age.  Mild paranasal sinus mucosal thickening is present. The visualized paranasal sinuses, orbits, mastoid air cells are otherwise are unremarkable.           No  acute intracranial hemorrhage or hydrocephalus. Chronic changes of the brain. If there is further clinical concern, MRI could be considered for further evaluation.  This report was finalized on 4/4/2023 7:21 PM by Dr. Leander Kay M.D.      XR Chest 1 View    Result Date: 4/4/2023  XR CHEST 1 VW-  HISTORY: Male who is 89 years-old,  short of breath  TECHNIQUE: Frontal view of the chest  COMPARISON: 04/03/2023  FINDINGS: The heart is enlarged, mild prominence of vascular markings. Aorta appears ectatic. No focal pulmonary consolidation, pleural effusion, or pneumothorax. No acute osseous process.      No focal pulmonary consolidation. Cardiomegaly with mild prominence of vascular markings. Ectatic appearing aorta. Follow-up as clinically indicated.  This report was finalized on 4/4/2023 6:10 PM by Dr. Leander Kay M.D.      CT Angiogram Chest    Result Date: 4/4/2023  CT ANGIOGRAM CHEST-  INDICATIONS: Abnormal chest x-ray  TECHNIQUE: Radiation dose reduction techniques were utilized, including automated exposure control and exposure modulation based on body size. CT angiography of the chest. Three-dimensional reconstructions.  COMPARISON: 11/04/2019  FINDINGS:  Ascending aorta is dilated, 4.1 cm, not significantly changed. No aortic dissection. No pulmonary embolism. Prominence of caliber of the central pulmonary arteries suggests pulmonary arterial hypertension; this appearance is chronic.  The heart size is enlarged without pericardial effusion. A few small subcentimeter short axis mediastinal lymph nodes are seen that are not significant by size criteria. A 1 cm short axis retrocardiac lymph node on axial image 83 is borderline, nonspecific, stable.  Left thyroid nodularity is noted, suboptimally evaluated with this technique, grossly similar to prior exam, could be further characterized with ultrasound if indicated.  The airways appear clear.  No pleural effusion or pneumothorax.  The lungs show mild  atelectasis. A 1.1 cm pleural-based groundglass nodular density of the right lower lobe on axial image 74 is stable.  Upper abdominal structures show no acute findings. Layering stones are seen in the gallbladder.  Degenerative changes are seen in the spine and shoulders. Bilateral shoulder effusions are present. No acute fracture is identified.       Dilated ascending aorta, 4.1 cm. No aortic dissection. Chronic prominence of caliber of the central pulmonary arteries suggests pulmonary arterial hypertension.  This report was finalized on 4/4/2023 7:28 PM by Dr. Leander Kay M.D.      XR Chest PA & Lateral    Result Date: 4/4/2023  PA AND LATERAL CHEST X-RAY  HISTORY: Hypoxia.  Chest x-ray consisting of 3 views is provided. Correlation: Chest x-ray 03/05/2021.  FINDINGS: Cardiomegaly appears similar. Pulmonary vasculature is engorged but there is no interstitial edema. No focal infiltrate or pleural effusion. Extensive degenerative change throughout the thoracic spine and visualized upper lumbar spine.      Cardiomegaly with pulmonary vascular engorgement but no marlee interstitial edema.  This report was finalized on 4/4/2023 7:10 AM by Dr. Jae Bailey M.D.      Results for orders placed during the hospital encounter of 04/04/23    Adult Transthoracic Echo Complete W/ Cont if Necessary Per Protocol    Interpretation Summary  •  Left ventricular systolic function is normal. Left ventricular ejection fraction appears to be 66 - 70%.  •  Left ventricular wall thickness is consistent with mild to moderate concentric hypertrophy.  •  Estimated right ventricular systolic pressure from tricuspid regurgitation is moderately elevated (45-55 mmHg).          Active Hospital Problems    Diagnosis  POA   • **Chronic respiratory failure with hypoxia [J96.11]  Unknown   • Hypoxia [R09.02]  Yes   • JENISE on CPAP [G47.33, Z99.89]  Not Applicable   • COPD (chronic obstructive pulmonary disease) [J44.9]  Yes   • Morbid obesity  [E66.01]  Yes   • Gastroesophageal reflux disease [K21.9]  Yes      Resolved Hospital Problems   No resolved problems to display.   .      Assessment & Plan     1. Chronic respiratory failure hypoxemic and hypercapnic he is on his home O2 now that had been increased recently from 3 to 4 L were worsening I think some of this is due to atelectasis now and hypoventilation part of that is from noncompliance with PAP which he apparently faithfully used at home with sleep.  He seems better this morning on his home O2 we will see how he does today noninvasive ventilator with sleep or if his oxygen drops on the 4 L.  2. Obstructive sleep apnea treating with noninvasive ventilation currently has home Pap machine may need more aggressive ventilation at home.  3. 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 Morbid obesity with a BMI of greater than 44  5. Left lower lobe 1.1 cm groundglass nodule stable for 4 years no follow-up needed  6. Confusion and marlee psychosis neurology following he apparently has some underlying dementia that complicates the issues and seems to be clearly sundowning.  Try and avoid any benzodiazepines or respiratory suppressants.  7. Hypertension blood pressures a little high at times but not enough to need adjustments to treatment right now.  8. Pulmonary hypertension last echocardiogram 2 days ago RVSP 45-55 this may explain some of his hypoxia last CT angiogram was negative pressure seem a little high for sleep apnea alone.  Statistically speaking the most likely etiology would be left heart failure he does have mild to moderate concentric LVH and his chronic atrial fibrillation I would suspect that some diastolic dysfunction is present but do not know for certain.  See what cardiology thinks we could always entertain a right heart cath.  9. Thrombocytopenia very mild he has been mildly thrombocytopenic since he came in.  10. New onset A-fib cardiology is managing  patient is anticoagulated with Eliquis rate is controlled.          Plan for disposition:    Kenneth Chamberlain Jr, MD  04/09/23  06:51 EDT    Time: Critical care time 39min.

## 2023-04-09 NOTE — SIGNIFICANT NOTE
04/09/23 1323   OTHER   Discipline physical therapist   Rehab Time/Intention   Session Not Performed other (see comments)  (pt transferred to ICU due to resp issues, discussed with nursing, PT to hold today and follow up tomorrow)   Recommendation   PT - Next Appointment 04/10/23

## 2023-04-09 NOTE — NURSING NOTE
Shift summary: Precedex weaned down. Full face bipap remains in place. Plans to trial off this morning. Sitter remains at bedside.

## 2023-04-09 NOTE — PROGRESS NOTES
"   LOS: 4 days   Patient Care Team:  Harry Luis MD as PCP - General    Chief Complaint: respiratory failure     Interval History: He did briefly require dexmetomidine overnight. He came off CPAP this morning. He is awake but very slowed. He answers some questions for me. He has a cough with thick sputum. He is SOA.     Objective   Vital Signs  Temp:  [97.8 °F (36.6 °C)-99.3 °F (37.4 °C)] 98.9 °F (37.2 °C)  Heart Rate:  [39-96] 50  Resp:  [16-22] 22  BP: (122-186)/() 166/79    Intake/Output Summary (Last 24 hours) at 4/9/2023 1309  Last data filed at 4/9/2023 1309  Gross per 24 hour   Intake 156.57 ml   Output 1200 ml   Net -1043.43 ml       Last Weight and Admission Weight        04/09/23  0600   Weight: (!) 137 kg (302 lb 0.5 oz)     Flowsheet Rows    Flowsheet Row First Filed Value   Admission Height 182.9 cm (72\") Documented at 04/04/2023 1736   Admission Weight 137 kg (302 lb 3.2 oz) Documented at 04/04/2023 1736                  Physical Exam  Constitutional:       Comments: Sitting in bed on NC, doesn't engage much, answers occasional questions with Y/N   HENT:      Nose: Nose normal.      Mouth/Throat:      Mouth: Mucous membranes are dry.   Eyes:      Comments: Everted lower lids, + chemosis   Cardiovascular:      Rate and Rhythm: Normal rate. Frequent extrasystoles are present.     Heart sounds: Heart sounds are distant.      Comments: Severe stasis changes of feet, 2-3+ RLE, 2+ LLE    Pulmonary:      Comments: Decreased at bases  Abdominal:      Palpations: Abdomen is soft.   Musculoskeletal:         General: Swelling (1-2+ edema with stasis change) present.   Neurological:      Comments: slowed         Results Review:      Results from last 7 days   Lab Units 04/09/23  0432 04/08/23  0714 04/07/23  0708   SODIUM mmol/L 144 143 143   POTASSIUM mmol/L 3.5 3.7 3.4*   CHLORIDE mmol/L 96* 94* 95*   CO2 mmol/L 41.0* 47.0* 39.1*   BUN mg/dL 26* 24* 23   CREATININE mg/dL 0.60* 0.68* 0.54*   GLUCOSE " mg/dL 113* 164* 153*   CALCIUM mg/dL 9.2 9.5 9.6     Results from last 7 days   Lab Units 04/05/23  0620 04/04/23 2001 04/04/23  1755   CK TOTAL U/L  --   --  117   HSTROP T ng/L 34* 22* 24*     Results from last 7 days   Lab Units 04/09/23  0432 04/08/23  0714 04/05/23  0620   WBC 10*3/mm3 6.18 6.85 4.73   HEMOGLOBIN g/dL 14.7 14.3 13.8   HEMATOCRIT % 44.6 46.5 43.2   PLATELETS 10*3/mm3 117* 122* 141                       I reviewed the patient's new clinical results.  I personally viewed and interpreted the patient's EKG/Telemetry data        Medication Review:   amLODIPine, 10 mg, Oral, Daily  ammonium lactate, 1 application, Topical, Q12H  aspirin, 81 mg, Oral, Daily  budesonide-formoterol, 2 puff, Inhalation, BID - RT  enoxaparin, 1 mg/kg, Subcutaneous, Q12H  fluticasone, 1 spray, Nasal, Daily  ipratropium-albuterol, 3 mL, Nebulization, Q6H While Awake - RT  metoprolol succinate XL, 25 mg, Oral, Daily  miconazole, , Topical, Q12H  pantoprazole, 40 mg, Oral, Q AM  potassium chloride, 40 mEq, Oral, Once  senna-docusate sodium, 2 tablet, Oral, BID  sodium chloride, 10 mL, Intravenous, Q12H  sodium chloride, 10 mL, Intravenous, Q12H  terazosin, 5 mg, Oral, Daily      dexmedetomidine, 0.2-1.5 mcg/kg/hr, Last Rate: Stopped (04/09/23 0729)      Assessment & Plan     1. PAF -- presently in SR with frequent PVCs. There are occasional episodes of AF but it's rate controlled. He cannot take PO medications right now so I switched him from apixaban to enoxaparin for now. He is not receiving his BB for that reason, but his HR is a bit low so I haven't ordered any IV BB.    2. PVCs -- known history, stable.     3. HTN -- BP really creeping up. Will order IV hydralazine until he can resume his oral medications.     4. Acute on chronic hypoxemic and hypercapneic respiratory failure -- despite having peripheral volume overload, he does not appear to have intravascular volume overload. His proBNP is WNL for age. He is at high  risk for acute diastolic CHF, though. We'll have to watch.     5. Moderate PHTN by echo -- his RV function is normal. This is surely multifactorial and due to diastolic dysfunction and obesity hypoventilation. I do not feel that a RHC will benefit things (and there is definite risk), especially given his age and other comorbidities.    We will follow.     Itz Lyle MD  04/09/23  13:09 EDT

## 2023-04-09 NOTE — PROGRESS NOTES
Patient Identification:  NAME:  Harry Potts  Age:  89 y.o.   Sex:  male   :  1930   MRN:  [id number]       Chief complaint: Metabolic encephalopathy, sundowning, psychosis, dementia without behavioral changes    History of present illness: Looks pretty good today but basically is not speaking to anybody the daughter thinks that the Precedex was very helpful last night he did not need any Zyprexa.  He is still a little bit hypoxic with blood gases today showing PO2 79, PCO2 69    Past medical history:  Past Medical History:   Diagnosis Date   • Arthritis    • Asthma     Oxygen at home   • Cancer     basal cell    • COPD (chronic obstructive pulmonary disease)    • Difficulty walking    • Elevated cholesterol    • Environmental allergies    • GERD (gastroesophageal reflux disease)    • HL (hearing loss)    • Hyperglycemia    • Hyperlipidemia    • Hypertension    • Obesity    • Sleep apnea    • Spondylolysis, lumbar region        Allergies:  Atorvastatin and Morphine and related    Home medications:  Medications Prior to Admission   Medication Sig Dispense Refill Last Dose   • albuterol sulfate  (90 Base) MCG/ACT inhaler Inhale 2 puffs Every 4 (Four) Hours As Needed for Wheezing. 18 g 5    • amLODIPine (NORVASC) 10 MG tablet TAKE 1 TABLET DAILY 90 tablet 3    • aspirin 81 MG EC tablet Take 1 tablet by mouth Daily.      • Azelastine HCl 137 MCG/SPRAY solution USE 1 SPRAY IN EACH NOSTRIL AS DIRECTED BY PROVIDER DAILY 30 mL 2    • celecoxib (CeleBREX) 200 MG capsule TAKE 1 CAPSULE DAILY AS NEEDED 90 capsule 3    • chlorthalidone (HYGROTON) 25 MG tablet TAKE 1 TABLET DAILY 90 tablet 3    • doxazosin (CARDURA) 4 MG tablet TAKE 1 TABLET DAILY AS DIRECTED 90 tablet 3    • esomeprazole (nexIUM) 40 MG capsule TAKE 1 CAPSULE DAILY 90 capsule 3    • ezetimibe (ZETIA) 10 MG tablet TAKE 1 TABLET DAILY 90 tablet 3    • fluticasone (FLONASE) 50 MCG/ACT nasal spray 1 spray into the nostril(s) as  directed by provider Daily. 11.1 mL 2    • fluticasone-salmeterol (Advair Diskus) 250-50 MCG/DOSE DISKUS Inhale 1 puff 2 (Two) Times a Day. 180 each 3    • loratadine (CLARITIN) 10 MG tablet Take  by mouth Daily.      • metoprolol succinate XL (TOPROL-XL) 50 MG 24 hr tablet Take 0.5 tablets by mouth Daily. 90 tablet 3    • Multiple Vitamins-Minerals (CENTRUM ADULTS PO) Take  by mouth Daily.      • TiZANidine (ZANAFLEX) 2 MG capsule Take 1 capsule by mouth Daily As Needed for Muscle Spasms. (Patient taking differently: Take 1 capsule by mouth 3 (Three) Times a Day.) 90 capsule 3    • Omega-3 Fatty Acids (FISH OIL) 1000 MG capsule capsule Take  by mouth Daily With Breakfast.      • tiZANidine (ZANAFLEX) 2 MG tablet            [facility] medications:  amLODIPine, 10 mg, Oral, Daily  ammonium lactate, 1 application, Topical, Q12H  aspirin, 81 mg, Oral, Daily  budesonide-formoterol, 2 puff, Inhalation, BID - RT  enoxaparin, 1 mg/kg, Subcutaneous, Q12H  fluticasone, 1 spray, Nasal, Daily  ipratropium-albuterol, 3 mL, Nebulization, Q6H While Awake - RT  metoprolol succinate XL, 25 mg, Oral, Daily  miconazole, , Topical, Q12H  pantoprazole, 40 mg, Oral, Q AM  potassium chloride, 40 mEq, Oral, Once  senna-docusate sodium, 2 tablet, Oral, BID  sodium chloride, 10 mL, Intravenous, Q12H  sodium chloride, 10 mL, Intravenous, Q12H  terazosin, 5 mg, Oral, Daily      dexmedetomidine, 0.2-1.5 mcg/kg/hr, Last Rate: Stopped (04/09/23 0729)      •  acetaminophen **OR** acetaminophen **OR** acetaminophen  •  albuterol  •  senna-docusate sodium **AND** polyethylene glycol **AND** bisacodyl **AND** bisacodyl  •  calcium carbonate  •  hydrALAZINE  •  ipratropium-albuterol  •  nitroglycerin  •  OLANZapine  •  ondansetron **OR** ondansetron  •  sodium chloride  •  sodium chloride  •  sodium chloride  •  sodium chloride  •  sodium chloride      Objective:  Vitals Ranges:   Temp:  [97.8 °F (36.6 °C)-99.3 °F (37.4 °C)] 98.9 °F (37.2 °C)  Heart  Rate:  [39-96] 70  Resp:  [16-22] 22  BP: (122-186)/() 160/75      Physical Exam:  AWake moderately alert will not speak to me but I ask him to waved bye and he does so with his left hand.  Blinks to threat.  Pupils 3 and half constricting slightly bilaterally.  Face symmetrical.  Equal tone throughout squeezes bilaterally and wiggles toes, toes downgoing bilaterally.  Reflexes trace and symmetrical  Results review:   I reviewed the patient's new clinical results.    Data review:  Lab Results (last 24 hours)     Procedure Component Value Units Date/Time    Basic Metabolic Panel [[id number]]  (Abnormal) Collected: 04/09/23 0432    Specimen: Blood Updated: 04/09/23 0524     Glucose 113 mg/dL      BUN 26 mg/dL      Creatinine 0.60 mg/dL      Sodium 144 mmol/L      Potassium 3.5 mmol/L      Chloride 96 mmol/L      CO2 41.0 mmol/L      Calcium 9.2 mg/dL      BUN/Creatinine Ratio 43.3     Anion Gap 7.0 mmol/L      eGFR 90.6 mL/min/1.73     Narrative:      GFR Normal >60  Chronic Kidney Disease <60  Kidney Failure <15    The GFR formula is only valid for adults with stable renal function between ages 18 and 70.    CBC (No Diff) [[id number]]  (Abnormal) Collected: 04/09/23 0432    Specimen: Blood Updated: 04/09/23 0510     WBC 6.18 10*3/mm3      RBC 5.01 10*6/mm3      Hemoglobin 14.7 g/dL      Hematocrit 44.6 %      MCV 89.0 fL      MCH 29.3 pg      MCHC 33.0 g/dL      RDW 12.0 %      RDW-SD 40.0 fl      MPV 10.0 fL      Platelets 117 10*3/mm3     Blood Gas, Arterial - [[id number]]  (Abnormal) Collected: 04/09/23 0330    Specimen: Arterial Blood Updated: 04/09/23 0452     Site Arterial: right brachial     Wyatt's Test Positive     pH, Arterial 7.443 pH units      pCO2, Arterial 69.8 mm Hg      Comment: sat 92  AVAP 650 MaxI - 30 MinI - 15 Epap - 10 Meter: [id number] : 4Critical:Notify Dr JAIRO PANDEY (09-Apr-23 03:33:28)Read back ok        pO2, Arterial 78.4 mm Hg      HCO3, Arterial 47.7 mmol/L       Base Excess, Arterial 18.5 mmol/L      O2 Saturation Calculated 95.2 %      A-a DO2 0.3 mmHg      Barometric Pressure for Blood Gas 756.0 mmHg      Modality BiPap     FIO2 45 %      Ventilator Mode AVAP     Set Tidal Volume 650     Set [facility] Resp Rate 18     Rate 18 Breaths/minute     POC Glucose Once [[id number]]  (Normal) Collected: 04/09/23 0115    Specimen: Blood Updated: 04/09/23 0117     Glucose 113 mg/dL      Comment: Meter: [id number] : TOÑITO Vogel RN              Imaging:  Imaging Results (Last 24 Hours)     ** No results found for the last 24 hours. **         PPE worn at all times washed before washed up afterwards disposed of everything properly is not within 6 feet of them for more than few minutes during my exam no aerosols used at any    Assessment and Plan:     Awake moderately alert but will not speak to me.  He is on low-dose Precedex.  I believe he has metabolic encephalopathy but also there is definitely some element of psychosis.  He still has some hypoxia with PO2 earlier of 79 and PCO2 of 69.    Note I do not have him on any scheduled neuroleptic medication but add as needed ultra low-dose of Zyprexa as needed.  This would not cause respiratory suppression  Nonetheless I agree with the daughter that the Precedex has been very beneficial.    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  Reconsult thanks      Jae Becker MD  04/09/23  12:37 EDT

## 2023-04-10 ENCOUNTER — APPOINTMENT (OUTPATIENT)
Dept: GENERAL RADIOLOGY | Facility: HOSPITAL | Age: 88
DRG: 189 | End: 2023-04-10
Payer: MEDICARE

## 2023-04-10 LAB
GLUCOSE BLDC GLUCOMTR-MCNC: 105 MG/DL (ref 70–130)
GLUCOSE BLDC GLUCOMTR-MCNC: 108 MG/DL (ref 70–130)

## 2023-04-10 PROCEDURE — 25010000002 ENOXAPARIN PER 10 MG: Performed by: INTERNAL MEDICINE

## 2023-04-10 PROCEDURE — 94799 UNLISTED PULMONARY SVC/PX: CPT

## 2023-04-10 PROCEDURE — 94664 DEMO&/EVAL PT USE INHALER: CPT

## 2023-04-10 PROCEDURE — 94761 N-INVAS EAR/PLS OXIMETRY MLT: CPT

## 2023-04-10 PROCEDURE — 99232 SBSQ HOSP IP/OBS MODERATE 35: CPT | Performed by: INTERNAL MEDICINE

## 2023-04-10 PROCEDURE — 71045 X-RAY EXAM CHEST 1 VIEW: CPT

## 2023-04-10 PROCEDURE — 97110 THERAPEUTIC EXERCISES: CPT

## 2023-04-10 PROCEDURE — 94760 N-INVAS EAR/PLS OXIMETRY 1: CPT

## 2023-04-10 PROCEDURE — 82962 GLUCOSE BLOOD TEST: CPT

## 2023-04-10 RX ORDER — DEXMEDETOMIDINE HYDROCHLORIDE 4 UG/ML
.2-1.5 INJECTION, SOLUTION INTRAVENOUS
Status: DISCONTINUED | OUTPATIENT
Start: 2023-04-11 | End: 2023-04-11

## 2023-04-10 RX ADMIN — BUDESONIDE AND FORMOTEROL FUMARATE DIHYDRATE 2 PUFF: 160; 4.5 AEROSOL RESPIRATORY (INHALATION) at 08:27

## 2023-04-10 RX ADMIN — AMMONIUM LACTATE 1 APPLICATION: 120 CREAM TOPICAL at 05:22

## 2023-04-10 RX ADMIN — Medication 10 ML: at 08:40

## 2023-04-10 RX ADMIN — METOPROLOL SUCCINATE 25 MG: 25 TABLET, EXTENDED RELEASE ORAL at 08:39

## 2023-04-10 RX ADMIN — DOCUSATE SODIUM 50MG AND SENNOSIDES 8.6MG 2 TABLET: 8.6; 5 TABLET, FILM COATED ORAL at 20:00

## 2023-04-10 RX ADMIN — TERAZOSIN HYDROCHLORIDE 5 MG: 5 CAPSULE ORAL at 08:40

## 2023-04-10 RX ADMIN — ENOXAPARIN SODIUM 135 MG: 150 INJECTION SUBCUTANEOUS at 05:22

## 2023-04-10 RX ADMIN — ENOXAPARIN SODIUM 135 MG: 150 INJECTION SUBCUTANEOUS at 17:00

## 2023-04-10 RX ADMIN — MICONAZOLE NITRATE: 2 POWDER TOPICAL at 08:40

## 2023-04-10 RX ADMIN — Medication 10 ML: at 20:00

## 2023-04-10 RX ADMIN — IPRATROPIUM BROMIDE AND ALBUTEROL SULFATE 3 ML: 2.5; .5 SOLUTION RESPIRATORY (INHALATION) at 12:15

## 2023-04-10 RX ADMIN — IPRATROPIUM BROMIDE AND ALBUTEROL SULFATE 3 ML: 2.5; .5 SOLUTION RESPIRATORY (INHALATION) at 06:40

## 2023-04-10 RX ADMIN — ASPIRIN 81 MG: 81 TABLET, COATED ORAL at 08:40

## 2023-04-10 RX ADMIN — MICONAZOLE NITRATE: 2 POWDER TOPICAL at 20:00

## 2023-04-10 RX ADMIN — AMMONIUM LACTATE 1 APPLICATION: 120 CREAM TOPICAL at 17:56

## 2023-04-10 RX ADMIN — BUDESONIDE AND FORMOTEROL FUMARATE DIHYDRATE 2 PUFF: 160; 4.5 AEROSOL RESPIRATORY (INHALATION) at 19:34

## 2023-04-10 RX ADMIN — FLUTICASONE PROPIONATE 1 SPRAY: 50 SPRAY, METERED NASAL at 08:40

## 2023-04-10 RX ADMIN — AMLODIPINE BESYLATE 10 MG: 10 TABLET ORAL at 08:39

## 2023-04-10 RX ADMIN — DOCUSATE SODIUM 50MG AND SENNOSIDES 8.6MG 2 TABLET: 8.6; 5 TABLET, FILM COATED ORAL at 08:40

## 2023-04-10 RX ADMIN — PANTOPRAZOLE SODIUM 40 MG: 40 TABLET, DELAYED RELEASE ORAL at 05:23

## 2023-04-10 NOTE — PROGRESS NOTES
[facility] Progress Note       Encounter Date:04/10/23  Patient:Harry Potts  :1930  MRN:[id number]      Chief Complaint: Follow-up shortness of breath      Subjective:        Awake on nasal cannula this morning.  Was on BiPAP overnight    Review of Systems:  Review of Systems   Constitutional: Positive for malaise/fatigue.   Cardiovascular: Positive for dyspnea on exertion.   Respiratory: Positive for shortness of breath.    Psychiatric/Behavioral: Positive for altered mental status.       Medications:  Scheduled Meds:  amLODIPine, 10 mg, Oral, Daily  ammonium lactate, 1 application, Topical, Q12H  aspirin, 81 mg, Oral, Daily  budesonide-formoterol, 2 puff, Inhalation, BID - RT  enoxaparin, 1 mg/kg, Subcutaneous, Q12H  fluticasone, 1 spray, Nasal, Daily  ipratropium-albuterol, 3 mL, Nebulization, Q6H While Awake - RT  metoprolol succinate XL, 25 mg, Oral, Daily  miconazole, , Topical, Q12H  pantoprazole, 40 mg, Oral, Q AM  potassium chloride, 40 mEq, Oral, Once  senna-docusate sodium, 2 tablet, Oral, BID  sodium chloride, 10 mL, Intravenous, Q12H  sodium chloride, 10 mL, Intravenous, Q12H  terazosin, 5 mg, Oral, Daily    Continuous Infusions:  dexmedetomidine, 0.2-1.5 mcg/kg/hr, Last Rate: Stopped (04/10/23 0405)    PRN Meds:  •  acetaminophen **OR** acetaminophen **OR** acetaminophen  •  albuterol  •  senna-docusate sodium **AND** polyethylene glycol **AND** bisacodyl **AND** bisacodyl  •  calcium carbonate  •  hydrALAZINE  •  ipratropium-albuterol  •  nitroglycerin  •  OLANZapine  •  ondansetron **OR** ondansetron  •  sodium chloride  •  sodium chloride  •  sodium chloride  •  sodium chloride  •  sodium chloride         Objective:       Vitals:    04/10/23 0600 04/10/23 0640 04/10/23 0648 04/10/23 0827   BP: 114/78      Pulse: 78 77 72    Resp:  20  18   Temp:       TempSrc:       SpO2: 95% 97% 96%    Weight:       Height:               Physical Exam:  Constitutional:  Chronically  ill-appearing, well developed, no acute distress   HENT: Oropharynx clear and membrane moist  Eyes: Normal conjunctiva, no sclera icterus.  Neck: Supple, no carotid bruit bilaterally.  Cardiovascular: Irregularly irregular rate and rhythm, No Murmur, Trace bilateral lower extremity edema.  Pulmonary: Normal respiratory effort, normal lung sounds, no wheezing.  Abdominal: Soft, nontender, no hepatosplenomegaly, liver is non-pulsatile.  Neurological: Alert and oriented to self but having some trouble but easily remembers that he is in the hospital and guessed that he was at [facility]..   Skin: Warm, dry, no ecchymosis, no rash.  Psych: Appropriate mood and affect. Normal judgment and insight.           Lab Review:   Results from last 7 days   Lab Units 04/09/23  0432 04/08/23  0714 04/07/23  0708 04/05/23 0620 04/04/23 1755 04/03/23  1504   SODIUM mmol/L 144 143 143 140 138 140   POTASSIUM mmol/L 3.5 3.7 3.4* 3.8 3.3* 3.7   CHLORIDE mmol/L 96* 94* 95* 97* 94* 95*   TOTAL CO2 mmol/L  --   --   --   --   --  31*   CO2 mmol/L 41.0* 47.0* 39.1* 33.5* 33.5*  --    BUN mg/dL 26* 24* 23 14 21 15   CREATININE mg/dL 0.60* 0.68* 0.54* 0.52* 0.64* 0.75*   GLUCOSE mg/dL 113* 164* 153* 132* 159* 126*   CALCIUM mg/dL 9.2 9.5 9.6 9.4 8.9 9.5   AST (SGOT) U/L  --  19  --   --  24 23   ALT (SGPT) U/L  --  40  --   --  34 32     Results from last 7 days   Lab Units 04/05/23  0620 04/04/23 2001 04/04/23 1755   CK TOTAL U/L  --   --  117   HSTROP T ng/L 34* 22* 24*     Results from last 7 days   Lab Units 04/09/23  0432 04/08/23  0714 04/05/23  0620 04/04/23  1755 04/03/23  1504   WBC 10*3/mm3 6.18 6.85 4.73 6.17 6.8   HEMOGLOBIN g/dL 14.7 14.3 13.8 14.2 13.3   HEMATOCRIT % 44.6 46.5 43.2 43.2 41.0   PLATELETS 10*3/mm3 117* 122* 141 133* 137*                   Invalid input(s): LDLCALC  Results from last 7 days   Lab Units 04/08/23  0714 04/04/23  1755   PROBNP pg/mL 776.0 466.2     Results from last 7 days   Lab Units  04/03/23  1504   TSH uIU/mL 1.970            Assessment:          Diagnosis Plan   1. Hypoxia        2. Confusion        3. General weakness        4. Physical deconditioning        5. Hypokalemia        6. Atrial fibrillation, unspecified type        7. Aneurysm of ascending aorta without rupture               Plan:       Mr. Potts is a 89 y.o. gentleman with past medical history notable for essential hypertension, mixed hyperlipidemia, morbid obesity, obstructive sleep apnea, asthma, premature ventricular contractions, and mild coronary disease which has been medically managed who presented to the hospital with respiratory failure requiring BiPAP and sedation on 4/5/2023.  He was also noted to have new onset atrial fibrillation in general from a cardiac standpoint has been doing okay does have known bradycardia with occasional PVCs.  He is not on beta-blocker given some of his bradycardia.  In general from a cardiac standpoint he seems to be doing reasonably well.    Paroxysmal atrial fibrillation:  · Currently on anticoagulation with Lovenox due to difficulty taking oral medications.  · Could transition over to oral anticoagulation once safe  · Rate seems reasonably controlled we have also held off on transitioning to IV beta-blocker but can monitor rate if increases can give IV beta-blocker as needed    Premature ventricular contractions:  · Chronic issue  · Asymptomatic  · We will continue to monitor again could always give doses of IV metoprolol if needed    Essential hypertension:  · Continue to monitor  · Currently treated with IV hydralazine    Pulmonary hypertension:  · Likely related underlying lung disease  · We will continue to monitor volume status but seems reasonably euvolemic today and proBNP level within normal limits on admission             Manjinder Trinidad MD  [facility]  04/10/23  08:42 EDT

## 2023-04-10 NOTE — PROGRESS NOTES
[facility]     Mar/chart reviewed  Follow up respiratory failure  He is confused, tries to use remote as phone  Unable to provide much usable history    Vital Sign Min/Max for last 24 hours  Temp  Min: 98.1 °F (36.7 °C)  Max: 98.6 °F (37 °C)   BP  Min: 96/72  Max: 174/88   Pulse  Min: 44  Max: 100   Resp  Min: 18  Max: 20   SpO2  Min: 86 %  Max: 100 %   Flow (L/min)  Min: 2  Max: 3   Weight  Min: 134 kg (294 lb 5 oz)  Max: 134 kg (294 lb 5 oz)   547/1450  Appears ill, confused  perrl, eomi, normal sclera  mmm, no jvd, trachea midline, neck supple,  chest decreased ae bilaterally, no crackles, no wheezes,   rrr,   soft, nt, nd +bs,  no c/c/ 1+ edema  Skin warm, dry no rashes    Labs: 4/09: reviewed:  Glucose 113  Bun 26  Cr 0.6  Na 144  Bicarb 41  Wbc 6  hgb 14.7  plts 117  7.44/69/78    4/10: cardiomegaly, pulm vasc congestion    A/P:  1. Chronic hypoxemic and hypercapnic respiratory failure - continue oxygen, NIPPV at HS  2. JENISE -- nippv at HS  3. 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 Morbid obesity  5. 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 Encephalopathy -- underlying dementia  7. HTN  8. Pulmonary hypertnesion  9. afib    Needed precedex last night, still pretty confused. Will continue to monitor in icu    Patient is new to me today

## 2023-04-10 NOTE — PLAN OF CARE
Problem: Adult Inpatient Plan of Care  Goal: Plan of Care Review  Outcome: Ongoing, Progressing  Goal: Patient-Specific Goal (Individualized)  Outcome: Ongoing, Progressing  Goal: Absence of Hospital-Acquired Illness or Injury  Outcome: Ongoing, Progressing  Intervention: Identify and Manage Fall Risk  Recent Flowsheet Documentation  Taken 4/10/2023 0400 by Lela Gallagher RN  Safety Promotion/Fall Prevention:   clutter free environment maintained   fall prevention program maintained   lighting adjusted   nonskid shoes/slippers when out of bed   safety round/check completed  Taken 4/10/2023 0200 by Lela Gallagher RN  Safety Promotion/Fall Prevention: clutter free environment maintained  Taken 4/10/2023 0000 by Lela Gallagher RN  Safety Promotion/Fall Prevention:   clutter free environment maintained   fall prevention program maintained   gait belt   lighting adjusted   nonskid shoes/slippers when out of bed   safety round/check completed  Taken 4/9/2023 2000 by Lela Gallagher RN  Safety Promotion/Fall Prevention:   clutter free environment maintained   fall prevention program maintained   gait belt   lighting adjusted   nonskid shoes/slippers when out of bed   safety round/check completed  Intervention: Prevent Skin Injury  Recent Flowsheet Documentation  Taken 4/10/2023 0400 by Lela Gallagher RN  Body Position: left  Taken 4/10/2023 0200 by Lela Gallagher RN  Body Position: right  Taken 4/10/2023 0000 by Lela Gallagher RN  Body Position: supine  Taken 4/9/2023 2200 by Lela Gallagher RN  Body Position: right  Taken 4/9/2023 2000 by Lela Gallagher RN  Body Position: left  Intervention: Prevent and Manage VTE (Venous Thromboembolism) Risk  Recent Flowsheet Documentation  Taken 4/10/2023 0400 by Lela Gallagher RN  Activity Management: bedrest  Taken 4/10/2023 0000 by Lela Gallagher RN  Activity Management: bedrest  Taken 4/9/2023 2000 by Lela Gallagher RN  Activity Management: bedrest  VTE Prevention/Management:    bilateral   sequential compression devices on  Intervention: Prevent Infection  Recent Flowsheet Documentation  Taken 4/10/2023 0400 by Lela Gallagher RN  Infection Prevention:   hand hygiene promoted   environmental surveillance performed   personal protective equipment utilized   rest/sleep promoted   single patient room provided  Taken 4/10/2023 0200 by Lela Gallagher RN  Infection Prevention:   environmental surveillance performed   hand hygiene promoted   personal protective equipment utilized   rest/sleep promoted   single patient room provided  Taken 4/10/2023 0000 by Lela Gallagher RN  Infection Prevention:   environmental surveillance performed   hand hygiene promoted   personal protective equipment utilized   rest/sleep promoted   single patient room provided  Taken 4/9/2023 2000 by Lela Gallagher RN  Infection Prevention:   environmental surveillance performed   equipment surfaces disinfected   hand hygiene promoted   personal protective equipment utilized   rest/sleep promoted   single patient room provided  Goal: Optimal Comfort and Wellbeing  Outcome: Ongoing, Progressing  Intervention: Provide Person-Centered Care  Recent Flowsheet Documentation  Taken 4/9/2023 2000 by Lela Gallagher RN  Trust Relationship/Rapport: care explained  Goal: Readiness for Transition of Care  Outcome: Ongoing, Progressing     Problem: Fall Injury Risk  Goal: Absence of Fall and Fall-Related Injury  Outcome: Ongoing, Progressing  Intervention: Identify and Manage Contributors  Recent Flowsheet Documentation  Taken 4/10/2023 0400 by Lela Gallagher RN  Medication Review/Management: medications reviewed  Taken 4/10/2023 0200 by Lela Gallagher RN  Medication Review/Management: medications reviewed  Taken 4/10/2023 0000 by Lela Gallagher RN  Medication Review/Management: medications reviewed  Taken 4/9/2023 2000 by Lela Gallagher RN  Medication Review/Management: medications reviewed  Intervention: Promote Injury-Free  Environment  Recent Flowsheet Documentation  Taken 4/10/2023 0400 by Lela Gallagher RN  Safety Promotion/Fall Prevention:   clutter free environment maintained   fall prevention program maintained   lighting adjusted   nonskid shoes/slippers when out of bed   safety round/check completed  Taken 4/10/2023 0200 by Lela Gallagher RN  Safety Promotion/Fall Prevention: clutter free environment maintained  Taken 4/10/2023 0000 by Lela Gallagher RN  Safety Promotion/Fall Prevention:   clutter free environment maintained   fall prevention program maintained   gait belt   lighting adjusted   nonskid shoes/slippers when out of bed   safety round/check completed  Taken 4/9/2023 2000 by Lela Gallagher RN  Safety Promotion/Fall Prevention:   clutter free environment maintained   fall prevention program maintained   gait belt   lighting adjusted   nonskid shoes/slippers when out of bed   safety round/check completed     Problem: Asthma Comorbidity  Goal: Maintenance of Asthma Control  Outcome: Ongoing, Progressing  Intervention: Maintain Asthma Symptom Control  Recent Flowsheet Documentation  Taken 4/10/2023 0400 by Lela Gallagher RN  Medication Review/Management: medications reviewed  Taken 4/10/2023 0200 by Lela Gallagher RN  Medication Review/Management: medications reviewed  Taken 4/10/2023 0000 by Lela Gallagher RN  Medication Review/Management: medications reviewed  Taken 4/9/2023 2000 by Lela Gallagher RN  Medication Review/Management: medications reviewed     Problem: Behavioral Health Comorbidity  Goal: Maintenance of Behavioral Health Symptom Control  Outcome: Ongoing, Progressing  Intervention: Maintain Behavioral Health Symptom Control  Recent Flowsheet Documentation  Taken 4/10/2023 0400 by Lela Gallagher RN  Medication Review/Management: medications reviewed  Taken 4/10/2023 0200 by Lela Gallagher RN  Medication Review/Management: medications reviewed  Taken 4/10/2023 0000 by Lela Gallagher RN  Medication  Review/Management: medications reviewed  Taken 4/9/2023 2000 by Lela Gallagher RN  Medication Review/Management: medications reviewed     Problem: COPD (Chronic Obstructive Pulmonary Disease) Comorbidity  Goal: Maintenance of COPD Symptom Control  Outcome: Ongoing, Progressing  Intervention: Maintain COPD-Symptom Control  Recent Flowsheet Documentation  Taken 4/10/2023 0400 by Lela Gallagher RN  Medication Review/Management: medications reviewed  Taken 4/10/2023 0200 by Lela Gallagher RN  Medication Review/Management: medications reviewed  Taken 4/10/2023 0000 by Lela Gallagher RN  Medication Review/Management: medications reviewed  Taken 4/9/2023 2000 by Lela Gallagher RN  Medication Review/Management: medications reviewed     Problem: Diabetes Comorbidity  Goal: Blood Glucose Level Within Targeted Range  Outcome: Ongoing, Progressing     Problem: Heart Failure Comorbidity  Goal: Maintenance of Heart Failure Symptom Control  Outcome: Ongoing, Progressing  Intervention: Maintain Heart Failure-Management  Recent Flowsheet Documentation  Taken 4/10/2023 0400 by Lela Gallagher RN  Medication Review/Management: medications reviewed  Taken 4/10/2023 0200 by Lela Gallagher RN  Medication Review/Management: medications reviewed  Taken 4/10/2023 0000 by Lela Gallagher RN  Medication Review/Management: medications reviewed  Taken 4/9/2023 2000 by Lela Gallagher RN  Medication Review/Management: medications reviewed     Problem: Hypertension Comorbidity  Goal: Blood Pressure in Desired Range  Outcome: Ongoing, Progressing  Intervention: Maintain Blood Pressure Management  Recent Flowsheet Documentation  Taken 4/10/2023 0400 by Lela Gallagher RN  Medication Review/Management: medications reviewed  Taken 4/10/2023 0200 by Lela Gallagher RN  Medication Review/Management: medications reviewed  Taken 4/10/2023 0000 by Lela Gallagher RN  Medication Review/Management: medications reviewed  Taken 4/9/2023 2000 by Lela Gallagher  RN  Medication Review/Management: medications reviewed     Problem: Obstructive Sleep Apnea Risk or Actual Comorbidity Management  Goal: Unobstructed Breathing During Sleep  Outcome: Ongoing, Progressing     Problem: Osteoarthritis Comorbidity  Goal: Maintenance of Osteoarthritis Symptom Control  Outcome: Ongoing, Progressing  Intervention: Maintain Osteoarthritis Symptom Control  Recent Flowsheet Documentation  Taken 4/10/2023 0400 by Lela Gallagher RN  Activity Management: bedrest  Medication Review/Management: medications reviewed  Taken 4/10/2023 0200 by Lela Gallagher RN  Medication Review/Management: medications reviewed  Taken 4/10/2023 0000 by Lela Gallagher RN  Activity Management: bedrest  Medication Review/Management: medications reviewed  Taken 4/9/2023 2000 by Lela Gallagher RN  Activity Management: bedrest  Medication Review/Management: medications reviewed     Problem: Pain Chronic (Persistent) (Comorbidity Management)  Goal: Acceptable Pain Control and Functional Ability  Outcome: Ongoing, Progressing  Intervention: Manage Persistent Pain  Recent Flowsheet Documentation  Taken 4/10/2023 0400 by Lela Gallagher RN  Medication Review/Management: medications reviewed  Taken 4/10/2023 0200 by Lela Gallagher RN  Medication Review/Management: medications reviewed  Taken 4/10/2023 0000 by Lela Gallagher RN  Medication Review/Management: medications reviewed  Taken 4/9/2023 2000 by Lela Gallagher RN  Medication Review/Management: medications reviewed     Problem: Seizure Disorder Comorbidity  Goal: Maintenance of Seizure Control  Outcome: Ongoing, Progressing  Intervention: Maintain Seizure-Symptom Control  Recent Flowsheet Documentation  Taken 4/9/2023 2000 by Lela Gallagher RN  Seizure Precautions:   clutter-free environment maintained   emergency equipment at bedside     Problem: Skin Injury Risk Increased  Goal: Skin Health and Integrity  Outcome: Ongoing, Progressing  Intervention: Optimize Skin  Protection  Recent Flowsheet Documentation  Taken 4/10/2023 0400 by Lela Gallagher RN  Head of Bed (HOB) Positioning: HOB at 30 degrees  Taken 4/10/2023 0200 by Lela Gallagher RN  Head of Bed (HOB) Positioning: HOB at 30 degrees  Taken 4/10/2023 0000 by Lela Gallagher RN  Head of Bed (HOB) Positioning: HOB at 30 degrees  Taken 4/9/2023 2200 by Lela Gallagher RN  Head of Bed (HOB) Positioning: HOB at 30 degrees  Taken 4/9/2023 2000 by Lela Gallagher RN  Pressure Reduction Techniques: weight shift assistance provided  Head of Bed (HOB) Positioning: HOB at 30 degrees  Pressure Reduction Devices: pressure-redistributing mattress utilized   Goal Outcome Evaluation:

## 2023-04-10 NOTE — THERAPY TREATMENT NOTE
Patient Name: Harry Potts  : 1930    MRN: [id number]                              Today's Date: 4/10/2023       Admit Date: 2023    Visit Dx:     ICD-10-CM ICD-9-CM   1. Hypoxia  R09.02 799.02   2. Confusion  R41.0 298.9   3. General weakness  R53.1 780.79   4. Physical deconditioning  R53.81 799.3   5. Hypokalemia  E87.6 276.8   6. Atrial fibrillation, unspecified type  I48.91 427.31   7. Aneurysm of ascending aorta without rupture  I71.21 441.2     Patient Active Problem List   Diagnosis   • Dysfunction of eustachian tube   • Gastroesophageal reflux disease   • Hyperglycemia   • Hypercholesterolemia   • Hypertension   • Morbid obesity   • Osteoarthritis of lumbar spine with myelopathy   • Osteoarthritis of lumbar spine   • Bronchitis   • Viral URI with cough   • PVC (premature ventricular contraction)   • Non-traumatic rhabdomyolysis   • Obesity (BMI 30-39.9)   • Chronic low back pain   • Weakness   • Edema, bilateral lower extremities   • COPD (chronic obstructive pulmonary disease)   • Irregularly irregular cardiac rhythm   • JENISE on CPAP   • Medicare annual wellness visit, subsequent   • Hypoxia   • Chronic respiratory failure with hypoxia     Past Medical History:   Diagnosis Date   • Arthritis    • Asthma     Oxygen at home   • Cancer     basal cell    • COPD (chronic obstructive pulmonary disease)    • Difficulty walking    • Elevated cholesterol    • Environmental allergies    • GERD (gastroesophageal reflux disease)    • HL (hearing loss)    • Hyperglycemia    • Hyperlipidemia    • Hypertension    • Obesity    • Sleep apnea    • Spondylolysis, lumbar region      Past Surgical History:   Procedure Laterality Date   • CARDIAC CATHETERIZATION  circa     [facility]   • EYE SURGERY     • HERNIA REPAIR     • JOINT REPLACEMENT     • REPLACEMENT TOTAL KNEE BILATERAL        General Information     Row Name 04/10/23 1218          Physical Therapy Time and Intention    Document Type  therapy note (daily note) (P)   -MO     Mode of Treatment physical therapy (P)   -MO     Row Name 04/10/23 1218          General Information    Patient Profile Reviewed yes (P)   -MO     Existing Precautions/Restrictions fall (P)   -MO     Row Name 04/10/23 1218          Cognition    Orientation Status (Cognition) unable/difficult to assess;person;place;verbal cues/prompts needed for orientation (P)   Pleasantly confused throughout session however is oriented to person and place.  -MO     Row Name 04/10/23 1218          Safety Issues, Functional Mobility    Impairments Affecting Function (Mobility) balance;cognition;endurance/activity tolerance;strength (P)   -MO           User Key  (r) = Recorded By, (t) = Taken By, (c) = Cosigned By    Initials Name Provider Type    Lianna Juan, PT Student PT Student               Mobility     Row Name 04/10/23 1219          Bed Mobility    Bed Mobility supine-sit;sit-supine (P)   -MO     Supine-Sit [place] (Bed Mobility) minimum assist (75% patient effort);1 person assist (P)   -MO     Sit-Supine [place] (Bed Mobility) minimum assist (75% patient effort);moderate assist (50% patient effort);2 person assist (P)   -MO     Assistive Device (Bed Mobility) bed rails;draw sheet;head of bed elevated (P)   -MO     Row Name 04/10/23 1219          Sit-Stand Transfer    Sit-Stand [place] (Transfers) moderate assist (50% patient effort);2 person assist;verbal cues (P)   -MO     Assistive Device (Sit-Stand Transfers) walker, front-wheeled (P)   -MO     Comment, (Sit-Stand Transfer) STS x 3 attempts. Unable to clear bed 1st attempt, improves with each trial. Stands for several seconds between each one. Verbal cueing for safe hand placement as well as for upright posture. Attempts to take side steps, 1-2 scoots made with L foot, unable to fully clear (P)   -MO           User Key  (r) = Recorded By, (t) = Taken By, (c) = Cosigned By    Initials Name Provider Type    NIRALI Owens  Lianna, PT Student PT Student               Obj/Interventions     Row Name 04/10/23 1221          Motor Skills    Therapeutic Exercise -- (P)   Seated ther ex: 2x10 AP, LAQ  -MO     Row Name 04/10/23 1221          Balance    Balance Assessment sitting static balance;sitting dynamic balance (P)   -MO     Static Sitting Balance standby assist (P)   -MO     Dynamic Sitting Balance standby assist (P)   -MO     Position, Sitting Balance sitting edge of bed (P)   -MO     Static Standing Balance minimal assist;2-person assist (P)   -MO     Position/Device Used, Standing Balance walker, front-wheeled (P)   -MO           User Key  (r) = Recorded By, (t) = Taken By, (c) = Cosigned By    Initials Name Provider Type    Lianna Juan, PT Student PT Student               Goals/Plan    No documentation.                Clinical Impression     Row Name 04/10/23 1222          Pain    Pretreatment Pain Rating 0/10 - no pain (P)   -MO     Posttreatment Pain Rating 0/10 - no pain (P)   -MO     Row Name 04/10/23 1222          Plan of Care Review    Plan of Care Reviewed With patient (P)   -MO     Outcome Evaluation Pt seen this am for PT, he is alert and agreeable to therapy. A&O x2, pleasantly confused throughout session and follows all directions. Pt hasn't been seen in several days d/t increased respiratory distress and ICU transfer, appears to have increased weakness and overal mobility. Today he comes to EOB with Min A x1 and completes seated ther ex prior to mobility. Completes x3 STS with rwx and Mod A x2, increased difficulty as well as quick to fatigue. Attempts 1-2 side steps, level little movement right LLE, LE buckling. Returns to supine with Mod Ax2. At current status, appropriate for SNF however will continue to monitor for progress. (P)   -MO     Row Name 04/10/23 1222          Positioning and Restraints    Pre-Treatment Position in bed (P)   -MO     Post Treatment Position bed (P)   -MO     In Bed supine;encouraged to  call for assist;call light within reach;exit alarm on;notified nsg (P)   -MO           User Key  (r) = Recorded By, (t) = Taken By, (c) = Cosigned By    Initials Name Provider Type    Lianna Juan, PT Student PT Student               Outcome Measures     Row Name 04/10/23 1227          How much help from another person do you currently need...    Turning from your back to your side while in flat bed without using bedrails? 3 (P)   -MO     Moving from lying on back to sitting on the side of a flat bed without bedrails? 2 (P)   -MO     Moving to and from a bed to a chair (including a wheelchair)? 1 (P)   -MO     Standing up from a chair using your arms (e.g., wheelchair, bedside chair)? 2 (P)   -MO     Climbing 3-5 steps with a railing? 1 (P)   -MO     To walk in hospital room? 1 (P)   -MO     AM-PAC 6 Clicks Score (PT) 10 (P)   -MO     Highest level of mobility 4 --> Transferred to chair/commode (P)   -MO     Row Name 04/10/23 [facility]7          Functional Assessment    Outcome Measure Options AM-PAC 6 Clicks Basic Mobility (PT) (P)   -MO           User Key  (r) = Recorded By, (t) = Taken By, (c) = Cosigned By    Initials Name Provider Type    Lianna Juan, PT Student PT Student                             Physical Therapy Education     Title: PT OT SLP Therapies (In Progress)     Topic: Physical Therapy (In Progress)     Point: Mobility training (In Progress)     Learning Progress Summary           Patient Acceptance, E,D, NR by MO at 4/10/2023 1227    Acceptance, E, VU,NR by DJ at 4/6/2023 0952                   Point: Home exercise program (In Progress)     Learning Progress Summary           Patient Acceptance, E,D, NR by MO at 4/10/2023 1227    Acceptance, E, VU,NR by DJ at 4/6/2023 0952                   Point: Body mechanics (Done)     Learning Progress Summary           Patient Acceptance, E, VU,NR by DJ at 4/6/2023 0952                   Point: Precautions (Done)     Learning Progress Summary            Patient Acceptance, E, VU,NR by YUAN at 4/6/2023 0952                               User Key     Initials Effective Dates Name Provider Type Discipline     10/25/19 -  Iwona Jones, PT Physical Therapist PT    MO 01/27/23 -  Lianna Owens PT Student PT Student PT              PT Recommendation and Plan     Plan of Care Reviewed With: (P) patient  Outcome Evaluation: (P) Pt seen this am for PT, he is alert and agreeable to therapy. A&O x2, pleasantly confused throughout session and follows all directions. Pt hasn't been seen in several days d/t increased respiratory distress and ICU transfer, appears to have increased weakness and overal mobility. Today he comes to EOB with Min A x1 and completes seated ther ex prior to mobility. Completes x3 STS with rwx and Mod A x2, increased difficulty as well as quick to fatigue. Attempts 1-2 side steps, level little movement right LLE, LE buckling. Returns to supine with Mod Ax2. At current status, appropriate for SNF however will continue to monitor for progress.     Time Calculation:    PT Charges     Row Name 04/10/23 1228             Time Calculation    Start Time 1100 (P)   -MO      Stop Time 1124 (P)   -MO      Time Calculation (min) 24 min (P)   -MO      PT Received On 04/10/23 (P)   -MO      PT - Next Appointment 04/11/23 (P)   -MO         Time Calculation- PT    Total Timed Code Minutes- PT 24 minute(s) (P)   -MO            User Key  (r) = Recorded By, (t) = Taken By, (c) = Cosigned By    Initials Name Provider Type    Lianna Juan, PT Student PT Student              Therapy Charges for Today     Code Description Service Date Service Provider Modifiers Qty    99247172200 HC PT THER PROC EA 15 MIN 4/10/2023 Lianna Owens PT Student GP 2    73949597872 HC PT THER SUPP EA 15 MIN 4/10/2023 Lianna Owens, PT Student GP 1          PT G-Codes  Outcome Measure Options: (P) AM-PAC 6 Clicks Basic Mobility (PT)  AM-PAC 6 Clicks Score (PT): (P) 10       Lianna Owens PT  Student  4/10/2023

## 2023-04-10 NOTE — PLAN OF CARE
Goal Outcome Evaluation:  Plan of Care Reviewed With: (P) patient           Outcome Evaluation: (P) Pt seen this am for PT, he is alert and agreeable to therapy. A&O x2, pleasantly confused throughout session and follows all directions. Pt hasn't been seen in several days d/t increased respiratory distress and ICU transfer, appears to have increased weakness and overal mobility. Today he comes to EOB with Min A x1 and completes seated ther ex prior to mobility. Completes x3 STS with rwx and Mod A x2, increased difficulty as well as quick to fatigue. Attempts 1-2 side steps, level little movement right LLE, LE buckling. Returns to supine with Mod Ax2. At current status, appropriate for SNF however will continue to monitor for progress.

## 2023-04-10 NOTE — PLAN OF CARE
Goal Outcome Evaluation:               Pt remains on ICU, 2 L o2 all day, pt in/out of being confused. Ate well today, good urine output, worked well with PT. Family updated at bedside

## 2023-04-11 LAB
ANION GAP SERPL CALCULATED.3IONS-SCNC: 5.4 MMOL/L (ref 5–15)
ARTERIAL PATENCY WRIST A: POSITIVE
ATMOSPHERIC PRESS: 755 MMHG
BASE EXCESS BLDA CALC-SCNC: 16.3 MMOL/L (ref 0–2)
BDY SITE: ABNORMAL
BUN SERPL-MCNC: 25 MG/DL (ref 8–23)
BUN/CREAT SERPL: 35.2 (ref 7–25)
CALCIUM SPEC-SCNC: 9.1 MG/DL (ref 8.2–9.6)
CHLORIDE SERPL-SCNC: 95 MMOL/L (ref 98–107)
CO2 SERPL-SCNC: 42.6 MMOL/L (ref 22–29)
CREAT SERPL-MCNC: 0.71 MG/DL (ref 0.76–1.27)
DEPRECATED RDW RBC AUTO: 40.4 FL (ref 37–54)
EGFRCR SERPLBLD CKD-EPI 2021: 86.1 ML/MIN/1.73
ERYTHROCYTE [DISTWIDTH] IN BLOOD BY AUTOMATED COUNT: 12.3 % (ref 12.3–15.4)
GLUCOSE BLDC GLUCOMTR-MCNC: 102 MG/DL (ref 70–130)
GLUCOSE SERPL-MCNC: 113 MG/DL (ref 65–99)
HCO3 BLDA-SCNC: 44.7 MMOL/L (ref 22–28)
HCT VFR BLD AUTO: 42.2 % (ref 37.5–51)
HGB BLD-MCNC: 14.1 G/DL (ref 13–17.7)
INHALED O2 CONCENTRATION: 45 %
LYMPHOCYTES # BLD MANUAL: 0.42 10*3/MM3 (ref 0.7–3.1)
LYMPHOCYTES NFR BLD MANUAL: 3 % (ref 5–12)
MAGNESIUM SERPL-MCNC: 2.1 MG/DL (ref 1.7–2.3)
MCH RBC QN AUTO: 29.9 PG (ref 26.6–33)
MCHC RBC AUTO-ENTMCNC: 33.4 G/DL (ref 31.5–35.7)
MCV RBC AUTO: 89.4 FL (ref 79–97)
MODALITY: ABNORMAL
MONOCYTES # BLD: 0.18 10*3/MM3 (ref 0.1–0.9)
NEUTROPHILS # BLD AUTO: 5.27 10*3/MM3 (ref 1.7–7)
NEUTROPHILS NFR BLD MANUAL: 89.9 % (ref 42.7–76)
O2 A-A PPRESDIFF RESPIRATORY: 0.4 MMHG
PCO2 BLDA: 66.5 MM HG (ref 35–45)
PEEP RESPIRATORY: 10 CM[H2O]
PH BLDA: 7.44 PH UNITS (ref 7.35–7.45)
PHOSPHATE SERPL-MCNC: 3.4 MG/DL (ref 2.5–4.5)
PLAT MORPH BLD: NORMAL
PLATELET # BLD AUTO: 106 10*3/MM3 (ref 140–450)
PMV BLD AUTO: 10.7 FL (ref 6–12)
PO2 BLDA: 91.5 MM HG (ref 80–100)
POTASSIUM SERPL-SCNC: 2.8 MMOL/L (ref 3.5–5.2)
RBC # BLD AUTO: 4.72 10*6/MM3 (ref 4.14–5.8)
RBC MORPH BLD: NORMAL
SAO2 % BLDCOA: 96.9 % (ref 92–99)
SET MECH RESP RATE: 18
SODIUM SERPL-SCNC: 143 MMOL/L (ref 136–145)
TOTAL RATE: 18 BREATHS/MINUTE
VARIANT LYMPHS NFR BLD MANUAL: 7.1 % (ref 19.6–45.3)
VENTILATOR MODE: ABNORMAL
VT ON VENT VENT: 650 ML
WBC MORPH BLD: NORMAL
WBC NRBC COR # BLD: 5.86 10*3/MM3 (ref 3.4–10.8)

## 2023-04-11 PROCEDURE — 82803 BLOOD GASES ANY COMBINATION: CPT

## 2023-04-11 PROCEDURE — 94760 N-INVAS EAR/PLS OXIMETRY 1: CPT

## 2023-04-11 PROCEDURE — 85007 BL SMEAR W/DIFF WBC COUNT: CPT | Performed by: INTERNAL MEDICINE

## 2023-04-11 PROCEDURE — 83735 ASSAY OF MAGNESIUM: CPT | Performed by: INTERNAL MEDICINE

## 2023-04-11 PROCEDURE — 25010000002 ENOXAPARIN PER 10 MG: Performed by: INTERNAL MEDICINE

## 2023-04-11 PROCEDURE — 36600 WITHDRAWAL OF ARTERIAL BLOOD: CPT

## 2023-04-11 PROCEDURE — 82962 GLUCOSE BLOOD TEST: CPT

## 2023-04-11 PROCEDURE — 94799 UNLISTED PULMONARY SVC/PX: CPT

## 2023-04-11 PROCEDURE — 94761 N-INVAS EAR/PLS OXIMETRY MLT: CPT

## 2023-04-11 PROCEDURE — 99232 SBSQ HOSP IP/OBS MODERATE 35: CPT | Performed by: INTERNAL MEDICINE

## 2023-04-11 PROCEDURE — 94660 CPAP INITIATION&MGMT: CPT

## 2023-04-11 PROCEDURE — 84100 ASSAY OF PHOSPHORUS: CPT | Performed by: INTERNAL MEDICINE

## 2023-04-11 PROCEDURE — 85025 COMPLETE CBC W/AUTO DIFF WBC: CPT | Performed by: INTERNAL MEDICINE

## 2023-04-11 PROCEDURE — 0 POTASSIUM CHLORIDE 10 MEQ/100ML SOLUTION: Performed by: INTERNAL MEDICINE

## 2023-04-11 PROCEDURE — 80048 BASIC METABOLIC PNL TOTAL CA: CPT | Performed by: INTERNAL MEDICINE

## 2023-04-11 PROCEDURE — 94664 DEMO&/EVAL PT USE INHALER: CPT

## 2023-04-11 RX ORDER — POTASSIUM CHLORIDE 7.45 MG/ML
10 INJECTION INTRAVENOUS
Status: DISCONTINUED | OUTPATIENT
Start: 2023-04-11 | End: 2023-04-24 | Stop reason: HOSPADM

## 2023-04-11 RX ORDER — POTASSIUM CHLORIDE 1.5 G/1.77G
40 POWDER, FOR SOLUTION ORAL AS NEEDED
Status: DISCONTINUED | OUTPATIENT
Start: 2023-04-11 | End: 2023-04-24 | Stop reason: HOSPADM

## 2023-04-11 RX ORDER — POTASSIUM CHLORIDE 750 MG/1
40 TABLET, FILM COATED, EXTENDED RELEASE ORAL AS NEEDED
Status: DISCONTINUED | OUTPATIENT
Start: 2023-04-11 | End: 2023-04-24 | Stop reason: HOSPADM

## 2023-04-11 RX ADMIN — DOCUSATE SODIUM 50MG AND SENNOSIDES 8.6MG 2 TABLET: 8.6; 5 TABLET, FILM COATED ORAL at 08:56

## 2023-04-11 RX ADMIN — PANTOPRAZOLE SODIUM 40 MG: 40 TABLET, DELAYED RELEASE ORAL at 08:57

## 2023-04-11 RX ADMIN — ENOXAPARIN SODIUM 135 MG: 150 INJECTION SUBCUTANEOUS at 05:56

## 2023-04-11 RX ADMIN — TERAZOSIN HYDROCHLORIDE 5 MG: 5 CAPSULE ORAL at 08:56

## 2023-04-11 RX ADMIN — MICONAZOLE NITRATE 1 APPLICATION: 2 POWDER TOPICAL at 20:01

## 2023-04-11 RX ADMIN — POTASSIUM CHLORIDE 10 MEQ: 7.46 INJECTION, SOLUTION INTRAVENOUS at 16:07

## 2023-04-11 RX ADMIN — POTASSIUM CHLORIDE 10 MEQ: 7.46 INJECTION, SOLUTION INTRAVENOUS at 15:17

## 2023-04-11 RX ADMIN — BUDESONIDE AND FORMOTEROL FUMARATE DIHYDRATE 2 PUFF: 160; 4.5 AEROSOL RESPIRATORY (INHALATION) at 21:07

## 2023-04-11 RX ADMIN — ENOXAPARIN SODIUM 135 MG: 150 INJECTION SUBCUTANEOUS at 16:07

## 2023-04-11 RX ADMIN — POTASSIUM CHLORIDE 10 MEQ: 7.46 INJECTION, SOLUTION INTRAVENOUS at 18:18

## 2023-04-11 RX ADMIN — ASPIRIN 81 MG: 81 TABLET, COATED ORAL at 08:56

## 2023-04-11 RX ADMIN — BUDESONIDE AND FORMOTEROL FUMARATE DIHYDRATE 2 PUFF: 160; 4.5 AEROSOL RESPIRATORY (INHALATION) at 08:18

## 2023-04-11 RX ADMIN — AMLODIPINE BESYLATE 10 MG: 10 TABLET ORAL at 08:56

## 2023-04-11 RX ADMIN — AMMONIUM LACTATE 1 APPLICATION: 120 CREAM TOPICAL at 05:57

## 2023-04-11 RX ADMIN — POTASSIUM CHLORIDE 40 MEQ: 750 TABLET, EXTENDED RELEASE ORAL at 18:50

## 2023-04-11 RX ADMIN — POTASSIUM CHLORIDE 40 MEQ: 1.5 POWDER, FOR SOLUTION ORAL at 23:09

## 2023-04-11 RX ADMIN — AMMONIUM LACTATE 1 APPLICATION: 120 CREAM TOPICAL at 18:59

## 2023-04-11 RX ADMIN — Medication 10 ML: at 20:01

## 2023-04-11 RX ADMIN — DEXMEDETOMIDINE HYDROCHLORIDE 0.2 MCG/KG/HR: 4 INJECTION, SOLUTION INTRAVENOUS at 01:42

## 2023-04-11 NOTE — PLAN OF CARE
Problem: Adult Inpatient Plan of Care  Goal: Plan of Care Review  Outcome: Ongoing, Progressing  Goal: Patient-Specific Goal (Individualized)  Outcome: Ongoing, Progressing  Goal: Absence of Hospital-Acquired Illness or Injury  Outcome: Ongoing, Progressing  Intervention: Identify and Manage Fall Risk  Recent Flowsheet Documentation  Taken 4/11/2023 0600 by Lela Gallagher RN  Safety Promotion/Fall Prevention:   activity supervised   clutter free environment maintained   fall prevention program maintained   gait belt   lighting adjusted   nonskid shoes/slippers when out of bed   safety round/check completed  Taken 4/11/2023 0400 by Lela Gallagher RN  Safety Promotion/Fall Prevention:   activity supervised   clutter free environment maintained   fall prevention program maintained   gait belt   lighting adjusted   nonskid shoes/slippers when out of bed   safety round/check completed  Taken 4/11/2023 0200 by Lela Gallagher RN  Safety Promotion/Fall Prevention:   activity supervised   clutter free environment maintained   fall prevention program maintained   lighting adjusted   nonskid shoes/slippers when out of bed   safety round/check completed  Taken 4/11/2023 0000 by Lela Gallagher RN  Safety Promotion/Fall Prevention:   activity supervised   clutter free environment maintained   fall prevention program maintained   gait belt   lighting adjusted   nonskid shoes/slippers when out of bed   safety round/check completed  Taken 4/10/2023 2200 by Lela Gallagher RN  Safety Promotion/Fall Prevention:   activity supervised   clutter free environment maintained   fall prevention program maintained   lighting adjusted   gait belt   nonskid shoes/slippers when out of bed   safety round/check completed  Taken 4/10/2023 2000 by Lela Gallagher RN  Safety Promotion/Fall Prevention:   clutter free environment maintained   fall prevention program maintained   gait belt   lighting adjusted   nonskid shoes/slippers when out of bed    safety round/check completed  Intervention: Prevent Skin Injury  Recent Flowsheet Documentation  Taken 4/11/2023 0600 by Lela Gallagher RN  Body Position: supine  Taken 4/11/2023 0400 by Lela Gallagher RN  Body Position: left  Taken 4/11/2023 0200 by Lela Gallagher RN  Body Position: right  Taken 4/11/2023 0000 by Lela Gallagher RN  Body Position: supine  Taken 4/10/2023 2200 by Lela Gallagher RN  Body Position: left  Taken 4/10/2023 2000 by Lela Gallagher RN  Body Position: right  Intervention: Prevent and Manage VTE (Venous Thromboembolism) Risk  Recent Flowsheet Documentation  Taken 4/10/2023 2000 by Lela Gallagher RN  Activity Management: bedrest  VTE Prevention/Management:   bilateral   sequential compression devices on  Range of Motion: active ROM (range of motion) encouraged  Intervention: Prevent Infection  Recent Flowsheet Documentation  Taken 4/11/2023 0600 by Lela Gallagher RN  Infection Prevention:   environmental surveillance performed   hand hygiene promoted   personal protective equipment utilized   rest/sleep promoted   single patient room provided  Taken 4/11/2023 0400 by Lela Gallagher RN  Infection Prevention:   environmental surveillance performed   hand hygiene promoted   personal protective equipment utilized   rest/sleep promoted   single patient room provided  Taken 4/11/2023 0000 by Lela Gallagher RN  Infection Prevention:   environmental surveillance performed   hand hygiene promoted   personal protective equipment utilized   rest/sleep promoted   single patient room provided  Taken 4/10/2023 2000 by Lela Gallagher RN  Infection Prevention:   environmental surveillance performed   equipment surfaces disinfected   hand hygiene promoted   personal protective equipment utilized   rest/sleep promoted   single patient room provided  Goal: Optimal Comfort and Wellbeing  Outcome: Ongoing, Progressing  Intervention: Provide Person-Centered Care  Recent Flowsheet Documentation  Taken 4/10/2023 2000  by Lela Gallagher RN  Trust Relationship/Rapport: care explained  Goal: Readiness for Transition of Care  Outcome: Ongoing, Progressing     Problem: Fall Injury Risk  Goal: Absence of Fall and Fall-Related Injury  Outcome: Ongoing, Progressing  Intervention: Identify and Manage Contributors  Recent Flowsheet Documentation  Taken 4/11/2023 0600 by Lela Gallagher RN  Medication Review/Management: medications reviewed  Taken 4/11/2023 0400 by Lela Gallagher RN  Medication Review/Management: medications reviewed  Taken 4/11/2023 0200 by Lela Gallagher RN  Medication Review/Management: medications reviewed  Taken 4/11/2023 0000 by Lela Gallagher RN  Medication Review/Management: medications reviewed  Taken 4/10/2023 2200 by Lela Gallagher RN  Medication Review/Management: medications reviewed  Taken 4/10/2023 2000 by Lela Gallagher RN  Medication Review/Management: medications reviewed  Intervention: Promote Injury-Free Environment  Recent Flowsheet Documentation  Taken 4/11/2023 0600 by Lela Gallagher RN  Safety Promotion/Fall Prevention:   activity supervised   clutter free environment maintained   fall prevention program maintained   gait belt   lighting adjusted   nonskid shoes/slippers when out of bed   safety round/check completed  Taken 4/11/2023 0400 by Lela Gallagher RN  Safety Promotion/Fall Prevention:   activity supervised   clutter free environment maintained   fall prevention program maintained   gait belt   lighting adjusted   nonskid shoes/slippers when out of bed   safety round/check completed  Taken 4/11/2023 0200 by Lela Gallagher RN  Safety Promotion/Fall Prevention:   activity supervised   clutter free environment maintained   fall prevention program maintained   lighting adjusted   nonskid shoes/slippers when out of bed   safety round/check completed  Taken 4/11/2023 0000 by Lela Gallagher RN  Safety Promotion/Fall Prevention:   activity supervised   clutter free environment maintained   fall  prevention program maintained   gait belt   lighting adjusted   nonskid shoes/slippers when out of bed   safety round/check completed  Taken 4/10/2023 2200 by Lela Gallagher RN  Safety Promotion/Fall Prevention:   activity supervised   clutter free environment maintained   fall prevention program maintained   lighting adjusted   gait belt   nonskid shoes/slippers when out of bed   safety round/check completed  Taken 4/10/2023 2000 by Lela Gallagher RN  Safety Promotion/Fall Prevention:   clutter free environment maintained   fall prevention program maintained   gait belt   lighting adjusted   nonskid shoes/slippers when out of bed   safety round/check completed     Problem: Asthma Comorbidity  Goal: Maintenance of Asthma Control  Outcome: Ongoing, Progressing  Intervention: Maintain Asthma Symptom Control  Recent Flowsheet Documentation  Taken 4/11/2023 0600 by Lela Gallagher RN  Medication Review/Management: medications reviewed  Taken 4/11/2023 0400 by Lela Gallagher RN  Medication Review/Management: medications reviewed  Taken 4/11/2023 0200 by Lela Gallagher RN  Medication Review/Management: medications reviewed  Taken 4/11/2023 0000 by Lela Gallagher RN  Medication Review/Management: medications reviewed  Taken 4/10/2023 2200 by Lela Gallagher RN  Medication Review/Management: medications reviewed  Taken 4/10/2023 2000 by Lela Gallagher RN  Medication Review/Management: medications reviewed     Problem: Behavioral Health Comorbidity  Goal: Maintenance of Behavioral Health Symptom Control  Outcome: Ongoing, Progressing  Intervention: Maintain Behavioral Health Symptom Control  Recent Flowsheet Documentation  Taken 4/11/2023 0600 by Lela Gallagher RN  Medication Review/Management: medications reviewed  Taken 4/11/2023 0400 by Lela Gallagher RN  Medication Review/Management: medications reviewed  Taken 4/11/2023 0200 by Lela Gallagher RN  Medication Review/Management: medications reviewed  Taken 4/11/2023 0000 by  Lela Gallagher RN  Medication Review/Management: medications reviewed  Taken 4/10/2023 2200 by Lela Gallagher RN  Medication Review/Management: medications reviewed  Taken 4/10/2023 2000 by Lela Gallagher RN  Medication Review/Management: medications reviewed     Problem: COPD (Chronic Obstructive Pulmonary Disease) Comorbidity  Goal: Maintenance of COPD Symptom Control  Outcome: Ongoing, Progressing  Intervention: Maintain COPD-Symptom Control  Recent Flowsheet Documentation  Taken 4/11/2023 0600 by Lela Gallagher RN  Medication Review/Management: medications reviewed  Taken 4/11/2023 0400 by Lela Gallagher RN  Medication Review/Management: medications reviewed  Taken 4/11/2023 0200 by Lela Gallagher RN  Medication Review/Management: medications reviewed  Taken 4/11/2023 0000 by Lela Gallagher RN  Medication Review/Management: medications reviewed  Taken 4/10/2023 2200 by Lela Gallagher RN  Medication Review/Management: medications reviewed  Taken 4/10/2023 2000 by Lela Gallagher RN  Supportive Measures:   active listening utilized   self-care encouraged  Medication Review/Management: medications reviewed     Problem: Diabetes Comorbidity  Goal: Blood Glucose Level Within Targeted Range  Outcome: Ongoing, Progressing  Intervention: Monitor and Manage Glycemia  Recent Flowsheet Documentation  Taken 4/10/2023 2000 by eLla Gallagher RN  Glycemic Management: blood glucose monitored     Problem: Heart Failure Comorbidity  Goal: Maintenance of Heart Failure Symptom Control  Outcome: Ongoing, Progressing  Intervention: Maintain Heart Failure-Management  Recent Flowsheet Documentation  Taken 4/11/2023 0600 by Lela Gallagher RN  Medication Review/Management: medications reviewed  Taken 4/11/2023 0400 by Lela Gallagher RN  Medication Review/Management: medications reviewed  Taken 4/11/2023 0200 by Lela Gallagher RN  Medication Review/Management: medications reviewed  Taken 4/11/2023 0000 by Lela Gallagher RN  Medication  Review/Management: medications reviewed  Taken 4/10/2023 2200 by Lela Gallagher RN  Medication Review/Management: medications reviewed  Taken 4/10/2023 2000 by Lela Gallagher RN  Medication Review/Management: medications reviewed     Problem: Hypertension Comorbidity  Goal: Blood Pressure in Desired Range  Outcome: Ongoing, Progressing  Intervention: Maintain Blood Pressure Management  Recent Flowsheet Documentation  Taken 4/11/2023 0600 by Lela Gallagher RN  Medication Review/Management: medications reviewed  Taken 4/11/2023 0400 by Lela Gallagher RN  Medication Review/Management: medications reviewed  Taken 4/11/2023 0200 by Lela Gallagher RN  Medication Review/Management: medications reviewed  Taken 4/11/2023 0000 by Lela Gallagher RN  Medication Review/Management: medications reviewed  Taken 4/10/2023 2200 by Lela Gallagher RN  Medication Review/Management: medications reviewed  Taken 4/10/2023 2000 by Lela Gallagher RN  Medication Review/Management: medications reviewed     Problem: Obstructive Sleep Apnea Risk or Actual Comorbidity Management  Goal: Unobstructed Breathing During Sleep  Outcome: Ongoing, Progressing     Problem: Osteoarthritis Comorbidity  Goal: Maintenance of Osteoarthritis Symptom Control  Outcome: Ongoing, Progressing  Intervention: Maintain Osteoarthritis Symptom Control  Recent Flowsheet Documentation  Taken 4/11/2023 0600 by Lela Gallagher RN  Medication Review/Management: medications reviewed  Taken 4/11/2023 0400 by Lela Gallagher RN  Medication Review/Management: medications reviewed  Taken 4/11/2023 0200 by Lela Gallagher RN  Medication Review/Management: medications reviewed  Taken 4/11/2023 0000 by Lela Gallagher RN  Medication Review/Management: medications reviewed  Taken 4/10/2023 2200 by Lela Gallagher RN  Medication Review/Management: medications reviewed  Taken 4/10/2023 2000 by Lela Gallagher RN  Activity Management: bedrest  Medication Review/Management: medications  reviewed     Problem: Pain Chronic (Persistent) (Comorbidity Management)  Goal: Acceptable Pain Control and Functional Ability  Outcome: Ongoing, Progressing  Intervention: Manage Persistent Pain  Recent Flowsheet Documentation  Taken 4/11/2023 0600 by Lela Gallagher RN  Medication Review/Management: medications reviewed  Taken 4/11/2023 0400 by Lela Gallagher RN  Medication Review/Management: medications reviewed  Taken 4/11/2023 0200 by Lela Gallagher RN  Medication Review/Management: medications reviewed  Taken 4/11/2023 0000 by Lela Gallagher RN  Medication Review/Management: medications reviewed  Taken 4/10/2023 2200 by Lela Gallagher RN  Medication Review/Management: medications reviewed  Taken 4/10/2023 2000 by Lela Gallagher RN  Sleep/Rest Enhancement: regular sleep/rest pattern promoted  Medication Review/Management: medications reviewed  Intervention: Optimize Psychosocial Wellbeing  Recent Flowsheet Documentation  Taken 4/10/2023 2000 by Lela Gallagher RN  Supportive Measures:   active listening utilized   self-care encouraged     Problem: Seizure Disorder Comorbidity  Goal: Maintenance of Seizure Control  Outcome: Ongoing, Progressing     Problem: Skin Injury Risk Increased  Goal: Skin Health and Integrity  Outcome: Ongoing, Progressing  Intervention: Optimize Skin Protection  Recent Flowsheet Documentation  Taken 4/11/2023 0600 by Lela Gallagher RN  Head of Bed (HOB) Positioning: HOB at 30 degrees  Taken 4/11/2023 0400 by Lela Gallagher RN  Head of Bed (HOB) Positioning: HOB at 30 degrees  Taken 4/11/2023 0200 by Lela Gallagher RN  Head of Bed (HOB) Positioning: HOB at 30 degrees  Taken 4/11/2023 0000 by Lela Gallagher RN  Head of Bed (HOB) Positioning: HOB at 30 degrees  Taken 4/10/2023 2200 by Lela Gallagher RN  Head of Bed (HOB) Positioning: HOB at 30 degrees  Taken 4/10/2023 2000 by Lela Gallagher RN  Pressure Reduction Techniques: weight shift assistance provided  Head of Bed (HOB) Positioning: HOB  at 30 degrees  Pressure Reduction Devices: pressure-redistributing mattress utilized   Goal Outcome Evaluation:

## 2023-04-11 NOTE — NURSING NOTE
Shift summary: No significant events overnight. Low dose of precedex started to help patient tolerate bipap.

## 2023-04-11 NOTE — PROGRESS NOTES
[facility]      Mar/chart reviewed  Follow up respiratory failure  On nippv, very difficult to arouse any    Vital Sign Min/Max for last 24 hours  Temp  Min: 97.3 °F (36.3 °C)  Max: 98.7 °F (37.1 °C)   BP  Min: 103/76  Max: 152/99   Pulse  Min: 38  Max: 105   Resp  Min: 17  Max: 24   SpO2  Min: 82 %  Max: 97 %   Flow (L/min)  Min: 3  Max: 3   Weight  Min: 130 kg (286 lb 9.6 oz)  Max: 130 kg (286 lb 9.6 oz)   42?/400    Appears ill, on nippv very difficult to arouse  perrl,  normal sclera  mmm, no jvd, trachea midline, neck supple,  chest decreased ae bilaterally, no crackles, no wheezes,   rrr,   soft, nt, nd +bs,  no c/c/ 1+ edema  Skin warm, dry no rashes    Labs: 4/11: nothing new    A/P:  1. Chronic hypoxemic and hypercapnic respiratory failure - continue oxygen, NIPPV at HS  2. JENISE -- nippv at HS  3. 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 Morbid obesity  5. 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 Encephalopathy -- underlying dementia  7. HTN  8. Pulmonary hypertnesion  9. afib    Doesn't tolerate precedex, will try zyprexa at HS prn  Very difficult to arouse right now, will get stat blood gas  Prognosis is poor

## 2023-04-11 NOTE — PROGRESS NOTES
Clinical Pharmacy Services: Medication History    Harry Potts is a 89 y.o. male presenting to [facility] for Hypoxia [R09.02]    He  has a past medical history of Arthritis, Asthma (March, 2021), Cancer, COPD (chronic obstructive pulmonary disease), Difficulty walking, Elevated cholesterol, Environmental allergies, GERD (gastroesophageal reflux disease), HL (hearing loss), Hyperglycemia, Hyperlipidemia, Hypertension, Obesity, Sleep apnea, and Spondylolysis, lumbar region.    Allergies as of 04/04/2023 - Reviewed 04/04/2023   Allergen Reaction Noted    Atorvastatin Other (See Comments) 06/02/2021    Morphine and related Unknown - Low Severity 04/06/2016       Medication information was obtained from: [facility], [place], [facility]  Pharmacy and Phone Number: : [phone], [facility] [phone]    Prior to Admission Medications       Prescriptions Last Dose Informant Patient Reported? Taking?    albuterol sulfate  (90 Base) MCG/ACT inhaler   No Yes    Inhale 2 puffs Every 4 (Four) Hours As Needed for Wheezing.    amLODIPine (NORVASC) 10 MG tablet   No Yes    TAKE 1 TABLET DAILY    Patient taking differently:  Take 1 tablet by mouth Daily.    aspirin 81 MG EC tablet  Self Yes Yes    Take 1 tablet by mouth Daily.    Azelastine HCl 137 MCG/SPRAY solution   No Yes    USE 1 SPRAY IN EACH NOSTRIL AS DIRECTED BY PROVIDER DAILY    Patient taking differently:  1 spray by Each Nare route Daily.    celecoxib (CeleBREX) 200 MG capsule   No Yes    TAKE 1 CAPSULE DAILY AS NEEDED    chlorthalidone (HYGROTON) 25 MG tablet   No Yes    TAKE 1 TABLET DAILY    doxazosin (CARDURA) 4 MG tablet   No Yes    TAKE 1 TABLET DAILY AS DIRECTED    Patient taking differently:  Take 1 tablet by mouth Every Night.    esomeprazole (nexIUM) 40 MG capsule   No Yes    TAKE 1 CAPSULE DAILY    ezetimibe (ZETIA) 10 MG tablet   No Yes    TAKE 1 TABLET DAILY    fluticasone (FLONASE) 50 MCG/ACT  nasal spray   No Yes    1 spray into the nostril(s) as directed by provider Daily.    fluticasone-salmeterol (Advair Diskus) 250-50 MCG/DOSE DISKUS   No Yes    Inhale 1 puff 2 (Two) Times a Day.    loratadine (CLARITIN) 10 MG tablet   Yes Yes    Take  by mouth Daily.    metoprolol succinate XL (TOPROL-XL) 50 MG 24 hr tablet   No Yes    Take 0.5 tablets by mouth Daily.    Multiple Vitamins-Minerals (CENTRUM ADULTS PO)   Yes Yes    Take  by mouth Daily.    TiZANidine (ZANAFLEX) 2 MG capsule   No Yes    Take 1 capsule by mouth Daily As Needed for Muscle Spasms.    Patient taking differently:  Take 1 capsule by mouth 3 (Three) Times a Day.              Medication notes: Medication list obtained from daughter who is also a pharmacist. Patient unable to discuss this am.   --Med frequencies were completed and removed Omega-3 due to patient no longer taking.    This medication list is complete to the best of my knowledge as of 4/11/2023    Please call if questions.    Jose D Patterson.D, [facility]  4/11/2023 14:46 EDT

## 2023-04-11 NOTE — SIGNIFICANT NOTE
04/11/23 1353   OTHER   Discipline physical therapist   Rehab Time/Intention   Session Not Performed other (see comments)  (Pt extremely lethargic this date, attempted to arouse in AM and PM with no success. Alerted nursing staff, will f/u tomorrow if appropriate.)   Recommendation   PT - Next Appointment 04/12/23

## 2023-04-11 NOTE — PROGRESS NOTES
[facility] Progress Note       Encounter Date:23  Patient:Harry Potts  :1930  MRN:[id number]      Chief Complaint: Follow-up shortness of breath      Subjective:        Sleeping when aroused but easily woken up    Review of Systems:  Review of Systems   Constitutional: Positive for malaise/fatigue.   Cardiovascular: Positive for dyspnea on exertion.   Respiratory: Positive for shortness of breath.    Psychiatric/Behavioral: Positive for altered mental status.       Medications:  Scheduled Meds:  amLODIPine, 10 mg, Oral, Daily  ammonium lactate, 1 application, Topical, Q12H  aspirin, 81 mg, Oral, Daily  budesonide-formoterol, 2 puff, Inhalation, BID - RT  enoxaparin, 1 mg/kg, Subcutaneous, Q12H  fluticasone, 1 spray, Nasal, Daily  ipratropium-albuterol, 3 mL, Nebulization, Q6H While Awake - RT  metoprolol succinate XL, 25 mg, Oral, Daily  miconazole, , Topical, Q12H  pantoprazole, 40 mg, Oral, Q AM  potassium chloride, 40 mEq, Oral, Once  senna-docusate sodium, 2 tablet, Oral, BID  sodium chloride, 10 mL, Intravenous, Q12H  sodium chloride, 10 mL, Intravenous, Q12H  terazosin, 5 mg, Oral, Daily    Continuous Infusions:  dexmedetomidine, 0.2-1.5 mcg/kg/hr, Last Rate: Stopped (23 0700)    PRN Meds:  •  acetaminophen **OR** acetaminophen **OR** acetaminophen  •  albuterol  •  senna-docusate sodium **AND** polyethylene glycol **AND** bisacodyl **AND** bisacodyl  •  calcium carbonate  •  hydrALAZINE  •  ipratropium-albuterol  •  nitroglycerin  •  OLANZapine  •  ondansetron **OR** ondansetron  •  sodium chloride  •  sodium chloride  •  sodium chloride  •  sodium chloride  •  sodium chloride         Objective:       Vitals:    23 0600 23 0700 23 0752 23 0818   BP: 121/73 117/71     Pulse: (!) 46 (!) 42  77   Resp:    21   Temp:   97.8 °F (36.6 °C)    TempSrc:   Axillary    SpO2: 94% 97%  (!) 82%   Weight: 130 kg (286 lb 9.6 oz)      Height:                Physical Exam:  Constitutional:  Chronically ill-appearing, well developed, no acute distress   HENT: Oropharynx clear and membrane moist  Eyes: Normal conjunctiva, no sclera icterus.  Neck: Supple, no carotid bruit bilaterally.  Cardiovascular: Irregularly irregular rate and rhythm, No Murmur, Trace bilateral lower extremity edema.  Pulmonary: Normal respiratory effort, normal lung sounds, no wheezing.  Abdominal: Soft, nontender, no hepatosplenomegaly, liver is non-pulsatile.  Neurological: Alert and oriented to self but having some trouble but easily remembers that he is in the hospital and guessed that he was at [facility]..   Skin: Warm, dry, no ecchymosis, no rash.  Psych: Appropriate mood and affect. Normal judgment and insight.           Lab Review:   Results from last 7 days   Lab Units 04/09/23 0432 04/08/23 0714 04/07/23 0708 04/05/23 0620 04/04/23  1755   SODIUM mmol/L 144 143 143 140 138   POTASSIUM mmol/L 3.5 3.7 3.4* 3.8 3.3*   CHLORIDE mmol/L 96* 94* 95* 97* 94*   CO2 mmol/L 41.0* 47.0* 39.1* 33.5* 33.5*   BUN mg/dL 26* 24* 23 14 21   CREATININE mg/dL 0.60* 0.68* 0.54* 0.52* 0.64*   GLUCOSE mg/dL 113* 164* 153* 132* 159*   CALCIUM mg/dL 9.2 9.5 9.6 9.4 8.9   AST (SGOT) U/L  --  19  --   --  24   ALT (SGPT) U/L  --  40  --   --  34     Results from last 7 days   Lab Units 04/05/23  0620 04/04/23 2001 04/04/23  1755   CK TOTAL U/L  --   --  117   HSTROP T ng/L 34* 22* 24*     Results from last 7 days   Lab Units 04/09/23 0432 04/08/23 0714 04/05/23 0620 04/04/23  1755   WBC 10*3/mm3 6.18 6.85 4.73 6.17   HEMOGLOBIN g/dL 14.7 14.3 13.8 14.2   HEMATOCRIT % 44.6 46.5 43.2 43.2   PLATELETS 10*3/mm3 117* 122* 141 133*                   Invalid input(s): LDLCALC  Results from last 7 days   Lab Units 04/08/23  0714 04/04/23  1755   PROBNP pg/mL 776.0 466.2                Assessment:          Diagnosis Plan   1. Hypoxia        2. Confusion        3. General weakness        4. Physical  deconditioning        5. Hypokalemia        6. Atrial fibrillation, unspecified type        7. Aneurysm of ascending aorta without rupture               Plan:       Mr. Potts is a 89 y.o. gentleman with past medical history notable for essential hypertension, mixed hyperlipidemia, morbid obesity, obstructive sleep apnea, asthma, premature ventricular contractions, and mild coronary disease which has been medically managed who presented to the hospital with respiratory failure requiring BiPAP and sedation on 4/5/2023.  He was also noted to have new onset atrial fibrillation in general from a cardiac standpoint has been doing okay does have known bradycardia with occasional PVCs.  He is not on beta-blocker given some of his bradycardia.  In general from a cardiac standpoint he seems to be doing reasonably well.    Paroxysmal atrial fibrillation:  · Currently on anticoagulation with Lovenox due to difficulty taking oral medications.  · Could transition over to oral anticoagulation once safe  · Rate seems reasonably controlled we have also held off on transitioning to IV beta-blocker but can monitor rate if increases can give IV beta-blocker as needed    Premature ventricular contractions:  · Chronic issue  · Asymptomatic  · We will continue to monitor again could always give doses of IV metoprolol if needed    Essential hypertension:  · Continue to monitor  · Currently treated with IV hydralazine    Pulmonary hypertension:  · Likely related underlying lung disease  · We will continue to monitor volume status but seems reasonably euvolemic today and proBNP level within normal limits on admission             Manjinder Trinidad MD  [facility]  04/11/23  09:24 EDT

## 2023-04-12 LAB
ANION GAP SERPL CALCULATED.3IONS-SCNC: 6 MMOL/L (ref 5–15)
BUN SERPL-MCNC: 19 MG/DL (ref 8–23)
BUN/CREAT SERPL: 36.5 (ref 7–25)
CALCIUM SPEC-SCNC: 8.5 MG/DL (ref 8.2–9.6)
CHLORIDE SERPL-SCNC: 97 MMOL/L (ref 98–107)
CO2 SERPL-SCNC: 39 MMOL/L (ref 22–29)
CREAT SERPL-MCNC: 0.52 MG/DL (ref 0.76–1.27)
EGFRCR SERPLBLD CKD-EPI 2021: 94.6 ML/MIN/1.73
GLUCOSE BLDC GLUCOMTR-MCNC: 117 MG/DL (ref 70–130)
GLUCOSE BLDC GLUCOMTR-MCNC: 97 MG/DL (ref 70–130)
GLUCOSE SERPL-MCNC: 107 MG/DL (ref 65–99)
POTASSIUM SERPL-SCNC: 3.7 MMOL/L (ref 3.5–5.2)
SODIUM SERPL-SCNC: 142 MMOL/L (ref 136–145)

## 2023-04-12 PROCEDURE — 82962 GLUCOSE BLOOD TEST: CPT

## 2023-04-12 PROCEDURE — 25010000002 ENOXAPARIN PER 10 MG: Performed by: INTERNAL MEDICINE

## 2023-04-12 PROCEDURE — 94799 UNLISTED PULMONARY SVC/PX: CPT

## 2023-04-12 PROCEDURE — 80048 BASIC METABOLIC PNL TOTAL CA: CPT | Performed by: INTERNAL MEDICINE

## 2023-04-12 PROCEDURE — 94664 DEMO&/EVAL PT USE INHALER: CPT

## 2023-04-12 PROCEDURE — 94660 CPAP INITIATION&MGMT: CPT

## 2023-04-12 PROCEDURE — 97110 THERAPEUTIC EXERCISES: CPT

## 2023-04-12 PROCEDURE — 94761 N-INVAS EAR/PLS OXIMETRY MLT: CPT

## 2023-04-12 PROCEDURE — 99232 SBSQ HOSP IP/OBS MODERATE 35: CPT | Performed by: INTERNAL MEDICINE

## 2023-04-12 RX ADMIN — AMMONIUM LACTATE 1 APPLICATION: 120 CREAM TOPICAL at 20:09

## 2023-04-12 RX ADMIN — Medication 10 ML: at 20:09

## 2023-04-12 RX ADMIN — DOCUSATE SODIUM 50MG AND SENNOSIDES 8.6MG 2 TABLET: 8.6; 5 TABLET, FILM COATED ORAL at 09:06

## 2023-04-12 RX ADMIN — ASPIRIN 81 MG: 81 TABLET, COATED ORAL at 09:06

## 2023-04-12 RX ADMIN — Medication 10 ML: at 09:07

## 2023-04-12 RX ADMIN — BUDESONIDE AND FORMOTEROL FUMARATE DIHYDRATE 2 PUFF: 160; 4.5 AEROSOL RESPIRATORY (INHALATION) at 20:21

## 2023-04-12 RX ADMIN — PANTOPRAZOLE SODIUM 40 MG: 40 TABLET, DELAYED RELEASE ORAL at 05:46

## 2023-04-12 RX ADMIN — MICONAZOLE NITRATE: 2 POWDER TOPICAL at 20:10

## 2023-04-12 RX ADMIN — MICONAZOLE NITRATE 1 APPLICATION: 2 POWDER TOPICAL at 09:06

## 2023-04-12 RX ADMIN — POTASSIUM CHLORIDE 40 MEQ: 1.5 POWDER, FOR SOLUTION ORAL at 03:00

## 2023-04-12 RX ADMIN — METOPROLOL SUCCINATE 25 MG: 25 TABLET, EXTENDED RELEASE ORAL at 09:06

## 2023-04-12 RX ADMIN — TERAZOSIN HYDROCHLORIDE 5 MG: 5 CAPSULE ORAL at 09:06

## 2023-04-12 RX ADMIN — ENOXAPARIN SODIUM 135 MG: 150 INJECTION SUBCUTANEOUS at 05:41

## 2023-04-12 RX ADMIN — AMLODIPINE BESYLATE 10 MG: 10 TABLET ORAL at 09:06

## 2023-04-12 RX ADMIN — ENOXAPARIN SODIUM 135 MG: 150 INJECTION SUBCUTANEOUS at 20:09

## 2023-04-12 RX ADMIN — BUDESONIDE AND FORMOTEROL FUMARATE DIHYDRATE 2 PUFF: 160; 4.5 AEROSOL RESPIRATORY (INHALATION) at 10:27

## 2023-04-12 RX ADMIN — AMMONIUM LACTATE 1 APPLICATION: 120 CREAM TOPICAL at 05:19

## 2023-04-12 NOTE — THERAPY TREATMENT NOTE
Patient Name: Harry Potts  : 1930    MRN: [id number]                              Today's Date: 2023       Admit Date: 2023    Visit Dx:     ICD-10-CM ICD-9-CM   1. Hypoxia  R09.02 799.02   2. Confusion  R41.0 298.9   3. General weakness  R53.1 780.79   4. Physical deconditioning  R53.81 799.3   5. Hypokalemia  E87.6 276.8   6. Atrial fibrillation, unspecified type  I48.91 427.31   7. Aneurysm of ascending aorta without rupture  I71.21 441.2     Patient Active Problem List   Diagnosis   • Dysfunction of eustachian tube   • Gastroesophageal reflux disease   • Hyperglycemia   • Hypercholesterolemia   • Hypertension   • Morbid obesity   • Osteoarthritis of lumbar spine with myelopathy   • Osteoarthritis of lumbar spine   • Bronchitis   • Viral URI with cough   • PVC (premature ventricular contraction)   • Non-traumatic rhabdomyolysis   • Obesity (BMI 30-39.9)   • Chronic low back pain   • Weakness   • Edema, bilateral lower extremities   • COPD (chronic obstructive pulmonary disease)   • Irregularly irregular cardiac rhythm   • JENISE on CPAP   • Medicare annual wellness visit, subsequent   • Hypoxia   • Chronic respiratory failure with hypoxia     Past Medical History:   Diagnosis Date   • Arthritis    • Asthma     Oxygen at home   • Cancer     basal cell    • COPD (chronic obstructive pulmonary disease)    • Difficulty walking    • Elevated cholesterol    • Environmental allergies    • GERD (gastroesophageal reflux disease)    • HL (hearing loss)    • Hyperglycemia    • Hyperlipidemia    • Hypertension    • Obesity    • Sleep apnea    • Spondylolysis, lumbar region      Past Surgical History:   Procedure Laterality Date   • CARDIAC CATHETERIZATION  circa     [facility]   • EYE SURGERY     • HERNIA REPAIR     • JOINT REPLACEMENT     • REPLACEMENT TOTAL KNEE BILATERAL        General Information     Row Name 23 1144          Physical Therapy Time and Intention    Document Type  therapy note (daily note) (P)   -MO     Mode of Treatment physical therapy (P)   -MO     Row Name 04/12/23 1144          General Information    Patient Profile Reviewed yes (P)   -MO     Existing Precautions/Restrictions fall (P)   -MO     Row Name 04/12/23 1144          Cognition    Orientation Status (Cognition) oriented x 3;verbal cues/prompts needed for orientation (P)   -MO     Row Name 04/12/23 1144          Safety Issues, Functional Mobility    Impairments Affecting Function (Mobility) balance;endurance/activity tolerance;strength;cognition (P)   -MO           User Key  (r) = Recorded By, (t) = Taken By, (c) = Cosigned By    Initials Name Provider Type    Lianna Juan, PT Student PT Student               Mobility     Row Name 04/12/23 1145          Bed Mobility    Supine-Sit [place] (Bed Mobility) minimum assist (75% patient effort);1 person assist (P)   -MO     Sit-Supine [place] (Bed Mobility) minimum assist (75% patient effort);moderate assist (50% patient effort);2 person assist (P)   -MO     Assistive Device (Bed Mobility) head of bed elevated (P)   -MO     Comment, (Bed Mobility) Mod A at BLE to return to supine, Min A for trunk guidance (P)   -MO     Row Name 04/12/23 1141          Sit-Stand Transfer    Sit-Stand [place] (Transfers) moderate assist (50% patient effort);1 person assist;2 person assist;verbal cues (P)   -MO     Assistive Device (Sit-Stand Transfers) walker, platform (P)   -MO     Comment, (Sit-Stand Transfer) First STS attempt Mod A x2 with HHA, improves with platform walker on 2nd trail, coming up with mod Ax1. Takes several side steps towards the head of the bed, frequent cueing for upright posture and occasional assist with walker management. (P)   -MO     Row Name 04/12/23 1148          Gait/Stairs (Locomotion)    Deviations/Abnormal Patterns (Gait) base of support, wide;gait speed decreased;festinating/shuffling (P)   -MO     Bilateral Gait Deviations forward  flexed posture (P)   -MO     Comment, (Gait/Stairs) Very wide ROSE, short shuffled side steps taken, frequent cueing for weight shifting and upright posture. No LOB however remains unstedy on feet (P)   -MO           User Key  (r) = Recorded By, (t) = Taken By, (c) = Cosigned By    Initials Name Provider Type    Lianna Juan, PT Student PT Student               Obj/Interventions     Row Name 04/12/23 1153          Motor Skills    Therapeutic Exercise -- (P)   Seated AP, LAQ  -MO     Row Name 04/12/23 1153          Balance    Static Sitting Balance standby assist (P)   -MO     Dynamic Sitting Balance standby assist;contact guard (P)   -MO     Position, Sitting Balance sitting edge of bed (P)   -MO     Static Standing Balance minimal assist (P)   -MO     Dynamic Standing Balance minimal assist;moderate assist;1-person assist (P)   -MO     Position/Device Used, Standing Balance walker, platform (P)   -MO     Comment, Balance Sits several minutes EOB with SBA and stands several minutes while nursing helps perform fxl hygine tasks, no LOB, sturdy BLE (P)   -MO           User Key  (r) = Recorded By, (t) = Taken By, (c) = Cosigned By    Initials Name Provider Type    Lianna Juan, PT Student PT Student               Goals/Plan    No documentation.                Clinical Impression     Row Name 04/12/23 1155          Pain    Pretreatment Pain Rating 0/10 - no pain (P)   -MO     Posttreatment Pain Rating 0/10 - no pain (P)   -MO     Row Name 04/12/23 1155          Plan of Care Review    Plan of Care Reviewed With patient (P)   -MO     Outcome Evaluation Pt seen this am for PT, he is much more alert this date, A&O x3 and agreeable to therapy. He shows good improvements in activity tolerance compared to last session, coming to EOB with Min Ax1 and maintaining his balance for several minutes with SBA, completing seated ther ex prior to mobility. He stands x2, once with HHA/Mod Ax2 and again with platform walker and Mod  Ax1 with improved completion. He is able to take several side steps towards the head of bed with Mod Ax1, consistent verbal cueing and midly unsteady however no LOB or LE buckling. Stands for several minutes while nursing performs fxl hygine tasks and he returns to supine with Min A x2. Further mobility to chair not attempted this date per nursing request, as he was obtunded yesterday and seems to be intermitently confused, not safe to leave in chair for extended time currently. Extensive discussion d/c options and encouraged pt to continue working with therapy each day to determine appropriate d/c plan. (P)   -MO     Row Name 04/12/23 1155          Vital Signs    O2 Delivery Pre Treatment supplemental O2 (P)   -MO     O2 Delivery Intra Treatment supplemental O2 (P)   -MO     Post SpO2 (%) 94 (P)   -MO     O2 Delivery Post Treatment supplemental O2 (P)   -MO     Row Name 04/12/23 1157          Positioning and Restraints    Pre-Treatment Position in bed (P)   -MO     Post Treatment Position bed (P)   -MO     In Bed call light within reach;encouraged to call for assist;exit alarm on;with nsg (P)   -MO           User Key  (r) = Recorded By, (t) = Taken By, (c) = Cosigned By    Initials Name Provider Type    Lianna Juan, PT Student PT Student               Outcome Measures     Row Name 04/12/23 1205          How much help from another person do you currently need...    Turning from your back to your side while in flat bed without using bedrails? 3 (P)   -MO     Moving from lying on back to sitting on the side of a flat bed without bedrails? 3 (P)   -MO     Moving to and from a bed to a chair (including a wheelchair)? 2 (P)   -MO     Standing up from a chair using your arms (e.g., wheelchair, bedside chair)? 2 (P)   -MO     Climbing 3-5 steps with a railing? 1 (P)   -MO     To walk in hospital room? 2 (P)   -MO     AM-PAC 6 Clicks Score (PT) 13 (P)   -MO     Highest level of mobility 4 --> Transferred to  chair/commode (P)   -MO     Row Name 04/12/23 1205          Functional Assessment    Outcome Measure Options AM-PAC 6 Clicks Basic Mobility (PT) (P)   -MO           User Key  (r) = Recorded By, (t) = Taken By, (c) = Cosigned By    Initials Name Provider Type    MO Lianna Owens, PT Student PT Student                             Physical Therapy Education     Title: PT OT SLP Therapies (Done)     Topic: Physical Therapy (Done)     Point: Mobility training (Done)     Learning Progress Summary           Patient Acceptance, E, VU,NR by MO at 4/12/2023 1206    Acceptance, E,D, NR by MO at 4/10/2023 1227    Acceptance, E, VU,NR by DJ at 4/6/2023 0952                   Point: Home exercise program (Done)     Learning Progress Summary           Patient Acceptance, E, VU,NR by MO at 4/12/2023 1206    Acceptance, E,D, NR by MO at 4/10/2023 1227    Acceptance, E, VU,NR by DJ at 4/6/2023 0952                   Point: Body mechanics (Done)     Learning Progress Summary           Patient Acceptance, E, VU,NR by MO at 4/12/2023 1206    Acceptance, E, VU,NR by DJ at 4/6/2023 0952                   Point: Precautions (Done)     Learning Progress Summary           Patient Acceptance, E, VU,NR by DJ at 4/6/2023 0952                               User Key     Initials Effective Dates Name Provider Type Discipline    DJ 10/25/19 -  Iwona Jones, PT Physical Therapist PT    MO 01/27/23 -  Lianna Owens, PT Student PT Student PT              PT Recommendation and Plan     Plan of Care Reviewed With: (P) patient  Outcome Evaluation: (P) Pt seen this am for PT, he is much more alert this date, A&O x3 and agreeable to therapy. He shows good improvements in activity tolerance compared to last session, coming to EOB with Min Ax1 and maintaining his balance for several minutes with SBA, completing seated ther ex prior to mobility. He stands x2, once with HHA/Mod Ax2 and again with platform walker and Mod Ax1 with improved completion. He is able  to take several side steps towards the head of bed with Mod Ax1, consistent verbal cueing and midly unsteady however no LOB or LE buckling. Stands for several minutes while nursing performs fxl hygine tasks and he returns to supine with Min A x2. Further mobility to chair not attempted this date per nursing request, as he was obtunded yesterday and seems to be intermitently confused, not safe to leave in chair for extended time currently. Extensive discussion d/c options and encouraged pt to continue working with therapy each day to determine appropriate d/c plan.     Time Calculation:    PT Charges     Row Name 04/12/23 1206             Time Calculation    Start Time 1109 (P)   -MO      Stop Time 1136 (P)   -MO      Time Calculation (min) 27 min (P)   -MO      PT Received On 04/12/23 (P)   -MO      PT - Next Appointment 04/13/23 (P)   -MO         Time Calculation- PT    Total Timed Code Minutes- PT 27 minute(s) (P)   -MO            User Key  (r) = Recorded By, (t) = Taken By, (c) = Cosigned By    Initials Name Provider Type    Lianna Juan, PT Student PT Student              Therapy Charges for Today     Code Description Service Date Service Provider Modifiers Qty    93061405324 HC PT THER PROC EA 15 MIN 4/12/2023 Lianna Owens, PT Student GP 2    90128453848 HC PT THER SUPP EA 15 MIN 4/12/2023 Lianna Owens, PT Student GP 1          PT G-Codes  Outcome Measure Options: (P) AM-PAC 6 Clicks Basic Mobility (PT)  AM-PAC 6 Clicks Score (PT): (P) 13       Lianna Owens PT Student  4/12/2023

## 2023-04-12 NOTE — PROGRESS NOTES
[facility]      Mar/chart reviewed  Follow up respiratory failure  Awake alert, wants to go home, he is still confused about somethings but knows location, home address things like that    Vital Sign Min/Max for last 24 hours  Temp  Min: 97.3 °F (36.3 °C)  Max: 99 °F (37.2 °C)   BP  Min: 112/62  Max: 219/180   Pulse  Min: 46  Max: 111   Resp  Min: 16  Max: 18   SpO2  Min: 89 %  Max: 97 %   Flow (L/min)  Min: 2.5  Max: 2.5   Weight  Min: 130 kg (287 lb 11.2 oz)  Max: 130 kg (287 lb 11.2 oz)     Appears ill, alert, mostly appropraite  perrl,  normal sclera  mmm, no jvd, trachea midline, neck supple,  chest decreased ae bilaterally, no crackles, no wheezes,   rrr,   soft, nt, nd +bs,  no c/c/ 1+ edema  Skin warm, dry no rashes    4/12: reviewed:  Nothing new    A/P:  1. Chronic hypoxemic and hypercapnic respiratory failure - continue oxygen, NIPPV at HS  2. JENISE -- nippv at HS  3. 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 Morbid obesity  5. 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 Encephalopathy -- underlying dementia  7. HTN  8. Pulmonary hypertnesion  9. afib     Doesn't tolerate precedex,    Transfer out of unit

## 2023-04-12 NOTE — PROGRESS NOTES
Continued Stay Note  [facility]     Patient Name: Harry Potts  MRN: [id number]  Today's Date: 4/12/2023    Admit Date: 4/4/2023    Plan: Plan is home   Discharge Plan     Row Name 04/12/23 1120       Plan    Plan Plan is home    Plan Comments Pt is on bi pap at night.  CCP following for discharge needds as clinical course evolves  Following for therapy evaluations               Discharge Codes    No documentation.               Expected Discharge Date and Time     Expected Discharge Date Expected Discharge Time    Apr 8, 2023             Maty Yu RN

## 2023-04-12 NOTE — PLAN OF CARE
Goal Outcome Evaluation:      Patient did well overnight. Some periods of confusion regarding time of day. Wore bipap for about 3 hours with close supervision but ultimately refused longer treatment.

## 2023-04-12 NOTE — PROGRESS NOTES
[facility] Progress Note       Encounter Date:23  Patient:Harry Potts  :1930  MRN:[id number]      Chief Complaint: Follow-up shortness of breath      Subjective:        Alert this morning sitting up asking questions but very confused still about situation    Review of Systems:  Review of Systems   Constitutional: Positive for malaise/fatigue.   Cardiovascular: Positive for dyspnea on exertion.   Respiratory: Positive for shortness of breath.    Psychiatric/Behavioral: Positive for altered mental status.       Medications:  Scheduled Meds:  amLODIPine, 10 mg, Oral, Daily  ammonium lactate, 1 application, Topical, Q12H  aspirin, 81 mg, Oral, Daily  budesonide-formoterol, 2 puff, Inhalation, BID - RT  enoxaparin, 1 mg/kg, Subcutaneous, Q12H  fluticasone, 1 spray, Nasal, Daily  ipratropium-albuterol, 3 mL, Nebulization, Q6H While Awake - RT  metoprolol succinate XL, 25 mg, Oral, Daily  miconazole, , Topical, Q12H  pantoprazole, 40 mg, Oral, Q AM  potassium chloride, 40 mEq, Oral, Once  senna-docusate sodium, 2 tablet, Oral, BID  sodium chloride, 10 mL, Intravenous, Q12H  sodium chloride, 10 mL, Intravenous, Q12H  terazosin, 5 mg, Oral, Daily    Continuous Infusions:   PRN Meds:  •  acetaminophen **OR** acetaminophen **OR** acetaminophen  •  albuterol  •  senna-docusate sodium **AND** polyethylene glycol **AND** bisacodyl **AND** bisacodyl  •  calcium carbonate  •  hydrALAZINE  •  ipratropium-albuterol  •  nitroglycerin  •  OLANZapine  •  ondansetron **OR** ondansetron  •  potassium chloride **OR** potassium chloride **OR** potassium chloride  •  potassium chloride  •  sodium chloride  •  sodium chloride  •  sodium chloride  •  sodium chloride  •  sodium chloride         Objective:       Vitals:    23 0441 23 0500 23 0600 23 0728   BP:  154/76 150/98    Pulse:  69 72    Resp:       Temp: 99 °F (37.2 °C)   98.2 °F (36.8 °C)   TempSrc: Oral   Oral   SpO2:  93%  92%    Weight: 130 kg (287 lb 11.2 oz)      Height:               Physical Exam:  Constitutional:  Chronically ill-appearing, well developed, no acute distress   HENT: Oropharynx clear and membrane moist  Eyes: Normal conjunctiva, no sclera icterus.  Neck: Supple, no carotid bruit bilaterally.  Cardiovascular: Irregularly irregular rate and rhythm, No Murmur, Trace bilateral lower extremity edema.  Pulmonary: Normal respiratory effort, normal lung sounds, no wheezing.  Abdominal: Soft, nontender, no hepatosplenomegaly, liver is non-pulsatile.  Neurological: Alert and oriented to self but having some trouble but easily remembers that he is in the hospital and guessed that he was at [facility]..   Skin: Warm, dry, no ecchymosis, no rash.  Psych: Appropriate mood and affect. Normal judgment and insight.           Lab Review:   Results from last 7 days   Lab Units 04/11/23  1253 04/09/23  0432 04/08/23  0714 04/07/23  0708   SODIUM mmol/L 143 144 143 143   POTASSIUM mmol/L 2.8* 3.5 3.7 3.4*   CHLORIDE mmol/L 95* 96* 94* 95*   CO2 mmol/L 42.6* 41.0* 47.0* 39.1*   BUN mg/dL 25* 26* 24* 23   CREATININE mg/dL 0.71* 0.60* 0.68* 0.54*   GLUCOSE mg/dL 113* 113* 164* 153*   CALCIUM mg/dL 9.1 9.2 9.5 9.6   AST (SGOT) U/L  --   --  19  --    ALT (SGPT) U/L  --   --  40  --          Results from last 7 days   Lab Units 04/11/23  1253 04/09/23  0432 04/08/23  0714   WBC 10*3/mm3 5.86 6.18 6.85   HEMOGLOBIN g/dL 14.1 14.7 14.3   HEMATOCRIT % 42.2 44.6 46.5   PLATELETS 10*3/mm3 106* 117* 122*         Results from last 7 days   Lab Units 04/11/23  1253   MAGNESIUM mg/dL 2.1           Invalid input(s): LDLCALC  Results from last 7 days   Lab Units 04/08/23  0714   PROBNP pg/mL 776.0                Assessment:          Diagnosis Plan   1. Hypoxia        2. Confusion        3. General weakness        4. Physical deconditioning        5. Hypokalemia        6. Atrial fibrillation, unspecified type        7. Aneurysm of ascending aorta  without rupture               Plan:       Mr. Potts is a 89 y.o. gentleman with past medical history notable for essential hypertension, mixed hyperlipidemia, morbid obesity, obstructive sleep apnea, asthma, premature ventricular contractions, and mild coronary disease which has been medically managed who presented to the hospital with respiratory failure requiring BiPAP and sedation on 4/5/2023.  He was also noted to have new onset atrial fibrillation in general from a cardiac standpoint has been doing okay does have known bradycardia with occasional PVCs.  He is not on beta-blocker given some of his bradycardia.  In general from a cardiac standpoint he seems to be doing reasonably well and we will follow along peripherally going forward.  Would be reasonable to transition back to oral medications when safe from his ability to take oral medication safely.  Please call us with any questions.    Paroxysmal atrial fibrillation:  · Currently on anticoagulation with Lovenox due to difficulty taking oral medications.  · Could transition over to oral anticoagulation once safe  · Rate seems reasonably controlled we have also held off on transitioning to IV beta-blocker but can monitor rate if increases can give IV beta-blocker as needed    Premature ventricular contractions:  · Chronic issue  · Asymptomatic  · We will continue to monitor again could always give doses of IV metoprolol if needed    Essential hypertension:  · Continue to monitor  · Currently treated with IV hydralazine    Pulmonary hypertension:  · Likely related underlying lung disease  · We will continue to monitor volume status but seems reasonably euvolemic today and proBNP level within normal limits on admission             Manjinder Trinidad MD  [facility]  04/12/23  09:39 EDT

## 2023-04-12 NOTE — PLAN OF CARE
Goal Outcome Evaluation:  Plan of Care Reviewed With: (P) patient           Outcome Evaluation: (P) Pt seen this am for PT, he is much more alert this date, A&O x3 and agreeable to therapy. He shows good improvements in activity tolerance compared to last session, coming to EOB with Min Ax1 and maintaining his balance for several minutes with SBA, completing seated ther ex prior to mobility. He stands x2, once with HHA/Mod Ax2 and again with platform walker and Mod Ax1 with improved completion. He is able to take several side steps towards the head of bed with Mod Ax1, consistent verbal cueing and midly unsteady however no LOB or LE buckling. Stands for several minutes while nursing performs fxl hygine tasks and he returns to supine with Min A x2. Further mobility to chair not attempted this date per nursing request, as he was obtunded yesterday and seems to be intermitently confused, not safe to leave in chair for extended time currently. Extensive discussion d/c options and encouraged pt to continue working with therapy each day to determine appropriate d/c plan.

## 2023-04-13 LAB
ARTERIAL PATENCY WRIST A: POSITIVE
ATMOSPHERIC PRESS: 747.5 MMHG
BASE EXCESS BLDA CALC-SCNC: 12.5 MMOL/L (ref 0–2)
BDY SITE: ABNORMAL
DEPRECATED RDW RBC AUTO: 39.7 FL (ref 37–54)
ERYTHROCYTE [DISTWIDTH] IN BLOOD BY AUTOMATED COUNT: 12.5 % (ref 12.3–15.4)
GAS FLOW AIRWAY: 3 LPM
HCO3 BLDA-SCNC: 39 MMOL/L (ref 22–28)
HCT VFR BLD AUTO: 44.7 % (ref 37.5–51)
HGB BLD-MCNC: 15 G/DL (ref 13–17.7)
MCH RBC QN AUTO: 29.4 PG (ref 26.6–33)
MCHC RBC AUTO-ENTMCNC: 33.6 G/DL (ref 31.5–35.7)
MCV RBC AUTO: 87.5 FL (ref 79–97)
MODALITY: ABNORMAL
PCO2 BLDA: 54 MM HG (ref 35–45)
PEEP RESPIRATORY: 5 CM[H2O]
PH BLDA: 7.47 PH UNITS (ref 7.35–7.45)
PLATELET # BLD AUTO: 108 10*3/MM3 (ref 140–450)
PMV BLD AUTO: 10.7 FL (ref 6–12)
PO2 BLDA: 71.3 MM HG (ref 80–100)
RBC # BLD AUTO: 5.11 10*6/MM3 (ref 4.14–5.8)
SAO2 % BLDCOA: 94.6 % (ref 92–99)
TOTAL RATE: 18 BREATHS/MINUTE
WBC NRBC COR # BLD: 10.45 10*3/MM3 (ref 3.4–10.8)

## 2023-04-13 PROCEDURE — 94761 N-INVAS EAR/PLS OXIMETRY MLT: CPT

## 2023-04-13 PROCEDURE — 94799 UNLISTED PULMONARY SVC/PX: CPT

## 2023-04-13 PROCEDURE — 36600 WITHDRAWAL OF ARTERIAL BLOOD: CPT

## 2023-04-13 PROCEDURE — 25010000002 ENOXAPARIN PER 10 MG: Performed by: INTERNAL MEDICINE

## 2023-04-13 PROCEDURE — 94760 N-INVAS EAR/PLS OXIMETRY 1: CPT

## 2023-04-13 PROCEDURE — 85027 COMPLETE CBC AUTOMATED: CPT | Performed by: INTERNAL MEDICINE

## 2023-04-13 PROCEDURE — 82803 BLOOD GASES ANY COMBINATION: CPT

## 2023-04-13 PROCEDURE — 94664 DEMO&/EVAL PT USE INHALER: CPT

## 2023-04-13 PROCEDURE — 94660 CPAP INITIATION&MGMT: CPT

## 2023-04-13 PROCEDURE — 97530 THERAPEUTIC ACTIVITIES: CPT

## 2023-04-13 RX ADMIN — AMMONIUM LACTATE 1 APPLICATION: 120 CREAM TOPICAL at 18:33

## 2023-04-13 RX ADMIN — ENOXAPARIN SODIUM 135 MG: 150 INJECTION SUBCUTANEOUS at 06:02

## 2023-04-13 RX ADMIN — BUDESONIDE AND FORMOTEROL FUMARATE DIHYDRATE 2 PUFF: 160; 4.5 AEROSOL RESPIRATORY (INHALATION) at 19:50

## 2023-04-13 RX ADMIN — TERAZOSIN HYDROCHLORIDE 5 MG: 5 CAPSULE ORAL at 09:09

## 2023-04-13 RX ADMIN — Medication 10 ML: at 09:09

## 2023-04-13 RX ADMIN — BUDESONIDE AND FORMOTEROL FUMARATE DIHYDRATE 2 PUFF: 160; 4.5 AEROSOL RESPIRATORY (INHALATION) at 08:49

## 2023-04-13 RX ADMIN — PANTOPRAZOLE SODIUM 40 MG: 40 TABLET, DELAYED RELEASE ORAL at 06:02

## 2023-04-13 RX ADMIN — ENOXAPARIN SODIUM 135 MG: 150 INJECTION SUBCUTANEOUS at 18:33

## 2023-04-13 RX ADMIN — AMLODIPINE BESYLATE 10 MG: 10 TABLET ORAL at 09:09

## 2023-04-13 RX ADMIN — Medication 10 ML: at 09:08

## 2023-04-13 RX ADMIN — METOPROLOL SUCCINATE 25 MG: 25 TABLET, EXTENDED RELEASE ORAL at 09:09

## 2023-04-13 RX ADMIN — ASPIRIN 81 MG: 81 TABLET, COATED ORAL at 09:09

## 2023-04-13 RX ADMIN — Medication 10 ML: at 20:53

## 2023-04-13 RX ADMIN — DOCUSATE SODIUM 50MG AND SENNOSIDES 8.6MG 2 TABLET: 8.6; 5 TABLET, FILM COATED ORAL at 09:09

## 2023-04-13 RX ADMIN — MICONAZOLE NITRATE: 2 POWDER TOPICAL at 09:10

## 2023-04-13 RX ADMIN — FLUTICASONE PROPIONATE 1 SPRAY: 50 SPRAY, METERED NASAL at 09:10

## 2023-04-13 RX ADMIN — IPRATROPIUM BROMIDE AND ALBUTEROL SULFATE 3 ML: 2.5; .5 SOLUTION RESPIRATORY (INHALATION) at 13:04

## 2023-04-13 RX ADMIN — AMMONIUM LACTATE 1 APPLICATION: 120 CREAM TOPICAL at 06:02

## 2023-04-13 RX ADMIN — MICONAZOLE NITRATE: 2 POWDER TOPICAL at 20:54

## 2023-04-13 NOTE — PROGRESS NOTES
[facility]      Mar/chart reviewed  Follow up respiratory failure  Awake alert, wants to go home, he is still confused about somethings but knows location, home address things like that    Vital Sign Min/Max for last 24 hours  Temp  Min: 97.4 °F (36.3 °C)  Max: 98.8 °F (37.1 °C)   BP  Min: 104/89  Max: 171/95   Pulse  Min: 57  Max: 118   Resp  Min: 18  Max: 22   SpO2  Min: 88 %  Max: 94 %   Flow (L/min)  Min: 2.5  Max: 4   Weight  Min: 126 kg (278 lb 4.8 oz)  Max: 126 kg (278 lb 4.8 oz)     Appears ill, alert, mostly appropraite  perrl,  normal sclera  mmm, no jvd, trachea midline, neck supple,  chest decreased ae bilaterally, no crackles, no wheezes,   rrr,   soft, nt, nd +bs,  no c/c/ 1+ edema  Skin warm, dry no rashes    Labs: 4/13: reviewed:  Wbc 10  hgb 15  plts 108  7.46/54/71    A/P:  1. Chronic hypoxemic and hypercapnic respiratory failure - continue oxygen, NIPPV at HS  2. JENISE -- nippv at HS  3. 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 Morbid obesity  5. 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 Encephalopathy -- underlying dementia  7. HTN  8. Pulmonary hypertnesion  9. afib    Patient has chronic hypercapnic respiratory failure.  He will need NIPPV for home use, simple cpap or bipap will not be enough to ensure adequate minute ventilation to prevent recurrent episdoes of acute on chronic hypercapnic respiratory failure that could result in hospital admission or death.  His hypercapnic respiratory failure is from restrictive lung disease

## 2023-04-13 NOTE — PROGRESS NOTES
Name: Harry Potts ADMIT: 2023   : 1930  PCP: Harry Luis MD    MRN: [id number] LOS: 8 days   AGE/SEX: 89 y.o. male  ROOM: [facility]     Subjective   Subjective   Out of unit. Oriented to self mostly. Later he did say that he was at the hospital. daughter at bedside. Denies complaints. Daughter stated he behaved similarly a couple days ago.     Objective   Objective   Vital Signs  Temp:  [97.4 °F (36.3 °C)-98.8 °F (37.1 °C)] 98.3 °F (36.8 °C)  Heart Rate:  [] 85  Resp:  [18-22] 20  BP: (104-171)/(71-95) 134/90  SpO2:  [88 %-94 %] 94 %  on  Flow (L/min):  [3-4] 3;   Device (Oxygen Therapy): nasal cannula  Body mass index is 42.32 kg/m².    Physical Exam  Constitutional:       General: He is not in acute distress.     Appearance: Normal appearance. He is not toxic-appearing.   HENT:      Head: Normocephalic and atraumatic.   Cardiovascular:      Rate and Rhythm: Normal rate and regular rhythm.   Pulmonary:      Effort: Pulmonary effort is normal. No respiratory distress.      Breath sounds: Normal breath sounds. No wheezing or rhonchi.   Abdominal:      General: Bowel sounds are normal.      Palpations: Abdomen is soft.      Tenderness: There is no abdominal tenderness. There is no guarding or rebound.   Musculoskeletal:         General: No swelling.      Cervical back: Normal range of motion.   Skin:     General: Skin is warm and dry.      Comments: left cheek abrasion   Neurological:      General: No focal deficit present.      Mental Status: He is alert. He is disoriented.   Psychiatric:         Mood and Affect: Mood normal.         Behavior: Behavior normal.         Thought Content: Thought content normal.     Results Review  I reviewed the patient's new clinical results.  Results from last 7 days   Lab Units 23  0614 23  1253 23  0432 23  0714   WBC 10*3/mm3 10.45 5.86 6.18 6.85   HEMOGLOBIN g/dL 15.0 14.1 14.7 14.3   PLATELETS 10*3/mm3 108* 106* 117* 122*      Results from last 7 days   Lab Units 04/12/23  0941 04/11/23  1253 04/09/23  0432 04/08/23  0714   SODIUM mmol/L 142 143 144 143   POTASSIUM mmol/L 3.7 2.8* 3.5 3.7   CHLORIDE mmol/L 97* 95* 96* 94*   CO2 mmol/L 39.0* 42.6* 41.0* 47.0*   BUN mg/dL 19 25* 26* 24*   CREATININE mg/dL 0.52* 0.71* 0.60* 0.68*   GLUCOSE mg/dL 107* 113* 113* 164*     Lab Results   Component Value Date    ANIONGAP 6.0 04/12/2023     Estimated Creatinine Clearance: 117.2 mL/min (A) (by C-G formula based on SCr of 0.52 mg/dL (L)).    Results from last 7 days   Lab Units 04/08/23  0714   ALBUMIN g/dL 3.9   BILIRUBIN mg/dL 0.9   ALK PHOS U/L 66   AST (SGOT) U/L 19   ALT (SGPT) U/L 40     Results from last 7 days   Lab Units 04/12/23  0941 04/11/23  1253 04/09/23  0432 04/08/23  0714   CALCIUM mg/dL 8.5 9.1 9.2 9.5   ALBUMIN g/dL  --   --   --  3.9   MAGNESIUM mg/dL  --  2.1  --   --    PHOSPHORUS mg/dL  --  3.4  --   --        Glucose   Date/Time Value Ref Range Status   04/12/2023 1147 117 70 - 130 mg/dL Final     Comment:     Meter: [id number] : [id number] Ankita Reese    04/12/2023 0023 97 70 - 130 mg/dL Final     Comment:     Meter: [id number] : [id number] Jasvir Hsuond NA   04/11/2023 0041 102 70 - 130 mg/dL Final     Comment:     Meter: [id number] : TOÑITO Vogel RN       No radiology results for the last day    Scheduled Meds  amLODIPine, 10 mg, Oral, Daily  ammonium lactate, 1 application, Topical, Q12H  aspirin, 81 mg, Oral, Daily  budesonide-formoterol, 2 puff, Inhalation, BID - RT  enoxaparin, 1 mg/kg, Subcutaneous, Q12H  fluticasone, 1 spray, Nasal, Daily  ipratropium-albuterol, 3 mL, Nebulization, Q6H While Awake - RT  metoprolol succinate XL, 25 mg, Oral, Daily  miconazole, , Topical, Q12H  pantoprazole, 40 mg, Oral, Q AM  potassium chloride, 40 mEq, Oral, Once  senna-docusate sodium, 2 tablet, Oral, BID  sodium chloride, 10 mL, Intravenous, Q12H  sodium chloride, 10 mL, Intravenous,  "Q12H  terazosin, 5 mg, Oral, Daily    Continuous Infusions   PRN Meds  •  acetaminophen **OR** acetaminophen **OR** acetaminophen  •  albuterol  •  senna-docusate sodium **AND** polyethylene glycol **AND** bisacodyl **AND** bisacodyl  •  calcium carbonate  •  hydrALAZINE  •  ipratropium-albuterol  •  nitroglycerin  •  OLANZapine  •  ondansetron **OR** ondansetron  •  potassium chloride **OR** potassium chloride **OR** potassium chloride  •  potassium chloride  •  sodium chloride  •  sodium chloride  •  sodium chloride  •  sodium chloride  •  sodium chloride     Diet  Diet: Regular/House Diet; Texture: Regular Texture (IDDSI 7); Fluid Consistency: Thin (IDDSI 0)    I have personally reviewed:  [x]  Medications  [x]  Laboratory   [x]  Microbiology   [x]  Radiology   []  EKG/Telemetry   []  Cardiology/Vascular   []  Pathology    []  Records     Assessment/Plan     Active Hospital Problems    Diagnosis  POA   • **Chronic respiratory failure with hypoxia [J96.11]  Unknown   • Hypoxia [R09.02]  Yes   • JENISE on CPAP [G47.33, Z99.89]  Not Applicable   • COPD (chronic obstructive pulmonary disease) [J44.9]  Yes   • Morbid obesity [E66.01]  Yes   • Gastroesophageal reflux disease [K21.9]  Yes      Resolved Hospital Problems   No resolved problems to display.     89 y.o. male     Chronic hypoxemic and hypercapnic respiratory failure, JENISE, restrictive lung disease, pulmonary hypertension related to lung disease  -Pulmonology following: \"will need NIPPV for home use, simple cpap or bipap will not be enough to ensure adequate minute ventilation to prevent recurrent episdoes of acute on chronic hypercapnic respiratory failure that could result in hospital admission or death\"    Body mass index is 42.32 kg/m².     Metabolic encephalopathy, underlying dementia  -Neurology expecting waxing and waning encephalopathy    Paroxysmal atrial fibrillation, hypertension  -Currently on Lovenox due to difficulty taking oral medications  -IV " hydralazine as needed    Discussed with patient, family and nursing staff    Discharge: Probably will need SNF. PT following    Expected Discharge Date and Time     Expected Discharge Date Expected Discharge Time    Apr 15, 2023       Bladimir Ashley MD  [facility]  04/13/23

## 2023-04-13 NOTE — PLAN OF CARE
Goal Outcome Evaluation:      Pt. Has been alert to self this shift and waxing and waning on other orientation questions. At times the patient is able to answer all questions correctly but has not been consistent with his answers throughout the day. VSS, patient is on O2 via nasal cannula and per Dr. Llamas the patient will be going home at discharge with a trilogy. The paperwork for the trilogy is in the patient's chart and is awaiting Dr. Llamas's signature.

## 2023-04-13 NOTE — PLAN OF CARE
Goal Outcome Evaluation:  Plan of Care Reviewed With: patient, family        Progress: no change  Outcome Evaluation: Pt received in bed upon arrival and agreeable to PT. Pt required max/dep x 2 for all bed mobility tasks. Pt demo's a posterior trunk lean requiring min A to correct and multiple VC to lean forward. Pt able to self-correct and eventually only required SBA. Pt stood from EOB x 2 with bed height elevated to assist c platform RW requiring max A x 2. Pt leans posteriorly resulting in RW being lifted off ground. Heavy cuing to stand upright and shift weight anteriorly. Pt able to stand for roughly 1 minute on each attempt. Pt took several side steps to HOB requiring min A x 2. Distance limited secondary to poor standing tolerance and balance. Pt fatigues quickly with minimal activity. Pt completed B LE strengthening exercises x 10 before returning to supine. Pt will continue to benefit from skilled PT to address strength, endurance, balance, and functional mobility.

## 2023-04-13 NOTE — THERAPY TREATMENT NOTE
Patient Name: Harry Potts  : 1930    MRN: [id number]                              Today's Date: 2023       Admit Date: 2023    Visit Dx:     ICD-10-CM ICD-9-CM   1. Hypoxia  R09.02 799.02   2. Confusion  R41.0 298.9   3. General weakness  R53.1 780.79   4. Physical deconditioning  R53.81 799.3   5. Hypokalemia  E87.6 276.8   6. Atrial fibrillation, unspecified type  I48.91 427.31   7. Aneurysm of ascending aorta without rupture  I71.21 441.2     Patient Active Problem List   Diagnosis   • Dysfunction of eustachian tube   • Gastroesophageal reflux disease   • Hyperglycemia   • Hypercholesterolemia   • Hypertension   • Morbid obesity   • Osteoarthritis of lumbar spine with myelopathy   • Osteoarthritis of lumbar spine   • Bronchitis   • Viral URI with cough   • PVC (premature ventricular contraction)   • Non-traumatic rhabdomyolysis   • Obesity (BMI 30-39.9)   • Chronic low back pain   • Weakness   • Edema, bilateral lower extremities   • COPD (chronic obstructive pulmonary disease)   • Irregularly irregular cardiac rhythm   • JENISE on CPAP   • Medicare annual wellness visit, subsequent   • Hypoxia   • Chronic respiratory failure with hypoxia     Past Medical History:   Diagnosis Date   • Arthritis    • Asthma     Oxygen at home   • Cancer     basal cell    • COPD (chronic obstructive pulmonary disease)    • Difficulty walking    • Elevated cholesterol    • Environmental allergies    • GERD (gastroesophageal reflux disease)    • HL (hearing loss)    • Hyperglycemia    • Hyperlipidemia    • Hypertension    • Obesity    • Sleep apnea    • Spondylolysis, lumbar region      Past Surgical History:   Procedure Laterality Date   • CARDIAC CATHETERIZATION  circa     [facility]   • EYE SURGERY     • HERNIA REPAIR     • JOINT REPLACEMENT     • REPLACEMENT TOTAL KNEE BILATERAL        General Information     Row Name 23 1436          Physical Therapy Time and Intention    Document Type  therapy note (daily note)  -CS     Mode of Treatment individual therapy;physical therapy  -CS     Row Name 04/13/23 1436          General Information    Patient Profile Reviewed yes  -CS     Existing Precautions/Restrictions fall;oxygen therapy device and L/min  -CS     Row Name 04/13/23 1436          Cognition    Orientation Status (Cognition) oriented to;person;place  -CS     Row Name 04/13/23 1436          Safety Issues, Functional Mobility    Safety Issues Affecting Function (Mobility) insight into deficits/self-awareness;judgment;positioning of assistive device;safety precaution awareness  -CS     Impairments Affecting Function (Mobility) balance;endurance/activity tolerance;strength;cognition;postural/trunk control  -CS           User Key  (r) = Recorded By, (t) = Taken By, (c) = Cosigned By    Initials Name Provider Type    CS Paula Cool PT Physical Therapist               Mobility     Row Name 04/13/23 1437          Bed Mobility    Bed Mobility scooting/bridging;supine-sit;sit-supine  -CS     Scooting/Bridging [place] (Bed Mobility) dependent (less than 25% patient effort);2 person assist;verbal cues  -CS     Supine-Sit [place] (Bed Mobility) maximum assist (25% patient effort);2 person assist;verbal cues  -CS     Sit-Supine [place] (Bed Mobility) maximum assist (25% patient effort);dependent (less than 25% patient effort);2 person assist;verbal cues  -CS     Assistive Device (Bed Mobility) head of bed elevated;bed rails  -CS     Comment, (Bed Mobility) increased time for bed mobility; posterior trunk lean while sitting EOB with mulitple VC to lean forward  -CS     Row Name 04/13/23 1437          Sit-Stand Transfer    Sit-Stand [place] (Transfers) maximum assist (25% patient effort);2 person assist;verbal cues  -CS     Assistive Device (Sit-Stand Transfers) walker, platform  -CS     Comment, (Sit-Stand Transfer) x 2 from EOB - bed elevated to help assist  -CS     Row Name 04/13/23  1437          Gait/Stairs (Locomotion)    [place] Level (Gait) minimum assist (75% patient effort);2 person assist;verbal cues  -CS     Assistive Device (Gait) walker, platform  -CS     Distance in Feet (Gait) 4' (side steps to HOB)  -CS     Deviations/Abnormal Patterns (Gait) base of support, wide;gait speed decreased  -CS     Bilateral Gait Deviations forward flexed posture;heel strike decreased  -CS     Comment, (Gait/Stairs) slow pace; frequest cues for upright posture and weight shifting to advance B LE  -CS           User Key  (r) = Recorded By, (t) = Taken By, (c) = Cosigned By    Initials Name Provider Type    CS Paula Cool, PT Physical Therapist               Obj/Interventions     Row Name 04/13/23 1439          Motor Skills    Therapeutic Exercise other (see comments)  10 reps B LE AP, LAQ, & seated marches  -CS     Row Name 04/13/23 1439          Balance    Balance Assessment sitting static balance;sitting dynamic balance;standing static balance;standing dynamic balance  -CS     Static Sitting Balance standby assist  -CS     Dynamic Sitting Balance contact guard;minimal assist  -CS     Position, Sitting Balance unsupported;sitting edge of bed  -CS     Static Standing Balance minimal assist;moderate assist;2-person assist  -CS     Dynamic Standing Balance minimal assist;2-person assist  -CS     Position/Device Used, Standing Balance supported;walker, platform  -CS     Comment, Balance posterior lean while sitting & standing; required multiple VC to shift weight anteriorly  -CS           User Key  (r) = Recorded By, (t) = Taken By, (c) = Cosigned By    Initials Name Provider Type    CS Paula Cool, PT Physical Therapist               Goals/Plan    No documentation.                Clinical Impression     Row Name 04/13/23 1440          Pain    Pretreatment Pain Rating 0/10 - no pain  -CS     Row Name 04/13/23 1440          Plan of Care Review    Plan of Care Reviewed With patient;family  -CS      Progress no change  -CS     Outcome Evaluation Pt received in bed upon arrival and agreeable to PT. Pt required max/dep x 2 for all bed mobility tasks. Pt demo's a posterior trunk lean requiring min A to correct and multiple VC to lean forward. Pt able to self-correct and eventually only required SBA. Pt stood from EOB x 2 with bed height elevated to assist c platform RW requiring max A x 2. Pt leans posteriorly resulting in RW being lifted off ground. Heavy cuing to stand upright and shift weight anteriorly. Pt able to stand for roughly 1 minute on each attempt. Pt took several side steps to HOB requiring min A x 2. Distance limited secondary to poor standing tolerance and balance. Pt fatigues quickly with minimal activity. Pt completed B LE strengthening exercises x 10 before returning to supine. Pt will continue to benefit from skilled PT to address strength, endurance, balance, and functional mobility.  -CS     Row Name 04/13/23 1440          Therapy Assessment/Plan (PT)    Criteria for Skilled Interventions Met (PT) yes;meets criteria  -CS     Therapy Frequency (PT) 6 times/wk  -CS     Row Name 04/13/23 1440          Positioning and Restraints    Pre-Treatment Position in bed  -CS     Post Treatment Position bed  -CS     In Bed fowlers;call light within reach;encouraged to call for assist;exit alarm on;with family/caregiver  -CS           User Key  (r) = Recorded By, (t) = Taken By, (c) = Cosigned By    Initials Name Provider Type    Paula Rodriguez, PT Physical Therapist               Outcome Measures     Row Name 04/13/23 1444 04/13/23 0830       How much help from another person do you currently need...    Turning from your back to your side while in flat bed without using bedrails? 2  -CS 3  -TT    Moving from lying on back to sitting on the side of a flat bed without bedrails? 2  -CS 3  -TT    Moving to and from a bed to a chair (including a wheelchair)? 2  -CS 2  -TT    Standing up from a chair  using your arms (e.g., wheelchair, bedside chair)? 2  -CS 2  -TT    Climbing 3-5 steps with a railing? 1  -CS 1  -TT    To walk in hospital room? 2  -CS 2  -TT    AM-PAC 6 Clicks Score (PT) 11  -CS 13  -TT    Highest level of mobility 4 --> Transferred to chair/commode  -CS 4 --> Transferred to chair/commode  -TT    Row Name 04/13/23 1444          Functional Assessment    Outcome Measure Options AM-PAC 6 Clicks Basic Mobility (PT)  -CS           User Key  (r) = Recorded By, (t) = Taken By, (c) = Cosigned By    Initials Name Provider Type    TT Loni Bravo, RN Registered Nurse    Paula Rodriguez, PT Physical Therapist                             Physical Therapy Education     Title: PT OT SLP Therapies (Done)     Topic: Physical Therapy (Done)     Point: Mobility training (Done)     Learning Progress Summary           Patient Acceptance, E,TB, VU,DU,NR by CS at 4/13/2023 1445    Acceptance, E, VU,NR by MO at 4/12/2023 1206    Acceptance, E,D, NR by MO at 4/10/2023 1227    Acceptance, E, VU,NR by DJ at 4/6/2023 0952   Family Acceptance, E,TB, VU,DU,NR by CS at 4/13/2023 1445                   Point: Home exercise program (Done)     Learning Progress Summary           Patient Acceptance, E,TB, VU,DU,NR by CS at 4/13/2023 1445    Acceptance, E, VU,NR by MO at 4/12/2023 1206    Acceptance, E,D, NR by MO at 4/10/2023 1227    Acceptance, E, VU,NR by DJ at 4/6/2023 0952   Family Acceptance, E,TB, VU,DU,NR by CS at 4/13/2023 1445                   Point: Body mechanics (Done)     Learning Progress Summary           Patient Acceptance, E,TB, VU,DU,NR by CS at 4/13/2023 1445    Acceptance, E, VU,NR by MO at 4/12/2023 1206    Acceptance, E, VU,NR by DJ at 4/6/2023 0952   Family Acceptance, E,TB, VU,DU,NR by CS at 4/13/2023 1445                   Point: Precautions (Done)     Learning Progress Summary           Patient Acceptance, E,TB, VU,DU,NR by CS at 4/13/2023 1445    Acceptance, E, VU,NR by DJ at 4/6/2023 0952    Family Acceptance, E,TB, VU,DU,NR by  at 4/13/2023 1445                               User Key     Initials Effective Dates Name Provider Type Discipline    DJ 10/25/19 -  Iwona Jones, PT Physical Therapist PT    CS 09/22/22 -  Paula Cool, PT Physical Therapist PT    MO 01/27/23 -  Lianna Owens, PT Student PT Student PT              PT Recommendation and Plan     Plan of Care Reviewed With: patient, family  Progress: no change  Outcome Evaluation: Pt received in bed upon arrival and agreeable to PT. Pt required max/dep x 2 for all bed mobility tasks. Pt demo's a posterior trunk lean requiring min A to correct and multiple VC to lean forward. Pt able to self-correct and eventually only required SBA. Pt stood from EOB x 2 with bed height elevated to assist c platform RW requiring max A x 2. Pt leans posteriorly resulting in RW being lifted off ground. Heavy cuing to stand upright and shift weight anteriorly. Pt able to stand for roughly 1 minute on each attempt. Pt took several side steps to HOB requiring min A x 2. Distance limited secondary to poor standing tolerance and balance. Pt fatigues quickly with minimal activity. Pt completed B LE strengthening exercises x 10 before returning to supine. Pt will continue to benefit from skilled PT to address strength, endurance, balance, and functional mobility.     Time Calculation:    PT Charges     Row Name 04/13/23 1445             Time Calculation    Start Time 1339  -CS      Stop Time 1403  -      Time Calculation (min) 24 min  -CS      PT Received On 04/13/23  -      PT - Next Appointment 04/14/23  -         Time Calculation- PT    Total Timed Code Minutes- PT 23 minute(s)  -CS         Timed Charges    30575 - PT Therapeutic Activity Minutes 23  -CS         Total Minutes    Timed Charges Total Minutes 23  -CS       Total Minutes 23  -CS            User Key  (r) = Recorded By, (t) = Taken By, (c) = Cosigned By    Initials Name Provider Type    CS  Paula Cool, PT Physical Therapist              Therapy Charges for Today     Code Description Service Date Service Provider Modifiers Qty    93501267047 HC PT THERAPEUTIC ACT EA 15 MIN 4/13/2023 Paula Cool, PT GP 2          PT G-Codes  Outcome Measure Options: AM-PAC 6 Clicks Basic Mobility (PT)  AM-PAC 6 Clicks Score (PT): 11  PT Discharge Summary  Anticipated Discharge Disposition (PT): skilled nursing facility    Paula Cool PT  4/13/2023

## 2023-04-13 NOTE — PLAN OF CARE
Goal Outcome Evaluation:           Progress: no change  Outcome Evaluation: Cpap worn for approx. 5 hours with continuous reminder to keep it on. Abrasion to left cheek from previous bipap use. Home cpap at bedside. Currently on 3 L NC. Afib on monitor. Incontinence care provided. Home walker at bedside. Miconazole powder applied to abdominal folds, cream applied to legs per mar.

## 2023-04-13 NOTE — DISCHARGE PLACEMENT REQUEST
"Saji Potts (89 y.o. Male)     Date of Birth   11/14/1930    Social Security Number       Address   [address] [address]    Home Phone   [phone]    MRN   [id number]       [facility]    Marital Status                               Admission Date   4/4/23    Admission Type   Urgent    Admitting Provider   Tano Osei MD    Attending Provider   Bladimir Ashley MD    Department, Room/Bed   [address], [address]/1       Discharge Date       Discharge Disposition       Discharge Destination                               Attending Provider: Bladimir Ashley MD    Allergies: Atorvastatin, Dexmedetomidine, Morphine And Related    Isolation: None   Infection: None   Code Status: CPR    Ht: 172.7 cm (68\")   Wt: 126 kg (278 lb 4.8 oz)    Admission Cmt: None   Principal Problem: Chronic respiratory failure with hypoxia [J96.11]                 Active Insurance as of 4/4/2023     Primary Coverage     Payor Plan Insurance Group Employer/Plan Group    [facility] MEDICARE REPLACEMENT [facility] MEDICARE REPLACEMENT [id number]     Payor Plan Address Payor Plan Phone Number Payor Plan Fax Number Effective Dates    [address] [address]   1/1/2016 - None Entered    [facility] [id number]       Subscriber Name Subscriber Birth Date Member ID       SAJI POTTS 11/14/1930 [id number]                 Emergency Contacts      (Rel.) Home Phone Work Phone Mobile Phone    [place] [place] (Other) [phone] -- --    TRAMAINE SAUCEDO (Daughter) [phone] -- --              "

## 2023-04-14 ENCOUNTER — APPOINTMENT (OUTPATIENT)
Dept: GENERAL RADIOLOGY | Facility: HOSPITAL | Age: 88
DRG: 189 | End: 2023-04-14
Payer: MEDICARE

## 2023-04-14 LAB
APTT PPP: 40.6 SECONDS (ref 22.7–35.4)
ARTERIAL PATENCY WRIST A: POSITIVE
ATMOSPHERIC PRESS: 743.4 MMHG
ATMOSPHERIC PRESS: 743.8 MMHG
BASE EXCESS BLDA CALC-SCNC: 14.9 MMOL/L (ref 0–2)
BASE EXCESS BLDV CALC-SCNC: 16 MMOL/L (ref -2–2)
BASOPHILS # BLD AUTO: 0.02 10*3/MM3 (ref 0–0.2)
BASOPHILS NFR BLD AUTO: 0.1 % (ref 0–1.5)
BDY SITE: ABNORMAL
BDY SITE: ABNORMAL
BILIRUB UR QL STRIP: NEGATIVE
CLARITY UR: CLEAR
COLOR UR: ABNORMAL
DEPRECATED RDW RBC AUTO: 40.5 FL (ref 37–54)
EOSINOPHIL # BLD AUTO: 0 10*3/MM3 (ref 0–0.4)
EOSINOPHIL NFR BLD AUTO: 0 % (ref 0.3–6.2)
EPAP: 10
ERYTHROCYTE [DISTWIDTH] IN BLOOD BY AUTOMATED COUNT: 12.7 % (ref 12.3–15.4)
GAS FLOW AIRWAY: 18 LPM
GLUCOSE UR STRIP-MCNC: NEGATIVE MG/DL
HCO3 BLDA-SCNC: 44.5 MMOL/L (ref 22–28)
HCO3 BLDV-SCNC: 41.6 MMOL/L (ref 22–28)
HCT VFR BLD AUTO: 43.8 % (ref 37.5–51)
HGB BLD-MCNC: 14.4 G/DL (ref 13–17.7)
HGB UR QL STRIP.AUTO: NEGATIVE
INHALED O2 CONCENTRATION: 35 %
INR PPP: 1.26 (ref 0.9–1.1)
KETONES UR QL STRIP: NEGATIVE
LEUKOCYTE ESTERASE UR QL STRIP.AUTO: ABNORMAL
LYMPHOCYTES # BLD AUTO: 0.39 10*3/MM3 (ref 0.7–3.1)
LYMPHOCYTES NFR BLD AUTO: 2.2 % (ref 19.6–45.3)
MCH RBC QN AUTO: 29 PG (ref 26.6–33)
MCHC RBC AUTO-ENTMCNC: 32.9 G/DL (ref 31.5–35.7)
MCV RBC AUTO: 88.1 FL (ref 79–97)
MODALITY: ABNORMAL
MODALITY: ABNORMAL
MONOCYTES # BLD AUTO: 0.71 10*3/MM3 (ref 0.1–0.9)
MONOCYTES NFR BLD AUTO: 4 % (ref 5–12)
NEUTROPHILS NFR BLD AUTO: 16.45 10*3/MM3 (ref 1.7–7)
NEUTROPHILS NFR BLD AUTO: 92.1 % (ref 42.7–76)
NITRITE UR QL STRIP: POSITIVE
PCO2 BLDA: 72.6 MM HG (ref 35–45)
PCO2 BLDV: 48.5 MM HG (ref 41–51)
PH BLDA: 7.4 PH UNITS (ref 7.35–7.45)
PH BLDV: 7.54 PH UNITS (ref 7.31–7.41)
PH UR STRIP.AUTO: 5.5 [PH] (ref 5–8)
PLATELET # BLD AUTO: 109 10*3/MM3 (ref 140–450)
PMV BLD AUTO: 11.1 FL (ref 6–12)
PO2 BLDA: 66.9 MM HG (ref 80–100)
PO2 BLDV: 68.6 MM HG (ref 35–45)
PROCALCITONIN SERPL-MCNC: 0.32 NG/ML (ref 0–0.25)
PROT UR QL STRIP: ABNORMAL
PROTHROMBIN TIME: 16 SECONDS (ref 11.7–14.2)
RBC # BLD AUTO: 4.97 10*6/MM3 (ref 4.14–5.8)
SAO2 % BLDCOA: 91.6 % (ref 92–99)
SAO2 % BLDCOA: 95.1 % (ref 92–99)
SET MECH RESP RATE: 20
SP GR UR STRIP: 1.02 (ref 1–1.03)
TOTAL RATE: 26 BREATHS/MINUTE
UROBILINOGEN UR QL STRIP: ABNORMAL
VT ON VENT VENT: 650 ML
WBC NRBC COR # BLD: 17.85 10*3/MM3 (ref 3.4–10.8)

## 2023-04-14 PROCEDURE — 94799 UNLISTED PULMONARY SVC/PX: CPT

## 2023-04-14 PROCEDURE — 85730 THROMBOPLASTIN TIME PARTIAL: CPT | Performed by: HOSPITALIST

## 2023-04-14 PROCEDURE — 87040 BLOOD CULTURE FOR BACTERIA: CPT | Performed by: HOSPITALIST

## 2023-04-14 PROCEDURE — 94660 CPAP INITIATION&MGMT: CPT

## 2023-04-14 PROCEDURE — 25010000002 FUROSEMIDE PER 20 MG: Performed by: INTERNAL MEDICINE

## 2023-04-14 PROCEDURE — 84145 PROCALCITONIN (PCT): CPT | Performed by: HOSPITALIST

## 2023-04-14 PROCEDURE — 87077 CULTURE AEROBIC IDENTIFY: CPT | Performed by: HOSPITALIST

## 2023-04-14 PROCEDURE — 87186 SC STD MICRODIL/AGAR DIL: CPT | Performed by: HOSPITALIST

## 2023-04-14 PROCEDURE — 82803 BLOOD GASES ANY COMBINATION: CPT

## 2023-04-14 PROCEDURE — 85610 PROTHROMBIN TIME: CPT | Performed by: HOSPITALIST

## 2023-04-14 PROCEDURE — 71045 X-RAY EXAM CHEST 1 VIEW: CPT

## 2023-04-14 PROCEDURE — 87150 DNA/RNA AMPLIFIED PROBE: CPT | Performed by: HOSPITALIST

## 2023-04-14 PROCEDURE — 97110 THERAPEUTIC EXERCISES: CPT

## 2023-04-14 PROCEDURE — 81001 URINALYSIS AUTO W/SCOPE: CPT | Performed by: HOSPITALIST

## 2023-04-14 PROCEDURE — 85025 COMPLETE CBC W/AUTO DIFF WBC: CPT | Performed by: HOSPITALIST

## 2023-04-14 PROCEDURE — 97530 THERAPEUTIC ACTIVITIES: CPT

## 2023-04-14 PROCEDURE — 36600 WITHDRAWAL OF ARTERIAL BLOOD: CPT

## 2023-04-14 PROCEDURE — 25010000002 ENOXAPARIN PER 10 MG: Performed by: INTERNAL MEDICINE

## 2023-04-14 PROCEDURE — 25010000002 HEPARIN (PORCINE) 25000-0.45 UT/250ML-% SOLUTION: Performed by: HOSPITALIST

## 2023-04-14 RX ORDER — HEPARIN SODIUM 10000 [USP'U]/100ML
7.94 INJECTION, SOLUTION INTRAVENOUS
Status: DISCONTINUED | OUTPATIENT
Start: 2023-04-14 | End: 2023-04-20

## 2023-04-14 RX ORDER — FUROSEMIDE 10 MG/ML
20 INJECTION INTRAMUSCULAR; INTRAVENOUS ONCE
Status: COMPLETED | OUTPATIENT
Start: 2023-04-14 | End: 2023-04-14

## 2023-04-14 RX ADMIN — PANTOPRAZOLE SODIUM 40 MG: 40 TABLET, DELAYED RELEASE ORAL at 10:02

## 2023-04-14 RX ADMIN — Medication 10 ML: at 09:59

## 2023-04-14 RX ADMIN — Medication 10 ML: at 21:57

## 2023-04-14 RX ADMIN — ASPIRIN 81 MG: 81 TABLET, COATED ORAL at 09:59

## 2023-04-14 RX ADMIN — MICONAZOLE NITRATE: 2 POWDER TOPICAL at 21:57

## 2023-04-14 RX ADMIN — AMMONIUM LACTATE 1 APPLICATION: 120 CREAM TOPICAL at 19:47

## 2023-04-14 RX ADMIN — HEPARIN SODIUM 7.94 UNITS/KG/HR: 10000 INJECTION, SOLUTION INTRAVENOUS at 19:45

## 2023-04-14 RX ADMIN — ENOXAPARIN SODIUM 135 MG: 150 INJECTION SUBCUTANEOUS at 06:00

## 2023-04-14 RX ADMIN — BUDESONIDE AND FORMOTEROL FUMARATE DIHYDRATE 2 PUFF: 160; 4.5 AEROSOL RESPIRATORY (INHALATION) at 19:40

## 2023-04-14 RX ADMIN — IPRATROPIUM BROMIDE AND ALBUTEROL SULFATE 3 ML: 2.5; .5 SOLUTION RESPIRATORY (INHALATION) at 13:51

## 2023-04-14 RX ADMIN — FLUTICASONE PROPIONATE 1 SPRAY: 50 SPRAY, METERED NASAL at 09:59

## 2023-04-14 RX ADMIN — AMLODIPINE BESYLATE 10 MG: 10 TABLET ORAL at 09:59

## 2023-04-14 RX ADMIN — MICONAZOLE NITRATE: 2 POWDER TOPICAL at 09:59

## 2023-04-14 RX ADMIN — Medication 10 ML: at 10:00

## 2023-04-14 RX ADMIN — METOPROLOL SUCCINATE 25 MG: 25 TABLET, EXTENDED RELEASE ORAL at 09:59

## 2023-04-14 RX ADMIN — AMMONIUM LACTATE 1 APPLICATION: 120 CREAM TOPICAL at 06:00

## 2023-04-14 RX ADMIN — FUROSEMIDE 20 MG: 10 INJECTION, SOLUTION INTRAMUSCULAR; INTRAVENOUS at 17:00

## 2023-04-14 RX ADMIN — TERAZOSIN HYDROCHLORIDE 5 MG: 5 CAPSULE ORAL at 09:59

## 2023-04-14 RX ADMIN — DOCUSATE SODIUM 50MG AND SENNOSIDES 8.6MG 2 TABLET: 8.6; 5 TABLET, FILM COATED ORAL at 09:59

## 2023-04-14 RX ADMIN — Medication 10 ML: at 21:56

## 2023-04-14 NOTE — PROGRESS NOTES
Dr. SONJA Alcaraz    [address]        Patient ID:  Name:  Harry Potts  MRN:  [id number]  11/14/1930  89 y.o.  male            CC/Reason for visit: Acute on chronic respiratory failure, obesity, JENISE, restrictive lung disease, kyphosis, pulmonary hypertension    Interval hx: Patient is short of breath at rest, at baseline, unchanged.  He does have some mild confusion still.  He also has underlying dementia.  Currently on oxygen via nasal cannula 3.5 L and saturating 88%.  Chart reviewed.  Discussed with daughter at bedside    ROS: Unable to obtain due to confusion    Vitals:  Vitals:    04/14/23 0934 04/14/23 0957 04/14/23 1257 04/14/23 1351   BP:   141/85    BP Location:   Right arm    Patient Position:   Lying    Pulse: 97 97 94 98   Resp:   18 20   Temp: 98.2 °F (36.8 °C)  98.7 °F (37.1 °C)    TempSrc: Oral  Oral    SpO2: 93% 93%  (!) 88%   Weight:       Height:               Body mass index is 42.3 kg/m².    Intake/Output Summary (Last 24 hours) at 4/14/2023 1423  Last data filed at 4/14/2023 1007  Gross per 24 hour   Intake 120 ml   Output --   Net 120 ml       Exam:  GEN:  Elderly obese patient who is confused  Alert, oriented only to self.  Does spontaneously move all 4 extremities  LUNGS: Shallow breaths, distant wheezes bilat, no use of accessory muscles  CV:  Normal S1S2, without murmur, 1+ leg  ABD:  Non tender, obesity hampers exam      Scheduled meds:  amLODIPine, 10 mg, Oral, Daily  ammonium lactate, 1 application, Topical, Q12H  aspirin, 81 mg, Oral, Daily  budesonide-formoterol, 2 puff, Inhalation, BID - RT  enoxaparin, 1 mg/kg, Subcutaneous, Q12H  fluticasone, 1 spray, Nasal, Daily  ipratropium-albuterol, 3 mL, Nebulization, Q6H While Awake - RT  metoprolol succinate XL, 25 mg, Oral, Daily  miconazole, , Topical, Q12H  pantoprazole, 40 mg, Oral, Q AM  potassium chloride, 40 mEq, Oral, Once  senna-docusate sodium, 2 tablet, Oral, BID  sodium chloride, 10 mL, Intravenous,  Q12H  sodium chloride, 10 mL, Intravenous, Q12H  terazosin, 5 mg, Oral, Daily      IV meds:                           Data Review:   I reviewed the patient's medications and new clinical results.            Results from last 7 days   Lab Units 04/13/23  0614 04/12/23  0941 04/11/23  1253 04/09/23  0432 04/08/23  0714   SODIUM mmol/L  --  142 143 144 143   POTASSIUM mmol/L  --  3.7 2.8* 3.5 3.7   CHLORIDE mmol/L  --  97* 95* 96* 94*   CO2 mmol/L  --  39.0* 42.6* 41.0* 47.0*   BUN mg/dL  --  19 25* 26* 24*   CREATININE mg/dL  --  0.52* 0.71* 0.60* 0.68*   CALCIUM mg/dL  --  8.5 9.1 9.2 9.5   BILIRUBIN mg/dL  --   --   --   --  0.9   ALK PHOS U/L  --   --   --   --  66   ALT (SGPT) U/L  --   --   --   --  40   AST (SGOT) U/L  --   --   --   --  19   GLUCOSE mg/dL  --  107* 113* 113* 164*   WBC 10*3/mm3 10.45  --  5.86 6.18 6.85   HEMOGLOBIN g/dL 15.0  --  14.1 14.7 14.3   PLATELETS 10*3/mm3 108*  --  106* 117* 122*   PROBNP pg/mL  --   --   --   --  776.0                 Results from last 7 days   Lab Units 04/14/23  1356 04/14/23  0656 04/13/23  0404 04/11/23  1416 04/09/23  0330 04/08/23  1020 04/08/23  0757 04/07/23  1549   PH, ARTERIAL pH units 7.396  --  7.466* 7.436 7.443 7.376 7.322* 7.356   PCO2, ARTERIAL mm Hg 72.6*  --  54.0* 66.5* 69.8* 76.3* 97.7* 90.0*   PO2 ART mm Hg 66.9*  --  71.3* 91.5 78.4* 77.8* 50.0* 67.4*   FLOW RATE lpm  --  18 3  --   --   --  4 4   MODALITY  Cannula BiPap BiPap BiPap BiPap BiPap Cannula Cannula   O2 SATURATION CALC % 91.6* 95.1 94.6 96.9 95.2 94.1 78.0* 90.4*             ASSESSMENT:   Acute on chronic chronic respiratory failure with hypoxia and hypercapnia  Obesity hypoventilation syndrome  JENISE  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  He has multiple irreversible chronic medical conditions affecting his respiratory and cardiac systems.  Namely he has obesity, kyphosis, causing restrictive ventilatory defect.  He also has obstructive sleep apnea.  He has COPD as well.  All of these conditions are causing chronic respiratory failure.  CPAP and BiPAP modalities have been tried and ruled out.  He needs noninvasive ventilator at nighttime, and as needed during daytime to prevent decompensation, hospitalization or death.  Continue with beta agonists for COPD.  Try gentle diuretics.  Check proBNP and procalcitonin.  Avoid hyperoxia to prevent hypercapnia.  Goal SPO2 should be 88 to 92%.  Try mobilizing with physical therapy if possible.  Patient seems still somewhat encephalopathic which may limit his mobility.  This patient has several chronic medical conditions, and now several acute medical illnesses.  Extensive amount of data was reviewed.  Images were directly visualized by me.  This patient warrants high complexity medical decision-making.            Blake Alcaraz MD  4/14/2023

## 2023-04-14 NOTE — PROGRESS NOTES
"Nutrition Services    Patient Name:  Harry Potts  YOB: 1930  MRN: [id number]  Admit Date:  4/4/2023    Assessment Date:  04/14/23    NUTRITION SCREENING      Reason for Encounter LOS 9   Diagnosis/Problem Chronic respiratory failure with hypoxia, JENISE on CPAP, COPD, GERD, morbid obesity       PO Diet Diet: Regular/House Diet; Texture: Regular Texture (IDDSI 7); Fluid Consistency: Thin (IDDSI 0)   PO Supplements/Snacks n/a   PO Intake % 50-75%, pt states good appetite       Labs  Listed below, reviewed   Physical Findings A/Ox4, SOB, morbidly obese, O2 therapy, no fat/muscle wasting noted on NFPE   GI Function Last BM 4/11   Skin Status Skin tear       Height:  Weight:  BMI:    Weight Trend Height: 172.7 cm (68\")  Weight: 126 kg (278 lb 3.2 oz) (04/14/23 0714)  Body mass index is 42.3 kg/m².    Loss, Amount/Timeframe: 24 lb (8%) wt loss x 10 days       Intervention/Plan Pt states good appetite and tolerating Regular diet well with 50-75% po intake of meals without any difficulties chewing/swallowing. Will follow as needed.         Results from last 7 days   Lab Units 04/12/23  0941 04/11/23  1253 04/09/23  0432 04/08/23  0714   SODIUM mmol/L 142 143 144 143   POTASSIUM mmol/L 3.7 2.8* 3.5 3.7   CHLORIDE mmol/L 97* 95* 96* 94*   CO2 mmol/L 39.0* 42.6* 41.0* 47.0*   BUN mg/dL 19 25* 26* 24*   CREATININE mg/dL 0.52* 0.71* 0.60* 0.68*   CALCIUM mg/dL 8.5 9.1 9.2 9.5   BILIRUBIN mg/dL  --   --   --  0.9   ALK PHOS U/L  --   --   --  66   ALT (SGPT) U/L  --   --   --  40   AST (SGOT) U/L  --   --   --  19   GLUCOSE mg/dL 107* 113* 113* 164*     Results from last 7 days   Lab Units 04/13/23  0614 04/11/23  1253   MAGNESIUM mg/dL  --  2.1   PHOSPHORUS mg/dL  --  3.4   HEMOGLOBIN g/dL 15.0 14.1   HEMATOCRIT % 44.7 42.2     COVID19   Date Value Ref Range Status   04/05/2023 Not Detected Not Detected - Ref. Range Final     Lab Results   Component Value Date    HGBA1C 6.00 (H) 09/30/2022       RD to follow up " per protocol.    Electronically signed by:  Niharika Mari RD  04/14/23 10:31 EDT

## 2023-04-14 NOTE — PLAN OF CARE
Goal Outcome Evaluation:  Plan of Care Reviewed With: patient        Progress: no change  Outcome Evaluation: Incontinence care provided. Provided frequent education on importance of Bipap. VBG to be obtained this morning. No complaints of pain. VSS

## 2023-04-14 NOTE — PROGRESS NOTES
Name: Harry Potts ADMIT: 2023   : 1930  PCP: Harry Luis MD    MRN: [id number] LOS: 9 days   AGE/SEX: 89 y.o. male  ROOM: [facility]     Subjective   Subjective    Wife at bedside. Seems more oriented today.     Objective   Objective   Vital Signs  Temp:  [97.8 °F (36.6 °C)-100.4 °F (38 °C)] 98.7 °F (37.1 °C)  Heart Rate:  [] 98  Resp:  [18-20] 20  BP: (141-150)/(65-92) 141/85  SpO2:  [88 %-93 %] 88 %  on  Flow (L/min):  [3-3.5] 3.5;   Device (Oxygen Therapy): nasal cannula  Body mass index is 42.3 kg/m².    Physical Exam  Constitutional:       General: He is not in acute distress.     Appearance: Normal appearance. He is not toxic-appearing.   HENT:      Head: Normocephalic and atraumatic.   Cardiovascular:      Rate and Rhythm: Normal rate and regular rhythm.   Pulmonary:      Effort: Pulmonary effort is normal. No respiratory distress.      Breath sounds: Normal breath sounds. No wheezing or rhonchi.   Abdominal:      General: Bowel sounds are normal.      Palpations: Abdomen is soft.      Tenderness: There is no abdominal tenderness. There is no guarding or rebound.   Musculoskeletal:         General: No swelling.      Cervical back: Normal range of motion.   Skin:     General: Skin is warm and dry.      Comments: left cheek abrasion   Neurological:      General: No focal deficit present.      Mental Status: He is alert.      Comments: Oriented to self, [facility], year (). Was not oriented to situation. Moving all extremities and following commands. Pleasant.   Psychiatric:         Mood and Affect: Mood normal.         Behavior: Behavior normal.         Thought Content: Thought content normal.     Results Review  I reviewed the patient's new clinical results.  Results from last 7 days   Lab Units 23  0614 23  1253 23  0432 23  0714   WBC 10*3/mm3 10.45 5.86 6.18 6.85   HEMOGLOBIN g/dL 15.0 14.1 14.7 14.3   PLATELETS 10*3/mm3 108* 106* 117* 122*     Results  from last 7 days   Lab Units 04/12/23  0941 04/11/23  1253 04/09/23  0432 04/08/23  0714   SODIUM mmol/L 142 143 144 143   POTASSIUM mmol/L 3.7 2.8* 3.5 3.7   CHLORIDE mmol/L 97* 95* 96* 94*   CO2 mmol/L 39.0* 42.6* 41.0* 47.0*   BUN mg/dL 19 25* 26* 24*   CREATININE mg/dL 0.52* 0.71* 0.60* 0.68*   GLUCOSE mg/dL 107* 113* 113* 164*     Lab Results   Component Value Date    ANIONGAP 6.0 04/12/2023     Estimated Creatinine Clearance: 117.2 mL/min (A) (by C-G formula based on SCr of 0.52 mg/dL (L)).    Results from last 7 days   Lab Units 04/08/23  0714   ALBUMIN g/dL 3.9   BILIRUBIN mg/dL 0.9   ALK PHOS U/L 66   AST (SGOT) U/L 19   ALT (SGPT) U/L 40     Results from last 7 days   Lab Units 04/12/23  0941 04/11/23  1253 04/09/23  0432 04/08/23  0714   CALCIUM mg/dL 8.5 9.1 9.2 9.5   ALBUMIN g/dL  --   --   --  3.9   MAGNESIUM mg/dL  --  2.1  --   --    PHOSPHORUS mg/dL  --  3.4  --   --        Glucose   Date/Time Value Ref Range Status   04/12/2023 1147 117 70 - 130 mg/dL Final     Comment:     Meter: [id number] : [id number] Ankita LIMA   04/12/2023 0023 97 70 - 130 mg/dL Final     Comment:     Meter: [id number] : [id number] Jasvir LIMA       No radiology results for the last day    Scheduled Meds  amLODIPine, 10 mg, Oral, Daily  ammonium lactate, 1 application, Topical, Q12H  aspirin, 81 mg, Oral, Daily  budesonide-formoterol, 2 puff, Inhalation, BID - RT  enoxaparin, 1 mg/kg, Subcutaneous, Q12H  fluticasone, 1 spray, Nasal, Daily  ipratropium-albuterol, 3 mL, Nebulization, Q6H While Awake - RT  metoprolol succinate XL, 25 mg, Oral, Daily  miconazole, , Topical, Q12H  pantoprazole, 40 mg, Oral, Q AM  potassium chloride, 40 mEq, Oral, Once  senna-docusate sodium, 2 tablet, Oral, BID  sodium chloride, 10 mL, Intravenous, Q12H  sodium chloride, 10 mL, Intravenous, Q12H  terazosin, 5 mg, Oral, Daily    Continuous Infusions   PRN Meds  •  acetaminophen **OR** acetaminophen **OR** acetaminophen  •   "albuterol  •  senna-docusate sodium **AND** polyethylene glycol **AND** bisacodyl **AND** bisacodyl  •  calcium carbonate  •  hydrALAZINE  •  ipratropium-albuterol  •  nitroglycerin  •  OLANZapine  •  ondansetron **OR** ondansetron  •  potassium chloride **OR** potassium chloride **OR** potassium chloride  •  potassium chloride  •  sodium chloride  •  sodium chloride  •  sodium chloride  •  sodium chloride  •  sodium chloride     Diet  Diet: Regular/House Diet; Texture: Regular Texture (IDDSI 7); Fluid Consistency: Thin (IDDSI 0)    I have personally reviewed:  [x]  Medications  [x]  Laboratory   []  Microbiology   []  Radiology   [x]  EKG/Telemetry   []  Cardiology/Vascular    []  Pathology    []  Records     Assessment/Plan     Active Hospital Problems    Diagnosis  POA   • **Chronic respiratory failure with hypoxia [J96.11]  Unknown   • Hypoxia [R09.02]  Yes   • JENISE on CPAP [G47.33, Z99.89]  Not Applicable   • COPD (chronic obstructive pulmonary disease) [J44.9]  Yes   • Morbid obesity [E66.01]  Yes   • Gastroesophageal reflux disease [K21.9]  Yes      Resolved Hospital Problems   No resolved problems to display.     89 y.o. male     Chronic hypoxemic and hypercapnic respiratory failure, JENISE, restrictive lung disease, pulmonary hypertension related to lung disease  -Pulmonology following: \"will need NIPPV for home use, simple cpap or bipap will not be enough to ensure adequate minute ventilation to prevent recurrent episdoes of acute on chronic hypercapnic respiratory failure that could result in hospital admission or death\"  -Avoid hyperoxia to prevent hypercapnia    Body mass index is 42.3 kg/m².     Metabolic encephalopathy, underlying dementia  -Neurology expecting waxing and waning encephalopathy    Paroxysmal atrial fibrillation, hypertension  -Currently on Lovenox due to difficulty taking oral medications  -IV hydralazine as needed    Diet: Regular/House Diet; Texture: Regular Texture (IDDSI 7); Fluid " Consistency: Thin (IDDSI 0)     Discussed with patient, family and nursing staff    Discharge: Probably will need SNF. PT following    Expected Discharge Date and Time     Expected Discharge Date Expected Discharge Time    Apr 15, 2023       Bladimir Ashley MD  [facility]  04/14/23

## 2023-04-14 NOTE — THERAPY TREATMENT NOTE
Patient Name: Harry Potts  : 1930    MRN: [id number]                              Today's Date: 2023       Admit Date: 2023    Visit Dx:     ICD-10-CM ICD-9-CM   1. Hypoxia  R09.02 799.02   2. Confusion  R41.0 298.9   3. General weakness  R53.1 780.79   4. Physical deconditioning  R53.81 799.3   5. Hypokalemia  E87.6 276.8   6. Atrial fibrillation, unspecified type  I48.91 427.31   7. Aneurysm of ascending aorta without rupture  I71.21 441.2     Patient Active Problem List   Diagnosis   • Dysfunction of eustachian tube   • Gastroesophageal reflux disease   • Hyperglycemia   • Hypercholesterolemia   • Hypertension   • Morbid obesity   • Osteoarthritis of lumbar spine with myelopathy   • Osteoarthritis of lumbar spine   • Bronchitis   • Viral URI with cough   • PVC (premature ventricular contraction)   • Non-traumatic rhabdomyolysis   • Obesity (BMI 30-39.9)   • Chronic low back pain   • Weakness   • Edema, bilateral lower extremities   • COPD (chronic obstructive pulmonary disease)   • Irregularly irregular cardiac rhythm   • JENISE on CPAP   • Medicare annual wellness visit, subsequent   • Hypoxia   • Chronic respiratory failure with hypoxia     Past Medical History:   Diagnosis Date   • Arthritis    • Asthma     Oxygen at home   • Cancer     basal cell    • COPD (chronic obstructive pulmonary disease)    • Difficulty walking    • Elevated cholesterol    • Environmental allergies    • GERD (gastroesophageal reflux disease)    • HL (hearing loss)    • Hyperglycemia    • Hyperlipidemia    • Hypertension    • Obesity    • Sleep apnea    • Spondylolysis, lumbar region      Past Surgical History:   Procedure Laterality Date   • CARDIAC CATHETERIZATION  circa     [facility]   • EYE SURGERY     • HERNIA REPAIR     • JOINT REPLACEMENT     • REPLACEMENT TOTAL KNEE BILATERAL        General Information     Row Name 23 1135          Physical Therapy Time and Intention    Document Type  therapy note (daily note)  -     Mode of Treatment individual therapy;physical therapy  -     Row Name 04/14/23 1135          General Information    Existing Precautions/Restrictions fall;oxygen therapy device and L/min  -     Row Name 04/14/23 1135          Cognition    Orientation Status (Cognition) oriented to;person;place  -     Row Name 04/14/23 1135          Safety Issues, Functional Mobility    Safety Issues Affecting Function (Mobility) insight into deficits/self-awareness;positioning of assistive device;judgment;problem-solving;safety precaution awareness;safety precautions follow-through/compliance  -     Impairments Affecting Function (Mobility) balance;endurance/activity tolerance;strength;cognition;postural/trunk control  -           User Key  (r) = Recorded By, (t) = Taken By, (c) = Cosigned By    Initials Name Provider Type    EB Edith Macias PTA Physical Therapist Assistant               Mobility     Row Name 04/14/23 1136          Bed Mobility    Supine-Sit [place] (Bed Mobility) moderate assist (50% patient effort);2 person assist;verbal cues;nonverbal cues (demo/gesture)  -     Sit-Supine [place] (Bed Mobility) maximum assist (25% patient effort);2 person assist;verbal cues;nonverbal cues (demo/gesture)  -     Assistive Device (Bed Mobility) bed rails;head of bed elevated  -EB     Comment, (Bed Mobility) increased time with bed mobility with Max verbal cues for sequencing.  -     Row Name 04/14/23 1136          Sit-Stand Transfer    Sit-Stand [place] (Transfers) maximum assist (25% patient effort);minimum assist (75% patient effort);2 person assist  -     Assistive Device (Sit-Stand Transfers) walker, platform  -EB     Comment, (Sit-Stand Transfer) 3Xs, first stand pt required Max assist of 2. Bed height elevated with last 2 stands and pt only needing MinAX2. Max cues for posture. Pt very forward flexed. Pt normally uses a rollator-Platform walker is new per  reports from pt's spouse.  -EB     Row Name 04/14/23 1136          Gait/Stairs (Locomotion)    [place] Level (Gait) minimum assist (75% patient effort);2 person assist;verbal cues  -EB     Assistive Device (Gait) walker, platform  -EB     Distance in Feet (Gait) about 4ft. side stepping along EOB.  -EB     Deviations/Abnormal Patterns (Gait) base of support, wide;gait speed decreased  -EB     Bilateral Gait Deviations forward flexed posture;heel strike decreased  -EB           User Key  (r) = Recorded By, (t) = Taken By, (c) = Cosigned By    Initials Name Provider Type    Edith Kim PTA Physical Therapist Assistant               Obj/Interventions     Row Name 04/14/23 1144          Balance    Balance Assessment sitting static balance;standing static balance;standing dynamic balance  -EB     Static Sitting Balance standby assist  -EB     Position, Sitting Balance unsupported;sitting edge of bed  -EB     Static Standing Balance minimal assist;2-person assist  -EB     Position/Device Used, Standing Balance supported;walker, platform  -EB     Comment, Balance posterior lean while sitting. cues for pt to shift weight anteriorly. Pt with forward flexed posture with standing. Had pt weight shift side to side.  -EB           User Key  (r) = Recorded By, (t) = Taken By, (c) = Cosigned By    Initials Name Provider Type    Edith Kim PTA Physical Therapist Assistant               Goals/Plan    No documentation.                Clinical Impression     Row Name 04/14/23 1148          Plan of Care Review    Plan of Care Reviewed With patient  -EB     Progress improving  -EB     Outcome Evaluation Pt seen for PT treatment today. pt required Max verbal cueing for bed mobility. Pt required ModAX2 with supine to sit and Max assist of 2 with sit to supine. Pt peformed 3 STS transfers with initial stand Max assist of 2. Pt improved with bed height elevated, MinAX2. Pt has a very forward flexed posture with pt taking  side steps along EOB for safety as pt reports knees very weak. Pt ambulated about 4ft with MinAX2. Pt required Max cueing for sitting balance and posture with standing throughout treatment. pt would benefit from SNF at this time due to assist level and weakness but will continue to follow pt and progress as able.  -EB     Row Name 04/14/23 1148          Therapy Assessment/Plan (PT)    Therapy Frequency (PT) 5 times/wk  -EB     Row Name 04/14/23 1148          Positioning and Restraints    Pre-Treatment Position in bed  -EB     Post Treatment Position bed  -EB     In Bed supine;call light within reach;exit alarm on;encouraged to call for assist;with family/caregiver  -EB           User Key  (r) = Recorded By, (t) = Taken By, (c) = Cosigned By    Initials Name Provider Type    Edith Kim PTA Physical Therapist Assistant               Outcome Measures     Row Name 04/14/23 1147          How much help from another person do you currently need...    Turning from your back to your side while in flat bed without using bedrails? 2  -EB     Moving from lying on back to sitting on the side of a flat bed without bedrails? 2  -EB     Moving to and from a bed to a chair (including a wheelchair)? 2  -EB     Standing up from a chair using your arms (e.g., wheelchair, bedside chair)? 2  -EB     Climbing 3-5 steps with a railing? 1  -EB     To walk in hospital room? 1  -EB     AM-PAC 6 Clicks Score (PT) 10  -EB     Highest level of mobility 4 --> Transferred to chair/commode  -           User Key  (r) = Recorded By, (t) = Taken By, (c) = Cosigned By    Initials Name Provider Type    Edith Kim PTA Physical Therapist Assistant                             Physical Therapy Education     Title: PT OT SLP Therapies (Done)     Topic: Physical Therapy (Done)     Point: Mobility training (Done)     Learning Progress Summary           Patient Acceptance, E, VU,NR by ANNA at 4/14/2023 1147    Acceptance, E,TB, VU,DU,NR by  at  4/13/2023 1445    Acceptance, E, VU,NR by MO at 4/12/2023 1206    Acceptance, E,D, NR by MO at 4/10/2023 1227    Acceptance, E, VU,NR by DJ at 4/6/2023 0952   Family Acceptance, E,TB, VU,DU,NR by CS at 4/13/2023 1445                   Point: Home exercise program (Done)     Learning Progress Summary           Patient Acceptance, E, VU,NR by EB at 4/14/2023 1147    Acceptance, E,TB, VU,DU,NR by CS at 4/13/2023 1445    Acceptance, E, VU,NR by MO at 4/12/2023 1206    Acceptance, E,D, NR by MO at 4/10/2023 1227    Acceptance, E, VU,NR by DJ at 4/6/2023 0952   Family Acceptance, E,TB, VU,DU,NR by CS at 4/13/2023 1445                   Point: Body mechanics (Done)     Learning Progress Summary           Patient Acceptance, E, VU,NR by EB at 4/14/2023 1147    Acceptance, E,TB, VU,DU,NR by CS at 4/13/2023 1445    Acceptance, E, VU,NR by MO at 4/12/2023 1206    Acceptance, E, VU,NR by DJ at 4/6/2023 0952   Family Acceptance, E,TB, VU,DU,NR by CS at 4/13/2023 1445                   Point: Precautions (Done)     Learning Progress Summary           Patient Acceptance, E, VU,NR by EB at 4/14/2023 1147    Acceptance, E,TB, VU,DU,NR by CS at 4/13/2023 1445    Acceptance, E, VU,NR by DJ at 4/6/2023 0952   Family Acceptance, E,TB, VU,DU,NR by CS at 4/13/2023 1445                               User Key     Initials Effective Dates Name Provider Type Discipline    EB 02/14/23 -  Edith Macias, PTA Physical Therapist Assistant PT    DJ 10/25/19 -  Iwona Jones, PT Physical Therapist PT    CS 09/22/22 -  Paula Cool, PT Physical Therapist PT    MO 01/27/23 -  Lianna Owens, PT Student PT Student PT              PT Recommendation and Plan     Plan of Care Reviewed With: patient  Progress: improving  Outcome Evaluation: Pt seen for PT treatment today. pt required Max verbal cueing for bed mobility. Pt required ModAX2 with supine to sit and Max assist of 2 with sit to supine. Pt peformed 3 STS transfers with initial stand Max assist of  2. Pt improved with bed height elevated, MinAX2. Pt has a very forward flexed posture with pt taking side steps along EOB for safety as pt reports knees very weak. Pt ambulated about 4ft with MinAX2. Pt required Max cueing for sitting balance and posture with standing throughout treatment. pt would benefit from SNF at this time due to assist level and weakness but will continue to follow pt and progress as able.     Time Calculation:    PT Charges     Row Name 04/14/23 1135             Time Calculation    Start Time 1034  -EB      Stop Time 1120  -EB      Time Calculation (min) 46 min  -EB      PT Received On 04/14/23  -EB      PT - Next Appointment 04/15/23  -EB         Time Calculation- PT    Total Timed Code Minutes- PT 46 minute(s)  -EB            User Key  (r) = Recorded By, (t) = Taken By, (c) = Cosigned By    Initials Name Provider Type    EB Edith Macias PTA Physical Therapist Assistant              Therapy Charges for Today     Code Description Service Date Service Provider Modifiers Qty    85608521035 HC PT THERAPEUTIC ACT EA 15 MIN 4/14/2023 Edith Macias PTA GP 2    52089757199 HC PT THER PROC EA 15 MIN 4/14/2023 Edith Macias PTA GP 1    37935125546 HC PT THER SUPP EA 15 MIN 4/14/2023 Edith Macias PTA GP 3          PT G-Codes  Outcome Measure Options: AM-PAC 6 Clicks Basic Mobility (PT)  AM-PAC 6 Clicks Score (PT): 10       Edith Macias PTA  4/14/2023

## 2023-04-14 NOTE — PLAN OF CARE
Goal Outcome Evaluation:  Plan of Care Reviewed With: patient        Progress: improving  Outcome Evaluation: Pt seen for PT treatment today. pt required Max verbal cueing for bed mobility. Pt required ModAX2 with supine to sit and Max assist of 2 with sit to supine. Pt peformed 3 STS transfers with initial stand Max assist of 2. Pt improved with bed height elevated, MinAX2. Pt has a very forward flexed posture with pt taking side steps along EOB for safety as pt reports knees very weak. Pt ambulated about 4ft with MinAX2. Pt required Max cueing for sitting balance and posture with standing throughout treatment. pt would benefit from SNF at this time due to assist level and weakness but will continue to follow pt and progress as able.

## 2023-04-15 LAB
APTT PPP: 56.4 SECONDS (ref 22.7–35.4)
APTT PPP: 57.6 SECONDS (ref 22.7–35.4)
APTT PPP: 64.5 SECONDS (ref 22.7–35.4)
BACTERIA BLD CULT: ABNORMAL
BACTERIA UR QL AUTO: ABNORMAL /HPF
BASOPHILS # BLD AUTO: 0.02 10*3/MM3 (ref 0–0.2)
BASOPHILS NFR BLD AUTO: 0.1 % (ref 0–1.5)
CREAT SERPL-MCNC: 0.88 MG/DL (ref 0.76–1.27)
DEPRECATED RDW RBC AUTO: 41.1 FL (ref 37–54)
EGFRCR SERPLBLD CKD-EPI 2021: 80.7 ML/MIN/1.73
EOSINOPHIL # BLD AUTO: 0 10*3/MM3 (ref 0–0.4)
EOSINOPHIL NFR BLD AUTO: 0 % (ref 0.3–6.2)
ERYTHROCYTE [DISTWIDTH] IN BLOOD BY AUTOMATED COUNT: 12.4 % (ref 12.3–15.4)
HCT VFR BLD AUTO: 42.8 % (ref 37.5–51)
HGB BLD-MCNC: 14.2 G/DL (ref 13–17.7)
HYALINE CASTS UR QL AUTO: ABNORMAL /LPF
LYMPHOCYTES # BLD AUTO: 0.49 10*3/MM3 (ref 0.7–3.1)
LYMPHOCYTES NFR BLD AUTO: 3.1 % (ref 19.6–45.3)
MCH RBC QN AUTO: 29.8 PG (ref 26.6–33)
MCHC RBC AUTO-ENTMCNC: 33.2 G/DL (ref 31.5–35.7)
MCV RBC AUTO: 89.9 FL (ref 79–97)
MONOCYTES # BLD AUTO: 0.66 10*3/MM3 (ref 0.1–0.9)
MONOCYTES NFR BLD AUTO: 4.2 % (ref 5–12)
NEUTROPHILS NFR BLD AUTO: 14.29 10*3/MM3 (ref 1.7–7)
NEUTROPHILS NFR BLD AUTO: 91.6 % (ref 42.7–76)
NT-PROBNP SERPL-MCNC: 518 PG/ML (ref 0–1800)
PLATELET # BLD AUTO: 105 10*3/MM3 (ref 140–450)
PMV BLD AUTO: 11.1 FL (ref 6–12)
PROCALCITONIN SERPL-MCNC: 0.32 NG/ML (ref 0–0.25)
RBC # BLD AUTO: 4.76 10*6/MM3 (ref 4.14–5.8)
RBC # UR STRIP: ABNORMAL /HPF
REF LAB TEST METHOD: ABNORMAL
SQUAMOUS #/AREA URNS HPF: ABNORMAL /HPF
WBC # UR STRIP: ABNORMAL /HPF
WBC NRBC COR # BLD: 15.61 10*3/MM3 (ref 3.4–10.8)

## 2023-04-15 PROCEDURE — 85730 THROMBOPLASTIN TIME PARTIAL: CPT | Performed by: HOSPITALIST

## 2023-04-15 PROCEDURE — 80076 HEPATIC FUNCTION PANEL: CPT | Performed by: INTERNAL MEDICINE

## 2023-04-15 PROCEDURE — 25010000002 HEPARIN (PORCINE) 25000-0.45 UT/250ML-% SOLUTION: Performed by: HOSPITALIST

## 2023-04-15 PROCEDURE — 94799 UNLISTED PULMONARY SVC/PX: CPT

## 2023-04-15 PROCEDURE — 94664 DEMO&/EVAL PT USE INHALER: CPT

## 2023-04-15 PROCEDURE — 84145 PROCALCITONIN (PCT): CPT | Performed by: INTERNAL MEDICINE

## 2023-04-15 PROCEDURE — 25010000002 VANCOMYCIN 10 G RECONSTITUTED SOLUTION: Performed by: HOSPITALIST

## 2023-04-15 PROCEDURE — 85025 COMPLETE CBC W/AUTO DIFF WBC: CPT | Performed by: HOSPITALIST

## 2023-04-15 PROCEDURE — 83880 ASSAY OF NATRIURETIC PEPTIDE: CPT | Performed by: INTERNAL MEDICINE

## 2023-04-15 PROCEDURE — 94761 N-INVAS EAR/PLS OXIMETRY MLT: CPT

## 2023-04-15 PROCEDURE — 82565 ASSAY OF CREATININE: CPT | Performed by: HOSPITALIST

## 2023-04-15 RX ORDER — CEFDINIR 300 MG/1
300 CAPSULE ORAL EVERY 12 HOURS SCHEDULED
Status: DISCONTINUED | OUTPATIENT
Start: 2023-04-15 | End: 2023-04-16

## 2023-04-15 RX ORDER — VANCOMYCIN HYDROCHLORIDE 1 G/200ML
1000 INJECTION, SOLUTION INTRAVENOUS EVERY 12 HOURS
Status: DISCONTINUED | OUTPATIENT
Start: 2023-04-16 | End: 2023-04-16

## 2023-04-15 RX ORDER — VANCOMYCIN 2 GRAM/500 ML IN 0.9 % SODIUM CHLORIDE INTRAVENOUS
15 ONCE
Status: COMPLETED | OUTPATIENT
Start: 2023-04-15 | End: 2023-04-15

## 2023-04-15 RX ADMIN — HEPARIN SODIUM 9.94 UNITS/KG/HR: 10000 INJECTION, SOLUTION INTRAVENOUS at 17:05

## 2023-04-15 RX ADMIN — AMMONIUM LACTATE 1 APPLICATION: 120 CREAM TOPICAL at 06:34

## 2023-04-15 RX ADMIN — BUDESONIDE AND FORMOTEROL FUMARATE DIHYDRATE 2 PUFF: 160; 4.5 AEROSOL RESPIRATORY (INHALATION) at 07:31

## 2023-04-15 RX ADMIN — CEFDINIR 300 MG: 300 CAPSULE ORAL at 22:13

## 2023-04-15 RX ADMIN — FLUTICASONE PROPIONATE 1 SPRAY: 50 SPRAY, METERED NASAL at 10:31

## 2023-04-15 RX ADMIN — VANCOMYCIN HYDROCHLORIDE 2000 MG: 10 INJECTION, POWDER, LYOPHILIZED, FOR SOLUTION INTRAVENOUS at 18:09

## 2023-04-15 RX ADMIN — PANTOPRAZOLE SODIUM 40 MG: 40 TABLET, DELAYED RELEASE ORAL at 10:29

## 2023-04-15 RX ADMIN — CEFDINIR 300 MG: 300 CAPSULE ORAL at 11:54

## 2023-04-15 RX ADMIN — ASPIRIN 81 MG: 81 TABLET, COATED ORAL at 10:29

## 2023-04-15 RX ADMIN — DOCUSATE SODIUM 50MG AND SENNOSIDES 8.6MG 2 TABLET: 8.6; 5 TABLET, FILM COATED ORAL at 22:13

## 2023-04-15 RX ADMIN — AMLODIPINE BESYLATE 10 MG: 10 TABLET ORAL at 10:29

## 2023-04-15 RX ADMIN — TERAZOSIN HYDROCHLORIDE 5 MG: 5 CAPSULE ORAL at 10:29

## 2023-04-15 RX ADMIN — METOPROLOL SUCCINATE 25 MG: 25 TABLET, EXTENDED RELEASE ORAL at 10:29

## 2023-04-15 RX ADMIN — Medication 10 ML: at 10:33

## 2023-04-15 RX ADMIN — IPRATROPIUM BROMIDE AND ALBUTEROL SULFATE 3 ML: 2.5; .5 SOLUTION RESPIRATORY (INHALATION) at 14:48

## 2023-04-15 RX ADMIN — BUDESONIDE AND FORMOTEROL FUMARATE DIHYDRATE 2 PUFF: 160; 4.5 AEROSOL RESPIRATORY (INHALATION) at 19:22

## 2023-04-15 RX ADMIN — MICONAZOLE NITRATE: 2 POWDER TOPICAL at 10:32

## 2023-04-15 RX ADMIN — ACETAMINOPHEN 650 MG: 325 TABLET, FILM COATED ORAL at 13:53

## 2023-04-15 RX ADMIN — AMMONIUM LACTATE 1 APPLICATION: 120 CREAM TOPICAL at 18:14

## 2023-04-15 RX ADMIN — DOCUSATE SODIUM 50MG AND SENNOSIDES 8.6MG 2 TABLET: 8.6; 5 TABLET, FILM COATED ORAL at 10:32

## 2023-04-15 NOTE — PROGRESS NOTES
"  Daily Progress Note.   [address]  4/15/2023    Patient:  Name:  Harry Potts  MRN:  [id number]  11/14/1930  89 y.o.  male         CC:Acute on chronic respiratory failure, obesity, JENISE, restrictive lung disease, kyphosis, pulmonary hypertension  Interval History:  Patient for historian says he is using his home breathing machine.  Denies much cough however does not cough during our conversation maintains on nasal cannula.    No family member at bedside yet    Physical Exam:  /74 (BP Location: Right arm, Patient Position: Lying)   Pulse 97   Temp 98.5 °F (36.9 °C) (Oral)   Resp 24   Ht 172.7 cm (68\")   Wt 127 kg (279 lb 15.8 oz)   SpO2 90%   BMI 42.57 kg/m²   Body mass index is 42.57 kg/m².    Intake/Output Summary (Last 24 hours) at 4/15/2023 0950  Last data filed at 4/15/2023 0900  Gross per 24 hour   Intake 680 ml   Output 350 ml   Net 330 ml     General appearance: Nontoxic, conversant   Eyes: anicteric sclerae, moist conjunctivae; no lidlag;    HENT: Lesion on left cheek with active bleeding;   Neck: Large neck circumference  Lungs: CTA, with normal respiratory effort and no intercostal retractions  CV: RRR, no rub   Abdomen: Obese bowel sounds positive  Extremities: Mild peripheral edema  Skin: WWP no diffuse visible rash  Psych/neuro calm speech intact mildly confused poor historian    Data Review:  Notable Labs:  Results from last 7 days   Lab Units 04/15/23  0833 04/14/23  1707 04/13/23  0614 04/11/23  1253 04/09/23  0432   WBC 10*3/mm3 15.61* 17.85* 10.45 5.86 6.18   HEMOGLOBIN g/dL 14.2 14.4 15.0 14.1 14.7   PLATELETS 10*3/mm3 105* 109* 108* 106* 117*     Results from last 7 days   Lab Units 04/12/23  0941 04/11/23  1253 04/09/23  0432   SODIUM mmol/L 142 143 144   POTASSIUM mmol/L 3.7 2.8* 3.5   CHLORIDE mmol/L 97* 95* 96*   CO2 mmol/L 39.0* 42.6* 41.0*   BUN mg/dL 19 25* 26*   CREATININE mg/dL 0.52* 0.71* 0.60*   GLUCOSE mg/dL 107* 113* 113*   CALCIUM mg/dL 8.5 9.1 " 9.2   MAGNESIUM mg/dL  --  2.1  --    PHOSPHORUS mg/dL  --  3.4  --    Estimated Creatinine Clearance: 117.7 mL/min (A) (by C-G formula based on SCr of 0.52 mg/dL (L)).    Results from last 7 days   Lab Units 04/15/23  0833 04/14/23  2115 04/14/23  1707 04/13/23  0614   PROCALCITONIN ng/mL 0.32* 0.32*  --   --    PLATELETS 10*3/mm3 105*  --  109* 108*       Results from last 7 days   Lab Units 04/14/23  1356 04/13/23  0404 04/11/23  1416 04/09/23  0330 04/08/23  1020   PH, ARTERIAL pH units 7.396 7.466* 7.436 7.443 7.376   PCO2, ARTERIAL mm Hg 72.6* 54.0* 66.5* 69.8* 76.3*   PO2 ART mm Hg 66.9* 71.3* 91.5 78.4* 77.8*   HCO3 ART mmol/L 44.5* 39.0* 44.7* 47.7* 44.8*       Imaging:  Reviewed chest images personally from past 3 days    ASSESSMENT  /  PLAN:  Acute on chronic chronic hypoxemic respiratory failure  Chronic hypercapnia  Obesity hypoventilation syndrome  JENISE  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  Prior hospital course, labs and imaging reviewed.        Electronically signed by Grady Rodrigues MD, 04/15/23, 9:50 AM EDT.  [facility]

## 2023-04-15 NOTE — PLAN OF CARE
Goal Outcome Evaluation:  Plan of Care Reviewed With: patient, daughter        Progress: no change    Pt is A&O x2, confused to time and situation sometimes. Family states that overall orientation is better today. Positive sepsis screen and positive blood cultures this shift. Abx have been adjusted and infectious disease was consulted.

## 2023-04-15 NOTE — PLAN OF CARE
Goal Outcome Evaluation:  Plan of Care Reviewed With: patient        Progress: no change  Outcome Evaluation: patient tolerated wearing trilogy NIV most of night. on 3.5-4L NC while awake. turn q2hr. purewick in place. urine sample sent for UA. blood cultures drawn. heparin gtt initiated and titrated per protocol-next ptt check at 08:30. remains in afib, hr controlled. monitor LLQ and flank bruise closely.

## 2023-04-15 NOTE — PROGRESS NOTES
Name: Harry Potts ADMIT: 2023   : 1930  PCP: Harry Luis MD    MRN: [id number] LOS: 10 days   AGE/SEX: 89 y.o. male  ROOM: [facility]     Subjective   Subjective    Wife and daughter at bedside. They feel his mental status is improved. Patient is oriented to self, year, hospital. He was switched to heparin due to bruising on abdomen presumably from Lovenox injections.     Objective   Objective   Vital Signs  Temp:  [97.7 °F (36.5 °C)-99.7 °F (37.6 °C)] 99.7 °F (37.6 °C)  Heart Rate:  [] 118  Resp:  [16-24] 24  BP: (115-147)/(54-81) 132/54  SpO2:  [86 %-94 %] 90 %  on  Flow (L/min):  [3-8] 3;   Device (Oxygen Therapy): nasal cannula  Body mass index is 42.57 kg/m².    Physical Exam  Constitutional:       General: He is not in acute distress.     Appearance: Normal appearance. He is not toxic-appearing.   HENT:      Head: Normocephalic and atraumatic.   Cardiovascular:      Rate and Rhythm: Normal rate and regular rhythm.   Pulmonary:      Effort: Pulmonary effort is normal. No respiratory distress.      Breath sounds: Normal breath sounds. No wheezing or rhonchi.   Abdominal:      General: Bowel sounds are normal.      Palpations: Abdomen is soft.      Tenderness: There is no abdominal tenderness. There is no guarding or rebound.   Musculoskeletal:         General: No swelling.      Cervical back: Normal range of motion.   Skin:     General: Skin is warm and dry.      Findings: Bruising (left abdomen brusing) present.      Comments: left cheek bandage   Neurological:      General: No focal deficit present.      Mental Status: He is alert.   Psychiatric:         Mood and Affect: Mood normal.         Behavior: Behavior normal.         Thought Content: Thought content normal.     Results Review  I reviewed the patient's new clinical results.  Results from last 7 days   Lab Units 04/15/23  0833 23  1707 23  0614 23  1253   WBC 10*3/mm3 15.61* 17.85* 10.45 5.86   HEMOGLOBIN  g/dL 14.2 14.4 15.0 14.1   PLATELETS 10*3/mm3 105* 109* 108* 106*     Results from last 7 days   Lab Units 04/12/23  0941 04/11/23  1253 04/09/23  0432   SODIUM mmol/L 142 143 144   POTASSIUM mmol/L 3.7 2.8* 3.5   CHLORIDE mmol/L 97* 95* 96*   CO2 mmol/L 39.0* 42.6* 41.0*   BUN mg/dL 19 25* 26*   CREATININE mg/dL 0.52* 0.71* 0.60*   GLUCOSE mg/dL 107* 113* 113*     Lab Results   Component Value Date    ANIONGAP 6.0 04/12/2023     Estimated Creatinine Clearance: 117.7 mL/min (A) (by C-G formula based on SCr of 0.52 mg/dL (L)).        Results from last 7 days   Lab Units 04/12/23  0941 04/11/23 1253 04/09/23  0432   CALCIUM mg/dL 8.5 9.1 9.2   MAGNESIUM mg/dL  --  2.1  --    PHOSPHORUS mg/dL  --  3.4  --      Results from last 7 days   Lab Units 04/15/23  0833 04/14/23 2115   PROCALCITONIN ng/mL 0.32* 0.32*     No results found for: HGBA1C, POCGLU    XR Chest 1 View    Result Date: 4/14/2023  1. Cardiomegaly. 2. Underinflation of the lungs. There may be some minimal pneumonia developing in the left base.  This report was finalized on 4/14/2023 7:20 PM by Dr. Jimenez Rueda M.D.        Scheduled Meds  amLODIPine, 10 mg, Oral, Daily  ammonium lactate, 1 application, Topical, Q12H  aspirin, 81 mg, Oral, Daily  budesonide-formoterol, 2 puff, Inhalation, BID - RT  cefdinir, 300 mg, Oral, Q12H  fluticasone, 1 spray, Nasal, Daily  ipratropium-albuterol, 3 mL, Nebulization, Q6H While Awake - RT  metoprolol succinate XL, 25 mg, Oral, Daily  miconazole, , Topical, Q12H  pantoprazole, 40 mg, Oral, Q AM  potassium chloride, 40 mEq, Oral, Once  senna-docusate sodium, 2 tablet, Oral, BID  sodium chloride, 10 mL, Intravenous, Q12H  sodium chloride, 10 mL, Intravenous, Q12H  terazosin, 5 mg, Oral, Daily    Continuous Infusions  heparin, 7.94 Units/kg/hr, Last Rate: 9.94 Units/kg/hr (04/15/23 7662)    PRN Meds  •  acetaminophen **OR** acetaminophen **OR** acetaminophen  •  albuterol  •  senna-docusate sodium **AND** polyethylene  "glycol **AND** bisacodyl **AND** bisacodyl  •  calcium carbonate  •  hydrALAZINE  •  ipratropium-albuterol  •  nitroglycerin  •  OLANZapine  •  ondansetron **OR** ondansetron  •  potassium chloride **OR** potassium chloride **OR** potassium chloride  •  potassium chloride  •  sodium chloride  •  sodium chloride  •  sodium chloride  •  sodium chloride  •  sodium chloride    heparin, 7.94 Units/kg/hr, Last Rate: 9.94 Units/kg/hr (04/15/23 1152)    Diet  Diet: Regular/House Diet; Texture: Regular Texture (IDDSI 7); Fluid Consistency: Thin (IDDSI 0)    I have personally reviewed:  [x]  Medications  [x]  Laboratory   [x]  Microbiology   []  Radiology   [x]  EKG/Telemetry   []  Cardiology/Vascular    []  Pathology    []  Records     Assessment/Plan     Active Hospital Problems    Diagnosis  POA   • **Chronic respiratory failure with hypoxia [J96.11]  Unknown   • Hypoxia [R09.02]  Yes   • JENISE on CPAP [G47.33, Z99.89]  Not Applicable   • COPD (chronic obstructive pulmonary disease) [J44.9]  Yes   • Morbid obesity [E66.01]  Yes   • Gastroesophageal reflux disease [K21.9]  Yes      Resolved Hospital Problems   No resolved problems to display.     89 y.o. male     Chronic hypoxemic and hypercapnic respiratory failure, JENISE, restrictive lung disease, pulmonary hypertension related to lung disease  -Pulmonology following: \"will need NIPPV for home use, simple cpap or bipap will not be enough to ensure adequate minute ventilation to prevent recurrent episdoes of acute on chronic hypercapnic respiratory failure that could result in hospital admission or death\"  -Avoid hyperoxia to prevent hypercapnia    leukocytosis, bacteremia, minimal pneumonia on x-ray left base  -High risk for aspiration  -Procalcitonin 0.32  -Started on Omnicef per pulmonology  -addendum 15:12 EDT: d/w pharmacy BCID Enterococcus faecium just resulted. Add vancomycin and consult ID    Body mass index is 42.57 kg/m².     Metabolic encephalopathy, underlying " dementia  -Neurology expecting waxing and waning encephalopathy  -Improved today    Paroxysmal atrial fibrillation, hypertension  -Currently on heparin due to bruising from Lovenox  -Oral anticoagulant when able to take p.o. more reliably  -Metoprolol  -IV hydralazine as needed    Diet: Regular/House Diet; Texture: Regular Texture (IDDSI 7); Fluid Consistency: Thin (IDDSI 0)     Discussed with patient, family and nursing staff and Dr. Grady Rodrigues    Discharge: Probably will need SNF. PT following    Expected Discharge Date and Time     Expected Discharge Date Expected Discharge Time    Apr 17, 2023       Bladimir Ashley MD  [facility]  04/15/23

## 2023-04-15 NOTE — PROGRESS NOTES
"[facility] Clinical Pharmacy Services: Vancomycin Pharmacokinetic Initial Consult Note    Harry Potts is a 89 y.o. male who is on day 1/7 of pharmacy to dose vancomycin.    Indication: Sepsis  Consulting Provider: Dr. Ashley  Planned Duration of Therapy: 7 days  Loading Dose Ordered or Given: 2000mg on 4/15 at 1615  MRSA PCR performed: No  Culture/Source: 4/14:  BCx 1/2 + Enterococcus faecium  Target: -600 mg/L.hr   Other Antimicrobials: Cefdinir 300mg BID    Vitals/Labs  Ht: 172.7 cm (68\"); Wt: 127 kg (279 lb 15.8 oz)  Temp Readings from Last 1 Encounters:   04/15/23 99.7 °F (37.6 °C) (Oral)    Estimated Creatinine Clearance: 117.7 mL/min (A) (by C-G formula based on SCr of 0.52 mg/dL (L)).     Results from last 7 days   Lab Units 04/15/23  0833 04/14/23  1707 04/13/23  0614 04/12/23  0941 04/11/23  1253 04/09/23  0432   CREATININE mg/dL  --   --   --  0.52* 0.71* 0.60*   WBC 10*3/mm3 15.61* 17.85* 10.45  --  5.86 6.18     Assessment/Plan:    Vancomycin Dose:   1000mg IV every 12 hours  Predictive AUC level for the dose ordered is 456 mg/L.hr, which is within the target of 400-600 mg/L.hr  Vanc Trough has been ordered for 4/17 at 0330 prior to 4th dose     Pharmacy will follow patient's kidney function and will adjust doses and obtain levels as necessary. Thank you for involving pharmacy in this patient's care. Please contact pharmacy with any questions or concerns.                           Kenney Meredith, [facility]  Clinical Pharmacist  "

## 2023-04-15 NOTE — PLAN OF CARE
Goal Outcome Evaluation:               VSS. Pt alert but disoriented. Pt awake all day. Lovenox discontinued due to bruising; hep gtt to start. Increase in WBC on CBC this evening; chest xray and labs ordered. Lasix x 1 given.

## 2023-04-16 ENCOUNTER — APPOINTMENT (OUTPATIENT)
Dept: CARDIOLOGY | Facility: HOSPITAL | Age: 88
DRG: 189 | End: 2023-04-16
Payer: MEDICARE

## 2023-04-16 ENCOUNTER — APPOINTMENT (OUTPATIENT)
Dept: CT IMAGING | Facility: HOSPITAL | Age: 88
DRG: 189 | End: 2023-04-16
Payer: MEDICARE

## 2023-04-16 PROBLEM — K81.0 ACUTE CHOLECYSTITIS: Status: ACTIVE | Noted: 2023-04-16

## 2023-04-16 LAB
ALBUMIN SERPL-MCNC: 2.6 G/DL (ref 3.5–5.2)
ALBUMIN SERPL-MCNC: 2.7 G/DL (ref 3.5–5.2)
ALBUMIN/GLOB SERPL: 0.7 G/DL
ALP SERPL-CCNC: 79 U/L (ref 39–117)
ALP SERPL-CCNC: 88 U/L (ref 39–117)
ALT SERPL W P-5'-P-CCNC: 33 U/L (ref 1–41)
ALT SERPL W P-5'-P-CCNC: 40 U/L (ref 1–41)
ANION GAP SERPL CALCULATED.3IONS-SCNC: 8 MMOL/L (ref 5–15)
AORTIC DIMENSIONLESS INDEX: 0.7 (DI)
APTT PPP: 53 SECONDS (ref 22.7–35.4)
APTT PPP: 57.4 SECONDS (ref 22.7–35.4)
APTT PPP: 58.8 SECONDS (ref 22.7–35.4)
AST SERPL-CCNC: 18 U/L (ref 1–40)
AST SERPL-CCNC: 18 U/L (ref 1–40)
BASOPHILS # BLD AUTO: 0 10*3/MM3 (ref 0–0.2)
BASOPHILS NFR BLD AUTO: 0 % (ref 0–1.5)
BH CV ECHO MEAS - AO MAX PG: 15.5 MMHG
BH CV ECHO MEAS - AO MEAN PG: 8.1 MMHG
BH CV ECHO MEAS - AO V2 MAX: 196.5 CM/SEC
BH CV ECHO MEAS - AO V2 VTI: 29.4 CM
BH CV ECHO MEAS - AVA(I,D): 3.5 CM2
BH CV ECHO MEAS - EDV(CUBED): 116.4 ML
BH CV ECHO MEAS - EDV(MOD-SP2): 126 ML
BH CV ECHO MEAS - EDV(MOD-SP4): 83 ML
BH CV ECHO MEAS - EF(MOD-BP): 59.5 %
BH CV ECHO MEAS - EF(MOD-SP2): 57.1 %
BH CV ECHO MEAS - EF(MOD-SP4): 57.8 %
BH CV ECHO MEAS - ESV(CUBED): 32.4 ML
BH CV ECHO MEAS - ESV(MOD-SP2): 54 ML
BH CV ECHO MEAS - ESV(MOD-SP4): 35 ML
BH CV ECHO MEAS - FS: 34.7 %
BH CV ECHO MEAS - IVS/LVPW: 1.04 CM
BH CV ECHO MEAS - IVSD: 1.09 CM
BH CV ECHO MEAS - LAT PEAK E' VEL: 15.7 CM/SEC
BH CV ECHO MEAS - LV DIASTOLIC VOL/BSA (35-75): 35.2 CM2
BH CV ECHO MEAS - LV MASS(C)D: 191.3 GRAMS
BH CV ECHO MEAS - LV MAX PG: 9.9 MMHG
BH CV ECHO MEAS - LV MEAN PG: 3.7 MMHG
BH CV ECHO MEAS - LV SYSTOLIC VOL/BSA (12-30): 14.8 CM2
BH CV ECHO MEAS - LV V1 MAX: 157.4 CM/SEC
BH CV ECHO MEAS - LV V1 VTI: 21.2 CM
BH CV ECHO MEAS - LVIDD: 4.9 CM
BH CV ECHO MEAS - LVIDS: 3.2 CM
BH CV ECHO MEAS - LVOT AREA: 4.8 CM2
BH CV ECHO MEAS - LVOT DIAM: 2.48 CM
BH CV ECHO MEAS - LVPWD: 1.04 CM
BH CV ECHO MEAS - MED PEAK E' VEL: 11.9 CM/SEC
BH CV ECHO MEAS - MV DEC SLOPE: 626.5 CM/SEC2
BH CV ECHO MEAS - MV DEC TIME: 198 MSEC
BH CV ECHO MEAS - MV E MAX VEL: 117 CM/SEC
BH CV ECHO MEAS - MV MAX PG: 4.1 MMHG
BH CV ECHO MEAS - MV MEAN PG: 1.36 MMHG
BH CV ECHO MEAS - MV P1/2T: 50.4 MSEC
BH CV ECHO MEAS - MV V2 VTI: 26.2 CM
BH CV ECHO MEAS - MVA(P1/2T): 4.4 CM2
BH CV ECHO MEAS - MVA(VTI): 3.9 CM2
BH CV ECHO MEAS - RAP SYSTOLE: 8 MMHG
BH CV ECHO MEAS - RVSP: 42.1 MMHG
BH CV ECHO MEAS - SI(MOD-SP2): 30.5 ML/M2
BH CV ECHO MEAS - SI(MOD-SP4): 20.4 ML/M2
BH CV ECHO MEAS - SV(LVOT): 102.2 ML
BH CV ECHO MEAS - SV(MOD-SP2): 72 ML
BH CV ECHO MEAS - SV(MOD-SP4): 48 ML
BH CV ECHO MEAS - TAPSE (>1.6): 1.9 CM
BH CV ECHO MEAS - TR MAX PG: 34.1 MMHG
BH CV ECHO MEAS - TR MAX VEL: 291.8 CM/SEC
BH CV ECHO MEASUREMENTS AVERAGE E/E' RATIO: 8.48
BH CV XLRA - RV BASE: 4.1 CM
BH CV XLRA - RV LENGTH: 8.7 CM
BH CV XLRA - RV MID: 3.5 CM
BH CV XLRA - TDI S': 16.1 CM/SEC
BILIRUB CONJ SERPL-MCNC: 0.8 MG/DL (ref 0–0.3)
BILIRUB INDIRECT SERPL-MCNC: 0.5 MG/DL
BILIRUB SERPL-MCNC: 1.2 MG/DL (ref 0–1.2)
BILIRUB SERPL-MCNC: 1.3 MG/DL (ref 0–1.2)
BUN SERPL-MCNC: 40 MG/DL (ref 8–23)
BUN/CREAT SERPL: 53.3 (ref 7–25)
CALCIUM SPEC-SCNC: 8.8 MG/DL (ref 8.2–9.6)
CHLORIDE SERPL-SCNC: 92 MMOL/L (ref 98–107)
CO2 SERPL-SCNC: 37 MMOL/L (ref 22–29)
CREAT SERPL-MCNC: 0.75 MG/DL (ref 0.76–1.27)
CREAT SERPL-MCNC: 0.94 MG/DL (ref 0.76–1.27)
DEPRECATED RDW RBC AUTO: 38.5 FL (ref 37–54)
EGFRCR SERPLBLD CKD-EPI 2021: 76.1 ML/MIN/1.73
EGFRCR SERPLBLD CKD-EPI 2021: 84.7 ML/MIN/1.73
EOSINOPHIL # BLD AUTO: 0 10*3/MM3 (ref 0–0.4)
EOSINOPHIL NFR BLD AUTO: 0 % (ref 0.3–6.2)
ERYTHROCYTE [DISTWIDTH] IN BLOOD BY AUTOMATED COUNT: 12 % (ref 12.3–15.4)
GLOBULIN UR ELPH-MCNC: 3.7 GM/DL
GLUCOSE SERPL-MCNC: 134 MG/DL (ref 65–99)
HCT VFR BLD AUTO: 38.9 % (ref 37.5–51)
HGB BLD-MCNC: 13.2 G/DL (ref 13–17.7)
LEFT ATRIUM VOLUME INDEX: 42.8 ML/M2
LEFT ATRIUM VOLUME: 101 ML
LYMPHOCYTES # BLD AUTO: 0.33 10*3/MM3 (ref 0.7–3.1)
LYMPHOCYTES NFR BLD AUTO: 2.6 % (ref 19.6–45.3)
MAXIMAL PREDICTED HEART RATE: 128 BPM
MCH RBC QN AUTO: 29.8 PG (ref 26.6–33)
MCHC RBC AUTO-ENTMCNC: 33.9 G/DL (ref 31.5–35.7)
MCV RBC AUTO: 87.8 FL (ref 79–97)
MONOCYTES # BLD AUTO: 0.69 10*3/MM3 (ref 0.1–0.9)
MONOCYTES NFR BLD AUTO: 5.4 % (ref 5–12)
NEUTROPHILS NFR BLD AUTO: 11.68 10*3/MM3 (ref 1.7–7)
NEUTROPHILS NFR BLD AUTO: 91.1 % (ref 42.7–76)
PLATELET # BLD AUTO: 112 10*3/MM3 (ref 140–450)
PMV BLD AUTO: 11 FL (ref 6–12)
POTASSIUM SERPL-SCNC: 4.4 MMOL/L (ref 3.5–5.2)
PROT SERPL-MCNC: 6 G/DL (ref 6–8.5)
PROT SERPL-MCNC: 6.3 G/DL (ref 6–8.5)
RBC # BLD AUTO: 4.43 10*6/MM3 (ref 4.14–5.8)
SINUS: 3.8 CM
SODIUM SERPL-SCNC: 137 MMOL/L (ref 136–145)
STRESS TARGET HR: 109 BPM
WBC NRBC COR # BLD: 12.81 10*3/MM3 (ref 3.4–10.8)
WHOLE BLOOD HOLD SPECIMEN: NORMAL

## 2023-04-16 PROCEDURE — 94799 UNLISTED PULMONARY SVC/PX: CPT

## 2023-04-16 PROCEDURE — 85730 THROMBOPLASTIN TIME PARTIAL: CPT | Performed by: HOSPITALIST

## 2023-04-16 PROCEDURE — 93306 TTE W/DOPPLER COMPLETE: CPT | Performed by: INTERNAL MEDICINE

## 2023-04-16 PROCEDURE — 99222 1ST HOSP IP/OBS MODERATE 55: CPT | Performed by: INTERNAL MEDICINE

## 2023-04-16 PROCEDURE — 25010000002 CEFTRIAXONE PER 250 MG: Performed by: HOSPITALIST

## 2023-04-16 PROCEDURE — 25010000002 VANCOMYCIN PER 500 MG: Performed by: HOSPITALIST

## 2023-04-16 PROCEDURE — 25010000002 HEPARIN (PORCINE) 25000-0.45 UT/250ML-% SOLUTION: Performed by: HOSPITALIST

## 2023-04-16 PROCEDURE — 74176 CT ABD & PELVIS W/O CONTRAST: CPT

## 2023-04-16 PROCEDURE — 80053 COMPREHEN METABOLIC PANEL: CPT | Performed by: INTERNAL MEDICINE

## 2023-04-16 PROCEDURE — 93306 TTE W/DOPPLER COMPLETE: CPT

## 2023-04-16 PROCEDURE — 82565 ASSAY OF CREATININE: CPT | Performed by: HOSPITALIST

## 2023-04-16 PROCEDURE — 94664 DEMO&/EVAL PT USE INHALER: CPT

## 2023-04-16 PROCEDURE — 94761 N-INVAS EAR/PLS OXIMETRY MLT: CPT

## 2023-04-16 PROCEDURE — 85025 COMPLETE CBC W/AUTO DIFF WBC: CPT | Performed by: HOSPITALIST

## 2023-04-16 PROCEDURE — 94760 N-INVAS EAR/PLS OXIMETRY 1: CPT

## 2023-04-16 PROCEDURE — 87040 BLOOD CULTURE FOR BACTERIA: CPT | Performed by: INTERNAL MEDICINE

## 2023-04-16 RX ADMIN — CEFTRIAXONE 2 G: 2 INJECTION, POWDER, FOR SOLUTION INTRAMUSCULAR; INTRAVENOUS at 16:11

## 2023-04-16 RX ADMIN — AMMONIUM LACTATE 1 APPLICATION: 120 CREAM TOPICAL at 17:47

## 2023-04-16 RX ADMIN — FLUTICASONE PROPIONATE 1 SPRAY: 50 SPRAY, METERED NASAL at 09:35

## 2023-04-16 RX ADMIN — IPRATROPIUM BROMIDE AND ALBUTEROL SULFATE 3 ML: 2.5; .5 SOLUTION RESPIRATORY (INHALATION) at 15:02

## 2023-04-16 RX ADMIN — Medication 10 ML: at 09:35

## 2023-04-16 RX ADMIN — MICONAZOLE NITRATE: 2 POWDER TOPICAL at 00:18

## 2023-04-16 RX ADMIN — Medication 10 ML: at 00:19

## 2023-04-16 RX ADMIN — Medication 10 ML: at 20:44

## 2023-04-16 RX ADMIN — PANTOPRAZOLE SODIUM 40 MG: 40 TABLET, DELAYED RELEASE ORAL at 06:26

## 2023-04-16 RX ADMIN — HEPARIN SODIUM 10.94 UNITS/KG/HR: 10000 INJECTION, SOLUTION INTRAVENOUS at 13:30

## 2023-04-16 RX ADMIN — TERAZOSIN HYDROCHLORIDE 5 MG: 5 CAPSULE ORAL at 09:34

## 2023-04-16 RX ADMIN — METOPROLOL SUCCINATE 25 MG: 25 TABLET, EXTENDED RELEASE ORAL at 09:34

## 2023-04-16 RX ADMIN — MICONAZOLE NITRATE: 2 POWDER TOPICAL at 20:43

## 2023-04-16 RX ADMIN — MICONAZOLE NITRATE: 2 POWDER TOPICAL at 09:35

## 2023-04-16 RX ADMIN — VANCOMYCIN HYDROCHLORIDE 1000 MG: 1 INJECTION, SOLUTION INTRAVENOUS at 05:06

## 2023-04-16 RX ADMIN — IPRATROPIUM BROMIDE AND ALBUTEROL SULFATE 3 ML: 2.5; .5 SOLUTION RESPIRATORY (INHALATION) at 19:31

## 2023-04-16 RX ADMIN — BUDESONIDE AND FORMOTEROL FUMARATE DIHYDRATE 2 PUFF: 160; 4.5 AEROSOL RESPIRATORY (INHALATION) at 07:33

## 2023-04-16 RX ADMIN — HEPARIN SODIUM 14.5 UNITS/KG/HR: 10000 INJECTION, SOLUTION INTRAVENOUS at 23:20

## 2023-04-16 RX ADMIN — DOCUSATE SODIUM 50MG AND SENNOSIDES 8.6MG 2 TABLET: 8.6; 5 TABLET, FILM COATED ORAL at 09:36

## 2023-04-16 RX ADMIN — Medication 10 ML: at 00:18

## 2023-04-16 RX ADMIN — CEFDINIR 300 MG: 300 CAPSULE ORAL at 09:34

## 2023-04-16 RX ADMIN — AMLODIPINE BESYLATE 10 MG: 10 TABLET ORAL at 09:35

## 2023-04-16 RX ADMIN — ASPIRIN 81 MG: 81 TABLET, COATED ORAL at 09:35

## 2023-04-16 RX ADMIN — AMMONIUM LACTATE 1 APPLICATION: 120 CREAM TOPICAL at 05:09

## 2023-04-16 NOTE — PROGRESS NOTES
"[facility] Clinical Pharmacy Services: Vancomycin Pharmacokinetic Initial Consult Note    Harry Potts is a 89 y.o. male who is on day 2/7 of pharmacy to dose vancomycin.    Indication: Sepsis  Consulting Provider: Dr. Ashley  Planned Duration of Therapy: 7 days  Loading Dose Ordered or Given: 2000mg on 4/15 at 1800  MRSA PCR performed: No  Culture/Source: 4/14:  BCx 1/2 + Enterococcus faecium  Target: -600 mg/L.hr   Other Antimicrobials: Cefdinir 300mg BID    Vitals/Labs  Ht: 172.7 cm (68\"); Wt: 127 kg (279 lb 15.8 oz)  Temp Readings from Last 1 Encounters:   04/16/23 98.2 °F (36.8 °C) (Oral)    Estimated Creatinine Clearance: 65.1 mL/min (by C-G formula based on SCr of 0.94 mg/dL).     Results from last 7 days   Lab Units 04/16/23  0627 04/15/23  1748 04/15/23  0833 04/14/23  1707 04/13/23  0614 04/12/23  0941   CREATININE mg/dL 0.94 0.88  --   --   --  0.52*   WBC 10*3/mm3 12.81*  --  15.61* 17.85*   < >  --     < > = values in this interval not displayed.     Assessment/Plan:    Vancomycin Dose:   1250mg IV every 24 hours  Predictive AUC level for the dose ordered is 510 mg/L.hr, which is within the target of 400-600 mg/L.hr  Vanc Trough has been ordered for 4/17 at 0330     Pharmacy will follow patient's kidney function and will adjust doses and obtain levels as necessary. Thank you for involving pharmacy in this patient's care. Please contact pharmacy with any questions or concerns.                           Kenney Meredith, [facility]  Clinical Pharmacist  "

## 2023-04-16 NOTE — PROGRESS NOTES
"  Daily Progress Note.   [address]  4/16/2023    Patient:  Name:  Harry Potts  MRN:  [id number]  11/14/1930  89 y.o.  male         CC:Acute on chronic respiratory failure, obesity, JENISE, restrictive lung disease, kyphosis, pulmonary hypertension  Interval History:  Used noninvasive ventilation overnight.  Afebrile.  Tmax 37.6 on 4 L at present time.  Patient is awake alert he is actually responding to questions more clearly and quickly than yesterday.  He denies worsening cough or shortness of breath.  He does say he has some abdominal pain and some nausea    Physical Exam:  /65 (BP Location: Left arm, Patient Position: Lying)   Pulse 87   Temp 98.2 °F (36.8 °C) (Oral)   Resp 20   Ht 172.7 cm (68\")   Wt 127 kg (279 lb 15.8 oz)   SpO2 (!) 89%   BMI 42.57 kg/m²   Body mass index is 42.57 kg/m².    Intake/Output Summary (Last 24 hours) at 4/16/2023 0918  Last data filed at 4/16/2023 0647  Gross per 24 hour   Intake 740 ml   Output 300 ml   Net 440 ml     General appearance: Nontoxic, conversant   Eyes: anicteric sclerae, moist conjunctivae; no lidlag;    HENT: Lesion on left cheek without active bleeding;   Neck: Large neck circumference  Lungs: CTA, with normal respiratory effort and no intercostal retractions  CV: RRR, no rub   Abdomen: Obese bowel sounds positive nonrigid tender to palpation  Extremities: Mild peripheral edema  Skin: WWP no diffuse visible rash  Psych/neuro calm speech intact    Data Review:  Notable Labs:  Results from last 7 days   Lab Units 04/16/23  0627 04/15/23  0833 04/14/23  1707 04/13/23  0614 04/11/23  1253   WBC 10*3/mm3 12.81* 15.61* 17.85* 10.45 5.86   HEMOGLOBIN g/dL 13.2 14.2 14.4 15.0 14.1   PLATELETS 10*3/mm3 112* 105* 109* 108* 106*     Results from last 7 days   Lab Units 04/16/23  0627 04/15/23  1748 04/12/23  0941 04/11/23  1253   SODIUM mmol/L  --   --  142 143   POTASSIUM mmol/L  --   --  3.7 2.8*   CHLORIDE mmol/L  --   --  97* 95*   CO2 " mmol/L  --   --  39.0* 42.6*   BUN mg/dL  --   --  19 25*   CREATININE mg/dL 0.94 0.88 0.52* 0.71*   GLUCOSE mg/dL  --   --  107* 113*   CALCIUM mg/dL  --   --  8.5 9.1   MAGNESIUM mg/dL  --   --   --  2.1   PHOSPHORUS mg/dL  --   --   --  3.4   Estimated Creatinine Clearance: 65.1 mL/min (by C-G formula based on SCr of 0.94 mg/dL).    Results from last 7 days   Lab Units 04/16/23  0627 04/15/23  0833 04/14/23  2115 04/14/23  1707   PROCALCITONIN ng/mL  --  0.32* 0.32*  --    PLATELETS 10*3/mm3 112* 105*  --  109*       Results from last 7 days   Lab Units 04/14/23  1356 04/13/23  0404 04/11/23  1416   PH, ARTERIAL pH units 7.396 7.466* 7.436   PCO2, ARTERIAL mm Hg 72.6* 54.0* 66.5*   PO2 ART mm Hg 66.9* 71.3* 91.5   HCO3 ART mmol/L 44.5* 39.0* 44.7*       Imaging:  Reviewed chest images personally from past 3 days    ASSESSMENT  /  PLAN:  Acute on chronic chronic hypoxemic respiratory failure  Chronic hypercapnia  Obesity hypoventilation syndrome  JENISE  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

## 2023-04-16 NOTE — PLAN OF CARE
Goal Outcome Evaluation:  Plan of Care Reviewed With: patient           Outcome Evaluation: pt disoriented to sit, 8L HF, q2 hour turn, large BM this shift, hep gtt, will cont to monitor  Problem: Adult Inpatient Plan of Care  Goal: Plan of Care Review  Outcome: Ongoing, Progressing  Flowsheets (Taken 4/16/2023 1357)  Plan of Care Reviewed With: patient  Outcome Evaluation: pt disoriented to sit, 8L HF, q2 hour turn, large BM this shift, hep gtt, will cont to monitor  Goal: Patient-Specific Goal (Individualized)  Outcome: Ongoing, Progressing  Goal: Absence of Hospital-Acquired Illness or Injury  Outcome: Ongoing, Progressing  Intervention: Identify and Manage Fall Risk  Recent Flowsheet Documentation  Taken 4/16/2023 1354 by Arlyn Drummond RN  Safety Promotion/Fall Prevention:   activity supervised   assistive device/personal items within reach   clutter free environment maintained   fall prevention program maintained   nonskid shoes/slippers when out of bed   room organization consistent   safety round/check completed  Taken 4/16/2023 1211 by Arlyn Drummond RN  Safety Promotion/Fall Prevention: patient off unit  Taken 4/16/2023 1033 by Arlyn Drummond RN  Safety Promotion/Fall Prevention:   activity supervised   assistive device/personal items within reach   clutter free environment maintained   fall prevention program maintained   nonskid shoes/slippers when out of bed   room organization consistent   safety round/check completed  Taken 4/16/2023 0800 by Arlyn Drummond, RN  Safety Promotion/Fall Prevention:   activity supervised   assistive device/personal items within reach   clutter free environment maintained   fall prevention program maintained   nonskid shoes/slippers when out of bed   room organization consistent   safety round/check completed  Intervention: Prevent Skin Injury  Recent Flowsheet Documentation  Taken 4/16/2023 1354 by Arlyn Drummond, RN  Skin Protection: adhesive use limited  Taken  4/16/2023 0800 by Arlyn Drummond RN  Skin Protection: adhesive use limited  Intervention: Prevent and Manage VTE (Venous Thromboembolism) Risk  Recent Flowsheet Documentation  Taken 4/16/2023 1354 by Arlyn Drummond RN  Activity Management: activity encouraged  Taken 4/16/2023 1033 by Arlyn Drummond RN  Activity Management: activity encouraged  Taken 4/16/2023 0800 by Arlyn Drummond RN  Activity Management: activity encouraged  Intervention: Prevent Infection  Recent Flowsheet Documentation  Taken 4/16/2023 1354 by Arlyn Drummond RN  Infection Prevention: single patient room provided  Taken 4/16/2023 1033 by Arlyn Drummond RN  Infection Prevention: single patient room provided  Taken 4/16/2023 0800 by Arlyn Drummond RN  Infection Prevention: single patient room provided  Goal: Optimal Comfort and Wellbeing  Outcome: Ongoing, Progressing  Intervention: Provide Person-Centered Care  Recent Flowsheet Documentation  Taken 4/16/2023 1354 by Arlyn Drummond RN  Trust Relationship/Rapport:   care explained   thoughts/feelings acknowledged  Taken 4/16/2023 0800 by Arlyn Drummond RN  Trust Relationship/Rapport:   care explained   thoughts/feelings acknowledged  Goal: Readiness for Transition of Care  Outcome: Ongoing, Progressing

## 2023-04-16 NOTE — SIGNIFICANT NOTE
04/16/23 1340   OTHER   Discipline physical therapy assistant   Rehab Time/Intention   Session Not Performed patient/family declined, not feeling well  (Pt and Pt's spouse declined PT treatment this PM due to having a rough couple of days. Will f/u with pt tomorrow.)   Recommendation   PT - Next Appointment 04/17/23

## 2023-04-16 NOTE — PLAN OF CARE
Goal Outcome Evaluation:      Patient alert to self, lethargic. 4-5L NC while awake, Trilogy w/ 8L bleed while sleeping. Alertness improved with Trilogy use. Fluids encouraged. Q2 turns. Hep gtt therapeutic. No complaints of pain.

## 2023-04-16 NOTE — PROGRESS NOTES
Name: Harry Potts ADMIT: 2023   : 1930  PCP: Harry Luis MD    MRN: [id number] LOS: 11 days   AGE/SEX: 89 y.o. male  ROOM: [facility]     Subjective   Subjective    Wife states he has complained of right sided abdominal pain after eating.     Objective   Objective   Vital Signs  Temp:  [98.2 °F (36.8 °C)-99.7 °F (37.6 °C)] 99 °F (37.2 °C)  Heart Rate:  [] 113  Resp:  [16-24] 18  BP: (115-139)/(54-66) 139/66  SpO2:  [88 %-92 %] 88 %  on  Flow (L/min):  [3-9] 8;   Device (Oxygen Therapy): high-flow nasal cannula  Body mass index is 42.57 kg/m².    Physical Exam  Constitutional:       General: He is not in acute distress.     Appearance: Normal appearance. He is not toxic-appearing.   HENT:      Head: Normocephalic and atraumatic.   Cardiovascular:      Rate and Rhythm: Normal rate and regular rhythm.   Pulmonary:      Effort: Pulmonary effort is normal. No respiratory distress.      Breath sounds: Normal breath sounds. No wheezing or rhonchi.   Abdominal:      Palpations: Abdomen is soft.      Tenderness: There is abdominal tenderness.   Musculoskeletal:         General: No swelling.      Cervical back: Normal range of motion.   Skin:     General: Skin is warm and dry.   Neurological:      General: No focal deficit present.      Mental Status: He is alert.   Psychiatric:         Mood and Affect: Mood normal.         Behavior: Behavior normal.         Thought Content: Thought content normal.     Results Review  I reviewed the patient's new clinical results.  Results from last 7 days   Lab Units 23  0627 04/15/23  0833 23  1707 23  0614   WBC 10*3/mm3 12.81* 15.61* 17.85* 10.45   HEMOGLOBIN g/dL 13.2 14.2 14.4 15.0   PLATELETS 10*3/mm3 112* 105* 109* 108*     Results from last 7 days   Lab Units 23  1101 23  0627 04/15/23  1748 23  0941 23  1253   SODIUM mmol/L 137  --   --  142 143   POTASSIUM mmol/L 4.4  --   --  3.7 2.8*   CHLORIDE mmol/L 92*  --    --  97* 95*   CO2 mmol/L 37.0*  --   --  39.0* 42.6*   BUN mg/dL 40*  --   --  19 25*   CREATININE mg/dL 0.75* 0.94 0.88 0.52* 0.71*   GLUCOSE mg/dL 134*  --   --  107* 113*     Lab Results   Component Value Date    ANIONGAP 8.0 04/16/2023     Estimated Creatinine Clearance: 81.6 mL/min (A) (by C-G formula based on SCr of 0.75 mg/dL (L)).    Results from last 7 days   Lab Units 04/16/23  1101 04/15/23  1748   ALBUMIN g/dL 2.6* 2.7*   BILIRUBIN mg/dL 1.2 1.3*   ALK PHOS U/L 88 79   AST (SGOT) U/L 18 18   ALT (SGPT) U/L 33 40     Results from last 7 days   Lab Units 04/16/23  1101 04/15/23  1748 04/12/23  0941 04/11/23  1253   CALCIUM mg/dL 8.8  --  8.5 9.1   ALBUMIN g/dL 2.6* 2.7*  --   --    MAGNESIUM mg/dL  --   --   --  2.1   PHOSPHORUS mg/dL  --   --   --  3.4     Results from last 7 days   Lab Units 04/15/23  0833 04/14/23  2115   PROCALCITONIN ng/mL 0.32* 0.32*     No results found for: HGBA1C, POCGLU    CT Abdomen Pelvis Without Contrast    Result Date: 4/16/2023  1. Gallbladder dilatation with wall thickening and multiple small gallstones and suspected proximal cystic duct stone.  There is extensive inflammation within the right upper quadrant most likely due to acute cholecystitis and there is also evidence for duodenitis with stranding and edema in the right upper quadrant extending to the right paracolic gutter. Mild ascites with perihepatic fluid. These findings represents a change when correlated with the CT angiogram chest 12 days ago. 2. Small pleural effusions with dense lower lobe consolidation and potential basilar infiltrates.  Discussed with Arlyn, the nurse caring for the patient on 04/16/2023 at 1:15 PM.  Radiation dose reduction techniques were utilized, including automated exposure control and exposure modulation based on body size.       XR Chest 1 View    Result Date: 4/14/2023  1. Cardiomegaly. 2. Underinflation of the lungs. There may be some minimal pneumonia developing in the left base.   This report was finalized on 4/14/2023 7:20 PM by Dr. Jimenez Rueda M.D.        Scheduled Meds  amLODIPine, 10 mg, Oral, Daily  ammonium lactate, 1 application, Topical, Q12H  aspirin, 81 mg, Oral, Daily  budesonide-formoterol, 2 puff, Inhalation, BID - RT  cefdinir, 300 mg, Oral, Q12H  fluticasone, 1 spray, Nasal, Daily  ipratropium-albuterol, 3 mL, Nebulization, Q6H While Awake - RT  metoprolol succinate XL, 25 mg, Oral, Daily  miconazole, , Topical, Q12H  pantoprazole, 40 mg, Oral, Q AM  potassium chloride, 40 mEq, Oral, Once  senna-docusate sodium, 2 tablet, Oral, BID  sodium chloride, 10 mL, Intravenous, Q12H  sodium chloride, 10 mL, Intravenous, Q12H  terazosin, 5 mg, Oral, Daily  [START ON 4/17/2023] vancomycin, 1,250 mg, Intravenous, Q24H    Continuous Infusions  heparin, 7.94 Units/kg/hr, Last Rate: 10.94 Units/kg/hr (04/16/23 1330)  Pharmacy to dose vancomycin,     PRN Meds  •  acetaminophen **OR** acetaminophen **OR** acetaminophen  •  albuterol  •  senna-docusate sodium **AND** polyethylene glycol **AND** bisacodyl **AND** bisacodyl  •  calcium carbonate  •  hydrALAZINE  •  ipratropium-albuterol  •  nitroglycerin  •  OLANZapine  •  ondansetron **OR** ondansetron  •  Pharmacy to dose vancomycin  •  potassium chloride **OR** potassium chloride **OR** potassium chloride  •  potassium chloride  •  sodium chloride  •  sodium chloride  •  sodium chloride  •  sodium chloride  •  sodium chloride    heparin, 7.94 Units/kg/hr, Last Rate: 10.94 Units/kg/hr (04/16/23 1330)  Pharmacy to dose vancomycin,     Diet  Diet: Regular/House Diet; Texture: Regular Texture (IDDSI 7); Fluid Consistency: Thin (IDDSI 0)    I have personally reviewed:  [x]  Medications  [x]  Laboratory   [x]  Microbiology   [x]  Radiology   []  EKG/Telemetry   []  Cardiology/Vascular    []  Pathology    []  Records    Results for orders placed during the hospital encounter of 04/04/23    Adult Transthoracic Echo Complete W/ Cont if Necessary  "Per Protocol    Interpretation Summary  •  Left ventricular systolic function is normal. Calculated left ventricular EF = 59.5%  •  Left ventricular wall thickness is consistent with mild concentric hypertrophy.  •  The right ventricular cavity is mildly dilated.  •  Left atrial volume is mildly increased.  •  The right atrial cavity is mildly  dilated.  •  Estimated right ventricular systolic pressure from tricuspid regurgitation is mildly elevated (35-45 mmHg).  •  The study is technically difficult for diagnosis.        Assessment/Plan     Active Hospital Problems    Diagnosis  POA   • **Chronic respiratory failure with hypoxia [J96.11]  Unknown   • Hypoxia [R09.02]  Yes   • JENISE on CPAP [G47.33, Z99.89]  Not Applicable   • COPD (chronic obstructive pulmonary disease) [J44.9]  Yes   • Morbid obesity [E66.01]  Yes   • Gastroesophageal reflux disease [K21.9]  Yes      Resolved Hospital Problems   No resolved problems to display.     89 y.o. male     Chronic hypoxemic and hypercapnic respiratory failure  JENISE  restrictive lung disease  pulmonary hypertension related to lung disease  -Pulmonology following: \"will need NIPPV for home use, simple cpap or bipap will not be enough to ensure adequate minute ventilation to prevent recurrent episdoes of acute on chronic hypercapnic respiratory failure that could result in hospital admission or death\"  -Avoid hyperoxia to prevent hypercapnia    Enterococcus faecium BCID  acute cholecystitis on CT today  minimal pneumonia on x-ray left base   -Procalcitonin 0.32  -Started on Omnicef per pulmonology  -vanc added for enterococcus. switch omnicef to IV ceftriaxone  -ID consulted  -Echo above  -CT acute cholecystitis will also consult surgery. Wife states patient has a history of gallstones    Body mass index is 42.57 kg/m².     Metabolic encephalopathy  underlying dementia  -Neurology expecting waxing and waning encephalopathy    Paroxysmal atrial " fibrillation  hypertension  -Currently on heparin due to bruising from Lovenox. For now leave on heparin pending surgery evaluation  -Eventually oral anticoagulant when able to take p.o. more reliably  -Metoprolol  -IV hydralazine as needed    Diet: Regular/House Diet; Texture: Regular Texture (IDDSI 7); Fluid Consistency: Thin (IDDSI 0)     Discussed with patient, family and nursing staff     Discharge: Probably will need SNF eventually. PT following    Expected Discharge Date and Time     Expected Discharge Date Expected Discharge Time    Apr 17, 2023       Bladimir Ashley MD  [facility]  04/16/23

## 2023-04-17 ENCOUNTER — APPOINTMENT (OUTPATIENT)
Dept: CT IMAGING | Facility: HOSPITAL | Age: 88
DRG: 189 | End: 2023-04-17
Payer: MEDICARE

## 2023-04-17 LAB
ANION GAP SERPL CALCULATED.3IONS-SCNC: 8.9 MMOL/L (ref 5–15)
APTT PPP: 56.5 SECONDS (ref 22.7–35.4)
APTT PPP: 91.1 SECONDS (ref 22.7–35.4)
BACTERIA SPEC AEROBE CULT: ABNORMAL
BASOPHILS # BLD AUTO: 0.01 10*3/MM3 (ref 0–0.2)
BASOPHILS NFR BLD AUTO: 0.1 % (ref 0–1.5)
BUN SERPL-MCNC: 45 MG/DL (ref 8–23)
BUN/CREAT SERPL: 56.3 (ref 7–25)
CALCIUM SPEC-SCNC: 9.2 MG/DL (ref 8.2–9.6)
CHLORIDE SERPL-SCNC: 91 MMOL/L (ref 98–107)
CO2 SERPL-SCNC: 37.1 MMOL/L (ref 22–29)
CREAT SERPL-MCNC: 0.8 MG/DL (ref 0.76–1.27)
DEPRECATED RDW RBC AUTO: 38.7 FL (ref 37–54)
EGFRCR SERPLBLD CKD-EPI 2021: 83 ML/MIN/1.73
EOSINOPHIL # BLD AUTO: 0 10*3/MM3 (ref 0–0.4)
EOSINOPHIL NFR BLD AUTO: 0 % (ref 0.3–6.2)
ERYTHROCYTE [DISTWIDTH] IN BLOOD BY AUTOMATED COUNT: 12.3 % (ref 12.3–15.4)
GLUCOSE SERPL-MCNC: 120 MG/DL (ref 65–99)
GRAM STN SPEC: ABNORMAL
GRAM STN SPEC: ABNORMAL
HCT VFR BLD AUTO: 40.2 % (ref 37.5–51)
HGB BLD-MCNC: 13.2 G/DL (ref 13–17.7)
IMM GRANULOCYTES # BLD AUTO: 0.08 10*3/MM3 (ref 0–0.05)
IMM GRANULOCYTES NFR BLD AUTO: 0.6 % (ref 0–0.5)
ISOLATED FROM: ABNORMAL
LYMPHOCYTES # BLD AUTO: 0.37 10*3/MM3 (ref 0.7–3.1)
LYMPHOCYTES NFR BLD AUTO: 2.9 % (ref 19.6–45.3)
MCH RBC QN AUTO: 28.8 PG (ref 26.6–33)
MCHC RBC AUTO-ENTMCNC: 32.8 G/DL (ref 31.5–35.7)
MCV RBC AUTO: 87.8 FL (ref 79–97)
MONOCYTES # BLD AUTO: 0.79 10*3/MM3 (ref 0.1–0.9)
MONOCYTES NFR BLD AUTO: 6.3 % (ref 5–12)
NEUTROPHILS NFR BLD AUTO: 11.37 10*3/MM3 (ref 1.7–7)
NEUTROPHILS NFR BLD AUTO: 90.1 % (ref 42.7–76)
NRBC BLD AUTO-RTO: 0 /100 WBC (ref 0–0.2)
PLATELET # BLD AUTO: 154 10*3/MM3 (ref 140–450)
PMV BLD AUTO: 12.3 FL (ref 6–12)
POTASSIUM SERPL-SCNC: 3.8 MMOL/L (ref 3.5–5.2)
RBC # BLD AUTO: 4.58 10*6/MM3 (ref 4.14–5.8)
SODIUM SERPL-SCNC: 137 MMOL/L (ref 136–145)
VANCOMYCIN TROUGH SERPL-MCNC: 8.4 MCG/ML (ref 5–20)
WBC NRBC COR # BLD: 12.62 10*3/MM3 (ref 3.4–10.8)

## 2023-04-17 PROCEDURE — 94664 DEMO&/EVAL PT USE INHALER: CPT

## 2023-04-17 PROCEDURE — 80202 ASSAY OF VANCOMYCIN: CPT | Performed by: HOSPITALIST

## 2023-04-17 PROCEDURE — 94799 UNLISTED PULMONARY SVC/PX: CPT

## 2023-04-17 PROCEDURE — 80048 BASIC METABOLIC PNL TOTAL CA: CPT | Performed by: NURSE PRACTITIONER

## 2023-04-17 PROCEDURE — 94660 CPAP INITIATION&MGMT: CPT

## 2023-04-17 PROCEDURE — 85025 COMPLETE CBC W/AUTO DIFF WBC: CPT | Performed by: HOSPITALIST

## 2023-04-17 PROCEDURE — 85730 THROMBOPLASTIN TIME PARTIAL: CPT | Performed by: HOSPITALIST

## 2023-04-17 PROCEDURE — 25010000002 HEPARIN (PORCINE) 25000-0.45 UT/250ML-% SOLUTION: Performed by: HOSPITALIST

## 2023-04-17 PROCEDURE — 25010000002 VANCOMYCIN 10 G RECONSTITUTED SOLUTION: Performed by: HOSPITALIST

## 2023-04-17 PROCEDURE — 99222 1ST HOSP IP/OBS MODERATE 55: CPT | Performed by: STUDENT IN AN ORGANIZED HEALTH CARE EDUCATION/TRAINING PROGRAM

## 2023-04-17 PROCEDURE — 92610 EVALUATE SWALLOWING FUNCTION: CPT | Performed by: SPEECH-LANGUAGE PATHOLOGIST

## 2023-04-17 PROCEDURE — 97530 THERAPEUTIC ACTIVITIES: CPT

## 2023-04-17 PROCEDURE — 25010000002 AMPICILLIN-SULBACTAM PER 1.5 G: Performed by: INTERNAL MEDICINE

## 2023-04-17 PROCEDURE — 99232 SBSQ HOSP IP/OBS MODERATE 35: CPT | Performed by: INTERNAL MEDICINE

## 2023-04-17 PROCEDURE — 94761 N-INVAS EAR/PLS OXIMETRY MLT: CPT

## 2023-04-17 PROCEDURE — 94760 N-INVAS EAR/PLS OXIMETRY 1: CPT

## 2023-04-17 RX ORDER — AMLODIPINE BESYLATE 10 MG/1
TABLET ORAL
Qty: 90 TABLET | Refills: 3 | Status: SHIPPED | OUTPATIENT
Start: 2023-04-17 | End: 2023-04-24 | Stop reason: SDUPTHER

## 2023-04-17 RX ORDER — FLUTICASONE PROPIONATE AND SALMETEROL 250; 50 UG/1; UG/1
POWDER RESPIRATORY (INHALATION)
Qty: 180 EACH | Refills: 3 | OUTPATIENT
Start: 2023-04-17 | End: 2023-04-24 | Stop reason: HOSPADM

## 2023-04-17 RX ORDER — DOXAZOSIN MESYLATE 4 MG/1
TABLET ORAL
Qty: 90 TABLET | Refills: 3 | OUTPATIENT
Start: 2023-04-17 | End: 2023-04-24 | Stop reason: HOSPADM

## 2023-04-17 RX ADMIN — Medication 10 ML: at 12:56

## 2023-04-17 RX ADMIN — AMMONIUM LACTATE 1 APPLICATION: 120 CREAM TOPICAL at 05:53

## 2023-04-17 RX ADMIN — IPRATROPIUM BROMIDE AND ALBUTEROL SULFATE 3 ML: 2.5; .5 SOLUTION RESPIRATORY (INHALATION) at 12:09

## 2023-04-17 RX ADMIN — Medication 10 ML: at 10:43

## 2023-04-17 RX ADMIN — AMLODIPINE BESYLATE 10 MG: 10 TABLET ORAL at 13:45

## 2023-04-17 RX ADMIN — MICONAZOLE NITRATE: 2 POWDER TOPICAL at 20:52

## 2023-04-17 RX ADMIN — AMMONIUM LACTATE 1 APPLICATION: 120 CREAM TOPICAL at 20:50

## 2023-04-17 RX ADMIN — HEPARIN SODIUM 13.5 UNITS/KG/HR: 10000 INJECTION, SOLUTION INTRAVENOUS at 23:02

## 2023-04-17 RX ADMIN — Medication 10 ML: at 20:52

## 2023-04-17 RX ADMIN — BUDESONIDE AND FORMOTEROL FUMARATE DIHYDRATE 2 PUFF: 160; 4.5 AEROSOL RESPIRATORY (INHALATION) at 21:05

## 2023-04-17 RX ADMIN — VANCOMYCIN HYDROCHLORIDE 1250 MG: 10 INJECTION, POWDER, LYOPHILIZED, FOR SOLUTION INTRAVENOUS at 04:59

## 2023-04-17 RX ADMIN — AMPICILLIN SODIUM AND SULBACTAM SODIUM 3 G: 2; 1 INJECTION, POWDER, FOR SOLUTION INTRAMUSCULAR; INTRAVENOUS at 18:17

## 2023-04-17 RX ADMIN — MICONAZOLE NITRATE: 2 POWDER TOPICAL at 13:46

## 2023-04-17 RX ADMIN — METOPROLOL TARTRATE 12.5 MG: 25 TABLET ORAL at 20:50

## 2023-04-17 RX ADMIN — AMPICILLIN SODIUM AND SULBACTAM SODIUM 3 G: 2; 1 INJECTION, POWDER, FOR SOLUTION INTRAMUSCULAR; INTRAVENOUS at 13:45

## 2023-04-17 RX ADMIN — ASPIRIN 81 MG: 81 TABLET, COATED ORAL at 13:45

## 2023-04-17 RX ADMIN — FLUTICASONE PROPIONATE 1 SPRAY: 50 SPRAY, METERED NASAL at 13:46

## 2023-04-17 RX ADMIN — Medication 10 ML: at 20:51

## 2023-04-17 RX ADMIN — AMPICILLIN SODIUM AND SULBACTAM SODIUM 3 G: 2; 1 INJECTION, POWDER, FOR SOLUTION INTRAMUSCULAR; INTRAVENOUS at 23:48

## 2023-04-17 RX ADMIN — BUDESONIDE AND FORMOTEROL FUMARATE DIHYDRATE 2 PUFF: 160; 4.5 AEROSOL RESPIRATORY (INHALATION) at 08:07

## 2023-04-17 NOTE — PLAN OF CARE
Goal Outcome Evaluation:  Plan of Care Reviewed With: patient        Progress: declining  Outcome Evaluation: Pt seen for PT this PM, very lethargic - RN reports pt did not wear his mask while sleeping. Pt required increased assist for all mobility today 2/2 lethargy. Pt required max A x2 to transfer to EOB - strong posterior lean with difficulty correcting. Heavy cueing throughout - attempted lateral UE leaning to correct R lean with some improvement, but pt still leans posteriorly with fatigue. Completed LE exercises with heavy cues and visual demo. Unsafe to attempt transfers today with drowsiness, pt having difficulty keeping his eyes open or maintaining independent sitting balance. Pt assisted back to supine, dependently scooted up in bed, and left with needs met. PT will continue to follow to progress mobility as tolerated. Anticipate DC to SNF.

## 2023-04-17 NOTE — CONSULTS
General Surgery Consult    Summary:    Mr. Harry Potts is a 89 y.o. year old gentleman with bacteremia from acute cholecystitis. Due to his comorbidities, would recommend sometime last week percutaneous cholecystostomy tube placement.    Chief Complaint:    Abdominal pain    History of Present Illness:    Mr. Harry Potts is a 89 y.o. year old gentleman admitted 11 days ago for respiratory failure.  Over the last few days he is developed right upper quadrant abdominal pain and developed bacteremia.  On imaging, it appears he has severe acute cholecystitis.    Past Medical History:   Past Medical History:   Diagnosis Date   • Arthritis    • Asthma March, 2021    Oxygen at home   • Cancer     basal cell    • COPD (chronic obstructive pulmonary disease)    • Difficulty walking    • Elevated cholesterol    • Environmental allergies    • GERD (gastroesophageal reflux disease)    • HL (hearing loss)    • Hyperglycemia    • Hyperlipidemia    • Hypertension    • Obesity    • Sleep apnea    • Spondylolysis, lumbar region       Past Surgical History:    Past Surgical History:   Procedure Laterality Date   • CARDIAC CATHETERIZATION  circa 2008    [facility]   • EYE SURGERY     • HERNIA REPAIR     • JOINT REPLACEMENT     • REPLACEMENT TOTAL KNEE BILATERAL       Family History:    Family History   Problem Relation Age of Onset   • Heart attack Father         age 80     Social History:    Social History     Socioeconomic History   • Marital status:    Tobacco Use   • Smoking status: Never   • Smokeless tobacco: Never   Vaping Use   • Vaping Use: Never used   Substance and Sexual Activity   • Alcohol use: No     Comment: caffeine use   • Drug use: No   • Sexual activity: Not Currently     Partners: Female     Allergies:   Allergies   Allergen Reactions   • Atorvastatin Other (See Comments)     SPASMS LEG      • Dexmedetomidine Other (See Comments)     Dex causes bradycardia and hyptension    • Morphine And Related  Spoke to pharmacy, they said to advise pt to check with her insurance about options.   She is going to call insurance and find out about prices Unknown - Low Severity       Medications:     Current Facility-Administered Medications:   •  acetaminophen (TYLENOL) tablet 650 mg, 650 mg, Oral, Q4H PRN, 650 mg at 04/15/23 1353 **OR** acetaminophen (TYLENOL) 160 MG/5ML solution 650 mg, 650 mg, Oral, Q4H PRN **OR** acetaminophen (TYLENOL) suppository 650 mg, 650 mg, Rectal, Q4H PRN, Emilie Pérez APRN  •  albuterol (PROVENTIL) nebulizer solution 0.083% 2.5 mg/3mL, 2.5 mg, Nebulization, Q6H PRN, Tano Osei MD  •  amLODIPine (NORVASC) tablet 10 mg, 10 mg, Oral, Daily, Tano Osei MD, 10 mg at 04/16/23 0935  •  ammonium lactate (AMLACTIN) cream 1 application, 1 application, Topical, Q12H, Tano Osei MD, 1 application at 04/17/23 0553  •  ampicillin-sulbactam (UNASYN) 3 g in sodium chloride 0.9 % 100 mL IVPB-VTB, 3 g, Intravenous, Q6H, Jazmín Mederos MD  •  aspirin EC tablet 81 mg, 81 mg, Oral, Daily, Tano Osei MD, 81 mg at 04/16/23 0935  •  sennosides-docusate (PERICOLACE) 8.6-50 MG per tablet 2 tablet, 2 tablet, Oral, BID, 2 tablet at 04/16/23 0936 **AND** polyethylene glycol (MIRALAX) packet 17 g, 17 g, Oral, Daily PRN **AND** bisacodyl (DULCOLAX) EC tablet 5 mg, 5 mg, Oral, Daily PRN **AND** bisacodyl (DULCOLAX) suppository 10 mg, 10 mg, Rectal, Daily PRN, Kenneth Chamberlain Jr., MD  •  budesonide-formoterol (SYMBICORT) 160-4.5 MCG/ACT inhaler 2 puff, 2 puff, Inhalation, BID - RT, Tano Osei MD, 2 puff at 04/17/23 0807  •  calcium carbonate (TUMS) chewable tablet 500 mg (200 mg elemental), 2 tablet, Oral, BID PRN, Emilie Pérez APRN  •  fluticasone (FLONASE) 50 MCG/ACT nasal spray 1 spray, 1 spray, Nasal, Daily, Tano Osei MD, 1 spray at 04/16/23 0935  •  heparin 25412 units/250 mL (100 units/mL) in 0.45 % NaCl infusion, 7.94 Units/kg/hr, Intravenous, Titrated, Bladimir Ashley MD, Last Rate: 15.75 mL/hr at 04/17/23 0727, 12.5 Units/kg/hr at 04/17/23 0727  •  hydrALAZINE (APRESOLINE)  injection 10 mg, 10 mg, Intravenous, Q4H PRN, Rosemary Jensen, APRN, 10 mg at 04/09/23 0821  •  ipratropium-albuterol (DUO-NEB) nebulizer solution 3 mL, 3 mL, Nebulization, Q4H PRN, Emilie Pérez APRN, 3 mL at 04/06/23 1456  •  ipratropium-albuterol (DUO-NEB) nebulizer solution 3 mL, 3 mL, Nebulization, Q6H While Awake - RT, Emilie Pérez APRN, 3 mL at 04/17/23 1209  •  metoprolol succinate XL (TOPROL-XL) 24 hr tablet 25 mg, 25 mg, Oral, Daily, Itz Lyle MD, 25 mg at 04/16/23 0934  •  miconazole (MICOTIN) 2 % powder, , Topical, Q12H, Tano Osei MD, Given at 04/16/23 2043  •  nitroglycerin (NITROSTAT) SL tablet 0.4 mg, 0.4 mg, Sublingual, Q5 Min PRN, Emilie Pérez APRN  •  OLANZapine (zyPREXA) injection 5 mg, 5 mg, Intramuscular, Q4H PRN, Jae Becker MD  •  ondansetron (ZOFRAN) tablet 4 mg, 4 mg, Oral, Q6H PRN **OR** ondansetron (ZOFRAN) injection 4 mg, 4 mg, Intravenous, Q6H PRN, Emilie Pérez APRN  •  pantoprazole (PROTONIX) EC tablet 40 mg, 40 mg, Oral, Q AM, Tano Osei MD, 40 mg at 04/16/23 0626  •  Pharmacy to dose vancomycin, , Does not apply, Continuous PRN, Bladimir Ashley MD  •  potassium chloride (K-DUR,KLOR-CON) ER tablet 40 mEq, 40 mEq, Oral, Once, Tano Osei MD  •  potassium chloride (K-DUR,KLOR-CON) ER tablet 40 mEq, 40 mEq, Oral, PRN, 40 mEq at 04/11/23 1850 **OR** potassium chloride (KLOR-CON) packet 40 mEq, 40 mEq, Oral, PRN, 40 mEq at 04/12/23 0300 **OR** potassium chloride 10 mEq in 100 mL IVPB, 10 mEq, Intravenous, Q1H PRN, Kenneth Chamberlain Jr., MD  •  potassium chloride 10 mEq in 100 mL IVPB, 10 mEq, Intravenous, Q1H PRN, Kenneth Chamberlain Jr., MD, Last Rate: 100 mL/hr at 04/11/23 1818, 10 mEq at 04/11/23 1818  •  sodium chloride 0.9 % flush 10 mL, 10 mL, Intravenous, PRN, Melquiades Narvaez MD  •  sodium chloride 0.9 % flush 10 mL, 10 mL, Intravenous, Q12H, Emilie Pérez, APRN, 10 mL at 04/17/23 1043  •   sodium chloride 0.9 % flush 10 mL, 10 mL, Intravenous, PRN, Emilie Pérez APRN, 10 mL at 04/05/23 1640  •  sodium chloride 0.9 % flush 10 mL, 10 mL, Intravenous, Q12H, Kenneth Chamberlain Jr., MD, 10 mL at 04/16/23 2044  •  sodium chloride 0.9 % flush 10 mL, 10 mL, Intravenous, PRN, Kenneth Chamberlain Jr., MD  •  sodium chloride 0.9 % infusion 40 mL, 40 mL, Intravenous, PRN, Emilie Pérez APRN  •  sodium chloride 0.9 % infusion 40 mL, 40 mL, Intravenous, PRN, Kenneth Chamberlain Jr., MD  •  terazosin (HYTRIN) capsule 5 mg, 5 mg, Oral, Daily, Tano Osei MD, 5 mg at 04/16/23 0934  •  vancomycin 1250 mg/250 mL 0.9% NS IVPB ([facility]), 1,250 mg, Intravenous, Q24H, Bladimir Ashley MD, 1,250 mg at 04/17/23 0459    Radiology/Endoscopy:    • CT abdomen/pelvis: gallstone dilatation with wall thickening, gallstones, extensive stranding in the RUQ    Labs:    • WBC 12 Hgb 13 platelets 154 Cr 0.8    Review of Systems:   Influenza-like illness: no fever, no  cough, no  sore throat, no  body aches, no loss of sense of taste or smell, no known exposure to person with Covid-19.  Constitutional: Negative for fevers or chills  HENT: Negative for hearing loss or runny nose  Eyes: Negative for vision changes or scleral icterus  Respiratory: Negative for cough or shortness of breath  Cardiovascular: Negative for chest pain or heart palpitations  Gastrointestinal: + for abdominal pain, Negative nausea, vomiting, constipation, melena, or hematochezia  Genitourinary: Negative for hematuria or dysuria  Musculoskeletal: Negative for joint swelling or gait instability  Neurologic: Negative for tremors or seizures  Psychiatric: Negative for suicidal ideations or depression  All other systems reviewed and negative    Physical Exam:   • Constitutional: Frail, appears stated age, no acute distress, BMI 40   • Eyes:  Conjunctivae normal, sclerae nonicteric  • ENMT: Hearing grossly normal, oral mucosa moist  • Neck: Supple,  trachea midline  • Respiratory: Clear to auscultation, normal inspiratory effort  • Cardiovascular: Regular rate, no peripheral edema, no jugular venous distention  • Gastrointestinal: Soft, tender in the right upper quadrant  • Skin:  Warm, dry, no rash on visualized skin surfaces  • Musculoskeletal: Symmetric strength, normal gait  • Psychiatric: Alert and oriented ×3, normal affect     NEYMAR WASSERMAN M.D.  General and Endoscopic Surgery  [facility]    [address], [address]  [place], [place], [facility]  P: [phone]  F: [phone]

## 2023-04-17 NOTE — THERAPY EVALUATION
Acute Care - Speech Language Pathology   Swallow Initial Evaluation [facility]     Patient Name: Harry Potts  : 1930  MRN: [id number]  Today's Date: 2023               Admit Date: 2023    Visit Dx:     ICD-10-CM ICD-9-CM   1. Hypoxia  R09.02 799.02   2. Confusion  R41.0 298.9   3. General weakness  R53.1 780.79   4. Physical deconditioning  R53.81 799.3   5. Hypokalemia  E87.6 276.8   6. Atrial fibrillation, unspecified type  I48.91 427.31   7. Aneurysm of ascending aorta without rupture  I71.21 441.2     Patient Active Problem List   Diagnosis   • Dysfunction of eustachian tube   • Gastroesophageal reflux disease   • Hyperglycemia   • Hypercholesterolemia   • Hypertension   • Morbid obesity   • Osteoarthritis of lumbar spine with myelopathy   • Osteoarthritis of lumbar spine   • Bronchitis   • Viral URI with cough   • PVC (premature ventricular contraction)   • Non-traumatic rhabdomyolysis   • Obesity (BMI 30-39.9)   • Chronic low back pain   • Weakness   • Edema, bilateral lower extremities   • COPD (chronic obstructive pulmonary disease)   • Irregularly irregular cardiac rhythm   • JENISE on CPAP   • Medicare annual wellness visit, subsequent   • Hypoxia   • Chronic respiratory failure with hypoxia   • Acute cholecystitis     Past Medical History:   Diagnosis Date   • Arthritis    • Asthma     Oxygen at home   • Cancer     basal cell    • COPD (chronic obstructive pulmonary disease)    • Difficulty walking    • Elevated cholesterol    • Environmental allergies    • GERD (gastroesophageal reflux disease)    • HL (hearing loss)    • Hyperglycemia    • Hyperlipidemia    • Hypertension    • Obesity    • Sleep apnea    • Spondylolysis, lumbar region      Past Surgical History:   Procedure Laterality Date   • CARDIAC CATHETERIZATION  circa     [facility]   • EYE SURGERY     • HERNIA REPAIR     • JOINT REPLACEMENT     • REPLACEMENT TOTAL KNEE BILATERAL         SLP Recommendation and  Plan  SLP Swallowing Diagnosis: mild (04/17/23 0830)  SLP Diet Recommendation: puree, honey thick liquids (04/17/23 0830)  Recommended Precautions and Strategies: upright posture during/after eating, small bites of food and sips of liquid, no straw, liquid via spoon only (04/17/23 0830)  SLP Rec. for Method of Medication Administration: with puree (04/17/23 0830)     Monitor for Signs of Aspiration: notify SLP if any concerns (04/17/23 0830)  Recommended Diagnostics: [facility]SS ([facility] [place]) (04/17/23 0830)  Swallow Criteria for Skilled Therapeutic Interventions Met: demonstrates skilled criteria (04/17/23 0830)  Anticipated Discharge Disposition (SLP): unknown (04/17/23 0830)  Rehab Potential/Prognosis, Swallowing: good, to achieve stated therapy goals (04/17/23 0830)  Therapy Frequency (Swallow): PRN (04/17/23 0830)  Predicted Duration Therapy Intervention (Days): until discharge (04/17/23 0830)                                        Plan of Care Reviewed With: patient  Outcome Evaluation: Pt alert and positioned upright for clinical swallow eval.  Pt with no overt s/s single ice chips.  Delayed throat clear and anterior loss with nectar thick and honey thick by cup.  No overt s/s with 4 oz of puree and 4 oz honey thick by spoon.  REC puree diet with HONEY thick liquids by SPOON, meds w/ puree.  Alert and upright with all po.  If coughing noted, hold tray.  Will follow with VFSS.      SWALLOW EVALUATION (last 72 hours)     SLP Adult Swallow Evaluation     Row Name 04/17/23 0830                   Rehab Evaluation    Document Type evaluation  -SA        Subjective Information no complaints  -SA        Patient Observations cooperative;alert  -SA        Patient Effort good  -SA        Symptoms Noted During/After Treatment none  -SA           General Information    Patient Profile Reviewed yes  -SA        Pertinent History Of Current Problem adm w/ chronic resp failure w/ hypoxia, h/o dementia, GERD, COPD  -SA        Current Method  of Nutrition NPO  -SA        Precautions/Limitations, Vision WFL;for purposes of eval  -SA        Precautions/Limitations, Hearing WFL;for purposes of eval  -SA        Prior Level of Function-Communication unknown  -SA        Prior Level of Function-Swallowing unknown  -SA        Plans/Goals Discussed with patient  -SA        Barriers to Rehab none identified  -SA           Pain    Additional Documentation Pain Scale: Numbers Pre/Post-Treatment (Group)  -SA           Pain Scale: Numbers Pre/Post-Treatment    Pretreatment Pain Rating 0/10 - no pain  -SA        Posttreatment Pain Rating 0/10 - no pain  -SA           Clinical Swallow Eval    Clinical Swallow Evaluation Summary Pt alert and positioned upright for clinical swallow eval.  Pt with no overt s/s single ice chips.  Delayed throat clear and anterior loss with nectar thick and honey thick by cup.  No overt s/s with 4 oz of puree and 4 oz honey thick by spoon.  -SA           SLP Evaluation Clinical Impression    SLP Swallowing Diagnosis mild  -SA        Functional Impact risk of aspiration/pneumonia  -SA        Rehab Potential/Prognosis, Swallowing good, to achieve stated therapy goals  -SA        Swallow Criteria for Skilled Therapeutic Interventions Met demonstrates skilled criteria  -SA           Recommendations    Therapy Frequency (Swallow) PRN  -SA        Predicted Duration Therapy Intervention (Days) until discharge  -SA        SLP Diet Recommendation puree;honey thick liquids  -SA        Recommended Diagnostics VFSS ([facility])  -SA        Recommended Precautions and Strategies upright posture during/after eating;small bites of food and sips of liquid;no straw;liquid via spoon only  -SA        Oral Care Recommendations Oral Care BID/PRN  -SA        SLP Rec. for Method of Medication Administration with puree  -SA        Monitor for Signs of Aspiration notify SLP if any concerns  -SA        Anticipated Discharge Disposition (SLP) unknown  -SA           Swallow  Goals (SLP)    Swallow LTGs Patient will demonstrate functional swallow for  -SA        Swallow STGs diet tolerance goal selection (SLP)  -SA        Diet Tolerance Goal Selection (SLP) Swallow Short Term Goal 1  -SA           (LTG) Patient will demonstrate functional swallow for    Diet Texture (Demonstrate functional swallow) regular textures  -SA        Liquid viscosity (Demonstrate functional swallow) thin liquids  -SA           (STG) Swallow 1    (STG) Swallow 1 Pt will tolerate least restrictive diet  -SA              User Key  (r) = Recorded By, (t) = Taken By, (c) = Cosigned By    Initials Name Effective Dates    Leeann Wood MS CCC-SLP 06/01/22 -                 EDUCATION  The patient has been educated in the following areas:   Dysphagia (Swallowing Impairment) Oral Care/Hydration Modified Diet Instruction.        SLP GOALS     Row Name 04/17/23 0830             (LTG) Patient will demonstrate functional swallow for    Diet Texture (Demonstrate functional swallow) regular textures  -SA      Liquid viscosity (Demonstrate functional swallow) thin liquids  -SA         (STG) Swallow 1    (STG) Swallow 1 Pt will tolerate least restrictive diet  -SA            User Key  (r) = Recorded By, (t) = Taken By, (c) = Cosigned By    Initials Name Provider Type    Leeann Wood MS CCC-SLP Speech and Language Pathologist                   Time Calculation:    Time Calculation- SLP     Row Name 04/17/23 1355             Time Calculation- SLP    SLP Start Time 0830  -            User Key  (r) = Recorded By, (t) = Taken By, (c) = Cosigned By    Initials Name Provider Type    Leeann Wood MS CCC-SLP Speech and Language Pathologist                Therapy Charges for Today     Code Description Service Date Service Provider Modifiers Qty    [id number]  ST EVAL ORAL PHARYNG SWALLOW 4 4/17/2023 Leeann Melendez MS CCC-SLP GN 1               Leeann Melendez MS CCC-GINI  4/17/2023

## 2023-04-17 NOTE — PROGRESS NOTES
[facility] Clinical Pharmacy Services: Vancomycin Level Monitoring Note    Harry Potts is a 89 y.o. male who is on day 3/7 of pharmacy to dose vancomycin for sepsis.    Estimated Creatinine Clearance: 76.8 mL/min (by C-G formula based on SCr of 0.8 mg/dL).      Results from last 7 days   Lab Units 04/17/23  0324   VANCOMYCIN TR mcg/mL 8.40       Vancomycin 1750 mg q24 for    Next Level Date and Time: Vanc Trough on 4/20 @ 0330    No changes at this time. Pharmacy is continuing to monitor and will adjust as needed.    Sujata Nunez [facility]  Clinical Pharmacist

## 2023-04-17 NOTE — PROGRESS NOTES
Name: Harry Potts ADMIT: 2023   : 1930  PCP: Harry Luis MD    MRN: [id number] LOS: 12 days   AGE/SEX: 89 y.o. male  ROOM: [facility]     Subjective   Subjective    Some confusion per wife today. He currently denies complaint other than abdominal pain. No chest pain or shortness of breath.     Objective   Objective   Vital Signs  Temp:  [98.2 °F (36.8 °C)-99 °F (37.2 °C)] 99 °F (37.2 °C)  Heart Rate:  [] 84  Resp:  [18-20] 18  BP: (110-125)/(67-74) 125/67  SpO2:  [90 %-95 %] 91 %  on  Flow (L/min):  [4-8] 4;   Device (Oxygen Therapy): nasal cannula  Body mass index is 42.94 kg/m².    Physical Exam  Constitutional:       General: He is not in acute distress.     Appearance: Normal appearance. He is not toxic-appearing.   HENT:      Head: Normocephalic and atraumatic.   Cardiovascular:      Rate and Rhythm: Normal rate and regular rhythm.   Pulmonary:      Effort: Pulmonary effort is normal. No respiratory distress.      Breath sounds: Wheezing (mild) present. No rhonchi.   Abdominal:      Palpations: Abdomen is soft.      Tenderness: There is abdominal tenderness.   Musculoskeletal:         General: No swelling.      Cervical back: Normal range of motion.   Skin:     General: Skin is warm and dry.   Neurological:      General: No focal deficit present.      Mental Status: He is alert.   Psychiatric:         Mood and Affect: Mood normal.         Behavior: Behavior normal.         Thought Content: Thought content normal.     Results Review  I reviewed the patient's new clinical results.  Results from last 7 days   Lab Units 23  0513 23  0627 04/15/23  0833 23  1707   WBC 10*3/mm3 12.62* 12.81* 15.61* 17.85*   HEMOGLOBIN g/dL 13.2 13.2 14.2 14.4   PLATELETS 10*3/mm3 154 112* 105* 109*     Results from last 7 days   Lab Units 23  0324 23  1101 23  0627 04/15/23  1748 23  0941 23  1253   SODIUM mmol/L 137 137  --   --  142 143   POTASSIUM mmol/L  3.8 4.4  --   --  3.7 2.8*   CHLORIDE mmol/L 91* 92*  --   --  97* 95*   CO2 mmol/L 37.1* 37.0*  --   --  39.0* 42.6*   BUN mg/dL 45* 40*  --   --  19 25*   CREATININE mg/dL 0.80 0.75* 0.94 0.88 0.52* 0.71*   GLUCOSE mg/dL 120* 134*  --   --  107* 113*     Lab Results   Component Value Date    ANIONGAP 8.9 04/17/2023     Estimated Creatinine Clearance: 76.8 mL/min (by C-G formula based on SCr of 0.8 mg/dL).    Results from last 7 days   Lab Units 04/16/23  1101 04/15/23  1748   ALBUMIN g/dL 2.6* 2.7*   BILIRUBIN mg/dL 1.2 1.3*   ALK PHOS U/L 88 79   AST (SGOT) U/L 18 18   ALT (SGPT) U/L 33 40     Results from last 7 days   Lab Units 04/17/23  0324 04/16/23  1101 04/15/23  1748 04/12/23  0941 04/11/23  1253   CALCIUM mg/dL 9.2 8.8  --  8.5 9.1   ALBUMIN g/dL  --  2.6* 2.7*  --   --    MAGNESIUM mg/dL  --   --   --   --  2.1   PHOSPHORUS mg/dL  --   --   --   --  3.4     Results from last 7 days   Lab Units 04/15/23  0833 04/14/23  2115   PROCALCITONIN ng/mL 0.32* 0.32*     No results found for: HGBA1C, POCGLU    CT Abdomen Pelvis Without Contrast    Result Date: 4/16/2023  1. Gallbladder dilatation with wall thickening and multiple small gallstones and suspected proximal cystic duct stone.  There is extensive inflammation within the right upper quadrant most likely due to acute cholecystitis and there is also evidence for duodenitis with stranding and edema in the right upper quadrant extending into the right paracolic gutter. Mild ascites with perihepatic fluid. These findings represent a change when correlated with the CT angiogram chest 12 days ago. 2. Small pleural effusions with dense lower lobe consolidation and potential basilar infiltrates.  Discussed with Arlyn, the nurse caring for the patient on 04/16/2023 at 1:15 PM.  Radiation dose reduction techniques were utilized, including automated exposure control and exposure modulation based on body size.  This report was finalized on 4/16/2023 2:15 PM by   Richard Gregg M.D.        Scheduled Meds  amLODIPine, 10 mg, Oral, Daily  ammonium lactate, 1 application, Topical, Q12H  ampicillin-sulbactam, 3 g, Intravenous, Q6H  aspirin, 81 mg, Oral, Daily  budesonide-formoterol, 2 puff, Inhalation, BID - RT  fluticasone, 1 spray, Nasal, Daily  ipratropium-albuterol, 3 mL, Nebulization, Q6H While Awake - RT  metoprolol succinate XL, 25 mg, Oral, Daily  miconazole, , Topical, Q12H  pantoprazole, 40 mg, Oral, Q AM  potassium chloride, 40 mEq, Oral, Once  senna-docusate sodium, 2 tablet, Oral, BID  sodium chloride, 10 mL, Intravenous, Q12H  sodium chloride, 10 mL, Intravenous, Q12H  terazosin, 5 mg, Oral, Daily  vancomycin, 1,250 mg, Intravenous, Q24H    Continuous Infusions  heparin, 7.94 Units/kg/hr, Last Rate: 12.5 Units/kg/hr (04/17/23 0727)  Pharmacy to dose vancomycin,     PRN Meds  •  acetaminophen **OR** acetaminophen **OR** acetaminophen  •  albuterol  •  senna-docusate sodium **AND** polyethylene glycol **AND** bisacodyl **AND** bisacodyl  •  calcium carbonate  •  hydrALAZINE  •  ipratropium-albuterol  •  nitroglycerin  •  OLANZapine  •  ondansetron **OR** ondansetron  •  Pharmacy to dose vancomycin  •  potassium chloride **OR** potassium chloride **OR** potassium chloride  •  potassium chloride  •  sodium chloride  •  sodium chloride  •  sodium chloride  •  sodium chloride  •  sodium chloride    heparin, 7.94 Units/kg/hr, Last Rate: 12.5 Units/kg/hr (04/17/23 0727)  Pharmacy to dose vancomycin,     Diet  Diet: Regular/House Diet, Cardiac Diets; Healthy Heart (2-3 Na+); Texture: Pureed (NDD 1); Fluid Consistency: Honey Thick    I have personally reviewed:  [x]  Medications  [x]  Laboratory   [x]  Microbiology   []  Radiology   [x]  EKG/Telemetry   []  Cardiology/Vascular    []  Pathology    []  Records    Results for orders placed during the hospital encounter of 04/04/23    Adult Transthoracic Echo Complete W/ Cont if Necessary Per Protocol    Interpretation  "Summary  •  Left ventricular systolic function is normal. Calculated left ventricular EF = 59.5%  •  Left ventricular wall thickness is consistent with mild concentric hypertrophy.  •  The right ventricular cavity is mildly dilated.  •  Left atrial volume is mildly increased.  •  The right atrial cavity is mildly  dilated.  •  Estimated right ventricular systolic pressure from tricuspid regurgitation is mildly elevated (35-45 mmHg).  •  The study is technically difficult for diagnosis.        Assessment/Plan     Active Hospital Problems    Diagnosis  POA   • **Chronic respiratory failure with hypoxia [J96.11]  Unknown   • Acute cholecystitis [K81.0]  Unknown   • Hypoxia [R09.02]  Yes   • JENISE on CPAP [G47.33, Z99.89]  Not Applicable   • COPD (chronic obstructive pulmonary disease) [J44.9]  Yes   • Morbid obesity [E66.01]  Yes   • Gastroesophageal reflux disease [K21.9]  Yes      Resolved Hospital Problems   No resolved problems to display.     89 y.o. male     Chronic hypoxemic and hypercapnic respiratory failure  JENISE  restrictive lung disease  pulmonary hypertension related to lung disease  -Pulmonology following: \"will need NIPPV for home use, simple cpap or bipap will not be enough to ensure adequate minute ventilation to prevent recurrent episdoes of acute on chronic hypercapnic respiratory failure that could result in hospital admission or death\"  -Avoid hyperoxia to prevent hypercapnia    Enterococcus faecium BCID  acute cholecystitis  minimal pneumonia on x-ray left base   -Procalcitonin 0.32  -Continue antibiotics  -Surgery recommends percutaneous cholecystostomy tube due to comorbidities    Dysphagia  -VFSS per SLP.   -Discussed with wife.  -Currently on Diet: Regular/House Diet, Cardiac Diets; Healthy Heart (2-3 Na+); Texture: Pureed (NDD 1); Fluid Consistency: Honey Thick     Body mass index is 42.94 kg/m².     Metabolic encephalopathy  underlying dementia  -Neurology expecting waxing and waning " encephalopathy  -Some worsening due to bacteremia/acute cholecystitis    Paroxysmal atrial fibrillation  hypertension  -Currently on heparin due to bruising from Lovenox. For now leave on heparin pending procedure  -Eventually oral anticoagulant when able to take p.o.  -Metoprolol  -IV hydralazine as needed    Diet: Regular/House Diet, Cardiac Diets; Healthy Heart (2-3 Na+); Texture: Pureed (NDD 1); Fluid Consistency: Honey Thick     Discussed with patient, family and nursing staff     Discharge: will need SNF eventually. PT following    Expected Discharge Date and Time     Expected Discharge Date Expected Discharge Time    Apr 17, 2023       Bladimir Ashley MD  [facility]  04/17/23

## 2023-04-17 NOTE — THERAPY TREATMENT NOTE
Patient Name: Harry Potts  : 1930    MRN: [id number]                              Today's Date: 2023       Admit Date: 2023    Visit Dx:     ICD-10-CM ICD-9-CM   1. Hypoxia  R09.02 799.02   2. Confusion  R41.0 298.9   3. General weakness  R53.1 780.79   4. Physical deconditioning  R53.81 799.3   5. Hypokalemia  E87.6 276.8   6. Atrial fibrillation, unspecified type  I48.91 427.31   7. Aneurysm of ascending aorta without rupture  I71.21 441.2     Patient Active Problem List   Diagnosis   • Dysfunction of eustachian tube   • Gastroesophageal reflux disease   • Hyperglycemia   • Hypercholesterolemia   • Hypertension   • Morbid obesity   • Osteoarthritis of lumbar spine with myelopathy   • Osteoarthritis of lumbar spine   • Bronchitis   • Viral URI with cough   • PVC (premature ventricular contraction)   • Non-traumatic rhabdomyolysis   • Obesity (BMI 30-39.9)   • Chronic low back pain   • Weakness   • Edema, bilateral lower extremities   • COPD (chronic obstructive pulmonary disease)   • Irregularly irregular cardiac rhythm   • JENISE on CPAP   • Medicare annual wellness visit, subsequent   • Hypoxia   • Chronic respiratory failure with hypoxia   • Acute cholecystitis     Past Medical History:   Diagnosis Date   • Arthritis    • Asthma     Oxygen at home   • Cancer     basal cell    • COPD (chronic obstructive pulmonary disease)    • Difficulty walking    • Elevated cholesterol    • Environmental allergies    • GERD (gastroesophageal reflux disease)    • HL (hearing loss)    • Hyperglycemia    • Hyperlipidemia    • Hypertension    • Obesity    • Sleep apnea    • Spondylolysis, lumbar region      Past Surgical History:   Procedure Laterality Date   • CARDIAC CATHETERIZATION  circa     [facility]   • EYE SURGERY     • HERNIA REPAIR     • JOINT REPLACEMENT     • REPLACEMENT TOTAL KNEE BILATERAL        General Information     Row Name 23 7388          Physical Therapy Time and  Intention    Document Type therapy note (daily note)  -     Mode of Treatment individual therapy;physical therapy  -     Row Name 04/17/23 1753          General Information    Patient Profile Reviewed yes  -     Existing Precautions/Restrictions fall;oxygen therapy device and L/min  -     Row Name 04/17/23 1753          Cognition    Orientation Status (Cognition) oriented to;person;place  -     Row Name 04/17/23 1753          Safety Issues, Functional Mobility    Impairments Affecting Function (Mobility) balance;endurance/activity tolerance;strength;cognition;postural/trunk control  -           User Key  (r) = Recorded By, (t) = Taken By, (c) = Cosigned By    Initials Name Provider Type     Imelda Mederos PT Physical Therapist               Mobility     Row Name 04/17/23 1753          Bed Mobility    Bed Mobility supine-sit;sit-supine;scooting/bridging  -     Scooting/Bridging [place] (Bed Mobility) dependent (less than 25% patient effort);2 person assist;verbal cues  -     Supine-Sit [place] (Bed Mobility) 2 person assist;verbal cues;nonverbal cues (demo/gesture);maximum assist (25% patient effort)  -     Sit-Supine [place] (Bed Mobility) maximum assist (25% patient effort);2 person assist;verbal cues;nonverbal cues (demo/gesture)  -     Assistive Device (Bed Mobility) bed rails;head of bed elevated  -     Row Name 04/17/23 1753          Transfers    Comment, (Transfers) Lethargy limiting mobility today, strong posterior lean with difficulty correcting, unsafe to attempt transfers  -           User Key  (r) = Recorded By, (t) = Taken By, (c) = Cosigned By    Initials Name Provider Type     Imelda Mederos PT Physical Therapist               Obj/Interventions     Row Name 04/17/23 1755          Motor Skills    Therapeutic Exercise --  10 reps B AP/LAQ/seated marches - heavy cueing/visual demo  -           User Key  (r) = Recorded By, (t) = Taken By, (c) = Cosigned By     Initials Name Provider Type     Imelda Mederos, PT Physical Therapist               Goals/Plan     Row Name 04/17/23 1759          Bed Mobility Goal 1 (PT)    Activity/Assistive Device (Bed Mobility Goal 1, PT) sit to supine;supine to sit  -     [place] Level/Cues Needed (Bed Mobility Goal 1, PT) verbal cues required;minimum assist (75% or more patient effort)  -     Time Frame (Bed Mobility Goal 1, PT) 10 days  -     Progress/Outcomes (Bed Mobility Goal 1, PT) goal revised this date  -     Row Name 04/17/23 1759          Transfer Goal 1 (PT)    Activity/Assistive Device (Transfer Goal 1, PT) sit-to-stand/stand-to-sit  -     [place] Level/Cues Needed (Transfer Goal 1, PT) verbal cues required;minimum assist (75% or more patient effort)  -     Time Frame (Transfer Goal 1, PT) 10 days  -     Progress/Outcome (Transfer Goal 1, PT) goal revised this date  -     Row Name 04/17/23 1759          Gait Training Goal 1 (PT)    Activity/Assistive Device (Gait Training Goal 1, PT) gait (walking locomotion);assistive device use;improve balance and speed;increase endurance/gait distance;increase energy conservation;walker, rolling  -     [place] Level (Gait Training Goal 1, PT) verbal cues required;minimum assist (75% or more patient effort)  -     Distance (Gait Training Goal 1, PT) 30ft  -     Time Frame (Gait Training Goal 1, PT) 10 days  -     Progress/Outcome (Gait Training Goal 1, PT) goal revised this date  -           User Key  (r) = Recorded By, (t) = Taken By, (c) = Cosigned By    Initials Name Provider Type     Imelda Mederos, PT Physical Therapist               Clinical Impression     Row Name 04/17/23 1755          Pain    Pretreatment Pain Rating 0/10 - no pain  -     Posttreatment Pain Rating 0/10 - no pain  -     Row Name 04/17/23 1755          Plan of Care Review    Plan of Care Reviewed With patient  -     Progress declining  -     Outcome Evaluation Pt  seen for PT this PM, very lethargic - RN reports pt did not wear his mask while sleeping. Pt required increased assist for all mobility today 2/2 lethargy. Pt required max A x2 to transfer to EOB - strong posterior lean with difficulty correcting. Heavy cueing throughout - attempted lateral UE leaning to correct R lean with some improvement, but pt still leans posteriorly with fatigue. Completed LE exercises with heavy cues and visual demo. Unsafe to attempt transfers today with drowsiness, pt having difficulty keeping his eyes open or maintaining independent sitting balance. Pt assisted back to supine, dependently scooted up in bed, and left with needs met. PT will continue to follow to progress mobility as tolerated. Anticipate DC to SNF.  -     Row Name 04/17/23 8029          Positioning and Restraints    Pre-Treatment Position in bed  -     Post Treatment Position bed  -BH     In Bed supine;call light within reach;encouraged to call for assist;exit alarm on;with family/caregiver  -           User Key  (r) = Recorded By, (t) = Taken By, (c) = Cosigned By    Initials Name Provider Type     Imelda Mederos, PT Physical Therapist               Outcome Measures     Row Name 04/17/23 1213          How much help from another person do you currently need...    Turning from your back to your side while in flat bed without using bedrails? 2  -BH     Moving from lying on back to sitting on the side of a flat bed without bedrails? 2  -BH     Moving to and from a bed to a chair (including a wheelchair)? 1  -BH     Standing up from a chair using your arms (e.g., wheelchair, bedside chair)? 1  -BH     Climbing 3-5 steps with a railing? 1  -BH     To walk in hospital room? 1  -BH     AM-PAC 6 Clicks Score (PT) 8  -     Highest level of mobility 3 --> Sat at edge of bed  -     Row Name 04/17/23 4767          Functional Assessment    Outcome Measure Options AM-PAC 6 Clicks Basic Mobility (PT)  -           User Key   (r) = Recorded By, (t) = Taken By, (c) = Cosigned By    Initials Name Provider Type     Imelda Mederos PT Physical Therapist                             Physical Therapy Education     Title: PT OT SLP Therapies (Done)     Topic: Physical Therapy (Done)     Point: Mobility training (Done)     Learning Progress Summary           Patient Acceptance, E,TB,D, VU,NR by  at 4/17/2023 1759    Acceptance, E, VU,NR by EB at 4/14/2023 1147    Acceptance, E,TB, VU,DU,NR by CS at 4/13/2023 1445    Acceptance, E, VU,NR by MO at 4/12/2023 1206    Acceptance, E,D, NR by MO at 4/10/2023 1227    Acceptance, E, VU,NR by DJ at 4/6/2023 0952   Family Acceptance, E,TB, VU,DU,NR by CS at 4/13/2023 1445                   Point: Home exercise program (Done)     Learning Progress Summary           Patient Acceptance, E,TB,D, VU,NR by  at 4/17/2023 1759    Acceptance, E, VU,NR by EB at 4/14/2023 1147    Acceptance, E,TB, VU,DU,NR by CS at 4/13/2023 1445    Acceptance, E, VU,NR by MO at 4/12/2023 1206    Acceptance, E,D, NR by MO at 4/10/2023 1227    Acceptance, E, VU,NR by DJ at 4/6/2023 0952   Family Acceptance, E,TB, VU,DU,NR by CS at 4/13/2023 1445                   Point: Body mechanics (Done)     Learning Progress Summary           Patient Acceptance, E,TB,D, VU,NR by  at 4/17/2023 1759    Acceptance, E, VU,NR by EB at 4/14/2023 1147    Acceptance, E,TB, VU,DU,NR by CS at 4/13/2023 1445    Acceptance, E, VU,NR by MO at 4/12/2023 1206    Acceptance, E, VU,NR by DJ at 4/6/2023 0952   Family Acceptance, E,TB, VU,DU,NR by CS at 4/13/2023 1445                   Point: Precautions (Done)     Learning Progress Summary           Patient Acceptance, E,TB,D, VU,NR by BH at 4/17/2023 1759    Acceptance, E, VU,NR by EB at 4/14/2023 1147    Acceptance, E,TB, VU,DU,NR by CS at 4/13/2023 1445    Acceptance, E, VU,NR by DJ at 4/6/2023 0952   Family Acceptance, E,TB, VU,DU,NR by CS at 4/13/2023 1445                               User Key      Initials Effective Dates Name Provider Type Discipline    EB 02/14/23 -  Edith Macias, PTA Physical Therapist Assistant PT    DJ 10/25/19 -  Iwona Jones, PT Physical Therapist PT     04/08/22 -  Imelda Mederos, PT Physical Therapist PT    CS 09/22/22 -  Paula Cool, PT Physical Therapist PT    MO 01/27/23 -  Lianna Owens, PT Student PT Student PT              PT Recommendation and Plan     Plan of Care Reviewed With: patient  Progress: declining  Outcome Evaluation: Pt seen for PT this PM, very lethargic - RN reports pt did not wear his mask while sleeping. Pt required increased assist for all mobility today 2/2 lethargy. Pt required max A x2 to transfer to EOB - strong posterior lean with difficulty correcting. Heavy cueing throughout - attempted lateral UE leaning to correct R lean with some improvement, but pt still leans posteriorly with fatigue. Completed LE exercises with heavy cues and visual demo. Unsafe to attempt transfers today with drowsiness, pt having difficulty keeping his eyes open or maintaining independent sitting balance. Pt assisted back to supine, dependently scooted up in bed, and left with needs met. PT will continue to follow to progress mobility as tolerated. Anticipate DC to SNF.     Time Calculation:    PT Charges     Row Name 04/17/23 1759             Time Calculation    Start Time 1513  -      Stop Time 1532  -      Time Calculation (min) 19 min  -      PT Received On 04/17/23  -      PT - Next Appointment 04/18/23  -      PT Goal Re-Cert Due Date 04/24/23  -         Time Calculation- PT    Total Timed Code Minutes- PT 19 minute(s)  -         Timed Charges    02706 - PT Therapeutic Exercise Minutes 9  -      29449 - PT Therapeutic Activity Minutes 10  -         Total Minutes    Timed Charges Total Minutes 19  -       Total Minutes 19  -BH            User Key  (r) = Recorded By, (t) = Taken By, (c) = Cosigned By    Initials Name Provider Type    SERAFIN Mederos  Imelda BENÍTEZ, PT Physical Therapist              Therapy Charges for Today     Code Description Service Date Service Provider Modifiers Qty    76544083519 HC PT THERAPEUTIC ACT EA 15 MIN 4/17/2023 Imelda Mederos, PT GP 1    78974288683 HC PT THER SUPP EA 15 MIN 4/17/2023 Imelda Mederos, PT GP 1          PT G-Codes  Outcome Measure Options: AM-PAC 6 Clicks Basic Mobility (PT)  AM-PAC 6 Clicks Score (PT): 8  PT Discharge Summary  Anticipated Discharge Disposition (PT): skilled nursing facility    Imelda Mederos PT  4/17/2023

## 2023-04-17 NOTE — PROGRESS NOTES
LOS: 12 days     Chief Complaint: Enterococcus bacteremia     Interval History: Afebrile, reports some right upper quadrant abdominal pain.  Denies any vomiting or diarrhea.  No rash    Vital Signs  Temp:  [98.2 °F (36.8 °C)-99 °F (37.2 °C)] 99 °F (37.2 °C)  Heart Rate:  [] 95  Resp:  [18-20] 18  BP: (110-139)/(66-74) 125/67    Physical Exam:  General: In no acute distress  Cardiovascular: Regular irregular, trace lower extremity edema  Respiratory: Bibasilar crackles  GI: Soft, tender palpation on the right  Skin: No rashes     Antibiotics:  Cefdinir 300mg PO BID  Vancomycin dosing per pharmacy      Results Review:    Lab Results   Component Value Date    WBC 12.62 (H) 04/17/2023    HGB 13.2 04/17/2023    HCT 40.2 04/17/2023    MCV 87.8 04/17/2023     04/17/2023     Lab Results   Component Value Date    GLUCOSE 120 (H) 04/17/2023    BUN 45 (H) 04/17/2023    CREATININE 0.80 04/17/2023    EGFRIFNONA 83 01/12/2022    EGFRIFAFRI 97 01/12/2022    BCR 56.3 (H) 04/17/2023    CO2 37.1 (H) 04/17/2023    CALCIUM 9.2 04/17/2023    PROTENTOTREF 6.4 04/03/2023    ALBUMIN 2.6 (L) 04/16/2023    LABIL2 1.9 04/03/2023    AST 18 04/16/2023    ALT 33 04/16/2023     procal 0.31 -> 0.32    Microbiology:  4/16 BCx P x 2  4/14 BCx Enterococcus faecium  1/2   4/5 RVP neg    CT of the abdomen pelvis shows gallbladder dilatation with wall thickening and multiple gallstones.  Extensive inflammation in the right upper quadrant due to acute cholecystitis.  Mild ascites with perihepatic fluid.  Small bilateral pleural effusions with bibasilar consolidation    Echocardiogram aortic valve not well visualized, normal mitral valve.  Tricuspid valve no well visualized.  Trace TR.    Assessment & Plan   Enterococcus bacteremia   Acute on chronic hypoxic and hypercapnic respiratory failure   Acute cholecystitis  Leukocytosis   Dementia  Confusion    CAT scan of the abdomen pelvis with acute cholecystitis.  Discontinue empiric ceftriaxone  and start Unasyn 3 g IV every 6 hours which will also provide anaerobic coverage.  Follow-up surgery recommendation.  Enterococcus bacteremia most likely due to this.  Echocardiogram without endocarditis although the heart valves were not well visualized.  Resistant to ampicillin.  Continue vancomycin dosing per pharmacy we will plan on a 14-day course of IV vancomycin.  Duration of antibiotics for acute cholecystitis to be determined.

## 2023-04-17 NOTE — CASE MANAGEMENT/SOCIAL WORK
Discharge Planning Assessment  [facility]     Patient Name: Harry Potts  MRN: [id number]  Today's Date: 4/17/2023    Admit Date: 4/4/2023    Plan: Home vs SNF   Discharge Needs Assessment    No documentation.                Discharge Plan     Row Name 04/17/23 1402       Plan    Plan Home vs SNF    Patient/Family in Agreement with Plan yes    Plan Comments Spoke with patient’s spouse at bedside. Introduced self. Explained to family that SNF is being recommended for discharge planning. Provided list and R2R book given.              Continued Care and Services - Admitted Since 4/4/2023     Durable Medical Equipment     Service Provider Request Status Selected Services Address Phone Fax Patient Preferred    [facility] Accepted N/A [id number] [phone] --              Expected Discharge Date and Time     Expected Discharge Date Expected Discharge Time    Apr 17, 2023          Demographic Summary    No documentation.                Functional Status    No documentation.                Psychosocial    No documentation.                Abuse/Neglect    No documentation.                Legal    No documentation.                Substance Abuse    No documentation.                Patient Forms    No documentation.                   Bridget Gasca RN

## 2023-04-17 NOTE — PROGRESS NOTES
"  Daily Progress Note.   [address]  4/17/2023    Patient:  Name:  Harry Potts  MRN:  [id number]  11/14/1930  89 y.o.  male         CC:Acute on chronic respiratory failure, obesity, JENISE, restrictive lung disease, kyphosis, pulmonary hypertension  Interval History:  He is noninvasive ventilation overnight remains on 4 L nasal cannula.  No high fevers overnight.  Blood pressure stable no worsening respiratory distress or tachycardia.  He says he feels like his abdominal pain is better.  Mild conversational dyspnea       Physical Exam:  /67 (BP Location: Right arm, Patient Position: Lying)   Pulse 84   Temp 99 °F (37.2 °C) (Axillary)   Resp 18   Ht 172.7 cm (68\")   Wt 128 kg (282 lb 6.6 oz)   SpO2 91%   BMI 42.94 kg/m²   Body mass index is 42.94 kg/m².    Intake/Output Summary (Last 24 hours) at 4/17/2023 1258  Last data filed at 4/17/2023 0215  Gross per 24 hour   Intake 360 ml   Output --   Net 360 ml     General appearance: Nontoxic, conversant   Eyes: anicteric sclerae, moist conjunctivae; no lidlag;    HENT: Lesion on left cheek without active bleeding;   Neck: Large neck circumference  Lungs: No wheeze no rhonchi, with normal respiratory effort and no intercostal retractions  CV: RRR, no rub   Abdomen: Obese bowel sounds positive nonrigid tender to palpation diffusely  Extremities: Mild peripheral edema bilateral  Skin: WWP no diffuse visible rash chronic changes and ecchymosis  Psych/neuro calm speech intact    Data Review:  Notable Labs:  Results from last 7 days   Lab Units 04/17/23  0513 04/16/23  0627 04/15/23  0833 04/14/23  1707 04/13/23  0614 04/11/23  1253   WBC 10*3/mm3 12.62* 12.81* 15.61* 17.85* 10.45 5.86   HEMOGLOBIN g/dL 13.2 13.2 14.2 14.4 15.0 14.1   PLATELETS 10*3/mm3 154 112* 105* 109* 108* 106*     Results from last 7 days   Lab Units 04/17/23  0324 04/16/23  1101 04/16/23  0627 04/15/23  1748 04/12/23  0941 04/11/23  1253   SODIUM mmol/L 137 137  --   -- "  142 143   POTASSIUM mmol/L 3.8 4.4  --   --  3.7 2.8*   CHLORIDE mmol/L 91* 92*  --   --  97* 95*   CO2 mmol/L 37.1* 37.0*  --   --  39.0* 42.6*   BUN mg/dL 45* 40*  --   --  19 25*   CREATININE mg/dL 0.80 0.75* 0.94 0.88 0.52* 0.71*   GLUCOSE mg/dL 120* 134*  --   --  107* 113*   CALCIUM mg/dL 9.2 8.8  --   --  8.5 9.1   MAGNESIUM mg/dL  --   --   --   --   --  2.1   PHOSPHORUS mg/dL  --   --   --   --   --  3.4   Estimated Creatinine Clearance: 76.8 mL/min (by C-G formula based on SCr of 0.8 mg/dL).    Results from last 7 days   Lab Units 04/17/23  0513 04/16/23  1101 04/16/23  0627 04/15/23  1748 04/15/23  0833 04/14/23  2115   AST (SGOT) U/L  --  18  --  18  --   --    ALT (SGPT) U/L  --  33  --  40  --   --    PROCALCITONIN ng/mL  --   --   --   --  0.32* 0.32*   PLATELETS 10*3/mm3 154  --  112*  --  105*  --        Results from last 7 days   Lab Units 04/14/23  1356 04/13/23  0404 04/11/23  1416   PH, ARTERIAL pH units 7.396 7.466* 7.436   PCO2, ARTERIAL mm Hg 72.6* 54.0* 66.5*   PO2 ART mm Hg 66.9* 71.3* 91.5   HCO3 ART mmol/L 44.5* 39.0* 44.7*       Imaging:  Reviewed chest images personally from past 3 days    CT abdomen/pelvis-gallbladder wall thickening gallstones proximal cystic duct stone extensive inflammation pneumonia left lower lobe    ASSESSMENT  /  PLAN:  Acute on chronic chronic hypoxemic respiratory failure  Chronic hypercapnia  Obesity hypoventilation syndrome  JENISE  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  Stable 1.1 cm nodule (4-year stability)  Encephalopathy superimposed on dementia    COPD (chronic obstructive pulmonary disease)  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

## 2023-04-17 NOTE — PLAN OF CARE
Goal Outcome Evaluation:  Plan of Care Reviewed With: patient           Outcome Evaluation: Pt alert and positioned upright for clinical swallow eval.  Pt with no overt s/s single ice chips.  Delayed throat clear and anterior loss with nectar thick and honey thick by cup.  No overt s/s with 4 oz of puree and 4 oz honey thick by spoon.  REC puree diet with HONEY thick liquids by SPOON, meds w/ puree.  Alert and upright with all po.  If coughing noted, hold tray.  Will follow with [facility].

## 2023-04-18 ENCOUNTER — APPOINTMENT (OUTPATIENT)
Dept: GENERAL RADIOLOGY | Facility: HOSPITAL | Age: 88
DRG: 189 | End: 2023-04-18
Payer: MEDICARE

## 2023-04-18 ENCOUNTER — APPOINTMENT (OUTPATIENT)
Dept: CT IMAGING | Facility: HOSPITAL | Age: 88
DRG: 189 | End: 2023-04-18
Payer: MEDICARE

## 2023-04-18 PROBLEM — I48.91 ATRIAL FIBRILLATION: Status: ACTIVE | Noted: 2023-04-18

## 2023-04-18 LAB
APTT PPP: 38.1 SECONDS (ref 22.7–35.4)
APTT PPP: 79.9 SECONDS (ref 22.7–35.4)
APTT PPP: 81.9 SECONDS (ref 22.7–35.4)
BASOPHILS # BLD AUTO: 0.02 10*3/MM3 (ref 0–0.2)
BASOPHILS NFR BLD AUTO: 0.2 % (ref 0–1.5)
CREAT SERPL-MCNC: 0.86 MG/DL (ref 0.76–1.27)
DEPRECATED RDW RBC AUTO: 40.2 FL (ref 37–54)
EGFRCR SERPLBLD CKD-EPI 2021: 81.2 ML/MIN/1.73
EOSINOPHIL # BLD AUTO: 0 10*3/MM3 (ref 0–0.4)
EOSINOPHIL NFR BLD AUTO: 0 % (ref 0.3–6.2)
ERYTHROCYTE [DISTWIDTH] IN BLOOD BY AUTOMATED COUNT: 12.3 % (ref 12.3–15.4)
HCT VFR BLD AUTO: 40 % (ref 37.5–51)
HGB BLD-MCNC: 12.9 G/DL (ref 13–17.7)
IMM GRANULOCYTES # BLD AUTO: 0.09 10*3/MM3 (ref 0–0.05)
IMM GRANULOCYTES NFR BLD AUTO: 0.9 % (ref 0–0.5)
LYMPHOCYTES # BLD AUTO: 0.34 10*3/MM3 (ref 0.7–3.1)
LYMPHOCYTES NFR BLD AUTO: 3.6 % (ref 19.6–45.3)
MCH RBC QN AUTO: 28.9 PG (ref 26.6–33)
MCHC RBC AUTO-ENTMCNC: 32.3 G/DL (ref 31.5–35.7)
MCV RBC AUTO: 89.5 FL (ref 79–97)
MONOCYTES # BLD AUTO: 0.69 10*3/MM3 (ref 0.1–0.9)
MONOCYTES NFR BLD AUTO: 7.3 % (ref 5–12)
NEUTROPHILS NFR BLD AUTO: 8.36 10*3/MM3 (ref 1.7–7)
NEUTROPHILS NFR BLD AUTO: 88 % (ref 42.7–76)
NRBC BLD AUTO-RTO: 0.1 /100 WBC (ref 0–0.2)
PLATELET # BLD AUTO: 166 10*3/MM3 (ref 140–450)
PMV BLD AUTO: 11.3 FL (ref 6–12)
RBC # BLD AUTO: 4.47 10*6/MM3 (ref 4.14–5.8)
WBC NRBC COR # BLD: 9.5 10*3/MM3 (ref 3.4–10.8)

## 2023-04-18 PROCEDURE — 0F9430Z DRAINAGE OF GALLBLADDER WITH DRAINAGE DEVICE, PERCUTANEOUS APPROACH: ICD-10-PCS | Performed by: RADIOLOGY

## 2023-04-18 PROCEDURE — 82565 ASSAY OF CREATININE: CPT | Performed by: HOSPITALIST

## 2023-04-18 PROCEDURE — 25010000002 HYDROMORPHONE PER 4 MG: Performed by: RADIOLOGY

## 2023-04-18 PROCEDURE — 94799 UNLISTED PULMONARY SVC/PX: CPT

## 2023-04-18 PROCEDURE — 87077 CULTURE AEROBIC IDENTIFY: CPT | Performed by: STUDENT IN AN ORGANIZED HEALTH CARE EDUCATION/TRAINING PROGRAM

## 2023-04-18 PROCEDURE — 99231 SBSQ HOSP IP/OBS SF/LOW 25: CPT | Performed by: SURGERY

## 2023-04-18 PROCEDURE — 87070 CULTURE OTHR SPECIMN AEROBIC: CPT | Performed by: STUDENT IN AN ORGANIZED HEALTH CARE EDUCATION/TRAINING PROGRAM

## 2023-04-18 PROCEDURE — 75989 ABSCESS DRAINAGE UNDER X-RAY: CPT

## 2023-04-18 PROCEDURE — 0 LIDOCAINE 1 % SOLUTION: Performed by: HOSPITALIST

## 2023-04-18 PROCEDURE — 85730 THROMBOPLASTIN TIME PARTIAL: CPT | Performed by: HOSPITALIST

## 2023-04-18 PROCEDURE — 71045 X-RAY EXAM CHEST 1 VIEW: CPT

## 2023-04-18 PROCEDURE — 94664 DEMO&/EVAL PT USE INHALER: CPT

## 2023-04-18 PROCEDURE — 99232 SBSQ HOSP IP/OBS MODERATE 35: CPT | Performed by: INTERNAL MEDICINE

## 2023-04-18 PROCEDURE — 25010000002 HEPARIN (PORCINE) 25000-0.45 UT/250ML-% SOLUTION: Performed by: HOSPITALIST

## 2023-04-18 PROCEDURE — 25010000002 AMPICILLIN-SULBACTAM PER 1.5 G: Performed by: INTERNAL MEDICINE

## 2023-04-18 PROCEDURE — 25010000002 VANCOMYCIN 10 G RECONSTITUTED SOLUTION: Performed by: HOSPITALIST

## 2023-04-18 PROCEDURE — 49406 IMAGE CATH FLUID PERI/RETRO: CPT

## 2023-04-18 PROCEDURE — 85025 COMPLETE CBC W/AUTO DIFF WBC: CPT | Performed by: HOSPITALIST

## 2023-04-18 PROCEDURE — 94761 N-INVAS EAR/PLS OXIMETRY MLT: CPT

## 2023-04-18 PROCEDURE — 25010000002 FUROSEMIDE PER 20 MG: Performed by: INTERNAL MEDICINE

## 2023-04-18 PROCEDURE — 87205 SMEAR GRAM STAIN: CPT | Performed by: STUDENT IN AN ORGANIZED HEALTH CARE EDUCATION/TRAINING PROGRAM

## 2023-04-18 PROCEDURE — 87186 SC STD MICRODIL/AGAR DIL: CPT | Performed by: STUDENT IN AN ORGANIZED HEALTH CARE EDUCATION/TRAINING PROGRAM

## 2023-04-18 RX ORDER — FUROSEMIDE 10 MG/ML
40 INJECTION INTRAMUSCULAR; INTRAVENOUS ONCE
Status: COMPLETED | OUTPATIENT
Start: 2023-04-18 | End: 2023-04-18

## 2023-04-18 RX ORDER — HYDROMORPHONE HYDROCHLORIDE 1 MG/ML
0.5 INJECTION, SOLUTION INTRAMUSCULAR; INTRAVENOUS; SUBCUTANEOUS EVERY 4 HOURS PRN
Status: DISCONTINUED | OUTPATIENT
Start: 2023-04-18 | End: 2023-04-24 | Stop reason: HOSPADM

## 2023-04-18 RX ORDER — LIDOCAINE HYDROCHLORIDE 10 MG/ML
20 INJECTION, SOLUTION INFILTRATION; PERINEURAL ONCE
Status: COMPLETED | OUTPATIENT
Start: 2023-04-18 | End: 2023-04-18

## 2023-04-18 RX ORDER — HYDROMORPHONE HYDROCHLORIDE 1 MG/ML
0.5 INJECTION, SOLUTION INTRAMUSCULAR; INTRAVENOUS; SUBCUTANEOUS ONCE
Status: COMPLETED | OUTPATIENT
Start: 2023-04-18 | End: 2023-04-18

## 2023-04-18 RX ORDER — MORPHINE SULFATE 2 MG/ML
1 INJECTION, SOLUTION INTRAMUSCULAR; INTRAVENOUS 3 TIMES DAILY PRN
Status: DISCONTINUED | OUTPATIENT
Start: 2023-04-18 | End: 2023-04-18

## 2023-04-18 RX ADMIN — HYDROMORPHONE HYDROCHLORIDE 0.5 MG: 1 INJECTION, SOLUTION INTRAMUSCULAR; INTRAVENOUS; SUBCUTANEOUS at 12:59

## 2023-04-18 RX ADMIN — Medication 10 ML: at 16:30

## 2023-04-18 RX ADMIN — HEPARIN SODIUM 13.5 UNITS/KG/HR: 10000 INJECTION, SOLUTION INTRAVENOUS at 21:23

## 2023-04-18 RX ADMIN — BUDESONIDE AND FORMOTEROL FUMARATE DIHYDRATE 2 PUFF: 160; 4.5 AEROSOL RESPIRATORY (INHALATION) at 07:04

## 2023-04-18 RX ADMIN — FLUTICASONE PROPIONATE 1 SPRAY: 50 SPRAY, METERED NASAL at 16:31

## 2023-04-18 RX ADMIN — AMMONIUM LACTATE 1 APPLICATION: 120 CREAM TOPICAL at 06:03

## 2023-04-18 RX ADMIN — MICONAZOLE NITRATE: 2 POWDER TOPICAL at 16:31

## 2023-04-18 RX ADMIN — LIDOCAINE HYDROCHLORIDE 20 ML: 10 INJECTION, SOLUTION INFILTRATION; PERINEURAL at 18:43

## 2023-04-18 RX ADMIN — AMPICILLIN SODIUM AND SULBACTAM SODIUM 3 G: 2; 1 INJECTION, POWDER, FOR SOLUTION INTRAMUSCULAR; INTRAVENOUS at 16:28

## 2023-04-18 RX ADMIN — AMLODIPINE BESYLATE 10 MG: 10 TABLET ORAL at 16:30

## 2023-04-18 RX ADMIN — Medication 10 ML: at 21:03

## 2023-04-18 RX ADMIN — FUROSEMIDE 40 MG: 10 INJECTION, SOLUTION INTRAMUSCULAR; INTRAVENOUS at 16:29

## 2023-04-18 RX ADMIN — METOPROLOL TARTRATE 12.5 MG: 25 TABLET ORAL at 16:30

## 2023-04-18 RX ADMIN — DOCUSATE SODIUM 50MG AND SENNOSIDES 8.6MG 2 TABLET: 8.6; 5 TABLET, FILM COATED ORAL at 21:05

## 2023-04-18 RX ADMIN — Medication 10 ML: at 21:06

## 2023-04-18 RX ADMIN — AMPICILLIN SODIUM AND SULBACTAM SODIUM 3 G: 2; 1 INJECTION, POWDER, FOR SOLUTION INTRAMUSCULAR; INTRAVENOUS at 23:14

## 2023-04-18 RX ADMIN — AMPICILLIN SODIUM AND SULBACTAM SODIUM 3 G: 2; 1 INJECTION, POWDER, FOR SOLUTION INTRAMUSCULAR; INTRAVENOUS at 06:03

## 2023-04-18 RX ADMIN — MICONAZOLE NITRATE: 2 POWDER TOPICAL at 21:03

## 2023-04-18 RX ADMIN — DOCUSATE SODIUM 50MG AND SENNOSIDES 8.6MG 2 TABLET: 8.6; 5 TABLET, FILM COATED ORAL at 17:05

## 2023-04-18 RX ADMIN — IPRATROPIUM BROMIDE AND ALBUTEROL SULFATE 3 ML: 2.5; .5 SOLUTION RESPIRATORY (INHALATION) at 15:10

## 2023-04-18 RX ADMIN — Medication 10 ML: at 16:31

## 2023-04-18 RX ADMIN — VANCOMYCIN HYDROCHLORIDE 1750 MG: 10 INJECTION, POWDER, LYOPHILIZED, FOR SOLUTION INTRAVENOUS at 03:35

## 2023-04-18 RX ADMIN — METOPROLOL TARTRATE 12.5 MG: 25 TABLET ORAL at 21:03

## 2023-04-18 RX ADMIN — BUDESONIDE AND FORMOTEROL FUMARATE DIHYDRATE 2 PUFF: 160; 4.5 AEROSOL RESPIRATORY (INHALATION) at 23:42

## 2023-04-18 RX ADMIN — AMMONIUM LACTATE 1 APPLICATION: 120 CREAM TOPICAL at 21:03

## 2023-04-18 NOTE — PROGRESS NOTES
"  Daily Progress Note.   [address]  4/18/2023    Patient:  Name:  Harry Potts  MRN:  [id number]  11/14/1930  89 y.o.  male         CC:Acute on chronic respiratory failure, obesity, JENISE, restrictive lung disease, kyphosis, pulmonary hypertension  Interval History:  Noninvasive ventilation overnight.  At present time on 8 L high flow nasal cannula.  Patient transferred downstairs for percutaneous cholecystostomy tube.  Patient wears noninvasive ventilation all night according to the daughter.  Patient did just get some Dilaudid downstairs he is a little bit effective for that right now however does awaken nods to questions.     Physical Exam:  /74 (BP Location: Right arm, Patient Position: Lying)   Pulse 60   Temp 98.5 °F (36.9 °C) (Oral)   Resp 16   Ht 172.7 cm (68\")   Wt 130 kg (287 lb 7.7 oz)   SpO2 92%   BMI 43.71 kg/m²   Body mass index is 43.71 kg/m².    Intake/Output Summary (Last 24 hours) at 4/18/2023 1049  Last data filed at 4/18/2023 0603  Gross per 24 hour   Intake 60 ml   Output --   Net 60 ml     General appearance: Nontoxic, conversant   Eyes: anicteric sclerae, moist conjunctivae; no lidlag;    HENT: Lesion on left cheek without active bleeding;   Neck: Large neck circumference  Lungs: No wheeze no rhonchi, with normal respiratory effort and no intercostal retractions  CV: RRR, no rub   Abdomen: Obese bowel sounds positive right cholecystostomy tube  Extremities: Mild peripheral edema bilateral right greater than left  Skin: WWP no diffuse visible rash chronic changes and ecchymosis  Psych/neuro calm speech intact slightly out of the    Data Review:  Notable Labs:  Results from last 7 days   Lab Units 04/18/23  0522 04/17/23  0513 04/16/23  0627 04/15/23  0833 04/14/23  1707 04/13/23  0614 04/11/23  1253   WBC 10*3/mm3 9.50 12.62* 12.81* 15.61* 17.85* 10.45 5.86   HEMOGLOBIN g/dL 12.9* 13.2 13.2 14.2 14.4 15.0 14.1   PLATELETS 10*3/mm3 166 154 112* 105* 109* 108* " 106*     Results from last 7 days   Lab Units 04/18/23  0522 04/17/23  0324 04/16/23  1101 04/16/23  0627 04/15/23  1748 04/12/23  0941 04/11/23  1253   SODIUM mmol/L  --  137 137  --   --  142 143   POTASSIUM mmol/L  --  3.8 4.4  --   --  3.7 2.8*   CHLORIDE mmol/L  --  91* 92*  --   --  97* 95*   CO2 mmol/L  --  37.1* 37.0*  --   --  39.0* 42.6*   BUN mg/dL  --  45* 40*  --   --  19 25*   CREATININE mg/dL 0.86 0.80 0.75* 0.94 0.88 0.52* 0.71*   GLUCOSE mg/dL  --  120* 134*  --   --  107* 113*   CALCIUM mg/dL  --  9.2 8.8  --   --  8.5 9.1   MAGNESIUM mg/dL  --   --   --   --   --   --  2.1   PHOSPHORUS mg/dL  --   --   --   --   --   --  3.4   Estimated Creatinine Clearance: 72.1 mL/min (by C-G formula based on SCr of 0.86 mg/dL).    Results from last 7 days   Lab Units 04/18/23  0522 04/17/23  0513 04/16/23  1101 04/16/23  0627 04/15/23  1748 04/15/23  0833 04/14/23  2115   AST (SGOT) U/L  --   --  18  --  18  --   --    ALT (SGPT) U/L  --   --  33  --  40  --   --    PROCALCITONIN ng/mL  --   --   --   --   --  0.32* 0.32*   PLATELETS 10*3/mm3 166 154  --  112*  --  105*  --        Results from last 7 days   Lab Units 04/14/23  1356 04/13/23  0404 04/11/23  1416   PH, ARTERIAL pH units 7.396 7.466* 7.436   PCO2, ARTERIAL mm Hg 72.6* 54.0* 66.5*   PO2 ART mm Hg 66.9* 71.3* 91.5   HCO3 ART mmol/L 44.5* 39.0* 44.7*       Imaging:  Reviewed chest images personally from past 3 days    CT abdomen/pelvis-gallbladder wall thickening gallstones proximal cystic duct stone extensive inflammation pneumonia left lower lobe    ASSESSMENT  /  PLAN:  Acute on chronic chronic hypoxemic respiratory failure  Chronic hypercapnia  Obesity hypoventilation syndrome  JENISE  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  Stable 1.1 cm nodule (4-year stability)  Encephalopathy superimposed on dementia    COPD (chronic obstructive pulmonary disease)  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

## 2023-04-18 NOTE — PROGRESS NOTES
LOS: 13 days     Chief Complaint: Enterococcus bacteremia     Interval History: Afebrile, s/p cholecystostomy tube insertion today.  Cultures pending.  Denies any diarrhea vomiting or rash    Vital Signs  Temp:  [97.8 °F (36.6 °C)-98.6 °F (37 °C)] 98.6 °F (37 °C)  Heart Rate:  [] 102  Resp:  [16-20] 20  BP: (127-146)/(66-78) 127/66    Physical Exam:  General: In no acute distress  Cardiovascular: Regular irregular, trace lower extremity edema  Respiratory: Bibasilar crackles  GI: Soft, drain placement on the right  Skin: No rashes     Antibiotics:  Unasyn 3g IV q6hrs  Vancomycin dosing per pharmacy      Results Review:    Lab Results   Component Value Date    WBC 9.50 04/18/2023    HGB 12.9 (L) 04/18/2023    HCT 40.0 04/18/2023    MCV 89.5 04/18/2023     04/18/2023     Lab Results   Component Value Date    GLUCOSE 120 (H) 04/17/2023    BUN 45 (H) 04/17/2023    CREATININE 0.86 04/18/2023    EGFRIFNONA 83 01/12/2022    EGFRIFAFRI 97 01/12/2022    BCR 56.3 (H) 04/17/2023    CO2 37.1 (H) 04/17/2023    CALCIUM 9.2 04/17/2023    PROTENTOTREF 6.4 04/03/2023    ALBUMIN 2.6 (L) 04/16/2023    LABIL2 1.9 04/03/2023    AST 18 04/16/2023    ALT 33 04/16/2023     procal 0.31 -> 0.32    Microbiology:  4/16 BCx NGTD x 2  4/14 BCx Enterococcus faecium  1/2 4/5 RVP neg    CT of the abdomen pelvis shows gallbladder dilatation with wall thickening and multiple gallstones.  Extensive inflammation in the right upper quadrant due to acute cholecystitis.  Mild ascites with perihepatic fluid.  Small bilateral pleural effusions with bibasilar consolidation    Echocardiogram aortic valve not well visualized, normal mitral valve.  Tricuspid valve no well visualized.  Trace TR.    Assessment & Plan   Enterococcus bacteremia   Acute on chronic hypoxic and hypercapnic respiratory failure   Acute cholecystitis  Leukocytosis   Dementia  Confusion    Patient is afebrile and his leukocytosis has resolved.  I will follow-up his  cystostomy tube cultures for now I recommend an empiric 7 days course of unasyn 3g IV q6hrs starting from the date of cholecystostomy tube insertion for treatment of his acute cholecystitis. Continue vancomycin dosing pr pharmacy for enterococcus bacteremia. Recommend a 14 day treatment course given no evidence of endocarditis and negative repeat blood cx. Abx stop date 4/30.     Ok to place a midline/picc line once repeat blood cx are negative for 48hrs

## 2023-04-18 NOTE — PLAN OF CARE
Goal Outcome Evaluation:  Plan of Care Reviewed With: patient        Progress: no change  Outcome Evaluation: Patient had no c/o pain through the night. Q2 turn. Incontinence care as needed. Trilogy on while asleep with 8 L O2 bled in. New IV placed in RFA. Heparin drip continued at 13.5 units/kg/hr, last aPTT therapeutic at 81.9. NPO since midnight for CT guided gallbladder drainage today. Will stop Heparin drip at 0700 per MD orders. VSS. Daily weight. IV antibiotics continued. WCTM.

## 2023-04-18 NOTE — POST-PROCEDURE NOTE
POST PROCEDURE NOTE    Procedure: ct cholecystostomy tube insertion    Pre-Procedure Diagnosis: cholecystitis    Post-procedure Diagnosis: same    Findings: technically successful placement of 8 [place] cholecystostomy tube    Complications: no immediate    Blood loss: none    Specimen Removed: 5 mL dark bile sent for requested culture    Disposition:   Transfer back to inpatient room

## 2023-04-18 NOTE — PROGRESS NOTES
Name: Harry Potts ADMIT: 2023   : 1930  PCP: Harry Luis MD    MRN: [id number] LOS: 13 days   AGE/SEX: 89 y.o. male  ROOM: [facility]     Subjective   Subjective   Patient appears elderly, morbidly obese, generally weak, relatively comfortable, no apparent distress following percutaneous cholecystostomy tube placement on 2023.  Family and RN present.    Review of Systems   Respiratory: Positive for shortness of breath. Negative for cough.    Cardiovascular: Negative for chest pain and leg swelling.   Gastrointestinal: Negative for abdominal pain and nausea.        Objective   Objective   Vital Signs  Temp:  [97.8 °F (36.6 °C)-98.6 °F (37 °C)] 98.5 °F (36.9 °C)  Heart Rate:  [] 54  Resp:  [16-22] 22  BP: (127-146)/(63-75) 130/63  SpO2:  [88 %-94 %] 88 %  on  Flow (L/min):  [4-8] 8;   Device (Oxygen Therapy): nasal cannula  Body mass index is 43.71 kg/m².     Physical Exam  Constitutional:       General: He is not in acute distress.     Appearance: He is obese. He is ill-appearing. He is not toxic-appearing.      Comments: Generally weak   Cardiovascular:      Rate and Rhythm: Tachycardia present. Rhythm irregular.      Heart sounds: Murmur heard.   Pulmonary:      Effort: Pulmonary effort is normal.      Breath sounds: Wheezing present.   Abdominal:      General: Bowel sounds are normal.      Palpations: Abdomen is soft.      Comments: Percutaneous cholecystostomy tube in place with drainage bag dark brown liquid output   Musculoskeletal:      Right lower leg: Edema present.      Left lower leg: Edema present.   Skin:     General: Skin is warm and dry.      Comments: Left facial abrasion   Neurological:      Mental Status: He is alert.       Results Review     I reviewed the patient's new clinical results.  Results from last 7 days   Lab Units 23  0522 23  0513 23  0627 04/15/23  0833   WBC 10*3/mm3 9.50 12.62* 12.81* 15.61*   HEMOGLOBIN g/dL 12.9* 13.2 13.2 14.2    PLATELETS 10*3/mm3 166 154 112* 105*     Results from last 7 days   Lab Units 04/18/23  0522 04/17/23  0324 04/16/23  1101 04/16/23  0627 04/15/23  1748 04/12/23  0941   SODIUM mmol/L  --  137 137  --   --  142   POTASSIUM mmol/L  --  3.8 4.4  --   --  3.7   CHLORIDE mmol/L  --  91* 92*  --   --  97*   CO2 mmol/L  --  37.1* 37.0*  --   --  39.0*   BUN mg/dL  --  45* 40*  --   --  19   CREATININE mg/dL 0.86 0.80 0.75* 0.94   < > 0.52*   GLUCOSE mg/dL  --  120* 134*  --   --  107*   EGFR mL/min/1.73 81.2 83.0 84.7 76.1   < > 94.6    < > = values in this interval not displayed.     Results from last 7 days   Lab Units 04/16/23  1101 04/15/23  1748   ALBUMIN g/dL 2.6* 2.7*   BILIRUBIN mg/dL 1.2 1.3*   ALK PHOS U/L 88 79   AST (SGOT) U/L 18 18   ALT (SGPT) U/L 33 40     Results from last 7 days   Lab Units 04/17/23 0324 04/16/23  1101 04/15/23  1748 04/12/23  0941   CALCIUM mg/dL 9.2 8.8  --  8.5   ALBUMIN g/dL  --  2.6* 2.7*  --      Results from last 7 days   Lab Units 04/15/23  0833 04/14/23  2115   PROCALCITONIN ng/mL 0.32* 0.32*     No results found for: HGBA1C, POCGLU    CT Guided Abscess Drain Subdiaphr Subphren    Result Date: 4/18/2023  Technically successful CT guided cholecystostomy tube placement.  Radiation dose reduction techniques were utilized, including automated exposure control and exposure modulation based on body size.  This report was finalized on 4/18/2023 1:18 PM by Dr. Juan Motta M.D.      I have personally reviewed all medications:  Scheduled Medications  amLODIPine, 10 mg, Oral, Daily  ammonium lactate, 1 application, Topical, Q12H  ampicillin-sulbactam, 3 g, Intravenous, Q6H  aspirin, 81 mg, Oral, Daily  budesonide-formoterol, 2 puff, Inhalation, BID - RT  fluticasone, 1 spray, Nasal, Daily  ipratropium-albuterol, 3 mL, Nebulization, Q6H While Awake - RT  lidocaine, 20 mL, Infiltration, Once  metoprolol tartrate, 12.5 mg, Oral, Q12H  miconazole, , Topical, Q12H  pantoprazole, 40 mg,  "Oral, Q AM  potassium chloride, 40 mEq, Oral, Once  senna-docusate sodium, 2 tablet, Oral, BID  sodium chloride, 10 mL, Intravenous, Q12H  sodium chloride, 10 mL, Intravenous, Q12H  vancomycin, 1,750 mg, Intravenous, Q24H    Infusions  heparin, 7.94 Units/kg/hr, Last Rate: Stopped (04/18/23 0652)  Pharmacy to dose vancomycin,     Diet  NPO Diet NPO Type: Strict NPO    I have personally reviewed:  [x]  Laboratory   [x]  Microbiology   [x]  Radiology   [x]  EKG/Telemetry  []  Cardiology/Vascular   []  Pathology    []  Records       Assessment/Plan     Active Hospital Problems    Diagnosis  POA   • **Chronic respiratory failure with hypoxia [J96.11]  Unknown   • Atrial fibrillation [I48.91]  Unknown   • Acute cholecystitis [K81.0]  Unknown   • Hypoxia [R09.02]  Yes   • JENISE on CPAP [G47.33, Z99.89]  Not Applicable   • COPD (chronic obstructive pulmonary disease) [J44.9]  Yes   • Morbid obesity [E66.01]  Yes   • Gastroesophageal reflux disease [K21.9]  Yes      Resolved Hospital Problems   No resolved problems to display.       89 y.o. male admitted with Chronic respiratory failure with hypoxia.      Chronic respiratory failure with hypoxia /restrictive lung disease /JENISE /pulmonary hypertension related to lung disease: Pulmonology following recommend Symbicort, DuoNebs, wean oxygen as tolerated, repeat ABG if lethargy returns.  Patient \"will need NIPPV for home use, simple cpap or bipap will not be enough to ensure adequate minute ventilation to prevent recurrent episdoes of acute on chronic hypercapnic respiratory failure that could result in hospital admission or death\".  Avoid hyperoxia to prevent hypercapnia.      Acute cholecystitis / Subsequent Enterococcus bacteremia.  General surgery following recommend percutaneous cholecystostomy tube placement completed on 4/18/2023 per IR.  Fluid sent for culture.  Infectious disease switch ceftriaxone to Unasyn 3 g IV every 6 hours for anaerobic coverage.  Echocardiogram " without endocarditis.  Vancomycin per pharmacy dosing.  Antibiotics duration TBD.  Provide light opioid analgesic PRN (IV morphine 1 mg TID PRN severe pain) & avoid sedation.      Dysphagia:  VFSS per SLP.  Regular/House Diet, Cardiac Diets; Healthy Heart (2-3 Na+); Texture: Pureed (NDD 1); Fluid Consistency: Honey Thick       Atrial fibrillation:  HR >115 on telemetry due to missed BB this a.m. in the setting of NPO given necessity for applesauce to ingest pills; therefore, plan provide IV metoprolol tartrate 2.5 mg TID PRN tachycardia if unable to administer oral formulation.  Heparin gtt for AC.       Gastroesophageal reflux disease: PPI.      Morbid obesity:  BMI 43.      · Heparin for DVT prophylaxis.  · Full code.  · Discussed with patient, family, & RN.  · Anticipate discharge pending clinical course & anticipate SNF at DC / PT following      DANNIE Hand  [facility]  04/18/23  15:00 EDT

## 2023-04-18 NOTE — PROGRESS NOTES
ASSESSMENT/PLAN:    89-year-old gentleman with acute cholecystitis.  Due to multiple comorbidities and age percutaneous cholecystostomy tube placed today.  Tube is draining bile and he appears comfortable.  Continue antibiotics.    CC:     Follow-up cholecystitis    INTERVAL HISTORY:    89-year-old gentleman with acute cholecystitis.  Due to his age and multiple medical comorbidities and dementia he is being treated conservatively.    LABS:    • WBC 9.5  • Hemoglobin 12.9  • Platelets 166    PHYSICAL EXAM:   • Constitutional: No acute distress  • Vital signs:   o He has remained afebrile with stable vital signs  • Abdomen: Soft, nondistended, not particularly tender, drain is draining bile    KAREN HICKEY M.D.

## 2023-04-18 NOTE — DISCHARGE PLACEMENT REQUEST
"Saji Potts (89 y.o. Male)     Date of Birth   11/14/1930    Social Security Number       Address   [id number] [place] [address]    Home Phone   [phone]    MRN   [id number]       [facility]    Marital Status                               Admission Date   4/4/23    Admission Type   Urgent    Admitting Provider   Tano Osei MD    Attending Provider   Bladimir Ashley MD    Department, Room/Bed   [address], S[address]/1       Discharge Date       Discharge Disposition       Discharge Destination                               Attending Provider: Bladimir Ashley MD    Allergies: Atorvastatin, Dexmedetomidine, Morphine And Related    Isolation: None   Infection: None   Code Status: CPR    Ht: 172.7 cm (68\")   Wt: 130 kg (287 lb 7.7 oz)    Admission Cmt: None   Principal Problem: Chronic respiratory failure with hypoxia [J96.11]                 Active Insurance as of 4/4/2023     Primary Coverage     Payor Plan Insurance Group Employer/Plan Group    [facility] MEDICARE REPLACEMENT [facility] MEDICARE REPLACEMENT [id number]     Payor Plan Address Payor Plan Phone Number Payor Plan Fax Number Effective Dates    [address] [address]   1/1/2016 - None Entered    [facility] [id number]       Subscriber Name Subscriber Birth Date Member ID       SAJI POTTS 11/14/1930 [id number]                 Emergency Contacts      (Rel.) Home Phone Work Phone Mobile Phone    [facility] (Other) [phone] -- --    TRAMAINE SAUCEDO (Daughter) [phone] -- --              "

## 2023-04-18 NOTE — CASE MANAGEMENT/SOCIAL WORK
Continued Stay Note  [facility]     Patient Name: Harry Potts  MRN: [id number]  Today's Date: 4/18/2023    Admit Date: 4/4/2023    Plan: [facility] pending pre-cert/ bed availability   Discharge Plan     Row Name 04/18/23 1553       Plan    Plan [facility] pending pre-cert/ bed availability    Patient/Family in Agreement with Plan yes    Plan Comments Spoke with patient’s spouse at bedside. Patient’s family toured multiple facilities today and request referral to [facility]. Spoke with [facility]/ [facility] to update with referral. Patient will require pre-cert when medically ready.               Discharge Codes    No documentation.               Expected Discharge Date and Time     Expected Discharge Date Expected Discharge Time    Apr 19, 2023             Bridget Gasca RN

## 2023-04-19 ENCOUNTER — APPOINTMENT (OUTPATIENT)
Dept: GENERAL RADIOLOGY | Facility: HOSPITAL | Age: 88
DRG: 189 | End: 2023-04-19
Payer: MEDICARE

## 2023-04-19 LAB
ALBUMIN SERPL-MCNC: 2.6 G/DL (ref 3.5–5.2)
ALBUMIN/GLOB SERPL: 0.9 G/DL
ALP SERPL-CCNC: 84 U/L (ref 39–117)
ALT SERPL W P-5'-P-CCNC: 20 U/L (ref 1–41)
ANION GAP SERPL CALCULATED.3IONS-SCNC: 8 MMOL/L (ref 5–15)
APTT PPP: 66.5 SECONDS (ref 22.7–35.4)
APTT PPP: 67.5 SECONDS (ref 22.7–35.4)
AST SERPL-CCNC: 15 U/L (ref 1–40)
BACTERIA SPEC AEROBE CULT: NORMAL
BILIRUB SERPL-MCNC: 0.6 MG/DL (ref 0–1.2)
BUN SERPL-MCNC: 52 MG/DL (ref 8–23)
BUN/CREAT SERPL: 56.5 (ref 7–25)
CALCIUM SPEC-SCNC: 8.9 MG/DL (ref 8.2–9.6)
CHLORIDE SERPL-SCNC: 94 MMOL/L (ref 98–107)
CO2 SERPL-SCNC: 37 MMOL/L (ref 22–29)
CREAT SERPL-MCNC: 0.92 MG/DL (ref 0.76–1.27)
DEPRECATED RDW RBC AUTO: 39.9 FL (ref 37–54)
EGFRCR SERPLBLD CKD-EPI 2021: 78 ML/MIN/1.73
ERYTHROCYTE [DISTWIDTH] IN BLOOD BY AUTOMATED COUNT: 12.4 % (ref 12.3–15.4)
GLOBULIN UR ELPH-MCNC: 2.9 GM/DL
GLUCOSE SERPL-MCNC: 113 MG/DL (ref 65–99)
HCT VFR BLD AUTO: 36.9 % (ref 37.5–51)
HGB BLD-MCNC: 12.1 G/DL (ref 13–17.7)
MCH RBC QN AUTO: 29.4 PG (ref 26.6–33)
MCHC RBC AUTO-ENTMCNC: 32.8 G/DL (ref 31.5–35.7)
MCV RBC AUTO: 89.8 FL (ref 79–97)
PLATELET # BLD AUTO: 183 10*3/MM3 (ref 140–450)
PMV BLD AUTO: 11.1 FL (ref 6–12)
POTASSIUM SERPL-SCNC: 3.5 MMOL/L (ref 3.5–5.2)
PROT SERPL-MCNC: 5.5 G/DL (ref 6–8.5)
RBC # BLD AUTO: 4.11 10*6/MM3 (ref 4.14–5.8)
SODIUM SERPL-SCNC: 139 MMOL/L (ref 136–145)
WBC NRBC COR # BLD: 8.02 10*3/MM3 (ref 3.4–10.8)

## 2023-04-19 PROCEDURE — 97530 THERAPEUTIC ACTIVITIES: CPT

## 2023-04-19 PROCEDURE — 99232 SBSQ HOSP IP/OBS MODERATE 35: CPT | Performed by: INTERNAL MEDICINE

## 2023-04-19 PROCEDURE — 25010000002 HEPARIN (PORCINE) 25000-0.45 UT/250ML-% SOLUTION: Performed by: HOSPITALIST

## 2023-04-19 PROCEDURE — 80053 COMPREHEN METABOLIC PANEL: CPT | Performed by: NURSE PRACTITIONER

## 2023-04-19 PROCEDURE — 92611 MOTION FLUOROSCOPY/SWALLOW: CPT | Performed by: SPEECH-LANGUAGE PATHOLOGIST

## 2023-04-19 PROCEDURE — 94799 UNLISTED PULMONARY SVC/PX: CPT

## 2023-04-19 PROCEDURE — 71045 X-RAY EXAM CHEST 1 VIEW: CPT

## 2023-04-19 PROCEDURE — 85027 COMPLETE CBC AUTOMATED: CPT | Performed by: NURSE PRACTITIONER

## 2023-04-19 PROCEDURE — 25010000002 VANCOMYCIN 10 G RECONSTITUTED SOLUTION: Performed by: INTERNAL MEDICINE

## 2023-04-19 PROCEDURE — 97110 THERAPEUTIC EXERCISES: CPT

## 2023-04-19 PROCEDURE — 25010000002 AMPICILLIN-SULBACTAM PER 1.5 G: Performed by: INTERNAL MEDICINE

## 2023-04-19 PROCEDURE — 85730 THROMBOPLASTIN TIME PARTIAL: CPT | Performed by: HOSPITALIST

## 2023-04-19 PROCEDURE — 74230 X-RAY XM SWLNG FUNCJ C+: CPT

## 2023-04-19 PROCEDURE — 94664 DEMO&/EVAL PT USE INHALER: CPT

## 2023-04-19 RX ADMIN — MICONAZOLE NITRATE: 2 POWDER TOPICAL at 21:41

## 2023-04-19 RX ADMIN — AMPICILLIN SODIUM AND SULBACTAM SODIUM 3 G: 2; 1 INJECTION, POWDER, FOR SOLUTION INTRAMUSCULAR; INTRAVENOUS at 13:05

## 2023-04-19 RX ADMIN — BARIUM SULFATE 55 ML: 0.81 POWDER, FOR SUSPENSION ORAL at 10:41

## 2023-04-19 RX ADMIN — AMMONIUM LACTATE 1 APPLICATION: 120 CREAM TOPICAL at 17:27

## 2023-04-19 RX ADMIN — AMPICILLIN SODIUM AND SULBACTAM SODIUM 3 G: 2; 1 INJECTION, POWDER, FOR SOLUTION INTRAMUSCULAR; INTRAVENOUS at 17:27

## 2023-04-19 RX ADMIN — BARIUM SULFATE 50 ML: 400 SUSPENSION ORAL at 10:41

## 2023-04-19 RX ADMIN — AMLODIPINE BESYLATE 10 MG: 10 TABLET ORAL at 13:07

## 2023-04-19 RX ADMIN — IPRATROPIUM BROMIDE AND ALBUTEROL SULFATE 3 ML: 2.5; .5 SOLUTION RESPIRATORY (INHALATION) at 20:47

## 2023-04-19 RX ADMIN — METOPROLOL TARTRATE 12.5 MG: 25 TABLET ORAL at 13:06

## 2023-04-19 RX ADMIN — PANTOPRAZOLE SODIUM 40 MG: 40 TABLET, DELAYED RELEASE ORAL at 06:17

## 2023-04-19 RX ADMIN — IPRATROPIUM BROMIDE AND ALBUTEROL SULFATE 3 ML: 2.5; .5 SOLUTION RESPIRATORY (INHALATION) at 12:44

## 2023-04-19 RX ADMIN — METOPROLOL TARTRATE 12.5 MG: 25 TABLET ORAL at 21:41

## 2023-04-19 RX ADMIN — AMPICILLIN SODIUM AND SULBACTAM SODIUM 3 G: 2; 1 INJECTION, POWDER, FOR SOLUTION INTRAMUSCULAR; INTRAVENOUS at 05:45

## 2023-04-19 RX ADMIN — VANCOMYCIN HYDROCHLORIDE 1750 MG: 10 INJECTION, POWDER, LYOPHILIZED, FOR SOLUTION INTRAVENOUS at 03:51

## 2023-04-19 RX ADMIN — BARIUM SULFATE 4 ML: 980 POWDER, FOR SUSPENSION ORAL at 10:41

## 2023-04-19 RX ADMIN — AMMONIUM LACTATE 1 APPLICATION: 120 CREAM TOPICAL at 06:17

## 2023-04-19 RX ADMIN — Medication 10 ML: at 21:41

## 2023-04-19 RX ADMIN — HEPARIN SODIUM 13.5 UNITS/KG/HR: 10000 INJECTION, SOLUTION INTRAVENOUS at 07:49

## 2023-04-19 RX ADMIN — POTASSIUM CHLORIDE 40 MEQ: 750 TABLET, EXTENDED RELEASE ORAL at 15:24

## 2023-04-19 RX ADMIN — Medication 10 ML: at 21:42

## 2023-04-19 RX ADMIN — DOCUSATE SODIUM 50MG AND SENNOSIDES 8.6MG 2 TABLET: 8.6; 5 TABLET, FILM COATED ORAL at 21:41

## 2023-04-19 RX ADMIN — ASPIRIN 81 MG: 81 TABLET, COATED ORAL at 13:07

## 2023-04-19 NOTE — PROGRESS NOTES
LOS: 14 days     Chief Complaint: Enterococcus bacteremia     Interval History: Afebrile, no new events or complaints.  Tolerating antibiotics without vomiting diarrhea or rash.    Vital Signs  Temp:  [97.3 °F (36.3 °C)-97.9 °F (36.6 °C)] 97.3 °F (36.3 °C)  Heart Rate:  [] 82  Resp:  [16-22] 18  BP: (109-140)/(61-79) 109/72    Physical Exam:  General: In no acute distress  Cardiovascular: Regular irregular, trace lower extremity edema  Respiratory: Bibasilar crackles  GI: Soft, drain placement on the right  Skin: No rashes     Antibiotics:  Unasyn 3g IV q6hrs  Vancomycin dosing per pharmacy      Results Review:    Lab Results   Component Value Date    WBC 8.02 04/19/2023    HGB 12.1 (L) 04/19/2023    HCT 36.9 (L) 04/19/2023    MCV 89.8 04/19/2023     04/19/2023     Lab Results   Component Value Date    GLUCOSE 113 (H) 04/19/2023    BUN 52 (H) 04/19/2023    CREATININE 0.92 04/19/2023    EGFRIFNONA 83 01/12/2022    EGFRIFAFRI 97 01/12/2022    BCR 56.5 (H) 04/19/2023    CO2 37.0 (H) 04/19/2023    CALCIUM 8.9 04/19/2023    PROTENTOTREF 6.4 04/03/2023    ALBUMIN 2.6 (L) 04/19/2023    LABIL2 1.9 04/03/2023    AST 15 04/19/2023    ALT 20 04/19/2023     procal 0.31 -> 0.32    Microbiology:  4/18 colostomy culture Enterococcus  4/16 BCx NGTD x 2  4/14 BCx Enterococcus faecium  1/2 4/5 RVP neg    CT of the abdomen pelvis shows gallbladder dilatation with wall thickening and multiple gallstones.  Extensive inflammation in the right upper quadrant due to acute cholecystitis.  Mild ascites with perihepatic fluid.  Small bilateral pleural effusions with bibasilar consolidation    Echocardiogram aortic valve not well visualized, normal mitral valve.  Tricuspid valve no well visualized.  Trace TR.    Assessment & Plan   Enterococcus bacteremia   Acute on chronic hypoxic and hypercapnic respiratory failure   Acute cholecystitis  Leukocytosis   Dementia  Confusion    Cholecystostomy tube cultures with Enterococcus.  I  recommend continuing empiric Unasyn for 1 more day to make sure that nothing else grows out of the colostomy tube.  If cultures only grew Enterococcus I will discontinue Unasyn tomorrow.  Continue vancomycin dosing pr pharmacy for enterococcus bacteremia. Recommend a 14 day treatment course given no evidence of endocarditis and negative repeat blood cx. Abx stop date 4/30.     Ok to place a midline/picc line at anytime if need    We will sign off.  Please do not hesitate to call us with further questions or concerns

## 2023-04-19 NOTE — PROGRESS NOTES
Name: Harry Potts ADMIT: 2023   : 1930  PCP: Harry Luis MD    MRN: [id number] LOS: 14 days   AGE/SEX: 89 y.o. male  ROOM: [facility]     Subjective   Subjective   Resting in bed. No family present. He denies any new complaints. Breathing is about the same. Denies any chest pain. Reports abdominal fullness. Last BM 2 days ago. He is waiting for dinner. Denies any nausea or vomiting.      Objective   Objective   Vital Signs  Temp:  [97.3 °F (36.3 °C)-97.9 °F (36.6 °C)] 97.7 °F (36.5 °C)  Heart Rate:  [78-95] 95  Resp:  [18-20] 18  BP: (109-150)/(61-79) 150/76  SpO2:  [90 %-95 %] 91 %  on  Flow (L/min):  [7-8] 7;   Device (Oxygen Therapy): humidified;nasal cannula  Body mass index is 43.58 kg/m².     Physical Exam  Vitals and nursing note reviewed.   Constitutional:       Appearance: He is obese. He is ill-appearing.   Cardiovascular:      Rate and Rhythm: Normal rate and regular rhythm.      Pulses: Normal pulses.   Pulmonary:      Effort: Respiratory distress (mild/conversational (nursing reports this is typical for him)) present.      Comments: Diminished throughout.  Abdominal:      General: Bowel sounds are normal. There is distension.      Palpations: Abdomen is soft.      Tenderness: There is no abdominal tenderness.      Comments: Right upper quadrant drain present with brown/bilious output   Musculoskeletal:         General: Swelling present. No tenderness. Normal range of motion.   Skin:     General: Skin is warm and dry.      Coloration: Skin is pale.      Findings: Bruising present.   Neurological:      General: No focal deficit present.      Mental Status: He is alert and oriented to person, place, and time.      Sensory: No sensory deficit.      Motor: Weakness present.      Coordination: Coordination normal.   Psychiatric:         Mood and Affect: Mood normal.         Behavior: Behavior normal.       Results Review:       I reviewed the patient's new clinical results.  Results from  last 7 days   Lab Units 04/19/23  0427 04/18/23  0522 04/17/23  0513 04/16/23  0627   WBC 10*3/mm3 8.02 9.50 12.62* 12.81*   HEMOGLOBIN g/dL 12.1* 12.9* 13.2 13.2   PLATELETS 10*3/mm3 183 166 154 112*     Results from last 7 days   Lab Units 04/19/23 0427 04/18/23  0522 04/17/23  0324 04/16/23  1101   SODIUM mmol/L 139  --  137 137   POTASSIUM mmol/L 3.5  --  3.8 4.4   CHLORIDE mmol/L 94*  --  91* 92*   CO2 mmol/L 37.0*  --  37.1* 37.0*   BUN mg/dL 52*  --  45* 40*   CREATININE mg/dL 0.92 0.86 0.80 0.75*   GLUCOSE mg/dL 113*  --  120* 134*   Estimated Creatinine Clearance: 67.4 mL/min (by C-G formula based on SCr of 0.92 mg/dL).  Results from last 7 days   Lab Units 04/19/23  0427 04/16/23  1101 04/15/23  1748   ALBUMIN g/dL 2.6* 2.6* 2.7*   BILIRUBIN mg/dL 0.6 1.2 1.3*   ALK PHOS U/L 84 88 79   AST (SGOT) U/L 15 18 18   ALT (SGPT) U/L 20 33 40     Results from last 7 days   Lab Units 04/19/23 0427 04/17/23  0324 04/16/23  1101 04/15/23  1748   CALCIUM mg/dL 8.9 9.2 8.8  --    ALBUMIN g/dL 2.6*  --  2.6* 2.7*     Results from last 7 days   Lab Units 04/15/23  0833 04/14/23  2115   PROCALCITONIN ng/mL 0.32* 0.32*     No results found for: HGBA1C, POCGLU    amLODIPine, 10 mg, Oral, Daily  ammonium lactate, 1 application, Topical, Q12H  ampicillin-sulbactam, 3 g, Intravenous, Q6H  aspirin, 81 mg, Oral, Daily  budesonide-formoterol, 2 puff, Inhalation, BID - RT  fluticasone, 1 spray, Nasal, Daily  ipratropium-albuterol, 3 mL, Nebulization, Q6H While Awake - RT  metoprolol tartrate, 12.5 mg, Oral, Q12H  miconazole, , Topical, Q12H  pantoprazole, 40 mg, Oral, Q AM  potassium chloride, 40 mEq, Oral, Once  senna-docusate sodium, 2 tablet, Oral, BID  sodium chloride, 10 mL, Intravenous, Q12H  sodium chloride, 10 mL, Intravenous, Q12H  vancomycin, 1,750 mg, Intravenous, Q24H      heparin, 7.94 Units/kg/hr, Last Rate: 13.5 Units/kg/hr (04/19/23 0749)  Pharmacy to dose vancomycin,     Diet: Regular/House Diet; Texture: Soft  "to Chew (NDD 3); Soft to Chew: Chopped Meat; Fluid Consistency: Thin (IDDSI 0)       Assessment/Plan     Active Hospital Problems    Diagnosis  POA   • **Chronic respiratory failure with hypoxia [J96.11]  Unknown   • Atrial fibrillation [I48.91]  Unknown   • Acute cholecystitis [K81.0]  Unknown   • Hypoxia [R09.02]  Yes   • JENISE on CPAP [G47.33, Z99.89]  Not Applicable   • COPD (chronic obstructive pulmonary disease) [J44.9]  Yes   • Morbid obesity [E66.01]  Yes   • Gastroesophageal reflux disease [K21.9]  Yes      Resolved Hospital Problems   No resolved problems to display.     Mr. Potts is a 89 year old male who presented to the hospital with complaints of dyspnea and increased confusion.    Chronic hypoxemic and hypercapnic respiratory failure  JENISE  restrictive lung disease  pulmonary hypertension related to lung disease  -Pulmonology following: \"will need NIPPV for home use, simple cpap or bipap will not be enough to ensure adequate minute ventilation to prevent recurrent episdoes of acute on chronic hypercapnic respiratory failure that could result in hospital admission or death\"  -Avoid hyperoxia to prevent hypercapnia.  -Continue Symbicort, Duonebs. Plan for repeat ABGs for any lethargy. Repeat CXR ordered per pulm.     Enterococcus faecium BCID  acute cholecystitis  minimal pneumonia on x-ray left base   -Continue antibiotics as advised by ID- Unasyn until tomorrow 4/20. Plans for pharmacy to dose vancomycin until 4/30 (14 day course given no evidence of endocarditis and repeat blood cultures with sterility).  -Surgery recommends percutaneous cholecystostomy tube due to comorbidities which is currently in place.     Dysphagia  -VFSS per SLP.   -Diet upgraded today to mechanical soft and thins. Aspiration precautions.      Metabolic encephalopathy  underlying dementia  -Neurology expecting waxing and waning encephalopathy  -Some worsening due to bacteremia/acute cholecystitis  -Currently oriented and " appropriate on exam today.     Paroxysmal atrial fibrillation  hypertension  -Currently on heparin due to bruising from Lovenox. Switch back to PO anticoagulation when okay with surgery.  -Metoprolol continued for rate control.  -Cardiology signed off 4/12/23.     Discussed with patient, nurse and Dr. Ashley.     VTE prophylaxis: heparin drip  Code status: Full code  Disposition: Will need SNF when cleared by all.    DANNIE Ramon  [facility]  04/19/23  17:09 EDT

## 2023-04-19 NOTE — PROGRESS NOTES
"  Daily Progress Note.   [address]  4/19/2023    Patient:  Name:  Harry Potts  MRN:  [id number]  11/14/1930  89 y.o.  male         CC:Acute on chronic respiratory failure, obesity, JENISE, restrictive lung disease, kyphosis, pulmonary hypertension  Interval History:  niv overnight  7-8lhfnc at present just worked with physical therapy is recovering.  Says he is very thirsty for water after working with physical therapy  No fevers  Bilious drain from drain     Physical Exam:  /72 (BP Location: Right arm, Patient Position: Lying)   Pulse 82   Temp 97.3 °F (36.3 °C) (Axillary)   Resp 18   Ht 172.7 cm (68\")   Wt 130 kg (286 lb 9.6 oz)   SpO2 90%   BMI 43.58 kg/m²   Body mass index is 43.58 kg/m².    Intake/Output Summary (Last 24 hours) at 4/19/2023 0808  Last data filed at 4/19/2023 0617  Gross per 24 hour   Intake 379 ml   Output 150 ml   Net 229 ml     General appearance: Nontoxic, conversant   Eyes: anicteric sclerae, moist conjunctivae; no lidlag;    HENT: Lesion on left cheek without active bleeding bandaged at present time  Neck: Large neck circumference  Lungs: No wheeze no rhonchi, diminished at bilateral bases with normal respiratory effort and no intercostal retractions  CV: RRR, no rub   Abdomen: Obese bowel sounds positive right cholecystostomy tube bilious output  Extremities: Mild peripheral edema bilateral right greater than left  Skin: WWP no diffuse visible rash chronic changes and ecchymosis  Psych/neuro calm speech intact seems slow to respond to questions.  Positive hard of hearing does move all extremities    Data Review:  Notable Labs:  Results from last 7 days   Lab Units 04/19/23  0427 04/18/23  0522 04/17/23  0513 04/16/23  0627 04/15/23  0833 04/14/23  1707 04/13/23  0614   WBC 10*3/mm3 8.02 9.50 12.62* 12.81* 15.61* 17.85* 10.45   HEMOGLOBIN g/dL 12.1* 12.9* 13.2 13.2 14.2 14.4 15.0   PLATELETS 10*3/mm3 183 166 154 112* 105* 109* 108*     Results from last " 7 days   Lab Units 04/19/23  0427 04/18/23  0522 04/17/23  0324 04/16/23  1101 04/16/23  0627 04/15/23  1748 04/12/23  0941   SODIUM mmol/L 139  --  137 137  --   --  142   POTASSIUM mmol/L 3.5  --  3.8 4.4  --   --  3.7   CHLORIDE mmol/L 94*  --  91* 92*  --   --  97*   CO2 mmol/L 37.0*  --  37.1* 37.0*  --   --  39.0*   BUN mg/dL 52*  --  45* 40*  --   --  19   CREATININE mg/dL 0.92 0.86 0.80 0.75* 0.94 0.88 0.52*   GLUCOSE mg/dL 113*  --  120* 134*  --   --  107*   CALCIUM mg/dL 8.9  --  9.2 8.8  --   --  8.5   Estimated Creatinine Clearance: 67.4 mL/min (by C-G formula based on SCr of 0.92 mg/dL).    Results from last 7 days   Lab Units 04/19/23  0427 04/18/23  0522 04/17/23  0513 04/16/23  1101 04/16/23  0627 04/15/23  1748 04/15/23  0833 04/14/23  2115   AST (SGOT) U/L 15  --   --  18  --  18  --   --    ALT (SGPT) U/L 20  --   --  33  --  40  --   --    PROCALCITONIN ng/mL  --   --   --   --   --   --  0.32* 0.32*   PLATELETS 10*3/mm3 183 166 154  --    < >  --  105*  --     < > = values in this interval not displayed.       Results from last 7 days   Lab Units 04/14/23  1356 04/13/23  0404   PH, ARTERIAL pH units 7.396 7.466*   PCO2, ARTERIAL mm Hg 72.6* 54.0*   PO2 ART mm Hg 66.9* 71.3*   HCO3 ART mmol/L 44.5* 39.0*       Imaging:  Reviewed chest images personally from past 3 days    CT abdomen/pelvis-gallbladder wall thickening gallstones proximal cystic duct stone extensive inflammation pneumonia left lower lobe    ASSESSMENT  /  PLAN:  Acute on chronic chronic hypoxemic respiratory failure  Chronic hypercapnia  Obesity hypoventilation syndrome  JENISE  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  Stable 1.1 cm nodule (4-year stability)  Encephalopathy superimposed on dementia    COPD (chronic obstructive pulmonary disease)  Pulm hypertension due to lung disease and cardiac disease  Atrial fibrillation  Severe acute cholecystitis    From pulmonary critical care perspective  symbicort  duonebs  Continue noninvasive wean oxygen as able -if having difficulty with oxygenation overnight switch to a hospital BiPAP  If lethargic repeat abg  Repeat chest x-ray to see if additional Lasix is needed.    Antibiotics per ID percutaneous cholecystostomy per surgery    Grady Rodrigues MD  [facility]  04/19/23  09:37 EDT

## 2023-04-19 NOTE — MBS/VFSS/FEES
Acute Care - Speech Language Pathology   Swallow Initial Evaluation [facility]     Patient Name: Harry Potts  : 1930  MRN: [id number]  Today's Date: 2023               Admit Date: 2023    Visit Dx:     ICD-10-CM ICD-9-CM   1. Hypoxia  R09.02 799.02   2. Confusion  R41.0 298.9   3. General weakness  R53.1 780.79   4. Physical deconditioning  R53.81 799.3   5. Hypokalemia  E87.6 276.8   6. Atrial fibrillation, unspecified type  I48.91 427.31   7. Aneurysm of ascending aorta without rupture  I71.21 441.2     Patient Active Problem List   Diagnosis   • Dysfunction of eustachian tube   • Gastroesophageal reflux disease   • Hyperglycemia   • Hypercholesterolemia   • Hypertension   • Morbid obesity   • Osteoarthritis of lumbar spine with myelopathy   • Osteoarthritis of lumbar spine   • Bronchitis   • Viral URI with cough   • PVC (premature ventricular contraction)   • Non-traumatic rhabdomyolysis   • Obesity (BMI 30-39.9)   • Chronic low back pain   • Weakness   • Edema, bilateral lower extremities   • COPD (chronic obstructive pulmonary disease)   • Irregularly irregular cardiac rhythm   • JENISE on CPAP   • Medicare annual wellness visit, subsequent   • Hypoxia   • Chronic respiratory failure with hypoxia   • Acute cholecystitis   • Atrial fibrillation     Past Medical History:   Diagnosis Date   • Arthritis    • Asthma     Oxygen at home   • Atrial fibrillation 2023   • Cancer     basal cell    • COPD (chronic obstructive pulmonary disease)    • Difficulty walking    • Elevated cholesterol    • Environmental allergies    • GERD (gastroesophageal reflux disease)    • HL (hearing loss)    • Hyperglycemia    • Hyperlipidemia    • Hypertension    • Obesity    • Sleep apnea    • Spondylolysis, lumbar region      Past Surgical History:   Procedure Laterality Date   • CARDIAC CATHETERIZATION  circa     [facility]   • EYE SURGERY     • HERNIA REPAIR     • JOINT REPLACEMENT     •  REPLACEMENT TOTAL KNEE BILATERAL         SLP Recommendation and Plan  SLP Swallowing Diagnosis: swallow WFL/no suspected pharyngeal impairment (04/19/23 1000)  SLP Diet Recommendation: soft to chew textures, chopped, thin liquids (04/19/23 1000)  Recommended Precautions and Strategies: upright posture during/after eating, small bites of food and sips of liquid (04/19/23 1000)  SLP Rec. for Method of Medication Administration: meds whole, as tolerated (04/19/23 1000)     Monitor for Signs of Aspiration: notify SLP if any concerns (04/19/23 1000)     Swallow Criteria for Skilled Therapeutic Interventions Met: no problems identified which require skilled intervention (04/19/23 1000)  Anticipated Discharge Disposition (SLP): unknown (04/19/23 1000)     Therapy Frequency (Swallow): evaluation only (04/19/23 1000)                                           Plan of Care Reviewed With: patient  Outcome Evaluation: VFSS completed patient demonstrates normal oropharyngeal swallow. Recommend upgrade to mechanical soft chopped and thin liquids. Meds whole as tolerated and small bites and sips.      SWALLOW EVALUATION (last 72 hours)     SLP Adult Swallow Evaluation     Row Name 04/19/23 1000 04/17/23 0830                Rehab Evaluation    Document Type evaluation  -KA evaluation  -SA       Subjective Information no complaints  -KA no complaints  -SA       Patient Observations alert;cooperative  -KA cooperative;alert  -SA       Patient Effort good  -KA good  -SA       Symptoms Noted During/After Treatment none  -KA none  -SA          General Information    Patient Profile Reviewed yes  -KA yes  -SA       Pertinent History Of Current Problem -- adm w/ chronic resp failure w/ hypoxia, h/o dementia, GERD, COPD  -SA       Current Method of Nutrition -- NPO  -SA       Precautions/Limitations, Vision -- WFL;for purposes of eval  -SA       Precautions/Limitations, Hearing -- WFL;for purposes of eval  -SA       Prior Level of  Function-Communication -- unknown  -SA       Prior Level of Function-Swallowing -- unknown  -SA       Plans/Goals Discussed with -- patient  -SA       Barriers to Rehab -- none identified  -SA          Pain    Additional Documentation -- Pain Scale: Numbers Pre/Post-Treatment (Group)  -SA          Pain Scale: Numbers Pre/Post-Treatment    Pretreatment Pain Rating -- 0/10 - no pain  -SA       Posttreatment Pain Rating -- 0/10 - no pain  -SA          Clinical Swallow Eval    Clinical Swallow Evaluation Summary -- Pt alert and positioned upright for clinical swallow eval.  Pt with no overt s/s single ice chips.  Delayed throat clear and anterior loss with nectar thick and honey thick by cup.  No overt s/s with 4 oz of puree and 4 oz honey thick by spoon.  -SA          MBS/VFSS    Utensils Used spoon;cup;straw  -KA --       Consistencies Trialed soft to chew textures;chopped;mixed consistency;pureed;thin liquids;nectar/syrup-thick liquids;honey-thick liquids  -KA --          MBS/VFSS Interpretation    VFSS Summary Radiologist Dr Loredo present: Patient demonstrates overall normal oropharyngeal swallow. Trace transient penetration x1 on first sip of thins via cup. No penetration on subsequent sips of thins including via straw and all other tested consistencies. Mastication slightly prolonged however WNL.  -KA --          SLP Communication to Radiology    Summary Statement Radiologist Dr Loredo present: Patient demonstrates overall normal oropharyngeal swallow. Trace transient penetration x1 on first sip of thins via cup. No penetration on subsequent sips of thins including via straw and all other tested consistencies. Mastication slightly prolonged however WNL.  -KA --          SLP Evaluation Clinical Impression    SLP Swallowing Diagnosis swallow WFL/no suspected pharyngeal impairment  -KA mild  -SA       Functional Impact no impact on function  -KA risk of aspiration/pneumonia  -       Rehab Potential/Prognosis,  Swallowing -- good, to achieve stated therapy goals  -       Swallow Criteria for Skilled Therapeutic Interventions Met no problems identified which require skilled intervention  - demonstrates skilled criteria  -          Recommendations    Therapy Frequency (Swallow) evaluation only  - PRN  -       Predicted Duration Therapy Intervention (Days) -- until discharge  -       SLP Diet Recommendation soft to chew textures;chopped;thin liquids  - puree;honey thick liquids  -       Recommended Diagnostics -- VFSS (MBS)  -       Recommended Precautions and Strategies upright posture during/after eating;small bites of food and sips of liquid  - upright posture during/after eating;small bites of food and sips of liquid;no straw;liquid via spoon only  -       Oral Care Recommendations Oral Care BID/PRN  - Oral Care BID/PRN  -       SLP Rec. for Method of Medication Administration meds whole;as tolerated  - with puree  -       Monitor for Signs of Aspiration notify SLP if any concerns  - notify SLP if any concerns  -       Anticipated Discharge Disposition (SLP) unknown  - unknown  -          Swallow Goals (SLP)    Swallow LTGs -- Patient will demonstrate functional swallow for  -       Swallow STGs -- diet tolerance goal selection (SLP)  -       Diet Tolerance Goal Selection (SLP) -- Swallow Short Term Goal 1  -          (LTG) Patient will demonstrate functional swallow for    Diet Texture (Demonstrate functional swallow) -- regular textures  -       Liquid viscosity (Demonstrate functional swallow) -- thin liquids  -          (STG) Swallow 1    (STG) Swallow 1 -- Pt will tolerate least restrictive diet  -             User Key  (r) = Recorded By, (t) = Taken By, (c) = Cosigned By    Initials Name Effective Dates    Leeann Wood MS CCC-SLP 06/01/22 -     Júnior Jeffrey MA,[facility]3]-SLP 06/02/22 -                 EDUCATION  The patient has been educated in the following  areas:   Dysphagia (Swallowing Impairment).        SLP GOALS     Row Name 04/17/23 0830             (LTG) Patient will demonstrate functional swallow for    Diet Texture (Demonstrate functional swallow) regular textures  -SA      Liquid viscosity (Demonstrate functional swallow) thin liquids  -SA         (STG) Swallow 1    (STG) Swallow 1 Pt will tolerate least restrictive diet  -SA            User Key  (r) = Recorded By, (t) = Taken By, (c) = Cosigned By    Initials Name Provider Type    Leeann Wood MS CCC-SLP Speech and Language Pathologist                   Time Calculation:    Time Calculation- SLP     Row Name 04/19/23 1059             Time Calculation- SLP    SLP Start Time 1010  -KA      SLP Received On 04/19/23  -KA         Untimed Charges    SLP Eval/Re-eval  ST [employer] Fluoro Eval Swallow - 76704  -KA      [id number]-OX [employer] Fluoro Eval Swallow Minutes 55  -KA         Total Minutes    Untimed Charges Total Minutes 55  -KA       Total Minutes 55  -KA            User Key  (r) = Recorded By, (t) = Taken By, (c) = Cosigned By    Initials Name Provider Type     Júnior Ridley MA,CCC-SLP Speech and Language Pathologist                Therapy Charges for Today     Code Description Service Date Service Provider Modifiers Qty    43308706505 HC ST [employer] FLUORO EVAL SWALLOW 4 4/19/2023 Júnior Ridley MA,CCC-SLP GN 1               Júnior Ridley MA,CIRA-SLP  4/19/2023

## 2023-04-19 NOTE — PLAN OF CARE
Goal Outcome Evaluation:  Plan of Care Reviewed With: patient           Outcome Evaluation: VFSS completed patient demonstrates normal oropharyngeal swallow. Recommend upgrade to mechanical soft chopped and thin liquids. Meds whole as tolerated and small bites and sips.

## 2023-04-19 NOTE — THERAPY TREATMENT NOTE
Patient Name: Harry Potts  : 1930    MRN: [id number]                              Today's Date: 2023       Admit Date: 2023    Visit Dx:     ICD-10-CM ICD-9-CM   1. Hypoxia  R09.02 799.02   2. Confusion  R41.0 298.9   3. General weakness  R53.1 780.79   4. Physical deconditioning  R53.81 799.3   5. Hypokalemia  E87.6 276.8   6. Atrial fibrillation, unspecified type  I48.91 427.31   7. Aneurysm of ascending aorta without rupture  I71.21 441.2     Patient Active Problem List   Diagnosis   • Dysfunction of eustachian tube   • Gastroesophageal reflux disease   • Hyperglycemia   • Hypercholesterolemia   • Hypertension   • Morbid obesity   • Osteoarthritis of lumbar spine with myelopathy   • Osteoarthritis of lumbar spine   • Bronchitis   • Viral URI with cough   • PVC (premature ventricular contraction)   • Non-traumatic rhabdomyolysis   • Obesity (BMI 30-39.9)   • Chronic low back pain   • Weakness   • Edema, bilateral lower extremities   • COPD (chronic obstructive pulmonary disease)   • Irregularly irregular cardiac rhythm   • JENISE on CPAP   • Medicare annual wellness visit, subsequent   • Hypoxia   • Chronic respiratory failure with hypoxia   • Acute cholecystitis   • Atrial fibrillation     Past Medical History:   Diagnosis Date   • Arthritis    • Asthma     Oxygen at home   • Atrial fibrillation 2023   • Cancer     basal cell    • COPD (chronic obstructive pulmonary disease)    • Difficulty walking    • Elevated cholesterol    • Environmental allergies    • GERD (gastroesophageal reflux disease)    • HL (hearing loss)    • Hyperglycemia    • Hyperlipidemia    • Hypertension    • Obesity    • Sleep apnea    • Spondylolysis, lumbar region      Past Surgical History:   Procedure Laterality Date   • CARDIAC CATHETERIZATION  circa     [facility], [place], operated by [facility]   • EYE SURGERY     • HERNIA REPAIR     • JOINT REPLACEMENT     • REPLACEMENT TOTAL KNEE BILATERAL        General Information     Row  Name 04/19/23 0918          Physical Therapy Time and Intention    Document Type therapy note (daily note)  -PH     Mode of Treatment physical therapy  -PH     Row Name 04/19/23 0918          General Information    Existing Precautions/Restrictions fall;oxygen therapy device and L/min  -PH     Row Name 04/19/23 0918          Safety Issues, Functional Mobility    Impairments Affecting Function (Mobility) endurance/activity tolerance;balance;strength;postural/trunk control;range of motion (ROM);muscle tone abnormal;cognition  -PH     Cognitive Impairments, Mobility Safety/Performance problem-solving/reasoning;insight into deficits/self-awareness  -PH     Comment, Safety Issues/Impairments (Mobility) gt belt and non skid socks donned  -PH           User Key  (r) = Recorded By, (t) = Taken By, (c) = Cosigned By    Initials Name Provider Type    PH Khushboo Galindo PTA Physical Therapist Assistant               Mobility     Row Name 04/19/23 0918          Bed Mobility    Bed Mobility supine-sit;sit-supine;scooting/bridging  -PH     Scooting/Bridging Mc[place]ullen (Bed Mobility) dependent (less than 25% patient effort);2 person assist;verbal cues;nonverbal cues (demo/gesture)  -PH     Supine-Sit [place]ullen (Bed Mobility) maximum assist (25% patient effort);2 person assist;verbal cues;nonverbal cues (demo/gesture)  -PH     Sit-Supine Mc[place]ullen (Bed Mobility) maximum assist (25% patient effort);2 person assist;verbal cues;nonverbal cues (demo/gesture)  -PH     Comment, (Bed Mobility) max cues for log roll and sequencing to exit/enter bed  -PH     Row Name 04/19/23 0918          Transfers    Comment, (Transfers) poor sit bal w/ pt lethargic; not attempted for pt safety  -     Row Name 04/19/23 0918          Bed-Chair Transfer    Bed-Chair McMullen (Transfers) unable to assess  -     Row Name 04/19/23 0918          Sit-Stand Transfer    Sit-Stand Mc[place]ullen (Transfers) unable to assess  -     Row Name  04/19/23 0918          Gait/Stairs (Locomotion)    H[place]geman Level (Gait) unable to assess  -PH     H[place] Level (Stairs) unable to assess  -PH           User Key  (r) = Recorded By, (t) = Taken By, (c) = Cosigned By    Initials Name Provider Type    JAYLA IvyMarilee blancoKhushbooKAILYN Physical Therapist Assistant               Obj/Interventions     Row Name 04/19/23 0920          Motor Skills    Therapeutic Exercise other (see comments)  AAROM B UE: shldr flexion, reaches; x 5 - 10 reps; BAP, LAQ; x 5-10 reps; cues and assist to incr ROM for B UE  -PH     Row Name 04/19/23 0920          Balance    Balance Assessment sitting static balance;sitting dynamic balance  -PH     Static Sitting Balance moderate assist;maximum assist;minimal assist;contact guard;verbal cues;non-verbal cues (demo/gesture)  -PH     Dynamic Sitting Balance minimal assist;moderate assist;verbal cues;non-verbal cues (demo/gesture)  -PH     Comment, Balance sit bal initially very poor w/ pt req mod to max A for L and posterior lean. Correct to CGA to mod A. Posterior lean noted for dynamic sit bal w/ vc/tc to correct  -PH           User Key  (r) = Recorded By, (t) = Taken By, (c) = Cosigned By    Initials Name Provider Type    JAYLA Khushboo Galindo PTA Physical Therapist Assistant               Goals/Plan    No documentation.                Clinical Impression     Row Name 04/19/23 0922          Pain    Pretreatment Pain Rating 0/10 - no pain  -PH     Posttreatment Pain Rating 0/10 - no pain  -PH     Row Name 04/19/23 0922          Plan of Care Review    Plan of Care Reviewed With patient  -PH     Progress no change  -PH     Outcome Evaluation Pt seen by PT this AM for treatment. Pt req encouragement to work w/ PT today w/ pt somewhat lethargic although responding to questions/cues w/ extra time. Pt req max A x 2 after max cues provided for sequencing to exit/enter bed. Pt performed the ex for strengthening and to incr endurance req from  AAROM to AROM and cues to incr ROM. PT will prog as pt lexy. Rec SNF after dc.  -PH     Row Name 04/19/23 0922          Vital Signs    O2 Delivery Pre Treatment other (see comments)  c-pap  -PH     Intra SpO2 (%) 83  -PH     O2 Delivery Intra Treatment nasal cannula  -PH     Post SpO2 (%) 92  -PH     O2 Delivery Post Treatment nasal cannula  -PH     Row Name 04/19/23 0922          Positioning and Restraints    Pre-Treatment Position in bed  -PH     Post Treatment Position bed  -PH     In Bed fowlers;call light within reach;encouraged to call for assist;exit alarm on  -PH           User Key  (r) = Recorded By, (t) = Taken By, (c) = Cosigned By    Initials Name Provider Type     Khushboo Galindo PTA Physical Therapist Assistant               Outcome Measures     Row Name 04/19/23 0925          How much help from another person do you currently need...    Turning from your back to your side while in flat bed without using bedrails? 2  -PH     Moving from lying on back to sitting on the side of a flat bed without bedrails? 2  -PH     Moving to and from a bed to a chair (including a wheelchair)? 1  -PH     Standing up from a chair using your arms (e.g., wheelchair, bedside chair)? 1  -PH     Climbing 3-5 steps with a railing? 1  -PH     To walk in hospital room? 1  -PH     AM-PAC 6 Clicks Score (PT) 8  -PH     Highest level of mobility 3 --> Sat at edge of bed  -PH     Row Name 04/19/23 0925          Functional Assessment    Outcome Measure Options AM-PAC 6 Clicks Basic Mobility (PT)  -PH           User Key  (r) = Recorded By, (t) = Taken By, (c) = Cosigned By    Initials Name Provider Type     Khushboo Galindo PTA Physical Therapist Assistant                             Physical Therapy Education     Title: PT OT SLP Therapies (Done)     Topic: Physical Therapy (Done)     Point: Mobility training (Done)     Learning Progress Summary           Patient Acceptance, D,E, DU,NR by  at 4/19/2023 0972     Acceptance, E,TB,D, VU,NR by BH at 4/17/2023 1759    Acceptance, E, VU,NR by EB at 4/14/2023 1147    Acceptance, E,TB, VU,DU,NR by CS at 4/13/2023 1445    Acceptance, E, VU,NR by MO at 4/12/2023 1206    Acceptance, E,D, NR by MO at 4/10/2023 1227    Acceptance, E, VU,NR by DJ at 4/6/2023 0952   Family Acceptance, E,TB, VU,DU,NR by CS at 4/13/2023 1445                   Point: Home exercise program (Done)     Learning Progress Summary           Patient Acceptance, D,E, DU,NR by  at 4/19/2023 0925    Acceptance, E,TB,D, VU,NR by BH at 4/17/2023 1759    Acceptance, E, VU,NR by EB at 4/14/2023 1147    Acceptance, E,TB, VU,DU,NR by CS at 4/13/2023 1445    Acceptance, E, VU,NR by MO at 4/12/2023 1206    Acceptance, E,D, NR by MO at 4/10/2023 1227    Acceptance, E, VU,NR by DJ at 4/6/2023 0952   Family Acceptance, E,TB, VU,DU,NR by CS at 4/13/2023 1445                   Point: Body mechanics (Done)     Learning Progress Summary           Patient Acceptance, D,E, DU,NR by  at 4/19/2023 0925    Acceptance, E,TB,D, VU,NR by  at 4/17/2023 1759    Acceptance, E, VU,NR by EB at 4/14/2023 1147    Acceptance, E,TB, VU,DU,NR by CS at 4/13/2023 1445    Acceptance, E, VU,NR by MO at 4/12/2023 1206    Acceptance, E, VU,NR by DJ at 4/6/2023 0952   Family Acceptance, E,TB, VU,DU,NR by CS at 4/13/2023 1445                   Point: Precautions (Done)     Learning Progress Summary           Patient Acceptance, D,E, DU,NR by  at 4/19/2023 0925    Acceptance, E,TB,D, VU,NR by BH at 4/17/2023 1759    Acceptance, E, VU,NR by EB at 4/14/2023 1147    Acceptance, E,TB, VU,DU,NR by CS at 4/13/2023 1445    Acceptance, E, VU,NR by  at 4/6/2023 0952   Family Acceptance, E,TB, VU,DU,NR by CS at 4/13/2023 1445                               User Key     Initials Effective Dates Name Provider Type Discipline    EB 02/14/23 -  Edith Macias PTA Physical Therapist Assistant PT    PH 06/16/21 -  Khushboo Galindo PTA Physical Therapist  Assistant PT    DJ 10/25/19 -  Iwona Jones, PT Physical Therapist PT    BH 04/08/22 -  Imelda Mederos, PT Physical Therapist PT    CS 09/22/22 -  Paula Cool, PT Physical Therapist PT    MO 01/27/23 -  Lianna Owens, PT Student PT Student PT              PT Recommendation and Plan     Plan of Care Reviewed With: patient  Progress: no change  Outcome Evaluation: Pt seen by PT this AM for treatment. Pt req encouragement to work w/ PT today w/ pt somewhat lethargic although responding to questions/cues w/ extra time. Pt req max A x 2 after max cues provided for sequencing to exit/enter bed. Pt performed the ex for strengthening and to incr endurance req from AAROM to AROM and cues to incr ROM. PT will prog as pt lexy. Rec SNF after dc.     Time Calculation:    PT Charges     Row Name 04/19/23 0926             Time Calculation    Start Time 0847  -PH      Stop Time 0910  -PH      Time Calculation (min) 23 min  -PH      PT Received On 04/19/23  -PH      PT - Next Appointment 04/20/23  -PH         Timed Charges    44127 - PT Therapeutic Exercise Minutes 5  -PH      80622 - PT Therapeutic Activity Minutes 18  -PH         Total Minutes    Timed Charges Total Minutes 23  -PH       Total Minutes 23  -PH            User Key  (r) = Recorded By, (t) = Taken By, (c) = Cosigned By    Initials Name Provider Type    PH Khushobo Galindo PTA Physical Therapist Assistant              Therapy Charges for Today     Code Description Service Date Service Provider Modifiers Qty    57071296584 HC PT THER PROC EA 15 MIN 4/19/2023 Khushboo Galindo, PTA GP 1    24177655492 HC PT THERAPEUTIC ACT EA 15 MIN 4/19/2023 Marilee Galindoricia, PTA GP 1    77850526286 HC PT THER SUPP EA 15 MIN 4/19/2023 Marilee Galindoricia, PTA GP 2          PT G-Codes  Outcome Measure Options: AM-PAC 6 Clicks Basic Mobility (PT)  AM-PAC 6 Clicks Score (PT): 8  PT Discharge Summary  Anticipated Discharge Disposition (PT): skilled nursing  facility    Khushboo Galindo, PTA  4/19/2023

## 2023-04-19 NOTE — CASE MANAGEMENT/SOCIAL WORK
Continued Stay Note  [facility]     Patient Name: Harry Potts  MRN: [id number]  Today's Date: 4/19/2023    Admit Date: 4/4/2023    Plan: [facility] pending pre-cert.   Discharge Plan     Row Name 04/19/23 1642       Plan    Plan [facility] pending pre-cert.    Plan Comments Left message for Arsalan/ [facility] regarding patient’s Trilogy. Spoke with [facility] and patient facility will need to order for use at facility or family could call [facility] to purchase. Spoke with daughter/ Emilie regarding trilogy; has customer service number and will check on trilogy.               Discharge Codes    No documentation.               Expected Discharge Date and Time     Expected Discharge Date Expected Discharge Time    Apr 21, 2023             Bridget Gasca RN

## 2023-04-19 NOTE — PROGRESS NOTES
"General Surgery Progress Note    Summary:  Mr. Harry Potts is a 89 y.o. year old gentleman with acute cholecystitis s/p percutaneous cholecystostomy tube placement. Diet as tolerated. On abx. Ok to transition to oral anticoagulation.      Chief Complaint: Abdominal pain    Interval Events: No acute events overnight. Tolerating PO. Pain controlled.    Vitals: /76 (BP Location: Right arm, Patient Position: Lying)   Pulse 95   Temp 97.7 °F (36.5 °C) (Oral)   Resp 18   Ht 172.7 cm (68\")   Wt 130 kg (286 lb 9.6 oz)   SpO2 91%   BMI 43.58 kg/m²     GENERAL: alert, confused, and in no distress  HEENT: normocephalic, atraumatic, moist mucous membranes, clear sclerae   CHEST: no increased work of breathing, symmetric air entry  CARDIAC: regular rate and rhythm    ABDOMEN: soft, nondistended, drain bilious   EXTREMITIES: no cyanosis, clubbing, or edema   SKIN: Warm and moist, no rashes    Labs:  Results from last 7 days   Lab Units 04/19/23  0427 04/18/23  0522 04/17/23  0513   WBC 10*3/mm3 8.02 9.50 12.62*   HEMOGLOBIN g/dL 12.1* 12.9* 13.2   HEMATOCRIT % 36.9* 40.0 40.2   PLATELETS 10*3/mm3 183 166 154     Results from last 7 days   Lab Units 04/19/23  0427 04/18/23  0522 04/17/23  0324 04/16/23  1101 04/16/23  0627 04/15/23  1748   SODIUM mmol/L 139  --  137 137  --   --    POTASSIUM mmol/L 3.5  --  3.8 4.4  --   --    CHLORIDE mmol/L 94*  --  91* 92*  --   --    CO2 mmol/L 37.0*  --  37.1* 37.0*  --   --    BUN mg/dL 52*  --  45* 40*  --   --    CREATININE mg/dL 0.92 0.86 0.80 0.75*   < > 0.88   CALCIUM mg/dL 8.9  --  9.2 8.8  --   --    BILIRUBIN mg/dL 0.6  --   --  1.2  --  1.3*   ALK PHOS U/L 84  --   --  88  --  79   ALT (SGPT) U/L 20  --   --  33  --  40   AST (SGOT) U/L 15  --   --  18  --  18   GLUCOSE mg/dL 113*  --  120* 134*  --   --     < > = values in this interval not displayed.     Results from last 7 days   Lab Units 04/19/23  1344 04/19/23  0427 04/18/23  1841 04/15/23  0125 04/14/23  1707 "   INR   --   --   --   --  1.26*   APTT seconds 66.5* 67.5* 38.1*   < > 40.6*    < > = values in this interval not displayed.       NEYMAR WASSERMAN MD  General and Endoscopic Surgery  [facility]    [address] [place] [address], [address]  [place], [place], [address]  P: [phone]  F: [phone]

## 2023-04-19 NOTE — PLAN OF CARE
Goal Outcome Evaluation:  Plan of Care Reviewed With: patient        Progress: no change  Outcome Evaluation: Pt seen by PT this AM for treatment. Pt req encouragement to work w/ PT today w/ pt somewhat lethargic although responding to questions/cues w/ extra time. Pt req max A x 2 after max cues provided for sequencing to exit/enter bed. Pt performed the ex for strengthening and to incr endurance req from AAROM to AROM and cues to incr ROM. PT will prog as pt lexy. Rec SNF after dc.

## 2023-04-20 PROBLEM — R13.10 DYSPHAGIA: Status: ACTIVE | Noted: 2023-04-20

## 2023-04-20 PROBLEM — D64.9 ANEMIA: Status: ACTIVE | Noted: 2023-04-20

## 2023-04-20 PROBLEM — B95.2 BACTEREMIA DUE TO ENTEROCOCCUS: Status: ACTIVE | Noted: 2023-04-20

## 2023-04-20 PROBLEM — G93.41 METABOLIC ENCEPHALOPATHY: Status: ACTIVE | Noted: 2023-04-20

## 2023-04-20 PROBLEM — R78.81 BACTEREMIA DUE TO ENTEROCOCCUS: Status: ACTIVE | Noted: 2023-04-20

## 2023-04-20 PROBLEM — I27.20 PULMONARY HYPERTENSION: Status: ACTIVE | Noted: 2023-04-20

## 2023-04-20 PROBLEM — J96.21 ACUTE ON CHRONIC RESPIRATORY FAILURE WITH HYPOXIA AND HYPERCAPNIA: Status: ACTIVE | Noted: 2023-04-05

## 2023-04-20 PROBLEM — F01.50 VASCULAR DEMENTIA WITHOUT BEHAVIORAL DISTURBANCE: Status: ACTIVE | Noted: 2023-04-20

## 2023-04-20 PROBLEM — J96.22 ACUTE ON CHRONIC RESPIRATORY FAILURE WITH HYPOXIA AND HYPERCAPNIA: Status: ACTIVE | Noted: 2023-04-05

## 2023-04-20 LAB
APTT PPP: 65.4 SECONDS (ref 22.7–35.4)
APTT PPP: 90.5 SECONDS (ref 22.7–35.4)
BACTERIA FLD CULT: ABNORMAL
CREAT SERPL-MCNC: 0.85 MG/DL (ref 0.76–1.27)
EGFRCR SERPLBLD CKD-EPI 2021: 81.5 ML/MIN/1.73
GRAM STN SPEC: ABNORMAL
GRAM STN SPEC: ABNORMAL
VANCOMYCIN TROUGH SERPL-MCNC: 13.8 MCG/ML (ref 5–20)

## 2023-04-20 PROCEDURE — 94799 UNLISTED PULMONARY SVC/PX: CPT

## 2023-04-20 PROCEDURE — 85730 THROMBOPLASTIN TIME PARTIAL: CPT | Performed by: HOSPITALIST

## 2023-04-20 PROCEDURE — 94761 N-INVAS EAR/PLS OXIMETRY MLT: CPT

## 2023-04-20 PROCEDURE — 25010000002 AMPICILLIN-SULBACTAM PER 1.5 G: Performed by: INTERNAL MEDICINE

## 2023-04-20 PROCEDURE — 80202 ASSAY OF VANCOMYCIN: CPT | Performed by: HOSPITALIST

## 2023-04-20 PROCEDURE — 82565 ASSAY OF CREATININE: CPT | Performed by: HOSPITALIST

## 2023-04-20 PROCEDURE — 94760 N-INVAS EAR/PLS OXIMETRY 1: CPT

## 2023-04-20 PROCEDURE — 25010000002 HEPARIN (PORCINE) 25000-0.45 UT/250ML-% SOLUTION: Performed by: HOSPITALIST

## 2023-04-20 PROCEDURE — 25010000002 VANCOMYCIN 10 G RECONSTITUTED SOLUTION: Performed by: INTERNAL MEDICINE

## 2023-04-20 PROCEDURE — 94664 DEMO&/EVAL PT USE INHALER: CPT

## 2023-04-20 PROCEDURE — 25010000002 FUROSEMIDE PER 20 MG: Performed by: INTERNAL MEDICINE

## 2023-04-20 RX ORDER — FUROSEMIDE 10 MG/ML
40 INJECTION INTRAMUSCULAR; INTRAVENOUS ONCE
Status: COMPLETED | OUTPATIENT
Start: 2023-04-20 | End: 2023-04-20

## 2023-04-20 RX ADMIN — AMMONIUM LACTATE 1 APPLICATION: 120 CREAM TOPICAL at 21:55

## 2023-04-20 RX ADMIN — DOCUSATE SODIUM 50MG AND SENNOSIDES 8.6MG 2 TABLET: 8.6; 5 TABLET, FILM COATED ORAL at 21:55

## 2023-04-20 RX ADMIN — APIXABAN 5 MG: 5 TABLET, FILM COATED ORAL at 13:38

## 2023-04-20 RX ADMIN — Medication 10 ML: at 08:37

## 2023-04-20 RX ADMIN — HEPARIN SODIUM 11.5 UNITS/KG/HR: 10000 INJECTION, SOLUTION INTRAVENOUS at 05:23

## 2023-04-20 RX ADMIN — ASPIRIN 81 MG: 81 TABLET, COATED ORAL at 08:35

## 2023-04-20 RX ADMIN — BUDESONIDE AND FORMOTEROL FUMARATE DIHYDRATE 2 PUFF: 160; 4.5 AEROSOL RESPIRATORY (INHALATION) at 10:51

## 2023-04-20 RX ADMIN — APIXABAN 5 MG: 5 TABLET, FILM COATED ORAL at 21:55

## 2023-04-20 RX ADMIN — MICONAZOLE NITRATE: 2 POWDER TOPICAL at 21:55

## 2023-04-20 RX ADMIN — PANTOPRAZOLE SODIUM 40 MG: 40 TABLET, DELAYED RELEASE ORAL at 05:31

## 2023-04-20 RX ADMIN — AMLODIPINE BESYLATE 10 MG: 10 TABLET ORAL at 08:35

## 2023-04-20 RX ADMIN — FUROSEMIDE 40 MG: 10 INJECTION, SOLUTION INTRAMUSCULAR; INTRAVENOUS at 15:02

## 2023-04-20 RX ADMIN — DOCUSATE SODIUM 50MG AND SENNOSIDES 8.6MG 2 TABLET: 8.6; 5 TABLET, FILM COATED ORAL at 08:35

## 2023-04-20 RX ADMIN — METOPROLOL TARTRATE 12.5 MG: 25 TABLET ORAL at 08:36

## 2023-04-20 RX ADMIN — AMPICILLIN SODIUM AND SULBACTAM SODIUM 3 G: 2; 1 INJECTION, POWDER, FOR SOLUTION INTRAMUSCULAR; INTRAVENOUS at 08:35

## 2023-04-20 RX ADMIN — METOPROLOL TARTRATE 12.5 MG: 25 TABLET ORAL at 21:55

## 2023-04-20 RX ADMIN — Medication 10 ML: at 21:55

## 2023-04-20 RX ADMIN — BUDESONIDE AND FORMOTEROL FUMARATE DIHYDRATE 2 PUFF: 160; 4.5 AEROSOL RESPIRATORY (INHALATION) at 19:08

## 2023-04-20 RX ADMIN — AMPICILLIN SODIUM AND SULBACTAM SODIUM 3 G: 2; 1 INJECTION, POWDER, FOR SOLUTION INTRAMUSCULAR; INTRAVENOUS at 01:24

## 2023-04-20 RX ADMIN — IPRATROPIUM BROMIDE AND ALBUTEROL SULFATE 3 ML: 2.5; .5 SOLUTION RESPIRATORY (INHALATION) at 07:09

## 2023-04-20 RX ADMIN — AMMONIUM LACTATE 1 APPLICATION: 120 CREAM TOPICAL at 05:24

## 2023-04-20 RX ADMIN — VANCOMYCIN HYDROCHLORIDE 1750 MG: 10 INJECTION, POWDER, LYOPHILIZED, FOR SOLUTION INTRAVENOUS at 05:24

## 2023-04-20 RX ADMIN — POTASSIUM CHLORIDE 40 MEQ: 1.5 POWDER, FOR SOLUTION ORAL at 01:24

## 2023-04-20 RX ADMIN — FLUTICASONE PROPIONATE 1 SPRAY: 50 SPRAY, METERED NASAL at 08:36

## 2023-04-20 RX ADMIN — MICONAZOLE NITRATE: 2 POWDER TOPICAL at 08:36

## 2023-04-20 NOTE — PROGRESS NOTES
[facility] Clinical Pharmacy Services: Vancomycin Level Monitoring Note    Harry Potts is a 89 y.o. male who is on day 6/14 of pharmacy to dose vancomycin for enterococcus bacteremia.    Estimated Creatinine Clearance: 72.9 mL/min (by C-G formula based on SCr of 0.85 mg/dL).     Current Vanc Dose: 1750 mg IV every 24 hours    Results from last 7 days   Lab Units 04/20/23  0315 04/17/23  0324   VANCOMYCIN TR mcg/mL 13.80 8.40     Predicted AUC at current dose: 477 mg/L.hr    Next Level Date and Time: Vanc Trough on Monday morning 4/24 @ 0300    No changes at this time. Pharmacy is continuing to monitor and will adjust as needed.    Alyson Norris, Pharm.D., [facility]  Clinical Pharmacist

## 2023-04-20 NOTE — PROGRESS NOTES
"Nutrition Services    Patient Name:  Harry Potts  YOB: 1930  MRN: [id number]  Admit Date:  4/4/2023    FOLLOW UP - CLINICAL NUTRITION    Assessment Date:  04/20/23    Encounter Information         Reason for Encounter Follow up    Current Issues POD#2 s/p s/p percutaneous cholecystostomy tube placement. RN consulted SLP 4/17 to eval swallow d/t pt frequently clearing his throat after swallowing liquids. SLP eval swallow and downgraded diet to Pureed diet w/ HTL and rec VFSS. Pt with decreased po intake on modified diet. VFSS 4/19, SLP rec upgrade diet to Soft to Chew (chopped meat) with thins. Pt tolerating diet upgrade better with >50% po intake of meals per pt. Last BM 4/16, pericolace given daily.  [facility] per-cert pending per CCP notes.  Recommend:  1. Laxative to stimulate BM d/t no BM since 4/16.  Will continue to follow per protocol.     Current Nutrition Orders & Evaluation of Intake       Oral Nutrition     Current PO Diet Diet: Regular/House Diet; Texture: Soft to Chew (NDD 3); Soft to Chew: Chopped Meat; Fluid Consistency: Thin (IDDSI 0)   Supplement n/a   PO Evaluation     % PO Intake >50% after diet upgraded    # of Days Evaluated 6    Factors Affecting Intake  altered respiratory status, dislikes modified diet, swallow impairment   --  Anthropometrics          Height    Weight Height: 172.7 cm (68\")  Weight: 129 kg (283 lb 4.7 oz) (04/20/23 0542)    BMI kg/m2 Body mass index is 43.07 kg/m².    Weight trend Loss, Amount/Timeframe: 19 lb (6%) wt loss x 3 weeks     Labs        Pertinent Labs Reviewed, listed below     Results from last 7 days   Lab Units 04/20/23  0315 04/19/23  0427 04/18/23  0522 04/17/23  0324 04/16/23  1101 04/16/23  0627 04/15/23  1748   SODIUM mmol/L  --  139  --  137 137  --   --    POTASSIUM mmol/L  --  3.5  --  3.8 4.4  --   --    CHLORIDE mmol/L  --  94*  --  91* 92*  --   --    CO2 mmol/L  --  37.0*  --  37.1* 37.0*  --   --    BUN mg/dL  --  52*  --  45* " 40*  --   --    CREATININE mg/dL 0.85 0.92 0.86 0.80 0.75*   < > 0.88   CALCIUM mg/dL  --  8.9  --  9.2 8.8  --   --    BILIRUBIN mg/dL  --  0.6  --   --  1.2  --  1.3*   ALK PHOS U/L  --  84  --   --  88  --  79   ALT (SGPT) U/L  --  20  --   --  33  --  40   AST (SGOT) U/L  --  15  --   --  18  --  18   GLUCOSE mg/dL  --  113*  --  120* 134*  --   --     < > = values in this interval not displayed.     Results from last 7 days   Lab Units 04/19/23  0427   HEMOGLOBIN g/dL 12.1*   HEMATOCRIT % 36.9*   WBC 10*3/mm3 8.02   ALBUMIN g/dL 2.6*     Results from last 7 days   Lab Units 04/20/23  1120 04/20/23  0315 04/19/23  1344 04/19/23  0427 04/18/23  1841 04/18/23  0522 04/17/23  1317 04/17/23  0513 04/16/23  1535 04/16/23  0627 04/15/23  1748 04/15/23  0833 04/15/23  0125 04/14/23  1707   INR   --   --   --   --   --   --   --   --   --   --   --   --   --  1.26*   APTT seconds 65.4* 90.5* 66.5* 67.5* 38.1* 79.9*   < > 91.1*   < > 58.8*   < > 57.6*   < > 40.6*   PLATELETS 10*3/mm3  --   --   --  183  --  166  --  154  --  112*  --  105*  --  109*    < > = values in this interval not displayed.     COVID19   Date Value Ref Range Status   04/05/2023 Not Detected Not Detected - Ref. Range Final     Lab Results   Component Value Date    HGBA1C 6.00 (H) 09/30/2022          Medications            Scheduled Medications amLODIPine, 10 mg, Oral, Daily  ammonium lactate, 1 application, Topical, Q12H  apixaban, 5 mg, Oral, Q12H  aspirin, 81 mg, Oral, Daily  budesonide-formoterol, 2 puff, Inhalation, BID - RT  fluticasone, 1 spray, Nasal, Daily  ipratropium-albuterol, 3 mL, Nebulization, Q6H While Awake - RT  metoprolol tartrate, 12.5 mg, Oral, Q12H  miconazole, , Topical, Q12H  pantoprazole, 40 mg, Oral, Q AM  potassium chloride, 40 mEq, Oral, Once  senna-docusate sodium, 2 tablet, Oral, BID  sodium chloride, 10 mL, Intravenous, Q12H  sodium chloride, 10 mL, Intravenous, Q12H  vancomycin, 1,750 mg, Intravenous, Q24H         Infusions Pharmacy to dose vancomycin,         PRN Medications •  acetaminophen **OR** acetaminophen **OR** acetaminophen  •  albuterol  •  senna-docusate sodium **AND** polyethylene glycol **AND** bisacodyl **AND** bisacodyl  •  calcium carbonate  •  hydrALAZINE  •  HYDROmorphone  •  ipratropium-albuterol  •  metoprolol tartrate  •  nitroglycerin  •  OLANZapine  •  ondansetron **OR** ondansetron  •  Pharmacy to dose vancomycin  •  potassium chloride **OR** potassium chloride **OR** potassium chloride  •  potassium chloride  •  sodium chloride  •  sodium chloride  •  sodium chloride  •  sodium chloride  •  sodium chloride     Physical Findings          Physical Appearance alert, obese, oriented, on oxygen therapy, short of breath   Oral/Mouth Cavity dry mouth   Edema  lower extremity , upper extremity, 1+ (trace), 2+ (mild)   Gastrointestinal constipation, fecal incontinence, non-distended , last bowel movement:4/16   Skin  skin intact   Tubes/Drains drain biliary tube RUQ   NFPE Not applicable at this time   --  NUTRITION INTERVENTION / PLAN OF CARE  Intervention Goal         Intervention Goal(s) Maintain nutrition status, Meet estimated needs, Disease management/therapy, Increase intake and Appropriate weight loss     Nutrition Intervention         RD Action Encourage intake, Follow Tx Progress and Care plan reviewed     Nutrition Prescription         Diet Prescription     Supplement Prescription n/a   EN/PN Prescription    New Prescription Ordered? Continue same per protocol   --  Monitor/Evaluation        Monitor Per protocol   Discharge Needs Pending clinical course   Education Will instruct as appropriate   --    RD to follow up per protocol.    Electronically signed by:  Niharika Mari RD  04/20/23 12:45 EDT

## 2023-04-20 NOTE — CASE MANAGEMENT/SOCIAL WORK
Continued Stay Note  [facility]     Patient Name: Harry Potts  MRN: [id number]  Today's Date: 4/20/2023    Admit Date: 4/4/2023    Plan: [place] pending precert (initiated today) - need EMS & trilogy settings order   Discharge Plan     Row Name 04/20/23 1346       Plan    Plan [place] pending precert (initiated today) - need EMS & trilogy settings order    Patient/Family in Agreement with Plan yes    Plan Comments CCP spoke to Art[facility] regarding referral. CCP notified Arturo that the patient’s trilogy machine is a rental through [employer], they do not own the machine. Arturo states they can only accept if [facility] is agreeable to  servicing the trilogy, doing the maintenance on the machine, and having a RT following.  CCP spoke with Samy who states he has spoken with [facility] office and they are agreeable to servicing the machine and having an RT following this patient. CCP let Arturo know that [facility] is agreeable to following for servicing and having an RT follow. Arturo requesting we get the orders with the settings for the trilogy machine. CCP spoke with pulmonology MD who states we can ask the RT for the settings and he can sign off on them.  CCP reached out to RT and requested trilogy settings order. [facility] is initiating precert today and will have a bed Saturday if patient is medically stable for discharge. CCP notified patient's daughter Emilie of updates and she is agreeable to starting precert at [place]. CCP following to confirm patient has trilogy settings in order for facility. Patient will need an EMS at [facility]               Discharge Codes    No documentation.               Expected Discharge Date and Time     Expected Discharge Date Expected Discharge Time    Apr 21, 2023             MACARIO Amin

## 2023-04-20 NOTE — PROGRESS NOTES
"    Name: Harry Potts ADMIT: 2023   : 1930  PCP: Harry Luis MD    MRN: [id number] LOS: 15 days   AGE/SEX: 89 y.o. male  ROOM: [facility]     Subjective   Subjective   Resting in bed. RT at bedside. He is currently on 5 L. He states his breathing feels \"okay\" and denies any chest pain. He tells me he ate breakfast without any nausea or abdominal pain. Denying any current RUQ. Last BM documented around midnight. He reports his only discomfort at this time is in his bottom from how he is positioned.      Objective   Objective   Vital Signs  Temp:  [97.6 °F (36.4 °C)-98.7 °F (37.1 °C)] 97.6 °F (36.4 °C)  Heart Rate:  [] 88  Resp:  [18-22] 18  BP: ()/(67-82) 98/70  SpO2:  [90 %-95 %] 90 %  on  Flow (L/min):  [4-9] 4;   Device (Oxygen Therapy): humidified;nasal cannula  Body mass index is 43.07 kg/m².     Physical Exam  Vitals and nursing note reviewed.   Constitutional:       Appearance: He is obese. He is ill-appearing.   Cardiovascular:      Rate and Rhythm: Normal rate and regular rhythm.      Pulses: Normal pulses.   Pulmonary:      Effort: No respiratory distress present. He seems to have less conversational dyspnea today.     Comments: Diminished throughout but worse at the bases anteriorly.   Abdominal:      General: Bowel sounds are normal. There is distension.      Palpations: Abdomen is soft.      Tenderness: There is no abdominal tenderness.      Comments: Right upper quadrant drain present with brown/bilious output   Musculoskeletal:         General: Swelling present. No tenderness. Normal range of motion.   Skin:     General: Skin is warm and dry.      Coloration: Skin is pale.      Findings: Bruising present.   Neurological:      General: No focal deficit present.      Mental Status: He is alert and oriented to person, place, and time.      Sensory: No sensory deficit.      Motor: Weakness present.      Coordination: Coordination normal.   Psychiatric:         Mood and Affect: " Mood normal.         Behavior: Behavior normal.     Results Review:       I reviewed the patient's new clinical results.  Results from last 7 days   Lab Units 04/19/23 0427 04/18/23 0522 04/17/23  0513 04/16/23  0627   WBC 10*3/mm3 8.02 9.50 12.62* 12.81*   HEMOGLOBIN g/dL 12.1* 12.9* 13.2 13.2   PLATELETS 10*3/mm3 183 166 154 112*     Results from last 7 days   Lab Units 04/20/23  0315 04/19/23 0427 04/18/23 0522 04/17/23  0324 04/16/23  1101   SODIUM mmol/L  --  139  --  137 137   POTASSIUM mmol/L  --  3.5  --  3.8 4.4   CHLORIDE mmol/L  --  94*  --  91* 92*   CO2 mmol/L  --  37.0*  --  37.1* 37.0*   BUN mg/dL  --  52*  --  45* 40*   CREATININE mg/dL 0.85 0.92 0.86 0.80 0.75*   GLUCOSE mg/dL  --  113*  --  120* 134*   Estimated Creatinine Clearance: 72.6 mL/min (by C-G formula based on SCr of 0.85 mg/dL).  Results from last 7 days   Lab Units 04/19/23 0427 04/16/23  1101 04/15/23  1748   ALBUMIN g/dL 2.6* 2.6* 2.7*   BILIRUBIN mg/dL 0.6 1.2 1.3*   ALK PHOS U/L 84 88 79   AST (SGOT) U/L 15 18 18   ALT (SGPT) U/L 20 33 40     Results from last 7 days   Lab Units 04/19/23 0427 04/17/23  0324 04/16/23  1101 04/15/23  1748   CALCIUM mg/dL 8.9 9.2 8.8  --    ALBUMIN g/dL 2.6*  --  2.6* 2.7*     Results from last 7 days   Lab Units 04/15/23  0833 04/14/23  2115   PROCALCITONIN ng/mL 0.32* 0.32*     No results found for: HGBA1C, POCGLU    ammonium lactate, 1 application, Topical, Q12H  apixaban, 5 mg, Oral, Q12H  aspirin, 81 mg, Oral, Daily  budesonide-formoterol, 2 puff, Inhalation, BID - RT  fluticasone, 1 spray, Nasal, Daily  furosemide, 40 mg, Intravenous, Once  ipratropium-albuterol, 3 mL, Nebulization, Q6H While Awake - RT  metoprolol tartrate, 12.5 mg, Oral, Q12H  miconazole, , Topical, Q12H  pantoprazole, 40 mg, Oral, Q AM  potassium chloride, 40 mEq, Oral, Once  senna-docusate sodium, 2 tablet, Oral, BID  sodium chloride, 10 mL, Intravenous, Q12H  sodium chloride, 10 mL, Intravenous, Q12H  vancomycin, 1,750  mg, Intravenous, Q24H      Pharmacy to dose vancomycin,     Diet: Regular/House Diet; Texture: Soft to Chew (NDD 3); Soft to Chew: Chopped Meat; Fluid Consistency: Thin (IDDSI 0)       Assessment/Plan     Active Hospital Problems    Diagnosis  POA   • **Acute on chronic respiratory failure with hypoxia and hypercapnia [J96.21, J96.22]  Yes   • Bacteremia due to Enterococcus [R78.81, B95.2]  Unknown   • Pulmonary hypertension [I27.20]  Yes   • Metabolic encephalopathy [G93.41]  Yes   • 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     Brief hospital course to date:   Mr. Potts is a 89 year old male who initially presented to the hospital with complaints of dyspnea and increased confusion. He was found to have acute on chronic respiratory failure as well as new onset atrial fibrillation and worsening confusion related to be related to encephalopathy as well as underlying dementia. He was initially admitted to telemetry but having issues tolerating PAP therapy and became more obtunded. He was moved to the ICU for more aggressive NIPPV therapy with use of Precedex on 4/8 and eventually moved out of the ICU on 4/13. Neurology was consulted for his mental status changes. Cardiology consulted for new atrial fibrillation. He developed a new onset of worsening leukocytosis on 4/14 prompting infectious work up. Blood cultures returned positive for Enterococcus and further imaging obtained. He was found to have acute cholecystitis prompting general surgery to see on 4/17. Given his age and multiple  "co-morbidities, percutaneous cholecystectomy tube was placed with IR for treatment.     Acute on hronic hypoxemic and hypercapnic respiratory failure  JENISE  Restrictive lung disease  Pulmonary hypertension related to lung disease  -Pulmonology following: \"will need NIPPV for home use, simple cpap or bipap will not be enough to ensure adequate minute ventilation to prevent recurrent episdoes of acute on chronic hypercapnic respiratory failure that could result in hospital admission or death\"  -Avoid hyperoxia to prevent hypercapnia.  -Continue Symbicort, Duonebs. Plan for repeat ABGs for any lethargy. Repeat CXR ordered per pulm with some congestion. Plans for IV Lasix x 1 today.     Enterococcus bacteremia  Acute cholecystitis  Minimal pneumonia on x-ray left base   -Continue antibiotics as advised by ID- Unasyn completed. Plans for pharmacy to dose vancomycin until 4/30 (14 day course given no evidence of endocarditis and repeat blood cultures with sterility).  -Surgery recommends percutaneous cholecystostomy tube due to comorbidities which is currently in place. Okay for resumption of oral anticoagulation from their standpoint.      Dysphagia  -VFSS per SLP.   -Diet upgraded 4/19 to mechanical soft and thins. Aspiration precautions.      Metabolic encephalopathy  With underlying vascular dementia per Neurology  -Neurology expecting waxing and waning encephalopathy during hospitalization  -Currently oriented and appropriate on exam today. Continue to monitor for changes and encourage NIPPV use with sleep to avoid lethargy.     Paroxysmal atrial fibrillation  Hypertension  -Heparin infusing but cleared for oral anticoagulation per Dr. Strong. Will stop heparin and change back to Eliquis 5 mg BID as taking earlier in the stay.  -Metoprolol continued for rate control.  -Cardiology signed off 4/12/23.  -BP a little on the lower side this afternoon. He is due to get a dose of Lasix per Pulm. I will hold his Norvasc for " now and re-evaluate trends in the AM. Avoid hypotension.    Anemia  -Baseline hemoglobin anywhere from 14 to 15 in the last several years. He has been fluctuating in the 13-15 range with most recent levels 12.9 and 12.1 over the last two days. No overt bleeding. Suspect declining levels d/t acute inflammation from bacteremia and prolonged hospital stay. Platelets are trending upward and stable. Will monitor for now. Iron panel may be erroneous given his infection and would avoid any IV iron for now given the stability of his hemoglobin as well as acute infection at play. Will obtain vitamin B12/folate to look for any underlying nutritional deficiencies, but MCV is normal. Will monitor closely for now while on Eliquis. CBC daily.     Discussed with patient, nurse and Dr. Ashley.      VTE prophylaxis: Change back to Eliquis. He has been on PPI for GI prophylaxis.  Code status: Full code  Disposition: Will need SNF when cleared by all. Working on Trilogy for this.     DANNIE Ramon  [facility]  04/20/23  14:07 EDT

## 2023-04-20 NOTE — PROGRESS NOTES
"General Surgery Progress Note    Summary:  Mr. Harry Potts is a 89 y.o. year old gentleman with acute cholecystitis s/p percutaneous cholecystostomy tube placement. Diet as tolerated. On abx. On eliquis. No further surgical intervention planned.    Chief Complaint: Abdominal pain    Interval Events: No acute events overnight. Tolerating PO. Pain controlled.    Vitals: BP 98/70 (BP Location: Right arm, Patient Position: Lying)   Pulse 88   Temp 97.6 °F (36.4 °C) (Oral)   Resp 20   Ht 172.7 cm (68\")   Wt 129 kg (283 lb 4.7 oz)   SpO2 93%   BMI 43.07 kg/m²     GENERAL: alert, confused, and in no distress  HEENT: normocephalic, atraumatic, moist mucous membranes, clear sclerae   CHEST: no increased work of breathing, symmetric air entry  CARDIAC: regular rate and rhythm    ABDOMEN: soft, nondistended, drain bilious   EXTREMITIES: no cyanosis, clubbing, or edema   SKIN: Warm and moist, no rashes    Labs:  Results from last 7 days   Lab Units 04/19/23  0427 04/18/23  0522 04/17/23  0513   WBC 10*3/mm3 8.02 9.50 12.62*   HEMOGLOBIN g/dL 12.1* 12.9* 13.2   HEMATOCRIT % 36.9* 40.0 40.2   PLATELETS 10*3/mm3 183 166 154     Results from last 7 days   Lab Units 04/20/23  0315 04/19/23  0427 04/18/23  0522 04/17/23  0324 04/16/23  1101 04/16/23  0627 04/15/23  1748   SODIUM mmol/L  --  139  --  137 137  --   --    POTASSIUM mmol/L  --  3.5  --  3.8 4.4  --   --    CHLORIDE mmol/L  --  94*  --  91* 92*  --   --    CO2 mmol/L  --  37.0*  --  37.1* 37.0*  --   --    BUN mg/dL  --  52*  --  45* 40*  --   --    CREATININE mg/dL 0.85 0.92 0.86 0.80 0.75*   < > 0.88   CALCIUM mg/dL  --  8.9  --  9.2 8.8  --   --    BILIRUBIN mg/dL  --  0.6  --   --  1.2  --  1.3*   ALK PHOS U/L  --  84  --   --  88  --  79   ALT (SGPT) U/L  --  20  --   --  33  --  40   AST (SGOT) U/L  --  15  --   --  18  --  18   GLUCOSE mg/dL  --  113*  --  120* 134*  --   --     < > = values in this interval not displayed.     Results from last 7 days "   Lab Units 04/20/23  1120 04/20/23  0315 04/19/23  1344 04/15/23  0125 04/14/23  1707   INR   --   --   --   --  1.26*   APTT seconds 65.4* 90.5* 66.5*   < > 40.6*    < > = values in this interval not displayed.       NEYMAR WASSERMAN MD  General and Endoscopic Surgery  [facility]    [address] [place] [address], [address]  [place], [place], [address]  P: [phone]  F: [phone]

## 2023-04-20 NOTE — PROGRESS NOTES
"  Daily Progress Note.   [address]  4/20/2023    Patient:  Name:  Harry Potts  MRN:  [id number]  11/14/1930  89 y.o.  male         CC:Acute on chronic respiratory failure, obesity, JENISE, restrictive lung disease, kyphosis, pulmonary hypertension    Interval History:  Afebrile  Down to 5lnc  Says he feels okay  Mentals status improved answers questions quicker today     Physical Exam:  /82 (BP Location: Right arm, Patient Position: Lying)   Pulse 87   Temp 98.1 °F (36.7 °C) (Oral)   Resp 20   Ht 172.7 cm (68\")   Wt 129 kg (283 lb 4.7 oz)   SpO2 92%   BMI 43.07 kg/m²   Body mass index is 43.07 kg/m².    Intake/Output Summary (Last 24 hours) at 4/20/2023 1126  Last data filed at 4/20/2023 0837  Gross per 24 hour   Intake 1044 ml   Output 100 ml   Net 944 ml     General appearance: Nontoxic, conversant   Eyes: anicteric sclerae, moist conjunctivae; no lidlag;    HENT: Lesion on left cheek without active bleeding bandaged at present time  Neck: Large neck circumference  Lungs: No wheeze no rhonchi, diminished at bilateral bases with normal respiratory effort and no intercostal retractions  CV: RRR, no rub   Abdomen: Obese bowel sounds positive right cholecystostomy tube bilious output  Extremities: Mild peripheral edema bilateral right greater than left  Skin: WWP no diffuse visible rash chronic changes and ecchymosis  Psych/neuro calm speech intactresponds to questions.  Positive hard of hearing does move all extremities    Data Review:  Notable Labs:  Results from last 7 days   Lab Units 04/19/23  0427 04/18/23  0522 04/17/23  0513 04/16/23  0627 04/15/23  0833 04/14/23  1707   WBC 10*3/mm3 8.02 9.50 12.62* 12.81* 15.61* 17.85*   HEMOGLOBIN g/dL 12.1* 12.9* 13.2 13.2 14.2 14.4   PLATELETS 10*3/mm3 183 166 154 112* 105* 109*     Results from last 7 days   Lab Units 04/20/23  0315 04/19/23  0427 04/18/23  0522 04/17/23  0324 04/16/23  1101 04/16/23  0627 04/15/23  1748   SODIUM " mmol/L  --  139  --  137 137  --   --    POTASSIUM mmol/L  --  3.5  --  3.8 4.4  --   --    CHLORIDE mmol/L  --  94*  --  91* 92*  --   --    CO2 mmol/L  --  37.0*  --  37.1* 37.0*  --   --    BUN mg/dL  --  52*  --  45* 40*  --   --    CREATININE mg/dL 0.85 0.92 0.86 0.80 0.75* 0.94 0.88   GLUCOSE mg/dL  --  113*  --  120* 134*  --   --    CALCIUM mg/dL  --  8.9  --  9.2 8.8  --   --    Estimated Creatinine Clearance: 72.6 mL/min (by C-G formula based on SCr of 0.85 mg/dL).    Results from last 7 days   Lab Units 04/19/23  0427 04/18/23  0522 04/17/23  0513 04/16/23  1101 04/16/23  0627 04/15/23  1748 04/15/23  0833 04/14/23  2115   AST (SGOT) U/L 15  --   --  18  --  18  --   --    ALT (SGPT) U/L 20  --   --  33  --  40  --   --    PROCALCITONIN ng/mL  --   --   --   --   --   --  0.32* 0.32*   PLATELETS 10*3/mm3 183 166 154  --    < >  --  105*  --     < > = values in this interval not displayed.       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  Stable 1.1 cm nodule (4-year stability)  Encephalopathy superimposed on dementia    COPD (chronic obstructive pulmonary disease)  Pulm hypertension due to lung disease and cardiac disease  Atrial fibrillation  Severe acute cholecystitis    From pulmonary critical care perspective  symbicort duonebs  Continue noninvasive wean oxygen as able -if having difficulty with oxygenation overnight switch to a hospital BiPAP  If lethargic repeat abg  Repeat chest  x-ray looks slightly volume up, lasix x 1  Weaning oxygen    Antibiotics per ID percutaneous cholecystostomy per surgery    Grady Rodrigues MD  [facility]  04/20/23  11:28 EDT

## 2023-04-21 LAB
ANION GAP SERPL CALCULATED.3IONS-SCNC: 12.1 MMOL/L (ref 5–15)
BACTERIA SPEC AEROBE CULT: NORMAL
BACTERIA SPEC AEROBE CULT: NORMAL
BUN SERPL-MCNC: 40 MG/DL (ref 8–23)
BUN/CREAT SERPL: 51.3 (ref 7–25)
CALCIUM SPEC-SCNC: 9.1 MG/DL (ref 8.2–9.6)
CHLORIDE SERPL-SCNC: 98 MMOL/L (ref 98–107)
CO2 SERPL-SCNC: 35.9 MMOL/L (ref 22–29)
CREAT SERPL-MCNC: 0.78 MG/DL (ref 0.76–1.27)
DEPRECATED RDW RBC AUTO: 41.8 FL (ref 37–54)
EGFRCR SERPLBLD CKD-EPI 2021: 83.7 ML/MIN/1.73
ERYTHROCYTE [DISTWIDTH] IN BLOOD BY AUTOMATED COUNT: 12.8 % (ref 12.3–15.4)
FOLATE SERPL-MCNC: 18.3 NG/ML (ref 4.78–24.2)
GLUCOSE SERPL-MCNC: 153 MG/DL (ref 65–99)
HCT VFR BLD AUTO: 40.6 % (ref 37.5–51)
HGB BLD-MCNC: 12.8 G/DL (ref 13–17.7)
MCH RBC QN AUTO: 28.7 PG (ref 26.6–33)
MCHC RBC AUTO-ENTMCNC: 31.5 G/DL (ref 31.5–35.7)
MCV RBC AUTO: 91 FL (ref 79–97)
PLATELET # BLD AUTO: 251 10*3/MM3 (ref 140–450)
PMV BLD AUTO: 10.3 FL (ref 6–12)
POTASSIUM SERPL-SCNC: 3.9 MMOL/L (ref 3.5–5.2)
RBC # BLD AUTO: 4.46 10*6/MM3 (ref 4.14–5.8)
SODIUM SERPL-SCNC: 146 MMOL/L (ref 136–145)
VIT B12 BLD-MCNC: 1440 PG/ML (ref 211–946)
WBC NRBC COR # BLD: 7.52 10*3/MM3 (ref 3.4–10.8)

## 2023-04-21 PROCEDURE — 97530 THERAPEUTIC ACTIVITIES: CPT

## 2023-04-21 PROCEDURE — 97110 THERAPEUTIC EXERCISES: CPT

## 2023-04-21 PROCEDURE — 80048 BASIC METABOLIC PNL TOTAL CA: CPT | Performed by: NURSE PRACTITIONER

## 2023-04-21 PROCEDURE — 94799 UNLISTED PULMONARY SVC/PX: CPT

## 2023-04-21 PROCEDURE — 82746 ASSAY OF FOLIC ACID SERUM: CPT | Performed by: NURSE PRACTITIONER

## 2023-04-21 PROCEDURE — 25010000002 VANCOMYCIN 10 G RECONSTITUTED SOLUTION: Performed by: INTERNAL MEDICINE

## 2023-04-21 PROCEDURE — 94761 N-INVAS EAR/PLS OXIMETRY MLT: CPT

## 2023-04-21 PROCEDURE — 82607 VITAMIN B-12: CPT | Performed by: NURSE PRACTITIONER

## 2023-04-21 PROCEDURE — 99232 SBSQ HOSP IP/OBS MODERATE 35: CPT | Performed by: STUDENT IN AN ORGANIZED HEALTH CARE EDUCATION/TRAINING PROGRAM

## 2023-04-21 PROCEDURE — 85027 COMPLETE CBC AUTOMATED: CPT | Performed by: NURSE PRACTITIONER

## 2023-04-21 RX ORDER — AMLODIPINE BESYLATE 5 MG/1
5 TABLET ORAL
Status: DISCONTINUED | OUTPATIENT
Start: 2023-04-22 | End: 2023-04-24 | Stop reason: HOSPADM

## 2023-04-21 RX ADMIN — Medication 10 ML: at 22:11

## 2023-04-21 RX ADMIN — APIXABAN 5 MG: 5 TABLET, FILM COATED ORAL at 21:18

## 2023-04-21 RX ADMIN — BUDESONIDE AND FORMOTEROL FUMARATE DIHYDRATE 2 PUFF: 160; 4.5 AEROSOL RESPIRATORY (INHALATION) at 19:26

## 2023-04-21 RX ADMIN — METOPROLOL TARTRATE 12.5 MG: 25 TABLET ORAL at 21:18

## 2023-04-21 RX ADMIN — VANCOMYCIN HYDROCHLORIDE 1750 MG: 10 INJECTION, POWDER, LYOPHILIZED, FOR SOLUTION INTRAVENOUS at 03:43

## 2023-04-21 RX ADMIN — Medication 10 ML: at 08:46

## 2023-04-21 RX ADMIN — AMMONIUM LACTATE 1 APPLICATION: 120 CREAM TOPICAL at 18:15

## 2023-04-21 RX ADMIN — IPRATROPIUM BROMIDE AND ALBUTEROL SULFATE 3 ML: 2.5; .5 SOLUTION RESPIRATORY (INHALATION) at 07:29

## 2023-04-21 RX ADMIN — Medication 10 ML: at 01:24

## 2023-04-21 RX ADMIN — DOCUSATE SODIUM 50MG AND SENNOSIDES 8.6MG 2 TABLET: 8.6; 5 TABLET, FILM COATED ORAL at 08:45

## 2023-04-21 RX ADMIN — IPRATROPIUM BROMIDE AND ALBUTEROL SULFATE 3 ML: 2.5; .5 SOLUTION RESPIRATORY (INHALATION) at 11:34

## 2023-04-21 RX ADMIN — BUDESONIDE AND FORMOTEROL FUMARATE DIHYDRATE 2 PUFF: 160; 4.5 AEROSOL RESPIRATORY (INHALATION) at 07:31

## 2023-04-21 RX ADMIN — AMMONIUM LACTATE 1 APPLICATION: 120 CREAM TOPICAL at 06:59

## 2023-04-21 RX ADMIN — Medication 10 ML: at 08:45

## 2023-04-21 RX ADMIN — FLUTICASONE PROPIONATE 1 SPRAY: 50 SPRAY, METERED NASAL at 08:45

## 2023-04-21 RX ADMIN — ASPIRIN 81 MG: 81 TABLET, COATED ORAL at 08:45

## 2023-04-21 RX ADMIN — METOPROLOL TARTRATE 12.5 MG: 25 TABLET ORAL at 08:44

## 2023-04-21 RX ADMIN — MICONAZOLE NITRATE: 2 POWDER TOPICAL at 08:45

## 2023-04-21 RX ADMIN — MICONAZOLE NITRATE: 2 POWDER TOPICAL at 22:11

## 2023-04-21 RX ADMIN — APIXABAN 5 MG: 5 TABLET, FILM COATED ORAL at 08:45

## 2023-04-21 RX ADMIN — PANTOPRAZOLE SODIUM 40 MG: 40 TABLET, DELAYED RELEASE ORAL at 06:59

## 2023-04-21 NOTE — PROGRESS NOTES
"  Daily Progress Note.   [address]  4/21/2023    Patient:  Name:  Harry Potts  MRN:  [id number]  11/14/1930  89 y.o.  male         CC:Acute on chronic respiratory failure, obesity, JENISE, restrictive lung disease, kyphosis, pulmonary hypertension    Interval History:  Afebrile oxygen back up to 6 to 7 L on noninvasive overnight  His mental status actually seems improved today he is respond to questions little quicker.  Denies any chest pain denies any worsening abdominal pain.  Still with significant drainage from his cholecystostomy drain     Physical Exam:  /89 (BP Location: Right arm, Patient Position: Lying)   Pulse 84   Temp 98.1 °F (36.7 °C) (Oral)   Resp 16   Ht 172.7 cm (68\")   Wt 127 kg (280 lb 8 oz)   SpO2 93%   BMI 42.65 kg/m²   Body mass index is 42.65 kg/m².    Intake/Output Summary (Last 24 hours) at 4/21/2023 1042  Last data filed at 4/21/2023 0841  Gross per 24 hour   Intake 970 ml   Output 80 ml   Net 890 ml     General appearance: Nontoxic, conversant   Eyes: anicteric sclerae, moist conjunctivae; no lidlag;    HENT: Lesion on left cheek without active bleeding bandaged at present time  Neck: Large neck circumference  Lungs: No wheeze no rhonchi, diminished at bilateral bases with normal respiratory effort and no intercostal retractions  CV: RRR, no rub   Abdomen: Obese bowel sounds positive right cholecystostomy tube bilious output  Extremities: Mild bilateral lower extremity peripheral edema   Skin: WWP no diffuse visible rash chronic changes and ecchymosis  Psych/neuro calm speech intact responds to questions.  Positive hard of hearing does move all extremities    Data Review:  Notable Labs:  Results from last 7 days   Lab Units 04/21/23  0834 04/19/23  0427 04/18/23  0522 04/17/23  0513 04/16/23  0627 04/15/23  0833 04/14/23  1707   WBC 10*3/mm3 7.52 8.02 9.50 12.62* 12.81* 15.61* 17.85*   HEMOGLOBIN g/dL 12.8* 12.1* 12.9* 13.2 13.2 14.2 14.4   PLATELETS " 10*3/mm3 251 183 166 154 112* 105* 109*     Results from last 7 days   Lab Units 04/21/23  0834 04/20/23  0315 04/19/23  0427 04/18/23  0522 04/17/23  0324 04/16/23  1101 04/16/23  0627   SODIUM mmol/L 146*  --  139  --  137 137  --    POTASSIUM mmol/L 3.9  --  3.5  --  3.8 4.4  --    CHLORIDE mmol/L 98  --  94*  --  91* 92*  --    CO2 mmol/L 35.9*  --  37.0*  --  37.1* 37.0*  --    BUN mg/dL 40*  --  52*  --  45* 40*  --    CREATININE mg/dL 0.78 0.85 0.92 0.86 0.80 0.75* 0.94   GLUCOSE mg/dL 153*  --  113*  --  120* 134*  --    CALCIUM mg/dL 9.1  --  8.9  --  9.2 8.8  --    Estimated Creatinine Clearance: 78.5 mL/min (by C-G formula based on SCr of 0.78 mg/dL).    Results from last 7 days   Lab Units 04/21/23  0834 04/19/23  0427 04/18/23  0522 04/17/23  0513 04/16/23  1101 04/16/23  0627 04/15/23  1748 04/15/23  0833 04/14/23  2115   AST (SGOT) U/L  --  15  --   --  18  --  18  --   --    ALT (SGPT) U/L  --  20  --   --  33  --  40  --   --    PROCALCITONIN ng/mL  --   --   --   --   --   --   --  0.32* 0.32*   PLATELETS 10*3/mm3 251 183 166   < >  --    < >  --  105*  --     < > = values in this interval not displayed.       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  Will check a cxr in am and see regarding additional diuretics - sodium up today    Weaning oxygen    Antibiotics per ID percutaneous cholecystostomy per surgery    Grady Rodrigues MD  [facility]  04/21/23  11:28 EDT

## 2023-04-21 NOTE — PROGRESS NOTES
Name: Harry Potts ADMIT: 2023   : 1930  PCP: Harry Luis MD    MRN: [id number] LOS: 16 days   AGE/SEX: 89 y.o. male  ROOM: [facility]     Subjective   Subjective   Resting in bed. No family at bedside. He was on 7 L when I entered and saturating 88% with a head sensor. However, nasal prongs not in place. Repositioned these and was able to turn to 5 L while in the room with oxygen saturations 91-92%. He states his breathing feels good. No chest pain or belly pain. Denies any nausea or vomiting. No cough/fever/chills. Asking what the plan is. He tells me he wore his machine overnight but unclear how long.     Objective   Objective   Vital Signs  Temp:  [97.6 °F (36.4 °C)-98.7 °F (37.1 °C)] 98.1 °F (36.7 °C)  Heart Rate:  [] 84  Resp:  [16-22] 16  BP: ()/(70-89) 141/89  SpO2:  [90 %-94 %] 93 %  on  Flow (L/min):  [4-7] 6;   Device (Oxygen Therapy): nasal cannula  Body mass index is 42.65 kg/m².     Physical Exam  Vitals and nursing note reviewed.   Constitutional:       Appearance: He is obese. He is ill-appearing.   Cardiovascular:      Rate and Rhythm: normal rate and irregular rhythm. Afib on the monitor with PVCs     Pulses: Normal pulses.   Pulmonary:      Effort: No respiratory distress present.      Comments: Diminished throughout, turned to 5 L with oxygen saturations 91-92%   Abdominal:      General: Bowel sounds are normal. There is distension.      Palpations: Abdomen is soft.      Tenderness: There is no abdominal tenderness.      Comments: Right upper quadrant drain present with brown/bilious output   Musculoskeletal:         General: Swelling present. No tenderness. Normal range of motion.   Skin:     General: Skin is warm and dry.      Coloration: Skin is pale.      Findings: Bruising present.   Neurological:      General: No focal deficit present.      Mental Status: He is alert and oriented to person, place, and time.      Sensory: No sensory deficit.      Motor:  Weakness present.      Coordination: Coordination normal.   Psychiatric:         Mood and Affect: Mood normal.         Behavior: Behavior normal.      Results Review:       I reviewed the patient's new clinical results.  Results from last 7 days   Lab Units 04/21/23  0834 04/19/23 0427 04/18/23  0522 04/17/23  0513   WBC 10*3/mm3 7.52 8.02 9.50 12.62*   HEMOGLOBIN g/dL 12.8* 12.1* 12.9* 13.2   PLATELETS 10*3/mm3 251 183 166 154     Results from last 7 days   Lab Units 04/21/23  0834 04/20/23  0315 04/19/23 0427 04/18/23  0522 04/17/23  0324 04/16/23  1101   SODIUM mmol/L 146*  --  139  --  137 137   POTASSIUM mmol/L 3.9  --  3.5  --  3.8 4.4   CHLORIDE mmol/L 98  --  94*  --  91* 92*   CO2 mmol/L 35.9*  --  37.0*  --  37.1* 37.0*   BUN mg/dL 40*  --  52*  --  45* 40*   CREATININE mg/dL 0.78 0.85 0.92 0.86 0.80 0.75*   GLUCOSE mg/dL 153*  --  113*  --  120* 134*   Estimated Creatinine Clearance: 78.5 mL/min (by C-G formula based on SCr of 0.78 mg/dL).  Results from last 7 days   Lab Units 04/19/23 0427 04/16/23  1101 04/15/23  1748   ALBUMIN g/dL 2.6* 2.6* 2.7*   BILIRUBIN mg/dL 0.6 1.2 1.3*   ALK PHOS U/L 84 88 79   AST (SGOT) U/L 15 18 18   ALT (SGPT) U/L 20 33 40     Results from last 7 days   Lab Units 04/21/23  0834 04/19/23 0427 04/17/23  0324 04/16/23  1101 04/15/23  1748   CALCIUM mg/dL 9.1 8.9 9.2 8.8  --    ALBUMIN g/dL  --  2.6*  --  2.6* 2.7*     Results from last 7 days   Lab Units 04/15/23  0833 04/14/23 2115   PROCALCITONIN ng/mL 0.32* 0.32*     No results found for: HGBA1C, POCGLU    ammonium lactate, 1 application, Topical, Q12H  apixaban, 5 mg, Oral, Q12H  aspirin, 81 mg, Oral, Daily  budesonide-formoterol, 2 puff, Inhalation, BID - RT  fluticasone, 1 spray, Nasal, Daily  ipratropium-albuterol, 3 mL, Nebulization, Q6H While Awake - RT  metoprolol tartrate, 12.5 mg, Oral, Q12H  miconazole, , Topical, Q12H  pantoprazole, 40 mg, Oral, Q AM  potassium chloride, 40 mEq, Oral, Once  senna-docusate  sodium, 2 tablet, Oral, BID  sodium chloride, 10 mL, Intravenous, Q12H  sodium chloride, 10 mL, Intravenous, Q12H  vancomycin, 1,750 mg, Intravenous, Q24H      Pharmacy to dose vancomycin,     Diet: Regular/House Diet; Texture: Soft to Chew (NDD 3); Soft to Chew: Chopped Meat; Fluid Consistency: Thin (IDDSI 0)       Assessment/Plan     Active Hospital Problems    Diagnosis  POA   • **Acute on chronic respiratory failure with hypoxia and hypercapnia [J96.21, J96.22]  Yes   • Bacteremia due to Enterococcus [R78.81, B95.2]  Unknown   • Pulmonary hypertension [I27.20]  Yes   • Metabolic encephalopathy [G93.41]  Yes   • 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     Brief hospital course to date:   Mr. Potts is a 89 year old male who initially presented to the hospital with complaints of dyspnea and increased confusion. He was found to have acute on chronic respiratory failure as well as new onset atrial fibrillation and worsening confusion related to be related to encephalopathy as well as underlying dementia. He was initially admitted to telemetry but having issues tolerating PAP therapy and became more obtunded. He was moved to the ICU for more aggressive NIPPV therapy with use of Precedex on 4/8 and eventually moved out of the ICU on 4/13. Neurology was consulted for his mental status changes. Cardiology consulted for new atrial fibrillation. He developed a new onset of worsening leukocytosis on 4/14 prompting infectious work up. Blood cultures returned positive for Enterococcus and  "further imaging obtained. He was found to have acute cholecystitis prompting general surgery to see on 4/17. Given his age and multiple co-morbidities, percutaneous cholecystectomy tube was placed with IR for treatment.     Acute on hronic hypoxemic and hypercapnic respiratory failure  JENISE  Restrictive lung disease  Pulmonary hypertension related to lung disease  -Pulmonology following: \"will need NIPPV for home use, simple cpap or bipap will not be enough to ensure adequate minute ventilation to prevent recurrent episdoes of acute on chronic hypercapnic respiratory failure that could result in hospital admission or death\"  -Avoid hyperoxia to prevent hypercapnia.  -Continue Symbicort, Duonebs. Plan for repeat ABGs for any lethargy. Received a dose of Lasix 4/20 for increased vascular congestion on x-ray.  -Oxygen requirements fluctuating. I did turn him down to 5 L and he was 91-92% with a good waveform.     Enterococcus bacteremia  Acute cholecystitis  Minimal pneumonia on x-ray left base   -Continue antibiotics as advised by ID- Unasyn completed. Plans for pharmacy to dose vancomycin until 4/30 (14 day course given no evidence of endocarditis and repeat blood cultures with sterility).  -Surgery recommends percutaneous cholecystostomy tube due to comorbidities which is currently in place. Okay for resumption of oral anticoagulation from their standpoint- Eliquis resumed 4/20. Surgery signed off.      Dysphagia  -VFSS per SLP.   -Diet upgraded 4/19 to mechanical soft and thins. Aspiration precautions.  -He is eating about 50-75% of meals. Mild hypernatremia on labs today- repeat in AM.      Metabolic encephalopathy  With underlying vascular dementia per Neurology  -Neurology expecting waxing and waning encephalopathy during hospitalization  -Currently oriented and appropriate on exam today. Continue to monitor for changes and encourage NIPPV use with sleep to avoid lethargy.     Paroxysmal atrial " fibrillation  Hypertension  -Heparin used perioperatively. Back on Eliquis 4/20. Seems to be tolerating.  -Metoprolol continued for rate control.  -Cardiology signed off 4/12/23.  -Norvasc 10 mg stopped 4/20 d/t afternoon pressure in the 90s systolic. SBP back up to 140s today. Will add back lower dosing of Norvasc at 5 mg. Avoid hypotension.     Anemia  -Baseline hemoglobin anywhere from 14 to 15 in the last several years. He has been fluctuating in the 13-15 range with most recent levels 12.9 and 12.1 over the last two days. Up to 12.8 today so all in all stable. No overt bleeding. Suspect declining levels d/t acute inflammation from bacteremia and prolonged hospital stay. Platelets are trending upward and stable. Will monitor for now. Iron panel may be erroneous given his infection and would avoid any IV iron for now given the stability of his hemoglobin as well as acute infection at play. Vitamin B12/folate okay Daily CBC for now.     Discussed with patient.    Overall, need to continue to optimize respiratory status for discharge. Pulmonology following.     VTE prophylaxis: Eliquis. He has been on PPI for GI prophylaxis.  Code status: Full code  Disposition: Will need SNF when cleared by all. Working on Trilogy for this.     DANNIE Ramon  [facility]  04/21/23  10:15 EDT

## 2023-04-21 NOTE — THERAPY TREATMENT NOTE
Patient Name: Harry Potts  : 1930    MRN: [id number]                              Today's Date: 2023       Admit Date: 2023    Visit Dx:     ICD-10-CM ICD-9-CM   1. Hypoxia  R09.02 799.02   2. Confusion  R41.0 298.9   3. General weakness  R53.1 780.79   4. Physical deconditioning  R53.81 799.3   5. Hypokalemia  E87.6 276.8   6. Atrial fibrillation, unspecified type  I48.91 427.31   7. Aneurysm of ascending aorta without rupture  I71.21 441.2     Patient Active Problem List   Diagnosis   • Dysfunction of eustachian tube   • Gastroesophageal reflux disease   • Hyperglycemia   • Hypercholesterolemia   • Hypertension   • Morbid obesity   • Osteoarthritis of lumbar spine with myelopathy   • Osteoarthritis of lumbar spine   • Bronchitis   • Viral URI with cough   • PVC (premature ventricular contraction)   • Non-traumatic rhabdomyolysis   • Obesity (BMI 30-39.9)   • Chronic low back pain   • Weakness   • Edema, bilateral lower extremities   • Restrictive lung disease   • Irregularly irregular cardiac rhythm   • JENISE on CPAP   • Medicare annual wellness visit, subsequent   • Hypoxia   • Acute on chronic respiratory failure with hypoxia and hypercapnia   • Acute cholecystitis   • New onset atrial fibrillation   • Bacteremia due to Enterococcus   • Pulmonary hypertension   • Metabolic encephalopathy   • Vascular dementia without behavioral disturbance   • Dysphagia   • Anemia     Past Medical History:   Diagnosis Date   • Arthritis    • Asthma     Oxygen at home   • Atrial fibrillation 2023   • Cancer     basal cell    • COPD (chronic obstructive pulmonary disease)    • Difficulty walking    • Elevated cholesterol    • Environmental allergies    • GERD (gastroesophageal reflux disease)    • HL (hearing loss)    • Hyperglycemia    • Hyperlipidemia    • Hypertension    • Obesity    • Pulmonary hypertension 2023   • Sleep apnea    • Spondylolysis, lumbar region      Past Surgical  History:   Procedure Laterality Date   • CARDIAC CATHETERIZATION  circa 2008    [facility]   • EYE SURGERY     • HERNIA REPAIR     • JOINT REPLACEMENT     • REPLACEMENT TOTAL KNEE BILATERAL        General Information     Row Name 04/21/23 1420          Physical Therapy Time and Intention    Document Type therapy note (daily note)  -     Mode of Treatment physical therapy  -     Row Name 04/21/23 1420          General Information    Existing Precautions/Restrictions fall;oxygen therapy device and L/min  -[facility] Name 04/21/23 1420          Cognition    Orientation Status (Cognition) oriented to;person;place;time;situation  Pt knew the year and month  -[facility] Name 04/21/23 1420          Safety Issues, Functional Mobility    Impairments Affecting Function (Mobility) endurance/activity tolerance;strength;postural/trunk control;balance;shortness of breath  -     Comment, Safety Issues/Impairments (Mobility) gt belt and non skid socks donned  -           User Key  (r) = Recorded By, (t) = Taken By, (c) = Cosigned By    Initials Name Provider Type     Khushboo Galindo PTA Physical Therapist Assistant               Mobility     Row Name 04/21/23 1420          Bed Mobility    Bed Mobility supine-sit;scooting/bridging;sit-supine  -     Scooting/Bridging Emery (Bed Mobility) dependent (less than 25% patient effort);2 person assist;verbal cues;nonverbal cues (demo/gesture)  -     Supine-Sit Emery (Bed Mobility) maximum assist (25% patient effort);2 person assist;verbal cues;nonverbal cues (demo/gesture)  -     Sit-Supine Emery (Bed Mobility) maximum assist (25% patient effort);2 person assist;verbal cues;nonverbal cues (demo/gesture)  -     Comment, (Bed Mobility) cues for sequencing w/ log roll to exit bed w/ more participation by pt. weakness limiting.  -     Row Name 04/21/23 1420          Bed-Chair Transfer    Bed-Chair Emery (Transfers) unable to assess  -      Row Name 04/21/23 1420          Sit-Stand Transfer    Sit-Stand Cannon (Transfers) dependent (less than 25% patient effort);2 person assist;verbal cues;nonverbal cues (demo/gesture)  -PH     Assistive Device (Sit-Stand Transfers) walker, platform  -PH     Comment, (Sit-Stand Transfer) 3 attempts made to stand w/ pt unable to clear bed  -PH     Row Name 04/21/23 1420          Gait/Stairs (Locomotion)    Cannon Level (Gait) unable to assess  -PH     Cannon Level (Stairs) unable to assess  -PH           User Key  (r) = Recorded By, (t) = Taken By, (c) = Cosigned By    Initials Name Provider Type     Khushboo Galindo PTA Physical Therapist Assistant               Obj/Interventions     [facility] Name 04/21/23 1422          Motor Skills    Therapeutic Exercise other (see comments)  trunk flex/ext, punches, BAP, LAQ, seated march; x 10 reps all; decr ROM 2/2 weakness  -PH     Row Name 04/21/23 1422          Balance    Balance Assessment sitting static balance;sitting dynamic balance  -PH     Static Sitting Balance minimal assist;contact guard;verbal cues;non-verbal cues (demo/gesture)  -PH     Dynamic Sitting Balance minimal assist;verbal cues  -PH     Comment, Balance L and posterior lean w/ able to correct for a few secs w/ static sit bal and unable to correct for dynamic. heavy use of 1 to 2 UE for upright support; cues for postural correction  -PH           User Key  (r) = Recorded By, (t) = Taken By, (c) = Cosigned By    Initials Name Provider Type     Khushboo Galindo PTA Physical Therapist Assistant               Goals/Plan    No documentation.                Clinical Impression     Row Name 04/21/23 1425          Pain    Pretreatment Pain Rating 0/10 - no pain  -PH     Posttreatment Pain Rating 0/10 - no pain  -PH     Additional Documentation Pain Scale: Numbers Pre/Post-Treatment (Group)  -PH     Row Name 04/21/23 1425          Plan of Care Review    Plan of Care Reviewed With  patient  -PH     Progress improving  -PH     Outcome Evaluation Pt seen by PT this PM for treatment. Pt alert and responsive to questions and cues when given. Pt req max a x 2 to sequence to EOB w/ pt offering more participation although very weak. 3 attempts made to stand w/ dep A x 2. Pt unable to clear bottom from bed. Pt performed ther ex at EOB for strengthening and to incr endurance. Pt req min A for dynamic bal w/ pt leaning L and posteriorly and unable to correct w/ cues given. Pt returned to bed w/ max A x 2. PT will prog as pt lexy. Rec SNF after dc.  -PH     Row Name 04/21/23 1425          Vital Signs    Pre SpO2 (%) 91  -PH     O2 Delivery Pre Treatment nasal cannula  -PH     Intra SpO2 (%) 80  pt placed in trendelenburg to scoot to HOB.  -PH     O2 Delivery Intra Treatment nasal cannula  -PH     Post SpO2 (%) 90  -PH     O2 Delivery Post Treatment nasal cannula  -PH     Row Name 04/21/23 1423          Positioning and Restraints    Pre-Treatment Position in bed  -PH     Post Treatment Position bed  -PH     In Bed fowlers;call light within reach;encouraged to call for assist;exit alarm on  -PH           User Key  (r) = Recorded By, (t) = Taken By, (c) = Cosigned By    Initials Name Provider Type    PH Khushboo Galindo PTA Physical Therapist Assistant               Outcome Measures     Row Name 04/21/23 7693          How much help from another person do you currently need...    Turning from your back to your side while in flat bed without using bedrails? 2  -PH     Moving from lying on back to sitting on the side of a flat bed without bedrails? 2  -PH     Moving to and from a bed to a chair (including a wheelchair)? 1  -PH     Standing up from a chair using your arms (e.g., wheelchair, bedside chair)? 1  -PH     Climbing 3-5 steps with a railing? 1  -PH     To walk in hospital room? 1  -PH     AM-PAC 6 Clicks Score (PT) 8  -PH     Highest level of mobility 3 --> Sat at edge of bed  -PH     Row Name  04/21/23 1429          Functional Assessment    Outcome Measure Options AM-PAC 6 Clicks Basic Mobility (PT)  -PH           User Key  (r) = Recorded By, (t) = Taken By, (c) = Cosigned By    Initials Name Provider Type    Khushboo Ching PTA Physical Therapist Assistant                             Physical Therapy Education     Title: PT OT SLP Therapies (Done)     Topic: Physical Therapy (Done)     Point: Mobility training (Done)     Learning Progress Summary           Patient Acceptance, E,TB,D, VU,NR by  at 4/21/2023 1429    Acceptance, D,E, DU,NR by  at 4/19/2023 0925    Acceptance, E,TB,D, VU,NR by  at 4/17/2023 1759    Acceptance, E, VU,NR by EB at 4/14/2023 1147    Acceptance, E,TB, VU,DU,NR by CS at 4/13/2023 1445    Acceptance, E, VU,NR by MO at 4/12/2023 1206    Acceptance, E,D, NR by MO at 4/10/2023 1227    Acceptance, E, VU,NR by DJ at 4/6/2023 0952   Family Acceptance, E,TB, VU,DU,NR by CS at 4/13/2023 1445                   Point: Home exercise program (Done)     Learning Progress Summary           Patient Acceptance, E,TB,D, VU,NR by  at 4/21/2023 1429    Acceptance, D,E, DU,NR by  at 4/19/2023 0925    Acceptance, E,TB,D, VU,NR by  at 4/17/2023 1759    Acceptance, E, VU,NR by EB at 4/14/2023 1147    Acceptance, E,TB, VU,DU,NR by CS at 4/13/2023 1445    Acceptance, E, VU,NR by MO at 4/12/2023 1206    Acceptance, E,D, NR by MO at 4/10/2023 1227    Acceptance, E, VU,NR by DJ at 4/6/2023 0952   Family Acceptance, E,TB, VU,DU,NR by CS at 4/13/2023 1445                   Point: Body mechanics (Done)     Learning Progress Summary           Patient Acceptance, E,TB,D, VU,NR by  at 4/21/2023 1429    Acceptance, D,E, DU,NR by PH at 4/19/2023 0925    Acceptance, E,TB,D, VU,NR by BH at 4/17/2023 1759    Acceptance, E, VU,NR by EB at 4/14/2023 1147    Acceptance, E,TB, VU,DU,NR by CS at 4/13/2023 1445    Acceptance, E, VU,NR by MO at 4/12/2023 1206    Acceptance, E, VU,NR by DJ at 4/6/2023  0952   Family Acceptance, E,TB, VU,DU,NR by CS at 4/13/2023 1445                   Point: Precautions (Done)     Learning Progress Summary           Patient Acceptance, E,TB,D, VU,NR by  at 4/21/2023 1429    Acceptance, D,E, DU,NR by PH at 4/19/2023 0925    Acceptance, E,TB,D, VU,NR by  at 4/17/2023 1759    Acceptance, E, VU,NR by EB at 4/14/2023 1147    Acceptance, E,TB, VU,DU,NR by CS at 4/13/2023 1445    Acceptance, E, VU,NR by  at 4/6/2023 0952   Family Acceptance, E,TB, VU,DU,NR by CS at 4/13/2023 1445                               User Key     Initials Effective Dates Name Provider Type Discipline    EB 02/14/23 -  Edith Macias, PTA Physical Therapist Assistant PT    PH 06/16/21 -  Khushboo Galindo PTA Physical Therapist Assistant PT     10/25/19 -  Iwona Jones, PT Physical Therapist PT     04/08/22 -  Imelda Mederos, PT Physical Therapist PT     09/22/22 -  Paula Cool, PT Physical Therapist PT    MO 01/27/23 -  Lianna Owens, PT Student PT Student PT              PT Recommendation and Plan     Plan of Care Reviewed With: patient  Progress: improving  Outcome Evaluation: Pt seen by PT this PM for treatment. Pt alert and responsive to questions and cues when given. Pt req max a x 2 to sequence to EOB w/ pt offering more participation although very weak. 3 attempts made to stand w/ dep A x 2. Pt unable to clear bottom from bed. Pt performed ther ex at EOB for strengthening and to incr endurance. Pt req min A for dynamic bal w/ pt leaning L and posteriorly and unable to correct w/ cues given. Pt returned to bed w/ max A x 2. PT will prog as pt lexy. Rec SNF after dc.     Time Calculation:    PT Charges     Row Name 04/21/23 1422             Time Calculation    Start Time 1351  -PH      Stop Time 1417  -PH      Time Calculation (min) 26 min  -PH      PT Received On 04/21/23  -PH      PT - Next Appointment 04/24/23  -PH         Timed Charges    44133 - PT Therapeutic Exercise Minutes 10   -PH      45429 - PT Therapeutic Activity Minutes 16  -PH         Total Minutes    Timed Charges Total Minutes 26  -PH       Total Minutes 26  -PH            User Key  (r) = Recorded By, (t) = Taken By, (c) = Cosigned By    Initials Name Provider Type    Khushboo Ching PTA Physical Therapist Assistant              Therapy Charges for Today     Code Description Service Date Service Provider Modifiers Qty    22131459509 HC PT THER PROC EA 15 MIN 4/21/2023 Khushboo Galindo PTA GP 1    93802077015 HC PT THERAPEUTIC ACT EA 15 MIN 4/21/2023 Khushboo Galindo PTA GP 1    57506901451 HC PT THER SUPP EA 15 MIN 4/21/2023 Khushboo Galindo, KAILYN GP 2          PT G-Codes  Outcome Measure Options: AM-PAC 6 Clicks Basic Mobility (PT)  AM-PAC 6 Clicks Score (PT): 8  PT Discharge Summary  Anticipated Discharge Disposition (PT): skilled nursing facility    Khushboo Galindo PTA  4/21/2023

## 2023-04-21 NOTE — CASE MANAGEMENT/SOCIAL WORK
Continued Stay Note  [facility]     Patient Name: Harry Potts  MRN: [id number]  Today's Date: 4/21/2023    Admit Date: 4/4/2023    Plan: Pre-cert approved, patient to [facility], good through the weekend. Will need EMS and trilogy settings at discharge.   Discharge Plan     Row Name 04/21/23 1406       Plan    Plan Pre-cert approved, patient to [facility], good through the weekend. Will need EMS and trilogy settings at discharge.    Plan Comments Spoke with [facility]/ [facility], have auth for patient to go to SNF; Auth will be good through the weekend. Patient will need to be on 5 Liters or less at night with his trilogy. Will need new settings for trilogy added to discharge summary/ instructions. Spoke with Daughter via outbound call, family is agreeable to plan for [facility]. Patient will need commercial transportation, probable stretcher.               Discharge Codes    No documentation.               Expected Discharge Date and Time     Expected Discharge Date Expected Discharge Time    Apr 21, 2023             Bridget Gasca RN

## 2023-04-21 NOTE — PROGRESS NOTES
"General Surgery Progress Note    Summary:  Mr. Harry Potts is a 89 y.o. year old gentleman with acute cholecystitis s/p percutaneous cholecystostomy tube placement. Diet as tolerated. On abx. On eliquis. No further surgical intervention planned. Will be available as needed. Can follow up in the office 4-6 weeks.     Chief Complaint: Abdominal pain    Interval Events: No acute events overnight. Tolerating PO. Pain controlled.    Vitals: /87 (BP Location: Right arm, Patient Position: Lying)   Pulse 86   Temp 98.1 °F (36.7 °C) (Oral)   Resp 16   Ht 172.7 cm (68\")   Wt 127 kg (280 lb 8 oz)   SpO2 91%   BMI 42.65 kg/m²     GENERAL: alert, confused, and in no distress  HEENT: normocephalic, atraumatic, moist mucous membranes, clear sclerae   CHEST: no increased work of breathing, symmetric air entry  CARDIAC: regular rate and rhythm    ABDOMEN: soft, nondistended, drain bilious   EXTREMITIES: no cyanosis, clubbing, or edema   SKIN: Warm and moist, no rashes    Labs:  Results from last 7 days   Lab Units 04/21/23  0834 04/19/23  0427 04/18/23  0522   WBC 10*3/mm3 7.52 8.02 9.50   HEMOGLOBIN g/dL 12.8* 12.1* 12.9*   HEMATOCRIT % 40.6 36.9* 40.0   PLATELETS 10*3/mm3 251 183 166     Results from last 7 days   Lab Units 04/21/23  0834 04/20/23  0315 04/19/23  0427 04/18/23  0522 04/17/23  0324 04/16/23  1101 04/16/23  0627 04/15/23  1748   SODIUM mmol/L 146*  --  139  --  137 137   < >  --    POTASSIUM mmol/L 3.9  --  3.5  --  3.8 4.4   < >  --    CHLORIDE mmol/L 98  --  94*  --  91* 92*   < >  --    CO2 mmol/L 35.9*  --  37.0*  --  37.1* 37.0*   < >  --    BUN mg/dL 40*  --  52*  --  45* 40*   < >  --    CREATININE mg/dL 0.78 0.85 0.92   < > 0.80 0.75*   < > 0.88   CALCIUM mg/dL 9.1  --  8.9  --  9.2 8.8   < >  --    BILIRUBIN mg/dL  --   --  0.6  --   --  1.2  --  1.3*   ALK PHOS U/L  --   --  84  --   --  88  --  79   ALT (SGPT) U/L  --   --  20  --   --  33  --  40   AST (SGOT) U/L  --   --  15  --   --  18 "  --  18   GLUCOSE mg/dL 153*  --  113*  --  120* 134*   < >  --     < > = values in this interval not displayed.     Results from last 7 days   Lab Units 04/20/23  1120 04/20/23  0315 04/19/23  1344 04/15/23  0125 04/14/23  1707   INR   --   --   --   --  1.26*   APTT seconds 65.4* 90.5* 66.5*   < > 40.6*    < > = values in this interval not displayed.       NEYMAR WASSERMAN MD  General and Endoscopic Surgery  [facility]    [address], [address]  [place], [place], [address]  P: [phone]  F: [phone]

## 2023-04-21 NOTE — PLAN OF CARE
Goal Outcome Evaluation:  Plan of Care Reviewed With: patient        Progress: improving  Outcome Evaluation: Pt seen by PT this PM for treatment. Pt alert and responsive to questions and cues when given. Pt req max a x 2 to sequence to EOB w/ pt offering more participation although very weak. 3 attempts made to stand w/ dep A x 2. Pt unable to clear bottom from bed. Pt performed ther ex at EOB for strengthening and to incr endurance. Pt req min A for dynamic bal w/ pt leaning L and posteriorly and unable to correct w/ cues given. Pt returned to bed w/ max A x 2. PT will prog as pt lexy. Rec SNF after dc.

## 2023-04-22 ENCOUNTER — APPOINTMENT (OUTPATIENT)
Dept: GENERAL RADIOLOGY | Facility: HOSPITAL | Age: 88
DRG: 189 | End: 2023-04-22
Payer: MEDICARE

## 2023-04-22 LAB
ANION GAP SERPL CALCULATED.3IONS-SCNC: 7.3 MMOL/L (ref 5–15)
BUN SERPL-MCNC: 33 MG/DL (ref 8–23)
BUN/CREAT SERPL: 43.4 (ref 7–25)
CALCIUM SPEC-SCNC: 9 MG/DL (ref 8.2–9.6)
CHLORIDE SERPL-SCNC: 99 MMOL/L (ref 98–107)
CO2 SERPL-SCNC: 35.7 MMOL/L (ref 22–29)
CREAT SERPL-MCNC: 0.76 MG/DL (ref 0.76–1.27)
DEPRECATED RDW RBC AUTO: 43.9 FL (ref 37–54)
EGFRCR SERPLBLD CKD-EPI 2021: 84.3 ML/MIN/1.73
ERYTHROCYTE [DISTWIDTH] IN BLOOD BY AUTOMATED COUNT: 12.9 % (ref 12.3–15.4)
GLUCOSE SERPL-MCNC: 106 MG/DL (ref 65–99)
HCT VFR BLD AUTO: 42 % (ref 37.5–51)
HGB BLD-MCNC: 13.4 G/DL (ref 13–17.7)
MCH RBC QN AUTO: 29.5 PG (ref 26.6–33)
MCHC RBC AUTO-ENTMCNC: 31.9 G/DL (ref 31.5–35.7)
MCV RBC AUTO: 92.5 FL (ref 79–97)
PLATELET # BLD AUTO: 232 10*3/MM3 (ref 140–450)
PMV BLD AUTO: 10.1 FL (ref 6–12)
POTASSIUM SERPL-SCNC: 3.8 MMOL/L (ref 3.5–5.2)
RBC # BLD AUTO: 4.54 10*6/MM3 (ref 4.14–5.8)
SODIUM SERPL-SCNC: 142 MMOL/L (ref 136–145)
WBC NRBC COR # BLD: 7.94 10*3/MM3 (ref 3.4–10.8)

## 2023-04-22 PROCEDURE — 94799 UNLISTED PULMONARY SVC/PX: CPT

## 2023-04-22 PROCEDURE — 94761 N-INVAS EAR/PLS OXIMETRY MLT: CPT

## 2023-04-22 PROCEDURE — 71045 X-RAY EXAM CHEST 1 VIEW: CPT

## 2023-04-22 PROCEDURE — 80048 BASIC METABOLIC PNL TOTAL CA: CPT | Performed by: NURSE PRACTITIONER

## 2023-04-22 PROCEDURE — 25010000002 VANCOMYCIN 10 G RECONSTITUTED SOLUTION: Performed by: INTERNAL MEDICINE

## 2023-04-22 PROCEDURE — 94760 N-INVAS EAR/PLS OXIMETRY 1: CPT

## 2023-04-22 PROCEDURE — 85027 COMPLETE CBC AUTOMATED: CPT | Performed by: NURSE PRACTITIONER

## 2023-04-22 PROCEDURE — 94664 DEMO&/EVAL PT USE INHALER: CPT

## 2023-04-22 RX ADMIN — DOCUSATE SODIUM 50MG AND SENNOSIDES 8.6MG 2 TABLET: 8.6; 5 TABLET, FILM COATED ORAL at 20:58

## 2023-04-22 RX ADMIN — PANTOPRAZOLE SODIUM 40 MG: 40 TABLET, DELAYED RELEASE ORAL at 05:27

## 2023-04-22 RX ADMIN — Medication 10 ML: at 20:58

## 2023-04-22 RX ADMIN — IPRATROPIUM BROMIDE AND ALBUTEROL SULFATE 3 ML: 2.5; .5 SOLUTION RESPIRATORY (INHALATION) at 11:35

## 2023-04-22 RX ADMIN — FLUTICASONE PROPIONATE 1 SPRAY: 50 SPRAY, METERED NASAL at 08:56

## 2023-04-22 RX ADMIN — BUDESONIDE AND FORMOTEROL FUMARATE DIHYDRATE 2 PUFF: 160; 4.5 AEROSOL RESPIRATORY (INHALATION) at 20:42

## 2023-04-22 RX ADMIN — AMLODIPINE BESYLATE 5 MG: 5 TABLET ORAL at 08:56

## 2023-04-22 RX ADMIN — DOCUSATE SODIUM 50MG AND SENNOSIDES 8.6MG 2 TABLET: 8.6; 5 TABLET, FILM COATED ORAL at 08:56

## 2023-04-22 RX ADMIN — Medication 10 ML: at 08:57

## 2023-04-22 RX ADMIN — BUDESONIDE AND FORMOTEROL FUMARATE DIHYDRATE 2 PUFF: 160; 4.5 AEROSOL RESPIRATORY (INHALATION) at 08:48

## 2023-04-22 RX ADMIN — MICONAZOLE NITRATE: 2 POWDER TOPICAL at 21:00

## 2023-04-22 RX ADMIN — APIXABAN 5 MG: 5 TABLET, FILM COATED ORAL at 20:58

## 2023-04-22 RX ADMIN — ASPIRIN 81 MG: 81 TABLET, COATED ORAL at 08:56

## 2023-04-22 RX ADMIN — METOPROLOL TARTRATE 12.5 MG: 25 TABLET ORAL at 08:56

## 2023-04-22 RX ADMIN — AMMONIUM LACTATE 1 APPLICATION: 120 CREAM TOPICAL at 05:40

## 2023-04-22 RX ADMIN — MICONAZOLE NITRATE 1 APPLICATION: 2 POWDER TOPICAL at 08:57

## 2023-04-22 RX ADMIN — APIXABAN 5 MG: 5 TABLET, FILM COATED ORAL at 08:56

## 2023-04-22 RX ADMIN — VANCOMYCIN HYDROCHLORIDE 1750 MG: 10 INJECTION, POWDER, LYOPHILIZED, FOR SOLUTION INTRAVENOUS at 05:27

## 2023-04-22 RX ADMIN — METOPROLOL TARTRATE 12.5 MG: 25 TABLET ORAL at 20:58

## 2023-04-22 NOTE — PLAN OF CARE
Goal Outcome Evaluation:  Plan of Care Reviewed With: patient, family           Outcome Evaluation: Patient continues on 5L NC and Trilogy when asleep.  Eliquis continues.  Biliary drain in place with dark brown drainage noted.  Chest xray this am.  Plans for discharge to [facility] when ready.  VS and labs monitored.

## 2023-04-22 NOTE — PROGRESS NOTES
LOS: 17 days   Patient Care Team:  Harry Luis MD as PCP - General    Subjective   Following for respiratory failure and sleep apnea  Patient is known to me I saw him earlier this admission I am picking up pulmonary coverage again I reviewed the interim records.  His daughter who has been incredibly helpful is at bedside along with his other daughter who also looks after him does not have the same medical experience.  Patient is pretty somnolent now he is on a Pap machine his daughter just putting back on his PAP machine he did not sleep well last night and did not get to use the machine they both note that when he gets a night sleep with his PAP machine he is so much better the next day.  He is on 5 L bled into his PAP machine currently.    Review of Systems:          Objective     Vital Signs  Vital Sign Min/Max for last 24 hours  Temp  Min: 97.4 °F (36.3 °C)  Max: 98.6 °F (37 °C)   BP  Min: 137/79  Max: 158/91   Pulse  Min: 64  Max: 103   Resp  Min: 16  Max: 18   SpO2  Min: 89 %  Max: 94 %   Flow (L/min)  Min: 3.5  Max: 5   Weight  Min: 133 kg (292 lb 5.3 oz)  Max: 133 kg (292 lb 5.3 oz)        Ventilator/Non-Invasive Ventilation Settings (From admission, onward)     Start     Ordered    04/13/23 1049  NIPPV (CPAP or BIPAP)  At Bedtime & As Needed-RT        Comments: Use our machine or NIPPV from dME rather than home unit   Question Answer Comment   Indication: Sleep Apnea    Type: AVAPS/PC/PS    Backup Rate 18    Target VT (mL) 650    EPAP/PEEP (cm H2O) 10    Min Pressure (cm H2O) 16    Max Pressure (cm H2O) 30    Titrate Oxygen for SpO2 90% - 95%        04/13/23 1049    04/08/23 0906  NIPPV (CPAP or BIPAP)  At Bedtime & As Needed-RT,   Status:  Canceled        Comments: Started 10 cm and try and adjust to best effect best tolerance until we can get his home machine   Question Answer Comment   Indication: Sleep Apnea    Type: AVAPS/PC/PS    Backup Rate 18    Target VT (mL) 650    EPAP/PEEP (cm  H2O) 10    Min Pressure (cm H2O) 16    Max Pressure (cm H2O) 30    Titrate Oxygen for SpO2 90% - 95%        04/08/23 0906    04/05/23 1411  NIPPV (CPAP or BIPAP)  At Bedtime & As Needed-RT,   Status:  Canceled        Comments: Started 10 cm and try and adjust to best effect best tolerance until we can get his home machine   Question Answer Comment   Indication: Sleep Apnea    Type: CPAP    Pressure 8    Titrate Oxygen for SpO2 88% - 92%        04/05/23 1410    04/05/23 0523  NIPPV (CPAP or BIPAP)  At Bedtime & As Needed-RT,   Status:  Canceled        Question Answer Comment   Indication: Sleep Apnea    Type: CPAP    Pressure 8    Titrate Oxygen for SpO2 88% - 92%        04/05/23 0522                             Body mass index is 44.45 kg/m².  I/O last 3 completed shifts:  In: 970 [P.O.:970]  Out: 110 [Emesis/NG output:110]  I/O this shift:  In: 926 [P.O.:926]  Out: -         Physical Exam:  General Appearance: Well-developed obese elderly white male he is sleeping on his PAP machine he did wake up and talk to me he does not appear in any acute distress  Eyes: Conjunctiva are clear  ENT: Did not get a real good look at his mouth his membranes looked a little dry  Neck: Trachea midline neck is obese no visible jugular venous distention  Lungs: Decreased but clear throughout he does not take the deepest breaths  Cardiac: Regular rhythm no murmur  Abdomen: Obese soft nontender he has a right upper quadrant cystostomy drain there is a khan brown fluid in the drain bag  : Not examined  Musculoskeletal: Moderate thoracic kyphosis  Skin: Warm and dry no jaundice no petechiae noted  Neuro: He woke up he talk to me a little bit I did not do an extensive exam today  Extremities/P Vascular: No clubbing no cyanosis he really does not have any significant peripheral edema at this time and he has palpable radial and dorsalis pedis pulses  MSE: He was pretty sleepy today I did not get a good assessment       Labs:  Results  from last 7 days   Lab Units 04/22/23  0654 04/21/23  0834 04/20/23  0315 04/19/23  0427 04/18/23  0522 04/17/23  0324 04/16/23  1101 04/16/23  0627 04/15/23  1748   GLUCOSE mg/dL 106* 153*  --  113*  --  120* 134*  --   --    SODIUM mmol/L 142 146*  --  139  --  137 137  --   --    POTASSIUM mmol/L 3.8 3.9  --  3.5  --  3.8 4.4  --   --    CO2 mmol/L 35.7* 35.9*  --  37.0*  --  37.1* 37.0*  --   --    CHLORIDE mmol/L 99 98  --  94*  --  91* 92*  --   --    ANION GAP mmol/L 7.3 12.1  --  8.0  --  8.9 8.0  --   --    CREATININE mg/dL 0.76 0.78 0.85 0.92 0.86 0.80 0.75*   < > 0.88   BUN mg/dL 33* 40*  --  52*  --  45* 40*  --   --    BUN / CREAT RATIO  43.4* 51.3*  --  56.5*  --  56.3* 53.3*  --   --    CALCIUM mg/dL 9.0 9.1  --  8.9  --  9.2 8.8  --   --    ALK PHOS U/L  --   --   --  84  --   --  88  --  79   TOTAL PROTEIN g/dL  --   --   --  5.5*  --   --  6.3  --  6.0   ALT (SGPT) U/L  --   --   --  20  --   --  33  --  40   AST (SGOT) U/L  --   --   --  15  --   --  18  --  18   BILIRUBIN mg/dL  --   --   --  0.6  --   --  1.2  --  1.3*   ALBUMIN g/dL  --   --   --  2.6*  --   --  2.6*  --  2.7*   GLOBULIN gm/dL  --   --   --  2.9  --   --  3.7  --   --     < > = values in this interval not displayed.     Estimated Creatinine Clearance: 82.6 mL/min (by C-G formula based on SCr of 0.76 mg/dL).      Results from last 7 days   Lab Units 04/22/23  0654 04/21/23  0834 04/19/23  0427 04/18/23  0522 04/17/23  0513 04/16/23  0627   WBC 10*3/mm3 7.94 7.52 8.02 9.50 12.62* 12.81*   RBC 10*6/mm3 4.54 4.46 4.11* 4.47 4.58 4.43   HEMOGLOBIN g/dL 13.4 12.8* 12.1* 12.9* 13.2 13.2   HEMATOCRIT % 42.0 40.6 36.9* 40.0 40.2 38.9   MCV fL 92.5 91.0 89.8 89.5 87.8 87.8   MCH pg 29.5 28.7 29.4 28.9 28.8 29.8   MCHC g/dL 31.9 31.5 32.8 32.3 32.8 33.9   RDW % 12.9 12.8 12.4 12.3 12.3 12.0*   RDW-SD fl 43.9 41.8 39.9 40.2 38.7 38.5   MPV fL 10.1 10.3 11.1 11.3 12.3* 11.0   PLATELETS 10*3/mm3 232 251 183 166 154 112*   NEUTROPHIL % %  --    --   --  88.0* 90.1* 91.1*   LYMPHOCYTE % %  --   --   --  3.6* 2.9* 2.6*   MONOCYTES % %  --   --   --  7.3 6.3 5.4   EOSINOPHIL % %  --   --   --  0.0* 0.0* 0.0*   BASOPHIL % %  --   --   --  0.2 0.1 0.0   IMM GRAN % %  --   --   --  0.9* 0.6*  --    NEUTROS ABS 10*3/mm3  --   --   --  8.36* 11.37* 11.68*   LYMPHS ABS 10*3/mm3  --   --   --  0.34* 0.37* 0.33*   MONOS ABS 10*3/mm3  --   --   --  0.69 0.79 0.69   EOS ABS 10*3/mm3  --   --   --  0.00 0.00 0.00   BASOS ABS 10*3/mm3  --   --   --  0.02 0.01 0.00   IMMATURE GRANS (ABS) 10*3/mm3  --   --   --  0.09* 0.08*  --    NRBC /100 WBC  --   --   --  0.1 0.0  --                              Microbiology Results (last 10 days)     Procedure Component Value - Date/Time    Body Fluid Culture - Body Fluid, Gallbladder [[id number]]  (Abnormal)  (Susceptibility) Collected: 04/18/23 1252    Lab Status: Final result Specimen: Body Fluid from Gallbladder Updated: 04/20/23 1150     Body Fluid Culture Heavy growth (4+) Enterococcus faecium     Gram Stain Rare (1+) WBCs per low power field      Occasional Gram positive cocci    Susceptibility      Enterococcus faecium      GERA      Ampicillin Resistant     Gentamicin High Level Synergy Susceptible      Vancomycin Susceptible                           Blood Culture - Blood, Hand, Right [[id number]]  (Normal) Collected: 04/16/23 1101    Lab Status: Final result Specimen: Blood from Hand, Right Updated: 04/21/23 1200     Blood Culture No growth at 5 days    Narrative:      Less than seven (7) mL's of blood was collected.  Insufficient quantity may yield false negative results.    Blood Culture - Blood, Hand, Left [[id number]]  (Normal) Collected: 04/16/23 1101    Lab Status: Final result Specimen: Blood from Hand, Left Updated: 04/21/23 1200     Blood Culture No growth at 5 days    Narrative:      Less than seven (7) mL's of blood was collected.  Insufficient quantity may yield false negative results.    Blood Culture - Blood,  Hand, Left [[id number]]  (Normal) Collected: 04/14/23 2115    Lab Status: Final result Specimen: Blood from Hand, Left Updated: 04/19/23 2130     Blood Culture No growth at 5 days    Blood Culture - Blood, Arm, Right [[id number]]  (Abnormal)  (Susceptibility) Collected: 04/14/23 2114    Lab Status: Final result Specimen: Blood from Arm, Right Updated: 04/17/23 0818     Blood Culture Enterococcus faecium     Comment:   Infectious disease consultation is highly recommended.        Isolated from Aerobic and Anaerobic Bottles     Gram Stain Anaerobic Bottle Gram positive cocci in chains      Aerobic Bottle Gram positive cocci in chains    Susceptibility      Enterococcus faecium      GERA      Ampicillin Resistant     Gentamicin High Level Synergy Susceptible      Vancomycin Susceptible                           Blood Culture ID, PCR - Blood, Arm, Right [[id number]]  (Abnormal) Collected: 04/14/23 2114    Lab Status: Final result Specimen: Blood from Arm, Right Updated: 04/15/23 1454     BCID, PCR Enterococcus faecium. Kimberli/B (vancomycin resistance gene) not detected. Identification by BCID2 PCR.    Narrative:      Infectious disease consultation is highly recommended to rule out distant foci of infection.              amLODIPine, 5 mg, Oral, Q24H  ammonium lactate, 1 application, Topical, Q12H  apixaban, 5 mg, Oral, Q12H  aspirin, 81 mg, Oral, Daily  budesonide-formoterol, 2 puff, Inhalation, BID - RT  fluticasone, 1 spray, Nasal, Daily  ipratropium-albuterol, 3 mL, Nebulization, Q6H While Awake - RT  metoprolol tartrate, 12.5 mg, Oral, Q12H  miconazole, , Topical, Q12H  pantoprazole, 40 mg, Oral, Q AM  potassium chloride, 40 mEq, Oral, Once  senna-docusate sodium, 2 tablet, Oral, BID  sodium chloride, 10 mL, Intravenous, Q12H  sodium chloride, 10 mL, Intravenous, Q12H  vancomycin, 1,750 mg, Intravenous, Q24H      Pharmacy to dose vancomycin,         Diagnostics:  Adult Transthoracic Echo Complete W/ Cont if Necessary Per  Protocol    Result Date: 4/16/2023  •  Left ventricular systolic function is normal. Calculated left ventricular EF = 59.5% •  Left ventricular wall thickness is consistent with mild concentric hypertrophy. •  The right ventricular cavity is mildly dilated. •  Left atrial volume is mildly increased. •  The right atrial cavity is mildly  dilated. •  Estimated right ventricular systolic pressure from tricuspid regurgitation is mildly elevated (35-45 mmHg). •  The study is technically difficult for diagnosis.     Adult Transthoracic Echo Complete W/ Cont if Necessary Per Protocol    Result Date: 4/5/2023  •  Left ventricular systolic function is normal. Left ventricular ejection fraction appears to be 66 - 70%. •  Left ventricular wall thickness is consistent with mild to moderate concentric hypertrophy. •  Estimated right ventricular systolic pressure from tricuspid regurgitation is moderately elevated (45-55 mmHg).     CT Abdomen Pelvis Without Contrast    Result Date: 4/16/2023  CT ABDOMEN AND PELVIS WITHOUT IV CONTRAST  HISTORY: Abdominal pain. Bacteremia.  TECHNIQUE:  CT includes axial imaging from the lung bases to the trochanters without intravenous contrast and without use of oral contrast. Data reconstructed in coronal and sagittal planes. Radiation dose reduction techniques were utilized, including automated exposure control and exposure modulation based on body size.  COMPARISON: CT angiogram chest 04/04/2023.  FINDINGS: There is bilateral lower lobe consolidation with potential infiltrates and there are small pleural effusions. Mild intraabdominal ascites is present and there is perihepatic fluid. Gallbladder is distended and exhibits wall thickening. Multiple gallstones are present and there is a suspected proximal cystic duct stone. There is right upper quadrant inflammation surrounding the gallbladder and descending portion of the duodenum. There is mild duodenal wall thickening. No biliary ductal  dilatation is present. The findings are suspicious for acute cholecystitis. There is no convincing evidence for pancreatitis. The spleen and kidneys appear within normal limits. There is no bowel dilatation or evidence for bowel obstruction. There is mild fluid and stranding extending into the right paracolic gutter. Atherosclerotic calcifications are present involving the abdominal aorta and iliac vasculature. There is multilevel degenerative disc disease throughout the thoracic and lumbar spine.      1. Gallbladder dilatation with wall thickening and multiple small gallstones and suspected proximal cystic duct stone.  There is extensive inflammation within the right upper quadrant most likely due to acute cholecystitis and there is also evidence for duodenitis with stranding and edema in the right upper quadrant extending into the right paracolic gutter. Mild ascites with perihepatic fluid. These findings represent a change when correlated with the CT angiogram chest 12 days ago. 2. Small pleural effusions with dense lower lobe consolidation and potential basilar infiltrates.  Discussed with Arlyn, the nurse caring for the patient on 04/16/2023 at 1:15 PM.  Radiation dose reduction techniques were utilized, including automated exposure control and exposure modulation based on body size.  This report was finalized on 4/16/2023 2:15 PM by Dr. Richard Gregg M.D.      CT Head Without Contrast    Result Date: 4/4/2023  CT HEAD WO CONTRAST-  INDICATIONS: Confusion  TECHNIQUE: Radiation dose reduction techniques were utilized, including automated exposure control and exposure modulation based on body size. Noncontrast head CT  COMPARISON: 03/05/2021  FINDINGS:    No acute intracranial hemorrhage, midline shift or mass effect. No acute territorial infarct is identified. 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  Prominent periventricular hypodensities suggest chronic small vessel ischemic change in a patient  this age.  Arterial calcifications are seen at the base of the brain.  Ventricles, cisterns, cerebral sulci are unremarkable for patient age.  Mild paranasal sinus mucosal thickening is present. The visualized paranasal sinuses, orbits, mastoid air cells are otherwise are unremarkable.           No acute intracranial hemorrhage or hydrocephalus. Chronic changes of the brain. If there is further clinical concern, MRI could be considered for further evaluation.  This report was finalized on 4/4/2023 7:21 PM by Dr. Leander Kay M.D.      FL Video Swallow With Speech Single Contrast    Result Date: 4/19/2023  VIDEO SWALLOW STUDY  HISTORY: Dysphagia.  1 minute 36 seconds fluoroscopy was provided for the speech pathologist during a video swallow study.  Penetration was seen      Fluoroscopy was provided for the speech pathologist during a video swallow study. For full details please see the speech pathology report  This report was finalized on 4/19/2023 11:33 AM by Dr. Chong Pena M.D.      XR Chest 1 View    Result Date: 4/22/2023  XR CHEST 1 VW-  HISTORY: Male who is 89 years-old, pleural effusion.  TECHNIQUE: Frontal views of the chest  COMPARISON: 4/18/2023  FINDINGS: The heart is enlarged. Aorta appears ectatic. Pulmonary vasculature appears mildly less congested. Aeration of the lower lungs appears partially improved, with mild atelectasis or infiltrate in the right lower lung. Minimal, decreased right pleural effusion is apparent. No pneumothorax. No acute osseous process.      Mild partial improvement.  This report was finalized on 4/22/2023 7:23 AM by Dr. Leander Kay M.D.      XR Chest 1 View    Result Date: 4/19/2023  Portable chest radiograph  HISTORY:Fluid status  TECHNIQUE: Single AP portable radiograph of the chest  COMPARISON:Hypoxia, respiratory failure      FINDINGS AND IMPRESSION: Lungs are hypoinflated. Evaluation is suboptimal due to patient body habitus. There is pulmonary vascular  congestion with superimpose interstitial thickening and pulmonary opacification bilateral mid to lower lungs, right greater than left, which appears mildly improved since 04/18/2023. No pneumothorax is seen. Cardiac silhouette is accentuated by low lung volumes. Radiopaque densities overlie the medial aspect of the left lung, of indeterminate etiology. There appears be oral contrast more inferiorly within the stomach.  This report was finalized on 4/19/2023 12:30 PM by Dr. Chong Pena M.D.      XR Chest 1 View    Result Date: 4/18/2023  XR CHEST 1 VW-  HISTORY: Male who is 89 years-old,  worsening hypoxia  TECHNIQUE: Frontal views of the chest  COMPARISON: 04/14/2023  FINDINGS: The heart is enlarged. Aorta appears ectatic. Pulmonary vasculature is congested. Alveolar interstitial opacities predominantly at the mid to lower lungs may represent edema and/or pneumonia. Moderate right pleural effusion is apparent. Overall appearance represents interval worsening. No pneumothorax is seen. No acute osseous process.      Bilateral pulmonary opacities. Moderate right pleural effusion. Cardiomegaly and pulmonary vascular congestion. Ectatic aorta.  This report was finalized on 4/18/2023 5:13 PM by Dr. Leander Kay M.D.      XR Chest 1 View    Result Date: 4/14/2023  XR CHEST 1 VW-  04/14/2023  HISTORY: Elevated white blood cell count.  Heart size is mildly enlarged. Lungs are underinflated with some vascular crowding. There may be some minimal patchy infiltrate in the left lung base. Patient is rotated slightly to the right.      1. Cardiomegaly. 2. Underinflation of the lungs. There may be some minimal pneumonia developing in the left base.  This report was finalized on 4/14/2023 7:20 PM by Dr. Jimenez Rueda M.D.      XR Chest 1 View    Result Date: 4/10/2023  Patient: SAJI VENTURA  Time Out: 05:41 Exam(s): XR CXR 1 VIEW EXAM:   XR Chest, 1 View CLINICAL HISTORY:    Reason for exam: Follow-up atelectasis  ongoing respiratory failure. TECHNIQUE:   Frontal view of the chest. COMPARISON: 4-80-23 IMPRESSION:     1.  No significant changes from prior study.    Electronically signed by Herbert Israel MD on 04-10-23 at 0541    XR Chest 1 View    Result Date: 4/8/2023  XR CHEST 1 VW-  HISTORY: Male who is 89 years-old,  dyspnea  TECHNIQUE: Frontal view of the chest  COMPARISON: 4/4/2023  FINDINGS: The heart is enlarged. Aorta is tortuous. Pulmonary vasculature is congested. Small likely atelectasis in the lower lungs. No pleural effusion, or pneumothorax is seen, apices are partly obscured by the chin. No acute osseous process.      Small likely atelectasis in the lower lungs. Cardiomegaly with pulmonary vascular congestion. Tortuous aorta.  This report was finalized on 4/8/2023 8:19 AM by Dr. Leander Kay M.D.      XR Chest 1 View    Result Date: 4/4/2023  XR CHEST 1 VW-  HISTORY: Male who is 89 years-old,  short of breath  TECHNIQUE: Frontal view of the chest  COMPARISON: 04/03/2023  FINDINGS: The heart is enlarged, mild prominence of vascular markings. Aorta appears ectatic. No focal pulmonary consolidation, pleural effusion, or pneumothorax. No acute osseous process.      No focal pulmonary consolidation. Cardiomegaly with mild prominence of vascular markings. Ectatic appearing aorta. Follow-up as clinically indicated.  This report was finalized on 4/4/2023 6:10 PM by Dr. Leander Kay M.D.      CT Angiogram Chest    Result Date: 4/4/2023  CT ANGIOGRAM CHEST-  INDICATIONS: Abnormal chest x-ray  TECHNIQUE: Radiation dose reduction techniques were utilized, including automated exposure control and exposure modulation based on body size. CT angiography of the chest. Three-dimensional reconstructions.  COMPARISON: 11/04/2019  FINDINGS:  Ascending aorta is dilated, 4.1 cm, not significantly changed. No aortic dissection. No pulmonary embolism. Prominence of caliber of the central pulmonary arteries suggests  pulmonary arterial hypertension; this appearance is chronic.  The heart size is enlarged without pericardial effusion. A few small subcentimeter short axis mediastinal lymph nodes are seen that are not significant by size criteria. A 1 cm short axis retrocardiac lymph node on axial image 83 is borderline, nonspecific, stable.  Left thyroid nodularity is noted, suboptimally evaluated with this technique, grossly similar to prior exam, could be further characterized with ultrasound if indicated.  The airways appear clear.  No pleural effusion or pneumothorax.  The lungs show mild atelectasis. A 1.1 cm pleural-based groundglass nodular density of the right lower lobe on axial image 74 is stable.  Upper abdominal structures show no acute findings. Layering stones are seen in the gallbladder.  Degenerative changes are seen in the spine and shoulders. Bilateral shoulder effusions are present. No acute fracture is identified.       Dilated ascending aorta, 4.1 cm. No aortic dissection. Chronic prominence of caliber of the central pulmonary arteries suggests pulmonary arterial hypertension.  This report was finalized on 4/4/2023 7:28 PM by Dr. Leander Kay M.D.      CT Guided Abscess Drain Subdiaphr Subphren    Result Date: 4/18/2023  PROCEDURE: CT guided cholecystostomy tube placement  HISTORY: Acute cholecystitis, nonsurgical candidate; R09.02-Hypoxemia; R41.0-Disorientation, unspecified; R53.1-Weakness; R53.81-Other malaise; E87.6-Hypokalemia; I48.91-Unspecified atrial fibrillation; I71.21-Aneurysm of the ascending aorta, without rupture  TECHNIQUE: Radiation dose reduction techniques were utilized, including automated exposure control and exposure modulation based on body size.  The procedural risks, benefits, and alternatives were discussed with the patient. Informed consent was obtained.    The patient was placed in the supine position on the CT procedure table. Axial CT scan was performed to localize the  gallbladder. The overlying skin was prepped and draped in the usual sterile fashion. 1% buffered lidocaine was used for local anesthesia.  Next, a Yueh needle was advanced into the gallbladder under CT guidance. There was return of dark bile. A superstiff guidewire was advanced through the needle. The tract was serially dilated. Next an 8 [place] pigtail catheter was advanced over the wire into the gallbladder. The catheter was sutured in place and placed to gravity bag drainage. No immediate complications. Sample of bile was taken for requested culture       Technically successful CT guided cholecystostomy tube placement.  Radiation dose reduction techniques were utilized, including automated exposure control and exposure modulation based on body size.  This report was finalized on 4/18/2023 1:18 PM by Dr. Juan Motta M.D.      XR Chest PA & Lateral    Result Date: 4/4/2023  PA AND LATERAL CHEST X-RAY  HISTORY: Hypoxia.  Chest x-ray consisting of 3 views is provided. Correlation: Chest x-ray 03/05/2021.  FINDINGS: Cardiomegaly appears similar. Pulmonary vasculature is engorged but there is no interstitial edema. No focal infiltrate or pleural effusion. Extensive degenerative change throughout the thoracic spine and visualized upper lumbar spine.      Cardiomegaly with pulmonary vascular engorgement but no marlee interstitial edema.  This report was finalized on 4/4/2023 7:10 AM by Dr. Jae Bailey M.D.      Results for orders placed during the hospital encounter of 04/04/23    Adult Transthoracic Echo Complete W/ Cont if Necessary Per Protocol    Interpretation Summary  •  Left ventricular systolic function is normal. Calculated left ventricular EF = 59.5%  •  Left ventricular wall thickness is consistent with mild concentric hypertrophy.  •  The right ventricular cavity is mildly dilated.  •  Left atrial volume is mildly increased.  •  The right atrial cavity is mildly  dilated.  •  Estimated right  ventricular systolic pressure from tricuspid regurgitation is mildly elevated (35-45 mmHg).  •  The study is technically difficult for diagnosis.          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         Assessment & Plan     1. Acute on chronic hypoxemic and hypercapnic respiratory failure improved he is weaned to 5 L O2 we will see how he is doing he might be somebody that would benefit from heated high flow O2 at 60 L with a lower FiO2 to see if we can open up and clear out his airway some of the heated humidity can sometimes be beneficial particular if they have used PAP machines and have impacted secretions.  2. Obstructive sleep apnea continue PAP/noninvasive ventilation with all sleep  3. 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  4. Left lower lobe 1.1 cm groundglass nodule stable for 4 years  5. Pulmonary hypertension by echocardiogram  6. Recent onset A-fib  7. Morbid obesity with a BMI greater than 44  8. Essential hypertension  9. 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 Acute cholecystitis status post percutaneous cholecystostomy  11. Enterococcus bacteremia ID has seen and recommends antibiotics through 4/30    Plan for  disposition:    Kenneth Chamberlain Jr, MD  04/22/23  16:00 EDT    Time:

## 2023-04-22 NOTE — PROGRESS NOTES
Name: Harry Potts ADMIT: 2023   : 1930  PCP: Harry Luis MD    MRN: [id number] LOS: 17 days   AGE/SEX: 89 y.o. male  ROOM: [facility]     Subjective   Subjective   Patient is seen at bedside.       Objective   Objective   Vital Signs  Temp:  [97.4 °F (36.3 °C)-98.6 °F (37 °C)] 97.9 °F (36.6 °C)  Heart Rate:  [] 86  Resp:  [16-18] 16  BP: (137-158)/(72-95) 137/79  SpO2:  [89 %-94 %] 90 %  on  Flow (L/min):  [3.5-5] 3.5;   Device (Oxygen Therapy): CPAP  Body mass index is 44.45 kg/m².  Physical Exam   General, awake and alert.  Appears sick on chronic basis  Head and ENT, normocephalic and atraumatic.  Lungs, symmetric expansion, equal air entry bilaterally.  Heart, regular rate and rhythm.  Abdomen, soft and nontender.  Extremities, no clubbing or cyanosis.  Neuro, no focal deficits.  Skin: Warm and no rash.  Psych, normal mood and affect.  Musculoskeletal, joint examination is grossly normal.      Results Review     I reviewed the patient's new clinical results.  Results from last 7 days   Lab Units 23  0654 23  0834 23  0522   WBC 10*3/mm3 7.94 7.52 8.02 9.50   HEMOGLOBIN g/dL 13.4 12.8* 12.1* 12.9*   PLATELETS 10*3/mm3 232 251 183 166     Results from last 7 days   Lab Units 23  0654 23  0834 23  0315 23  0427 23  0522 23  0324   SODIUM mmol/L 142 146*  --  139  --  137   POTASSIUM mmol/L 3.8 3.9  --  3.5  --  3.8   CHLORIDE mmol/L 99 98  --  94*  --  91*   CO2 mmol/L 35.7* 35.9*  --  37.0*  --  37.1*   BUN mg/dL 33* 40*  --  52*  --  45*   CREATININE mg/dL 0.76 0.78 0.85 0.92   < > 0.80   GLUCOSE mg/dL 106* 153*  --  113*  --  120*   EGFR mL/min/1.73 84.3 83.7 81.5 78.0   < > 83.0    < > = values in this interval not displayed.     Results from last 7 days   Lab Units 23  0427 23  1101 04/15/23  1748   ALBUMIN g/dL 2.6* 2.6* 2.7*   BILIRUBIN mg/dL 0.6 1.2 1.3*   ALK PHOS U/L 84 88 79   AST (SGOT) U/L 15 18  18   ALT (SGPT) U/L 20 33 40     Results from last 7 days   Lab Units 04/22/23  0654 04/21/23  0834 04/19/23  0427 04/17/23  0324 04/16/23  1101 04/16/23  1101 04/15/23  1748   CALCIUM mg/dL 9.0 9.1 8.9 9.2   < > 8.8  --    ALBUMIN g/dL  --   --  2.6*  --   --  2.6* 2.7*    < > = values in this interval not displayed.       No results found for: HGBA1C, POCGLU    XR Chest 1 View    Result Date: 4/22/2023  Mild partial improvement.  This report was finalized on 4/22/2023 7:23 AM by Dr. Leander Kay M.D.      I have personally reviewed all medications:  Scheduled Medications  amLODIPine, 5 mg, Oral, Q24H  ammonium lactate, 1 application, Topical, Q12H  apixaban, 5 mg, Oral, Q12H  aspirin, 81 mg, Oral, Daily  budesonide-formoterol, 2 puff, Inhalation, BID - RT  fluticasone, 1 spray, Nasal, Daily  ipratropium-albuterol, 3 mL, Nebulization, Q6H While Awake - RT  metoprolol tartrate, 12.5 mg, Oral, Q12H  miconazole, , Topical, Q12H  pantoprazole, 40 mg, Oral, Q AM  potassium chloride, 40 mEq, Oral, Once  senna-docusate sodium, 2 tablet, Oral, BID  sodium chloride, 10 mL, Intravenous, Q12H  sodium chloride, 10 mL, Intravenous, Q12H  vancomycin, 1,750 mg, Intravenous, Q24H    Infusions  Pharmacy to dose vancomycin,     Diet  Diet: Regular/House Diet; Texture: Soft to Chew (NDD 3); Soft to Chew: Chopped Meat; Fluid Consistency: Thin (IDDSI 0)    I have personally reviewed:  [x]  Laboratory   [x]  Microbiology   [x]  Radiology   [x]  EKG/Telemetry  [x]  Cardiology/Vascular   [x]  Pathology    [x]  Records       Assessment/Plan     Active Hospital Problems    Diagnosis  POA   • **Acute on chronic respiratory failure with hypoxia and hypercapnia [J96.21, J96.22]  Yes   • Bacteremia due to Enterococcus [R78.81, B95.2]  Unknown   • Pulmonary hypertension [I27.20]  Yes   • Metabolic encephalopathy [G93.41]  Yes   • Vascular dementia without behavioral disturbance [F01.50]  Yes   • Dysphagia [R13.10]  Unknown   • Anemia  [D64.9]  Unknown   • New onset atrial fibrillation [I48.91]  Yes   • Acute cholecystitis [K81.0]  Unknown   • JENISE on CPAP [G47.33, Z99.89]  Not Applicable   • Restrictive lung disease [J98.4]  Yes   • Morbid obesity [E66.01]  Yes   • Gastroesophageal reflux disease [K21.9]  Yes   • Hypertension [I10]  Yes      Resolved Hospital Problems   No resolved problems to display.       89 y.o. male admitted with Acute on chronic respiratory failure with hypoxia and hypercapnia.    Assessment and plan  1.  Acute on chronic hypoxemic and hypercapnic respiratory failure, obstructive sleep apnea, restrictive lung disease, pulm hypertension, pulmonary on board, follow management recommendations.    2.  Enterococcal bacteremia, acute cholecystitis, pneumonia, continue antibiotics as advised by ID, follow management recommendations.  Surgery recommended cholecystostomy tube.    3.  Dysphagia, continue to use aspiration precautions.    4.  Metabolic encephalopathy with underlying vascular dementia, neurology expecting waxing and waning encephalopathy during hospitalization.  Continue to monitor for mental status changes.    5.  Paroxysmal A-fib, hypertension, patient is currently rate controlled.  He is back on Eliquis.    6.  Anemia, monitor hemoglobin trend.    7.  CODE STATUS is full code.  Further plans based on hospital course.  He may benefit from palliative care evaluation going forward.    Bladimir Sun MD  [facility]  04/22/23  16:21 EDT

## 2023-04-23 LAB
ANION GAP SERPL CALCULATED.3IONS-SCNC: 7 MMOL/L (ref 5–15)
BUN SERPL-MCNC: 28 MG/DL (ref 8–23)
BUN/CREAT SERPL: 35.4 (ref 7–25)
CALCIUM SPEC-SCNC: 8.4 MG/DL (ref 8.2–9.6)
CHLORIDE SERPL-SCNC: 97 MMOL/L (ref 98–107)
CO2 SERPL-SCNC: 37 MMOL/L (ref 22–29)
CREAT SERPL-MCNC: 0.79 MG/DL (ref 0.76–1.27)
DEPRECATED RDW RBC AUTO: 40.2 FL (ref 37–54)
EGFRCR SERPLBLD CKD-EPI 2021: 83.3 ML/MIN/1.73
ERYTHROCYTE [DISTWIDTH] IN BLOOD BY AUTOMATED COUNT: 12.6 % (ref 12.3–15.4)
GLUCOSE SERPL-MCNC: 134 MG/DL (ref 65–99)
HCT VFR BLD AUTO: 41.5 % (ref 37.5–51)
HGB BLD-MCNC: 13.1 G/DL (ref 13–17.7)
MCH RBC QN AUTO: 28.2 PG (ref 26.6–33)
MCHC RBC AUTO-ENTMCNC: 31.6 G/DL (ref 31.5–35.7)
MCV RBC AUTO: 89.4 FL (ref 79–97)
PLATELET # BLD AUTO: 233 10*3/MM3 (ref 140–450)
PMV BLD AUTO: 10.6 FL (ref 6–12)
POTASSIUM SERPL-SCNC: 4 MMOL/L (ref 3.5–5.2)
RBC # BLD AUTO: 4.64 10*6/MM3 (ref 4.14–5.8)
SODIUM SERPL-SCNC: 141 MMOL/L (ref 136–145)
WBC NRBC COR # BLD: 9.18 10*3/MM3 (ref 3.4–10.8)

## 2023-04-23 PROCEDURE — 85027 COMPLETE CBC AUTOMATED: CPT | Performed by: NURSE PRACTITIONER

## 2023-04-23 PROCEDURE — 94799 UNLISTED PULMONARY SVC/PX: CPT

## 2023-04-23 PROCEDURE — 25010000002 VANCOMYCIN 10 G RECONSTITUTED SOLUTION: Performed by: INTERNAL MEDICINE

## 2023-04-23 PROCEDURE — 80048 BASIC METABOLIC PNL TOTAL CA: CPT | Performed by: NURSE PRACTITIONER

## 2023-04-23 RX ADMIN — Medication 10 ML: at 20:42

## 2023-04-23 RX ADMIN — APIXABAN 5 MG: 5 TABLET, FILM COATED ORAL at 20:41

## 2023-04-23 RX ADMIN — MICONAZOLE NITRATE 1 APPLICATION: 2 POWDER TOPICAL at 08:34

## 2023-04-23 RX ADMIN — ASPIRIN 81 MG: 81 TABLET, COATED ORAL at 08:35

## 2023-04-23 RX ADMIN — Medication 10 ML: at 08:35

## 2023-04-23 RX ADMIN — AMMONIUM LACTATE 1 APPLICATION: 120 CREAM TOPICAL at 06:36

## 2023-04-23 RX ADMIN — DOCUSATE SODIUM 50MG AND SENNOSIDES 8.6MG 2 TABLET: 8.6; 5 TABLET, FILM COATED ORAL at 08:35

## 2023-04-23 RX ADMIN — PANTOPRAZOLE SODIUM 40 MG: 40 TABLET, DELAYED RELEASE ORAL at 05:44

## 2023-04-23 RX ADMIN — IPRATROPIUM BROMIDE AND ALBUTEROL SULFATE 3 ML: 2.5; .5 SOLUTION RESPIRATORY (INHALATION) at 13:08

## 2023-04-23 RX ADMIN — MICONAZOLE NITRATE: 2 POWDER TOPICAL at 20:41

## 2023-04-23 RX ADMIN — METOPROLOL TARTRATE 12.5 MG: 25 TABLET ORAL at 08:34

## 2023-04-23 RX ADMIN — BUDESONIDE AND FORMOTEROL FUMARATE DIHYDRATE 2 PUFF: 160; 4.5 AEROSOL RESPIRATORY (INHALATION) at 19:08

## 2023-04-23 RX ADMIN — AMLODIPINE BESYLATE 5 MG: 5 TABLET ORAL at 08:35

## 2023-04-23 RX ADMIN — Medication 10 ML: at 04:27

## 2023-04-23 RX ADMIN — METOPROLOL TARTRATE 12.5 MG: 25 TABLET ORAL at 20:41

## 2023-04-23 RX ADMIN — APIXABAN 5 MG: 5 TABLET, FILM COATED ORAL at 08:35

## 2023-04-23 RX ADMIN — FLUTICASONE PROPIONATE 1 SPRAY: 50 SPRAY, METERED NASAL at 08:35

## 2023-04-23 RX ADMIN — VANCOMYCIN HYDROCHLORIDE 1750 MG: 10 INJECTION, POWDER, LYOPHILIZED, FOR SOLUTION INTRAVENOUS at 05:44

## 2023-04-23 RX ADMIN — BUDESONIDE AND FORMOTEROL FUMARATE DIHYDRATE 2 PUFF: 160; 4.5 AEROSOL RESPIRATORY (INHALATION) at 08:24

## 2023-04-23 NOTE — PROGRESS NOTES
Name: Harry Potts ADMIT: 2023   : 1930  PCP: Harry Luis MD    MRN: [id number] LOS: 18 days   AGE/SEX: 89 y.o. male  ROOM: [facility]     Subjective   Subjective    Denies any new complaints today. Resting in bed. Discussed with nursing staff finally had a bowel movement today     Objective   Objective   Vital Signs  Temp:  [97.8 °F (36.6 °C)-98.5 °F (36.9 °C)] 97.9 °F (36.6 °C)  Heart Rate:  [] 101  Resp:  [16-18] 18  BP: (121-137)/(67-80) 121/80  SpO2:  [89 %-93 %] 93 %  on  Flow (L/min):  [3.5-5] 4;   Device (Oxygen Therapy): humidified;nasal cannula  Body mass index is 44.01 kg/m².     Physical Exam  Constitutional:       General: He is not in acute distress.     Appearance: Normal appearance. He is not toxic-appearing.   HENT:      Head: Normocephalic and atraumatic.   Cardiovascular:      Rate and Rhythm: Normal rate and regular rhythm.   Pulmonary:      Effort: Pulmonary effort is normal. No respiratory distress.      Breath sounds: Normal breath sounds. No wheezing or rhonchi.   Abdominal:      General: Bowel sounds are normal.      Palpations: Abdomen is soft.      Tenderness: There is no abdominal tenderness. There is no guarding or rebound.      Comments: Cholecystostomy tube   Musculoskeletal:         General: No swelling.      Cervical back: Normal range of motion.      Right lower leg: No edema.      Left lower leg: No edema.   Skin:     General: Skin is warm and dry.   Neurological:      General: No focal deficit present.      Mental Status: He is alert.   Psychiatric:         Mood and Affect: Mood normal.         Behavior: Behavior normal.         Thought Content: Thought content normal.            Results Review:       I reviewed the patient's new clinical results.  Results from last 7 days   Lab Units 23  0846 23  0654 23  0834 23  0427   WBC 10*3/mm3 9.18 7.94 7.52 8.02   HEMOGLOBIN g/dL 13.1 13.4 12.8* 12.1*   PLATELETS 10*3/mm3 233 232 251 183      Results from last 7 days   Lab Units 04/23/23  0846 04/22/23  0654 04/21/23  0834 04/20/23  0315 04/19/23 0427   SODIUM mmol/L 141 142 146*  --  139   POTASSIUM mmol/L 4.0 3.8 3.9  --  3.5   CHLORIDE mmol/L 97* 99 98  --  94*   CO2 mmol/L 37.0* 35.7* 35.9*  --  37.0*   BUN mg/dL 28* 33* 40*  --  52*   CREATININE mg/dL 0.79 0.76 0.78 0.85 0.92   GLUCOSE mg/dL 134* 106* 153*  --  113*   Estimated Creatinine Clearance: 78.8 mL/min (by C-G formula based on SCr of 0.79 mg/dL).  Results from last 7 days   Lab Units 04/19/23  0427 04/16/23  1101   ALBUMIN g/dL 2.6* 2.6*   BILIRUBIN mg/dL 0.6 1.2   ALK PHOS U/L 84 88   AST (SGOT) U/L 15 18   ALT (SGPT) U/L 20 33     Results from last 7 days   Lab Units 04/23/23  0846 04/22/23  0654 04/21/23  0834 04/19/23  0427 04/17/23  0324 04/16/23  1101   CALCIUM mg/dL 8.4 9.0 9.1 8.9   < > 8.8   ALBUMIN g/dL  --   --   --  2.6*  --  2.6*    < > = values in this interval not displayed.         No results found for: HGBA1C, POCGLU    amLODIPine, 5 mg, Oral, Q24H  ammonium lactate, 1 application, Topical, Q12H  apixaban, 5 mg, Oral, Q12H  aspirin, 81 mg, Oral, Daily  budesonide-formoterol, 2 puff, Inhalation, BID - RT  fluticasone, 1 spray, Nasal, Daily  ipratropium-albuterol, 3 mL, Nebulization, Q6H While Awake - RT  metoprolol tartrate, 12.5 mg, Oral, Q12H  miconazole, , Topical, Q12H  pantoprazole, 40 mg, Oral, Q AM  potassium chloride, 40 mEq, Oral, Once  senna-docusate sodium, 2 tablet, Oral, BID  sodium chloride, 10 mL, Intravenous, Q12H  sodium chloride, 10 mL, Intravenous, Q12H  vancomycin, 1,750 mg, Intravenous, Q24H      Pharmacy to dose vancomycin,     Diet: Regular/House Diet; Texture: Soft to Chew (NDD 3); Soft to Chew: Chopped Meat; Fluid Consistency: Thin (IDDSI 0)       Assessment/Plan     Active Hospital Problems    Diagnosis  POA   • **Acute on chronic respiratory failure with hypoxia and hypercapnia [J96.21, J96.22]  Yes   • Bacteremia due to Enterococcus [R78.81,  "B95.2]  Unknown   • Pulmonary hypertension [I27.20]  Yes   • Metabolic encephalopathy [G93.41]  Yes   • 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     Mr. Potts is a 89 year old male who initially presented to the hospital with complaints of dyspnea and increased confusion. He was found to have acute on chronic respiratory failure as well as new onset atrial fibrillation and worsening confusion related to be related to encephalopathy as well as underlying dementia. He was initially admitted to telemetry but having issues tolerating PAP therapy and became more obtunded. He was moved to the ICU for more aggressive NIPPV therapy with use of Precedex on 4/8 and eventually moved out of the ICU on 4/13. Neurology was consulted for his mental status changes. Cardiology consulted for new atrial fibrillation. He developed a new onset of worsening leukocytosis on 4/14 prompting infectious work up. Blood cultures returned positive for Enterococcus and further imaging obtained. He was found to have acute cholecystitis prompting general surgery to see on 4/17. Given his age and multiple co-morbidities, percutaneous cholecystectomy tube was placed with IR for treatment.     Acute on hronic hypoxemic and hypercapnic respiratory failure  JENISE  Restrictive lung disease  Pulmonary hypertension related to lung disease  -Pulmonology following: \"will need NIPPV for home use, simple cpap or bipap will not be enough to ensure adequate minute ventilation to prevent recurrent episdoes of acute on chronic " "hypercapnic respiratory failure that could result in hospital admission or death\"  -Avoid hyperoxia to prevent hypercapnia.  -Continue Symbicort, Duonebs. Plan for repeat ABGs for any lethargy. Received a dose of Lasix 4/20 for increased vascular congestion on x-ray.  -Oxygen requirements fluctuating. Flow (L/min):  [3.5-5] 4      Enterococcus bacteremia  Acute cholecystitis  Minimal pneumonia on x-ray left base   -Continue antibiotics as advised by ID- Unasyn completed. Plans for pharmacy to dose vancomycin until 4/30 (14 day course given no evidence of endocarditis and repeat blood cultures with sterility).  -Surgery rec percutaneous cholecystostomy tube (placed 4/18/2023) due to comorbidities. Eliquis resumed 4/20. Surgery signed off.      Dysphagia  -VFSS per SLP.   -Diet upgraded 4/19 to mechanical soft and thins. Aspiration precautions.      Metabolic encephalopathy  With underlying vascular dementia per Neurology  -Neurology expecting waxing and waning encephalopathy during hospitalization  -Mental status seems stable continue to monitor for changes and encourage NIPPV use with sleep to avoid lethargy.     Paroxysmal atrial fibrillation  Hypertension  -Heparin used perioperatively. Back on Eliquis 4/20. Seems to be tolerating.  -Metoprolol continued for rate control.  -Cardiology signed off 4/12/23.  -Norvasc 10 mg stopped 4/20 d/t afternoon pressure in the 90s systolic. SBP back up to 140s today. Back on lower dosing of Norvasc at 5 mg. Avoid hypotension.     Anemia  -Baseline hemoglobin anywhere from 14 to 15 in the last several years. He has been fluctuating in the 13-15 range with most recent levels 12.9 and 12.1 over the last two days. Up to 12.8 today so all in all stable. No overt bleeding. Suspect declining levels d/t acute inflammation from bacteremia and prolonged hospital stay. Platelets are trending upward and stable. Will monitor for now. Iron panel may be erroneous given his infection and would " avoid any IV iron for now given the stability of his hemoglobin as well as acute infection at play. Vitamin B12/folate okay Daily CBC for now.      Overall, need to continue to optimize respiratory status for discharge. Pulmonology following.    Discussed with patient and nursing staff      VTE prophylaxis: Eliquis. He has been on PPI for GI prophylaxis.  Code status: Full code  Disposition: Will need SNF when cleared by all. Working on [facility] for this.     Bladimir Ashley MD  [facility]  04/23/23

## 2023-04-23 NOTE — PROGRESS NOTES
LOS: 18 days   Patient Care Team:  Harry Luis MD as PCP - General    Subjective   Following for respiratory failure and sleep apnea  Patient sitting up eating lunch she is on nasal cannula O2 he is awake and denies shortness of breath denies any pain or nausea.  He was attempting to make a few jokes.    Review of Systems:          Objective     Vital Signs  Vital Sign Min/Max for last 24 hours  Temp  Min: 97.8 °F (36.6 °C)  Max: 98.5 °F (36.9 °C)   BP  Min: 121/80  Max: 137/79   Pulse  Min: 60  Max: 101   Resp  Min: 16  Max: 18   SpO2  Min: 89 %  Max: 93 %   Flow (L/min)  Min: 3.5  Max: 5   Weight  Min: 131 kg (289 lb 7.4 oz)  Max: 131 kg (289 lb 7.4 oz)        Ventilator/Non-Invasive Ventilation Settings (From admission, onward)     Start     Ordered    04/13/23 1049  NIPPV (CPAP or BIPAP)  At Bedtime & As Needed-RT        Comments: Use our machine or NIPPV from dME rather than home unit   Question Answer Comment   Indication: Sleep Apnea    Type: AVAPS/PC/PS    Backup Rate 18    Target VT (mL) 650    EPAP/PEEP (cm H2O) 10    Min Pressure (cm H2O) 16    Max Pressure (cm H2O) 30    Titrate Oxygen for SpO2 90% - 95%        04/13/23 1049    04/08/23 0906  NIPPV (CPAP or BIPAP)  At Bedtime & As Needed-RT,   Status:  Canceled        Comments: Started 10 cm and try and adjust to best effect best tolerance until we can get his home machine   Question Answer Comment   Indication: Sleep Apnea    Type: AVAPS/PC/PS    Backup Rate 18    Target VT (mL) 650    EPAP/PEEP (cm H2O) 10    Min Pressure (cm H2O) 16    Max Pressure (cm H2O) 30    Titrate Oxygen for SpO2 90% - 95%        04/08/23 0906    04/05/23 1411  NIPPV (CPAP or BIPAP)  At Bedtime & As Needed-RT,   Status:  Canceled        Comments: Started 10 cm and try and adjust to best effect best tolerance until we can get his home machine   Question Answer Comment   Indication: Sleep Apnea    Type: CPAP    Pressure 8    Titrate Oxygen for SpO2 88% - 92%         04/05/23 1410    04/05/23 0523  NIPPV (CPAP or BIPAP)  At Bedtime & As Needed-RT,   Status:  Canceled        Question Answer Comment   Indication: Sleep Apnea    Type: CPAP    Pressure 8    Titrate Oxygen for SpO2 88% - 92%        04/05/23 0522                             Body mass index is 44.01 kg/m².  I/O last 3 completed shifts:  In: 1136 [P.O.:1136]  Out: 50 [Emesis/NG output:50]  I/O this shift:  In: 480 [P.O.:480]  Out: -         Physical Exam:  General Appearance: Well-developed obese elderly white male sitting up in bed eating in no apparent distress on 4 L nasal cannula O2 oxygen saturations 95%  Eyes: Conjunctiva are clear  ENT: Mucous membranes were moist no exudates  Neck: Trachea midline neck is obese no visible jugular venous distention  Lungs: Decreased but clear throughout he does not take the deepest breaths but he was doing much better than yesterday.  Cardiac: Regular rhythm no murmur  Abdomen: Obese soft nontender he has a right upper quadrant cystostomy drain there is a khan brown fluid in the drain bag  : Not examined  Musculoskeletal: Moderate thoracic kyphosis  Skin: Warm and dry no jaundice no petechiae noted  Neuro: Awake and alert he is following simple commands he is not completely oriented except for person anywhere realizes he is in the hospital.  He does get confused easily  Extremities/P Vascular: No clubbing no cyanosis he really does not have any significant peripheral edema at this time and he has palpable radial and dorsalis pedis pulses  MSE: Seems to be in good spirits again he was trying to tell me a couple of jokes he sort of messed him up but he was engaged.       Labs:  Results from last 7 days   Lab Units 04/23/23  0846 04/22/23  0654 04/21/23  0834 04/20/23  0315 04/19/23  0427 04/18/23  0522 04/17/23  0324   GLUCOSE mg/dL 134* 106* 153*  --  113*  --  120*   SODIUM mmol/L 141 142 146*  --  139  --  137   POTASSIUM mmol/L 4.0 3.8 3.9  --  3.5  --  3.8   CO2 mmol/L  37.0* 35.7* 35.9*  --  37.0*  --  37.1*   CHLORIDE mmol/L 97* 99 98  --  94*  --  91*   ANION GAP mmol/L 7.0 7.3 12.1  --  8.0  --  8.9   CREATININE mg/dL 0.79 0.76 0.78 0.85 0.92 0.86 0.80   BUN mg/dL 28* 33* 40*  --  52*  --  45*   BUN / CREAT RATIO  35.4* 43.4* 51.3*  --  56.5*  --  56.3*   CALCIUM mg/dL 8.4 9.0 9.1  --  8.9  --  9.2   ALK PHOS U/L  --   --   --   --  84  --   --    TOTAL PROTEIN g/dL  --   --   --   --  5.5*  --   --    ALT (SGPT) U/L  --   --   --   --  20  --   --    AST (SGOT) U/L  --   --   --   --  15  --   --    BILIRUBIN mg/dL  --   --   --   --  0.6  --   --    ALBUMIN g/dL  --   --   --   --  2.6*  --   --    GLOBULIN gm/dL  --   --   --   --  2.9  --   --      Estimated Creatinine Clearance: 78.8 mL/min (by C-G formula based on SCr of 0.79 mg/dL).      Results from last 7 days   Lab Units 04/23/23  0846 04/22/23  0654 04/21/23  0834 04/19/23  0427 04/18/23  0522 04/17/23  0513   WBC 10*3/mm3 9.18 7.94 7.52 8.02 9.50 12.62*   RBC 10*6/mm3 4.64 4.54 4.46 4.11* 4.47 4.58   HEMOGLOBIN g/dL 13.1 13.4 12.8* 12.1* 12.9* 13.2   HEMATOCRIT % 41.5 42.0 40.6 36.9* 40.0 40.2   MCV fL 89.4 92.5 91.0 89.8 89.5 87.8   MCH pg 28.2 29.5 28.7 29.4 28.9 28.8   MCHC g/dL 31.6 31.9 31.5 32.8 32.3 32.8   RDW % 12.6 12.9 12.8 12.4 12.3 12.3   RDW-SD fl 40.2 43.9 41.8 39.9 40.2 38.7   MPV fL 10.6 10.1 10.3 11.1 11.3 12.3*   PLATELETS 10*3/mm3 233 232 251 183 166 154   NEUTROPHIL % %  --   --   --   --  88.0* 90.1*   LYMPHOCYTE % %  --   --   --   --  3.6* 2.9*   MONOCYTES % %  --   --   --   --  7.3 6.3   EOSINOPHIL % %  --   --   --   --  0.0* 0.0*   BASOPHIL % %  --   --   --   --  0.2 0.1   IMM GRAN % %  --   --   --   --  0.9* 0.6*   NEUTROS ABS 10*3/mm3  --   --   --   --  8.36* 11.37*   LYMPHS ABS 10*3/mm3  --   --   --   --  0.34* 0.37*   MONOS ABS 10*3/mm3  --   --   --   --  0.69 0.79   EOS ABS 10*3/mm3  --   --   --   --  0.00 0.00   BASOS ABS 10*3/mm3  --   --   --   --  0.02 0.01   IMMATURE GRANS  (ABS) 10*3/mm3  --   --   --   --  0.09* 0.08*   NRBC /100 WBC  --   --   --   --  0.1 0.0                             Microbiology Results (last 10 days)     Procedure Component Value - Date/Time    Body Fluid Culture - Body Fluid, Gallbladder [[id number]]  (Abnormal)  (Susceptibility) Collected: 04/18/23 1252    Lab Status: Final result Specimen: Body Fluid from Gallbladder Updated: 04/20/23 1150     Body Fluid Culture Heavy growth (4+) Enterococcus faecium     Gram Stain Rare (1+) WBCs per low power field      Occasional Gram positive cocci    Susceptibility      Enterococcus faecium      GERA      Ampicillin Resistant     Gentamicin High Level Synergy Susceptible      Vancomycin Susceptible                           Blood Culture - Blood, Hand, Right [[id number]]  (Normal) Collected: 04/16/23 1101    Lab Status: Final result Specimen: Blood from Hand, Right Updated: 04/21/23 1200     Blood Culture No growth at 5 days    Narrative:      Less than seven (7) mL's of blood was collected.  Insufficient quantity may yield false negative results.    Blood Culture - Blood, Hand, Left [[id number]]  (Normal) Collected: 04/16/23 1101    Lab Status: Final result Specimen: Blood from Hand, Left Updated: 04/21/23 1200     Blood Culture No growth at 5 days    Narrative:      Less than seven (7) mL's of blood was collected.  Insufficient quantity may yield false negative results.    Blood Culture - Blood, Hand, Left [[id number]]  (Normal) Collected: 04/14/23 2115    Lab Status: Final result Specimen: Blood from Hand, Left Updated: 04/19/23 2130     Blood Culture No growth at 5 days    Blood Culture - Blood, Arm, Right [[id number]]  (Abnormal)  (Susceptibility) Collected: 04/14/23 2114    Lab Status: Final result Specimen: Blood from Arm, Right Updated: 04/17/23 0818     Blood Culture Enterococcus faecium     Comment:   Infectious disease consultation is highly recommended.        Isolated from Aerobic and Anaerobic Bottles     Gram  Stain Anaerobic Bottle Gram positive cocci in chains      Aerobic Bottle Gram positive cocci in chains    Susceptibility      Enterococcus faecium      GERA      Ampicillin Resistant     Gentamicin High Level Synergy Susceptible      Vancomycin Susceptible                           Blood Culture ID, PCR - Blood, Arm, Right [[id number]]  (Abnormal) Collected: 04/14/23 2114    Lab Status: Final result Specimen: Blood from Arm, Right Updated: 04/15/23 1454     BCID, PCR Enterococcus faecium. Kimberli/B (vancomycin resistance gene) not detected. Identification by BCID2 PCR.    Narrative:      Infectious disease consultation is highly recommended to rule out distant foci of infection.              amLODIPine, 5 mg, Oral, Q24H  ammonium lactate, 1 application, Topical, Q12H  apixaban, 5 mg, Oral, Q12H  aspirin, 81 mg, Oral, Daily  budesonide-formoterol, 2 puff, Inhalation, BID - RT  fluticasone, 1 spray, Nasal, Daily  ipratropium-albuterol, 3 mL, Nebulization, Q6H While Awake - RT  metoprolol tartrate, 12.5 mg, Oral, Q12H  miconazole, , Topical, Q12H  pantoprazole, 40 mg, Oral, Q AM  potassium chloride, 40 mEq, Oral, Once  senna-docusate sodium, 2 tablet, Oral, BID  sodium chloride, 10 mL, Intravenous, Q12H  sodium chloride, 10 mL, Intravenous, Q12H  vancomycin, 1,750 mg, Intravenous, Q24H      Pharmacy to dose vancomycin,         Diagnostics:  Adult Transthoracic Echo Complete W/ Cont if Necessary Per Protocol    Result Date: 4/16/2023  •  Left ventricular systolic function is normal. Calculated left ventricular EF = 59.5% •  Left ventricular wall thickness is consistent with mild concentric hypertrophy. •  The right ventricular cavity is mildly dilated. •  Left atrial volume is mildly increased. •  The right atrial cavity is mildly  dilated. •  Estimated right ventricular systolic pressure from tricuspid regurgitation is mildly elevated (35-45 mmHg). •  The study is technically difficult for diagnosis.     Adult Transthoracic  Echo Complete W/ Cont if Necessary Per Protocol    Result Date: 4/5/2023  •  Left ventricular systolic function is normal. Left ventricular ejection fraction appears to be 66 - 70%. •  Left ventricular wall thickness is consistent with mild to moderate concentric hypertrophy. •  Estimated right ventricular systolic pressure from tricuspid regurgitation is moderately elevated (45-55 mmHg).     CT Abdomen Pelvis Without Contrast    Result Date: 4/16/2023  CT ABDOMEN AND PELVIS WITHOUT IV CONTRAST  HISTORY: Abdominal pain. Bacteremia.  TECHNIQUE:  CT includes axial imaging from the lung bases to the trochanters without intravenous contrast and without use of oral contrast. Data reconstructed in coronal and sagittal planes. Radiation dose reduction techniques were utilized, including automated exposure control and exposure modulation based on body size.  COMPARISON: CT angiogram chest 04/04/2023.  FINDINGS: There is bilateral lower lobe consolidation with potential infiltrates and there are small pleural effusions. Mild intraabdominal ascites is present and there is perihepatic fluid. Gallbladder is distended and exhibits wall thickening. Multiple gallstones are present and there is a suspected proximal cystic duct stone. There is right upper quadrant inflammation surrounding the gallbladder and descending portion of the duodenum. There is mild duodenal wall thickening. No biliary ductal dilatation is present. The findings are suspicious for acute cholecystitis. There is no convincing evidence for pancreatitis. The spleen and kidneys appear within normal limits. There is no bowel dilatation or evidence for bowel obstruction. There is mild fluid and stranding extending into the right paracolic gutter. Atherosclerotic calcifications are present involving the abdominal aorta and iliac vasculature. There is multilevel degenerative disc disease throughout the thoracic and lumbar spine.      1. Gallbladder dilatation with  wall thickening and multiple small gallstones and suspected proximal cystic duct stone.  There is extensive inflammation within the right upper quadrant most likely due to acute cholecystitis and there is also evidence for duodenitis with stranding and edema in the right upper quadrant extending into the right paracolic gutter. Mild ascites with perihepatic fluid. These findings represent a change when correlated with the CT angiogram chest 12 days ago. 2. Small pleural effusions with dense lower lobe consolidation and potential basilar infiltrates.  Discussed with Arlyn, the nurse caring for the patient on 04/16/2023 at 1:15 PM.  Radiation dose reduction techniques were utilized, including automated exposure control and exposure modulation based on body size.  This report was finalized on 4/16/2023 2:15 PM by Dr. iRchard Gregg M.D.      CT Head Without Contrast    Result Date: 4/4/2023  CT HEAD WO CONTRAST-  INDICATIONS: Confusion  TECHNIQUE: Radiation dose reduction techniques were utilized, including automated exposure control and exposure modulation based on body size. Noncontrast head CT  COMPARISON: 03/05/2021  FINDINGS:    No acute intracranial hemorrhage, midline shift or mass effect. No acute territorial infarct is identified. 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  Prominent periventricular hypodensities suggest chronic small vessel ischemic change in a patient this age.  Arterial calcifications are seen at the base of the brain.  Ventricles, cisterns, cerebral sulci are unremarkable for patient age.  Mild paranasal sinus mucosal thickening is present. The visualized paranasal sinuses, orbits, mastoid air cells are otherwise are unremarkable.           No acute intracranial hemorrhage or hydrocephalus. Chronic changes of the brain. If there is further clinical concern, MRI could be considered for further evaluation.  This report was finalized on 4/4/2023 7:21 PM by Dr. Leander Kay,  M.D.      FL Video Swallow With Speech Single Contrast    Result Date: 4/19/2023  VIDEO SWALLOW STUDY  HISTORY: Dysphagia.  1 minute 36 seconds fluoroscopy was provided for the speech pathologist during a video swallow study.  Penetration was seen      Fluoroscopy was provided for the speech pathologist during a video swallow study. For full details please see the speech pathology report  This report was finalized on 4/19/2023 11:33 AM by Dr. Chong Pena M.D.      XR Chest 1 View    Result Date: 4/22/2023  XR CHEST 1 VW-  HISTORY: Male who is 89 years-old, pleural effusion.  TECHNIQUE: Frontal views of the chest  COMPARISON: 4/18/2023  FINDINGS: The heart is enlarged. Aorta appears ectatic. Pulmonary vasculature appears mildly less congested. Aeration of the lower lungs appears partially improved, with mild atelectasis or infiltrate in the right lower lung. Minimal, decreased right pleural effusion is apparent. No pneumothorax. No acute osseous process.      Mild partial improvement.  This report was finalized on 4/22/2023 7:23 AM by Dr. Leander Kay M.D.      XR Chest 1 View    Result Date: 4/19/2023  Portable chest radiograph  HISTORY:Fluid status  TECHNIQUE: Single AP portable radiograph of the chest  COMPARISON:Hypoxia, respiratory failure      FINDINGS AND IMPRESSION: Lungs are hypoinflated. Evaluation is suboptimal due to patient body habitus. There is pulmonary vascular congestion with superimpose interstitial thickening and pulmonary opacification bilateral mid to lower lungs, right greater than left, which appears mildly improved since 04/18/2023. No pneumothorax is seen. Cardiac silhouette is accentuated by low lung volumes. Radiopaque densities overlie the medial aspect of the left lung, of indeterminate etiology. There appears be oral contrast more inferiorly within the stomach.  This report was finalized on 4/19/2023 12:30 PM by Dr. Chong Pena M.D.      XR Chest 1 View    Result Date:  4/18/2023  XR CHEST 1 VW-  HISTORY: Male who is 89 years-old,  worsening hypoxia  TECHNIQUE: Frontal views of the chest  COMPARISON: 04/14/2023  FINDINGS: The heart is enlarged. Aorta appears ectatic. Pulmonary vasculature is congested. Alveolar interstitial opacities predominantly at the mid to lower lungs may represent edema and/or pneumonia. Moderate right pleural effusion is apparent. Overall appearance represents interval worsening. No pneumothorax is seen. No acute osseous process.      Bilateral pulmonary opacities. Moderate right pleural effusion. Cardiomegaly and pulmonary vascular congestion. Ectatic aorta.  This report was finalized on 4/18/2023 5:13 PM by Dr. Leander Kay M.D.      XR Chest 1 View    Result Date: 4/14/2023  XR CHEST 1 VW-  04/14/2023  HISTORY: Elevated white blood cell count.  Heart size is mildly enlarged. Lungs are underinflated with some vascular crowding. There may be some minimal patchy infiltrate in the left lung base. Patient is rotated slightly to the right.      1. Cardiomegaly. 2. Underinflation of the lungs. There may be some minimal pneumonia developing in the left base.  This report was finalized on 4/14/2023 7:20 PM by Dr. Jimenez Rueda M.D.      XR Chest 1 View    Result Date: 4/10/2023  Patient: SAJI VENTURA  Time Out: 05:41 Exam(s): XR CXR 1 VIEW EXAM:   XR Chest, 1 View CLINICAL HISTORY:    Reason for exam: Follow-up atelectasis ongoing respiratory failure. TECHNIQUE:   Frontal view of the chest. COMPARISON: 4-80-23 IMPRESSION:     1.  No significant changes from prior study.    Electronically signed by Herbert Israel MD on 04-10-23 at 0541    XR Chest 1 View    Result Date: 4/8/2023  XR CHEST 1 VW-  HISTORY: Male who is 89 years-old,  dyspnea  TECHNIQUE: Frontal view of the chest  COMPARISON: 4/4/2023  FINDINGS: The heart is enlarged. Aorta is tortuous. Pulmonary vasculature is congested. Small likely atelectasis in the lower lungs. No pleural effusion,  or pneumothorax is seen, apices are partly obscured by the chin. No acute osseous process.      Small likely atelectasis in the lower lungs. Cardiomegaly with pulmonary vascular congestion. Tortuous aorta.  This report was finalized on 4/8/2023 8:19 AM by Dr. Leander Kay M.D.      XR Chest 1 View    Result Date: 4/4/2023  XR CHEST 1 VW-  HISTORY: Male who is 89 years-old,  short of breath  TECHNIQUE: Frontal view of the chest  COMPARISON: 04/03/2023  FINDINGS: The heart is enlarged, mild prominence of vascular markings. Aorta appears ectatic. No focal pulmonary consolidation, pleural effusion, or pneumothorax. No acute osseous process.      No focal pulmonary consolidation. Cardiomegaly with mild prominence of vascular markings. Ectatic appearing aorta. Follow-up as clinically indicated.  This report was finalized on 4/4/2023 6:10 PM by Dr. Leander Kay M.D.      CT Angiogram Chest    Result Date: 4/4/2023  CT ANGIOGRAM CHEST-  INDICATIONS: Abnormal chest x-ray  TECHNIQUE: Radiation dose reduction techniques were utilized, including automated exposure control and exposure modulation based on body size. CT angiography of the chest. Three-dimensional reconstructions.  COMPARISON: 11/04/2019  FINDINGS:  Ascending aorta is dilated, 4.1 cm, not significantly changed. No aortic dissection. No pulmonary embolism. Prominence of caliber of the central pulmonary arteries suggests pulmonary arterial hypertension; this appearance is chronic.  The heart size is enlarged without pericardial effusion. A few small subcentimeter short axis mediastinal lymph nodes are seen that are not significant by size criteria. A 1 cm short axis retrocardiac lymph node on axial image 83 is borderline, nonspecific, stable.  Left thyroid nodularity is noted, suboptimally evaluated with this technique, grossly similar to prior exam, could be further characterized with ultrasound if indicated.  The airways appear clear.  No pleural  effusion or pneumothorax.  The lungs show mild atelectasis. A 1.1 cm pleural-based groundglass nodular density of the right lower lobe on axial image 74 is stable.  Upper abdominal structures show no acute findings. Layering stones are seen in the gallbladder.  Degenerative changes are seen in the spine and shoulders. Bilateral shoulder effusions are present. No acute fracture is identified.       Dilated ascending aorta, 4.1 cm. No aortic dissection. Chronic prominence of caliber of the central pulmonary arteries suggests pulmonary arterial hypertension.  This report was finalized on 4/4/2023 7:28 PM by Dr. Leander Kay M.D.      CT Guided Abscess Drain Subdiaphr Subphren    Result Date: 4/18/2023  PROCEDURE: CT guided cholecystostomy tube placement  HISTORY: Acute cholecystitis, nonsurgical candidate; R09.02-Hypoxemia; R41.0-Disorientation, unspecified; R53.1-Weakness; R53.81-Other malaise; E87.6-Hypokalemia; I48.91-Unspecified atrial fibrillation; I71.21-Aneurysm of the ascending aorta, without rupture  TECHNIQUE: Radiation dose reduction techniques were utilized, including automated exposure control and exposure modulation based on body size.  The procedural risks, benefits, and alternatives were discussed with the patient. Informed consent was obtained.    The patient was placed in the supine position on the CT procedure table. Axial CT scan was performed to localize the gallbladder. The overlying skin was prepped and draped in the usual sterile fashion. 1% buffered lidocaine was used for local anesthesia.  Next, a Yueh needle was advanced into the gallbladder under CT guidance. There was return of dark bile. A superstiff guidewire was advanced through the needle. The tract was serially dilated. Next an 8 Turkmen pigtail catheter was advanced over the wire into the gallbladder. The catheter was sutured in place and placed to gravity bag drainage. No immediate complications. Sample of bile was taken for  requested culture       Technically successful CT guided cholecystostomy tube placement.  Radiation dose reduction techniques were utilized, including automated exposure control and exposure modulation based on body size.  This report was finalized on 4/18/2023 1:18 PM by Dr. Juan Motta M.D.      XR Chest PA & Lateral    Result Date: 4/4/2023  PA AND LATERAL CHEST X-RAY  HISTORY: Hypoxia.  Chest x-ray consisting of 3 views is provided. Correlation: Chest x-ray 03/05/2021.  FINDINGS: Cardiomegaly appears similar. Pulmonary vasculature is engorged but there is no interstitial edema. No focal infiltrate or pleural effusion. Extensive degenerative change throughout the thoracic spine and visualized upper lumbar spine.      Cardiomegaly with pulmonary vascular engorgement but no marlee interstitial edema.  This report was finalized on 4/4/2023 7:10 AM by Dr. Jae Bailey M.D.      Results for orders placed during the hospital encounter of 04/04/23    Adult Transthoracic Echo Complete W/ Cont if Necessary Per Protocol    Interpretation Summary  •  Left ventricular systolic function is normal. Calculated left ventricular EF = 59.5%  •  Left ventricular wall thickness is consistent with mild concentric hypertrophy.  •  The right ventricular cavity is mildly dilated.  •  Left atrial volume is mildly increased.  •  The right atrial cavity is mildly  dilated.  •  Estimated right ventricular systolic pressure from tricuspid regurgitation is mildly elevated (35-45 mmHg).  •  The study is technically difficult for diagnosis.          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         Assessment & Plan     1. Acute on chronic hypoxemic and hypercapnic respiratory failure improved he is weaned to 4 L O2 and looks like he might be able to wean a little further we will continue weaning as tolerated  2. Obstructive sleep apnea continue PAP/noninvasive ventilation with all sleep  3. 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  4. Left lower lobe 1.1 cm groundglass nodule stable for 4 years  5. Pulmonary hypertension by echocardiogram  6. Recent onset A-fib  7. Morbid obesity with a BMI greater than 44  8. Essential hypertension  9. 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 Acute cholecystitis status post percutaneous cholecystostomy  11. Enterococcus bacteremia ID has seen and recommends antibiotics through 4/30    Overall patient looks much improved today    Plan for disposition:    Kenneth Chamberlain Jr, MD  04/23/23  11:29 EDT    Time:

## 2023-04-24 VITALS
BODY MASS INDEX: 43.47 KG/M2 | WEIGHT: 286.8 LBS | RESPIRATION RATE: 18 BRPM | HEIGHT: 68 IN | OXYGEN SATURATION: 90 % | DIASTOLIC BLOOD PRESSURE: 81 MMHG | SYSTOLIC BLOOD PRESSURE: 127 MMHG | TEMPERATURE: 98.6 F | HEART RATE: 69 BPM

## 2023-04-24 LAB
ANION GAP SERPL CALCULATED.3IONS-SCNC: 5 MMOL/L (ref 5–15)
BUN SERPL-MCNC: 26 MG/DL (ref 8–23)
BUN/CREAT SERPL: 33.3 (ref 7–25)
CALCIUM SPEC-SCNC: 8.4 MG/DL (ref 8.2–9.6)
CHLORIDE SERPL-SCNC: 100 MMOL/L (ref 98–107)
CO2 SERPL-SCNC: 37 MMOL/L (ref 22–29)
CREAT SERPL-MCNC: 0.78 MG/DL (ref 0.76–1.27)
DEPRECATED RDW RBC AUTO: 41.7 FL (ref 37–54)
EGFRCR SERPLBLD CKD-EPI 2021: 83.7 ML/MIN/1.73
ERYTHROCYTE [DISTWIDTH] IN BLOOD BY AUTOMATED COUNT: 12.6 % (ref 12.3–15.4)
GLUCOSE SERPL-MCNC: 103 MG/DL (ref 65–99)
HCT VFR BLD AUTO: 38.6 % (ref 37.5–51)
HGB BLD-MCNC: 13 G/DL (ref 13–17.7)
MCH RBC QN AUTO: 30 PG (ref 26.6–33)
MCHC RBC AUTO-ENTMCNC: 33.7 G/DL (ref 31.5–35.7)
MCV RBC AUTO: 88.9 FL (ref 79–97)
PLATELET # BLD AUTO: 204 10*3/MM3 (ref 140–450)
PMV BLD AUTO: 10.1 FL (ref 6–12)
POTASSIUM SERPL-SCNC: 4 MMOL/L (ref 3.5–5.2)
RBC # BLD AUTO: 4.34 10*6/MM3 (ref 4.14–5.8)
SODIUM SERPL-SCNC: 142 MMOL/L (ref 136–145)
VANCOMYCIN TROUGH SERPL-MCNC: 13.4 MCG/ML (ref 5–20)
WBC NRBC COR # BLD: 8.65 10*3/MM3 (ref 3.4–10.8)

## 2023-04-24 PROCEDURE — 94799 UNLISTED PULMONARY SVC/PX: CPT

## 2023-04-24 PROCEDURE — 94760 N-INVAS EAR/PLS OXIMETRY 1: CPT

## 2023-04-24 PROCEDURE — 85027 COMPLETE CBC AUTOMATED: CPT | Performed by: NURSE PRACTITIONER

## 2023-04-24 PROCEDURE — 36410 VNPNXR 3YR/> PHY/QHP DX/THER: CPT

## 2023-04-24 PROCEDURE — 05HC33Z INSERTION OF INFUSION DEVICE INTO LEFT BASILIC VEIN, PERCUTANEOUS APPROACH: ICD-10-PCS | Performed by: INTERNAL MEDICINE

## 2023-04-24 PROCEDURE — 94664 DEMO&/EVAL PT USE INHALER: CPT

## 2023-04-24 PROCEDURE — 80202 ASSAY OF VANCOMYCIN: CPT | Performed by: INTERNAL MEDICINE

## 2023-04-24 PROCEDURE — C1751 CATH, INF, PER/CENT/MIDLINE: HCPCS

## 2023-04-24 PROCEDURE — 80048 BASIC METABOLIC PNL TOTAL CA: CPT | Performed by: NURSE PRACTITIONER

## 2023-04-24 PROCEDURE — 25010000002 VANCOMYCIN 10 G RECONSTITUTED SOLUTION: Performed by: INTERNAL MEDICINE

## 2023-04-24 PROCEDURE — 94761 N-INVAS EAR/PLS OXIMETRY MLT: CPT

## 2023-04-24 RX ORDER — BUDESONIDE AND FORMOTEROL FUMARATE DIHYDRATE 160; 4.5 UG/1; UG/1
2 AEROSOL RESPIRATORY (INHALATION)
Refills: 12
Start: 2023-04-24

## 2023-04-24 RX ORDER — AMMONIUM LACTATE 120 MG/G
1 CREAM TOPICAL EVERY 12 HOURS
Start: 2023-04-24

## 2023-04-24 RX ORDER — SODIUM CHLORIDE 0.9 % (FLUSH) 0.9 %
10 SYRINGE (ML) INJECTION EVERY 12 HOURS SCHEDULED
Status: DISCONTINUED | OUTPATIENT
Start: 2023-04-24 | End: 2023-04-24 | Stop reason: HOSPADM

## 2023-04-24 RX ORDER — POLYETHYLENE GLYCOL 3350 17 G/17G
17 POWDER, FOR SOLUTION ORAL DAILY PRN
Start: 2023-04-24

## 2023-04-24 RX ORDER — AMLODIPINE BESYLATE 10 MG/1
5 TABLET ORAL DAILY
Qty: 90 TABLET | Refills: 3 | Status: ON HOLD
Start: 2023-04-24 | End: 2023-05-03 | Stop reason: SDUPTHER

## 2023-04-24 RX ORDER — SODIUM CHLORIDE 9 MG/ML
40 INJECTION, SOLUTION INTRAVENOUS AS NEEDED
Status: DISCONTINUED | OUTPATIENT
Start: 2023-04-24 | End: 2023-04-24 | Stop reason: HOSPADM

## 2023-04-24 RX ORDER — SODIUM CHLORIDE 0.9 % (FLUSH) 0.9 %
10 SYRINGE (ML) INJECTION AS NEEDED
Status: DISCONTINUED | OUTPATIENT
Start: 2023-04-24 | End: 2023-04-24 | Stop reason: HOSPADM

## 2023-04-24 RX ORDER — SODIUM CHLORIDE 0.9 % (FLUSH) 0.9 %
20 SYRINGE (ML) INJECTION AS NEEDED
OUTPATIENT
Start: 2023-04-24

## 2023-04-24 RX ORDER — IPRATROPIUM BROMIDE AND ALBUTEROL SULFATE 2.5; .5 MG/3ML; MG/3ML
3 SOLUTION RESPIRATORY (INHALATION)
Qty: 360 ML
Start: 2023-04-24

## 2023-04-24 RX ORDER — SODIUM CHLORIDE 0.9 % (FLUSH) 0.9 %
10 SYRINGE (ML) INJECTION EVERY 12 HOURS SCHEDULED
OUTPATIENT
Start: 2023-04-24

## 2023-04-24 RX ORDER — AMOXICILLIN 250 MG
2 CAPSULE ORAL 2 TIMES DAILY
Start: 2023-04-24

## 2023-04-24 RX ORDER — SODIUM CHLORIDE 0.9 % (FLUSH) 0.9 %
10 SYRINGE (ML) INJECTION AS NEEDED
OUTPATIENT
Start: 2023-04-24

## 2023-04-24 RX ADMIN — PANTOPRAZOLE SODIUM 40 MG: 40 TABLET, DELAYED RELEASE ORAL at 06:01

## 2023-04-24 RX ADMIN — VANCOMYCIN HYDROCHLORIDE 1750 MG: 10 INJECTION, POWDER, LYOPHILIZED, FOR SOLUTION INTRAVENOUS at 05:13

## 2023-04-24 RX ADMIN — Medication 10 ML: at 09:22

## 2023-04-24 RX ADMIN — ASPIRIN 81 MG: 81 TABLET, COATED ORAL at 09:20

## 2023-04-24 RX ADMIN — AMMONIUM LACTATE 1 APPLICATION: 120 CREAM TOPICAL at 06:01

## 2023-04-24 RX ADMIN — AMLODIPINE BESYLATE 5 MG: 5 TABLET ORAL at 09:20

## 2023-04-24 RX ADMIN — METOPROLOL TARTRATE 12.5 MG: 25 TABLET ORAL at 09:20

## 2023-04-24 RX ADMIN — BUDESONIDE AND FORMOTEROL FUMARATE DIHYDRATE 2 PUFF: 160; 4.5 AEROSOL RESPIRATORY (INHALATION) at 07:21

## 2023-04-24 RX ADMIN — APIXABAN 5 MG: 5 TABLET, FILM COATED ORAL at 09:20

## 2023-04-24 RX ADMIN — IPRATROPIUM BROMIDE AND ALBUTEROL SULFATE 3 ML: 2.5; .5 SOLUTION RESPIRATORY (INHALATION) at 11:13

## 2023-04-24 RX ADMIN — MICONAZOLE NITRATE: 2 POWDER TOPICAL at 09:22

## 2023-04-24 RX ADMIN — FLUTICASONE PROPIONATE 1 SPRAY: 50 SPRAY, METERED NASAL at 09:22

## 2023-04-24 NOTE — PROGRESS NOTES
Continued Stay Note  [facility]     Patient Name: Harry Potts  MRN: [id number]  Today's Date: 4/24/2023    Admit Date: 4/4/2023    Plan: [facility] today via stretcher @ 1700   Discharge Plan     Row Name 04/24/23 1138       Plan    Plan [facility] today via stretcher @ 1700    Plan Comments DC orders noted. Spoke with [facility], bed will be ready today at [facility] (cert is still good). [facility] stretcher with o2 scheduled to transport today @ 1700 conf # [id number] (Harry Morales # [phone]). [facility] notified of transport schedule. CCP will update pt/family. Transfer packet given to nsg. Pharmacy is correct.               Discharge Codes    No documentation.               Expected Discharge Date and Time     Expected Discharge Date Expected Discharge Time    Apr 24, 2023             Jacqueline Espana RN

## 2023-04-24 NOTE — DISCHARGE SUMMARY
[facility]    Date of Discharge:  4/24/2023    PCP: Harry Luis MD    Discharge Diagnosis:   Active Hospital Problems    Diagnosis  POA   • **Acute on chronic respiratory failure with hypoxia and hypercapnia [J96.21, J96.22]  Yes   • Bacteremia due to Enterococcus [R78.81, B95.2]  Unknown   • Pulmonary hypertension [I27.20]  Yes   • Metabolic encephalopathy [G93.41]  Yes   • 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     Procedures Performed  IR guided placement of cholecystostomy tube 4/18     Consults     Date and Time Order Name Status Description    4/16/2023  2:10 PM Inpatient General Surgery Consult Completed     4/15/2023  3:13 PM Inpatient Infectious Diseases Consult Completed     4/15/2023  3:12 PM Inpatient Infectious Diseases Consult      4/6/2023 12:12 PM Inpatient Neurology Consult General Completed     4/5/2023  3:11 AM Inpatient Pulmonology Consult Completed     4/5/2023  3:11 AM Inpatient Cardiology Consult Completed     4/4/2023  7:35 PM [facility] (on-call MD unless specified) Details          Hospital Course  Please see history and physical for details. Patient is a 89 y.o. male who initially presented to the hospital with complaints of dyspnea and increased confusion. He was found to have acute on chronic respiratory failure as well as new onset atrial fibrillation and worsening confusion related to be related to encephalopathy as well as underlying dementia. He was initially admitted to telemetry but having issues  tolerating PAP therapy and became more obtunded. He was moved to the ICU for more aggressive NIPPV therapy with use of Precedex on 4/8 and eventually moved out of the ICU on 4/13. Neurology was consulted for his mental status changes. Cardiology consulted for new atrial fibrillation. He developed a new onset of worsening leukocytosis on 4/14 prompting infectious work up. Blood cultures returned positive for Enterococcus and further imaging obtained. He was found to have acute cholecystitis prompting general surgery to see on 4/17. Given his age and multiple co-morbidities, percutaneous cholecystectomy tube was placed with IR for treatment.    Pulmonology managed the patient's respiratory status and feels he will need ongoing NIPPV for home/outpatient use as simple CPAP or BiPAP would not be enough to prevent recurrent episodes of acute on chronic respiratory failure. His oxygen requirements were monitored and he was successfully weaned to 1-2L by the day of discharge. Hyperoxia should be avoided with plans for ongoing supplemental oxygen to keep saturations between 88-92%. He was continued on Symbicort (was on Advair at home) and Duonebs. He has been using his NIPPV with sleep and at night time. He did receive PRN dosing of Lasix during the stay and currently looks clinically stable. He will need to follow up with pulmonology in a couple weeks or as advised.    He was noted to have new onset atrial fibrillation during the stay prompting cardiology evaluation. His metoprolol was switched to tartrate 12.5 mg BID for rate control. He was placed on Eliquis and then on heparin perioperatively but switched back to Eliquis on 4/20. He has been doing well from this standpoint and Cardiology signed off 4/12/23. His Norvasc was reduced to allow for higher resting Bps. He should follow up with Cardiology in 2 weeks or as advised.   As noted above, he developed bacteremia secondary to cholecystitis. He was a poor surgical  candidate so general surgery recommended tube placement. Infectious disease was consulted and he was managed with Unasyn and Vancomycin initially but de-escalated to Vancomycin alone to complete 14 days of therapy as cultures grew Enterococcus. Echocardiogram was negative for valve vegetations and repeat cultures were negative. General surgery signed off with plans to continue his cholecystostomy tube at discharge. ID signed off with antibiotic course in place. He can follow up with both services as needed at discharge.   He did have worsening confusion on admission prompting neurology to see. He was felt to have vascular dementia as well as metabolic encephalopathy secondary to his age/chronic illnesses. His mentation stabilized and neurology felt he would have some waxing and waning. He has not been a behavioral issue. ST cleared for diet advancement on 4/19 to mechanical soft and thins.    On the day of discharge, he looks the best I have seen him. He denies any pain, nausea, vomiting. He has had a BM today. He feels his breathing is stable and has been compliant with NIPPV at night. He is chronically ill but current stable. His long-term prognosis is guarded d/t his co-morbidities and age, but he is overall much improved this stay and is stable for discharge to rehab for ongoing strengthening prior to returning home. PICC or midline will be placed prior to leaving. His hemoglobin did drop a little during the stay but is increasing on the day of discharge. Recommended repeat BMP and CBC in 1 week at rehab.    I discussed the patient's findings and my recommendations with patient, nursing staff and Dr. Ashley.    Condition on Discharge: Improved.     Temp:  [97.3 °F (36.3 °C)-99.4 °F (37.4 °C)] 98.6 °F (37 °C)  Heart Rate:  [56-88] 69  Resp:  [18-20] 18  BP: (108-150)/(75-90) 127/81  Body mass index is 43.61 kg/m².    Physical Exam  Constitutional:       General: He is not in acute distress.     Appearance: Normal  appearance. He is not toxic-appearing.   HENT:      Head: Normocephalic and atraumatic.   Cardiovascular:      Rate and Rhythm: Normal rate and regular rhythm.   Pulmonary:      Effort: Pulmonary effort is normal. No respiratory distress.      Breath sounds: Normal breath sounds. No wheezing or rhonchi.   Abdominal:      General: Bowel sounds are normal.      Palpations: Abdomen is soft.      Tenderness: There is no abdominal tenderness. There is no guarding or rebound.      Comments: Cholecystostomy tube   Musculoskeletal:         General: No swelling.      Cervical back: Normal range of motion.      Right lower leg: No edema.      Left lower leg: No edema.   Skin:     General: Skin is warm and dry.   Neurological:      General: No focal deficit present.      Mental Status: He is alert.   Psychiatric:         Mood and Affect: Mood normal.         Behavior: Behavior normal.         Thought Content: Thought content normal.        Discharge Medications      New Medications      Instructions Start Date   ammonium lactate 12 % cream  Commonly known as: AMLACTIN   1 application, Topical, Every 12 Hours      apixaban 5 MG tablet tablet  Commonly known as: ELIQUIS   5 mg, Oral, Every 12 Hours Scheduled      budesonide-formoterol 160-4.5 MCG/ACT inhaler  Commonly known as: SYMBICORT   2 puffs, Inhalation, 2 Times Daily - RT      ipratropium-albuterol 0.5-2.5 mg/3 ml nebulizer  Commonly known as: DUO-NEB   3 mL, Nebulization, Every 6 Hours While Awake - RT      metoprolol tartrate 25 MG tablet  Commonly known as: LOPRESSOR   12.5 mg, Oral, Every 12 Hours Scheduled      miconazole 2 % powder  Commonly known as: MICOTIN   Topical, Every 12 Hours Scheduled      PHARMACY TO DOSE VANCOMYCIN   Does not apply, Continuous PRN      polyethylene glycol 17 g packet  Commonly known as: MIRALAX   17 g, Oral, Daily PRN      sennosides-docusate 8.6-50 MG per tablet  Commonly known as: PERICOLACE   2 tablets, Oral, 2 Times Daily       vancomycin   1,750 mg, Intravenous, Every 24 Hours   Start Date: April 25, 2023        Changes to Medications      Instructions Start Date   amLODIPine 10 MG tablet  Commonly known as: NORVASC  What changed: how much to take   5 mg, Oral, Daily      Azelastine HCl 137 MCG/SPRAY solution  What changed: See the new instructions.   USE 1 SPRAY IN EACH NOSTRIL AS DIRECTED BY PROVIDER DAILY         Continue These Medications      Instructions Start Date   albuterol sulfate  (90 Base) MCG/ACT inhaler  Commonly known as: PROVENTIL HFA;VENTOLIN HFA;PROAIR HFA   2 puffs, Inhalation, Every 4 Hours PRN      aspirin 81 MG EC tablet   81 mg, Oral, Daily      esomeprazole 40 MG capsule  Commonly known as: nexIUM   TAKE 1 CAPSULE DAILY      ezetimibe 10 MG tablet  Commonly known as: ZETIA   TAKE 1 TABLET DAILY      fluticasone 50 MCG/ACT nasal spray  Commonly known as: FLONASE   1 spray, Nasal, Daily      loratadine 10 MG tablet  Commonly known as: CLARITIN   Oral, Daily      multivitamin with minerals tablet tablet   Oral, Daily         Stop These Medications    celecoxib 200 MG capsule  Commonly known as: CeleBREX     chlorthalidone 25 MG tablet  Commonly known as: HYGROTON     doxazosin 4 MG tablet  Commonly known as: CARDURA     metoprolol succinate XL 50 MG 24 hr tablet  Commonly known as: TOPROL-XL     TiZANidine 2 MG capsule  Commonly known as: ZANAFLEX           Diet Instructions     Diet: Regular/House Diet, Cardiac Diets; Healthy Heart (2-3 Na+); Soft to Chew (NDD 3); Chopped Meat; Thin (IDDSI 0)      Discharge Diet:  Regular/House Diet  Cardiac Diets       Cardiac Diet: Healthy Heart (2-3 Na+)    Texture: Soft to Chew (NDD 3)    Soft to Chew: Chopped Meat    Fluid Consistency: Thin (IDDSI 0)         Activity Instructions     Activity as Tolerated           Additional Instructions for the Follow-ups that You Need to Schedule     Discharge Follow-up with PCP   As directed       Currently Documented PCP:    Toby  Harry CASILLAS MD    PCP Phone Number:    [phone]     Follow Up Details: see in 1-2 weeks         Discharge Follow-up with Specified Provider: Dr. Strong/General surgery; 3 Weeks   As directed      To: Dr. Strong/General surgery    Follow Up: 3 Weeks    Follow Up Details: or as needed         Discharge Follow-up with Specified Provider: Dr. Trinidad with Cardiology; 2 Weeks   As directed      To: Dr. Trinidad with Cardiology    Follow Up: 2 Weeks         Discharge Follow-up with Specified Provider: Pulmonology; 2 Weeks   As directed      To: Pulmonology    Follow Up: 2 Weeks    Follow Up Details: or as advised            Contact information for follow-up providers     Harry Luis MD .    Specialty: Internal Medicine  Why: see in 1-2 weeks  Contact information:  [address] [id number] [phone]                   Contact information for after-discharge care     Destination     [facility] .    Service: Skilled Nursing  Contact information:  [address]   [place] [address] [phone]                           Test Results Pending at Discharge     Jennifer Mercado, DANNIE  04/24/23  13:13 EDT    Discharge time spent greater than 30 minutes.

## 2023-04-24 NOTE — PROGRESS NOTES
Case Management Discharge Note      Final Note: Daughter Emilie notified of dc today to [facility] via [facility]cher. Daughter will be here around 1400 to go over dc paper work with luke. [facility]y/[facility] aware of dc. Packet given to nsg.         Selected Continued Care - Admitted Since 4/4/2023     Destination Coordination complete.    Service Provider Selected Services Address Phone Fax Patient Preferred    [facility] [address] , [facility] [address] [phone] --          Durable Medical Equipment    No services have been selected for the patient.              Dialysis/Infusion    No services have been selected for the patient.              Home Medical Care    No services have been selected for the patient.              Therapy    No services have been selected for the patient.              Community Resources    No services have been selected for the patient.              Community & DME    No services have been selected for the patient.                       Final Discharge Disposition Code: 03 - skilled nursing facility (SNF)   5

## 2023-04-24 NOTE — NURSING NOTE
Report called to Mylene silveira Bieber. Messages left X2 for IV therapy to place midline for IV Vanco at [facility].

## 2023-04-24 NOTE — PROGRESS NOTES
LOS: 19 days   Patient Care Team:  Harry Luis MD as PCP - General    Subjective   Following for respiratory failure and sleep apnea  I was called to see patient and cleared him for discharge later today to nursing home rehab facility.  Patient is the most awake and alert I think have seen him.  He was on about 2-1/2 L nasal cannula O2 oxygen saturations were in the mid 90s and he did not appear in any distress.  He denied chest pain he is denying any pain he was fully aware that he was going to rehab today he wanted to know if I would following there and I told him unfortunately I could not.  Minimal cough no sputum    Review of Systems:          Objective     Vital Signs  Vital Sign Min/Max for last 24 hours  Temp  Min: 97.3 °F (36.3 °C)  Max: 99.4 °F (37.4 °C)   BP  Min: 108/75  Max: 150/90   Pulse  Min: 56  Max: 88   Resp  Min: 18  Max: 20   SpO2  Min: 90 %  Max: 95 %   Flow (L/min)  Min: 1  Max: 4   Weight  Min: 130 kg (286 lb 12.8 oz)  Max: 130 kg (286 lb 12.8 oz)        Ventilator/Non-Invasive Ventilation Settings (From admission, onward)     Start     Ordered    04/13/23 1049  NIPPV (CPAP or BIPAP)  At Bedtime & As Needed-RT        Comments: Use our machine or NIPPV from dME rather than home unit   Question Answer Comment   Indication: Sleep Apnea    Type: AVAPS/PC/PS    Backup Rate 18    Target VT (mL) 650    EPAP/PEEP (cm H2O) 10    Min Pressure (cm H2O) 16    Max Pressure (cm H2O) 30    Titrate Oxygen for SpO2 90% - 95%        04/13/23 1049    04/08/23 0906  NIPPV (CPAP or BIPAP)  At Bedtime & As Needed-RT,   Status:  Canceled        Comments: Started 10 cm and try and adjust to best effect best tolerance until we can get his home machine   Question Answer Comment   Indication: Sleep Apnea    Type: AVAPS/PC/PS    Backup Rate 18    Target VT (mL) 650    EPAP/PEEP (cm H2O) 10    Min Pressure (cm H2O) 16    Max Pressure (cm H2O) 30    Titrate Oxygen for SpO2 90% - 95%        04/08/23 0906     04/05/23 1411  NIPPV (CPAP or BIPAP)  At Bedtime & As Needed-RT,   Status:  Canceled        Comments: Started 10 cm and try and adjust to best effect best tolerance until we can get his home machine   Question Answer Comment   Indication: Sleep Apnea    Type: CPAP    Pressure 8    Titrate Oxygen for SpO2 88% - 92%        04/05/23 1410    04/05/23 0523  NIPPV (CPAP or BIPAP)  At Bedtime & As Needed-RT,   Status:  Canceled        Question Answer Comment   Indication: Sleep Apnea    Type: CPAP    Pressure 8    Titrate Oxygen for SpO2 88% - 92%        04/05/23 0522                             Body mass index is 43.61 kg/m².  I/O last 3 completed shifts:  In: 1020 [P.O.:1020]  Out: 100 [Emesis/NG output:100]  I/O this shift:  In: 690 [P.O.:690]  Out: -         Physical Exam:  General Appearance: Well-developed obese elderly white male sitting up in bed eating in no apparent distress on 2.5 L nasal cannula O2 oxygen saturations 95%  Eyes: Conjunctiva are clear  ENT: Mucous membranes were moist no exudates  Neck: Trachea midline neck is obese no visible jugular venous distention  Lungs: Deeper breaths even today than yesterday clear no wheezes no rales no rhonchi  Cardiac: Regular rhythm no murmur  Abdomen: Obese soft nontender he has a right upper quadrant cystostomy drain there is a khan brown fluid in the drain bag, he has a fairly large left lower abdominal wall ecchymoses I do not feel any large deep hematoma looks to be all superficial.  The center of it looks like probably a anticoagulant stick.  : Not examined  Musculoskeletal: Moderate thoracic kyphosis  Skin: Warm and dry no jaundice no petechiae noted  Neuro: He is awake and alert today much better oriented than even yesterday able to carry on a little conversation he still extremely hard of hearing.  He was able to assist in positioning himself so I can listen to his back better today.  Extremities/P Vascular: No clubbing no cyanosis he really does not have  any significant peripheral edema at this time and he has palpable radial and dorsalis pedis pulses  MSE: Seems to be in good spirits        Labs:  Results from last 7 days   Lab Units 04/24/23 0431 04/23/23  0846 04/22/23  0654 04/21/23  0834 04/20/23  0315 04/19/23  0427 04/18/23  0522   GLUCOSE mg/dL 103* 134* 106* 153*  --  113*  --    SODIUM mmol/L 142 141 142 146*  --  139  --    POTASSIUM mmol/L 4.0 4.0 3.8 3.9  --  3.5  --    CO2 mmol/L 37.0* 37.0* 35.7* 35.9*  --  37.0*  --    CHLORIDE mmol/L 100 97* 99 98  --  94*  --    ANION GAP mmol/L 5.0 7.0 7.3 12.1  --  8.0  --    CREATININE mg/dL 0.78 0.79 0.76 0.78 0.85 0.92 0.86   BUN mg/dL 26* 28* 33* 40*  --  52*  --    BUN / CREAT RATIO  33.3* 35.4* 43.4* 51.3*  --  56.5*  --    CALCIUM mg/dL 8.4 8.4 9.0 9.1  --  8.9  --    ALK PHOS U/L  --   --   --   --   --  84  --    TOTAL PROTEIN g/dL  --   --   --   --   --  5.5*  --    ALT (SGPT) U/L  --   --   --   --   --  20  --    AST (SGOT) U/L  --   --   --   --   --  15  --    BILIRUBIN mg/dL  --   --   --   --   --  0.6  --    ALBUMIN g/dL  --   --   --   --   --  2.6*  --    GLOBULIN gm/dL  --   --   --   --   --  2.9  --      Estimated Creatinine Clearance: 79.5 mL/min (by C-G formula based on SCr of 0.78 mg/dL).      Results from last 7 days   Lab Units 04/24/23 0431 04/23/23  0846 04/22/23  0654 04/21/23  0834 04/19/23  0427 04/18/23  0522   WBC 10*3/mm3 8.65 9.18 7.94 7.52 8.02 9.50   RBC 10*6/mm3 4.34 4.64 4.54 4.46 4.11* 4.47   HEMOGLOBIN g/dL 13.0 13.1 13.4 12.8* 12.1* 12.9*   HEMATOCRIT % 38.6 41.5 42.0 40.6 36.9* 40.0   MCV fL 88.9 89.4 92.5 91.0 89.8 89.5   MCH pg 30.0 28.2 29.5 28.7 29.4 28.9   MCHC g/dL 33.7 31.6 31.9 31.5 32.8 32.3   RDW % 12.6 12.6 12.9 12.8 12.4 12.3   RDW-SD fl 41.7 40.2 43.9 41.8 39.9 40.2   MPV fL 10.1 10.6 10.1 10.3 11.1 11.3   PLATELETS 10*3/mm3 204 233 232 251 183 166   NEUTROPHIL % %  --   --   --   --   --  88.0*   LYMPHOCYTE % %  --   --   --   --   --  3.6*   MONOCYTES  % %  --   --   --   --   --  7.3   EOSINOPHIL % %  --   --   --   --   --  0.0*   BASOPHIL % %  --   --   --   --   --  0.2   IMM GRAN % %  --   --   --   --   --  0.9*   NEUTROS ABS 10*3/mm3  --   --   --   --   --  8.36*   LYMPHS ABS 10*3/mm3  --   --   --   --   --  0.34*   MONOS ABS 10*3/mm3  --   --   --   --   --  0.69   EOS ABS 10*3/mm3  --   --   --   --   --  0.00   BASOS ABS 10*3/mm3  --   --   --   --   --  0.02   IMMATURE GRANS (ABS) 10*3/mm3  --   --   --   --   --  0.09*   NRBC /100 WBC  --   --   --   --   --  0.1                             Microbiology Results (last 10 days)     Procedure Component Value - Date/Time    Body Fluid Culture - Body Fluid, Gallbladder [[id number]]  (Abnormal)  (Susceptibility) Collected: 04/18/23 1252    Lab Status: Final result Specimen: Body Fluid from Gallbladder Updated: 04/20/23 1150     Body Fluid Culture Heavy growth (4+) Enterococcus faecium     Gram Stain Rare (1+) WBCs per low power field      Occasional Gram positive cocci    Susceptibility      Enterococcus faecium      GERA      Ampicillin Resistant     Gentamicin High Level Synergy Susceptible      Vancomycin Susceptible                           Blood Culture - Blood, Hand, Right [[id number]]  (Normal) Collected: 04/16/23 1101    Lab Status: Final result Specimen: Blood from Hand, Right Updated: 04/21/23 1200     Blood Culture No growth at 5 days    Narrative:      Less than seven (7) mL's of blood was collected.  Insufficient quantity may yield false negative results.    Blood Culture - Blood, Hand, Left [[id number]]  (Normal) Collected: 04/16/23 1101    Lab Status: Final result Specimen: Blood from Hand, Left Updated: 04/21/23 1200     Blood Culture No growth at 5 days    Narrative:      Less than seven (7) mL's of blood was collected.  Insufficient quantity may yield false negative results.    Blood Culture - Blood, Hand, Left [[id number]]  (Normal) Collected: 04/14/23 2115    Lab Status: Final result  Specimen: Blood from Hand, Left Updated: 04/19/23 2130     Blood Culture No growth at 5 days    Blood Culture - Blood, Arm, Right [[id number]]  (Abnormal)  (Susceptibility) Collected: 04/14/23 2114    Lab Status: Final result Specimen: Blood from Arm, Right Updated: 04/17/23 0818     Blood Culture Enterococcus faecium     Comment:   Infectious disease consultation is highly recommended.        Isolated from Aerobic and Anaerobic Bottles     Gram Stain Anaerobic Bottle Gram positive cocci in chains      Aerobic Bottle Gram positive cocci in chains    Susceptibility      Enterococcus faecium      GERA      Ampicillin Resistant     Gentamicin High Level Synergy Susceptible      Vancomycin Susceptible                           Blood Culture ID, PCR - Blood, Arm, Right [[id number]]  (Abnormal) Collected: 04/14/23 2114    Lab Status: Final result Specimen: Blood from Arm, Right Updated: 04/15/23 1454     BCID, PCR Enterococcus faecium. Kimberli/B (vancomycin resistance gene) not detected. Identification by BCID2 PCR.    Narrative:      Infectious disease consultation is highly recommended to rule out distant foci of infection.              amLODIPine, 5 mg, Oral, Q24H  ammonium lactate, 1 application, Topical, Q12H  apixaban, 5 mg, Oral, Q12H  aspirin, 81 mg, Oral, Daily  budesonide-formoterol, 2 puff, Inhalation, BID - RT  fluticasone, 1 spray, Nasal, Daily  ipratropium-albuterol, 3 mL, Nebulization, Q6H While Awake - RT  metoprolol tartrate, 12.5 mg, Oral, Q12H  miconazole, , Topical, Q12H  pantoprazole, 40 mg, Oral, Q AM  potassium chloride, 40 mEq, Oral, Once  senna-docusate sodium, 2 tablet, Oral, BID  sodium chloride, 10 mL, Intravenous, Q12H  sodium chloride, 10 mL, Intravenous, Q12H  vancomycin, 1,750 mg, Intravenous, Q24H      Pharmacy to dose vancomycin,         Diagnostics:  Adult Transthoracic Echo Complete W/ Cont if Necessary Per Protocol    Result Date: 4/16/2023  •  Left ventricular systolic function is normal.  Calculated left ventricular EF = 59.5% •  Left ventricular wall thickness is consistent with mild concentric hypertrophy. •  The right ventricular cavity is mildly dilated. •  Left atrial volume is mildly increased. •  The right atrial cavity is mildly  dilated. •  Estimated right ventricular systolic pressure from tricuspid regurgitation is mildly elevated (35-45 mmHg). •  The study is technically difficult for diagnosis.     Adult Transthoracic Echo Complete W/ Cont if Necessary Per Protocol    Result Date: 4/5/2023  •  Left ventricular systolic function is normal. Left ventricular ejection fraction appears to be 66 - 70%. •  Left ventricular wall thickness is consistent with mild to moderate concentric hypertrophy. •  Estimated right ventricular systolic pressure from tricuspid regurgitation is moderately elevated (45-55 mmHg).     CT Abdomen Pelvis Without Contrast    Result Date: 4/16/2023  CT ABDOMEN AND PELVIS WITHOUT IV CONTRAST  HISTORY: Abdominal pain. Bacteremia.  TECHNIQUE:  CT includes axial imaging from the lung bases to the trochanters without intravenous contrast and without use of oral contrast. Data reconstructed in coronal and sagittal planes. Radiation dose reduction techniques were utilized, including automated exposure control and exposure modulation based on body size.  COMPARISON: CT angiogram chest 04/04/2023.  FINDINGS: There is bilateral lower lobe consolidation with potential infiltrates and there are small pleural effusions. Mild intraabdominal ascites is present and there is perihepatic fluid. Gallbladder is distended and exhibits wall thickening. Multiple gallstones are present and there is a suspected proximal cystic duct stone. There is right upper quadrant inflammation surrounding the gallbladder and descending portion of the duodenum. There is mild duodenal wall thickening. No biliary ductal dilatation is present. The findings are suspicious for acute cholecystitis. There is no  convincing evidence for pancreatitis. The spleen and kidneys appear within normal limits. There is no bowel dilatation or evidence for bowel obstruction. There is mild fluid and stranding extending into the right paracolic gutter. Atherosclerotic calcifications are present involving the abdominal aorta and iliac vasculature. There is multilevel degenerative disc disease throughout the thoracic and lumbar spine.      1. Gallbladder dilatation with wall thickening and multiple small gallstones and suspected proximal cystic duct stone.  There is extensive inflammation within the right upper quadrant most likely due to acute cholecystitis and there is also evidence for duodenitis with stranding and edema in the right upper quadrant extending into the right paracolic gutter. Mild ascites with perihepatic fluid. These findings represent a change when correlated with the CT angiogram chest 12 days ago. 2. Small pleural effusions with dense lower lobe consolidation and potential basilar infiltrates.  Discussed with Arlyn, the nurse caring for the patient on 04/16/2023 at 1:15 PM.  Radiation dose reduction techniques were utilized, including automated exposure control and exposure modulation based on body size.  This report was finalized on 4/16/2023 2:15 PM by Dr. Richard Gregg M.D.      CT Head Without Contrast    Result Date: 4/4/2023  CT HEAD WO CONTRAST-  INDICATIONS: Confusion  TECHNIQUE: Radiation dose reduction techniques were utilized, including automated exposure control and exposure modulation based on body size. Noncontrast head CT  COMPARISON: 03/05/2021  FINDINGS:    No acute intracranial hemorrhage, midline shift or mass effect. No acute territorial infarct is identified. 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  Prominent periventricular hypodensities suggest chronic small vessel ischemic change in a patient this age.  Arterial calcifications are seen at the base of the brain.  Ventricles,  cisterns, cerebral sulci are unremarkable for patient age.  Mild paranasal sinus mucosal thickening is present. The visualized paranasal sinuses, orbits, mastoid air cells are otherwise are unremarkable.           No acute intracranial hemorrhage or hydrocephalus. Chronic changes of the brain. If there is further clinical concern, MRI could be considered for further evaluation.  This report was finalized on 4/4/2023 7:21 PM by Dr. Leander Kay M.D.      FL Video Swallow With Speech Single Contrast    Result Date: 4/19/2023  VIDEO SWALLOW STUDY  HISTORY: Dysphagia.  1 minute 36 seconds fluoroscopy was provided for the speech pathologist during a video swallow study.  Penetration was seen      Fluoroscopy was provided for the speech pathologist during a video swallow study. For full details please see the speech pathology report  This report was finalized on 4/19/2023 11:33 AM by Dr. Chong Pena M.D.      XR Chest 1 View    Result Date: 4/22/2023  XR CHEST 1 VW-  HISTORY: Male who is 89 years-old, pleural effusion.  TECHNIQUE: Frontal views of the chest  COMPARISON: 4/18/2023  FINDINGS: The heart is enlarged. Aorta appears ectatic. Pulmonary vasculature appears mildly less congested. Aeration of the lower lungs appears partially improved, with mild atelectasis or infiltrate in the right lower lung. Minimal, decreased right pleural effusion is apparent. No pneumothorax. No acute osseous process.      Mild partial improvement.  This report was finalized on 4/22/2023 7:23 AM by Dr. Leander Kay M.D.      XR Chest 1 View    Result Date: 4/19/2023  Portable chest radiograph  HISTORY:Fluid status  TECHNIQUE: Single AP portable radiograph of the chest  COMPARISON:Hypoxia, respiratory failure      FINDINGS AND IMPRESSION: Lungs are hypoinflated. Evaluation is suboptimal due to patient body habitus. There is pulmonary vascular congestion with superimpose interstitial thickening and pulmonary opacification  bilateral mid to lower lungs, right greater than left, which appears mildly improved since 04/18/2023. No pneumothorax is seen. Cardiac silhouette is accentuated by low lung volumes. Radiopaque densities overlie the medial aspect of the left lung, of indeterminate etiology. There appears be oral contrast more inferiorly within the stomach.  This report was finalized on 4/19/2023 12:30 PM by Dr. Chong Pena M.D.      XR Chest 1 View    Result Date: 4/18/2023  XR CHEST 1 VW-  HISTORY: Male who is 89 years-old,  worsening hypoxia  TECHNIQUE: Frontal views of the chest  COMPARISON: 04/14/2023  FINDINGS: The heart is enlarged. Aorta appears ectatic. Pulmonary vasculature is congested. Alveolar interstitial opacities predominantly at the mid to lower lungs may represent edema and/or pneumonia. Moderate right pleural effusion is apparent. Overall appearance represents interval worsening. No pneumothorax is seen. No acute osseous process.      Bilateral pulmonary opacities. Moderate right pleural effusion. Cardiomegaly and pulmonary vascular congestion. Ectatic aorta.  This report was finalized on 4/18/2023 5:13 PM by Dr. Leander Kay M.D.      XR Chest 1 View    Result Date: 4/14/2023  XR CHEST 1 VW-  04/14/2023  HISTORY: Elevated white blood cell count.  Heart size is mildly enlarged. Lungs are underinflated with some vascular crowding. There may be some minimal patchy infiltrate in the left lung base. Patient is rotated slightly to the right.      1. Cardiomegaly. 2. Underinflation of the lungs. There may be some minimal pneumonia developing in the left base.  This report was finalized on 4/14/2023 7:20 PM by Dr. Jimenez Rueda M.D.      XR Chest 1 View    Result Date: 4/10/2023  Patient: SAJI VENTURA  Time Out: 05:41 Exam(s): XR CXR 1 VIEW EXAM:   XR Chest, 1 View CLINICAL HISTORY:    Reason for exam: Follow-up atelectasis ongoing respiratory failure. TECHNIQUE:   Frontal view of the chest. COMPARISON:  4-80-23 IMPRESSION:     1.  No significant changes from prior study.    Electronically signed by Herbert Israel MD on 04-10-23 at 0541    XR Chest 1 View    Result Date: 4/8/2023  XR CHEST 1 VW-  HISTORY: Male who is 89 years-old,  dyspnea  TECHNIQUE: Frontal view of the chest  COMPARISON: 4/4/2023  FINDINGS: The heart is enlarged. Aorta is tortuous. Pulmonary vasculature is congested. Small likely atelectasis in the lower lungs. No pleural effusion, or pneumothorax is seen, apices are partly obscured by the chin. No acute osseous process.      Small likely atelectasis in the lower lungs. Cardiomegaly with pulmonary vascular congestion. Tortuous aorta.  This report was finalized on 4/8/2023 8:19 AM by Dr. Leander Kay M.D.      XR Chest 1 View    Result Date: 4/4/2023  XR CHEST 1 VW-  HISTORY: Male who is 89 years-old,  short of breath  TECHNIQUE: Frontal view of the chest  COMPARISON: 04/03/2023  FINDINGS: The heart is enlarged, mild prominence of vascular markings. Aorta appears ectatic. No focal pulmonary consolidation, pleural effusion, or pneumothorax. No acute osseous process.      No focal pulmonary consolidation. Cardiomegaly with mild prominence of vascular markings. Ectatic appearing aorta. Follow-up as clinically indicated.  This report was finalized on 4/4/2023 6:10 PM by Dr. Leander Kay M.D.      CT Angiogram Chest    Result Date: 4/4/2023  CT ANGIOGRAM CHEST-  INDICATIONS: Abnormal chest x-ray  TECHNIQUE: Radiation dose reduction techniques were utilized, including automated exposure control and exposure modulation based on body size. CT angiography of the chest. Three-dimensional reconstructions.  COMPARISON: 11/04/2019  FINDINGS:  Ascending aorta is dilated, 4.1 cm, not significantly changed. No aortic dissection. No pulmonary embolism. Prominence of caliber of the central pulmonary arteries suggests pulmonary arterial hypertension; this appearance is chronic.  The heart size is  enlarged without pericardial effusion. A few small subcentimeter short axis mediastinal lymph nodes are seen that are not significant by size criteria. A 1 cm short axis retrocardiac lymph node on axial image 83 is borderline, nonspecific, stable.  Left thyroid nodularity is noted, suboptimally evaluated with this technique, grossly similar to prior exam, could be further characterized with ultrasound if indicated.  The airways appear clear.  No pleural effusion or pneumothorax.  The lungs show mild atelectasis. A 1.1 cm pleural-based groundglass nodular density of the right lower lobe on axial image 74 is stable.  Upper abdominal structures show no acute findings. Layering stones are seen in the gallbladder.  Degenerative changes are seen in the spine and shoulders. Bilateral shoulder effusions are present. No acute fracture is identified.       Dilated ascending aorta, 4.1 cm. No aortic dissection. Chronic prominence of caliber of the central pulmonary arteries suggests pulmonary arterial hypertension.  This report was finalized on 4/4/2023 7:28 PM by Dr. Leander Kay M.D.      CT Guided Abscess Drain Subdiaphr Subphren    Result Date: 4/18/2023  PROCEDURE: CT guided cholecystostomy tube placement  HISTORY: Acute cholecystitis, nonsurgical candidate; R09.02-Hypoxemia; R41.0-Disorientation, unspecified; R53.1-Weakness; R53.81-Other malaise; E87.6-Hypokalemia; I48.91-Unspecified atrial fibrillation; I71.21-Aneurysm of the ascending aorta, without rupture  TECHNIQUE: Radiation dose reduction techniques were utilized, including automated exposure control and exposure modulation based on body size.  The procedural risks, benefits, and alternatives were discussed with the patient. Informed consent was obtained.    The patient was placed in the supine position on the CT procedure table. Axial CT scan was performed to localize the gallbladder. The overlying skin was prepped and draped in the usual sterile fashion.  1% buffered lidocaine was used for local anesthesia.  Next, a Yueh needle was advanced into the gallbladder under CT guidance. There was return of dark bile. A superstiff guidewire was advanced through the needle. The tract was serially dilated. Next an 8 [place] pigtail catheter was advanced over the wire into the gallbladder. The catheter was sutured in place and placed to gravity bag drainage. No immediate complications. Sample of bile was taken for requested culture       Technically successful CT guided cholecystostomy tube placement.  Radiation dose reduction techniques were utilized, including automated exposure control and exposure modulation based on body size.  This report was finalized on 4/18/2023 1:18 PM by Dr. Juan Motta M.D.      XR Chest PA & Lateral    Result Date: 4/4/2023  PA AND LATERAL CHEST X-RAY  HISTORY: Hypoxia.  Chest x-ray consisting of 3 views is provided. Correlation: Chest x-ray 03/05/2021.  FINDINGS: Cardiomegaly appears similar. Pulmonary vasculature is engorged but there is no interstitial edema. No focal infiltrate or pleural effusion. Extensive degenerative change throughout the thoracic spine and visualized upper lumbar spine.      Cardiomegaly with pulmonary vascular engorgement but no marlee interstitial edema.  This report was finalized on 4/4/2023 7:10 AM by Dr. Jae Bailey M.D.      Results for orders placed during the hospital encounter of 04/04/23    Adult Transthoracic Echo Complete W/ Cont if Necessary Per Protocol    Interpretation Summary  •  Left ventricular systolic function is normal. Calculated left ventricular EF = 59.5%  •  Left ventricular wall thickness is consistent with mild concentric hypertrophy.  •  The right ventricular cavity is mildly dilated.  •  Left atrial volume is mildly increased.  •  The right atrial cavity is mildly  dilated.  •  Estimated right ventricular systolic pressure from tricuspid regurgitation is mildly elevated (35-45 mmHg).  •   The study is technically difficult for diagnosis.          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         Assessment & Plan     1. Acute on chronic hypoxemic and hypercapnic respiratory failure improved he is weaned to about his previous home O2 levels he is saturating very well continue O2 may be forelock you will even be able to wean him a little further over time.  2. Obstructive sleep apnea continue PAP/noninvasive ventilation with all sleep.  Patient has an astral ventilator that we have been using and he seems to be tolerating it has a good interface thankfully his daughter is incredibly adept at using this we want him to take it with him to the rehab facility and use it.  3. 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 Left lower lobe 1.1 cm groundglass nodule stable for 4 years  5. Pulmonary hypertension by echocardiogram  6. Recent onset A-fib  7. Morbid obesity with a BMI greater than 44  8. Essential hypertension  9. 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 Acute cholecystitis status post percutaneous cholecystostomy  11. Enterococcus bacteremia ID has seen and recommends  antibiotics through 4/30    Overall patient looks much improved today I discussed with his wife at bedside she is really pleased with how he is doing and we all agree he needs to be up moving more.    Plan for disposition: I think it is okay from a pulmonary standpoint for him to go to rehab I think getting up and moving around is really what he needs now.    Kenneth Chamberlain Jr, MD  04/24/23  14:40 EDT    Time:

## 2023-04-24 NOTE — PLAN OF CARE
Goal Outcome Evaluation:  Plan of Care Reviewed With: patient        Progress: improving  Outcome Evaluation: Patient has had no c/o pain through the night. 3 L O2 while awake. 4 L O2 bled into Trilogy during sleep. Q2 turn. Moments of incontinence, skin care as needed. A-fib on monitor, rate has dropped to 50's while sleeping but not symptomatic. VSS overall. IV Vanc continued, waiting for trough to be collected before administering 0400 dose. Daily weight. Plan to be discharged to [facility] today. WCTM.

## 2023-04-24 NOTE — SIGNIFICANT NOTE
"   04/24/23 1635   Midline Catheter - Single Lumen 04/24/23 Left Basilic   Placement date: If unknown, DO NOT use \"Add Comment\" note/Placement time: If unknown, DO NOT use \"Add Comment\" note: 04/24/23 1620   Hand Hygiene Completed: Yes  Site Prep: Chlorhexidine  All 5 Sterile Barriers Used (Gloves, Gown, Cap, Mask, Large Espinoza...   Site Assessment Clean;Dry;Intact   Line Status Blood return noted;Capped;Flushed;Saline locked   Length fe (cm) 10 cm   Extremity Circumference (cm) 33 cm   Dressing Type Securing device;Antimicrobial dressing/disc   Dressing Status Clean;Dry;Intact   Dressing Intervention New dressing   Dressing Change Due 05/01/23   Indication/Daily Review of Necessity intravenous medication therapy     Sharp Count Correct 3 needles, 2 guidewires, and 1 scalpel    "

## 2023-04-25 NOTE — PROGRESS NOTES
[facility] Clinical Pharmacy Services: Vancomycin Level Monitoring Note    Harry Potts is a 89 y.o. male who is on pharmacy to dose vancomycin for Bacteremia.    Estimated Creatinine Clearance: 79.5 mL/min (by C-G formula based on SCr of 0.78 mg/dL).    Results from last 7 days   Lab Units 04/24/23  0431 04/23/23  0846 04/22/23  0654   CREATININE mg/dL 0.78 0.79 0.76     Results from last 7 days   Lab Units 04/24/23  0431 04/20/23  0315   VANCOMYCIN TR mcg/mL 13.40 13.80     Current Vanc Dose: 1750 mg IV every  24  hours  Predicted AUC at current dose:514 mg/L.hr  Next Level Date and Time: No more levels need it at this time    No changes at this time. Pharmacy is continuing to monitor and will adjust as needed.    Sujata Nunez [facility]  Clinical Pharmacist

## 2023-04-26 ENCOUNTER — HOSPITAL ENCOUNTER (INPATIENT)
Facility: HOSPITAL | Age: 88
LOS: 7 days | Discharge: NURSING, REHAB OR LONG-TERM CARE | End: 2023-05-03
Attending: EMERGENCY MEDICINE | Admitting: INTERNAL MEDICINE
Payer: MEDICARE

## 2023-04-26 ENCOUNTER — APPOINTMENT (OUTPATIENT)
Dept: GENERAL RADIOLOGY | Facility: HOSPITAL | Age: 88
End: 2023-04-26
Payer: MEDICARE

## 2023-04-26 DIAGNOSIS — R41.82 ALTERED MENTAL STATUS, UNSPECIFIED ALTERED MENTAL STATUS TYPE: ICD-10-CM

## 2023-04-26 DIAGNOSIS — J96.21 ACUTE ON CHRONIC RESPIRATORY FAILURE WITH HYPOXIA AND HYPERCAPNIA: Primary | ICD-10-CM

## 2023-04-26 DIAGNOSIS — R78.81 BACTEREMIA DUE TO ENTEROCOCCUS: ICD-10-CM

## 2023-04-26 DIAGNOSIS — B95.2 BACTEREMIA DUE TO ENTEROCOCCUS: ICD-10-CM

## 2023-04-26 DIAGNOSIS — J96.22 ACUTE ON CHRONIC RESPIRATORY FAILURE WITH HYPOXIA AND HYPERCAPNIA: Primary | ICD-10-CM

## 2023-04-26 LAB
ALBUMIN SERPL-MCNC: 3.3 G/DL (ref 3.5–5.2)
ALBUMIN/GLOB SERPL: 1.1 G/DL
ALP SERPL-CCNC: 85 U/L (ref 39–117)
ALT SERPL W P-5'-P-CCNC: 26 U/L (ref 1–41)
ANION GAP SERPL CALCULATED.3IONS-SCNC: 9.3 MMOL/L (ref 5–15)
ARTERIAL PATENCY WRIST A: POSITIVE
ARTERIAL PATENCY WRIST A: POSITIVE
AST SERPL-CCNC: 16 U/L (ref 1–40)
ATMOSPHERIC PRESS: 751.9 MMHG
ATMOSPHERIC PRESS: 753.2 MMHG
BASE EXCESS BLDA CALC-SCNC: 11.8 MMOL/L (ref 0–2)
BASE EXCESS BLDA CALC-SCNC: 12.7 MMOL/L (ref 0–2)
BASOPHILS # BLD AUTO: 0.05 10*3/MM3 (ref 0–0.2)
BASOPHILS NFR BLD AUTO: 0.4 % (ref 0–1.5)
BDY SITE: ABNORMAL
BDY SITE: ABNORMAL
BILIRUB SERPL-MCNC: 0.5 MG/DL (ref 0–1.2)
BUN SERPL-MCNC: 20 MG/DL (ref 8–23)
BUN/CREAT SERPL: 21.1 (ref 7–25)
CALCIUM SPEC-SCNC: 9.3 MG/DL (ref 8.2–9.6)
CHLORIDE SERPL-SCNC: 99 MMOL/L (ref 98–107)
CO2 SERPL-SCNC: 40.7 MMOL/L (ref 22–29)
CREAT SERPL-MCNC: 0.95 MG/DL (ref 0.76–1.27)
DEPRECATED RDW RBC AUTO: 40.7 FL (ref 37–54)
EGFRCR SERPLBLD CKD-EPI 2021: 75.1 ML/MIN/1.73
EOSINOPHIL # BLD AUTO: 0.05 10*3/MM3 (ref 0–0.4)
EOSINOPHIL NFR BLD AUTO: 0.4 % (ref 0.3–6.2)
ERYTHROCYTE [DISTWIDTH] IN BLOOD BY AUTOMATED COUNT: 12.4 % (ref 12.3–15.4)
GAS FLOW AIRWAY: 6 LPM
GEN 5 2HR TROPONIN T REFLEX: 31 NG/L
GLOBULIN UR ELPH-MCNC: 3 GM/DL
GLUCOSE BLDC GLUCOMTR-MCNC: 187 MG/DL (ref 70–130)
GLUCOSE SERPL-MCNC: 179 MG/DL (ref 65–99)
HCO3 BLDA-SCNC: 43.1 MMOL/L (ref 22–28)
HCO3 BLDA-SCNC: 47.1 MMOL/L (ref 22–28)
HCT VFR BLD AUTO: 44.4 % (ref 37.5–51)
HGB BLD-MCNC: 14.8 G/DL (ref 13–17.7)
IMM GRANULOCYTES # BLD AUTO: 0.09 10*3/MM3 (ref 0–0.05)
IMM GRANULOCYTES NFR BLD AUTO: 0.8 % (ref 0–0.5)
INHALED O2 CONCENTRATION: 28 %
LYMPHOCYTES # BLD AUTO: 0.78 10*3/MM3 (ref 0.7–3.1)
LYMPHOCYTES NFR BLD AUTO: 6.8 % (ref 19.6–45.3)
MCH RBC QN AUTO: 29.9 PG (ref 26.6–33)
MCHC RBC AUTO-ENTMCNC: 33.3 G/DL (ref 31.5–35.7)
MCV RBC AUTO: 89.7 FL (ref 79–97)
MODALITY: ABNORMAL
MODALITY: ABNORMAL
MONOCYTES # BLD AUTO: 0.48 10*3/MM3 (ref 0.1–0.9)
MONOCYTES NFR BLD AUTO: 4.2 % (ref 5–12)
NEUTROPHILS NFR BLD AUTO: 87.4 % (ref 42.7–76)
NEUTROPHILS NFR BLD AUTO: 9.97 10*3/MM3 (ref 1.7–7)
NRBC BLD AUTO-RTO: 0 /100 WBC (ref 0–0.2)
NT-PROBNP SERPL-MCNC: 1595 PG/ML (ref 0–1800)
O2 A-A PPRESDIFF RESPIRATORY: 0.6 MMHG
PCO2 BLDA: 122.2 MM HG (ref 35–45)
PCO2 BLDA: 81.3 MM HG (ref 35–45)
PEEP RESPIRATORY: 8 CM[H2O]
PH BLDA: 7.19 PH UNITS (ref 7.35–7.45)
PH BLDA: 7.33 PH UNITS (ref 7.35–7.45)
PLATELET # BLD AUTO: 246 10*3/MM3 (ref 140–450)
PMV BLD AUTO: 10.3 FL (ref 6–12)
PO2 BLDA: 66.1 MM HG (ref 80–100)
PO2 BLDA: 73.2 MM HG (ref 80–100)
POTASSIUM SERPL-SCNC: 3.9 MMOL/L (ref 3.5–5.2)
PROT SERPL-MCNC: 6.3 G/DL (ref 6–8.5)
QT INTERVAL: 411 MS
RBC # BLD AUTO: 4.95 10*6/MM3 (ref 4.14–5.8)
SAO2 % BLDCOA: 87.9 % (ref 92–99)
SAO2 % BLDCOA: 89.8 % (ref 92–99)
SET MECH RESP RATE: 10
SODIUM SERPL-SCNC: 149 MMOL/L (ref 136–145)
TOTAL RATE: 28 BREATHS/MINUTE
TOTAL RATE: 29 BREATHS/MINUTE
TROPONIN T DELTA: 1 NG/L
TROPONIN T SERPL HS-MCNC: 30 NG/L
VANCOMYCIN SERPL-MCNC: 10.3 MCG/ML (ref 5–40)
VT ON VENT VENT: 809 ML
WBC NRBC COR # BLD: 11.42 10*3/MM3 (ref 3.4–10.8)

## 2023-04-26 PROCEDURE — 25010000002 VANCOMYCIN 10 G RECONSTITUTED SOLUTION: Performed by: INTERNAL MEDICINE

## 2023-04-26 PROCEDURE — 94799 UNLISTED PULMONARY SVC/PX: CPT

## 2023-04-26 PROCEDURE — 85025 COMPLETE CBC W/AUTO DIFF WBC: CPT | Performed by: EMERGENCY MEDICINE

## 2023-04-26 PROCEDURE — 93005 ELECTROCARDIOGRAM TRACING: CPT | Performed by: EMERGENCY MEDICINE

## 2023-04-26 PROCEDURE — 87040 BLOOD CULTURE FOR BACTERIA: CPT | Performed by: INTERNAL MEDICINE

## 2023-04-26 PROCEDURE — 36600 WITHDRAWAL OF ARTERIAL BLOOD: CPT

## 2023-04-26 PROCEDURE — 25010000002 FUROSEMIDE PER 20 MG: Performed by: INTERNAL MEDICINE

## 2023-04-26 PROCEDURE — 93010 ELECTROCARDIOGRAM REPORT: CPT | Performed by: INTERNAL MEDICINE

## 2023-04-26 PROCEDURE — 82962 GLUCOSE BLOOD TEST: CPT

## 2023-04-26 PROCEDURE — 80202 ASSAY OF VANCOMYCIN: CPT | Performed by: INTERNAL MEDICINE

## 2023-04-26 PROCEDURE — 80053 COMPREHEN METABOLIC PANEL: CPT | Performed by: EMERGENCY MEDICINE

## 2023-04-26 PROCEDURE — 99222 1ST HOSP IP/OBS MODERATE 55: CPT | Performed by: INTERNAL MEDICINE

## 2023-04-26 PROCEDURE — 94640 AIRWAY INHALATION TREATMENT: CPT

## 2023-04-26 PROCEDURE — 94660 CPAP INITIATION&MGMT: CPT

## 2023-04-26 PROCEDURE — 94761 N-INVAS EAR/PLS OXIMETRY MLT: CPT

## 2023-04-26 PROCEDURE — 84484 ASSAY OF TROPONIN QUANT: CPT | Performed by: EMERGENCY MEDICINE

## 2023-04-26 PROCEDURE — 82803 BLOOD GASES ANY COMBINATION: CPT

## 2023-04-26 PROCEDURE — 99285 EMERGENCY DEPT VISIT HI MDM: CPT

## 2023-04-26 PROCEDURE — 94664 DEMO&/EVAL PT USE INHALER: CPT

## 2023-04-26 PROCEDURE — 83880 ASSAY OF NATRIURETIC PEPTIDE: CPT | Performed by: EMERGENCY MEDICINE

## 2023-04-26 PROCEDURE — 71045 X-RAY EXAM CHEST 1 VIEW: CPT

## 2023-04-26 RX ORDER — SODIUM CHLORIDE 0.9 % (FLUSH) 0.9 %
10 SYRINGE (ML) INJECTION AS NEEDED
Status: DISCONTINUED | OUTPATIENT
Start: 2023-04-26 | End: 2023-05-03 | Stop reason: HOSPADM

## 2023-04-26 RX ORDER — ONDANSETRON 4 MG/1
4 TABLET, FILM COATED ORAL EVERY 6 HOURS PRN
Status: DISCONTINUED | OUTPATIENT
Start: 2023-04-26 | End: 2023-05-03 | Stop reason: HOSPADM

## 2023-04-26 RX ORDER — SODIUM CHLORIDE 0.9 % (FLUSH) 0.9 %
10 SYRINGE (ML) INJECTION EVERY 12 HOURS SCHEDULED
Status: DISCONTINUED | OUTPATIENT
Start: 2023-04-26 | End: 2023-05-03 | Stop reason: HOSPADM

## 2023-04-26 RX ORDER — SODIUM CHLORIDE 9 MG/ML
40 INJECTION, SOLUTION INTRAVENOUS AS NEEDED
Status: DISCONTINUED | OUTPATIENT
Start: 2023-04-26 | End: 2023-05-03 | Stop reason: HOSPADM

## 2023-04-26 RX ORDER — BUDESONIDE AND FORMOTEROL FUMARATE DIHYDRATE 160; 4.5 UG/1; UG/1
2 AEROSOL RESPIRATORY (INHALATION)
Status: DISCONTINUED | OUTPATIENT
Start: 2023-04-26 | End: 2023-05-03 | Stop reason: HOSPADM

## 2023-04-26 RX ORDER — IPRATROPIUM BROMIDE AND ALBUTEROL SULFATE 2.5; .5 MG/3ML; MG/3ML
3 SOLUTION RESPIRATORY (INHALATION)
Status: DISCONTINUED | OUTPATIENT
Start: 2023-04-26 | End: 2023-05-03 | Stop reason: HOSPADM

## 2023-04-26 RX ORDER — ONDANSETRON 2 MG/ML
4 INJECTION INTRAMUSCULAR; INTRAVENOUS EVERY 6 HOURS PRN
Status: DISCONTINUED | OUTPATIENT
Start: 2023-04-26 | End: 2023-05-03 | Stop reason: HOSPADM

## 2023-04-26 RX ORDER — FUROSEMIDE 10 MG/ML
20 INJECTION INTRAMUSCULAR; INTRAVENOUS ONCE
Status: COMPLETED | OUTPATIENT
Start: 2023-04-26 | End: 2023-04-26

## 2023-04-26 RX ADMIN — IPRATROPIUM BROMIDE AND ALBUTEROL SULFATE 3 ML: .5; 3 SOLUTION RESPIRATORY (INHALATION) at 15:36

## 2023-04-26 RX ADMIN — Medication 10 ML: at 22:34

## 2023-04-26 RX ADMIN — APIXABAN 5 MG: 5 TABLET, FILM COATED ORAL at 22:33

## 2023-04-26 RX ADMIN — VANCOMYCIN HYDROCHLORIDE 1750 MG: 10 INJECTION, POWDER, LYOPHILIZED, FOR SOLUTION INTRAVENOUS at 23:01

## 2023-04-26 RX ADMIN — FUROSEMIDE 20 MG: 10 INJECTION, SOLUTION INTRAMUSCULAR; INTRAVENOUS at 22:32

## 2023-04-26 RX ADMIN — IPRATROPIUM BROMIDE AND ALBUTEROL SULFATE 3 ML: .5; 3 SOLUTION RESPIRATORY (INHALATION) at 19:14

## 2023-04-26 NOTE — ED NOTES
..Nursing report ED to floor  Harry Potts  89 y.o.  male    HPI :   Chief Complaint   Patient presents with    Shortness of Breath       Admitting doctor:   Blake Alcaraz MD    Admitting diagnosis:   The primary encounter diagnosis was Acute on chronic respiratory failure with hypoxia and hypercapnia. Diagnoses of Altered mental status, unspecified altered mental status type and Bacteremia due to Enterococcus were also pertinent to this visit.    Code status:   Current Code Status       Date Active Code Status Order ID Comments User Context       4/26/2023 1424 CPR (Attempt to Resuscitate) [id number]  Blake Alcaraz MD ED        Question Answer    Code Status (Patient has no pulse and is not breathing) CPR (Attempt to Resuscitate)    Medical Interventions (Patient has pulse or is breathing) Full Support                    Allergies:   Morphine and related, Atorvastatin, and Dexmedetomidine    Isolation:   No active isolations    Intake and Output  No intake or output data in the 24 hours ending 04/26/23 1445    Weight:   There were no vitals filed for this visit.    Most recent vitals:   Vitals:    04/26/23 1228 04/26/23 1242 04/26/23 1302 04/26/23 1430   BP:       Pulse:   75 50   Resp:  (!) 32 (!) 33 (!) 32   Temp: 97.8 °F (36.6 °C)      TempSrc: Oral      SpO2:  90% (!) 89% 91%       Active LDAs/IV Access:   Lines, Drains & Airways       Active LDAs       Name Placement date Placement time Site Days    Midline Catheter - Single Lumen 04/24/23 Left Basilic 04/24/23  1620  -- 1    Biliary Tube RUQ --  --  RUQ  --                    Labs (abnormal labs have a star):   Labs Reviewed   COMPREHENSIVE METABOLIC PANEL - Abnormal; Notable for the following components:       Result Value    Glucose 179 (*)     Sodium 149 (*)     CO2 40.7 (*)     Albumin 3.3 (*)     All other components within normal limits    Narrative:     GFR Normal >60  Chronic Kidney Disease <60  Kidney Failure <15    The GFR formula is only  valid for adults with stable renal function between ages 18 and 70.   TROPONIN - Abnormal; Notable for the following components:    HS Troponin T 30 (*)     All other components within normal limits    Narrative:     High Sensitive Troponin T Reference Range:  <10.0 ng/L- Negative Female for AMI  <15.0 ng/L- Negative Male for AMI  >=10 - Abnormal Female indicating possible myocardial injury.  >=15 - Abnormal Male indicating possible myocardial injury.   Clinicians would have to utilize clinical acumen, EKG, Troponin, and serial changes to determine if it is an Acute Myocardial Infarction or myocardial injury due to an underlying chronic condition.        CBC WITH AUTO DIFFERENTIAL - Abnormal; Notable for the following components:    WBC 11.42 (*)     Neutrophil % 87.4 (*)     Lymphocyte % 6.8 (*)     Monocyte % 4.2 (*)     Immature Grans % 0.8 (*)     Neutrophils, Absolute 9.97 (*)     Immature Grans, Absolute 0.09 (*)     All other components within normal limits   BLOOD GAS, ARTERIAL - Abnormal; Notable for the following components:    pH, Arterial 7.194 (*)     pCO2, Arterial 122.2 (*)     pO2, Arterial 73.2 (*)     HCO3, Arterial 47.1 (*)     Base Excess, Arterial 11.8 (*)     O2 Saturation Calculated 87.9 (*)     All other components within normal limits   BLOOD GAS, ARTERIAL - Abnormal; Notable for the following components:    pH, Arterial 7.332 (*)     pCO2, Arterial 81.3 (*)     pO2, Arterial 66.1 (*)     HCO3, Arterial 43.1 (*)     Base Excess, Arterial 12.7 (*)     O2 Saturation Calculated 89.8 (*)     All other components within normal limits   BNP (IN-HOUSE) - Normal    Narrative:     Among patients with dyspnea, NT-proBNP is highly sensitive for the detection of acute congestive heart failure. In addition NT-proBNP of <300 pg/ml effectively rules out acute congestive heart failure with 99% negative predictive value.    Results may be falsely decreased if patient taking Biotin.     BLOOD GAS, ARTERIAL    BLOOD GAS, ARTERIAL   HIGH SENSITIVITIY TROPONIN T 2HR   POCT GLUCOSE FINGERSTICK   POCT GLUCOSE FINGERSTICK   CBC AND DIFFERENTIAL    Narrative:     The following orders were created for panel order CBC & Differential.  Procedure                               Abnormality         Status                     ---------                               -----------         ------                     CBC Auto Differential[[id number]]        Abnormal            Final result                 Please view results for these tests on the individual orders.       EKG:   ECG 12 Lead Dyspnea   Final Result   HEART RATE= 75  bpm   RR Interval= 800  ms   GA Interval=   ms   P Horizontal Axis=   deg   P Front Axis=   deg   QRSD Interval= 175  ms   QT Interval= 411  ms   QRS Axis= 214  deg   T Wave Axis= 12  deg   - ABNORMAL ECG -   Atrial fibrillation   Nonspecific intraventricular conduction delay   Ventricular premature complex   RBBB and LAFB   No change from previous tracing   Electronically Signed By: Azra Gomes ([facility]) 26-Apr-2023 14:24:50   Date and Time of Study: 2023-04-26 12:42:45          Meds given in ED:   Medications   sodium chloride 0.9 % flush 10 mL (has no administration in time range)   sodium chloride 0.9 % flush 10 mL (has no administration in time range)   sodium chloride 0.9 % flush 10 mL (has no administration in time range)   sodium chloride 0.9 % infusion 40 mL (has no administration in time range)   ondansetron (ZOFRAN) tablet 4 mg (has no administration in time range)     Or   ondansetron (ZOFRAN) injection 4 mg (has no administration in time range)   apixaban (ELIQUIS) tablet 5 mg (has no administration in time range)   budesonide-formoterol (SYMBICORT) 160-4.5 MCG/ACT inhaler 2 puff (has no administration in time range)   ipratropium-albuterol (DUO-NEB) nebulizer solution 3 mL (has no administration in time range)       Imaging results:  XR Chest 1 View    Result Date: 4/26/2023  Interval worsening of  pulmonary vascular congestion and lower lung opacities.  This report was finalized on 4/26/2023 12:54 PM by Dr. Leander Kay M.D.       Ambulatory status:   - bedrest    Social issues:   Social History     Socioeconomic History    Marital status:    Tobacco Use    Smoking status: Never    Smokeless tobacco: Never   Vaping Use    Vaping Use: Never used   Substance and Sexual Activity    Alcohol use: No     Comment: caffeine use    Drug use: No    Sexual activity: Not Currently     Partners: Female         Selena Coker RN  04/26/23 14:45 EDT

## 2023-04-26 NOTE — ED PROVIDER NOTES
EMERGENCY DEPARTMENT ENCOUNTER    Room Number:  39/39  Date seen:  4/26/2023  PCP: Harry Luis MD  Historian: Patient, EMS      HPI:  Chief Complaint: Altered mental status, short of air  A complete HPI/ROS/PMH/PSH/SH/FH are unobtainable due to: Mental status change  Context: Harry Potts is a 89 y.o. male who presents to the ED c/o altered mental status and shortness of breath.  Patient presents from a rehab facility where he reportedly had some mild hypoxia on nasal cannula oxygen.  EMS had increased nasal cannula oxygen prior to arrival with improvement of O2 saturations to the low 90s.  EMS states that patient was just discharged in the hospital and he is supposed to be on some type of home CPAP or BiPAP machine.  They are not sure if he has been compliant with this.  Patient was reportedly drowsy prior to arrival but would arouse to verbal stimuli.  Blood glucose not obtained prior to arrival.            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   • Osteoarthritis of lumbar spine 04/06/2016   • Bronchitis 04/02/2018   • Viral URI with cough 05/01/2018   • PVC (premature ventricular contraction) 10/17/2019   • Non-traumatic rhabdomyolysis 03/05/2021   • Obesity (BMI 30-39.9) 03/06/2021   • Chronic low back pain 03/06/2021   • Weakness 03/06/2021   • Edema, bilateral lower extremities 03/06/2021   • Restrictive lung disease    • Irregularly irregular cardiac rhythm 07/19/2022   • JENISE on CPAP 08/19/2022   • Medicare annual wellness visit, subsequent 10/04/2022   • Hypoxia 04/04/2023   • Acute on chronic respiratory failure with hypoxia and hypercapnia 04/05/2023   • Acute cholecystitis 04/16/2023   •  New onset atrial fibrillation 04/18/2023   • Bacteremia due to Enterococcus 04/20/2023   • Pulmonary hypertension 04/20/2023   • Metabolic encephalopathy 04/20/2023   • Vascular dementia without behavioral disturbance 04/20/2023   • Dysphagia 04/20/2023   • Anemia 04/20/2023     Resolved Ambulatory Problems     Diagnosis Date Noted   • Cough 04/06/2016     Past Medical History:   Diagnosis Date   • Arthritis    • Asthma March, 2021   • Atrial fibrillation 4/18/2023   • Cancer    • COPD (chronic obstructive pulmonary disease)    • Difficulty walking    • Elevated cholesterol    • Environmental allergies    • GERD (gastroesophageal reflux disease)    • HL (hearing loss)    • Hyperlipidemia    • Obesity    • Sleep apnea    • Spondylolysis, lumbar region          PAST SURGICAL HISTORY  Past Surgical History:   Procedure Laterality Date   • CARDIAC CATHETERIZATION  circa 2008    [facility], [place], operated by [facility]   • EYE SURGERY     • HERNIA REPAIR     • JOINT REPLACEMENT     • REPLACEMENT TOTAL KNEE BILATERAL           FAMILY HISTORY  Family History   Problem Relation Age of Onset   • Heart attack Father         age 80         SOCIAL HISTORY  Social History     Socioeconomic History   • Marital status:    Tobacco Use   • Smoking status: Never   • Smokeless tobacco: Never   Vaping Use   • Vaping Use: Never used   Substance and Sexual Activity   • Alcohol use: No     Comment: caffeine use   • Drug use: No   • Sexual activity: Not Currently     Partners: Female         ALLERGIES  Morphine and related, Atorvastatin, and Dexmedetomidine        REVIEW OF SYSTEMS  Review of Systems   Review of all 14 systems is negative other than stated in the HPI above.      PHYSICAL EXAM  ED Triage Vitals   Temp Heart Rate Resp BP SpO2   04/26/23 1228 04/26/23 1227 04/26/23 1242 04/26/23 1227 04/26/23 1227   97.8 °F (36.6 °C) 71 (!) 32 152/70 90 %      Temp src Heart Rate Source Patient Position BP Location FiO2 (%)   04/26/23 1228 04/26/23 1242 -- -- --    Oral Monitor          Physical Exam      GENERAL: Lethargic, arouses to verbal stimuli, will follow simple commands  HENT: nares patent  EYES: no scleral icterus, EOMI, pupils 3 mm reactive bilaterally  CV: regular rhythm, normal rate  RESPIRATORY: Shallow respirations  ABDOMEN: soft, nontender throughout.  Percutaneous bile drain present in the right upper quadrant.  MUSCULOSKELETAL: no deformity  NEURO: Lethargic, arouses to verbal stimuli, will move all extremities with apparent normal strength, cranial nerves II through XII grossly intact.  PSYCH:  calm, cooperative  SKIN: warm, dry    Vital signs and nursing notes reviewed.          LAB RESULTS  Recent Results (from the past 24 hour(s))   Comprehensive Metabolic Panel    Collection Time: 04/26/23 12:35 PM    Specimen: Blood   Result Value Ref Range    Glucose 179 (H) 65 - 99 mg/dL    BUN 20 8 - 23 mg/dL    Creatinine 0.95 0.76 - 1.27 mg/dL    Sodium 149 (H) 136 - 145 mmol/L    Potassium 3.9 3.5 - 5.2 mmol/L    Chloride 99 98 - 107 mmol/L    CO2 40.7 (H) 22.0 - 29.0 mmol/L    Calcium 9.3 8.2 - 9.6 mg/dL    Total Protein 6.3 6.0 - 8.5 g/dL    Albumin 3.3 (L) 3.5 - 5.2 g/dL    ALT (SGPT) 26 1 - 41 U/L    AST (SGOT) 16 1 - 40 U/L    Alkaline Phosphatase 85 39 - 117 U/L    Total Bilirubin 0.5 0.0 - 1.2 mg/dL    Globulin 3.0 gm/dL    A/G Ratio 1.1 g/dL    BUN/Creatinine Ratio 21.1 7.0 - 25.0    Anion Gap 9.3 5.0 - 15.0 mmol/L    eGFR 75.1 >60.0 mL/min/1.73   BNP    Collection Time: 04/26/23 12:35 PM    Specimen: Blood   Result Value Ref Range    proBNP 1,595.0 0.0 - 1,800.0 pg/mL   High Sensitivity Troponin T    Collection Time: 04/26/23 12:35 PM    Specimen: Blood   Result Value Ref Range    HS Troponin T 30 (H) <15 ng/L   CBC Auto Differential    Collection Time: 04/26/23 12:35 PM    Specimen: Blood   Result Value Ref Range    WBC 11.42 (H) 3.40 - 10.80 10*3/mm3    RBC 4.95 4.14 - 5.80 10*6/mm3    Hemoglobin 14.8 13.0 - 17.7 g/dL    Hematocrit 44.4 37.5 - 51.0 %     MCV 89.7 79.0 - 97.0 fL    MCH 29.9 26.6 - 33.0 pg    MCHC 33.3 31.5 - 35.7 g/dL    RDW 12.4 12.3 - 15.4 %    RDW-SD 40.7 37.0 - 54.0 fl    MPV 10.3 6.0 - 12.0 fL    Platelets 246 140 - 450 10*3/mm3    Neutrophil % 87.4 (H) 42.7 - 76.0 %    Lymphocyte % 6.8 (L) 19.6 - 45.3 %    Monocyte % 4.2 (L) 5.0 - 12.0 %    Eosinophil % 0.4 0.3 - 6.2 %    Basophil % 0.4 0.0 - 1.5 %    Immature Grans % 0.8 (H) 0.0 - 0.5 %    Neutrophils, Absolute 9.97 (H) 1.70 - 7.00 10*3/mm3    Lymphocytes, Absolute 0.78 0.70 - 3.10 10*3/mm3    Monocytes, Absolute 0.48 0.10 - 0.90 10*3/mm3    Eosinophils, Absolute 0.05 0.00 - 0.40 10*3/mm3    Basophils, Absolute 0.05 0.00 - 0.20 10*3/mm3    Immature Grans, Absolute 0.09 (H) 0.00 - 0.05 10*3/mm3    nRBC 0.0 0.0 - 0.2 /100 WBC   ECG 12 Lead Dyspnea    Collection Time: 04/26/23 12:42 PM   Result Value Ref Range    QT Interval 411 ms   Blood Gas, Arterial -    Collection Time: 04/26/23 12:44 PM    Specimen: Arterial Blood   Result Value Ref Range    Site Arterial: right radial     Wyatt's Test Positive     pH, Arterial 7.194 (C) 7.350 - 7.450 pH units    pCO2, Arterial 122.2 (C) 35.0 - 45.0 mm Hg    pO2, Arterial 73.2 (L) 80.0 - 100.0 mm Hg    HCO3, Arterial 47.1 (H) 22.0 - 28.0 mmol/L    Base Excess, Arterial 11.8 (H) 0.0 - 2.0 mmol/L    O2 Saturation Calculated 87.9 (L) 92.0 - 99.0 %    Barometric Pressure for Blood Gas 751.9 mmHg    Modality HFNC     Flow Rate 6 lpm    Rate 28 Breaths/minute   Blood Gas, Arterial -    Collection Time: 04/26/23  2:00 PM    Specimen: Arterial Blood   Result Value Ref Range    Site Arterial: right radial     Wyatt's Test Positive     pH, Arterial 7.332 (L) 7.350 - 7.450 pH units    pCO2, Arterial 81.3 (C) 35.0 - 45.0 mm Hg    pO2, Arterial 66.1 (L) 80.0 - 100.0 mm Hg    HCO3, Arterial 43.1 (H) 22.0 - 28.0 mmol/L    Base Excess, Arterial 12.7 (H) 0.0 - 2.0 mmol/L    O2 Saturation Calculated 89.8 (L) 92.0 - 99.0 %    A-a DO2 0.6 mmHg    Barometric Pressure for  Blood Gas 753.2 mmHg    Modality BiPap     FIO2 28 %    Set Tidal Volume 809     Set Mech Resp Rate 10     Rate 29 Breaths/minute    PEEP 8        Ordered the above labs and reviewed the results.        RADIOLOGY  XR Chest 1 View    Result Date: 4/26/2023  XR CHEST 1 VW-  HISTORY: Male who is 89 years-old,  short of breath  TECHNIQUE: Frontal view of the chest  COMPARISON: 04/22/2023  FINDINGS: The heart is enlarged. Aorta appears ectatic. Pulmonary vasculature appears more congested. Previous lower lung opacities show mild worsening. Minimal, persistent right pleural effusion is apparent. No pneumothorax. No acute osseous process.      Interval worsening of pulmonary vascular congestion and lower lung opacities.  This report was finalized on 4/26/2023 12:54 PM by Dr. Leander Kay M.D.        Ordered the above noted radiological studies. Reviewed by me in PACS.            PROCEDURES  Critical Care  Performed by: Natanael Siddiqi MD  Authorized by: Natanael Siddiqi MD     Critical care provider statement:     Critical care time (minutes):  45    Critical care time was exclusive of:  Separately billable procedures and treating other patients    Critical care was necessary to treat or prevent imminent or life-threatening deterioration of the following conditions:  Respiratory failure    Critical care was time spent personally by me on the following activities:  Ordering and review of laboratory studies, ordering and review of radiographic studies, ordering and performing treatments and interventions, discussions with consultants, development of treatment plan with patient or surrogate, evaluation of patient's response to treatment, pulse oximetry, re-evaluation of patient's condition, review of old charts and obtaining history from patient or surrogate                  MEDICATIONS GIVEN IN ER  Medications   sodium chloride 0.9 % flush 10 mL (has no administration in time range)   sodium chloride 0.9 %  flush 10 mL (has no administration in time range)   sodium chloride 0.9 % flush 10 mL (has no administration in time range)   sodium chloride 0.9 % infusion 40 mL (has no administration in time range)   ondansetron (ZOFRAN) tablet 4 mg (has no administration in time range)     Or   ondansetron (ZOFRAN) injection 4 mg (has no administration in time range)   apixaban (ELIQUIS) tablet 5 mg (has no administration in time range)   budesonide-formoterol (SYMBICORT) 160-4.5 MCG/ACT inhaler 2 puff (has no administration in time range)   ipratropium-albuterol (DUO-NEB) nebulizer solution 3 mL (has no administration in time range)                   MEDICAL DECISION MAKING, PROGRESS, and CONSULTS    All labs have been independently reviewed by me.  All radiology studies have been reviewed by me and I have also reviewed the radiology report.   EKG's independently viewed and interpreted by me.  Discussion below represents my analysis of pertinent findings related to patient's condition, differential diagnosis, treatment plan and final disposition.      Additional sources:  - Discussed/ obtained information from independent historians: EMS, family at bedside    - External (non-ED) record review: Recent discharge summary reviewed    - Shared decision making: Patient and family informed of need for admission      Orders placed during this visit:  Orders Placed This Encounter   Procedures   • XR Chest 1 View   • Comprehensive Metabolic Panel   • BNP   • High Sensitivity Troponin T   • Blood Gas, Arterial -   • CBC Auto Differential   • Blood Gas, Arterial -   • Blood Gas, Arterial -   • High Sensitivity Troponin T 2Hr   • Blood Gas, Arterial -   • Comprehensive Metabolic Panel   • CBC (No Diff)   • BNP   • Lactic Acid, Plasma   • Protime-INR   • Phosphorus   • Magnesium   • Procalcitonin   • NPO Diet NPO Type: Strict NPO   • Monitor Blood Pressure   • Vital Signs Per hospital policy   • Cardiac Monitoring   • Continuous Pulse  Oximetry   • Height and weight   • Daily Weights   • Intake and Output   • Oral Care - Patient Not on NPPV & Not Intubated   • If Patient has BG of < 80 and is symptomatic but not on an IV insulin protocol then use the Adult Hypoglycemia Treatment Orders.   • Place Order to Consult Intensivist For Critical Care Management (If Patient Not Admitted to Cardiology for Primary Cardiology Condition)   • Notify All Physicians When Patient is Transferred   • Use Mobility Guidelines for Advancement of Activity   • Saline Lock & Maintain IV Access   • Place Sequential Compression Device   • Maintain Sequential Compression Device   • Code Status and Medical Interventions:   • Pulmonology (on-call MD unless specified)   • NIPPV (CPAP or BIPAP)   • POC Glucose Once   • POC Glucose Once   • ECG 12 Lead Dyspnea   • Insert Peripheral IV   • Insert Peripheral IV   • Inpatient Admission   • Inpatient Admission   • CBC & Differential         Differential diagnosis includes but is not limited to:    Pneumonia  Respiratory failure  Sepsis        Independent interpretation of labs, radiology studies, and discussions with consultants:  ED Course as of 04/26/23 1426   Wed Apr 26, 2023   1321 pH, Arterial(!!): 7.194 [JR]   1321 pCO2, Arterial(!!): 122.2 [JR]   1321 I had previously reviewed this ABG and requested the patient be placed on BiPAP for acute hypoxic and hypercapnic respiratory failure.  We will continue to monitor closely. [JR]   1325 EKG          EKG time: 1242  Rhythm/Rate: A-fib, rate 75  P waves and MO: N/A  QRS, axis: RBBB and LAFB, frequent PVCs  ST and T waves: No acute ischemic changes    Interpreted Contemporaneously by me, independently viewed  Similar compared to prior 4/5/2023       [JR]   1404 Patient reassessed.  He is following commands, seems to be tolerating BiPAP well.  Repeat ABG obtained and shows improving respiratory acidosis. [JR]   1405 Discussed with Dr. Alcaraz, pulmonary critical care, who agrees to  admit to the ICU. [JR]   1428 Record review: I reviewed discharge summary from 4/24/2023.  Patient was admitted for dyspnea.  He developed new onset atrial fibrillation Enterococcus bacteremia.  He was found to have acute cholecystitis, not a candidate for surgical intervention.  Percutaneous drain was placed.  He was discharged on vancomycin via left upper extremity PICC line. [JR]      ED Course User Index  [JR] Natanael Siddiqi MD       Chest x-ray independently interpreted in PACS.  There is increased pulmonary vascular congestion.  Some of this is likely accentuated due to low lung volumes prior to initiating NIPPV.        DIAGNOSIS  Final diagnoses:   Acute on chronic respiratory failure with hypoxia and hypercapnia   Altered mental status, unspecified altered mental status type   Bacteremia due to Enterococcus         DISPOSITION  Admit            Latest Documented Vital Signs:  As of 14:26 EDT  BP- 152/70 HR- 75 Temp- 97.8 °F (36.6 °C) (Oral) O2 sat- (!) 89%              --    Please note that portions of this were completed with a voice recognition program.       Note Disclaimer: At [facility], we believe that sharing information builds trust and better relationships. You are receiving this note because you are receiving care at [facility] or recently visited. It is possible you will see health information before a provider has talked with you about it. This kind of information can be easy to misunderstand. To help you fully understand what it means for your health, we urge you to discuss this note with your provider.           Natanael Siddiqi MD  04/26/23 [id number]

## 2023-04-26 NOTE — PROGRESS NOTES
[facility] Clinical Pharmacy Services: Vancomycin Level Monitoring Note    Harry Potts is a 89 y.o. male who is on pharmacy to dose vancomycin for Bacteremia.      Estimated Creatinine Clearance: 65.3 mL/min (by C-G formula based on SCr of 0.95 mg/dL).    Results from last 7 days   Lab Units 04/26/23  1235 04/24/23  0431 04/23/23  0846   CREATININE mg/dL 0.95 0.78 0.79     Results from last 7 days   Lab Units 04/26/23  1452 04/24/23  0431 04/20/23  0315   VANCOMYCIN RM mcg/mL 10.30  --   --    VANCOMYCIN TR mcg/mL  --  13.40 13.80     Current Vanc Dose: 1750 mg IV every  24  hours  Predicted AUC at current dose: 469 mg/L.hr     Pharmacy is continuing to monitor and will adjust as needed.    Vicki Kay, Pharm.D., [facility]    Clinical Pharmacist

## 2023-04-26 NOTE — PLAN OF CARE
Goal Outcome Evaluation:  Plan of Care Reviewed With: patient        Progress: improving  Outcome Evaluation: Improved serial ABG while on BiPAP, remains lethargic. Dumont placed d/t upcoming plan to administer lasix. Remains in Afib, stable BP. No interventions required.

## 2023-04-26 NOTE — Clinical Note
Level of Care: Critical Care [6]   Diagnosis: Acute on chronic respiratory failure with hypoxia and hypercapnia [[id number]]   Admitting Physician: FAREED BARNARD [[id number]]   Attending Physician: FAREED BARNARD [[id number]]   Certification: I Certify That Inpatient Hospital Services Are Medically Necessary For Greater Than 2 Midnights

## 2023-04-26 NOTE — CONSULTS
Referring Provider: Dr. Alcaraz  Reason for Consultation: Enterococcus bacteremia     Subjective   History of present illness: This is a 89-year-old male with a history of COPD hypertension hyperlipidemia and obstructive sleep apnea who was admitted on April 26 with altered mental status.    The patient was last hospitalized at [facility] from April 5 through 24 with Enterococcus bacteremia in the setting of acute cholecystitis.  He underwent cholecystostomy tube placement.  Echocardiogram did not reveal any evidence of endocarditis and repeat blood cultures were negative.  He was discharged on a 2-week course of vancomycin with an antibiotic stop date of April 30.  He was discharged to a nursing home and was doing well until the day of admission when he was noted to be unresponsive.  Arrival ABG showed acute hypercapnic respiratory failure.  Apparently the patient was noncompliant with his noninvasive ventilation at the nursing home.  Admission blood work revealed a mild leukocytosis of 11,000.    Per the patient's daughter the patient did not receive any doses of vancomycin until yesterday when she was advocating for the patient.  He got a 500 mg dose.    Past Medical History:   Diagnosis Date   • Arthritis    • Cancer     basal cell    • COPD (chronic obstructive pulmonary disease)    • GERD (gastroesophageal reflux disease)    • Hyperlipidemia    • Hypertension    • Obesity    • Sleep apnea    • Spondylolysis, lumbar region        Past Surgical History:   Procedure Laterality Date   • CARDIAC CATHETERIZATION  circa 2008    [facility]   • EYE SURGERY     • HERNIA REPAIR     • JOINT REPLACEMENT     • REPLACEMENT TOTAL KNEE BILATERAL          reports that he has never smoked. He has never used smokeless tobacco. He reports that he does not drink alcohol and does not use drugs.    family history includes Heart attack in his father.    Allergies   Allergen Reactions   • Morphine And Related Itching,  Rash and Confusion     Patient never wants to have Morphine again in his life   • Atorvastatin Other (See Comments)     SPASMS LEG      • Dexmedetomidine Other (See Comments)     Dex causes bradycardia and hyptension        Medication:  Antibiotics:  Vancomycin dosing per pharmacy     Please refer to the medical record for a full medication list    Review of Systems  Pertinent items are noted in HPI, all other systems reviewed and negative    Objective   Vital Signs   Temp:  [97.8 °F (36.6 °C)] 97.8 °F (36.6 °C)  Heart Rate:  [47-75] 50  Resp:  [25-33] 25  BP: (115-152)/(70-82) 115/82    Physical Exam:   General: Ill-appearing, BiPAP in place  HEENT: Normocephalic, atraumatic, no scleral icterus.   Neck: Supple, trachea is midline  Cardiovascular: Irregularly irregular normal S1 and S2, no murmurs, rubs, or gallops   Respiratory: Bibasilar crackles  GI: Abdomen is soft, nontender, distended, positive bowel sounds bilaterally, colostomy tube in place  Musculoskeletal:  no edema, tenderness or deformity  Skin: No rashes, left upper extremity midline without erythema or purulent  Extremities: NoC/C trace lower extremity edema  Neurological: Alert and oriented, moving all 4 extremities  Psychiatric: Normal mood and affect     Results Review:   I reviewed the patient's new clinical results.  I reviewed the patient's new imaging results and agree with the interpretation.    Lab Results   Component Value Date    WBC 11.42 (H) 04/26/2023    HGB 14.8 04/26/2023    HCT 44.4 04/26/2023    MCV 89.7 04/26/2023     04/26/2023       Lab Results   Component Value Date    GLUCOSE 179 (H) 04/26/2023    BUN 20 04/26/2023    CREATININE 0.95 04/26/2023    EGFRIFNONA 83 01/12/2022    EGFRIFAFRI 97 01/12/2022    BCR 21.1 04/26/2023    CO2 40.7 (H) 04/26/2023    CALCIUM 9.3 04/26/2023    PROTENTOTREF 6.4 04/03/2023    ALBUMIN 3.3 (L) 04/26/2023    LABIL2 1.9 04/03/2023    AST 16 04/26/2023    ALT 26 04/26/2023      Microbiology:  4/18 colostomy culture Enterococcus  4/16 BCx Negx 2  4/14 BCx Enterococcus faecium  1/2 4/5 RVP neg    Radiology:  Admission CXR personally reviewed by me shows vascular congestion    Assessment & Plan   AMS  Acute hypercapnic respiratory failure  Enterococcus bacteremia   Leukocytosis   Dementia  Vascular congestion    Given low vancomycin troughs prior to his discharge and the fact that he did not get the needed vancomycin at the nursing home I recommend repeating blood cultures x2 . continue vancomycin dosing per pharmacy as planned through April 30.  Cholecystostomy tube management and timing of cholecystectomy per general surgery.    I discussed the patient's findings and my recommendations with patient, family and nursing staff

## 2023-04-26 NOTE — PROGRESS NOTES
"[facility] Clinical Pharmacy Services: Vancomycin Pharmacokinetic Initial Consult Note    Harry Potts is a 89 y.o. male who is on pharmacy to dose vancomycin for bacteremia.    Restart from previous admission  Planned Duration of Therapy: 6 days  Target: -600 mg/L.hr     Other Antimicrobials: none    Vitals/Labs  Ht: 172.7 cm (68\"); Wt: 130 kg (286 lb 9.6 oz)  Temp Readings from Last 1 Encounters:   04/26/23 97.8 °F (36.6 °C) (Oral)    Estimated Creatinine Clearance: 65.3 mL/min (by C-G formula based on SCr of 0.95 mg/dL).        Results from last 7 days   Lab Units 04/26/23  1235 04/24/23  0431 04/23/23  0846   CREATININE mg/dL 0.95 0.78 0.79   WBC 10*3/mm3 11.42* 8.65 9.18     Assessment/Plan:    Vancomycin Dose:   1750 mg IV every  24  hours  Predictive AUC level for the dose ordered is 468 mg/L.hr, which is within the target of 400-600 mg/L.hr    Pharmacy will follow patient's kidney function and will adjust doses and obtain levels as necessary. Thank you for involving pharmacy in this patient's care. Please contact pharmacy with any questions or concerns.                           Vicki Kay, Pharm.D., [facility]  Clinical Pharmacist    "

## 2023-04-26 NOTE — H&P
Group: [facility]         CONSULT NOTE    Patient Identification:  Harry Potts  89 y.o.  male  11/14/1930  [id number]            Requesting physician: [facility], Dr. Manuel Siddiqi from ER    Reason for Consultation: Acute on chronic respiratory failure    CC: Decreased level of consciousness    History of Present Illness:  89-year-old male patient who presents with altered mental status, unresponsiveness.  Blood glucose was 161.  Patient brought here from [facility].  He was supposed to be using his noninvasive ventilator but is noncompliant.  On arrival blood gas shows severe acute respiratory hypercapnic failure on top of chronic.  He was started on noninvasive ventilation here in the hospital and he is beginning to respond.  He is a little more awake now but still dozes off to sleep without stimuli.  Discussed with Dr. Siddiqi as well as his granddaughter at the bedside  Chart reviewed.  Discharge summary reviewed from 2 days ago.  The patient was here with bacteremia due to Enterococcus, pulmonary hypertension, dementia, dysphagia, A-fib, JENISE, restrictive lung disease and chronic respiratory failure.      Review of Systems   Unable to perform ROS: Mental status change       Past Medical History:  Past Medical History:   Diagnosis Date   • Arthritis    • Asthma March, 2021    Oxygen at home   • Atrial fibrillation 4/18/2023   • Cancer     basal cell    • COPD (chronic obstructive pulmonary disease)    • Difficulty walking    • Elevated cholesterol    • Environmental allergies    • GERD (gastroesophageal reflux disease)    • HL (hearing loss)    • Hyperglycemia    • Hyperlipidemia    • Hypertension    • Obesity    • Pulmonary hypertension 4/20/2023   • Sleep apnea    • Spondylolysis, lumbar region        Past Surgical History:  Past Surgical History:   Procedure Laterality Date   • CARDIAC CATHETERIZATION  circa 2008    [facility]   • EYE SURGERY      • HERNIA REPAIR     • JOINT REPLACEMENT     • REPLACEMENT TOTAL KNEE BILATERAL          Home Meds:  Reviewed and reconciled    Allergies:  Allergies   Allergen Reactions   • Morphine And Related Itching, Rash and Confusion     Patient never wants to have Morphine again in his life   • Atorvastatin Other (See Comments)     SPASMS LEG      • Dexmedetomidine Other (See Comments)     Dex causes bradycardia and hyptension        Social History:   Social History     Socioeconomic History   • Marital status:    Tobacco Use   • Smoking status: Never   • Smokeless tobacco: Never   Vaping Use   • Vaping Use: Never used   Substance and Sexual Activity   • Alcohol use: No     Comment: caffeine use   • Drug use: No   • Sexual activity: Not Currently     Partners: Female       Family History:  Family History   Problem Relation Age of Onset   • Heart attack Father         age 80       Physical Exam:  /70   Pulse 75   Temp 97.8 °F (36.6 °C) (Oral)   Resp (!) 33   SpO2 (!) 89%  There is no height or weight on file to calculate BMI. (!) 89%    Physical Exam  Constitutional:       Appearance: He is well-developed. He is obese. He is ill-appearing.      Comments: Elderly male patient who appears chronically ill   HENT:      Right Ear: External ear normal.      Left Ear: External ear normal.      Nose: Nose normal.   Eyes:      General: No scleral icterus.     Conjunctiva/sclera: Conjunctivae normal.      Pupils: Pupils are equal, round, and reactive to light.   Neck:      Thyroid: No thyromegaly.      Vascular: No JVD.   Cardiovascular:      Rate and Rhythm: Normal rate and regular rhythm.      Heart sounds: No murmur heard.     Comments: No edema  Pulmonary:      Effort: Respiratory distress present.      Breath sounds: Wheezing present. No rales.   Abdominal:      General: There is no distension.      Palpations: Abdomen is soft.      Comments: Obesity hampers rest of the exam.  No rebound.  Has percutaneous right  upper quadrant biliary drain in place   Musculoskeletal:         General: No deformity.      Cervical back: Neck supple. No rigidity.      Comments: No deformity in all 4 extrem   Skin:     Findings: No erythema or rash.      Comments: No palpable nodules   Neurological:      Comments: Disoriented and confused, somnolent at times         LABS:        Results from last 7 days   Lab Units 04/26/23  1235 04/24/23  0431 04/23/23  0846   SODIUM mmol/L 149* 142 141   POTASSIUM mmol/L 3.9 4.0 4.0   CHLORIDE mmol/L 99 100 97*   CO2 mmol/L 40.7* 37.0* 37.0*   BUN mg/dL 20 26* 28*   CREATININE mg/dL 0.95 0.78 0.79   GLUCOSE mg/dL 179* 103* 134*   CALCIUM mg/dL 9.3 8.4 8.4   WBC 10*3/mm3 11.42* 8.65 9.18   HEMOGLOBIN g/dL 14.8 13.0 13.1   PLATELETS 10*3/mm3 246 204 233   ALT (SGPT) U/L 26  --   --    AST (SGOT) U/L 16  --   --    PROBNP pg/mL 1,595.0  --   --      Lab Results   Component Value Date    CKTOTAL 117 04/04/2023    TROPONINT 30 (H) 04/26/2023     Results from last 7 days   Lab Units 04/26/23  1235   HSTROP T ng/L 30*             Results from last 7 days   Lab Units 04/26/23  1400 04/26/23  1244   PH, ARTERIAL pH units 7.332* 7.194*   PCO2, ARTERIAL mm Hg 81.3* 122.2*   PO2 ART mm Hg 66.1* 73.2*   FLOW RATE lpm  --  6   MODALITY  BiPap HFNC   O2 SATURATION CALC % 89.8* 87.9*        Imaging: I personally visualized the images of chest x-ray showing bilateral cephalization of pulmonary vasculature, linear opacities suggesting interstitial edema of the lungs bilaterally.  There is cardiomegaly.  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  Continue noninvasive ventilation.  Start Precedex if patient becomes more agitated since his encephalopathy last hospital stay caused him to pull off several noninvasive ventilator masks.  Continue percutaneous biliary drain and consult infectious diseases and pharmacy to dose antibiotics to complete full treatment of recent Enterococcus bacteremia.  Patient's morbid obesity does complicate overall health care of this patient.  He also has dysphagia.  This may increase risk of aspiration pneumonia.  He seems to have some pulmonary edema on chest x-ray but it could be vascular crowding from poor inspiratory effort.  I will give him gentle Lasix, but we need to be careful due to elevated bicarbonate.  His restrictive ventilatory defect is due to his obesity and that cannot be addressed any other way other than noninvasive ventilation.  Check procalcitonin levels.  Lovenox for DVT prophylaxis.    Total critical care time 35 minutes          Blake Alcaraz MD  4/26/2023  14:25 EDT      Much of this encounter note is an electronic transcription/translation of spoken language to printed text using Dragon Software.

## 2023-04-27 PROBLEM — E87.0 HYPERNATREMIA: Status: ACTIVE | Noted: 2023-04-27

## 2023-04-27 LAB
ALBUMIN SERPL-MCNC: 2.4 G/DL (ref 3.5–5.2)
ALBUMIN/GLOB SERPL: 0.8 G/DL
ALP SERPL-CCNC: 68 U/L (ref 39–117)
ALT SERPL W P-5'-P-CCNC: 19 U/L (ref 1–41)
ANION GAP SERPL CALCULATED.3IONS-SCNC: 5.2 MMOL/L (ref 5–15)
AST SERPL-CCNC: 11 U/L (ref 1–40)
BASOPHILS # BLD AUTO: 0.04 10*3/MM3 (ref 0–0.2)
BASOPHILS NFR BLD AUTO: 0.5 % (ref 0–1.5)
BILIRUB SERPL-MCNC: 0.5 MG/DL (ref 0–1.2)
BUN SERPL-MCNC: 20 MG/DL (ref 8–23)
BUN/CREAT SERPL: 22.7 (ref 7–25)
CALCIUM SPEC-SCNC: 9.5 MG/DL (ref 8.2–9.6)
CHLORIDE SERPL-SCNC: 100 MMOL/L (ref 98–107)
CO2 SERPL-SCNC: 42.8 MMOL/L (ref 22–29)
CREAT SERPL-MCNC: 0.88 MG/DL (ref 0.76–1.27)
D-LACTATE SERPL-SCNC: 1.4 MMOL/L (ref 0.5–2)
DEPRECATED RDW RBC AUTO: 41.3 FL (ref 37–54)
EGFRCR SERPLBLD CKD-EPI 2021: 80.7 ML/MIN/1.73
EOSINOPHIL # BLD AUTO: 0.1 10*3/MM3 (ref 0–0.4)
EOSINOPHIL NFR BLD AUTO: 1.2 % (ref 0.3–6.2)
ERYTHROCYTE [DISTWIDTH] IN BLOOD BY AUTOMATED COUNT: 12.9 % (ref 12.3–15.4)
GLOBULIN UR ELPH-MCNC: 3.1 GM/DL
GLUCOSE SERPL-MCNC: 93 MG/DL (ref 65–99)
HCT VFR BLD AUTO: 40.4 % (ref 37.5–51)
HGB BLD-MCNC: 13 G/DL (ref 13–17.7)
IMM GRANULOCYTES # BLD AUTO: 0.06 10*3/MM3 (ref 0–0.05)
IMM GRANULOCYTES NFR BLD AUTO: 0.7 % (ref 0–0.5)
INR PPP: 1.43 (ref 0.9–1.1)
LYMPHOCYTES # BLD AUTO: 0.68 10*3/MM3 (ref 0.7–3.1)
LYMPHOCYTES NFR BLD AUTO: 8.1 % (ref 19.6–45.3)
MAGNESIUM SERPL-MCNC: 2.1 MG/DL (ref 1.7–2.3)
MCH RBC QN AUTO: 28.4 PG (ref 26.6–33)
MCHC RBC AUTO-ENTMCNC: 32.2 G/DL (ref 31.5–35.7)
MCV RBC AUTO: 88.4 FL (ref 79–97)
MONOCYTES # BLD AUTO: 0.51 10*3/MM3 (ref 0.1–0.9)
MONOCYTES NFR BLD AUTO: 6.1 % (ref 5–12)
NEUTROPHILS NFR BLD AUTO: 7 10*3/MM3 (ref 1.7–7)
NEUTROPHILS NFR BLD AUTO: 83.4 % (ref 42.7–76)
NRBC BLD AUTO-RTO: 0 /100 WBC (ref 0–0.2)
PHOSPHATE SERPL-MCNC: 3.5 MG/DL (ref 2.5–4.5)
PLATELET # BLD AUTO: 207 10*3/MM3 (ref 140–450)
PMV BLD AUTO: 10.6 FL (ref 6–12)
POTASSIUM SERPL-SCNC: 4.3 MMOL/L (ref 3.5–5.2)
PROCALCITONIN SERPL-MCNC: 0.08 NG/ML (ref 0–0.25)
PROT SERPL-MCNC: 5.5 G/DL (ref 6–8.5)
PROTHROMBIN TIME: 17.6 SECONDS (ref 11.7–14.2)
RBC # BLD AUTO: 4.57 10*6/MM3 (ref 4.14–5.8)
SODIUM SERPL-SCNC: 148 MMOL/L (ref 136–145)
WBC NRBC COR # BLD: 8.39 10*3/MM3 (ref 3.4–10.8)

## 2023-04-27 PROCEDURE — 94799 UNLISTED PULMONARY SVC/PX: CPT

## 2023-04-27 PROCEDURE — 85025 COMPLETE CBC W/AUTO DIFF WBC: CPT | Performed by: INTERNAL MEDICINE

## 2023-04-27 PROCEDURE — 36415 COLL VENOUS BLD VENIPUNCTURE: CPT | Performed by: INTERNAL MEDICINE

## 2023-04-27 PROCEDURE — 80053 COMPREHEN METABOLIC PANEL: CPT | Performed by: INTERNAL MEDICINE

## 2023-04-27 PROCEDURE — 83735 ASSAY OF MAGNESIUM: CPT | Performed by: INTERNAL MEDICINE

## 2023-04-27 PROCEDURE — 99221 1ST HOSP IP/OBS SF/LOW 40: CPT | Performed by: SURGERY

## 2023-04-27 PROCEDURE — 25010000002 VANCOMYCIN 10 G RECONSTITUTED SOLUTION: Performed by: INTERNAL MEDICINE

## 2023-04-27 PROCEDURE — 85610 PROTHROMBIN TIME: CPT | Performed by: INTERNAL MEDICINE

## 2023-04-27 PROCEDURE — 83605 ASSAY OF LACTIC ACID: CPT | Performed by: INTERNAL MEDICINE

## 2023-04-27 PROCEDURE — 97110 THERAPEUTIC EXERCISES: CPT

## 2023-04-27 PROCEDURE — 84100 ASSAY OF PHOSPHORUS: CPT | Performed by: INTERNAL MEDICINE

## 2023-04-27 PROCEDURE — 94664 DEMO&/EVAL PT USE INHALER: CPT

## 2023-04-27 PROCEDURE — 25010000002 HYDRALAZINE PER 20 MG: Performed by: INTERNAL MEDICINE

## 2023-04-27 PROCEDURE — 84145 PROCALCITONIN (PCT): CPT | Performed by: INTERNAL MEDICINE

## 2023-04-27 PROCEDURE — 94761 N-INVAS EAR/PLS OXIMETRY MLT: CPT

## 2023-04-27 PROCEDURE — 97162 PT EVAL MOD COMPLEX 30 MIN: CPT

## 2023-04-27 RX ORDER — HYDRALAZINE HYDROCHLORIDE 20 MG/ML
10 INJECTION INTRAMUSCULAR; INTRAVENOUS EVERY 4 HOURS PRN
Status: DISCONTINUED | OUTPATIENT
Start: 2023-04-27 | End: 2023-04-27

## 2023-04-27 RX ORDER — HYDRALAZINE HYDROCHLORIDE 20 MG/ML
20 INJECTION INTRAMUSCULAR; INTRAVENOUS EVERY 4 HOURS PRN
Status: DISCONTINUED | OUTPATIENT
Start: 2023-04-27 | End: 2023-05-03 | Stop reason: HOSPADM

## 2023-04-27 RX ORDER — PANTOPRAZOLE SODIUM 40 MG/1
40 TABLET, DELAYED RELEASE ORAL
Status: DISCONTINUED | OUTPATIENT
Start: 2023-04-27 | End: 2023-05-03 | Stop reason: HOSPADM

## 2023-04-27 RX ORDER — AMLODIPINE BESYLATE 10 MG/1
10 TABLET ORAL
Status: DISCONTINUED | OUTPATIENT
Start: 2023-04-27 | End: 2023-05-03 | Stop reason: HOSPADM

## 2023-04-27 RX ADMIN — BUDESONIDE AND FORMOTEROL FUMARATE DIHYDRATE 2 PUFF: 160; 4.5 AEROSOL RESPIRATORY (INHALATION) at 19:24

## 2023-04-27 RX ADMIN — AMLODIPINE BESYLATE 10 MG: 10 TABLET ORAL at 09:55

## 2023-04-27 RX ADMIN — VANCOMYCIN HYDROCHLORIDE 1750 MG: 10 INJECTION, POWDER, LYOPHILIZED, FOR SOLUTION INTRAVENOUS at 17:06

## 2023-04-27 RX ADMIN — HYDRALAZINE HYDROCHLORIDE 10 MG: 20 INJECTION INTRAMUSCULAR; INTRAVENOUS at 09:00

## 2023-04-27 RX ADMIN — APIXABAN 5 MG: 5 TABLET, FILM COATED ORAL at 20:21

## 2023-04-27 RX ADMIN — Medication 10 ML: at 20:21

## 2023-04-27 RX ADMIN — IPRATROPIUM BROMIDE AND ALBUTEROL SULFATE 3 ML: .5; 3 SOLUTION RESPIRATORY (INHALATION) at 12:45

## 2023-04-27 RX ADMIN — Medication 10 ML: at 09:29

## 2023-04-27 RX ADMIN — PANTOPRAZOLE SODIUM 40 MG: 40 TABLET, DELAYED RELEASE ORAL at 09:29

## 2023-04-27 RX ADMIN — HYDRALAZINE HYDROCHLORIDE 20 MG: 20 INJECTION INTRAMUSCULAR; INTRAVENOUS at 12:22

## 2023-04-27 RX ADMIN — BUDESONIDE AND FORMOTEROL FUMARATE DIHYDRATE 2 PUFF: 160; 4.5 AEROSOL RESPIRATORY (INHALATION) at 09:18

## 2023-04-27 RX ADMIN — IPRATROPIUM BROMIDE AND ALBUTEROL SULFATE 3 ML: .5; 3 SOLUTION RESPIRATORY (INHALATION) at 07:08

## 2023-04-27 RX ADMIN — APIXABAN 5 MG: 5 TABLET, FILM COATED ORAL at 09:29

## 2023-04-27 NOTE — PROGRESS NOTES
Dr. SONJA Alcaraz    [facility] CORON[facility] CARE        Patient ID:  Name:  Harry Potts  MRN:  [id number]  11/14/1930  89 y.o.  male            CC/Reason for visit: Acute on chronic respiratory failure    Interval hx: Patient is better today but still having high blood pressure.  Awake, alert, following commands on a few liters of nasal cannula.  Noninvasive ventilation was discontinued this morning and he seems to be tolerating well.    ROS: Positive for leg edema.  No abdominal pain or chest pain    Vitals:  Vitals:    04/27/23 0930 04/27/23 0955 04/27/23 1030 04/27/23 1100   BP:  150/71 (!) 182/68 (!) 182/82   BP Location:       Patient Position:       Pulse: 75 68 77 80   Resp:       Temp:       TempSrc:       SpO2: 93%  95% 94%   Weight:       Height:               Body mass index is 44.31 kg/m².    Intake/Output Summary (Last 24 hours) at 4/27/2023 1149  Last data filed at 4/27/2023 1100  Gross per 24 hour   Intake 979 ml   Output 2310 ml   Net -1331 ml       Exam:  GEN:  No distress  Alert, oriented x 2, slightly confused but moves all 4 extremities without focal deficit.   LUNGS: Prolonged expiratory time, distant and diminished  breath sounds bilat, no use of accessory muscles  CV:  Normal S1S2, without murmur, no edema  ABD:  Non tender, morbid obesity hampers exam      Scheduled meds:  amLODIPine, 10 mg, Oral, Q24H  apixaban, 5 mg, Oral, Q12H  budesonide-formoterol, 2 puff, Inhalation, BID - RT  ipratropium-albuterol, 3 mL, Nebulization, Q6H While Awake - RT  pantoprazole, 40 mg, Oral, Q AM  sodium chloride, 10 mL, Intravenous, Q12H  vancomycin, 1,750 mg, Intravenous, Q24H      IV meds:                      Pharmacy to dose vancomycin,         Data Review:   I reviewed the patient's medications and new clinical results.    COVID19   Date Value Ref Range Status   04/05/2023 Not Detected Not Detected - Ref. Range Final         Lab Results   Component Value Date    CALCIUM 9.5 04/27/2023    PHOS  3.5 04/27/2023    MG 2.1 04/27/2023    MG 2.1 04/11/2023    MG 1.9 03/05/2021     Results from last 7 days   Lab Units 04/27/23  0648 04/26/23  1235 04/24/23  0431   SODIUM mmol/L 148* 149* 142   POTASSIUM mmol/L 4.3 3.9 4.0   CHLORIDE mmol/L 100 99 100   CO2 mmol/L 42.8* 40.7* 37.0*   BUN mg/dL 20 20 26*   CREATININE mg/dL 0.88 0.95 0.78   CALCIUM mg/dL 9.5 9.3 8.4   BILIRUBIN mg/dL 0.5 0.5  --    ALK PHOS U/L 68 85  --    ALT (SGPT) U/L 19 26  --    AST (SGOT) U/L 11 16  --    GLUCOSE mg/dL 93 179* 103*   WBC 10*3/mm3 8.39 11.42* 8.65   HEMOGLOBIN g/dL 13.0 14.8 13.0   PLATELETS 10*3/mm3 207 246 204   INR  1.43*  --   --    PROBNP pg/mL  --  1,595.0  --    PROCALCITONIN ng/mL 0.08  --   --              Results from last 7 days   Lab Units 04/26/23  1452 04/26/23  1235   HSTROP T ng/L 31* 30*     Results from last 7 days   Lab Units 04/26/23  1400 04/26/23  1244   PH, ARTERIAL pH units 7.332* 7.194*   PCO2, ARTERIAL mm Hg 81.3* 122.2*   PO2 ART mm Hg 66.1* 73.2*   FLOW RATE lpm  --  6   MODALITY  BiPap HFNC   O2 SATURATION CALC % 89.8* 87.9*             ASSESSMENT:   Acute on chronic hypercapnic and hypoxic respiratory failure  Pulmonary edema and volume overload  Cardiomegaly  Morbid obesity  COPD  Obesity hypoventilation  JENISE  Enterococcal bacteremia  Secondary pulmonary hypertension  Vascular dementia with behavioral disturbance  Dysphagia  Chronic anemia  Atrial fibrillation  Acute cholecystitis status post percutaneous abscess drain in place  Hypertension  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  Respirations have improved.  Wean off of noninvasive ventilation  as tolerated during daytime.  He needs to use noninvasive ventilation every night.  He has severe hypercapnia at baseline.  Avoid hyperoxia, goal oxygen saturation should be 88 to 92%  Transferred to telemetry.  Request hospitalist service to take over as primary team.  We will follow along for  pulmonary issues          Blake Alcaraz MD  4/27/2023

## 2023-04-27 NOTE — THERAPY EVALUATION
Patient Name: Harry Potts  : 1930    MRN: [id number]                              Today's Date: 2023       Admit Date: 2023    Visit Dx:     ICD-10-CM ICD-9-CM   1. Acute on chronic respiratory failure with hypoxia and hypercapnia  J96.21 518.84    J96.22 786.09     799.02   2. Altered mental status, unspecified altered mental status type  R41.82 780.97   3. Bacteremia due to Enterococcus  R78.81 790.7    B95.2 041.04     Patient Active Problem List   Diagnosis   • Dysfunction of eustachian tube   • Gastroesophageal reflux disease   • Hyperglycemia   • Hypercholesterolemia   • Hypertension   • Morbid obesity   • Osteoarthritis of lumbar spine with myelopathy   • Osteoarthritis of lumbar spine   • Bronchitis   • Viral URI with cough   • PVC (premature ventricular contraction)   • Non-traumatic rhabdomyolysis   • Obesity (BMI 30-39.9)   • Chronic low back pain   • Weakness   • Edema, bilateral lower extremities   • Restrictive lung disease   • Irregularly irregular cardiac rhythm   • JENISE on CPAP   • Medicare annual wellness visit, subsequent   • Hypoxia   • Acute on chronic respiratory failure with hypoxia and hypercapnia   • Acute cholecystitis   • New onset atrial fibrillation   • Bacteremia due to Enterococcus   • Pulmonary hypertension   • Metabolic encephalopathy   • Vascular dementia without behavioral disturbance   • Dysphagia   • Anemia   • Hypernatremia     Past Medical History:   Diagnosis Date   • Arthritis    • Asthma     Oxygen at home   • Atrial fibrillation 2023   • Cancer     basal cell    • COPD (chronic obstructive pulmonary disease)    • Difficulty walking    • Elevated cholesterol    • Environmental allergies    • GERD (gastroesophageal reflux disease)    • HL (hearing loss)    • Hyperglycemia    • Hyperlipidemia    • Hypertension    • Obesity    • Pulmonary hypertension 2023   • Sleep apnea    • Spondylolysis, lumbar region      Past Surgical History:    Procedure Laterality Date   • CARDIAC CATHETERIZATION  circa 2008    [facility]   • EYE SURGERY     • HERNIA REPAIR     • JOINT REPLACEMENT     • REPLACEMENT TOTAL KNEE BILATERAL        General Information     Row Name 04/27/23 1655          Physical Therapy Time and Intention    Document Type evaluation  -PC     Mode of Treatment physical therapy  -PC     Row Name 04/27/23 1655          General Information    Patient Profile Reviewed yes  -PC     Prior Level of Function --  pt was recovering from previous admission at SNF, working on sitting/standing  -PC     Existing Precautions/Restrictions fall;oxygen therapy device and L/min  -PC     Row Name 04/27/23 1655          Living Environment    People in Home spouse  -PC     Row Name 04/27/23 1655          Cognition    Orientation Status (Cognition) unable/difficult to assess  -PC     Row Name 04/27/23 1655          Safety Issues, Functional Mobility    Comment, Safety Issues/Impairments (Mobility) pt on BIPAP, appears oriented x 3, but difficult to assess with BIPAP in place  -           User Key  (r) = Recorded By, (t) = Taken By, (c) = Cosigned By    Initials Name Provider Type    PC Heidi Porras PT Physical Therapist               Mobility     Row Name 04/27/23 1657          Bed Mobility    Bed Mobility supine-sit;sit-supine  -PC     Supine-Sit [place] (Bed Mobility) maximum assist (25% patient effort);2 person assist  -PC     Sit-Supine [place] (Bed Mobility) maximum assist (25% patient effort);2 person assist  -PC     Row Name 04/27/23 1657          Sit-Stand Transfer    Comment, (Sit-Stand Transfer) not safe to attempt  -           User Key  (r) = Recorded By, (t) = Taken By, (c) = Cosigned By    Initials Name Provider Type    PC Heidi Porras, PT Physical Therapist               Obj/Interventions     Row Name 04/27/23 1658          Range of Motion Comprehensive    Comment, General Range of Motion limited B shoulder flexion, R UE swelling  present  -PC     Row Name 04/27/23 1658          Strength Comprehensive (MMT)    Comment, General Manual Muscle Testing (MMT) Assessment R UE 2-/5, L UE 3-/5, B LE 2+ to 3-/5  -PC     Row Name 04/27/23 1658          Balance    Balance Assessment sitting static balance;sitting dynamic balance  -PC     Static Sitting Balance moderate assist  -PC     Dynamic Sitting Balance moderate assist  -PC     Position, Sitting Balance sitting edge of bed  -PC           User Key  (r) = Recorded By, (t) = Taken By, (c) = Cosigned By    Initials Name Provider Type    PC Heidi Porras, PT Physical Therapist               Goals/Plan     Row Name 04/27/23 1710          Bed Mobility Goal 1 (PT)    Activity/Assistive Device (Bed Mobility Goal 1, PT) sit to supine/supine to sit  -PC     [place] Level/Cues Needed (Bed Mobility Goal 1, PT) moderate assist (50-74% patient effort)  -PC     Time Frame (Bed Mobility Goal 1, PT) 1 week  -PC     Row Name 04/27/23 1710          Transfer Goal 1 (PT)    Activity/Assistive Device (Transfer Goal 1, PT) sit-to-stand/stand-to-sit;bed-to-chair/chair-to-bed  -PC     [place] Level/Cues Needed (Transfer Goal 1, PT) moderate assist (50-74% patient effort)  -PC     Time Frame (Transfer Goal 1, PT) 1 week  -PC     Row Name 04/27/23 1710          Gait Training Goal 1 (PT)    Activity/Assistive Device (Gait Training Goal 1, PT) gait (walking locomotion);assistive device use  -PC     [place] Level (Gait Training Goal 1, PT) moderate assist (50-74% patient effort)  -PC     Distance (Gait Training Goal 1, PT) 5 ft  -PC     Time Frame (Gait Training Goal 1, PT) 1 week  -PC     Row Name 04/27/23 1710          Therapy Assessment/Plan (PT)    Planned Therapy Interventions (PT) bed mobility training;gait training;transfer training;strengthening  -PC           User Key  (r) = Recorded By, (t) = Taken By, (c) = Cosigned By    Initials Name Provider Type    PC Heidi Porras, PT Physical Therapist                Clinical Impression     Row Name 04/27/23 1704          Pain    Pre/Posttreatment Pain Comment pt did not rate pain, he appeared to have some general discomfort  -PC     Row Name 04/27/23 1704          Plan of Care Review    Plan of Care Reviewed With patient  -PC     Outcome Evaluation Pt adm from SNF with altered mental status, acute on chronic hypoxic and hypercapneic resp failure, now in CCU on BIPAP, pt is responding, awake, following commands consistently, alert X 3. Pt has multiple medical problems and significant functional deficits including his obesity and weakness leading to severely impaired functional mobility, requiring max a x 2 to sit edge of bed today. PT will follow and progress with activity as tolerated, anticipate that pt will need to return to SNF with LTC  -PC     Row Name 04/27/23 1704          Therapy Assessment/Plan (PT)    Rehab Potential (PT) fair, will monitor progress closely  -PC     Criteria for Skilled Interventions Met (PT) yes;meets criteria  -PC     Therapy Frequency (PT) 5 times/wk  -PC     Row Name 04/27/23 1704          Positioning and Restraints    Pre-Treatment Position in bed  -PC     Post Treatment Position bed  -PC     In Bed supine;call light within reach;encouraged to call for assist;with family/caregiver  -PC           User Key  (r) = Recorded By, (t) = Taken By, (c) = Cosigned By    Initials Name Provider Type    PC Heidi Porras, PT Physical Therapist               Outcome Measures     Row Name 04/27/23 1713          How much help from another person do you currently need...    Turning from your back to your side while in flat bed without using bedrails? 1  -PC     Moving from lying on back to sitting on the side of a flat bed without bedrails? 2  -PC     Moving to and from a bed to a chair (including a wheelchair)? 1  -PC     Standing up from a chair using your arms (e.g., wheelchair, bedside chair)? 1  -PC     Climbing 3-5 steps with a railing? 1  -PC      To walk in hospital room? 1  -PC     AM-PAC 6 Clicks Score (PT) 7  -PC     Highest level of mobility 2 --> Bed activities/dependent transfer  -PC           User Key  (r) = Recorded By, (t) = Taken By, (c) = Cosigned By    Initials Name Provider Type    PC Heidi Porras PT Physical Therapist                             Physical Therapy Education     Title: PT OT SLP Therapies (Done)     Topic: Physical Therapy (Done)     Point: Mobility training (Done)     Learning Progress Summary           Patient Acceptance, E,D, DU,NR by PC at 4/27/2023 1711                   Point: Home exercise program (Done)     Learning Progress Summary           Patient Acceptance, E,D, DU,NR by PC at 4/27/2023 1711                   Point: Body mechanics (Done)     Learning Progress Summary           Patient Acceptance, E,D, DU,NR by PC at 4/27/2023 1711                   Point: Precautions (Done)     Learning Progress Summary           Patient Acceptance, E,D, DU,NR by PC at 4/27/2023 1711                               User Key     Initials Effective Dates Name Provider Type Discipline     06/16/21 -  Heidi Porras PT Physical Therapist PT              PT Recommendation and Plan  Planned Therapy Interventions (PT): bed mobility training, gait training, transfer training, strengthening  Plan of Care Reviewed With: patient  Outcome Evaluation: Pt adm from SNF with altered mental status, acute on chronic hypoxic and hypercapneic resp failure, now in CCU on BIPAP, pt is responding, awake, following commands consistently, alert X 3. Pt has multiple medical problems and significant functional deficits including his obesity and weakness leading to severely impaired functional mobility, requiring max a x 2 to sit edge of bed today. PT will follow and progress with activity as tolerated, anticipate that pt will need to return to SNF with LTC     Time Calculation:    PT Charges     Row Name 04/27/23 1711             Time Calculation    Start  Time 1333  -PC      Stop Time 1352  -PC      Time Calculation (min) 19 min  -PC      PT Received On 04/27/23  -PC      PT - Next Appointment 04/28/23  -PC      PT Goal Re-Cert Due Date 05/04/23  -PC            User Key  (r) = Recorded By, (t) = Taken By, (c) = Cosigned By    Initials Name Provider Type    PC Heidi Porras, PT Physical Therapist              Therapy Charges for Today     Code Description Service Date Service Provider Modifiers Qty    76701235902 HC PT EVAL MOD COMPLEXITY 3 4/27/2023 Heidi Porras, PT GP 1    37582581541 HC PT THER PROC EA 15 MIN 4/27/2023 Heidi Porras, PT GP 1    81982297955 HC PT THER SUPP EA 15 MIN 4/27/2023 Heidi Porras, PT GP 1          PT G-Codes  AM-PAC 6 Clicks Score (PT): 7  PT Discharge Summary  Anticipated Discharge Disposition (PT): skilled nursing facility, extended care facility    Heidi Porras PT  4/27/2023

## 2023-04-27 NOTE — CONSULTS
Name: Harry Potts ADMIT: 2023   : 1930  PCP: Harry Luis MD    MRN: [id number] LOS: 1 days   AGE/SEX: 89 y.o. male  ROOM: [facility]     Subjective   Subjective   Resting in bed in ICU. Daughter and spouse at bedside. He is currently wearing hospital NIPPV at bedside as daughter reports they thought he was going to fall asleep. He is otherwise alert and denies any complaints including dyspnea, pain, nausea or vomiting. He has been eating/drinking and currently requesting water.     Daughter reports that she had laid out specific instructions with the facility on how/when to use his NIPPV that was brought with him from hospital to rehab. She states the first night he was there he was not very compliant as he was restless and nervous and could not tolerate it. She is waiting to see what the interrogation of his NIPPV shows to see how long the patient was actually wearing it the other night as she reports the staff would often turn on his machine but not always have it connected to him. There were also issues with him getting his vancomycin there. Daughter and wife do not want him to return to that facility. Daughter requests that patient be sitting on edge of bed and getting into a chair prior to discharge next time.       Objective   Objective   Vital Signs  Temp:  [98.1 °F (36.7 °C)-98.4 °F (36.9 °C)] 98.1 °F (36.7 °C)  Heart Rate:  [42-94] 49  Resp:  [13-32] 32  BP: (107-187)/() 172/72  SpO2:  [87 %-98 %] 95 %  on  Flow (L/min):  [2-3] 2;   Device (Oxygen Therapy): nasal cannula  Body mass index is 44.31 kg/m².     Physical Exam  Vitals and nursing note reviewed.   Constitutional:       Appearance: He is obese. He is ill-appearing.   Cardiovascular:      Rate and Rhythm: Normal rate. Rhythm irregular.      Pulses: Normal pulses.   Pulmonary:      Effort: Pulmonary effort is normal. No respiratory distress.      Comments: Diminished throughout. No obvious wheeze/crackles. On NIPPV at  present. Mask ill-fitting at the bottom.  Abdominal:      General: Bowel sounds are normal. There is distension (mild).      Palpations: Abdomen is soft.      Tenderness: There is no abdominal tenderness.      Comments: RUQ cholecystostomy drain in place with brownish drainage.   Musculoskeletal:         General: Swelling (trace BLE) present. Normal range of motion.      Cervical back: Normal range of motion and neck supple.   Skin:     General: Skin is warm and dry.      Coloration: Skin is pale.      Findings: Bruising present.   Neurological:      Mental Status: He is alert and oriented to person, place, and time.      Sensory: No sensory deficit.      Coordination: Coordination normal.   Psychiatric:         Mood and Affect: Mood normal.         Behavior: Behavior normal.       Results Review:       I reviewed the patient's new clinical results.  Results from last 7 days   Lab Units 04/27/23  0648 04/26/23  1235 04/24/23  0431 04/23/23  0846   WBC 10*3/mm3 8.39 11.42* 8.65 9.18   HEMOGLOBIN g/dL 13.0 14.8 13.0 13.1   PLATELETS 10*3/mm3 207 246 204 233     Results from last 7 days   Lab Units 04/27/23  0648 04/26/23  1235 04/24/23  0431 04/23/23  0846   SODIUM mmol/L 148* 149* 142 141   POTASSIUM mmol/L 4.3 3.9 4.0 4.0   CHLORIDE mmol/L 100 99 100 97*   CO2 mmol/L 42.8* 40.7* 37.0* 37.0*   BUN mg/dL 20 20 26* 28*   CREATININE mg/dL 0.88 0.95 0.78 0.79   GLUCOSE mg/dL 93 179* 103* 134*   Estimated Creatinine Clearance: 71.1 mL/min (by C-G formula based on SCr of 0.88 mg/dL).  Results from last 7 days   Lab Units 04/27/23  0648 04/26/23  1235   ALBUMIN g/dL 2.4* 3.3*   BILIRUBIN mg/dL 0.5 0.5   ALK PHOS U/L 68 85   AST (SGOT) U/L 11 16   ALT (SGPT) U/L 19 26     Results from last 7 days   Lab Units 04/27/23  0648 04/26/23  1235 04/24/23  0431 04/23/23  0846   CALCIUM mg/dL 9.5 9.3 8.4 8.4   ALBUMIN g/dL 2.4* 3.3*  --   --    MAGNESIUM mg/dL 2.1  --   --   --    PHOSPHORUS mg/dL 3.5  --   --   --      Results from  last 7 days   Lab Units 04/27/23  0648   PROCALCITONIN ng/mL 0.08   LACTATE mmol/L 1.4     Glucose   Date/Time Value Ref Range Status   04/26/2023 1521 187 (H) 70 - 130 mg/dL Final     Comment:     Meter: [id number] : [id number] Collinsmabel Radha ZAMBRANO       amLODIPine, 10 mg, Oral, Q24H  apixaban, 5 mg, Oral, Q12H  budesonide-formoterol, 2 puff, Inhalation, BID - RT  ipratropium-albuterol, 3 mL, Nebulization, Q6H While Awake - RT  pantoprazole, 40 mg, Oral, Q AM  sodium chloride, 10 mL, Intravenous, Q12H  vancomycin, 1,750 mg, Intravenous, Q24H      Pharmacy to dose vancomycin,     Diet: Cardiac Diets; Healthy Heart (2-3 Na+); Texture: Soft to Chew (NDD 3); Soft to Chew: Chopped Meat; Fluid Consistency: Thin (IDDSI 0)       Assessment/Plan     Active Hospital Problems    Diagnosis  POA   • **Acute on chronic respiratory failure with hypoxia and hypercapnia [J96.21, J96.22]  Yes   • Hypernatremia [E87.0]  Unknown   • Vascular dementia without behavioral disturbance [F01.50]  Yes   • Pulmonary hypertension [I27.20]  Yes   • Bacteremia due to Enterococcus [R78.81, B95.2]  Yes   • New onset atrial fibrillation [I48.91]  Yes   • JENISE on CPAP [G47.33, Z99.89]  Not Applicable   • Restrictive lung disease [J98.4]  Yes   • Morbid obesity [E66.01]  Yes   • Hypertension [I10]  Yes      Resolved Hospital Problems   No resolved problems to display.     Hospital course to date:  Mr. Potts is a 89 year old male who presented from a nursing facility for increased lethargy and unresponsiveness. He recently had a 20 day stay here and was discharged two days prior to this admission for acute on chronic respiratory failure that was complicated by acute cholecystitis. He did require ICU stay at that time for sedation with NIPPV. His respiratory status improved, but he developed Enterococcus bacteremia secondary to cholecystitis. He was seen by ID and general surgery but not felt to be a candidate for surgery so IR guided cholecystostomy  tube was placed on 4/17 with plans to complete a 14 day course of vancomycin as echocardiogram was negative for vegetations. He did have altered mental status as well as new onset atrial fibrillation and was seen by cardiology and neurology during that stay. He was treated with oral beta blocker therapy and Eliquis for his atrial fibrillation. Neurology felt he had underlying vascular dementia with acute encephalopathy. His mentation was stable at discharge and he was planned for SNF for strengthening prior to returning home. Unfortunately, he became hypercapnic secondary to non-use versus mis-use of his NIPPV at the facility prompting re-admission to the hospital. He was admitted to the ICU overnight and hypercapnia corrected. [facility] asked to assume care.     · Acute on chronic respiratory failure: Appreciate pulmonology who is managing NIPPV. His home machine is at bedside. Daughter reports having to bleed large amounts of oxygen into the machine at night- I have asked her to discuss this with pulmonology for further recommendations. Otherwise, ABGs improved and he is cleared out of ICU. Continue NIPPV at night time. He did get some IV Lasix overnight but was getting PRN dosing here last time as well.     · Bacteremia d/t Enterococcus: Repeat blood cultures here are pending given missed dosing of antibiotics at facility. ID and general surgery re-consulted given his cholecystostomy tube. GI symptoms appear resolved. Will need tube for 6-8 weeks per Dr. Gaytan. Plan to follow up with Dr. Strong. Continues on pharmacy to dose vancomycin through 4/30.    · Atrial fibrillation: With some rates as low as 40s this admission. Had known bradycardia and PVCs last admission but metoprolol added back at lower dosing prior to discharge. Monitor off beta-blocker for now but consider asking cardiology to see again if bradycardia persists. Monitor on telemetry. Continue Eliquis.     · Vascular dementia: Mental status appears  stable. Monitor for any waxing/waning encephalopathy.    · HTN: BP a little elevated but fairly stable on Norvasc.    · Hypernatremia: Sodium 149-> 148 today after small dose of Lasix. Will monitor for now. See how he does as oral intake picks up.    · Obesity: Complicating all the above.     Discussed with patient, wife/daughter at bedside, nurse and Dr. Ashley.    VTE Prophylaxis - Eliquis (home med)  Code Status - Full code  Disposition - Anticipate discharge TBD. Family requesting a different rehab at discharge. PT/OT to follow again here.      DANNIE Ramon  [facility]  04/27/23  16:19 EDT

## 2023-04-27 NOTE — PLAN OF CARE
Goal Outcome Evaluation:  Plan of Care Reviewed With: patient           Outcome Evaluation: Pt adm from SNF with altered mental status, acute on chronic hypoxic and hypercapneic resp failure, now in CCU on BIPAP, pt is responding, awake, following commands consistently, alert X 3. Pt has multiple medical problems and significant functional deficits including his obesity and weakness leading to severely impaired functional mobility, requiring max a x 2 to sit edge of bed today. PT will follow and progress with activity as tolerated, anticipate that pt will need to return to SNF with LTC

## 2023-04-27 NOTE — PLAN OF CARE
Goal Outcome Evaluation:      Kept bipap on all night. Pt alert and oriented. Lasix given with results. UOP 1550.

## 2023-04-27 NOTE — CONSULTS
Cc: Cholecystitis    History of presenting illness:   This is a obese 89-year-old gentleman with multiple medical issues who was recently seen by Dr. Strong for acute cholecystitis.  Because of his significant comorbidities decision was made for percutaneous cholecystostomy tube which was placed on 4/17/2023.  Patient reports that from an abdominal standpoint has been doing well, tolerating a diet and no abdominal pain currently.  He was readmitted with worsening shortness of breath and is currently in the ICU, although he appears stable.    Past Medical History: Asthma, oxygen dependent COPD, morbid obesity, hypertension, sleep apnea, gastroesophageal reflux disease    Past Surgical History: Remote hernia repair, sounds as if it was an inguinal hernia in the past, total knee arthroplasty, eye surgery    Medications: Reviewed and reconciled in    Allergies: Reviewed and reconciled in epic    Social History: Non-smoker    Family History: Negative for colon or rectal cancer    Review of Systems:  Constitutional: Positive for weakness, denies fever or chills  Neck: no swollen glands or dysphagia or odynophagia  Respiratory: Positive for wheezing, shortness of breath, denies cough or hemoptysis  Cardiovascular: negative for chest pain, palpitations or peripheral edema  Gastrointestinal: Denies nausea, vomiting, abdominal pain      Physical Exam:  BMI: 44.3  General: alert and oriented, appropriate, no acute distress but chronic ill appearance  Eyes: No scleral icterus, extraocular movements are intact  Neck: Supple without lymphadenopathy or thyromegaly, trachea is in the midline  Respiratory: There is good bilateral chest expansion, no use of accessory muscles is noted  Cardiovascular: No jugular venous distention or peripheral edema is seen  Gastrointestinal: Soft and benign, cholecystostomy tube in position, dark bile is draining, no significant abdominal tenderness    Laboratory data: White blood cell count 8.4  hemoglobin 13.0 liver function studies in good order with total bilirubin of 0.5 albumin 2.4 INR 1.4 lactate 1.4 procalcitonin 0.08    Imaging data: Previous percutaneous cholecystostomy imaging reviewed      Assessment and plan:   -Acute cholecystitis, status post percutaneous cholecystostomy tube  -Multiple other medical illnesses, most prominently pulmonary disease, currently in the ICU  -Continue percutaneous drainage, would recommend leaving in place for 6 to 8 weeks, it has only been in place for approximately 10 days  -I do not think there is much benefit to doing a cholangiogram at this point as it is unlikely to  plans  -Okay for diet as tolerated  -Follow-up with Dr. Strong upon discharge as an outpatient.      Jovanny Gaytan MD, FACS  General, Minimally Invasive and Endoscopic Surgery  [facility]    [address], [address]  [place], [place], [address]  P: [phone]  F: [phone]

## 2023-04-27 NOTE — NURSING NOTE
Cwon consult received for a RUQ drain. Mr Katlyn has a percutaneous biliary drain that was placed during a prior admission. Drain is patent and dressing is clean, dry and intact. I spoke to primary RN and she reports no issues with drain. Nothing further to add. Of note, GS has also been consulted.  Please call with any questions.

## 2023-04-27 NOTE — NURSING NOTE
Patient arrived on unit at 1527. Order for blood cultures that were ordered at 1649 were not obtained. Three ICU nurses tried to obtain but were not successful. Lab had to come obtain. Vancomycin was given at 2301, not time ordered 1800

## 2023-04-27 NOTE — PLAN OF CARE
Goal Outcome Evaluation:  Plan of Care Reviewed With: patient           Outcome Evaluation: arrived to unit around 1645 from CCU, called they said resp will be bringing the Bipap later, pt in afib, will cont to monitor  Problem: Adult Inpatient Plan of Care  Goal: Plan of Care Review  Outcome: Ongoing, Not Progressing  Flowsheets (Taken 4/27/2023 1657)  Plan of Care Reviewed With: patient  Outcome Evaluation: arrived to unit around 1645 from CCU, called they said resp will be bringing the Bipap later, pt in afib, will cont to monitor  Goal: Patient-Specific Goal (Individualized)  Outcome: Ongoing, Not Progressing  Goal: Absence of Hospital-Acquired Illness or Injury  Outcome: Ongoing, Not Progressing  Goal: Optimal Comfort and Wellbeing  Outcome: Ongoing, Not Progressing  Goal: Readiness for Transition of Care  Outcome: Ongoing, Not Progressing

## 2023-04-28 LAB
ALBUMIN SERPL-MCNC: 2.4 G/DL (ref 3.5–5.2)
ALBUMIN/GLOB SERPL: 0.9 G/DL
ALP SERPL-CCNC: 63 U/L (ref 39–117)
ALT SERPL W P-5'-P-CCNC: 17 U/L (ref 1–41)
ANION GAP SERPL CALCULATED.3IONS-SCNC: 8.6 MMOL/L (ref 5–15)
AST SERPL-CCNC: 21 U/L (ref 1–40)
BASOPHILS # BLD AUTO: 0.04 10*3/MM3 (ref 0–0.2)
BASOPHILS NFR BLD AUTO: 0.5 % (ref 0–1.5)
BILIRUB SERPL-MCNC: 0.6 MG/DL (ref 0–1.2)
BUN SERPL-MCNC: 18 MG/DL (ref 8–23)
BUN/CREAT SERPL: 22.2 (ref 7–25)
CALCIUM SPEC-SCNC: 8.5 MG/DL (ref 8.2–9.6)
CHLORIDE SERPL-SCNC: 98 MMOL/L (ref 98–107)
CO2 SERPL-SCNC: 33.4 MMOL/L (ref 22–29)
CREAT SERPL-MCNC: 0.81 MG/DL (ref 0.76–1.27)
DEPRECATED RDW RBC AUTO: 41.6 FL (ref 37–54)
EGFRCR SERPLBLD CKD-EPI 2021: 82.7 ML/MIN/1.73
EOSINOPHIL # BLD AUTO: 0.1 10*3/MM3 (ref 0–0.4)
EOSINOPHIL NFR BLD AUTO: 1.4 % (ref 0.3–6.2)
ERYTHROCYTE [DISTWIDTH] IN BLOOD BY AUTOMATED COUNT: 12.9 % (ref 12.3–15.4)
GLOBULIN UR ELPH-MCNC: 2.6 GM/DL
GLUCOSE SERPL-MCNC: 89 MG/DL (ref 65–99)
HCT VFR BLD AUTO: 38.9 % (ref 37.5–51)
HGB BLD-MCNC: 12.9 G/DL (ref 13–17.7)
IMM GRANULOCYTES # BLD AUTO: 0.05 10*3/MM3 (ref 0–0.05)
IMM GRANULOCYTES NFR BLD AUTO: 0.7 % (ref 0–0.5)
LYMPHOCYTES # BLD AUTO: 0.75 10*3/MM3 (ref 0.7–3.1)
LYMPHOCYTES NFR BLD AUTO: 10.1 % (ref 19.6–45.3)
MAGNESIUM SERPL-MCNC: 2 MG/DL (ref 1.7–2.3)
MCH RBC QN AUTO: 29.2 PG (ref 26.6–33)
MCHC RBC AUTO-ENTMCNC: 33.2 G/DL (ref 31.5–35.7)
MCV RBC AUTO: 88 FL (ref 79–97)
MONOCYTES # BLD AUTO: 0.46 10*3/MM3 (ref 0.1–0.9)
MONOCYTES NFR BLD AUTO: 6.2 % (ref 5–12)
NEUTROPHILS NFR BLD AUTO: 6 10*3/MM3 (ref 1.7–7)
NEUTROPHILS NFR BLD AUTO: 81.1 % (ref 42.7–76)
NRBC BLD AUTO-RTO: 0 /100 WBC (ref 0–0.2)
PHOSPHATE SERPL-MCNC: 3.2 MG/DL (ref 2.5–4.5)
PLATELET # BLD AUTO: 190 10*3/MM3 (ref 140–450)
PMV BLD AUTO: 10.4 FL (ref 6–12)
POTASSIUM SERPL-SCNC: 3.7 MMOL/L (ref 3.5–5.2)
PROCALCITONIN SERPL-MCNC: 0.07 NG/ML (ref 0–0.25)
PROT SERPL-MCNC: 5 G/DL (ref 6–8.5)
RBC # BLD AUTO: 4.42 10*6/MM3 (ref 4.14–5.8)
SODIUM SERPL-SCNC: 140 MMOL/L (ref 136–145)
WBC NRBC COR # BLD: 7.4 10*3/MM3 (ref 3.4–10.8)

## 2023-04-28 PROCEDURE — 83735 ASSAY OF MAGNESIUM: CPT | Performed by: INTERNAL MEDICINE

## 2023-04-28 PROCEDURE — 97110 THERAPEUTIC EXERCISES: CPT

## 2023-04-28 PROCEDURE — 94799 UNLISTED PULMONARY SVC/PX: CPT

## 2023-04-28 PROCEDURE — 84145 PROCALCITONIN (PCT): CPT | Performed by: INTERNAL MEDICINE

## 2023-04-28 PROCEDURE — 94760 N-INVAS EAR/PLS OXIMETRY 1: CPT

## 2023-04-28 PROCEDURE — 25010000002 HYDRALAZINE PER 20 MG: Performed by: INTERNAL MEDICINE

## 2023-04-28 PROCEDURE — 97166 OT EVAL MOD COMPLEX 45 MIN: CPT

## 2023-04-28 PROCEDURE — 80053 COMPREHEN METABOLIC PANEL: CPT | Performed by: INTERNAL MEDICINE

## 2023-04-28 PROCEDURE — 85025 COMPLETE CBC W/AUTO DIFF WBC: CPT | Performed by: INTERNAL MEDICINE

## 2023-04-28 PROCEDURE — 97530 THERAPEUTIC ACTIVITIES: CPT

## 2023-04-28 PROCEDURE — 94664 DEMO&/EVAL PT USE INHALER: CPT

## 2023-04-28 PROCEDURE — 94761 N-INVAS EAR/PLS OXIMETRY MLT: CPT

## 2023-04-28 PROCEDURE — 99232 SBSQ HOSP IP/OBS MODERATE 35: CPT | Performed by: INTERNAL MEDICINE

## 2023-04-28 PROCEDURE — 84100 ASSAY OF PHOSPHORUS: CPT | Performed by: INTERNAL MEDICINE

## 2023-04-28 PROCEDURE — 94660 CPAP INITIATION&MGMT: CPT

## 2023-04-28 RX ADMIN — PANTOPRAZOLE SODIUM 40 MG: 40 TABLET, DELAYED RELEASE ORAL at 06:52

## 2023-04-28 RX ADMIN — BUDESONIDE AND FORMOTEROL FUMARATE DIHYDRATE 2 PUFF: 160; 4.5 AEROSOL RESPIRATORY (INHALATION) at 20:37

## 2023-04-28 RX ADMIN — Medication 10 ML: at 20:27

## 2023-04-28 RX ADMIN — IPRATROPIUM BROMIDE AND ALBUTEROL SULFATE 3 ML: .5; 3 SOLUTION RESPIRATORY (INHALATION) at 11:55

## 2023-04-28 RX ADMIN — HYDRALAZINE HYDROCHLORIDE 20 MG: 20 INJECTION INTRAMUSCULAR; INTRAVENOUS at 08:39

## 2023-04-28 RX ADMIN — BUDESONIDE AND FORMOTEROL FUMARATE DIHYDRATE 2 PUFF: 160; 4.5 AEROSOL RESPIRATORY (INHALATION) at 07:15

## 2023-04-28 RX ADMIN — Medication 10 ML: at 08:40

## 2023-04-28 RX ADMIN — AMLODIPINE BESYLATE 10 MG: 10 TABLET ORAL at 08:39

## 2023-04-28 RX ADMIN — APIXABAN 5 MG: 5 TABLET, FILM COATED ORAL at 08:39

## 2023-04-28 RX ADMIN — APIXABAN 5 MG: 5 TABLET, FILM COATED ORAL at 20:27

## 2023-04-28 NOTE — PLAN OF CARE
Goal Outcome Evaluation:  Plan of Care Reviewed With: patient           Outcome Evaluation: Pt seen for OT eval this date. Pt admitted from rehab with acute on chronic respiratory failure with hypoxia and hypercapnia. Prior to rehab pt was living with wife, unsure of prior level of function. Pt was agreeable to participate in therapy this date. Performed bed mobility with mod/max assist x2. Unable to perform LE dressing edge of bed, required total assist. Pt B UE rom is limited due to arthritis. Pt attempted to stand 4x, but was unable to successfuly get bottom up off bed due to weakness. Pt demonstrating decreased functional strength/endurance and will cont to benefit from acute OT. Pt will need cont rehab once medically stable.  OT wore appropriate PPE during session and performed hand hygiene before/after session.

## 2023-04-28 NOTE — THERAPY TREATMENT NOTE
Patient Name: Harry Potts  : 1930    MRN: [id number]                              Today's Date: 2023       Admit Date: 2023    Visit Dx:     ICD-10-CM ICD-9-CM   1. Acute on chronic respiratory failure with hypoxia and hypercapnia  J96.21 518.84    J96.22 786.09     799.02   2. Altered mental status, unspecified altered mental status type  R41.82 780.97   3. Bacteremia due to Enterococcus  R78.81 790.7    B95.2 041.04     Patient Active Problem List   Diagnosis   • Dysfunction of eustachian tube   • Gastroesophageal reflux disease   • Hyperglycemia   • Hypercholesterolemia   • Hypertension   • Morbid obesity   • Osteoarthritis of lumbar spine with myelopathy   • Osteoarthritis of lumbar spine   • Bronchitis   • Viral URI with cough   • PVC (premature ventricular contraction)   • Non-traumatic rhabdomyolysis   • Obesity (BMI 30-39.9)   • Chronic low back pain   • Weakness   • Edema, bilateral lower extremities   • Restrictive lung disease   • Irregularly irregular cardiac rhythm   • JENISE on CPAP   • Medicare annual wellness visit, subsequent   • Hypoxia   • Acute on chronic respiratory failure with hypoxia and hypercapnia   • Acute cholecystitis   • New onset atrial fibrillation   • Bacteremia due to Enterococcus   • Pulmonary hypertension   • Metabolic encephalopathy   • Vascular dementia without behavioral disturbance   • Dysphagia   • Anemia   • Hypernatremia     Past Medical History:   Diagnosis Date   • Arthritis    • Asthma     Oxygen at home   • Atrial fibrillation 2023   • Cancer     basal cell    • COPD (chronic obstructive pulmonary disease)    • Difficulty walking    • Elevated cholesterol    • Environmental allergies    • GERD (gastroesophageal reflux disease)    • HL (hearing loss)    • Hyperglycemia    • Hyperlipidemia    • Hypertension    • Obesity    • Pulmonary hypertension 2023   • Sleep apnea    • Spondylolysis, lumbar region      Past Surgical History:    Procedure Laterality Date   • CARDIAC CATHETERIZATION  circa 2008    [facility], operated by [facility]   • EYE SURGERY     • HERNIA REPAIR     • JOINT REPLACEMENT     • REPLACEMENT TOTAL KNEE BILATERAL        General Information     Row Name 04/28/23 1019          Physical Therapy Time and Intention    Document Type therapy note (daily note)  -     Mode of Treatment physical therapy;co-treatment;occupational therapy  -     Row Name 04/28/23 1019          General Information    Existing Precautions/Restrictions fall;oxygen therapy device and L/min  -     Row Name 04/28/23 1019          Cognition    Orientation Status (Cognition) oriented to;person;place;disoriented to;situation  -     Row Name 04/28/23 1019          Safety Issues, Functional Mobility    Impairments Affecting Function (Mobility) endurance/activity tolerance;strength;postural/trunk control;range of motion (ROM);cognition  -           User Key  (r) = Recorded By, (t) = Taken By, (c) = Cosigned By    Initials Name Provider Type    EB Edith Macias PTA Physical Therapist Assistant               Mobility     Row Name 04/28/23 1020          Bed Mobility    Supine-Sit [place] (Bed Mobility) moderate assist (50% patient effort);2 person assist;verbal cues;nonverbal cues (demo/gesture)  -     Sit-Supine [place] (Bed Mobility) maximum assist (25% patient effort);2 person assist;verbal cues;nonverbal cues (demo/gesture)  -     Assistive Device (Bed Mobility) bed rails;head of bed elevated  -EB     Comment, (Bed Mobility) increased time, sequencing cues given.  -     Row Name 04/28/23 1020          Sit-Stand Transfer    Sit-Stand [place] (Transfers) maximum assist (25% patient effort);2 person assist  -     Assistive Device (Sit-Stand Transfers) walker, platform  -EB     Comment, (Sit-Stand Transfer) 4 attempts. Pt unable to clear bottom off the bed despite bed height elevated, cues for hand placement.  Pt has platform walker in room.  -EB            User Key  (r) = Recorded By, (t) = Taken By, (c) = Cosigned By    Initials Name Provider Type    Edith Kim PTA Physical Therapist Assistant               Obj/Interventions     Row Name 04/28/23 1022          Motor Skills    Therapeutic Exercise --  BLE: LAQs and seated marches (X10)  -     Row Name 04/28/23 1022          Balance    Balance Assessment sitting static balance;sitting dynamic balance  -EB     Static Sitting Balance minimal assist;contact guard  -EB     Dynamic Sitting Balance contact guard  -EB     Position, Sitting Balance supported;sitting edge of bed  -EB     Comment, Balance cues for hand placement. Pt initially needing Oscar due to posterior lean upon sitting.  -EB           User Key  (r) = Recorded By, (t) = Taken By, (c) = Cosigned By    Initials Name Provider Type    Edith Kim PTA Physical Therapist Assistant               Goals/Plan    No documentation.                Clinical Impression     Row Name 04/28/23 1027          Plan of Care Review    Plan of Care Reviewed With patient  -EB     Progress improving  -EB     Outcome Evaluation Pt seen for PT this AM and co-treat with OT to maximize therapeutic benefit. Pt required ModAX2 for supine to sit and Max assist of 2 with sit to supine, sequencing cues given. Pt initially needed Oscar with sitting balance but with hand placement cues, pt able to support self, CGA. Pt performed bilateral LE exercises with decreased ROM observed with knee and hip flexion . Pt attempted 4 STS transfers with Max assist of 2. Pt unable to lift bottom off bed despite cueing and bed height elevated. Pt will continue to benefit from skilled PT to improve functional mobility and strength. Will continue to progress  pt as able.  -     Row Name 04/28/23 1027          Therapy Assessment/Plan (PT)    Therapy Frequency (PT) 5 times/wk  -     Row Name 04/28/23 1027          Positioning and Restraints    Pre-Treatment Position in bed  -EB     Post  Treatment Position bed  -EB     In Bed supine;call light within reach;encouraged to call for assist;exit alarm on;with nsg  HOB elevated  -EB           User Key  (r) = Recorded By, (t) = Taken By, (c) = Cosigned By    Initials Name Provider Type    Edith Kim PTA Physical Therapist Assistant               Outcome Measures     Row Name 04/28/23 1035          How much help from another person do you currently need...    Turning from your back to your side while in flat bed without using bedrails? 2  -EB     Moving from lying on back to sitting on the side of a flat bed without bedrails? 2  -EB     Moving to and from a bed to a chair (including a wheelchair)? 1  -EB     Standing up from a chair using your arms (e.g., wheelchair, bedside chair)? 2  -EB     Climbing 3-5 steps with a railing? 1  -EB     To walk in hospital room? 1  -EB     AM-PAC 6 Clicks Score (PT) 9  -EB     Highest level of mobility 3 --> Sat at edge of bed  -EB           User Key  (r) = Recorded By, (t) = Taken By, (c) = Cosigned By    Initials Name Provider Type    Edith Kim PTA Physical Therapist Assistant                             Physical Therapy Education     Title: PT OT SLP Therapies (Done)     Topic: Physical Therapy (Done)     Point: Mobility training (Done)     Learning Progress Summary           Patient Acceptance, E,D, VU,NR by EB at 4/28/2023 1035    Acceptance, E,D, DU,NR by PC at 4/27/2023 1711                   Point: Home exercise program (Done)     Learning Progress Summary           Patient Acceptance, E,D, VU,NR by EB at 4/28/2023 1035    Acceptance, E,D, DU,NR by PC at 4/27/2023 1711                   Point: Body mechanics (Done)     Learning Progress Summary           Patient Acceptance, E,D, VU,NR by EB at 4/28/2023 1035    Acceptance, E,D, DU,NR by PC at 4/27/2023 1711                   Point: Precautions (Done)     Learning Progress Summary           Patient Acceptance, E,D, VU,NR by EB at 4/28/2023 1035     Acceptance, E,D, DU,NR by  at 4/27/2023 1711                               User Key     Initials Effective Dates Name Provider Type Discipline    PC 06/16/21 -  Heidi Porras PT Physical Therapist PT    EB 02/14/23 -  Edith Macias PTA Physical Therapist Assistant PT              PT Recommendation and Plan     Plan of Care Reviewed With: patient  Progress: improving  Outcome Evaluation: Pt seen for PT this AM and co-treat with OT to maximize therapeutic benefit. Pt required ModAX2 for supine to sit and Max assist of 2 with sit to supine, sequencing cues given. Pt initially needed Oscar with sitting balance but with hand placement cues, pt able to support self, CGA. Pt performed bilateral LE exercises with decreased ROM observed with knee and hip flexion . Pt attempted 4 STS transfers with Max assist of 2. Pt unable to lift bottom off bed despite cueing and bed height elevated. Pt will continue to benefit from skilled PT to improve functional mobility and strength. Will continue to progress  pt as able.     Time Calculation:    PT Charges     Row Name 04/28/23 1019             Time Calculation    Start Time 0814  -EB      Stop Time 0837  -EB      Time Calculation (min) 23 min  -EB      PT Received On 04/28/23  -EB      PT - Next Appointment 04/29/23  -EB         Time Calculation- PT    Total Timed Code Minutes- PT 23 minute(s)  -EB            User Key  (r) = Recorded By, (t) = Taken By, (c) = Cosigned By    Initials Name Provider Type    EB Edith Macias PTA Physical Therapist Assistant              Therapy Charges for Today     Code Description Service Date Service Provider Modifiers Qty    82225900419 HC PT THERAPEUTIC ACT EA 15 MIN 4/28/2023 Edith Macias PTA GP 1    51050871145 HC PT THER PROC EA 15 MIN 4/28/2023 Edith Macias PTA GP 1    53263379459 HC PT THER SUPP EA 15 MIN 4/28/2023 Edith Macias PTA GP 1          PT G-Codes  AM-PAC 6 Clicks Score (PT): 9       Edith Macias PTA  4/28/2023

## 2023-04-28 NOTE — PLAN OF CARE
Goal Outcome Evaluation:  Plan of Care Reviewed With: patient, daughter        Progress: improving  Outcome Evaluation: AOx4, BP was high this AM and PRN hydralazine was given once. Otherwise VSS on 2L NC. Worked with PT/OT but was unable to stand. Plans to use home Bipap tonight. Good UOP. no c/o pain or SOA. Palliative Care consulted with the family and patient. The Code status was changed to DNR/DNI.

## 2023-04-28 NOTE — PROGRESS NOTES
LOS: 2 days     Chief Complaint:  Enterococcus bacteremia     Interval History: Afebrile, patient is awake and alert.  Denies any new complaints.  Tolerating antibiotics vomiting diarrhea or rash    Vital Signs  Temp:  [98.1 °F (36.7 °C)-98.5 °F (36.9 °C)] 98.3 °F (36.8 °C)  Heart Rate:  [49-86] 60  Resp:  [18-32] 20  BP: (150-182)/() 176/91    Physical Exam:  General: In no acute distress  Cardiovascular: RRR, trace lower extremity edema bilaterally  Respiratory: CPAP in place  GI: Soft, NT/ND, ostomy tube in place  Skin: No rashes     Antibiotics:  Vancomycin dosing per pharmacy      Results Review:    Lab Results   Component Value Date    WBC 7.40 04/28/2023    HGB 12.9 (L) 04/28/2023    HCT 38.9 04/28/2023    MCV 88.0 04/28/2023     04/28/2023     Lab Results   Component Value Date    GLUCOSE 89 04/28/2023    BUN 18 04/28/2023    CREATININE 0.81 04/28/2023    EGFRIFNONA 83 01/12/2022    EGFRIFAFRI 97 01/12/2022    BCR 22.2 04/28/2023    CO2 33.4 (H) 04/28/2023    CALCIUM 8.5 04/28/2023    PROTENTOTREF 6.4 04/03/2023    ALBUMIN 2.4 (L) 04/28/2023    LABIL2 1.9 04/03/2023    AST 21 04/28/2023    ALT 17 04/28/2023       Microbiology:  4/26 BCx NGTD x 2    4/18 colostomy culture Enterococcus  4/16 BCx Negx 2  4/14 BCx Enterococcus faecium  1/2   4/5 RVP neg     Assessment & Plan   AMS  Acute hypercapnic respiratory failure  Enterococcus bacteremia   Leukocytosis   Dementia  Vascular congestion    Patient remains afebrile and leukocytosis has resolved.  Repeat blood cultures on April 26 are negative to date.  General surgery note reviewed plan for cholecystostomy tube to remain in place for the next 6 to 8 weeks.    Continue vancomycin dosing per pharmacy to complete a 14-day course.  Antibiotic stop date is April 30.  The patient should have repeat blood cultures times two 1 week after stopping his antimicrobial therapy to ensure resolution of the bacteremia.    ID will sign off.  Please do not  hesitate to call us with further questions or concerns

## 2023-04-28 NOTE — CASE MANAGEMENT/SOCIAL WORK
Discharge Planning Assessment  [facility]     Patient Name: Harry Potts  MRN: [id number]  Today's Date: 4/28/2023    Admit Date: 4/26/2023    Plan: DC to SNF, with goal of returning home, stretcher transport at discharge   Discharge Needs Assessment     Row Name 04/28/23 1252       Living Environment    People in Home facility resident    Current Living Arrangements other (see comments)  SNF    Potentially Unsafe Housing Conditions none    Provides Primary Care For no one, unable/limited ability to care for self    Family Caregiver if Needed child(jay), adult    Family Caregiver Names Emilie MelgarersonSOHEILA [phone]    Quality of Family Relationships helpful;involved;supportive    Able to Return to Prior Arrangements yes       Resource/Environmental Concerns    Resource/Environmental Concerns none       Food Insecurity    Within the past 12 months, you worried that your food would run out before you got the money to buy more. Never true    Within the past 12 months, the food you bought just didn't last and you didn't have money to get more. Never true       Transition Planning    Patient/Family Anticipates Transition to other (see comments)  SNF, with goal of returning home    Patient/Family Anticipated Services at Transition     Transportation Anticipated health plan transportation       Discharge Needs Assessment    Equipment Currently Used at Home walker, rolling    Concerns to be Addressed other (see comments)  Placement    Anticipated Changes Related to Illness inability to care for self    Outpatient/Agency/Support Group Needs skilled nursing facility    Provided Post Acute Provider List? Refused    Provided Post Acute Provider Quality & Resource List? Refused               Discharge Plan     Row Name 04/28/23 1254       Plan    Plan DC to SNF, with goal of returning home, stretcher transport at discharge    Patient/Family in Agreement with Plan yes    Provided Post Acute Provider List?  N/A    Provided Post Acute Provider Quality & Resource List? N/A    Plan Comments Met with pt and Daughter/SOHEILA Pahn at bedside. Permission obtained to speak to daughter in front of pt. Explained role of . Face sheet verified. Pts PCP is Harry Luis. Pt recently was DC from [facility] to [place]. Pt/family requesting a different facility upon discharge. Referrals placed per requet in Epic. Respective liaison's contacted. Explained that CCP would follow to assess for discharge needs              Continued Care and Services - Admitted Since 4/26/2023     Destination     Service Provider Request Status Selected Services Address Phone Fax Patient Preferred    [facility] Pending - Request Sent N/A [address] [facility] [address] [phone] --    [facility] P[facility] - [facility] N[facility] [address] [facility] [address] [phone] --            Selected Continued Care - Prior Encounters Includes continued care and service providers with selected services from prior encounters from 1/26/2023 to 4/28/2023    Discharged on 4/24/2023 Admission date: 4/4/2023 - Discharge disposition: Skilled Nursing Facility (DC - External)    Destination     Service Provider Selected Services Address Phone Fax Patient Preferred    [facility] [address] [facility] [address] [phone] --                    Expected Discharge Date and Time     Expected Discharge Date Expected Discharge Time    May 2, 2023          Demographic Summary    No documentation.                Functional Status    No documentation.                Psychosocial    No documentation.                Abuse/Neglect    No documentation.                Legal    No documentation.                Substance Abuse    No documentation.                Patient Forms    No documentation.                   Leeann Quiroz, RN

## 2023-04-28 NOTE — DISCHARGE PLACEMENT REQUEST
"Saji Potts (89 y.o. Male)     Date of Birth   11/14/1930    Social Security Number       Address   [address] [address]    Home Phone   [phone]    MRN   [id number]       [facility]    Marital Status                               Admission Date   4/26/23    Admission Type   Emergency    Admitting Provider   Blake Alcaraz MD    Attending Provider   Bladimir Ashley MD    Department, Room/Bed   [address], [address]       Discharge Date       Discharge Disposition       Discharge Destination                               Attending Provider: Bladimir Ashley MD    Allergies: Morphine And Related, Atorvastatin, Dexmedetomidine    Isolation: None   Infection: None   Code Status: CPR    Ht: 172.7 cm (68\")   Wt: 130 kg (287 lb 7.7 oz)    Admission Cmt: None   Principal Problem: Acute on chronic respiratory failure with hypoxia and hypercapnia [J96.21,J96.22]                 Active Insurance as of 4/26/2023     Primary Coverage     Payor Plan Insurance Group Employer/Plan Group    [facility] MEDICARE REPLACEMENT [facility] MEDICARE REPLACEMENT [id number]     Payor Plan Address Payor Plan Phone Number Payor Plan Fax Number Effective Dates    [address] [address]   1/1/2016 - None Entered    [facility] [id number]       Subscriber Name Subscriber Birth Date Member ID       SAJI POTTS 11/14/1930 [id number]                 Emergency Contacts      (Rel.) Home Phone Work Phone Mobile Phone    Venita Potts [facility] (Other) [phone] -- --    SHERYLSTELLATRAMAINE [facility] [address] (Daughter) [phone] -- --              "

## 2023-04-28 NOTE — CONSULTS
Purpose of the visit was to evaluate for: goals of care/advanced care planning, support for patient/family, hospice referral/discussion, pain/symptom management, withdrawal of interventions, transfer to comfort care bed/unit and comfort care. Spoke with MD and RN as well as family and discussed palliative care, goals of care, care options, resuscitation status, [facility] scattered bed status and discharge options.      Assessment:    Patient is palliative care appropriate for community based services with [facility] or SNF with palliative care given: advanced age, acute on chronic hypoxic and hypercapneic respiratory failure, Afib, restrictive lung disease, CPAP dependent JENISE, morbid obesity, dementia.  Upon assessment this patient is alert and pleasantly conversant. Denies pain or SOA on 2L NC.  PPS: 40%    Recommendations/Plan: Change CODE status to DNR/DNI.    Other Comments: I spoke with this patient's spouse/HCS Venita via phone and daughter/alt HCS Emilie at bedside to offer support and to discuss their goals of care for the patient. The patient has shown significant improvement in mentation and respiratory status, and they are optimistic that with adjustments to his CPAP function he will be able to discharge to rehab for strength building.    Following prior discussion with the patient and per the wishes expressed in his living will, all are agreeable to change his CODE status to DNR/DNI/no cardioversion.   MD and RN notified, orders received.    I advised them of our availability and all questions answered.   Palliative Care will sign off at this time, please reconsult if further support is needed.    Felicia Andersen RN

## 2023-04-28 NOTE — PLAN OF CARE
Goal Outcome Evaluation:  Plan of Care Reviewed With: patient        Progress: improving  Outcome Evaluation: Pt seen for PT this AM and co-treat with OT to maximize therapeutic benefit. Pt required ModAX2 for supine to sit and Max assist of 2 with sit to supine, sequencing cues given. Pt initially needed Oscar with sitting balance but with hand placement cues, pt able to support self, CGA. Pt performed bilateral LE exercises with decreased ROM observed with knee and hip flexion . Pt attempted 4 STS transfers with Max assist of 2. Pt unable to lift bottom off bed despite cueing and bed height elevated. Pt will continue to benefit from skilled PT to improve functional mobility and strength. Will continue to progress  pt as able.

## 2023-04-28 NOTE — CASE MANAGEMENT/SOCIAL WORK
"Physicians Statement of Medical Necessity for  Ambulance Transportation    GENERAL INFORMATION     Name: Harry Potts  YOB: 1930  Medicare #:[employer] Medicare Replacement [id number]  Transport Date: __________________________________________ (Valid for round trips this date, or for scheduled repetitive trips for 60 days from the date signed below.)  Origin: [facility]  Destination:   Is the Patient's stay covered under Medicare Part A (PPS/DRG?)Yes  Closest appropriate facility? Yes  If this a hosp-hosp transfer? No  Is this a hospice patient? No    MEDICAL NECESSITY QUESTIONAIRE    Ambulance Transportation is medically necessary only if other means of transportation are contraindicated or would be potentially harmful to the patient.  To meet this requirement, the patient must be either \"bed confined\" or suffer from a condition such that transport by means other than an ambulance is contraindicated by the patient's condition.  The following questions must be answered by the healthcare professional signing below for this form to be valid:     1) Describe the MEDICAL CONDITION (physical and/or mental) of this patient AT THE TIME OF AMBULANCE TRANSPORT that requires the patient to be transported in an ambulance, and why transport by other means is contraindicated by the patient's condition:   Past Medical History:   Diagnosis Date   • Arthritis    • Asthma March, 2021    Oxygen at home   • Atrial fibrillation 4/18/2023   • Cancer     basal cell    • COPD (chronic obstructive pulmonary disease)    • Difficulty walking    • Elevated cholesterol    • Environmental allergies    • GERD (gastroesophageal reflux disease)    • HL (hearing loss)    • Hyperglycemia    • Hyperlipidemia    • Hypertension    • Obesity    • Pulmonary hypertension 4/20/2023   • Sleep apnea    • Spondylolysis, lumbar region       Past Surgical History:   Procedure Laterality Date   • CARDIAC CATHETERIZATION  " "circa 2008    [facility]   • EYE SURGERY     • HERNIA REPAIR     • JOINT REPLACEMENT     • REPLACEMENT TOTAL KNEE BILATERAL        2) Is this patient \"bed confined\" as defined below?Yes   To be \"bed confined\" the patient must satisfy all three of the following criteria:  (1) unable to get up from bed without assistance; AND (2) unable to ambulate;  AND (3) unable to sit in a chair or wheelchair.  3) Can this patient safely be transported by car or wheelchair van (I.e., may safely sit during transport, without an attendant or monitoring?)No   4. In addition to completing questions 1-3 above, please check any of the following conditions that courtney:          *Note: supporting documentation for any boxes checked must be maintained in the patient's medical records Requires oxygen - unable to self administer and Other High falls risk      SIGNATURE OF PHYSICIAN OR OTHER AUTHORIZED HEALTHCARE PROFESSIONAL    I certify that the above information is true and correct based on my evaluation of this patient, and represent that the patient requires transport by ambulance and that other forms of transport are contraindicated.  I understand that this information will be used by the Centers for Medicare and Medicaid Services (CMS) to support the determiniation of medical necessity for ambulance services, and I represent that I have personal knowledge of the patient's condition at the time of transport.       If this box is checked, I also certify that the patient is physically or mentally incapable of signing the ambulance service's claim form and that the institution with which I am affiliated has furnished care, services or assistance to the patient.  My signature below is made on behalf of the patient pursuant to 42 .36(b)(4). In accordance with 42 .37, the specific reason(s) that the patient is physically or mentally incapable of signing the claim for is as follows:     Signature of Physician or Healthcare " Professional  Date/Time:        (For Scheduled repetitive transport, this form is not valid for transports performed more than 60 days after this date).                                                                                                                                            --------------------------------------------------------------------------------------------  Printed Name and Credentials of Physician or Authorized Healthcare Professional     *Form must be signed by patient's attending physician for scheduled, repetitive transports,.  For non-repetitive ambulance transports, if unable to obtain the signature of the attending physician, any of the following may sign (please select below):     Physician  Clinical Nurse Specialist  Registered Nurse     Physician Assistant  Discharge Planner  Licensed Practical Nurse     Nurse Practitioner

## 2023-04-28 NOTE — PROGRESS NOTES
Dr. SONJA Alcaraz    [address]        Patient ID:  Name:  Harry Potts  MRN:  [id number]  11/14/1930  89 y.o.  male            CC/Reason for visit: Acute on chronic respiratory failure     Interval hx: Patient is alert and oriented.  Does have some shortness of breath at rest.  Has been using hospital noninvasive BiPAP here     ROS: Positive for leg edema.  No abdominal pain or chest pain    Vitals:  Vitals:    04/28/23 0000 04/28/23 0100 04/28/23 0551 04/28/23 0737   BP:    176/91   BP Location:    Right arm   Patient Position:    Lying   Pulse:  57  60   Resp:    20   Temp:    98.3 °F (36.8 °C)   TempSrc:    Oral   SpO2: 90% 93%  93%   Weight:   130 kg (287 lb 7.7 oz)    Height:               Body mass index is 43.71 kg/m².    Intake/Output Summary (Last 24 hours) at 4/28/2023 1348  Last data filed at 4/28/2023 0600  Gross per 24 hour   Intake 480 ml   Output 550 ml   Net -70 ml       Exam:  GEN:  Obese elderly male patient who is awake  Alert, oriented x 2.  Moves all 4 extremities, little hard of hearing  LUNGS: Distant and diminished breath sounds bilat, no use of accessory muscles, tachypnea  CV:  Normal S1S2, without murmur, there is leg edema  ABD:  Non tender, obesity hampers exam      Scheduled meds:  amLODIPine, 10 mg, Oral, Q24H  apixaban, 5 mg, Oral, Q12H  budesonide-formoterol, 2 puff, Inhalation, BID - RT  ipratropium-albuterol, 3 mL, Nebulization, Q6H While Awake - RT  pantoprazole, 40 mg, Oral, Q AM  sodium chloride, 10 mL, Intravenous, Q12H  vancomycin, 1,750 mg, Intravenous, Q24H      IV meds:                      Pharmacy to dose vancomycin,         Data Review:   I reviewed the patient's medications and new clinical results.            Results from last 7 days   Lab Units 04/28/23  0506 04/27/23  0648 04/26/23  1235   SODIUM mmol/L 140 148* 149*   POTASSIUM mmol/L 3.7 4.3 3.9   CHLORIDE mmol/L 98 100 99   CO2 mmol/L 33.4* 42.8* 40.7*   BUN mg/dL 18 20 20   CREATININE mg/dL  0.81 0.88 0.95   CALCIUM mg/dL 8.5 9.5 9.3   BILIRUBIN mg/dL 0.6 0.5 0.5   ALK PHOS U/L 63 68 85   ALT (SGPT) U/L 17 19 26   AST (SGOT) U/L 21 11 16   GLUCOSE mg/dL 89 93 179*   WBC 10*3/mm3 7.40 8.39 11.42*   HEMOGLOBIN g/dL 12.9* 13.0 14.8   PLATELETS 10*3/mm3 190 207 246   INR   --  1.43*  --    PROBNP pg/mL  --   --  1,595.0   PROCALCITONIN ng/mL 0.07 0.08  --      Results from last 7 days   Lab Units 04/26/23  2245   BLOODCX  No growth at 24 hours  No growth at 24 hours           Results from last 7 days   Lab Units 04/26/23  1400 04/26/23  1244   PH, ARTERIAL pH units 7.332* 7.194*   PCO2, ARTERIAL mm Hg 81.3* 122.2*   PO2 ART mm Hg 66.1* 73.2*   FLOW RATE lpm  --  6   MODALITY  BiPap HFNC   O2 SATURATION CALC % 89.8* 87.9*             ASSESSMENT:     Acute on chronic respiratory failure with hypoxia and hypercapnia    Hypertension    Morbid obesity    Restrictive lung disease    JENISE on CPAP    New onset atrial fibrillation    Bacteremia due to Enterococcus    Pulmonary hypertension    Vascular dementia without behavioral disturbance    Hypernatremia        PLAN:  From the respiratory standpoint patient has very advanced restrictive lung defect with severe hypercapnic respiratory failure.  He was a  and was exposed to chemical fumes, lacquer and paint.  His lung disease is irreversible.  I communicated this to the family today.  The only thing we can do is continue with noninvasive ventilation.  Today I reviewed the settings of his ResMed noninvasive ventilator provided by [employer] which he uses at a long-term care facility where he resides.  Apparently he had been using it 7 hours every night but still having oxygen saturations that are low and obviously still had severe hypercapnic episode prompting his admission to the hospital with altered mental status.  For these reasons, we should start using his own noninvasive ventilator here for the next few nights before he is discharged so we can  know exactly whether the machine works appropriately or is not sufficient to maintain his adequate ventilation.  He does not have a problem of oxygenation.  He has a problem of ventilation, with marked hypercapnia at rest.  It seems as if his baseline PCO2 is somewhere between 65 and 70.  Avoid aggressive diuresis in order not to provoke contraction alkalosis which would worsen his respiratory status by reducing his respiratory rate as compensation mechanism.  Bronchodilators as needed.  The rest of his medical problems as per primary team          Blake Alcaraz MD  4/28/2023

## 2023-04-28 NOTE — PROGRESS NOTES
Name: Harry Potts ADMIT: 2023   : 1930  PCP: Harry Luis MD    MRN: [id number] LOS: 2 days   AGE/SEX: 89 y.o. male  ROOM: N4/1     Subjective   Subjective   Resting in bed. On tele floor now. Daughter at bedside and wife just arrived as well. He wore his NIPPV last night and did well except he woke up at 4 am confused and took it off. He reoriented and had this put back on. Family feels this is the best that he has looked in a long time today. He ate breakfast and sat on the edge of the bed. He denies any nausea or vomiting. No abdominal pain. Breathing is comfortable.      Objective   Objective   Vital Signs  Temp:  [98.1 °F (36.7 °C)-98.5 °F (36.9 °C)] 98.3 °F (36.8 °C)  Heart Rate:  [49-86] 60  Resp:  [20-32] 20  BP: (150-176)/() 176/91  SpO2:  [90 %-98 %] 93 %  on  Flow (L/min):  [1-2] 2;   Device (Oxygen Therapy): nasal cannula  Body mass index is 43.71 kg/m².     Physical Exam  Vitals and nursing note reviewed.   Constitutional:       Appearance: He is obese. He is ill-appearing.   Cardiovascular:      Rate and Rhythm: Normal rate. Rhythm irregular.      Pulses: Normal pulses.   Pulmonary:      Effort: Pulmonary effort is normal. No respiratory distress.      Comments: Diminished throughout.   Abdominal:      General: Bowel sounds are normal. There is distension (mild).      Palpations: Abdomen is soft.      Tenderness: There is no abdominal tenderness.      Comments: RUQ cholecystostomy drain in place with brownish drainage.   Musculoskeletal:         General: Swelling (trace BLE) present. Normal range of motion.      Cervical back: Normal range of motion and neck supple.   Skin:     General: Skin is warm and dry.      Coloration: Skin is pale.      Findings: Bruising present.   Neurological:      Mental Status: He is alert and oriented to person, place, and time.      Sensory: No sensory deficit.      Coordination: Coordination normal.   Psychiatric:         Mood and  Affect: Mood normal.         Behavior: Behavior normal.     Results Review:       I reviewed the patient's new clinical results.  Results from last 7 days   Lab Units 04/28/23  0506 04/27/23  0648 04/26/23  1235 04/24/23  0431   WBC 10*3/mm3 7.40 8.39 11.42* 8.65   HEMOGLOBIN g/dL 12.9* 13.0 14.8 13.0   PLATELETS 10*3/mm3 190 207 246 204     Results from last 7 days   Lab Units 04/28/23  0506 04/27/23  0648 04/26/23  1235 04/24/23  0431   SODIUM mmol/L 140 148* 149* 142   POTASSIUM mmol/L 3.7 4.3 3.9 4.0   CHLORIDE mmol/L 98 100 99 100   CO2 mmol/L 33.4* 42.8* 40.7* 37.0*   BUN mg/dL 18 20 20 26*   CREATININE mg/dL 0.81 0.88 0.95 0.78   GLUCOSE mg/dL 89 93 179* 103*   Estimated Creatinine Clearance: 76.5 mL/min (by C-G formula based on SCr of 0.81 mg/dL).  Results from last 7 days   Lab Units 04/28/23  0506 04/27/23 0648 04/26/23  1235   ALBUMIN g/dL 2.4* 2.4* 3.3*   BILIRUBIN mg/dL 0.6 0.5 0.5   ALK PHOS U/L 63 68 85   AST (SGOT) U/L 21 11 16   ALT (SGPT) U/L 17 19 26     Results from last 7 days   Lab Units 04/28/23  0506 04/27/23  0648 04/26/23  1235 04/24/23  0431   CALCIUM mg/dL 8.5 9.5 9.3 8.4   ALBUMIN g/dL 2.4* 2.4* 3.3*  --    MAGNESIUM mg/dL 2.0 2.1  --   --    PHOSPHORUS mg/dL 3.2 3.5  --   --      Results from last 7 days   Lab Units 04/28/23  0506 04/27/23  0648   PROCALCITONIN ng/mL 0.07 0.08   LACTATE mmol/L  --  1.4     Glucose   Date/Time Value Ref Range Status   04/26/2023 1521 187 (H) 70 - 130 mg/dL Final     Comment:     Meter: [id number] : [id number] Mare Garcia RN       amLODIPine, 10 mg, Oral, Q24H  apixaban, 5 mg, Oral, Q12H  budesonide-formoterol, 2 puff, Inhalation, BID - RT  ipratropium-albuterol, 3 mL, Nebulization, Q6H While Awake - RT  pantoprazole, 40 mg, Oral, Q AM  sodium chloride, 10 mL, Intravenous, Q12H  vancomycin, 1,750 mg, Intravenous, Q24H      Pharmacy to dose vancomycin,     Diet: Cardiac Diets; Healthy Heart (2-3 Na+); Texture: Soft to Chew (NDD 3); Soft to Chew:  Chopped Meat; Fluid Consistency: Thin (IDDSI 0)       Assessment/Plan     Active Hospital Problems    Diagnosis  POA   • **Acute on chronic respiratory failure with hypoxia and hypercapnia [J96.21, J96.22]  Yes   • Hypernatremia [E87.0]  Unknown   • Vascular dementia without behavioral disturbance [F01.50]  Yes   • Pulmonary hypertension [I27.20]  Yes   • Bacteremia due to Enterococcus [R78.81, B95.2]  Yes   • New onset atrial fibrillation [I48.91]  Yes   • JENISE on CPAP [G47.33, Z99.89]  Not Applicable   • Restrictive lung disease [J98.4]  Yes   • Morbid obesity [E66.01]  Yes   • Hypertension [I10]  Yes      Resolved Hospital Problems   No resolved problems to display.     Hospital course to date:  Mr. Potts is a 89 year old male who presented from a nursing facility for increased lethargy and unresponsiveness. He recently had a 20 day stay here and was discharged two days prior to this admission for acute on chronic respiratory failure that was complicated by acute cholecystitis. He did require ICU stay at that time for sedation with NIPPV. His respiratory status improved, but he developed Enterococcus bacteremia secondary to cholecystitis. He was seen by ID and general surgery but not felt to be a candidate for surgery so IR guided cholecystostomy tube was placed on 4/17 with plans to complete a 14 day course of vancomycin as echocardiogram was negative for vegetations. He did have altered mental status as well as new onset atrial fibrillation and was seen by cardiology and neurology during that stay. He was treated with oral beta blocker therapy and Eliquis for his atrial fibrillation. Neurology felt he had underlying vascular dementia with acute encephalopathy. His mentation was stable at discharge and he was planned for SNF for strengthening prior to returning home. Unfortunately, he became hypercapnic secondary to non-use versus mis-use of his NIPPV at the facility prompting re-admission to the hospital. He  was admitted to the ICU overnight and hypercapnia corrected. A asked to assume care.      • Acute on chronic respiratory failure: Appreciate pulmonology. Reviewed Dr. Alcaraz's note which indicates patient was using his machine at the facility but oxygen saturations were low on this- plans to trial his home machine here to see if working properly. He did get some IV Lasix on admission but currently stable and will diurese only as needed. Need to avoid contraction alkalosis to prevent reducing respiratory rate per pulm.      • Bacteremia d/t Enterococcus: Repeat blood cultures negative thus far. Had some missed dosing of antibiotics at facility. ID and general surgery re-consulted given his cholecystostomy tube. GI symptoms appear resolved. Will need tube for 6-8 weeks per Dr. Gaytan. Plan to follow up with Dr. Strong. Continues on pharmacy to dose vancomycin through 4/30. Will need repeat blood cultures x 2 one week after completing therapy per ID recs.     • Atrial fibrillation: With some rates as low as 40s this admission. Had known bradycardia and PVCs last admission but metoprolol added back at lower dosing prior to discharge. Monitor off beta-blocker for now but consider asking cardiology to see again if bradycardia persists. Monitor on telemetry. Continue Eliquis. HR/BP are both very stable today.     • Vascular dementia: Mental status appears stable. Monitor for any waxing/waning encephalopathy.     • HTN: BP good on most recent check.     • Hypernatremia: Sodium normalized with adequate PO intake.      • Obesity: Complicating all the above.      Discussed with patient, wife/daughter at bedside and Dr. Ashley.     VTE Prophylaxis - Eliquis (home med)  Code Status - Full code  Disposition - Anticipate discharge TBD. Family requesting a different rehab at discharge. PT/OT following.    DANNIE Ramon  [facility]  04/28/23  11:10 EDT

## 2023-04-28 NOTE — THERAPY EVALUATION
Patient Name: Harry Potts  : 1930    MRN: [id number]                              Today's Date: 2023       Admit Date: 2023    Visit Dx:     ICD-10-CM ICD-9-CM   1. Acute on chronic respiratory failure with hypoxia and hypercapnia  J96.21 518.84    J96.22 786.09     799.02   2. Altered mental status, unspecified altered mental status type  R41.82 780.97   3. Bacteremia due to Enterococcus  R78.81 790.7    B95.2 041.04     Patient Active Problem List   Diagnosis   • Dysfunction of eustachian tube   • Gastroesophageal reflux disease   • Hyperglycemia   • Hypercholesterolemia   • Hypertension   • Morbid obesity   • Osteoarthritis of lumbar spine with myelopathy   • Osteoarthritis of lumbar spine   • Bronchitis   • Viral URI with cough   • PVC (premature ventricular contraction)   • Non-traumatic rhabdomyolysis   • Obesity (BMI 30-39.9)   • Chronic low back pain   • Weakness   • Edema, bilateral lower extremities   • Restrictive lung disease   • Irregularly irregular cardiac rhythm   • JENISE on CPAP   • Medicare annual wellness visit, subsequent   • Hypoxia   • Acute on chronic respiratory failure with hypoxia and hypercapnia   • Acute cholecystitis   • New onset atrial fibrillation   • Bacteremia due to Enterococcus   • Pulmonary hypertension   • Metabolic encephalopathy   • Vascular dementia without behavioral disturbance   • Dysphagia   • Anemia   • Hypernatremia     Past Medical History:   Diagnosis Date   • Arthritis    • Asthma     Oxygen at home   • Atrial fibrillation 2023   • Cancer     basal cell    • COPD (chronic obstructive pulmonary disease)    • Difficulty walking    • Elevated cholesterol    • Environmental allergies    • GERD (gastroesophageal reflux disease)    • HL (hearing loss)    • Hyperglycemia    • Hyperlipidemia    • Hypertension    • Obesity    • Pulmonary hypertension 2023   • Sleep apnea    • Spondylolysis, lumbar region      Past Surgical History:    Procedure Laterality Date   • CARDIAC CATHETERIZATION  circa 2008    [facility]   • EYE SURGERY     • HERNIA REPAIR     • JOINT REPLACEMENT     • REPLACEMENT TOTAL KNEE BILATERAL        General Information     Row Name 04/28/23 1201          OT Time and Intention    Document Type evaluation  -     Mode of Treatment co-treatment;occupational therapy  -     Row Name 04/28/23 1201          General Information    Patient Profile Reviewed yes  -KB     Prior Level of Function --  unsure of prior level of function  -     Existing Precautions/Restrictions fall;oxygen therapy device and L/min  -     Barriers to Rehab medically complex;previous functional deficit  -     Row Name 04/28/23 1201          Living Environment    People in Home facility resident;spouse  pt came from rehab, prior to that lived with wife  -     Row Name 04/28/23 1201          Cognition    Orientation Status (Cognition) oriented to;person;place;disoriented to;situation  -     Row Name 04/28/23 1201          Safety Issues, Functional Mobility    Impairments Affecting Function (Mobility) endurance/activity tolerance;strength;postural/trunk control;range of motion (ROM);cognition  -     Cognitive Impairments, Mobility Safety/Performance insight into deficits/self-awareness  -           User Key  (r) = Recorded By, (t) = Taken By, (c) = Cosigned By    Initials Name Provider Type    KB Luz Maria Baldwin OT Occupational Therapist                 Mobility/ADL's     Row Name 04/28/23 1204          Bed Mobility    Supine-Sit [place] (Bed Mobility) moderate assist (50% patient effort);2 person assist;verbal cues;nonverbal cues (demo/gesture)  -     Sit-Supine [place] (Bed Mobility) maximum assist (25% patient effort);2 person assist;verbal cues;nonverbal cues (demo/gesture)  -     Assistive Device (Bed Mobility) bed rails;head of bed elevated  -     Row Name 04/28/23 1204          Transfers    Transfers sit-stand transfer  -      Comment, (Transfers) unable to come to stand with max assist x2  -KB     Row Name 04/28/23 1204          Sit-Stand Transfer    Sit-Stand [place] (Transfers) maximum assist (25% patient effort);2 person assist  -     Assistive Device (Sit-Stand Transfers) walker, platform  -     Comment, (Sit-Stand Transfer) 4 attempts to stand with max assist x2, unable to get bottom up  -     Row Name 04/28/23 1204          Activities of Daily Living    BADL Assessment/Intervention lower body dressing  -     Row Name 04/28/23 1204          Lower Body Dressing Assessment/Training    [place] Level (Lower Body Dressing) don;socks;dependent (less than 25% patient effort)  -           User Key  (r) = Recorded By, (t) = Taken By, (c) = Cosigned By    Initials Name Provider Type    Luz Maria Diaz OT Occupational Therapist               Obj/Interventions     Row Name 04/28/23 1206          Sensory Assessment (Somatosensory)    Sensory Assessment (Somatosensory) sensation intact  -     Row Name 04/28/23 1206          Vision Assessment/Intervention    Visual Impairment/Limitations WNL  -     Row Name 04/28/23 1206          Range of Motion Comprehensive    Comment, General Range of Motion B UE shoulder rom impairedl, R had with moderate edema  -     Row Name 04/28/23 1206          Strength Comprehensive (MMT)    General Manual Muscle Testing (MMT) Assessment upper extremity strength deficits identified  -     Row Name 04/28/23 1206          Motor Skills    Motor Skills functional endurance  -     Functional Endurance poor  -     Row Name 04/28/23 1206          Balance    Balance Assessment sitting static balance;sitting dynamic balance  -     Static Sitting Balance minimal assist;contact guard  -KB     Dynamic Sitting Balance contact guard;minimal assist  -           User Key  (r) = Recorded By, (t) = Taken By, (c) = Cosigned By    Initials Name Provider Type    Luz Maria Diaz OT Occupational  Therapist               Goals/Plan     Row Name 04/28/23 1212          Bed Mobility Goal 1 (OT)    Activity/Assistive Device (Bed Mobility Goal 1, OT) sit to supine/supine to sit  -KB     [place] Level/Cues Needed (Bed Mobility Goal 1, OT) standby assist  -KB     Time Frame (Bed Mobility Goal 1, OT) 2 weeks  -KB     Progress/Outcomes (Bed Mobility Goal 1, OT) goal ongoing  -KB     Row Name 04/28/23 1212          Transfer Goal 1 (OT)    Activity/Assistive Device (Transfer Goal 1, OT) toilet;commode, 3-in-1  -KB     [place] Level/Cues Needed (Transfer Goal 1, OT) standby assist  -KB     Time Frame (Transfer Goal 1, OT) 2 weeks  -KB     Progress/Outcome (Transfer Goal 1, OT) goal ongoing  -KB     Row Name 04/28/23 1212          Toileting Goal 1 (OT)    Activity/Device (Toileting Goal 1, OT) adjust/manage clothing;perform perineal hygiene  -KB     [place] Level/Cues Needed (Toileting Goal 1, OT) standby assist  -KB     Time Frame (Toileting Goal 1, OT) 2 weeks  -KB     Progress/Outcome (Toileting Goal 1, OT) goal ongoing  -KB     Row Name 04/28/23 1212          Strength Goal 1 (OT)    Strength Goal 1 (OT) 3/5 B UE  -KB     Time Frame (Strength Goal 1, OT) 2 weeks  -KB     Progress/Outcome (Strength Goal 1, OT) goal ongoing  -     Row Name 04/28/23 1212          Therapy Assessment/Plan (OT)    Planned Therapy Interventions (OT) activity tolerance training;BADL retraining;transfer/mobility retraining;patient/caregiver education/training;strengthening exercise;occupation/activity based interventions  -KB           User Key  (r) = Recorded By, (t) = Taken By, (c) = Cosigned By    Initials Name Provider Type    KB Luz Maria Baldwin, ED Occupational Therapist               Clinical Impression     Row Name 04/28/23 1208          Pain Assessment    Pretreatment Pain Rating 0/10 - no pain  -KB     Posttreatment Pain Rating 0/10 - no pain  -KB     Row Name 04/28/23 1208          Plan of Care Review    Plan of Care  Reviewed With patient  -KB     Outcome Evaluation Pt seen for OT eval this date. Pt admitted from rehab with acute on chronic respiratory failure with hypoxia and hypercapnia. Prior to rehab pt was living with wife, unsure of prior level of function. Pt was agreeable to participate in therapy this date. Performed bed mobility with mod/max assist x2. Unable to perform LE dressing edge of bed, required total assist. Pt B UE rom is limited due to arthritis. Pt attempted to stand 4x, but was unable to successfuly get bottom up off bed due to weakness. Pt demonstrating decreased functional strength/endurance and will cont to benefit from acute OT. Pt will need cont rehab once medically stable.  -KB     Row Name 04/28/23 1208          Therapy Assessment/Plan (OT)    Rehab Potential (OT) good, to achieve stated therapy goals  -     Criteria for Skilled Therapeutic Interventions Met (OT) yes;meets criteria;skilled treatment is necessary  -KB     Therapy Frequency (OT) 3 times/wk  -KB     Row Name 04/28/23 1208          Therapy Plan Review/Discharge Plan (OT)    Anticipated Discharge Disposition (OT) skilled nursing facility  -KB     Row Name 04/28/23 1208          Positioning and Restraints    Pre-Treatment Position in bed  -KB     Post Treatment Position bed  -KB     In Bed supine;call light within reach;encouraged to call for assist;exit alarm on  -KB           User Key  (r) = Recorded By, (t) = Taken By, (c) = Cosigned By    Initials Name Provider Type    Luz Maria Diaz, ED Occupational Therapist               Outcome Measures     Row Name 04/28/23 1213          How much help from another is currently needed...    Putting on and taking off regular lower body clothing? 1  -KB     Bathing (including washing, rinsing, and drying) 2  -KB     Toileting (which includes using toilet bed pan or urinal) 1  -KB     Putting on and taking off regular upper body clothing 2  -KB     Taking care of personal grooming (such as  brushing teeth) 3  -KB     Eating meals 3  -KB     AM-PAC 6 Clicks Score (OT) 12  -KB     Row Name 04/28/23 1035          How much help from another person do you currently need...    Turning from your back to your side while in flat bed without using bedrails? 2  -EB     Moving from lying on back to sitting on the side of a flat bed without bedrails? 2  -EB     Moving to and from a bed to a chair (including a wheelchair)? 1  -EB     Standing up from a chair using your arms (e.g., wheelchair, bedside chair)? 2  -EB     Climbing 3-5 steps with a railing? 1  -EB     To walk in hospital room? 1  -EB     AM-PAC 6 Clicks Score (PT) 9  -EB     Highest level of mobility 3 --> Sat at edge of bed  -EB     Row Name 04/28/23 1213          Functional Assessment    Outcome Measure Options AM-PAC 6 Clicks Daily Activity (OT)  -KB           User Key  (r) = Recorded By, (t) = Taken By, (c) = Cosigned By    Initials Name Provider Type    Edith Kim PTA Physical Therapist Assistant    Luz Maria Diaz, OT Occupational Therapist                Occupational Therapy Education     Title: PT OT SLP Therapies (In Progress)     Topic: Occupational Therapy (In Progress)     Point: ADL training (Done)     Description:   Instruct learner(s) on proper safety adaptation and remediation techniques during self care or transfers.   Instruct in proper use of assistive devices.              Learning Progress Summary           Patient BALAJI Murphy, VU by GUSTAVO at 4/28/2023 1214    Comment: ed on adl tech                   Point: Home exercise program (Not Started)     Description:   Instruct learner(s) on appropriate technique for monitoring, assisting and/or progressing therapeutic exercises/activities.              Learner Progress:  Not documented in this visit.          Point: Precautions (Not Started)     Description:   Instruct learner(s) on prescribed precautions during self-care and functional transfers.              Learner Progress:  Not  documented in this visit.          Point: Body mechanics (Not Started)     Description:   Instruct learner(s) on proper positioning and spine alignment during self-care, functional mobility activities and/or exercises.              Learner Progress:  Not documented in this visit.                      User Key     Initials Effective Dates Name Provider Type Discipline     01/03/23 -  Luz Maria Baldwin OT Occupational Therapist OT              OT Recommendation and Plan  Planned Therapy Interventions (OT): activity tolerance training, BADL retraining, transfer/mobility retraining, patient/caregiver education/training, strengthening exercise, occupation/activity based interventions  Therapy Frequency (OT): 3 times/wk  Plan of Care Review  Plan of Care Reviewed With: patient  Outcome Evaluation: Pt seen for OT eval this date. Pt admitted from rehab with acute on chronic respiratory failure with hypoxia and hypercapnia. Prior to rehab pt was living with wife, unsure of prior level of function. Pt was agreeable to participate in therapy this date. Performed bed mobility with mod/max assist x2. Unable to perform LE dressing edge of bed, required total assist. Pt B UE rom is limited due to arthritis. Pt attempted to stand 4x, but was unable to successfuly get bottom up off bed due to weakness. Pt demonstrating decreased functional strength/endurance and will cont to benefit from acute OT. Pt will need cont rehab once medically stable.     Time Calculation:    Time Calculation- OT     Row Name 04/28/23 1215             Time Calculation- OT    OT Start Time 0815  -KB      OT Stop Time 0836  -KB      OT Time Calculation (min) 21 min  -KB      Total Timed Code Minutes- OT 13 minute(s)  -KB      OT Received On 04/28/23  -KB      OT - Next Appointment 05/01/23  -KB      OT Goal Re-Cert Due Date 05/12/23  -KB         Timed Charges    82893 - OT Therapeutic Activity Minutes 13  -KB         Untimed Charges    OT Eval/Re-eval Minutes  8  -KB         Total Minutes    Timed Charges Total Minutes 13  -KB      Untimed Charges Total Minutes 8  -KB       Total Minutes 21  -KB            User Key  (r) = Recorded By, (t) = Taken By, (c) = Cosigned By    Initials Name Provider Type    Luz Maria Diaz OT Occupational Therapist              Therapy Charges for Today     Code Description Service Date Service Provider Modifiers Qty    [id number]  OT THERAPEUTIC ACT EA 15 MIN 4/28/2023 Luz Maria Baldwin OT GO 1    [id number]  OT EVAL MOD COMPLEXITY 2 4/28/2023 Luz Maria Baldwin OT GO 1               Luz Maria Baldwin OT  4/28/2023

## 2023-04-29 LAB
ANION GAP SERPL CALCULATED.3IONS-SCNC: 4.3 MMOL/L (ref 5–15)
BUN SERPL-MCNC: 16 MG/DL (ref 8–23)
BUN/CREAT SERPL: 20.8 (ref 7–25)
CALCIUM SPEC-SCNC: 8.7 MG/DL (ref 8.2–9.6)
CHLORIDE SERPL-SCNC: 100 MMOL/L (ref 98–107)
CO2 SERPL-SCNC: 42.7 MMOL/L (ref 22–29)
CREAT SERPL-MCNC: 0.77 MG/DL (ref 0.76–1.27)
DEPRECATED RDW RBC AUTO: 42.3 FL (ref 37–54)
EGFRCR SERPLBLD CKD-EPI 2021: 84 ML/MIN/1.73
ERYTHROCYTE [DISTWIDTH] IN BLOOD BY AUTOMATED COUNT: 13.1 % (ref 12.3–15.4)
GLUCOSE SERPL-MCNC: 91 MG/DL (ref 65–99)
HCT VFR BLD AUTO: 38.2 % (ref 37.5–51)
HGB BLD-MCNC: 12.4 G/DL (ref 13–17.7)
MCH RBC QN AUTO: 28.6 PG (ref 26.6–33)
MCHC RBC AUTO-ENTMCNC: 32.5 G/DL (ref 31.5–35.7)
MCV RBC AUTO: 88 FL (ref 79–97)
PLATELET # BLD AUTO: 181 10*3/MM3 (ref 140–450)
PMV BLD AUTO: 10.5 FL (ref 6–12)
POTASSIUM SERPL-SCNC: 3 MMOL/L (ref 3.5–5.2)
POTASSIUM SERPL-SCNC: 3.7 MMOL/L (ref 3.5–5.2)
RBC # BLD AUTO: 4.34 10*6/MM3 (ref 4.14–5.8)
SODIUM SERPL-SCNC: 147 MMOL/L (ref 136–145)
VANCOMYCIN TROUGH SERPL-MCNC: 14.4 MCG/ML (ref 5–20)
WBC NRBC COR # BLD: 6.84 10*3/MM3 (ref 3.4–10.8)

## 2023-04-29 PROCEDURE — 94664 DEMO&/EVAL PT USE INHALER: CPT

## 2023-04-29 PROCEDURE — 94760 N-INVAS EAR/PLS OXIMETRY 1: CPT

## 2023-04-29 PROCEDURE — 97110 THERAPEUTIC EXERCISES: CPT

## 2023-04-29 PROCEDURE — 94761 N-INVAS EAR/PLS OXIMETRY MLT: CPT

## 2023-04-29 PROCEDURE — 94799 UNLISTED PULMONARY SVC/PX: CPT

## 2023-04-29 PROCEDURE — 97530 THERAPEUTIC ACTIVITIES: CPT

## 2023-04-29 PROCEDURE — 94660 CPAP INITIATION&MGMT: CPT

## 2023-04-29 PROCEDURE — 80048 BASIC METABOLIC PNL TOTAL CA: CPT | Performed by: NURSE PRACTITIONER

## 2023-04-29 PROCEDURE — 80202 ASSAY OF VANCOMYCIN: CPT | Performed by: INTERNAL MEDICINE

## 2023-04-29 PROCEDURE — 84132 ASSAY OF SERUM POTASSIUM: CPT | Performed by: HOSPITALIST

## 2023-04-29 PROCEDURE — 85027 COMPLETE CBC AUTOMATED: CPT | Performed by: NURSE PRACTITIONER

## 2023-04-29 PROCEDURE — 25010000002 VANCOMYCIN 10 G RECONSTITUTED SOLUTION: Performed by: INTERNAL MEDICINE

## 2023-04-29 RX ORDER — POTASSIUM CHLORIDE 750 MG/1
40 TABLET, FILM COATED, EXTENDED RELEASE ORAL AS NEEDED
Status: DISCONTINUED | OUTPATIENT
Start: 2023-04-29 | End: 2023-05-03 | Stop reason: HOSPADM

## 2023-04-29 RX ORDER — POTASSIUM CHLORIDE 1.5 G/1.77G
40 POWDER, FOR SOLUTION ORAL AS NEEDED
Status: DISCONTINUED | OUTPATIENT
Start: 2023-04-29 | End: 2023-05-03 | Stop reason: HOSPADM

## 2023-04-29 RX ADMIN — Medication 10 ML: at 20:08

## 2023-04-29 RX ADMIN — POTASSIUM CHLORIDE 40 MEQ: 750 TABLET, EXTENDED RELEASE ORAL at 15:04

## 2023-04-29 RX ADMIN — IPRATROPIUM BROMIDE AND ALBUTEROL SULFATE 3 ML: .5; 3 SOLUTION RESPIRATORY (INHALATION) at 11:19

## 2023-04-29 RX ADMIN — BUDESONIDE AND FORMOTEROL FUMARATE DIHYDRATE 2 PUFF: 160; 4.5 AEROSOL RESPIRATORY (INHALATION) at 19:06

## 2023-04-29 RX ADMIN — POTASSIUM CHLORIDE 40 MEQ: 750 TABLET, EXTENDED RELEASE ORAL at 10:39

## 2023-04-29 RX ADMIN — PANTOPRAZOLE SODIUM 40 MG: 40 TABLET, DELAYED RELEASE ORAL at 05:09

## 2023-04-29 RX ADMIN — AMLODIPINE BESYLATE 10 MG: 10 TABLET ORAL at 09:12

## 2023-04-29 RX ADMIN — BUDESONIDE AND FORMOTEROL FUMARATE DIHYDRATE 2 PUFF: 160; 4.5 AEROSOL RESPIRATORY (INHALATION) at 07:34

## 2023-04-29 RX ADMIN — APIXABAN 5 MG: 5 TABLET, FILM COATED ORAL at 09:12

## 2023-04-29 RX ADMIN — Medication 10 ML: at 09:13

## 2023-04-29 RX ADMIN — POTASSIUM CHLORIDE 40 MEQ: 750 TABLET, EXTENDED RELEASE ORAL at 18:01

## 2023-04-29 RX ADMIN — APIXABAN 5 MG: 5 TABLET, FILM COATED ORAL at 20:08

## 2023-04-29 RX ADMIN — VANCOMYCIN HYDROCHLORIDE 1750 MG: 10 INJECTION, POWDER, LYOPHILIZED, FOR SOLUTION INTRAVENOUS at 18:01

## 2023-04-29 NOTE — PLAN OF CARE
Goal Outcome Evaluation:              Outcome Evaluation: A/ox4 with AM assessment. Requires extensive assist with ADLs per staff, 2 person lift assist. Turn/repositioned. Pt was able to wear new home cpap overnight from 11p-5a per dtr, and seemed much more rested and alert to her today. Pt napped with machine on from approx [phone] and woke up confused, oriented to self only. After about an hour or so of being awake pt seemed much more alert and answered all orientation questions correctly. No attempts at unassisted self transfers. Worked briefly with PT. Family remains at the bedside.

## 2023-04-29 NOTE — PROGRESS NOTES
Placed pt on home unit with 4L O2 bled in with full face mask. Sats currently 91-92%  v60  On standby

## 2023-04-29 NOTE — PLAN OF CARE
Alert, vss. Slept with home cpap, 4L . Tolerated well. No complaints of pain. Midline capped. SCD. No complaints of pain.     Problem: Adult Inpatient Plan of Care  Goal: Absence of Hospital-Acquired Illness or Injury  Intervention: Prevent Skin Injury  Recent Flowsheet Documentation  Taken 4/29/2023 0200 by Adelaida Borja RN  Body Position:   sitting up in bed   upper extremity elevated  Taken 4/28/2023 2351 by Adelaida Borja RN  Body Position:   sitting up in bed   upper extremity elevated  Skin Protection: adhesive use limited  Taken 4/28/2023 2210 by Adelaida Borja RN  Body Position:   left   side-lying  Taken 4/28/2023 1945 by Adelaida Borja RN  Body Position:   left   side-lying  Skin Protection:   adhesive use limited   incontinence pads utilized   Goal Outcome Evaluation:

## 2023-04-29 NOTE — PROGRESS NOTES
Name: Harry Potts ADMIT: 2023   : 1930  PCP: Harry Luis MD    MRN: [id number] LOS: 3 days   AGE/SEX: 89 y.o. male  ROOM: [facility]     Subjective   Subjective   Feeling pretty good today. Slept well. Tolerating diet. Voiding well. No abd pain. No SOA or CP or palp.     Review of Systems   as above    Objective   Objective   Vital Signs  Temp:  [97.3 °F (36.3 °C)-98.5 °F (36.9 °C)] 98.5 °F (36.9 °C)  Heart Rate:  [65-80] 72  Resp:  [20] 20  BP: (116-151)/(67-82) 127/82  SpO2:  [91 %-97 %] 92 %  on  Flow (L/min):  [2-4] 2;   Device (Oxygen Therapy): nasal cannula  Body mass index is 43.98 kg/m².  Physical Exam  Vitals and nursing note reviewed. Exam conducted with a chaperone present (Dtr).   Constitutional:       General: He is not in acute distress.     Appearance: He is ill-appearing (chronically). He is not toxic-appearing or diaphoretic.   HENT:      Head: Normocephalic.      Nose: Nose normal.      Mouth/Throat:      Mouth: Mucous membranes are moist.      Pharynx: Oropharynx is clear.   Eyes:      General: No scleral icterus.        Right eye: No discharge.         Left eye: No discharge.      Extraocular Movements: Extraocular movements intact.      Conjunctiva/sclera: Conjunctivae normal.      Comments: Bilateral lower lid ectropion   Cardiovascular:      Rate and Rhythm: Normal rate. Rhythm irregular.      Pulses: Normal pulses.   Pulmonary:      Effort: Pulmonary effort is normal. No respiratory distress.      Breath sounds: Normal breath sounds. No wheezing or rales.      Comments: Anteriorly   Abdominal:      General: Bowel sounds are normal. There is no distension.      Palpations: Abdomen is soft.      Tenderness: There is no abdominal tenderness.      Comments: Beba tube in RUQ, dark green fluid in bag   Musculoskeletal:         General: Swelling (2+ in all 4 extremities) present.      Cervical back: Neck supple. No rigidity.   Lymphadenopathy:      Cervical: No cervical  adenopathy.   Skin:     General: Skin is warm and dry.      Capillary Refill: Capillary refill takes less than 2 seconds.      Coloration: Skin is not jaundiced.   Neurological:      Mental Status: He is alert. Mental status is at baseline.      Cranial Nerves: No cranial nerve deficit.      Coordination: Coordination normal.   Psychiatric:         Mood and Affect: Mood normal.         Behavior: Behavior normal.      Comments: Very pleasant       Results Review     I reviewed the patient's new clinical results.  Results from last 7 days   Lab Units 04/29/23 0458 04/28/23  0506 04/27/23 0648 04/26/23  1235   WBC 10*3/mm3 6.84 7.40 8.39 11.42*   HEMOGLOBIN g/dL 12.4* 12.9* 13.0 14.8   PLATELETS 10*3/mm3 181 190 207 246     Results from last 7 days   Lab Units 04/29/23 0458 04/28/23  0506 04/27/23 0648 04/26/23  1235   SODIUM mmol/L 147* 140 148* 149*   POTASSIUM mmol/L 3.0* 3.7 4.3 3.9   CHLORIDE mmol/L 100 98 100 99   CO2 mmol/L 42.7* 33.4* 42.8* 40.7*   BUN mg/dL 16 18 20 20   CREATININE mg/dL 0.77 0.81 0.88 0.95   GLUCOSE mg/dL 91 89 93 179*   EGFR mL/min/1.73 84.0 82.7 80.7 75.1     Results from last 7 days   Lab Units 04/28/23  0506 04/27/23 0648 04/26/23  1235   ALBUMIN g/dL 2.4* 2.4* 3.3*   BILIRUBIN mg/dL 0.6 0.5 0.5   ALK PHOS U/L 63 68 85   AST (SGOT) U/L 21 11 16   ALT (SGPT) U/L 17 19 26     Results from last 7 days   Lab Units 04/29/23 0458 04/28/23  0506 04/27/23  0648 04/26/23  1235   CALCIUM mg/dL 8.7 8.5 9.5 9.3   ALBUMIN g/dL  --  2.4* 2.4* 3.3*   MAGNESIUM mg/dL  --  2.0 2.1  --    PHOSPHORUS mg/dL  --  3.2 3.5  --      Results from last 7 days   Lab Units 04/28/23  0506 04/27/23  0648   PROCALCITONIN ng/mL 0.07 0.08   LACTATE mmol/L  --  1.4     Glucose   Date/Time Value Ref Range Status   04/26/2023 1521 187 (H) 70 - 130 mg/dL Final     Comment:     Meter: [id number] : [id number] Mare Garcia RN       No radiology results for the last day  I have personally reviewed all  medications:  Scheduled Medications  amLODIPine, 10 mg, Oral, Q24H  apixaban, 5 mg, Oral, Q12H  budesonide-formoterol, 2 puff, Inhalation, BID - RT  ipratropium-albuterol, 3 mL, Nebulization, Q6H While Awake - RT  pantoprazole, 40 mg, Oral, Q AM  sodium chloride, 10 mL, Intravenous, Q12H  vancomycin, 1,750 mg, Intravenous, Q24H    Infusions  Pharmacy to dose vancomycin,     Diet  Diet: Cardiac Diets; Healthy Heart (2-3 Na+); Texture: Soft to Chew (NDD 3); Soft to Chew: Chopped Meat; Fluid Consistency: Thin (IDDSI 0)    I have personally reviewed:  [x]  Laboratory   [x]  Microbiology   [x]  Radiology   [x]  EKG/Telemetry  []  Cardiology/Vascular   []  Pathology    [x]  Records       Assessment/Plan     Active Hospital Problems    Diagnosis  POA   • **Acute on chronic respiratory failure with hypoxia and hypercapnia [J96.21, J96.22]  Yes   • Hypernatremia [E87.0]  Unknown   • Vascular dementia without behavioral disturbance [F01.50]  Yes   • Pulmonary hypertension [I27.20]  Yes   • Bacteremia due to Enterococcus [R78.81, B95.2]  Yes   • New onset atrial fibrillation [I48.91]  Yes   • JENISE on CPAP [G47.33, Z99.89]  Not Applicable   • Restrictive lung disease [J98.4]  Yes   • Morbid obesity [E66.01]  Yes   • Hypertension [I10]  Yes      Resolved Hospital Problems   No resolved problems to display.       Hospital course to date:  Mr. Potts is a 89yo gentleman who presented from a nursing facility for increased lethargy and unresponsiveness. He recently had a 20 day stay here and was discharged two days prior to this admission for acute on chronic respiratory failure that was complicated by acute cholecystitis. He did require ICU stay at that time for sedation with NIPPV. His respiratory status improved, but he developed Enterococcus bacteremia secondary to cholecystitis. He was seen by ID and Gen Surg but not felt to be a candidate for surgery so IR guided cholecystostomy tube was placed on 4/17 with plans to complete a  14 day course of vancomycin (Echo was negative for vegetations). He did have altered mental status as well as new-onset atrial fibrillation and was seen by Cardiology and Neurology during that stay. He was treated with oral beta-blocker therapy and Eliquis for his atrial fibrillation. Neurology felt he had underlying vascular dementia with acute metabolic encephalopathy. His mentation was stable at discharge and he was planned for SNF for strengthening prior to returning home. Unfortunately, he became hypercapnic secondary to non-use vs mis-use of his NIPPV at the facility prompting re-admission to the hospital. He was admitted to the ICU overnight and hypercapnia corrected. [facility] asked to assume care now.      • Acute on chronic respiratory failure with hypercapnia and hypoxia, advanced restrictive lung disease: Appreciate Pulmonology attention to pt. Continue NIV. Reviewed Dr. Alcaraz's note which indicates patient was using his machine at the facility but oxygen saturations were low on this--plans to trial his home NIV machine here to see if working properly. He did get some IV Lasix on admission but currently stable and will diurese only as needed. Need to avoid contraction alkalosis to prevent reducing respiratory rate per Pulm recs.      • Bacteremia (Enterococcus): Repeat blood cultures negative thus far. Had some missed dosing of antibiotics at facility. ID and Gen Surg re-consulted given his cholecystostomy tube. GI symptoms appear resolved. Will need tube for 6-8 weeks per Dr. Gaytan. Plan to follow up with Dr. Strong. Continues on pharmacy to dose vancomycin through 4/30. Will need repeat blood cultures x 2 one week after completing therapy per ID recs.     • Atrial fibrillation: With some rates as low as 40s this admission. Had known bradycardia and PVCs last admission but metoprolol added back at lower dosing prior to discharge. Monitor off beta-blocker for now but consider asking Cardiology to see again  if bradycardia persists. Monitor on telemetry. Continue Eliquis. HR/BP are both remain quite stable/acceptable today.     • Vascular dementia: Mental status appears stable. Monitor for any waxing/waning encephalopathy due to hospitalization.     • HTN: BPs acceptable.     • Hypernatremia: Sodium improved with adequate PO intake, continue to encourage water intake.      • Obesity: Complicating all the above.    • Hypokalemia: will replace with protocol, Mg++ level fine.         VTE Prophylaxis - Eliquis (home med)  Code Status - DNR/DNI/no cardioversion  Discussed with - pt and daughter.  Disposition - Anticipate discharge when arrangements in place. Family requesting a different rehab at discharge. CCP assisting. PT/OT following.         Jae Silva MD  [facility]  04/29/23  09:43 EDT

## 2023-04-29 NOTE — PROGRESS NOTES
[facility]  [phone]  Dr. Calvin Nolan    Subjective:  LOS: 3    Chief Complaint: Chronic respiratory failure    Patient is asleep on his AVAPS machine.  His machine was switched out yesterday.  This morning he woke up alert and more awake than he has previously been.  Currently asleep with AVAPS device.  Patient has a astral device by [employer].  His daughter is at the bedside and provides history.   for 60+ years.  Always had wheezing.  Suspect chronic asthma with now fixed obstructive lung disease and concomitant restrictive lung disease.    Objective   Vital Signs past 24hrs  Temp range: Temp (24hrs), Av.2 °F (36.8 °C), Min:97.3 °F (36.3 °C), Max:98.9 °F (37.2 °C)    BP range: BP: (127-151)/(72-82) 127/82  Pulse range: Heart Rate:  [55-76] 55  Resp rate range: Resp:  [18-20] 18  Device (Oxygen Therapy): nasal cannulaFlow (L/min):  [2-4] 4  Oxygen range:SpO2:  [92 %-97 %] 92 %     Physical Exam  Constitutional:       General: He is sleeping.      Appearance: He is obese.   Cardiovascular:      Rate and Rhythm: Normal rate and regular rhythm.      Heart sounds: Normal heart sounds. No murmur heard.  Pulmonary:      Effort: Pulmonary effort is normal.      Breath sounds: Normal breath sounds.   Abdominal:      General: Bowel sounds are normal.      Palpations: Abdomen is soft. There is no mass.      Tenderness: There is no abdominal tenderness.   Musculoskeletal:         General: Swelling present.       Results Review:    I have reviewed the laboratory and imaging data since the last note by [facility] physician.  My annotations are noted in assessment and plan.      Result Review:  I have personally reviewed the results from last note by [facility] physician to 2023 14:05 EDT and agree with these findings:  [x]  Laboratory list / accordion  [x]  Microbiology  [x]  Radiology  [x]  EKG/Telemetry   [x]  Cardiology/Vascular   []  Pathology  []  Old records  []  Other:    Medication Review:  I  have reviewed the current MAR.  My annotations are noted in assessment and plan.    amLODIPine, 10 mg, Oral, Q24H  apixaban, 5 mg, Oral, Q12H  budesonide-formoterol, 2 puff, Inhalation, BID - RT  ipratropium-albuterol, 3 mL, Nebulization, Q6H While Awake - RT  pantoprazole, 40 mg, Oral, Q AM  sodium chloride, 10 mL, Intravenous, Q12H  vancomycin, 1,750 mg, Intravenous, Q24H        Pharmacy to dose vancomycin,       Lines, Drains & Airways     Active LDAs     Name Placement date Placement time Site Days    Midline Catheter - Single Lumen 04/24/23 Left Basilic 04/24/23  1620  -- 4    Biliary Tube RUQ --  --  RUQ  --    External Urinary Catheter 04/27/23 2030  --  1              No active isolations  Diet Orders (active) (From admission, onward)     Start     Ordered    04/26/23 2335  Diet: Cardiac Diets; Healthy Heart (2-3 Na+); Texture: Soft to Chew (NDD 3); Soft to Chew: Chopped Meat; Fluid Consistency: Thin (IDDSI 0)  Diet Effective Now         04/26/23 2336                PCCM Problems  Acute on chronic respiratory failure with hypoxia and hypercapnia  Morbid obesity  JENISE on CPAP  Restrictive lung disease  Relevant Medical Diagnoses  New onset A-fib  Enterococcal bacteremia  Vascular dementia  Mild pulmonary hypertension        THESE ARE NEW MEDICAL PROBLEMS TO ME.    Plan of Treatment    Continue with [employer] astral device.  Current minimum EPAP is 10 with maximum EPAP 15, pressure support range 10-20.  Apparently much more alert and awake this morning.  Current settings should be appropriate.  He will need to continue use nightly and as needed.  Daughter at bedside and is aware.    On antibiotics per ID.    I will obtain a chest x-ray tomorrow for follow-up.    Appears close to baseline.    Clavin Nolan MD  04/29/23  14:05 EDT      Part of this note may be an electronic transcription/translation of spoken language to printed text using the Dragon Dictation System.

## 2023-04-29 NOTE — PLAN OF CARE
Goal Outcome Evaluation:  Plan of Care Reviewed With: patient        Progress: improving  Outcome Evaluation: Pt tolerated treatment well this date. Improved success w/ supine to sit this date, requiring min A to complete. Pt completed a few seated LE exercises, then attempted standing 3x. Able to clear bottom off bed 2/3x w/ max-dep A x2. Limited d/t weakness. Pt returned to bed w/ max-dep A x2.

## 2023-04-29 NOTE — THERAPY TREATMENT NOTE
Patient Name: Harry Potts  : 1930    MRN: [id number]                              Today's Date: 2023       Admit Date: 2023    Visit Dx:     ICD-10-CM ICD-9-CM   1. Acute on chronic respiratory failure with hypoxia and hypercapnia  J96.21 518.84    J96.22 786.09     799.02   2. Altered mental status, unspecified altered mental status type  R41.82 780.97   3. Bacteremia due to Enterococcus  R78.81 790.7    B95.2 041.04     Patient Active Problem List   Diagnosis   • Dysfunction of eustachian tube   • Gastroesophageal reflux disease   • Hyperglycemia   • Hypercholesterolemia   • Hypertension   • Morbid obesity   • Osteoarthritis of lumbar spine with myelopathy   • Osteoarthritis of lumbar spine   • Bronchitis   • Viral URI with cough   • PVC (premature ventricular contraction)   • Non-traumatic rhabdomyolysis   • Obesity (BMI 30-39.9)   • Chronic low back pain   • Weakness   • Edema, bilateral lower extremities   • Restrictive lung disease   • Irregularly irregular cardiac rhythm   • JENISE on CPAP   • Medicare annual wellness visit, subsequent   • Hypoxia   • Acute on chronic respiratory failure with hypoxia and hypercapnia   • Acute cholecystitis   • New onset atrial fibrillation   • Bacteremia due to Enterococcus   • Pulmonary hypertension   • Metabolic encephalopathy   • Vascular dementia without behavioral disturbance   • Dysphagia   • Anemia   • Hypernatremia     Past Medical History:   Diagnosis Date   • Arthritis    • Asthma     Oxygen at home   • Atrial fibrillation 2023   • Cancer     basal cell    • COPD (chronic obstructive pulmonary disease)    • Difficulty walking    • Elevated cholesterol    • Environmental allergies    • GERD (gastroesophageal reflux disease)    • HL (hearing loss)    • Hyperglycemia    • Hyperlipidemia    • Hypertension    • Obesity    • Pulmonary hypertension 2023   • Sleep apnea    • Spondylolysis, lumbar region      Past Surgical History:    Procedure Laterality Date   • CARDIAC CATHETERIZATION  circa 2008    [facility]   • EYE SURGERY     • HERNIA REPAIR     • JOINT REPLACEMENT     • REPLACEMENT TOTAL KNEE BILATERAL        General Information     [facility] Name 04/29/23 1559          Physical Therapy Time and Intention    Document Type therapy note (daily note)  -     Mode of Treatment individual therapy;physical therapy  -[facility] Name 04/29/23 3989          General Information    Patient Profile Reviewed yes  -     Existing Precautions/Restrictions fall;oxygen therapy device and L/min  2L O2, drain  -     Barriers to Rehab medically complex;previous functional deficit  -[facility] Name 04/29/23 4999          Cognition    Orientation Status (Cognition) oriented x 4;other (see comments)  Confusion following his nap  -[facility] Name 04/29/23 7239          Safety Issues, Functional Mobility    Impairments Affecting Function (Mobility) balance;endurance/activity tolerance;strength  -     Comment, Safety Issues/Impairments (Mobility) Gait belt and non-skid socks donned.  -           User Key  (r) = Recorded By, (t) = Taken By, (c) = Cosigned By    Initials Name Provider Type     Ilana Malcolm PTA Physical Therapist Assistant               Mobility     Row Name 04/29/23 1602          Bed Mobility    Bed Mobility supine-sit;sit-supine  -     Supine-Sit [place] (Bed Mobility) minimum assist (75% patient effort)  -     Sit-Supine [place] (Bed Mobility) maximum assist (25% patient effort);dependent (less than 25% patient effort);2 person assist  -     Assistive Device (Bed Mobility) head of bed elevated;bed rails  -     Comment, (Bed Mobility) Increased time to complete.  -[facility] Name 04/29/23 160          Sit-Stand Transfer    Sit-Stand [place] (Transfers) maximum assist (25% patient effort);dependent (less than 25% patient effort);2 person assist;verbal cues;nonverbal cues (demo/gesture)  -     Comment,  (Sit-Stand Transfer) STS x3; able to clear bottom off bed twice  -           User Key  (r) = Recorded By, (t) = Taken By, (c) = Cosigned By    Initials Name Provider Type    Ilana Hill PTA Physical Therapist Assistant               Obj/Interventions     Row Name 04/29/23 1604          Motor Skills    Therapeutic Exercise other (see comments)  seated AP, LAQ, marches x10  -[facility] Name 04/29/23 1604          Balance    Static Sitting Balance standby assist  -     Dynamic Sitting Balance contact guard  -SM     Position, Sitting Balance sitting edge of bed  -     Comment, Balance Posterior lean in sitting EOB.  -           User Key  (r) = Recorded By, (t) = Taken By, (c) = Cosigned By    Initials Name Provider Type    Ilana Hill PTA Physical Therapist Assistant               Goals/Plan    No documentation.                Clinical Impression     Row Name 04/29/23 1602          Pain    Pretreatment Pain Rating 0/10 - no pain  -     Posttreatment Pain Rating 0/10 - no pain  -     Pain Intervention(s) --  -SM     Row Name 04/29/23 1602          Plan of Care Review    Plan of Care Reviewed With --  -     Progress --  -SM     Row Name 04/29/23 1602          Vital Signs    Pretreatment Heart Rate (beats/min) 86  -SM     Pre SpO2 (%) 92  -SM     O2 Delivery Pre Treatment nasal cannula  2L  -     O2 Delivery Intra Treatment nasal cannula  -     O2 Delivery Post Treatment nasal cannula  -SM     Pre Patient Position Supine  -     Post Patient Position Supine  -SM     Row Name 04/29/23 1602          Positioning and Restraints    Pre-Treatment Position in bed  -     Post Treatment Position bed  -     In Bed supine;call light within reach;encouraged to call for assist;exit alarm on;with family/caregiver  -           User Key  (r) = Recorded By, (t) = Taken By, (c) = Cosigned By    Initials Name Provider Type    Ilana Hill PTA Physical Therapist Assistant                Outcome Measures     Row Name 04/29/23 1708 04/29/23 0912       How much help from another person do you currently need...    Turning from your back to your side while in flat bed without using bedrails? 3  -SM 2  -SL    Moving from lying on back to sitting on the side of a flat bed without bedrails? 3  -SM 2  -SL    Moving to and from a bed to a chair (including a wheelchair)? 1  -SM 1  -SL    Standing up from a chair using your arms (e.g., wheelchair, bedside chair)? 2  -SM 1  -SL    Climbing 3-5 steps with a railing? 1  -SM 1  -SL    To walk in hospital room? 1  -SM 1  -SL    AM-PAC 6 Clicks Score (PT) 11  -SM 8  -SL    Highest level of mobility 4 --> Transferred to chair/commode  -SM 3 --> Sat at edge of bed  -SL    Row Name 04/29/23 1708          Functional Assessment    Outcome Measure Options AM-PAC 6 Clicks Basic Mobility (PT)  -           User Key  (r) = Recorded By, (t) = Taken By, (c) = Cosigned By    Initials Name Provider Type    Ilana Hill PTA Physical Therapist Assistant    Charla Harris RN Registered Nurse                             Physical Therapy Education     Title: PT OT SLP Therapies (In Progress)     Topic: Physical Therapy (Done)     Point: Mobility training (Done)     Learning Progress Summary           Patient Acceptance, E,TB,D, VU,NR by  at 4/29/2023 1708    Acceptance, E,D, VU,NR by EB at 4/28/2023 1035    Acceptance, E,D, DU,NR by PC at 4/27/2023 1711                   Point: Home exercise program (Done)     Learning Progress Summary           Patient Acceptance, E,TB,D, VU,NR by  at 4/29/2023 1708    Acceptance, E,D, VU,NR by EB at 4/28/2023 1035    Acceptance, E,D, DU,NR by PC at 4/27/2023 1711                   Point: Body mechanics (Done)     Learning Progress Summary           Patient Acceptance, E,TB,D, VU,NR by  at 4/29/2023 1708    Acceptance, E,D, VU,NR by EB at 4/28/2023 1035    Acceptance, E,D, DU,NR by PC at 4/27/2023 4359                    Point: Precautions (Done)     Learning Progress Summary           Patient Acceptance, E,TB,D, VU,NR by  at 4/29/2023 1708    Acceptance, E,D, VU,NR by  at 4/28/2023 1035    Acceptance, E,D, DU,NR by  at 4/27/2023 1711                               User Key     Initials Effective Dates Name Provider Type Discipline     06/16/21 -  Heidi Porras PT Physical Therapist PT     03/07/18 -  Ilana Malcolm PTA Physical Therapist Assistant PT     02/14/23 -  Edith Macias PTA Physical Therapist Assistant PT              PT Recommendation and Plan     Plan of Care Reviewed With: patient  Progress: improving  Outcome Evaluation: Pt tolerated treatment well this date. Improved success w/ supine to sit this date, requiring min A to complete. Pt completed a few seated LE exercises, then attempted standing 3x. Able to clear bottom off bed 2/3x w/ max-dep A x2. Limited d/t weakness. Pt returned to bed w/ max-dep A x2.     Time Calculation:    PT Charges     Row Name 04/29/23 1710             Time Calculation    Start Time 1556  -      Stop Time 1627  -      Time Calculation (min) 31 min  -      PT Received On 04/29/23  -      PT - Next Appointment 05/01/23  -            User Key  (r) = Recorded By, (t) = Taken By, (c) = Cosigned By    Initials Name Provider Type     Ilana Malcolm PTA Physical Therapist Assistant              Therapy Charges for Today     Code Description Service Date Service Provider Modifiers Qty    97636608427 HC PT THER PROC EA 15 MIN 4/29/2023 Ilana Malcolm PTA GP 1    36244906079 HC PT THERAPEUTIC ACT EA 15 MIN 4/29/2023 Ilana Malcolm PTA GP 1    75307211953 HC PT THER SUPP EA 15 MIN 4/29/2023 Ilana Maloclm PTA GP 2          PT G-Codes  Outcome Measure Options: AM-PAC 6 Clicks Basic Mobility (PT)  AM-PAC 6 Clicks Score (PT): 11  AM-PAC 6 Clicks Score (OT): 12  PT Discharge Summary  Anticipated Discharge Disposition (PT): skilled nursing  facility    Ilana Malcolm, PTA  4/29/2023

## 2023-04-30 ENCOUNTER — APPOINTMENT (OUTPATIENT)
Dept: GENERAL RADIOLOGY | Facility: HOSPITAL | Age: 88
End: 2023-04-30
Payer: MEDICARE

## 2023-04-30 LAB
ALBUMIN SERPL-MCNC: 2.7 G/DL (ref 3.5–5.2)
ALBUMIN/GLOB SERPL: 1.2 G/DL
ALP SERPL-CCNC: 64 U/L (ref 39–117)
ALT SERPL W P-5'-P-CCNC: 15 U/L (ref 1–41)
ANION GAP SERPL CALCULATED.3IONS-SCNC: 9 MMOL/L (ref 5–15)
AST SERPL-CCNC: 22 U/L (ref 1–40)
BILIRUB SERPL-MCNC: 0.4 MG/DL (ref 0–1.2)
BUN SERPL-MCNC: 18 MG/DL (ref 8–23)
BUN/CREAT SERPL: 22.8 (ref 7–25)
CALCIUM SPEC-SCNC: 8.2 MG/DL (ref 8.2–9.6)
CHLORIDE SERPL-SCNC: 102 MMOL/L (ref 98–107)
CO2 SERPL-SCNC: 31 MMOL/L (ref 22–29)
CREAT SERPL-MCNC: 0.79 MG/DL (ref 0.76–1.27)
DEPRECATED RDW RBC AUTO: 42.5 FL (ref 37–54)
EGFRCR SERPLBLD CKD-EPI 2021: 83.3 ML/MIN/1.73
ERYTHROCYTE [DISTWIDTH] IN BLOOD BY AUTOMATED COUNT: 13 % (ref 12.3–15.4)
GLOBULIN UR ELPH-MCNC: 2.3 GM/DL
GLUCOSE SERPL-MCNC: 92 MG/DL (ref 65–99)
HCT VFR BLD AUTO: 38.9 % (ref 37.5–51)
HGB BLD-MCNC: 12.6 G/DL (ref 13–17.7)
MAGNESIUM SERPL-MCNC: 1.8 MG/DL (ref 1.7–2.3)
MCH RBC QN AUTO: 29.2 PG (ref 26.6–33)
MCHC RBC AUTO-ENTMCNC: 32.4 G/DL (ref 31.5–35.7)
MCV RBC AUTO: 90 FL (ref 79–97)
PLATELET # BLD AUTO: 155 10*3/MM3 (ref 140–450)
PMV BLD AUTO: 10 FL (ref 6–12)
POTASSIUM SERPL-SCNC: 4.8 MMOL/L (ref 3.5–5.2)
PROT SERPL-MCNC: 5 G/DL (ref 6–8.5)
RBC # BLD AUTO: 4.32 10*6/MM3 (ref 4.14–5.8)
SODIUM SERPL-SCNC: 142 MMOL/L (ref 136–145)
WBC NRBC COR # BLD: 6.59 10*3/MM3 (ref 3.4–10.8)

## 2023-04-30 PROCEDURE — 94799 UNLISTED PULMONARY SVC/PX: CPT

## 2023-04-30 PROCEDURE — 71045 X-RAY EXAM CHEST 1 VIEW: CPT

## 2023-04-30 PROCEDURE — 94664 DEMO&/EVAL PT USE INHALER: CPT

## 2023-04-30 PROCEDURE — 80053 COMPREHEN METABOLIC PANEL: CPT | Performed by: HOSPITALIST

## 2023-04-30 PROCEDURE — 83735 ASSAY OF MAGNESIUM: CPT | Performed by: HOSPITALIST

## 2023-04-30 PROCEDURE — 63710000001 ONDANSETRON PER 8 MG: Performed by: INTERNAL MEDICINE

## 2023-04-30 PROCEDURE — 94761 N-INVAS EAR/PLS OXIMETRY MLT: CPT

## 2023-04-30 PROCEDURE — 85027 COMPLETE CBC AUTOMATED: CPT | Performed by: HOSPITALIST

## 2023-04-30 PROCEDURE — 25010000002 VANCOMYCIN 10 G RECONSTITUTED SOLUTION: Performed by: INTERNAL MEDICINE

## 2023-04-30 RX ADMIN — IPRATROPIUM BROMIDE AND ALBUTEROL SULFATE 3 ML: .5; 3 SOLUTION RESPIRATORY (INHALATION) at 15:49

## 2023-04-30 RX ADMIN — AMLODIPINE BESYLATE 10 MG: 10 TABLET ORAL at 08:32

## 2023-04-30 RX ADMIN — ONDANSETRON HYDROCHLORIDE 4 MG: 4 TABLET, FILM COATED ORAL at 21:34

## 2023-04-30 RX ADMIN — VANCOMYCIN HYDROCHLORIDE 1750 MG: 10 INJECTION, POWDER, LYOPHILIZED, FOR SOLUTION INTRAVENOUS at 17:27

## 2023-04-30 RX ADMIN — Medication 10 ML: at 08:33

## 2023-04-30 RX ADMIN — APIXABAN 5 MG: 5 TABLET, FILM COATED ORAL at 21:34

## 2023-04-30 RX ADMIN — PANTOPRAZOLE SODIUM 40 MG: 40 TABLET, DELAYED RELEASE ORAL at 05:00

## 2023-04-30 RX ADMIN — BUDESONIDE AND FORMOTEROL FUMARATE DIHYDRATE 2 PUFF: 160; 4.5 AEROSOL RESPIRATORY (INHALATION) at 19:20

## 2023-04-30 RX ADMIN — Medication 10 ML: at 21:35

## 2023-04-30 RX ADMIN — APIXABAN 5 MG: 5 TABLET, FILM COATED ORAL at 08:33

## 2023-04-30 RX ADMIN — BUDESONIDE AND FORMOTEROL FUMARATE DIHYDRATE 2 PUFF: 160; 4.5 AEROSOL RESPIRATORY (INHALATION) at 11:10

## 2023-04-30 RX ADMIN — IPRATROPIUM BROMIDE AND ALBUTEROL SULFATE 3 ML: .5; 3 SOLUTION RESPIRATORY (INHALATION) at 07:25

## 2023-04-30 NOTE — PROGRESS NOTES
Name: Harry Potts ADMIT: 2023   : 1930  PCP: Harry Luis MD    MRN: [id number] LOS: 4 days   AGE/SEX: 89 y.o. male  ROOM: [facility]     Subjective   Subjective   Feeling pretty good again today. Slept well. Tolerating diet. Voiding well. No abd pain. No SOA or CP or palp. Wife at bedside thinks he looks pretty good again today.    Review of Systems     as above    Objective   Objective   Vital Signs  Temp:  [98.1 °F (36.7 °C)-98.9 °F (37.2 °C)] 98.1 °F (36.7 °C)  Heart Rate:  [55-88] 78  Resp:  [18-20] 18  BP: (133-156)/(63-96) 156/93  SpO2:  [90 %-94 %] 90 %  on  Flow (L/min):  [2-4] 4;   Device (Oxygen Therapy): nasal cannula  Body mass index is 43.71 kg/m².     (No change in exam today)    Physical Exam  Vitals and nursing note reviewed. Exam conducted with a chaperone present (Wife).   Constitutional:       General: He is not in acute distress.     Appearance: He is ill-appearing (chronically). He is not toxic-appearing or diaphoretic.   HENT:      Head: Normocephalic.      Nose: Nose normal.      Mouth/Throat:      Mouth: Mucous membranes are moist.      Pharynx: Oropharynx is clear.   Eyes:      General: No scleral icterus.        Right eye: No discharge.         Left eye: No discharge.      Extraocular Movements: Extraocular movements intact.      Conjunctiva/sclera: Conjunctivae normal.      Comments: Bilateral lower lid ectropion   Cardiovascular:      Rate and Rhythm: Normal rate. Rhythm irregular.      Pulses: Normal pulses.   Pulmonary:      Effort: Pulmonary effort is normal. No respiratory distress.      Breath sounds: Normal breath sounds. No wheezing or rales.      Comments: Anteriorly   Abdominal:      General: Bowel sounds are normal. There is no distension.      Palpations: Abdomen is soft.      Tenderness: There is no abdominal tenderness.      Comments: Beba tube in RUQ, dark green fluid in bag   Musculoskeletal:         General: Swelling (2+ in all 4 extremities)  present.      Cervical back: Neck supple. No rigidity.   Lymphadenopathy:      Cervical: No cervical adenopathy.   Skin:     General: Skin is warm and dry.      Capillary Refill: Capillary refill takes less than 2 seconds.      Coloration: Skin is not jaundiced.   Neurological:      Mental Status: He is alert. Mental status is at baseline.      Cranial Nerves: No cranial nerve deficit.      Coordination: Coordination normal.   Psychiatric:         Mood and Affect: Mood normal.         Behavior: Behavior normal.      Comments: Very pleasant       Results Review     I reviewed the patient's new clinical results.  Results from last 7 days   Lab Units 04/30/23  0545 04/29/23  0458 04/28/23  0506 04/27/23  0648   WBC 10*3/mm3 6.59 6.84 7.40 8.39   HEMOGLOBIN g/dL 12.6* 12.4* 12.9* 13.0   PLATELETS 10*3/mm3 155 181 190 207     Results from last 7 days   Lab Units 04/30/23  0358 04/29/23  1659 04/29/23  0458 04/28/23  0506 04/27/23  0648   SODIUM mmol/L 142  --  147* 140 148*   POTASSIUM mmol/L 4.8 3.7 3.0* 3.7 4.3   CHLORIDE mmol/L 102  --  100 98 100   CO2 mmol/L 31.0*  --  42.7* 33.4* 42.8*   BUN mg/dL 18  --  16 18 20   CREATININE mg/dL 0.79  --  0.77 0.81 0.88   GLUCOSE mg/dL 92  --  91 89 93   EGFR mL/min/1.73 83.3  --  84.0 82.7 80.7     Results from last 7 days   Lab Units 04/30/23  0358 04/28/23  0506 04/27/23  0648 04/26/23  1235   ALBUMIN g/dL 2.7* 2.4* 2.4* 3.3*   BILIRUBIN mg/dL 0.4 0.6 0.5 0.5   ALK PHOS U/L 64 63 68 85   AST (SGOT) U/L 22 21 11 16   ALT (SGPT) U/L 15 17 19 26     Results from last 7 days   Lab Units 04/30/23  0358 04/29/23  0458 04/28/23  0506 04/27/23  0648 04/26/23  1235   CALCIUM mg/dL 8.2 8.7 8.5 9.5 9.3   ALBUMIN g/dL 2.7*  --  2.4* 2.4* 3.3*   MAGNESIUM mg/dL 1.8  --  2.0 2.1  --    PHOSPHORUS mg/dL  --   --  3.2 3.5  --      Results from last 7 days   Lab Units 04/28/23  0506 04/27/23  0648   PROCALCITONIN ng/mL 0.07 0.08   LACTATE mmol/L  --  1.4     No results found for: HGBA1C,  POCGLU    No radiology results for the last day  I have personally reviewed all medications:  Scheduled Medications  amLODIPine, 10 mg, Oral, Q24H  apixaban, 5 mg, Oral, Q12H  budesonide-formoterol, 2 puff, Inhalation, BID - RT  ipratropium-albuterol, 3 mL, Nebulization, Q6H While Awake - RT  pantoprazole, 40 mg, Oral, Q AM  sodium chloride, 10 mL, Intravenous, Q12H  vancomycin, 1,750 mg, Intravenous, Q24H    Infusions  Pharmacy to dose vancomycin,     Diet  Diet: Cardiac Diets; Healthy Heart (2-3 Na+); Texture: Soft to Chew (NDD 3); Soft to Chew: Chopped Meat; Fluid Consistency: Thin (IDDSI 0)    I have personally reviewed:  [x]  Laboratory   [x]  Microbiology   [x]  Radiology   [x]  EKG/Telemetry  []  Cardiology/Vascular   []  Pathology    []  Records       Assessment/Plan     Active Hospital Problems    Diagnosis  POA   • **Acute on chronic respiratory failure with hypoxia and hypercapnia [J96.21, J96.22]  Yes   • Hypernatremia [E87.0]  Unknown   • Vascular dementia without behavioral disturbance [F01.50]  Yes   • Pulmonary hypertension [I27.20]  Yes   • Bacteremia due to Enterococcus [R78.81, B95.2]  Yes   • New onset atrial fibrillation [I48.91]  Yes   • JENISE on CPAP [G47.33, Z99.89]  Not Applicable   • Restrictive lung disease [J98.4]  Yes   • Morbid obesity [E66.01]  Yes   • Hypertension [I10]  Yes      Resolved Hospital Problems   No resolved problems to display.       Hospital course to date:  Mr. Potts is a 89yo gentleman who presented from a nursing facility for increased lethargy and unresponsiveness. He recently had a 20 day stay here and was discharged two days prior to this admission for acute on chronic respiratory failure that was complicated by acute cholecystitis. He did require ICU stay at that time for sedation with NIPPV. His respiratory status improved, but he developed Enterococcus bacteremia secondary to cholecystitis. He was seen by ID and Gen Surg but not felt to be a candidate for surgery  so IR guided cholecystostomy tube was placed on 4/17 with plans to complete a 14 day course of vancomycin (Echo was negative for vegetations). He did have altered mental status as well as new-onset atrial fibrillation and was seen by Cardiology and Neurology during that stay. He was treated with oral beta-blocker therapy and Eliquis for his atrial fibrillation. Neurology felt he had underlying vascular dementia with acute metabolic encephalopathy. His mentation was stable at discharge and he was planned for SNF for strengthening prior to returning home. Unfortunately, he became hypercapnic secondary to non-use vs mis-use of his NIPPV at the facility prompting re-admission to the hospital. He was admitted to the ICU overnight and hypercapnia corrected. [facility] asked to assume care now.      • Acute on chronic respiratory failure with hypercapnia and hypoxia, advanced restrictive lung disease, RLL infiltrates: Appreciate Pulmonology attention to pt. Continue NIV. Reviewed Dr. Alcaraz's note which indicates patient was using his machine at the facility but oxygen saturations were low on this--plans to trial his home NIV machine here to see if working properly. He did get some IV Lasix on admission but currently stable and will diurese only as needed. Need to avoid contraction alkalosis to prevent reducing respiratory rate per Pulm recs. Still requiring 4L/min. CXR this AM reviewed. Cannot r/o aspiration PNA in RLL but, per Pulm, current abx should cover for this. Most recent SLP eval/recs reviewed, continue current modified diet for now.     • Bacteremia (Enterococcus): Repeat blood cultures negative thus far. Had some missed dosing of antibiotics at facility. ID and Gen Surg re-consulted given his cholecystostomy tube. GI symptoms appear resolved. Will need tube for 6-8 weeks per Dr. Gaytan. Plan to follow up with Dr. Strong. Continue vancomycin through 4/30. Will need repeat blood cultures one week after completing  therapy per ID recs.     • Atrial fibrillation: With some rates as low as 40s earlier this admission. Had known bradycardia and PVCs last admission but metoprolol added back at lower dosing prior to discharge. Monitoring off beta-blocker for now but consider asking Cardiology to see again if bradycardia persists. Continue Eliquis. HR/BP are both remain quite stable/acceptable today.     • Vascular dementia: Mental status appears stable. Monitor for any waxing/waning encephalopathy due to hospitalization.     • HTN: BPs acceptable on amlodipine.     • Hypernatremia: Sodium improved with adequate PO intake, continue to encourage water intake.      • Obesity: Complicating all the above.    • Hypokalemia: replaced with protocol, Mg++ level fine.         VTE Prophylaxis - Eliquis (home med)  Code Status - DNR/DNI/no cardioversion  Discussed with - pt and wife.  Disposition - Anticipate discharge when arrangements in place. Family requesting a different rehab at discharge. CCP assisting. PT/OT following.         Jae Silva MD  [facility]  04/30/23  10:53 EDT

## 2023-04-30 NOTE — PLAN OF CARE
Alert, vss. Bipap HS. Tolerated well. No complaints of pain. Brief, purewick. Assist with turns. SCD on.     Problem: Adult Inpatient Plan of Care  Goal: Absence of Hospital-Acquired Illness or Injury  Intervention: Prevent Skin Injury  Recent Flowsheet Documentation  Taken 4/30/2023 0551 by Adelaida Borja RN  Body Position:   left   side-lying  Taken 4/30/2023 0400 by Adelaida Borja RN  Body Position: sitting up in bed  Taken 4/30/2023 0150 by Adelaida Borja RN  Body Position:   right   weight shifting  Taken 4/29/2023 2352 by Adelaida Borja RN  Body Position:   upper extremity elevated   legs elevated   sitting up in bed  Skin Protection:   adhesive use limited   incontinence pads utilized  Taken 4/29/2023 2200 by Adelaida Borja RN  Body Position:   upper extremity elevated   head facing, left  Taken 4/29/2023 1945 by Adelaida Borja RN  Body Position:   turned   left  Taken 4/29/2023 1928 by Adelaida Borja RN  Body Position:   sitting up in bed   upper extremity elevated  Skin Protection:   adhesive use limited   incontinence pads utilized   Goal Outcome Evaluation:

## 2023-04-30 NOTE — PROGRESS NOTES
[facility]  [phone]  Dr. Calvin Nolan    Subjective:  LOS: 4    Chief Complaint: Chronic respiratory failure     Patient has a astral device by [employer].   for 60+ years.  Always had wheezing.  Suspect chronic asthma with now fixed obstructive lung disease and concomitant restrictive lung disease.  He is awake and alert this morning.  Currently tolerating room air.  He slept with his home AVAPS machine.  Denies any problems this morning    Objective   Vital Signs past 24hrs  Temp range: Temp (24hrs), Av.4 °F (36.9 °C), Min:98.1 °F (36.7 °C), Max:98.9 °F (37.2 °C)    BP range: BP: (133-156)/(63-96) 156/93  Pulse range: Heart Rate:  [55-88] 78  Resp rate range: Resp:  [18-20] 18  Device (Oxygen Therapy): nasal cannulaFlow (L/min):  [2-4] 4  Oxygen range:SpO2:  [90 %-94 %] 90 %     Physical Exam  Constitutional:       General: He is sleeping.      Appearance: He is obese.   Cardiovascular:      Rate and Rhythm: Normal rate and regular rhythm.      Heart sounds: Normal heart sounds. No murmur heard.  Pulmonary:      Effort: Pulmonary effort is normal.      Breath sounds: Normal breath sounds.   Abdominal:      General: Bowel sounds are normal.      Palpations: Abdomen is soft. There is no mass.      Tenderness: There is no abdominal tenderness.      Comments: Cholecystostomy tube right upper quadrant   Musculoskeletal:         General: Swelling present.       Results Review:    I have reviewed the laboratory and imaging data since the last note by [facility] physician.  My annotations are noted in assessment and plan.      Result Review:  I have personally reviewed the results from last note by [facility] physician to 2023 09:50 EDT and agree with these findings:  [x]  Laboratory list / accordion  [x]  Microbiology  [x]  Radiology  []  EKG/Telemetry   []  Cardiology/Vascular   []  Pathology  []  Old records  []  Other:    Medication Review:  I have reviewed the current MAR.  My annotations are  noted in assessment and plan.    amLODIPine, 10 mg, Oral, Q24H  apixaban, 5 mg, Oral, Q12H  budesonide-formoterol, 2 puff, Inhalation, BID - RT  ipratropium-albuterol, 3 mL, Nebulization, Q6H While Awake - RT  pantoprazole, 40 mg, Oral, Q AM  sodium chloride, 10 mL, Intravenous, Q12H  vancomycin, 1,750 mg, Intravenous, Q24H        Pharmacy to dose vancomycin,       Lines, Drains & Airways     Active LDAs     Name Placement date Placement time Site Days    Midline Catheter - Single Lumen 04/24/23 Left Basilic 04/24/23  1620  -- 4    Biliary Tube RUQ --  --  RUQ  --    External Urinary Catheter 04/27/23 2030  --  1              No active isolations  Diet Orders (active) (From admission, onward)     Start     Ordered    04/26/23 2335  Diet: Cardiac Diets; Healthy Heart (2-3 Na+); Texture: Soft to Chew (NDD 3); Soft to Chew: Chopped Meat; Fluid Consistency: Thin (IDDSI 0)  Diet Effective Now         04/26/23 2336                PCCM Problems  Acute on chronic respiratory failure with hypoxia and hypercapnia  Morbid obesity  JENISE on CPAP  Restrictive lung disease  Right lower lobe infiltrate increased on current chest x-ray  Dysphagia  Relevant Medical Diagnoses  New onset A-fib  Enterococcal bacteremia  Acute cholecystitis status post percutaneous cholecystostomy drain in place since 4/18/2023  Vascular dementia  Mild pulmonary hypertension      Plan of Treatment    Continue with [employer] astral device.  Current minimum EPAP is 10 with maximum EPAP 15, pressure support range 10-20.  More alert and awake this morning.  Current settings should be appropriate.  He will need to continue use nightly and as needed.  Discussed with daughter yesterday.    On antibiotics per ID for Enterococcus bacteremia and with cholecystostomy drain in place per general surgery.    Chest x-ray shows mild increased infiltrate in the right lower lobe.  This could be a sympathetic effusion from the cholecystostomy drain and recent acute  cholecystitis.  Patient does not have a white count or a new fever.  Current antibiotics should be sufficient.  Follow-up chest imaging would be of help to ensure clearing of the right lower lobe infiltrate and this can be done in 8 weeks or so.    Aspiration cannot be ruled out based on increased infiltrate in the right lower lobe.  His last speech evaluation on 4/19/2023 is as follows.  VFSS completed patient demonstrates normal oropharyngeal swallow. Recommend upgrade to mechanical soft chopped and thin liquids. Meds whole as tolerated and small bites and sips.  He remains on a modified consistency diet.      Calvin Nolan MD  04/30/23  09:50 EDT      Part of this note may be an electronic transcription/translation of spoken language to printed text using the Dragon Dictation System.

## 2023-04-30 NOTE — PLAN OF CARE
Goal Outcome Evaluation:              Outcome Evaluation: A/ox4 with AM assessment. Pt cognition declines in later afternoon. Pt oriented to self and family this evening. Turned/repositioned per staff. Able to nap for a couple hours this afternoon and use home bipap. No attempts at unassisted self transfers. Bed alarm utilized. Safety maintained.

## 2023-04-30 NOTE — PROGRESS NOTES
[facility]: Vancomycin Monitoring Note  Harry Potts is a 89 y.o. male who is on day 5 (this hospitalization) of pharmacy to dose vancomycin for Bacteremia.  Stop date = 4/30 per Dr. Jazmín Mederos and per Dr. Alcaraz's consult order from 4/26    Previous Vancomycin Dose:   1750 mg IV every  24  hours  Updated Cultures and Sensitivities:   Microbiology Results (last 21 days)    Collected Updated Procedure Result Status    04/26/2023 2245 04/29/2023 2315 Blood Culture - Blood, Arm, Left [[id number]]   Blood from Arm, Left    Preliminary result Component Value   Blood Culture No growth at 3 days P             04/26/2023 2245 04/29/2023 2315 Blood Culture - Blood, Arm, Left [[id number]]   Blood from Arm, Left    Preliminary result Component Value   Blood Culture No growth at 3 days P             04/18/2023 1252 04/20/2023 1150 Body Fluid Culture - Body Fluid, Gallbladder [[id number]]    (Abnormal)   Body Fluid from Gallbladder    Final result Component Value   Body Fluid Culture Heavy growth (4+) Enterococcus faecium Abnormal    Gram Stain Rare (1+) WBCs per low power field    Occasional Gram positive cocci       Susceptibility     Enterococcus faecium     GERA     Ampicillin <=2  Resistant     Gentamicin High Level Synergy SYN-S  Susceptible     Vancomycin <=0.5  Susceptible               Linear View         04/16/2023 1101 04/21/2023 1200 Blood Culture - Blood, Hand, Right [[id number]]    Blood from Hand, Right    Final result Component Value   Blood Culture No growth at 5 days             04/16/2023 1101 04/21/2023 1200 Blood Culture - Blood, Hand, Left [[id number]]    Blood from Hand, Left    Final result Component Value   Blood Culture No growth at 5 days             04/14/2023 2115 04/19/2023 2130 Blood Culture - Blood, Hand, Left [[id number]]   Blood from Hand, Left    Final result Component Value   Blood Culture No growth at 5 days             04/14/2023 2114 04/17/2023 0818 Blood Culture -  "Blood, Arm, Right [[id number]]    (Abnormal)   Blood from Arm, Right    Final result Component Value   Blood Culture Enterococcus faecium Critical       Infectious disease consultation is highly recommended.   Isolated from Aerobic and Anaerobic Bottles   Gram Stain Anaerobic Bottle Gram positive cocci in chains Critical     Aerobic Bottle Gram positive cocci in chains Critical        Susceptibility     Enterococcus faecium     GERA     Ampicillin 4  Resistant     Gentamicin High Level Synergy SYN-S  Susceptible     Vancomycin <=0.5  Susceptible               Linear View         04/14/2023 2114 04/15/2023 1454 Blood Culture ID, PCR - Blood, Arm, Right [[id number]]    (Abnormal)   Blood from Arm, Right    Final result Component Value   BCID, PCR Enterococcus faecium. Kimberli/B (vancomycin resistance gene) not detected. Identification by BCID2 PCR. Abnormal         Results from last 7 days   Lab Units 04/29/23  1659 04/26/23  1452 04/24/23  0431   VANCOMYCIN RM mcg/mL  --  10.30  --    VANCOMYCIN TR mcg/mL 14.40  --  13.40     Vitals/Labs  Ht: 172.7 cm (68\"); Wt: 130 kg (287 lb 7.7 oz)   Temp Readings from Last 1 Encounters:   04/29/23 98.1 °F (36.7 °C)     Estimated Creatinine Clearance: 78.5 mL/min (by C-G formula based on SCr of 0.79 mg/dL).     Results from last 7 days   Lab Units 04/30/23  0545 04/30/23  0358 04/29/23  0458 04/28/23  0506   CREATININE mg/dL  --  0.79 0.77 0.81   WBC 10*3/mm3 6.59  --  6.84 7.40     Assessment/Plan  Current Vancomycin Dose: 1750 mg IVPB every 24 hours (~13.4mg/kg); provides an InsightRX predicted  mg/L.hr (target 400-600)  Next Level Date and Time: No further levels unless duration is extended  Pharmacy will continue to monitor patient changes and renal function.      Thank you for involving pharmacy in this patient's care. Please contact pharmacy with any questions or concerns.       Mikey Byrd, PharmD  Clinical Pharmacist  "

## 2023-05-01 LAB
ALBUMIN SERPL-MCNC: 3.2 G/DL (ref 3.5–5.2)
ALBUMIN/GLOB SERPL: 1.5 G/DL
ALP SERPL-CCNC: 66 U/L (ref 39–117)
ALT SERPL W P-5'-P-CCNC: 16 U/L (ref 1–41)
ANION GAP SERPL CALCULATED.3IONS-SCNC: 4 MMOL/L (ref 5–15)
AST SERPL-CCNC: 14 U/L (ref 1–40)
BACTERIA SPEC AEROBE CULT: NORMAL
BACTERIA SPEC AEROBE CULT: NORMAL
BILIRUB SERPL-MCNC: 0.4 MG/DL (ref 0–1.2)
BUN SERPL-MCNC: 17 MG/DL (ref 8–23)
BUN/CREAT SERPL: 19.8 (ref 7–25)
CALCIUM SPEC-SCNC: 8.6 MG/DL (ref 8.2–9.6)
CHLORIDE SERPL-SCNC: 102 MMOL/L (ref 98–107)
CO2 SERPL-SCNC: 40 MMOL/L (ref 22–29)
CREAT SERPL-MCNC: 0.86 MG/DL (ref 0.76–1.27)
DEPRECATED RDW RBC AUTO: 44.3 FL (ref 37–54)
EGFRCR SERPLBLD CKD-EPI 2021: 81.2 ML/MIN/1.73
ERYTHROCYTE [DISTWIDTH] IN BLOOD BY AUTOMATED COUNT: 13.2 % (ref 12.3–15.4)
GLOBULIN UR ELPH-MCNC: 2.2 GM/DL
GLUCOSE SERPL-MCNC: 94 MG/DL (ref 65–99)
HCT VFR BLD AUTO: 38.4 % (ref 37.5–51)
HGB BLD-MCNC: 12.4 G/DL (ref 13–17.7)
MAGNESIUM SERPL-MCNC: 1.8 MG/DL (ref 1.7–2.3)
MCH RBC QN AUTO: 29.5 PG (ref 26.6–33)
MCHC RBC AUTO-ENTMCNC: 32.3 G/DL (ref 31.5–35.7)
MCV RBC AUTO: 91.4 FL (ref 79–97)
PLATELET # BLD AUTO: 138 10*3/MM3 (ref 140–450)
PMV BLD AUTO: 10.7 FL (ref 6–12)
POTASSIUM SERPL-SCNC: 3.9 MMOL/L (ref 3.5–5.2)
PROT SERPL-MCNC: 5.4 G/DL (ref 6–8.5)
RBC # BLD AUTO: 4.2 10*6/MM3 (ref 4.14–5.8)
SODIUM SERPL-SCNC: 146 MMOL/L (ref 136–145)
WBC NRBC COR # BLD: 6 10*3/MM3 (ref 3.4–10.8)

## 2023-05-01 PROCEDURE — 94799 UNLISTED PULMONARY SVC/PX: CPT

## 2023-05-01 PROCEDURE — 97530 THERAPEUTIC ACTIVITIES: CPT

## 2023-05-01 PROCEDURE — 97110 THERAPEUTIC EXERCISES: CPT

## 2023-05-01 PROCEDURE — 85027 COMPLETE CBC AUTOMATED: CPT | Performed by: HOSPITALIST

## 2023-05-01 PROCEDURE — 80053 COMPREHEN METABOLIC PANEL: CPT | Performed by: HOSPITALIST

## 2023-05-01 PROCEDURE — 94761 N-INVAS EAR/PLS OXIMETRY MLT: CPT

## 2023-05-01 PROCEDURE — 83735 ASSAY OF MAGNESIUM: CPT | Performed by: HOSPITALIST

## 2023-05-01 PROCEDURE — 94760 N-INVAS EAR/PLS OXIMETRY 1: CPT

## 2023-05-01 RX ORDER — FUROSEMIDE 20 MG/1
20 TABLET ORAL DAILY
Status: DISCONTINUED | OUTPATIENT
Start: 2023-05-01 | End: 2023-05-03 | Stop reason: HOSPADM

## 2023-05-01 RX ADMIN — APIXABAN 5 MG: 5 TABLET, FILM COATED ORAL at 20:18

## 2023-05-01 RX ADMIN — BUDESONIDE AND FORMOTEROL FUMARATE DIHYDRATE 2 PUFF: 160; 4.5 AEROSOL RESPIRATORY (INHALATION) at 08:25

## 2023-05-01 RX ADMIN — AMLODIPINE BESYLATE 10 MG: 10 TABLET ORAL at 09:04

## 2023-05-01 RX ADMIN — BUDESONIDE AND FORMOTEROL FUMARATE DIHYDRATE 2 PUFF: 160; 4.5 AEROSOL RESPIRATORY (INHALATION) at 19:46

## 2023-05-01 RX ADMIN — APIXABAN 5 MG: 5 TABLET, FILM COATED ORAL at 09:04

## 2023-05-01 RX ADMIN — Medication 10 ML: at 09:04

## 2023-05-01 RX ADMIN — PANTOPRAZOLE SODIUM 40 MG: 40 TABLET, DELAYED RELEASE ORAL at 09:03

## 2023-05-01 RX ADMIN — IPRATROPIUM BROMIDE AND ALBUTEROL SULFATE 3 ML: .5; 3 SOLUTION RESPIRATORY (INHALATION) at 12:08

## 2023-05-01 RX ADMIN — FUROSEMIDE 20 MG: 20 TABLET ORAL at 17:52

## 2023-05-01 NOTE — CASE MANAGEMENT/SOCIAL WORK
Continued Stay Note  [facility]     Patient Name: Harry Potts  MRN: [id number]  Today's Date: 5/1/2023    Admit Date: 4/26/2023    Plan: DC to [facility] when medically ready, stretcher transport   Discharge Plan     Row Name 05/01/23 1535       Plan    Plan DC to [facility] when medically ready, stretcher transport    Plan Comments Spoke to Venita Olvera who toured [facility] and would like for pt to be dc there for short term rehab. Discussed with SOHEILA Lopez, who was also in agreement. Contacted Cecilia NORTON/edwina to start pre-cert process.               Discharge Codes    No documentation.               Expected Discharge Date and Time     Expected Discharge Date Expected Discharge Time    May 2, 2023             Leeann Quiroz RN

## 2023-05-01 NOTE — PLAN OF CARE
Goal Outcome Evaluation:  Plan of Care Reviewed With: patient      No acute distress noted this shift, pt wore his CPAP 7+ hours this shift, small amount of thin greenish drainage from biliary tube, safety maintained, continue plan of care

## 2023-05-01 NOTE — THERAPY TREATMENT NOTE
Patient Name: Harry Potts  : 1930    MRN: [id number]                              Today's Date: 2023       Admit Date: 2023    Visit Dx:     ICD-10-CM ICD-9-CM   1. Acute on chronic respiratory failure with hypoxia and hypercapnia  J96.21 518.84    J96.22 786.09     799.02   2. Altered mental status, unspecified altered mental status type  R41.82 780.97   3. Bacteremia due to Enterococcus  R78.81 790.7    B95.2 041.04     Patient Active Problem List   Diagnosis   • Dysfunction of eustachian tube   • Gastroesophageal reflux disease   • Hyperglycemia   • Hypercholesterolemia   • Hypertension   • Morbid obesity   • Osteoarthritis of lumbar spine with myelopathy   • Osteoarthritis of lumbar spine   • Bronchitis   • Viral URI with cough   • PVC (premature ventricular contraction)   • Non-traumatic rhabdomyolysis   • Obesity (BMI 30-39.9)   • Chronic low back pain   • Weakness   • Edema, bilateral lower extremities   • Restrictive lung disease   • Irregularly irregular cardiac rhythm   • JENISE on CPAP   • Medicare annual wellness visit, subsequent   • Hypoxia   • Acute on chronic respiratory failure with hypoxia and hypercapnia   • Acute cholecystitis   • New onset atrial fibrillation   • Bacteremia due to Enterococcus   • Pulmonary hypertension   • Metabolic encephalopathy   • Vascular dementia without behavioral disturbance   • Dysphagia   • Anemia   • Hypernatremia     Past Medical History:   Diagnosis Date   • Arthritis    • Asthma     Oxygen at home   • Atrial fibrillation 2023   • Cancer     basal cell    • COPD (chronic obstructive pulmonary disease)    • Difficulty walking    • Elevated cholesterol    • Environmental allergies    • GERD (gastroesophageal reflux disease)    • HL (hearing loss)    • Hyperglycemia    • Hyperlipidemia    • Hypertension    • Obesity    • Pulmonary hypertension 2023   • Sleep apnea    • Spondylolysis, lumbar region      Past Surgical History:    Procedure Laterality Date   • CARDIAC CATHETERIZATION  circa 2008    [facility]   • EYE SURGERY     • HERNIA REPAIR     • JOINT REPLACEMENT     • REPLACEMENT TOTAL KNEE BILATERAL        General Information     Row Name 05/01/23 1255          Physical Therapy Time and Intention    Document Type therapy note (daily note)  -EB     Mode of Treatment physical therapy;co-treatment;occupational therapy  -     Row Name 05/01/23 1255          General Information    Existing Precautions/Restrictions fall;oxygen therapy device and L/min  -     Row Name 05/01/23 1255          Cognition    Orientation Status (Cognition) oriented x 4  -     Row Name 05/01/23 1255          Safety Issues, Functional Mobility    Impairments Affecting Function (Mobility) balance;endurance/activity tolerance;strength  -EB           User Key  (r) = Recorded By, (t) = Taken By, (c) = Cosigned By    Initials Name Provider Type    EB Edith Macias PTA Physical Therapist Assistant               Mobility     Row Name 05/01/23 1256          Bed Mobility    Bed Mobility rolling left;rolling right  -EB     Rolling Left [place] (Bed Mobility) minimum assist (75% patient effort);verbal cues  -EB     Rolling Right [place] (Bed Mobility) minimum assist (75% patient effort);verbal cues  -EB     Supine-Sit [place] (Bed Mobility) moderate assist (50% patient effort);2 person assist  -EB     Sit-Supine [place] (Bed Mobility) maximum assist (25% patient effort);dependent (less than 25% patient effort);2 person assist  -EB     Assistive Device (Bed Mobility) head of bed elevated;bed rails  -EB     Comment, (Bed Mobility) increased time to complete. pt rolling left/right.  -EB     Row Name 05/01/23 1256          Sit-Stand Transfer    Sit-Stand [place] (Transfers) maximum assist (25% patient effort);2 person assist  -EB     Assistive Device (Sit-Stand Transfers) walker, front-wheeled  -EB     Comment, (Sit-Stand Transfer) attempted  twice but unable to clear bottom off bed.  -EB           User Key  (r) = Recorded By, (t) = Taken By, (c) = Cosigned By    Initials Name Provider Type    Edith Kim PTA Physical Therapist Assistant               Obj/Interventions     Row Name 05/01/23 1338          Motor Skills    Therapeutic Exercise --  BLE: LAQs, seated marchess-AAROM (X10), HS (X10)  -EB           User Key  (r) = Recorded By, (t) = Taken By, (c) = Cosigned By    Initials Name Provider Type    Edith Kim PTA Physical Therapist Assistant               Goals/Plan    No documentation.                Clinical Impression     Row Name 05/01/23 1340          Plan of Care Review    Plan of Care Reviewed With patient  -EB     Progress no change  -EB     Outcome Evaluation Pt seen for PT/OT co-treat today to maximize therapeutic benefit. Pt performed rolling with [place] and supine to sit with ModAX2. Pt required sequencing cues. pt sat on EOB with SBA and attempted 2 STS transfers needing max assist of 2 and unable to clear bottom off bed. Pt performed bilateral LE exercises with pt needing AAROM with hip flexion. Pt needed Max assist of 2 with sit to supine. Will continue to progress pt as able.  -EB     Row Name 05/01/23 1340          Therapy Assessment/Plan (PT)    Therapy Frequency (PT) 5 times/wk  -EB     Row Name 05/01/23 1340          Positioning and Restraints    Pre-Treatment Position in bed  -EB     Post Treatment Position bed  -EB     In Bed supine;call light within reach;encouraged to call for assist;exit alarm on  -EB           User Key  (r) = Recorded By, (t) = Taken By, (c) = Cosigned By    Initials Name Provider Type    Edith Kim PTA Physical Therapist Assistant               Outcome Measures     Row Name 05/01/23 1346          How much help from another person do you currently need...    Turning from your back to your side while in flat bed without using bedrails? 2  -EB     Moving from lying on back to sitting on the  side of a flat bed without bedrails? 2  -EB     Moving to and from a bed to a chair (including a wheelchair)? 1  -EB     Standing up from a chair using your arms (e.g., wheelchair, bedside chair)? 1  -EB     Climbing 3-5 steps with a railing? 1  -EB     To walk in hospital room? 1  -EB     AM-PAC 6 Clicks Score (PT) 8  -EB     Highest level of mobility 3 --> Sat at edge of bed  -EB           User Key  (r) = Recorded By, (t) = Taken By, (c) = Cosigned By    Initials Name Provider Type    Edith Kim PTA Physical Therapist Assistant                             Physical Therapy Education     Title: PT OT SLP Therapies (In Progress)     Topic: Physical Therapy (Done)     Point: Mobility training (Done)     Learning Progress Summary           Patient Acceptance, E,D, VU by EB at 5/1/2023 1344    Acceptance, E,TB,D, VU,NR by  at 4/29/2023 1708    Acceptance, E,D, VU,NR by EB at 4/28/2023 1035    Acceptance, E,D, DU,NR by PC at 4/27/2023 1711                   Point: Home exercise program (Done)     Learning Progress Summary           Patient Acceptance, E,D, VU by EB at 5/1/2023 1344    Acceptance, E,TB,D, VU,NR by  at 4/29/2023 1708    Acceptance, E,D, VU,NR by EB at 4/28/2023 1035    Acceptance, E,D, DU,NR by PC at 4/27/2023 1711                   Point: Body mechanics (Done)     Learning Progress Summary           Patient Acceptance, E,D, VU by EB at 5/1/2023 1344    Acceptance, E,TB,D, VU,NR by  at 4/29/2023 1708    Acceptance, E,D, VU,NR by EB at 4/28/2023 1035    Acceptance, E,D, DU,NR by PC at 4/27/2023 1711                   Point: Precautions (Done)     Learning Progress Summary           Patient Acceptance, E,D, VU by EB at 5/1/2023 1344    Acceptance, E,TB,D, VU,NR by  at 4/29/2023 1708    Acceptance, E,D, VU,NR by EB at 4/28/2023 1035    Acceptance, E,D, DU,NR by PC at 4/27/2023 1711                               User Key     Initials Effective Dates Name Provider Type Discipline    PC 06/16/21 -   Heidi Porras, PT Physical Therapist PT     03/07/18 -  Ilana Malcolm PTA Physical Therapist Assistant PT     02/14/23 -  Edith Macias PTA Physical Therapist Assistant PT              PT Recommendation and Plan     Plan of Care Reviewed With: patient  Progress: no change  Outcome Evaluation: Pt seen for PT/OT co-treat today to maximize therapeutic benefit. Pt performed rolling with [place] and supine to sit with ModAX2. Pt required sequencing cues. pt sat on EOB with SBA and attempted 2 STS transfers needing max assist of 2 and unable to clear bottom off bed. Pt performed bilateral LE exercises with pt needing AAROM with hip flexion. Pt needed Max assist of 2 with sit to supine. Will continue to progress pt as able.     Time Calculation:    PT Charges     Row Name 05/01/23 1254             Time Calculation    Start Time 0946  -EB      Stop Time 1027  -EB      Time Calculation (min) 41 min  -EB      PT Received On 05/01/23  -EB      PT - Next Appointment 05/02/23  -EB         Time Calculation- PT    Total Timed Code Minutes- PT 41 minute(s)  -EB            User Key  (r) = Recorded By, (t) = Taken By, (c) = Cosigned By    Initials Name Provider Type     Edith Macias PTA Physical Therapist Assistant              Therapy Charges for Today     Code Description Service Date Service Provider Modifiers Qty    77446096521 HC PT THERAPEUTIC ACT EA 15 MIN 5/1/2023 Edith Macias PTA GP 1    85026533657 HC PT THER SUPP EA 15 MIN 5/1/2023 Edith Macias PTA GP 1    93647364738 HC PT THER PROC EA 15 MIN 5/1/2023 Edith Macias PTA GP 1    21868687654 HC PT THERAPEUTIC ACT EA 15 MIN 5/1/2023 Edith Macias PTA GP 1          PT G-Codes  Outcome Measure Options: AM-PAC 6 Clicks Basic Mobility (PT)  AM-PAC 6 Clicks Score (PT): 8  AM-PAC 6 Clicks Score (OT): 12       Edith Macias PTA  5/1/2023

## 2023-05-01 NOTE — PROGRESS NOTES
"Enter Query Response Below      Query Response:       possible pneumonia       If applicable, please update the problem list.     Patient: Harry Potts        : 1930  Account: [id number]           Admit Date: 2023        How to Respond to this query:       a. Click New Note     b. Answer query within the yellow box.                c. Update the Problem List, if applicable.      If you have any questions about this query contact me at: [email or web address]     Dr. Ashley    89-year-old male admitted 23 with acute on chronic respiratory failure with hypoxia and hypercapnia per the dc summary.   progress note \"Minimal pneumonia on x-ray left base- Continue antibiotics as advised by ID- Unasyn completed\"   pulmonary progress note \"There may be some minimal pneumonia developing in the left base.\"  Pneumonia is not documented on the dc summary.    Please clarify the following:  Pneumonia ruled in  Pneumonia ruled out  Other- specify____  Unable to determine    By submitting this query, we are merely seeking further clarification of documentation to accurately reflect all conditions that you are monitoring, evaluating, treating or that extend the hospitalization or utilize additional resources of care. Please utilize your independent clinical judgment when addressing the question(s) above.     This query and your response, once completed, will be entered into the legal medical record.    Sincerely,  Jennifer Chung MSN, RN, CCDS  Clinical Documentation Integrity Program   "

## 2023-05-01 NOTE — PLAN OF CARE
Goal Outcome Evaluation:  Plan of Care Reviewed With: patient, daughter  VSS  Quiet day  Denies pain or shortness of breath  Turned every 2 hours  Plan is to transfer to [facility] when medically stable  Family at side today and hoping for transfer soon

## 2023-05-01 NOTE — THERAPY TREATMENT NOTE
Patient Name: Harry Potts  : 1930    MRN: [id number]                              Today's Date: 2023       Admit Date: 2023    Visit Dx:     ICD-10-CM ICD-9-CM   1. Acute on chronic respiratory failure with hypoxia and hypercapnia  J96.21 518.84    J96.22 786.09     799.02   2. Altered mental status, unspecified altered mental status type  R41.82 780.97   3. Bacteremia due to Enterococcus  R78.81 790.7    B95.2 041.04     Patient Active Problem List   Diagnosis   • Dysfunction of eustachian tube   • Gastroesophageal reflux disease   • Hyperglycemia   • Hypercholesterolemia   • Hypertension   • Morbid obesity   • Osteoarthritis of lumbar spine with myelopathy   • Osteoarthritis of lumbar spine   • Bronchitis   • Viral URI with cough   • PVC (premature ventricular contraction)   • Non-traumatic rhabdomyolysis   • Obesity (BMI 30-39.9)   • Chronic low back pain   • Weakness   • Edema, bilateral lower extremities   • Restrictive lung disease   • Irregularly irregular cardiac rhythm   • JENISE on CPAP   • Medicare annual wellness visit, subsequent   • Hypoxia   • Acute on chronic respiratory failure with hypoxia and hypercapnia   • Acute cholecystitis   • New onset atrial fibrillation   • Bacteremia due to Enterococcus   • Pulmonary hypertension   • Metabolic encephalopathy   • Vascular dementia without behavioral disturbance   • Dysphagia   • Anemia   • Hypernatremia     Past Medical History:   Diagnosis Date   • Arthritis    • Asthma     Oxygen at home   • Atrial fibrillation 2023   • Cancer     basal cell    • COPD (chronic obstructive pulmonary disease)    • Difficulty walking    • Elevated cholesterol    • Environmental allergies    • GERD (gastroesophageal reflux disease)    • HL (hearing loss)    • Hyperglycemia    • Hyperlipidemia    • Hypertension    • Obesity    • Pulmonary hypertension 2023   • Sleep apnea    • Spondylolysis, lumbar region      Past Surgical History:    Procedure Laterality Date   • CARDIAC CATHETERIZATION  circa 2008    [facility]   • EYE SURGERY     • HERNIA REPAIR     • JOINT REPLACEMENT     • REPLACEMENT TOTAL KNEE BILATERAL        General Information     Row Name 05/01/23 1110          OT Time and Intention    Document Type therapy note (daily note)  -     Mode of Treatment co-treatment;occupational therapy  -     Row Name 05/01/23 1110          General Information    Patient Profile Reviewed yes  -     Existing Precautions/Restrictions fall;oxygen therapy device and L/min  -     Row Name 05/01/23 1110          Cognition    Orientation Status (Cognition) oriented x 4  -     Row Name 05/01/23 1110          Safety Issues, Functional Mobility    Impairments Affecting Function (Mobility) balance;endurance/activity tolerance;strength  -           User Key  (r) = Recorded By, (t) = Taken By, (c) = Cosigned By    Initials Name Provider Type     Luz Maria Baldwin, OT Occupational Therapist                 Mobility/ADL's     Row Name 05/01/23 1111          Bed Mobility    Bed Mobility supine-sit;sit-supine  -     Supine-Sit [place] (Bed Mobility) moderate assist (50% patient effort);2 person assist  -     Sit-Supine [place] (Bed Mobility) maximum assist (25% patient effort);dependent (less than 25% patient effort);2 person assist  -     Row Name 05/01/23 1111          Transfers    Comment, (Transfers) not able to stand this date  -     Row Name 05/01/23 1111          Sit-Stand Transfer    Sit-Stand [place] (Transfers) dependent (less than 25% patient effort)  -     Assistive Device (Sit-Stand Transfers) walker, platform  -     Row Name 05/01/23 1111          Activities of Daily Living    BADL Assessment/Intervention lower body dressing  -     Row Name 05/01/23 1111          Lower Body Dressing Assessment/Training    [place] Level (Lower Body Dressing) don;socks;dependent (less than 25% patient effort)  -           User  Key  (r) = Recorded By, (t) = Taken By, (c) = Cosigned By    Initials Name Provider Type    Luz Maria Diaz OT Occupational Therapist               Obj/Interventions     Row Name 05/01/23 1112          Shoulder (Therapeutic Exercise)    Shoulder (Therapeutic Exercise) AROM (active range of motion);AAROM (active assistive range of motion)  -     Shoulder AROM (Therapeutic Exercise) left;flexion;aBduction;10 repetitions  -     Shoulder AAROM (Therapeutic Exercise) right;flexion;aBduction;10 repetitions  -     Row Name 05/01/23 1112          Elbow/Forearm (Therapeutic Exercise)    Elbow/Forearm (Therapeutic Exercise) AROM (active range of motion)  -     Elbow/Forearm AROM (Therapeutic Exercise) bilateral;flexion;extension;10 repetitions;2 sets  -     Row Name 05/01/23 [facility]          Motor Skills    Motor Skills functional endurance  -     Functional Endurance poor  -     Therapeutic Exercise shoulder;elbow/forearm  -     Row Name 05/01/23 1112          Balance    Balance Assessment sitting static balance;sitting dynamic balance  -     Static Sitting Balance standby assist  -     Dynamic Sitting Balance contact guard  -           User Key  (r) = Recorded By, (t) = Taken By, (c) = Cosigned By    Initials Name Provider Type    Luz Maria Diaz OT Occupational Therapist               Goals/Plan    No documentation.                Clinical Impression     Row Name 05/01/23 [facility]          Pain Assessment    Pretreatment Pain Rating 0/10 - no pain  -     Posttreatment Pain Rating 0/10 - no pain  -     Row Name 05/01/23 1113          Plan of Care Review    Progress no change  -     Outcome Evaluation Pt seen for OT treatment this date. Pt motivated to participate. Performed bed mobility with mod assist x2 for rolling and supine to sit. Sat edge of bed with sba. Required dep/total assist to perform LE dressing and toileting. Pt attempted to stand with platform walker, unable to perform with max  assist x2. Noted less leg strength this date than previous session. Pt performed UE ther ex, aarom R shoulder x10, arom L shoulder and bilateral elbow x10. Pt cont to demo poor functional endurance/strength for adls and fm, will cont to benefit from acute OT. Will recommend rehab/snf at time of dc.  -     Row Name 05/01/23 1113          Therapy Plan Review/Discharge Plan (OT)    Anticipated Discharge Disposition (OT) skilled nursing facility  -     Row Name 05/01/23 1113          Positioning and Restraints    Pre-Treatment Position in bed  -KB     Post Treatment Position bed  -KB     In Bed notified nsg;supine;call light within reach;encouraged to call for assist;exit alarm on  -KB           User Key  (r) = Recorded By, (t) = Taken By, (c) = Cosigned By    Initials Name Provider Type    Luz Maria Diaz OT Occupational Therapist               Outcome Measures     Row Name 05/01/23 1117          How much help from another is currently needed...    Putting on and taking off regular lower body clothing? 1  -KB     Bathing (including washing, rinsing, and drying) 2  -KB     Toileting (which includes using toilet bed pan or urinal) 1  -KB     Putting on and taking off regular upper body clothing 2  -KB     Taking care of personal grooming (such as brushing teeth) 3  -KB     Eating meals 3  -KB     AM-PAC 6 Clicks Score (OT) 12  -KB           User Key  (r) = Recorded By, (t) = Taken By, (c) = Cosigned By    Initials Name Provider Type    Luz Maria Diaz OT Occupational Therapist                Occupational Therapy Education     Title: PT OT SLP Therapies (In Progress)     Topic: Occupational Therapy (In Progress)     Point: ADL training (Done)     Description:   Instruct learner(s) on proper safety adaptation and remediation techniques during self care or transfers.   Instruct in proper use of assistive devices.              Learning Progress Summary           Patient BALAJI Murphy, VU by GUSTAVO at 4/28/2023 1941     Comment: ed on adl tech                   Point: Home exercise program (Not Started)     Description:   Instruct learner(s) on appropriate technique for monitoring, assisting and/or progressing therapeutic exercises/activities.              Learner Progress:  Not documented in this visit.          Point: Precautions (Not Started)     Description:   Instruct learner(s) on prescribed precautions during self-care and functional transfers.              Learner Progress:  Not documented in this visit.          Point: Body mechanics (Not Started)     Description:   Instruct learner(s) on proper positioning and spine alignment during self-care, functional mobility activities and/or exercises.              Learner Progress:  Not documented in this visit.                      User Key     Initials Effective Dates Name Provider Type Discipline     01/03/23 -  Luz Maria Baldwin OT Occupational Therapist OT              OT Recommendation and Plan  Planned Therapy Interventions (OT): activity tolerance training, BADL retraining, transfer/mobility retraining, patient/caregiver education/training, strengthening exercise, occupation/activity based interventions  Therapy Frequency (OT): 3 times/wk  Plan of Care Review  Plan of Care Reviewed With: patient  Progress: no change  Outcome Evaluation: Pt seen for OT treatment this date. Pt motivated to participate. Performed bed mobility with mod assist x2 for rolling and supine to sit. Sat edge of bed with sba. Required dep/total assist to perform LE dressing and toileting. Pt attempted to stand with platform walker, unable to perform with max assist x2. Noted less leg strength this date than previous session. Pt performed UE ther ex, aarom R shoulder x10, arom L shoulder and bilateral elbow x10. Pt cont to demo poor functional endurance/strength for adls and fm, will cont to benefit from acute OT. Will recommend rehab/snf at time of dc.     Time Calculation:    Time Calculation- OT      Row Name 05/01/23 1117             Time Calculation- OT    OT Start Time 0946  -KB      OT Stop Time 1027  -KB      OT Time Calculation (min) 41 min  -KB      Total Timed Code Minutes- OT 41 minute(s)  -KB      OT Received On 05/01/23  -KB      OT - Next Appointment 05/03/23  -KB      OT Goal Re-Cert Due Date 05/12/23  -KB         Timed Charges    45301 - OT Therapeutic Activity Minutes 41  -KB         Total Minutes    Timed Charges Total Minutes 41  -KB       Total Minutes 41  -KB            User Key  (r) = Recorded By, (t) = Taken By, (c) = Cosigned By    Initials Name Provider Type    KB Luz Maria Baldwin OT Occupational Therapist              Therapy Charges for Today     Code Description Service Date Service Provider Modifiers Qty    56348055868 HC OT THERAPEUTIC ACT EA 15 MIN 5/1/2023 Luz Maria Baldwin OT GO 3               Luz Maria Baldwin OT  5/1/2023

## 2023-05-01 NOTE — PLAN OF CARE
Goal Outcome Evaluation:  Plan of Care Reviewed With: patient        Progress: no change  Outcome Evaluation: Pt seen for PT/OT co-treat today to maximize therapeutic benefit. Pt performed rolling with [place] and supine to sit with ModAX2. Pt required sequencing cues. pt sat on EOB with SBA and attempted 2 STS transfers needing max assist of 2 and unable to clear bottom off bed. Pt performed bilateral LE exercises with pt needing AAROM with hip flexion. Pt needed Max assist of 2 with sit to supine. Will continue to progress pt as able.

## 2023-05-01 NOTE — PLAN OF CARE
Goal Outcome Evaluation:  Plan of Care Reviewed With: patient        Progress: no change  Outcome Evaluation: Pt seen for OT treatment this date. Pt motivated to participate. Performed bed mobility with mod assist x2 for rolling and supine to sit. Sat edge of bed with sba. Required dep/total assist to perform LE dressing and toileting. Pt attempted to stand with platform walker, unable to perform with max assist x2. Noted less leg strength this date than previous session. Pt performed UE ther ex, aarom R shoulder x10, arom L shoulder and bilateral elbow x10. Pt cont to demo poor functional endurance/strength for adls and fm, will cont to benefit from acute OT. Will recommend rehab/snf at time of dc. OT wore appropriate PPE during session and performed hand hygiene before/after session.

## 2023-05-01 NOTE — PROGRESS NOTES
[facility]  [phone]  Dr. Calvin Nolan    Subjective:  LOS: 5    Chief Complaint: Chronic respiratory failure     Patient has a astral device by [employer].   for 60+ years.  Always had wheezing.  Suspect chronic asthma with now fixed obstructive lung disease and concomitant restrictive lung disease.  Awake alert and without respiratory complaints.  On low-flow nasal oxygen.    Objective   Vital Signs past 24hrs  Temp range: Temp (24hrs), Av.4 °F (36.9 °C), Min:98.1 °F (36.7 °C), Max:98.8 °F (37.1 °C)    BP range: BP: (112-148)/(64-90) 112/88  Pulse range: Heart Rate:  [48-76] 72  Resp rate range: Resp:  [18] 18  Device (Oxygen Therapy): nasal cannulaFlow (L/min):  [3-4] 4  Oxygen range:SpO2:  [89 %-98 %] 95 %     Physical Exam  Constitutional:       General: He is sleeping.      Appearance: He is obese.   Cardiovascular:      Rate and Rhythm: Normal rate and regular rhythm.      Heart sounds: Normal heart sounds. No murmur heard.  Pulmonary:      Effort: Pulmonary effort is normal.      Breath sounds: Normal breath sounds.   Abdominal:      General: Bowel sounds are normal.      Palpations: Abdomen is soft. There is no mass.      Tenderness: There is no abdominal tenderness.      Comments: Cholecystostomy tube right upper quadrant   Musculoskeletal:         General: Swelling present.       Results Review:    I have reviewed the laboratory and imaging data since the last note by [facility] physician.  My annotations are noted in assessment and plan.      Result Review:  I have personally reviewed the results from last note by [facility] physician to 2023 12:37 EDT and agree with these findings:  [x]  Laboratory list / accordion  [x]  Microbiology  [x]  Radiology  []  EKG/Telemetry   []  Cardiology/Vascular   []  Pathology  []  Old records  []  Other:    Medication Review:  I have reviewed the current MAR.  My annotations are noted in assessment and plan.    amLODIPine, 10 mg, Oral,  Q24H  apixaban, 5 mg, Oral, Q12H  budesonide-formoterol, 2 puff, Inhalation, BID - RT  ipratropium-albuterol, 3 mL, Nebulization, Q6H While Awake - RT  pantoprazole, 40 mg, Oral, Q AM  sodium chloride, 10 mL, Intravenous, Q12H        Pharmacy to dose vancomycin,       Lines, Drains & Airways     Active LDAs     Name Placement date Placement time Site Days    Midline Catheter - Single Lumen 04/24/23 Left Basilic 04/24/23  1620  -- 4    Biliary Tube RUQ --  --  RUQ  --    External Urinary Catheter 04/27/23 2030  --  1              No active isolations  Diet Orders (active) (From admission, onward)     Start     Ordered    04/26/23 2335  Diet: Cardiac Diets; Healthy Heart (2-3 Na+); Texture: Soft to Chew (NDD 3); Soft to Chew: Chopped Meat; Fluid Consistency: Thin (IDDSI 0)  Diet Effective Now         04/26/23 2336                PCCM Problems  Acute on chronic respiratory failure with hypoxia and hypercapnia  Morbid obesity  JENISE on CPAP  Restrictive lung disease  Right lower lobe infiltrate increased on current chest x-ray  Dysphagia  Relevant Medical Diagnoses  New onset A-fib  Enterococcal bacteremia  Acute cholecystitis status post percutaneous cholecystostomy drain in place since 4/18/2023  Vascular dementia  Mild pulmonary hypertension      Plan of Treatment    Continue with [employer] astral device.  Current minimum EPAP is 10 with maximum EPAP 15, pressure support range 10-20.  More alert and awake this morning.  Current settings appear to be appropriate.  He will need to continue use nightly and as needed.  Discussed with spouse at bedside.      On antibiotics per ID for Enterococcus bacteremia and with cholecystostomy drain in place per general surgery.    Chest x-ray shows mild increased infiltrate in the right lower lobe.  This could be a sympathetic effusion from the cholecystostomy drain and recent acute cholecystitis.  Patient does not have a white count or a new fever.  Current antibiotics should be  sufficient.  Follow-up chest imaging would be of help to ensure clearing of the right lower lobe infiltrate and this can be done in 8 weeks or so.    Aspiration cannot be ruled out based on increased infiltrate in the right lower lobe.  His last speech evaluation on 4/19/2023 is as follows.  VFSS completed patient demonstrates normal oropharyngeal swallow. Recommend upgrade to mechanical soft chopped and thin liquids. Meds whole as tolerated and small bites and sips.  He remains on a modified consistency diet.      Calvin Nolan MD  05/01/23  12:37 EDT      Part of this note may be an electronic transcription/translation of spoken language to printed text using the Dragon Dictation System.

## 2023-05-01 NOTE — PROGRESS NOTES
Name: Harry Potts ADMIT: 2023   : 1930  PCP: Harry Luis MD    MRN: [id number] LOS: 5 days   AGE/SEX: 89 y.o. male  ROOM: [facility]     Subjective   Subjective   Resting in bed. No family currently at bedside. No issues noted overnight. He wore his home machine. He denies any chest pain, dyspnea, fever or chills. Aware of self/place/time, but does seem a little confused to situation.     Objective   Objective   Vital Signs  Temp:  [98.1 °F (36.7 °C)-98.8 °F (37.1 °C)] 98.2 °F (36.8 °C)  Heart Rate:  [48-76] 68  Resp:  [18] 18  BP: (112-148)/(64-90) 112/88  SpO2:  [89 %-98 %] 98 %  on  Flow (L/min):  [3-4] 4;   Device (Oxygen Therapy): nasal cannula  Body mass index is 45.39 kg/m².     Physical Exam  Vitals and nursing note reviewed.   Constitutional:       Appearance: He is obese. He is ill-appearing.   Cardiovascular:      Rate and Rhythm: Normal rate. Rhythm irregular.      Pulses: Normal pulses.   Pulmonary:      Effort: Pulmonary effort is normal. No respiratory distress.      Comments: Diminished at bases bilaterally. On 4 L. Some scattered rhonchi in upper lobes.  Abdominal:      General: Bowel sounds are normal. There is distension (mild).      Palpations: Abdomen is soft.      Tenderness: There is no abdominal tenderness.      Comments: RUQ cholecystostomy drain in place with brownish drainage.   Musculoskeletal:         General: Swelling (2+ BLE) present. Normal range of motion.      Cervical back: Normal range of motion and neck supple.   Skin:     General: Skin is warm and dry.      Coloration: Skin is pale.      Findings: Bruising present.   Neurological:      Mental Status: He is alert and oriented to person, place, and time. Seems a little forgetful on exam.     Sensory: No sensory deficit.      Coordination: Coordination normal.   Psychiatric:         Mood and Affect: Mood normal.         Behavior: Behavior normal.     Results Review:       I reviewed the patient's new clinical  results.  Results from last 7 days   Lab Units 05/01/23 0412 04/30/23  0545 04/29/23  0458 04/28/23  0506   WBC 10*3/mm3 6.00 6.59 6.84 7.40   HEMOGLOBIN g/dL 12.4* 12.6* 12.4* 12.9*   PLATELETS 10*3/mm3 138* 155 181 190     Results from last 7 days   Lab Units 05/01/23 0412 04/30/23  0358 04/29/23  1659 04/29/23 0458 04/28/23  0506   SODIUM mmol/L 146* 142  --  147* 140   POTASSIUM mmol/L 3.9 4.8 3.7 3.0* 3.7   CHLORIDE mmol/L 102 102  --  100 98   CO2 mmol/L 40.0* 31.0*  --  42.7* 33.4*   BUN mg/dL 17 18  --  16 18   CREATININE mg/dL 0.86 0.79  --  0.77 0.81   GLUCOSE mg/dL 94 92  --  91 89   Estimated Creatinine Clearance: 73.6 mL/min (by C-G formula based on SCr of 0.86 mg/dL).  Results from last 7 days   Lab Units 05/01/23 0412 04/30/23 0358 04/28/23  0506 04/27/23  0648   ALBUMIN g/dL 3.2* 2.7* 2.4* 2.4*   BILIRUBIN mg/dL 0.4 0.4 0.6 0.5   ALK PHOS U/L 66 64 63 68   AST (SGOT) U/L 14 22 21 11   ALT (SGPT) U/L 16 15 17 19     Results from last 7 days   Lab Units 05/01/23 0412 04/30/23  0358 04/29/23  0458 04/28/23  0506 04/27/23  0648   CALCIUM mg/dL 8.6 8.2 8.7 8.5 9.5   ALBUMIN g/dL 3.2* 2.7*  --  2.4* 2.4*   MAGNESIUM mg/dL 1.8 1.8  --  2.0 2.1   PHOSPHORUS mg/dL  --   --   --  3.2 3.5     Results from last 7 days   Lab Units 04/28/23  0506 04/27/23  0648   PROCALCITONIN ng/mL 0.07 0.08   LACTATE mmol/L  --  1.4     No results found for: HGBA1C, POCGLU    amLODIPine, 10 mg, Oral, Q24H  apixaban, 5 mg, Oral, Q12H  budesonide-formoterol, 2 puff, Inhalation, BID - RT  ipratropium-albuterol, 3 mL, Nebulization, Q6H While Awake - RT  pantoprazole, 40 mg, Oral, Q AM  sodium chloride, 10 mL, Intravenous, Q12H      Pharmacy to dose vancomycin,     Diet: Cardiac Diets; Healthy Heart (2-3 Na+); Texture: Soft to Chew (NDD 3); Soft to Chew: Chopped Meat; Fluid Consistency: Thin (IDDSI 0)       Assessment/Plan     Active Hospital Problems    Diagnosis  POA   • **Acute on chronic respiratory failure with hypoxia and  hypercapnia [J96.21, J96.22]  Yes   • Hypernatremia [E87.0]  Unknown   • Vascular dementia without behavioral disturbance [F01.50]  Yes   • Pulmonary hypertension [I27.20]  Yes   • Bacteremia due to Enterococcus [R78.81, B95.2]  Yes   • New onset atrial fibrillation [I48.91]  Yes   • JENISE on CPAP [G47.33, Z99.89]  Not Applicable   • Restrictive lung disease [J98.4]  Yes   • Morbid obesity [E66.01]  Yes   • Hypertension [I10]  Yes      Resolved Hospital Problems   No resolved problems to display.     Hospital course to date:  Mr. Ptots is a 89 year old male who presented from a nursing facility for increased lethargy and unresponsiveness. He recently had a 20 day stay here and was discharged two days prior to this admission for acute on chronic respiratory failure that was complicated by acute cholecystitis. He did require ICU stay at that time for sedation with NIPPV. His respiratory status improved, but he developed Enterococcus bacteremia secondary to cholecystitis. He was seen by ID and general surgery but not felt to be a candidate for surgery so IR guided cholecystostomy tube was placed on 4/17 with plans to complete a 14 day course of vancomycin as echocardiogram was negative for vegetations. He did have altered mental status as well as new onset atrial fibrillation and was seen by cardiology and neurology during that stay. He was treated with oral beta blocker therapy and Eliquis for his atrial fibrillation. Neurology felt he had underlying vascular dementia with acute encephalopathy. His mentation was stable at discharge and he was planned for SNF for strengthening prior to returning home. Unfortunately, he became hypercapnic secondary to non-use versus mis-use of his NIPPV at the facility prompting re-admission to the hospital. He was admitted to the ICU for one night and hypercapnia corrected. [facility] asked to assume care out of unit.      • Acute on chronic respiratory failure: Appreciate pulmonology.  Reviewed Dr. Alcaraz's note which indicates patient was using his machine at the facility but oxygen saturations were low on this- home machine being use here to make sure proper function. He did get some IV Lasix on admission but currently stable and will diurese only as needed. Need to avoid contraction alkalosis to prevent reducing respiratory rate per pulm. He is on 4 L and repeat CXR did show some increased density in RLL. Unclear if this is related to effusion from katina drain or possibly some pneumonia from aspiration. VFSS performed 4/19 with recommendations for mech soft and thin. If any worsening in respiratory status, may need tighten modifications. Clinically, without signs of acute infection but has been on Vancomycin, which pulm feels is sufficient. Needs repeat chest imaging in 6-8 weeks to ensure resolution. Encourage IS.      • Bacteremia d/t Enterococcus: Repeat blood cultures negative thus far. Had some missed dosing of antibiotics at facility. ID and general surgery re-consulted given his cholecystostomy tube. GI symptoms appear resolved. Will need tube for 6-8 weeks per Dr. Gaytan. Plan to follow up with Dr. Strong. Continues on pharmacy to dose vancomycin through 4/30- completed. Will need repeat blood cultures x 2 one week after completing therapy per ID recs.     • Atrial fibrillation: With some rates as low as 40s this admission. Had known bradycardia and PVCs last admission but metoprolol added back at lower dosing prior to discharge. Monitor off beta-blocker for now but consider asking cardiology to see again if bradycardia persists. Monitor on telemetry. Continue Eliquis. HR/BP are both very stable today.     • Vascular dementia: Mental status appears stable. Monitor for any waxing/waning encephalopathy.     • HTN: BP good on most recent check.     • Hypernatremia: Sodium mildly elevated. Repeat labs in AM.     • Obesity: Complicating all the above.      Discussed with patient and nurse.      Also, called to update patient's daughter, Emilie. She is concerned about his peripheral edema as well as history of swelling related to Norvasc use. She reports he has always been on some type of diuretic therapy for this reason. Most recently on chlorthalidone. Did receive PRN dosing of Lasix this admission and last with improvement of his respiratory status. However, Dr. Alcaraz did recommend caution as to avoid worsening contraction alkalosis. Bicarb has fluctuated here between 35-40 range. I spoke with Dr. Nolan in regards to potential trial of low dose diuretic given concerns for worsening contraction alkalosis and he is okay with diuretics at this time. I will begin Lasix 20 mg daily and repeat labs in AM. Hopefully, small dose will help with peripheral edema as well as oxygen requirements without shifting his bicarb too much. Follow up labs in AM.      VTE Prophylaxis - Eliquis (home med)  Code Status - No CPR/intubation.  Disposition - Anticipate discharge to SNF in the next couple days. Pending his respiratory status/clearance from pulm.     DANNIE Ramon  [facility]  05/01/23  10:30 EDT

## 2023-05-02 LAB
ANION GAP SERPL CALCULATED.3IONS-SCNC: 8.2 MMOL/L (ref 5–15)
BUN SERPL-MCNC: 17 MG/DL (ref 8–23)
BUN/CREAT SERPL: 19.3 (ref 7–25)
CALCIUM SPEC-SCNC: 8.5 MG/DL (ref 8.2–9.6)
CHLORIDE SERPL-SCNC: 103 MMOL/L (ref 98–107)
CO2 SERPL-SCNC: 36.8 MMOL/L (ref 22–29)
CREAT SERPL-MCNC: 0.88 MG/DL (ref 0.76–1.27)
DEPRECATED RDW RBC AUTO: 44.5 FL (ref 37–54)
EGFRCR SERPLBLD CKD-EPI 2021: 80.7 ML/MIN/1.73
ERYTHROCYTE [DISTWIDTH] IN BLOOD BY AUTOMATED COUNT: 13.3 % (ref 12.3–15.4)
GLUCOSE SERPL-MCNC: 95 MG/DL (ref 65–99)
HCT VFR BLD AUTO: 35.1 % (ref 37.5–51)
HGB BLD-MCNC: 11.4 G/DL (ref 13–17.7)
MCH RBC QN AUTO: 29.6 PG (ref 26.6–33)
MCHC RBC AUTO-ENTMCNC: 32.5 G/DL (ref 31.5–35.7)
MCV RBC AUTO: 91.2 FL (ref 79–97)
PLATELET # BLD AUTO: 114 10*3/MM3 (ref 140–450)
PMV BLD AUTO: 11.3 FL (ref 6–12)
POTASSIUM SERPL-SCNC: 4.1 MMOL/L (ref 3.5–5.2)
RBC # BLD AUTO: 3.85 10*6/MM3 (ref 4.14–5.8)
SODIUM SERPL-SCNC: 148 MMOL/L (ref 136–145)
WBC NRBC COR # BLD: 5.1 10*3/MM3 (ref 3.4–10.8)

## 2023-05-02 PROCEDURE — 94799 UNLISTED PULMONARY SVC/PX: CPT

## 2023-05-02 PROCEDURE — 94760 N-INVAS EAR/PLS OXIMETRY 1: CPT

## 2023-05-02 PROCEDURE — 94664 DEMO&/EVAL PT USE INHALER: CPT

## 2023-05-02 PROCEDURE — 94761 N-INVAS EAR/PLS OXIMETRY MLT: CPT

## 2023-05-02 PROCEDURE — 97110 THERAPEUTIC EXERCISES: CPT

## 2023-05-02 PROCEDURE — 97530 THERAPEUTIC ACTIVITIES: CPT

## 2023-05-02 PROCEDURE — 85027 COMPLETE CBC AUTOMATED: CPT | Performed by: NURSE PRACTITIONER

## 2023-05-02 PROCEDURE — 80048 BASIC METABOLIC PNL TOTAL CA: CPT | Performed by: NURSE PRACTITIONER

## 2023-05-02 RX ADMIN — IPRATROPIUM BROMIDE AND ALBUTEROL SULFATE 3 ML: .5; 3 SOLUTION RESPIRATORY (INHALATION) at 11:53

## 2023-05-02 RX ADMIN — APIXABAN 5 MG: 5 TABLET, FILM COATED ORAL at 09:12

## 2023-05-02 RX ADMIN — FUROSEMIDE 20 MG: 20 TABLET ORAL at 09:12

## 2023-05-02 RX ADMIN — AMLODIPINE BESYLATE 10 MG: 10 TABLET ORAL at 09:12

## 2023-05-02 RX ADMIN — PANTOPRAZOLE SODIUM 40 MG: 40 TABLET, DELAYED RELEASE ORAL at 06:26

## 2023-05-02 RX ADMIN — BUDESONIDE AND FORMOTEROL FUMARATE DIHYDRATE 2 PUFF: 160; 4.5 AEROSOL RESPIRATORY (INHALATION) at 19:48

## 2023-05-02 RX ADMIN — BUDESONIDE AND FORMOTEROL FUMARATE DIHYDRATE 2 PUFF: 160; 4.5 AEROSOL RESPIRATORY (INHALATION) at 07:28

## 2023-05-02 RX ADMIN — APIXABAN 5 MG: 5 TABLET, FILM COATED ORAL at 20:21

## 2023-05-02 RX ADMIN — Medication 10 ML: at 09:12

## 2023-05-02 NOTE — PLAN OF CARE
Goal Outcome Evaluation:  Plan of Care Reviewed With: patient        Progress: improving  Outcome Evaluation: Pt tolerated treatment fair this date. Limited overall d/t weakness/fatigue, though pt giving as much effort as possible. Required min-mod A for supine to sit, then pt attempted standing 3x. Required max-dep A x2 to stand, only partially clearing bottom off bed twice w/ bed elevated as well. O2 sats dropped w/ activity d/t pt holding breath w/ big movements. Pt returned to supine w/ max-dep A x2. Encouraged pt to attempt a few simple LE exercises in bed throughout the day.

## 2023-05-02 NOTE — THERAPY TREATMENT NOTE
Patient Name: Harry Potts  : 1930    MRN: [id number]                              Today's Date: 2023       Admit Date: 2023    Visit Dx:     ICD-10-CM ICD-9-CM   1. Acute on chronic respiratory failure with hypoxia and hypercapnia  J96.21 518.84    J96.22 786.09     799.02   2. Altered mental status, unspecified altered mental status type  R41.82 780.97   3. Bacteremia due to Enterococcus  R78.81 790.7    B95.2 041.04     Patient Active Problem List   Diagnosis   • Dysfunction of eustachian tube   • Gastroesophageal reflux disease   • Hyperglycemia   • Hypercholesterolemia   • Hypertension   • Morbid obesity   • Osteoarthritis of lumbar spine with myelopathy   • Osteoarthritis of lumbar spine   • Bronchitis   • Viral URI with cough   • PVC (premature ventricular contraction)   • Non-traumatic rhabdomyolysis   • Obesity (BMI 30-39.9)   • Chronic low back pain   • Weakness   • Edema, bilateral lower extremities   • Restrictive lung disease   • Irregularly irregular cardiac rhythm   • JENISE on CPAP   • Medicare annual wellness visit, subsequent   • Hypoxia   • Acute on chronic respiratory failure with hypoxia and hypercapnia   • Acute cholecystitis   • New onset atrial fibrillation   • Bacteremia due to Enterococcus   • Pulmonary hypertension   • Metabolic encephalopathy   • Vascular dementia without behavioral disturbance   • Dysphagia   • Anemia   • Hypernatremia     Past Medical History:   Diagnosis Date   • Arthritis    • Asthma     Oxygen at home   • Atrial fibrillation 2023   • Cancer     basal cell    • COPD (chronic obstructive pulmonary disease)    • Difficulty walking    • Elevated cholesterol    • Environmental allergies    • GERD (gastroesophageal reflux disease)    • HL (hearing loss)    • Hyperglycemia    • Hyperlipidemia    • Hypertension    • Obesity    • Pulmonary hypertension 2023   • Sleep apnea    • Spondylolysis, lumbar region      Past Surgical History:    Procedure Laterality Date   • CARDIAC CATHETERIZATION  circa 2008    [facility]   • EYE SURGERY     • HERNIA REPAIR     • JOINT REPLACEMENT     • REPLACEMENT TOTAL KNEE BILATERAL        General Information     Row Name 05/02/23 1014          Physical Therapy Time and Intention    Document Type therapy note (daily note)  -     Mode of Treatment physical therapy  -     Row Name 05/02/23 1014          General Information    Existing Precautions/Restrictions fall;oxygen therapy device and L/min  -     Row Name 05/02/23 1014          Cognition    Orientation Status (Cognition) oriented x 3  -           User Key  (r) = Recorded By, (t) = Taken By, (c) = Cosigned By    Initials Name Provider Type     Ilana Malcolm PTA Physical Therapist Assistant               Mobility     Row Name 05/02/23 1014          Bed Mobility    Bed Mobility supine-sit;sit-supine  -     Rolling Left [place] (Bed Mobility) moderate assist (50% patient effort)  -     Rolling Right [place] (Bed Mobility) moderate assist (50% patient effort)  -     Supine-Sit [place] (Bed Mobility) minimum assist (75% patient effort);moderate assist (50% patient effort)  -     Sit-Supine [place] (Bed Mobility) maximum assist (25% patient effort);2 person assist  -     Assistive Device (Bed Mobility) bed rails;head of bed elevated  -     Row Name 05/02/23 1014          Sit-Stand Transfer    Sit-Stand [place] (Transfers) maximum assist (25% patient effort);dependent (less than 25% patient effort);2 person assist  -     Assistive Device (Sit-Stand Transfers) --  A  -     Comment, (Sit-Stand Transfer) 3 attempts to stand, w/ cues for hand placement and to lean forward; partially clearred bottom off bed 2/3x  -           User Key  (r) = Recorded By, (t) = Taken By, (c) = Cosigned By    Initials Name Provider Type     Ilana Malcolm PTA Physical Therapist Assistant               Obj/Interventions      Row Name 05/02/23 1017          Motor Skills    Therapeutic Exercise --  seated AP and LAQ x10 reps  -           User Key  (r) = Recorded By, (t) = Taken By, (c) = Cosigned By    Initials Name Provider Type    Ilana Hill PTA Physical Therapist Assistant               Goals/Plan    No documentation.                Clinical Impression     Row Name 05/02/23 1017          Pain    Pretreatment Pain Rating 0/10 - no pain  -SM     Posttreatment Pain Rating 0/10 - no pain  -SM     Row Name 05/02/23 1017          Positioning and Restraints    Pre-Treatment Position in bed  -SM     Post Treatment Position bed  -SM     In Bed supine;call light within reach;encouraged to call for assist;exit alarm on  -SM           User Key  (r) = Recorded By, (t) = Taken By, (c) = Cosigned By    Initials Name Provider Type    Ilana Hill PTA Physical Therapist Assistant               Outcome Measures     Row Name 05/02/23 1018          How much help from another person do you currently need...    Turning from your back to your side while in flat bed without using bedrails? 2  -SM     Moving from lying on back to sitting on the side of a flat bed without bedrails? 2  -SM     Moving to and from a bed to a chair (including a wheelchair)? 1  -SM     Standing up from a chair using your arms (e.g., wheelchair, bedside chair)? 2  -SM     Climbing 3-5 steps with a railing? 1  -SM     To walk in hospital room? 1  -SM     AM-PAC 6 Clicks Score (PT) 9  -SM     Highest level of mobility 3 --> Sat at edge of bed  -     Row Name 05/02/23 1018          Functional Assessment    Outcome Measure Options AM-PAC 6 Clicks Basic Mobility (PT)  -SM           User Key  (r) = Recorded By, (t) = Taken By, (c) = Cosigned By    Initials Name Provider Type    Ilana Hill PTA Physical Therapist Assistant                             Physical Therapy Education     Title: PT OT SLP Therapies (In Progress)     Topic: Physical Therapy  (Done)     Point: Mobility training (Done)     Learning Progress Summary           Patient Acceptance, E,TB,D, VU,NR by  at 5/2/2023 1018    Acceptance, E,D, VU by EB at 5/1/2023 1344    Acceptance, E,TB,D, VU,NR by  at 4/29/2023 1708    Acceptance, E,D, VU,NR by EB at 4/28/2023 1035    Acceptance, E,D, DU,NR by PC at 4/27/2023 1711                   Point: Home exercise program (Done)     Learning Progress Summary           Patient Acceptance, E,TB,D, VU,NR by  at 5/2/2023 1018    Acceptance, E,D, VU by EB at 5/1/2023 1344    Acceptance, E,TB,D, VU,NR by  at 4/29/2023 1708    Acceptance, E,D, VU,NR by EB at 4/28/2023 1035    Acceptance, E,D, DU,NR by PC at 4/27/2023 1711                   Point: Body mechanics (Done)     Learning Progress Summary           Patient Acceptance, E,TB,D, VU,NR by  at 5/2/2023 1018    Acceptance, E,D, VU by EB at 5/1/2023 1344    Acceptance, E,TB,D, VU,NR by  at 4/29/2023 1708    Acceptance, E,D, VU,NR by EB at 4/28/2023 1035    Acceptance, E,D, DU,NR by PC at 4/27/2023 1711                   Point: Precautions (Done)     Learning Progress Summary           Patient Acceptance, E,TB,D, VU,NR by  at 5/2/2023 1018    Acceptance, E,D, VU by EB at 5/1/2023 1344    Acceptance, E,TB,D, VU,NR by  at 4/29/2023 1708    Acceptance, E,D, VU,NR by EB at 4/28/2023 1035    Acceptance, E,D, DU,NR by PC at 4/27/2023 1711                               User Key     Initials Effective Dates Name Provider Type Discipline     06/16/21 -  Heidi Porras PT Physical Therapist PT     03/07/18 -  Ilana Malcolm, PTA Physical Therapist Assistant PT    EB 02/14/23 -  Edith Macias PTA Physical Therapist Assistant PT              PT Recommendation and Plan     Plan of Care Reviewed With: patient  Progress: improving  Outcome Evaluation: Pt tolerated treatment fair this date. Limited overall d/t weakness/fatigue, though pt giving as much effort as possible. Required min-mod A for supine  to sit, then pt attempted standing 3x. Required max-dep A x2 to stand, only partially clearing bottom off bed twice w/ bed elevated as well. O2 sats dropped w/ activity d/t pt holding breath w/ big movements. Pt returned to supine w/ max-dep A x2. Encouraged pt to attempt a few simple LE exercises in bed throughout the day.     Time Calculation:    PT Charges     Row Name 05/02/23 1022             Time Calculation    Start Time 0935  -      Stop Time 1004  -      Time Calculation (min) 29 min  -      PT Received On 05/02/23  -      PT - Next Appointment 05/03/23  -            User Key  (r) = Recorded By, (t) = Taken By, (c) = Cosigned By    Initials Name Provider Type     Ilana Malcolm PTA Physical Therapist Assistant              Therapy Charges for Today     Code Description Service Date Service Provider Modifiers Qty    13137039895 HC PT THER PROC EA 15 MIN 5/2/2023 Ilana Malcolm PTA GP 1    71252499019 HC PT THERAPEUTIC ACT EA 15 MIN 5/2/2023 Ilana Malcolm PTA GP 1    28208127004 HC PT THER SUPP EA 15 MIN 5/2/2023 Ilana Malcolm PTA GP 2          PT G-Codes  Outcome Measure Options: AM-PAC 6 Clicks Basic Mobility (PT)  AM-PAC 6 Clicks Score (PT): 9  AM-PAC 6 Clicks Score (OT): 12  PT Discharge Summary  Anticipated Discharge Disposition (PT): skilled nursing facility    Ilana Malcolm PTA  5/2/2023

## 2023-05-02 NOTE — PLAN OF CARE
Goal Outcome Evaluation:  Plan of Care Reviewed With: patient, daughter      VSS-pt had quiet day  Plan for transfer to nursing facility today  Received prune juice per his request  Safety maintained

## 2023-05-02 NOTE — PROGRESS NOTES
Name: Harry Potts ADMIT: 2023   : 1930  PCP: Harry Luis MD    MRN: [id number] LOS: 6 days   AGE/SEX: 89 y.o. male  ROOM: [facility]/     Subjective   Subjective   Resting in bed. Just worked with PT. PT states he did fairly well- standing but not ambulating. Does have some de-saturation with activity but recovers quickly. He denies any current dyspnea at rest. He remains on 3-4 L. He denies any nausea, vomiting, chest pain or abdominal pain. He is asking for ice water. Nursing denies any issues today.     Objective   Objective   Vital Signs  Temp:  [97 °F (36.1 °C)-98.8 °F (37.1 °C)] 98.3 °F (36.8 °C)  Heart Rate:  [60-82] 60  Resp:  [18-20] 20  BP: (110-151)/(65-94) 151/94  SpO2:  [90 %-95 %] 94 %  on  Flow (L/min):  [4] 4;   Device (Oxygen Therapy): nasal cannula  Body mass index is 45.59 kg/m².     Physical Exam  Vitals and nursing note reviewed.   Constitutional:       Appearance: He is obese. He is ill-appearing.   Cardiovascular:      Rate and Rhythm: Normal rate. Rhythm irregular.      Pulses: Normal pulses.   Pulmonary:      Effort: Pulmonary effort is normal. No respiratory distress.      Comments: Diminished at bases bilaterally. On 4 L.   Abdominal:      General: Bowel sounds are normal. There is distension (mild).      Palpations: Abdomen is soft.      Tenderness: There is no abdominal tenderness.      Comments: RUQ cholecystostomy drain in place with brownish drainage.   Musculoskeletal:         General: Swelling (2+ BLE/mild 1+ BUE) present. Normal range of motion.      Cervical back: Normal range of motion and neck supple.   Skin:     General: Skin is warm and dry.      Coloration: Skin is pale.      Findings: Bruising present.   Neurological:      Mental Status: He is alert and oriented to person, place, and time.      Sensory: No sensory deficit.      Coordination: Coordination normal.   Psychiatric:         Mood and Affect: Mood normal.         Behavior:  Behavior normal.     Results Review:       I reviewed the patient's new clinical results.  Results from last 7 days   Lab Units 05/02/23 0433 05/01/23 0412 04/30/23  0545 04/29/23  0458   WBC 10*3/mm3 5.10 6.00 6.59 6.84   HEMOGLOBIN g/dL 11.4* 12.4* 12.6* 12.4*   PLATELETS 10*3/mm3 114* 138* 155 181     Results from last 7 days   Lab Units 05/02/23 0433 05/01/23 0412 04/30/23  0358 04/29/23  1659 04/29/23  0458   SODIUM mmol/L 148* 146* 142  --  147*   POTASSIUM mmol/L 4.1 3.9 4.8 3.7 3.0*   CHLORIDE mmol/L 103 102 102  --  100   CO2 mmol/L 36.8* 40.0* 31.0*  --  42.7*   BUN mg/dL 17 17 18  --  16   CREATININE mg/dL 0.88 0.86 0.79  --  0.77   GLUCOSE mg/dL 95 94 92  --  91   Estimated Creatinine Clearance: 72.3 mL/min (by C-G formula based on SCr of 0.88 mg/dL).  Results from last 7 days   Lab Units 05/01/23 0412 04/30/23 0358 04/28/23  0506 04/27/23  0648   ALBUMIN g/dL 3.2* 2.7* 2.4* 2.4*   BILIRUBIN mg/dL 0.4 0.4 0.6 0.5   ALK PHOS U/L 66 64 63 68   AST (SGOT) U/L 14 22 21 11   ALT (SGPT) U/L 16 15 17 19     Results from last 7 days   Lab Units 05/02/23 0433 05/01/23 0412 04/30/23  0358 04/29/23  0458 04/28/23  0506 04/27/23  0648   CALCIUM mg/dL 8.5 8.6 8.2 8.7 8.5 9.5   ALBUMIN g/dL  --  3.2* 2.7*  --  2.4* 2.4*   MAGNESIUM mg/dL  --  1.8 1.8  --  2.0 2.1   PHOSPHORUS mg/dL  --   --   --   --  3.2 3.5     Results from last 7 days   Lab Units 04/28/23  0506 04/27/23  0648   PROCALCITONIN ng/mL 0.07 0.08   LACTATE mmol/L  --  1.4     No results found for: HGBA1C, POCGLU    amLODIPine, 10 mg, Oral, Q24H  apixaban, 5 mg, Oral, Q12H  budesonide-formoterol, 2 puff, Inhalation, BID - RT  furosemide, 20 mg, Oral, Daily  ipratropium-albuterol, 3 mL, Nebulization, Q6H While Awake - RT  pantoprazole, 40 mg, Oral, Q AM  sodium chloride, 10 mL, Intravenous, Q12H       Diet: Cardiac Diets; Healthy Heart (2-3 Na+); Texture: Soft to Chew (NDD 3); Soft to Chew: Chopped Meat; Fluid Consistency: Thin (IDDSI 0)        Assessment/Plan     Active Hospital Problems    Diagnosis  POA   • **Acute on chronic respiratory failure with hypoxia and hypercapnia [J96.21, J96.22]  Yes   • Hypernatremia [E87.0]  Unknown   • Vascular dementia without behavioral disturbance [F01.50]  Yes   • Pulmonary hypertension [I27.20]  Yes   • Bacteremia due to Enterococcus [R78.81, B95.2]  Yes   • New onset atrial fibrillation [I48.91]  Yes   • JENISE on CPAP [G47.33, Z99.89]  Not Applicable   • Restrictive lung disease [J98.4]  Yes   • Morbid obesity [E66.01]  Yes   • Hypertension [I10]  Yes      Resolved Hospital Problems   No resolved problems to display.     Hospital course to date:  Mr. Potts is a 89 year old male who presented from a nursing facility for increased lethargy and unresponsiveness. He recently had a 20 day stay here and was discharged two days prior to this admission for acute on chronic respiratory failure that was complicated by acute cholecystitis. He did require ICU stay at that time for sedation with NIPPV. His respiratory status improved, but he developed Enterococcus bacteremia secondary to cholecystitis. He was seen by ID and general surgery but not felt to be a candidate for surgery so IR guided cholecystostomy tube was placed on 4/17 with plans to complete a 14 day course of vancomycin as echocardiogram was negative for vegetations. He did have altered mental status as well as new onset atrial fibrillation and was seen by cardiology and neurology during that stay. He was treated with oral beta blocker therapy and Eliquis for his atrial fibrillation. Neurology felt he had underlying vascular dementia with acute encephalopathy. His mentation was stable at discharge and he was planned for SNF for strengthening prior to returning home. Unfortunately, he became hypercapnic secondary to non-use versus mis-use of his NIPPV at the facility prompting re-admission to the hospital. He was admitted to the ICU for one night and hypercapnia  corrected. [facility] asked to assume care out of unit.      • Acute on chronic respiratory failure: Appreciate pulmonology. Reviewed Dr. Alcaraz's note which indicates patient was using his machine at the facility but oxygen saturations were low on this- home machine being use here to make sure proper function. He did get some IV Lasix on admission but currently stable and will diurese only as needed. Need to avoid contraction alkalosis to prevent reducing respiratory rate per pulm. He is on 4 L and repeat CXR did show some increased density in RLL. Unclear if this is related to effusion from katina drain or possibly some pneumonia from aspiration. VFSS performed 4/19 with recommendations for mech soft and thin. If any worsening in respiratory status, may need tighten modifications. Clinically, without signs of acute infection but has been on Vancomycin, which pulm feels is sufficient. Needs repeat chest imaging in 6-8 weeks to ensure resolution. Encourage IS. Low-dose Lasix started 5/1 d/t peripheral edema in the setting of Norvasc use. Dr. Nolan was okay with this and bicarb today is actually a little lower than yesterday.     • Bacteremia d/t Enterococcus: Repeat blood cultures negative thus far. Had some missed dosing of antibiotics at facility. ID and general surgery re-consulted given his cholecystostomy tube. GI symptoms appear resolved. Will need tube for 6-8 weeks per Dr. Gaytan. Plan to follow up with Dr. Strong. Continues on pharmacy to dose vancomycin through 4/30- completed. Will need repeat blood cultures x 2 one week after completing therapy per ID recs.     • Atrial fibrillation: With some rates as low as 40s this admission. Had known bradycardia and PVCs last admission but metoprolol added back at lower dosing prior to discharge. Monitor off beta-blocker for now but consider asking cardiology to see again if bradycardia persists. Monitor on telemetry. Continue Eliquis. HR/BP are both very stable  today.     • Vascular dementia: Mental status appears stable. Monitor for any waxing/waning encephalopathy.     • HTN: BP good on most recent check.     • Hypernatremia: Sodium mildly elevated which has been fairly persistent. On Lasix as above. Encouraged free water intake with meals.     • Obesity: Complicating all the above.      Discussed with patient and nurse.    Also, called to update patient's daughter, Emilie. She states they have reached out to [facility] for possible sitter at facility to ensure proper compliance with machine to avoid re-hospitalization. Plans to meet with them tomorrow morning. Will keep inpatient and plan to discharge tomorrow. [facility] was able to post-pone precert as well as save bed at facility. [facility] indicates that sitter with [facility] should be able to accommodate patient tomorrow. Tentative discharge tomorrow if no changes and okay with pulmonology.     VTE Prophylaxis - Eliquis (home med)  Code Status - No CPR/intubation.  Disposition - Anticipate discharge to SNF as above.    DANNIE Ramon  [facility]  05/02/23  10:13 EDT

## 2023-05-02 NOTE — PROGRESS NOTES
[facility]  [phone]  Dr. Calvin Nolan    Subjective:  LOS: 6    Chief Complaint: Chronic respiratory failure     Patient has a astral device by [employer].   for 60+ years.  Always had wheezing.  Suspect chronic asthma with now fixed obstructive lung disease and concomitant restrictive lung disease.  Awake alert and with family members at bedside.  On low-flow oxygen and tolerating NIV machine at night.  Tolerated addition of diuretics with no problems and labs ordered mental alertness.    Objective   Vital Signs past 24hrs  Temp range: Temp (24hrs), Av °F (36.7 °C), Min:97 °F (36.1 °C), Max:98.8 °F (37.1 °C)    BP range: BP: (110-151)/(68-94) 150/77  Pulse range: Heart Rate:  [60-90] 90  Resp rate range: Resp:  [18-20] 18  Device (Oxygen Therapy): nasal cannulaFlow (L/min):  [4] 4  Oxygen range:SpO2:  [90 %-98 %] 93 %     Physical Exam  Constitutional:       General: He is sleeping.      Appearance: He is obese.   Cardiovascular:      Rate and Rhythm: Normal rate and regular rhythm.      Heart sounds: Normal heart sounds. No murmur heard.  Pulmonary:      Effort: Pulmonary effort is normal.      Breath sounds: Normal breath sounds.   Abdominal:      General: Bowel sounds are normal.      Palpations: Abdomen is soft. There is no mass.      Tenderness: There is no abdominal tenderness.      Comments: Cholecystostomy tube right upper quadrant   Musculoskeletal:         General: Swelling present.       Results Review:    I have reviewed the laboratory and imaging data since the last note by [facility] physician.  My annotations are noted in assessment and plan.      Result Review:  I have personally reviewed the results from last note by [facility] physician to 2023 15:10 EDT and agree with these findings:  [x]  Laboratory list / accordion  [x]  Microbiology  [x]  Radiology  []  EKG/Telemetry   []  Cardiology/Vascular   []  Pathology  []  Old records  []  Other:    Medication Review:  I have  reviewed the current MAR.  My annotations are noted in assessment and plan.    amLODIPine, 10 mg, Oral, Q24H  apixaban, 5 mg, Oral, Q12H  budesonide-formoterol, 2 puff, Inhalation, BID - RT  furosemide, 20 mg, Oral, Daily  ipratropium-albuterol, 3 mL, Nebulization, Q6H While Awake - RT  pantoprazole, 40 mg, Oral, Q AM  sodium chloride, 10 mL, Intravenous, Q12H           Lines, Drains & Airways     Active LDAs     Name Placement date Placement time Site Days    Midline Catheter - Single Lumen 04/24/23 Left Basilic 04/24/23  1620  -- 4    Biliary Tube RUQ --  --  RUQ  --    External Urinary Catheter 04/27/23 2030  --  1              No active isolations  Diet Orders (active) (From admission, onward)     Start     Ordered    04/26/23 2335  Diet: Cardiac Diets; Healthy Heart (2-3 Na+); Texture: Soft to Chew (NDD 3); Soft to Chew: Chopped Meat; Fluid Consistency: Thin (IDDSI 0)  Diet Effective Now         04/26/23 2336                PCCM Problems  Acute on chronic respiratory failure with hypoxia and hypercapnia  Morbid obesity  JENISE on CPAP  Restrictive lung disease  Right lower lobe infiltrate increased on current chest x-ray  Dysphagia  Relevant Medical Diagnoses  New onset A-fib  Enterococcal bacteremia  Acute cholecystitis status post percutaneous cholecystostomy drain in place since 4/18/2023  Vascular dementia  Mild pulmonary hypertension      Plan of Treatment    Continue with [employer] astral device.  Current minimum EPAP is 10 with maximum EPAP 15, pressure support range 10-20. Current settings appear to be appropriate.  He will need to continue use nightly and as needed.  Discussed with spouse at bedside.      On antibiotics per ID for Enterococcus bacteremia and with cholecystostomy drain in place per general surgery.    Chest x-ray shows mild increased infiltrate in the right lower lobe.  This could be a sympathetic effusion from the cholecystostomy drain and recent acute cholecystitis.  Patient does not have  a white count or a new fever.  Current antibiotics should be sufficient.  Follow-up chest imaging would be of help to ensure clearing of the right lower lobe infiltrate and this can be done in 8 weeks or so.    Aspiration cannot be ruled out based on increased infiltrate in the right lower lobe.  His last speech evaluation on 4/19/2023 is as follows.  VFSS completed patient demonstrates normal oropharyngeal swallow. Recommend upgrade to mechanical soft chopped and thin liquids. Meds whole as tolerated and small bites and sips.  He remains on a modified consistency diet.    Can continue with present dose diuretics.  No objections to discharge to [facility].    Calvin Nolan MD  05/02/23  15:10 EDT      Part of this note may be an electronic transcription/translation of spoken language to printed text using the Dragon Dictation System.

## 2023-05-03 VITALS
BODY MASS INDEX: 45.67 KG/M2 | HEIGHT: 68 IN | OXYGEN SATURATION: 95 % | HEART RATE: 73 BPM | SYSTOLIC BLOOD PRESSURE: 139 MMHG | DIASTOLIC BLOOD PRESSURE: 85 MMHG | WEIGHT: 301.37 LBS | TEMPERATURE: 98.3 F | RESPIRATION RATE: 17 BRPM

## 2023-05-03 PROBLEM — E87.0 HYPERNATREMIA: Status: RESOLVED | Noted: 2023-04-27 | Resolved: 2023-05-03

## 2023-05-03 PROBLEM — B95.2 BACTEREMIA DUE TO ENTEROCOCCUS: Status: RESOLVED | Noted: 2023-04-20 | Resolved: 2023-05-03

## 2023-05-03 PROBLEM — R78.81 BACTEREMIA DUE TO ENTEROCOCCUS: Status: RESOLVED | Noted: 2023-04-20 | Resolved: 2023-05-03

## 2023-05-03 LAB
ANION GAP SERPL CALCULATED.3IONS-SCNC: 10.3 MMOL/L (ref 5–15)
BUN SERPL-MCNC: 15 MG/DL (ref 8–23)
BUN/CREAT SERPL: 18.8 (ref 7–25)
CALCIUM SPEC-SCNC: 8.8 MG/DL (ref 8.2–9.6)
CHLORIDE SERPL-SCNC: 99 MMOL/L (ref 98–107)
CO2 SERPL-SCNC: 35.7 MMOL/L (ref 22–29)
CREAT SERPL-MCNC: 0.8 MG/DL (ref 0.76–1.27)
DEPRECATED RDW RBC AUTO: 43.2 FL (ref 37–54)
EGFRCR SERPLBLD CKD-EPI 2021: 83 ML/MIN/1.73
ERYTHROCYTE [DISTWIDTH] IN BLOOD BY AUTOMATED COUNT: 13.3 % (ref 12.3–15.4)
GLUCOSE SERPL-MCNC: 86 MG/DL (ref 65–99)
HCT VFR BLD AUTO: 37.3 % (ref 37.5–51)
HGB BLD-MCNC: 12.1 G/DL (ref 13–17.7)
MCH RBC QN AUTO: 28.9 PG (ref 26.6–33)
MCHC RBC AUTO-ENTMCNC: 32.4 G/DL (ref 31.5–35.7)
MCV RBC AUTO: 89.2 FL (ref 79–97)
PLATELET # BLD AUTO: 116 10*3/MM3 (ref 140–450)
PMV BLD AUTO: 11.2 FL (ref 6–12)
POTASSIUM SERPL-SCNC: 3.4 MMOL/L (ref 3.5–5.2)
RBC # BLD AUTO: 4.18 10*6/MM3 (ref 4.14–5.8)
SODIUM SERPL-SCNC: 145 MMOL/L (ref 136–145)
WBC NRBC COR # BLD: 4.89 10*3/MM3 (ref 3.4–10.8)

## 2023-05-03 PROCEDURE — 94761 N-INVAS EAR/PLS OXIMETRY MLT: CPT

## 2023-05-03 PROCEDURE — 94799 UNLISTED PULMONARY SVC/PX: CPT

## 2023-05-03 PROCEDURE — 94664 DEMO&/EVAL PT USE INHALER: CPT

## 2023-05-03 PROCEDURE — 80048 BASIC METABOLIC PNL TOTAL CA: CPT | Performed by: NURSE PRACTITIONER

## 2023-05-03 PROCEDURE — 85027 COMPLETE CBC AUTOMATED: CPT | Performed by: NURSE PRACTITIONER

## 2023-05-03 RX ORDER — POTASSIUM CHLORIDE 20 MEQ/1
10 TABLET, EXTENDED RELEASE ORAL DAILY
Start: 2023-05-03

## 2023-05-03 RX ORDER — AMLODIPINE BESYLATE 10 MG/1
10 TABLET ORAL DAILY
Qty: 90 TABLET | Refills: 3
Start: 2023-05-03

## 2023-05-03 RX ORDER — FUROSEMIDE 20 MG/1
20 TABLET ORAL DAILY
Start: 2023-05-04

## 2023-05-03 RX ADMIN — APIXABAN 5 MG: 5 TABLET, FILM COATED ORAL at 08:32

## 2023-05-03 RX ADMIN — AMLODIPINE BESYLATE 10 MG: 10 TABLET ORAL at 08:32

## 2023-05-03 RX ADMIN — BUDESONIDE AND FORMOTEROL FUMARATE DIHYDRATE 2 PUFF: 160; 4.5 AEROSOL RESPIRATORY (INHALATION) at 07:09

## 2023-05-03 RX ADMIN — Medication 10 ML: at 08:32

## 2023-05-03 RX ADMIN — PANTOPRAZOLE SODIUM 40 MG: 40 TABLET, DELAYED RELEASE ORAL at 06:16

## 2023-05-03 RX ADMIN — FUROSEMIDE 20 MG: 20 TABLET ORAL at 08:32

## 2023-05-03 NOTE — DISCHARGE PLACEMENT REQUEST
"Saji Potts (89 y.o. Male)     Date of Birth   11/14/1930    Social Security Number       Address   [address] [address]    Home Phone   [phone]    MRN   [id number]       [facility]    Marital Status                               Admission Date   4/26/23    Admission Type   Emergency    Admitting Provider   Blake Alcaraz MD    Attending Provider   Bladimir Ashley MD    Department, Room/Bed   [address], [address]       Discharge Date       Discharge Disposition   Skilled Nursing Facility (DC - External)    Discharge Destination                               Attending Provider: Bladimir Ashley MD    Allergies: Morphine And Related, Atorvastatin, Dexmedetomidine    Isolation: None   Infection: None   Code Status: No CPR    Ht: 172.7 cm (68\")   Wt: 137 kg (301 lb 5.9 oz)    Admission Cmt: None   Principal Problem: Acute on chronic respiratory failure with hypoxia and hypercapnia [J96.21,J96.22]                 Active Insurance as of 4/26/2023     Primary Coverage     Payor Plan Insurance Group Employer/Plan Group    [facility] MEDICARE REPLACEMENT [facility] MEDICARE REPLACEMENT [id number]     Payor Plan Address Payor Plan Phone Number Payor Plan Fax Number Effective Dates    [address] [address]   1/1/2016 - None Entered    [place] [facility] [id number]       Subscriber Name Subscriber Birth Date Member ID       SAJI POTTS 11/14/1930 [id number]                 Emergency Contacts      (Rel.) Home Phone Work Phone Mobile Phone    Venita Potts [facility] (Other) [phone] -- --    TRAMAINE SAUCEDO [facility] (Daughter) [phone] -- --                 Discharge Summary      Jennifer Mercado, APRN at 05/03/23 1108                          [place] [facility]    Date of Admission: 4/26/2023  Date of Discharge:  5/3/2023    PCP: Saji Luis MD    Discharge Diagnosis:   Active Hospital Problems    Diagnosis "  POA   • **Acute on chronic respiratory failure with hypoxia and hypercapnia [J96.21, J96.22]  Yes   • Vascular dementia without behavioral disturbance [F01.50]  Yes   • Pulmonary hypertension [I27.20]  Yes   • New onset atrial fibrillation [I48.91]  Yes   • JENISE on CPAP [G47.33, Z99.89]  Not Applicable   • Restrictive lung disease [J98.4]  Yes   • Morbid obesity [E66.01]  Yes   • Hypertension [I10]  Yes      Resolved Hospital Problems    Diagnosis Date Resolved POA   • Hypernatremia [E87.0] 05/03/2023 Unknown   • Bacteremia due to Enterococcus [R78.81, B95.2] 05/03/2023 Yes     Procedures Performed  none     Consults  Inpatient consult to general surgery  Inpatient consult to infectious disease  Inpatient consult to pulmonology    Hospital Course  Please see history and physical for details. Patient is a 89 y.o. male who presented from a nursing facility for increased lethargy and unresponsiveness. He recently had a 20 day stay here and was discharged two days prior to this admission for acute on chronic respiratory failure that was complicated by acute cholecystitis. He did require ICU stay at that time for sedation with NIPPV. His respiratory status improved, but he developed Enterococcus bacteremia secondary to cholecystitis. He was seen by ID and general surgery but not felt to be a candidate for surgery so IR guided cholecystostomy tube was placed on 4/17 with plans to complete a 14 day course of vancomycin as echocardiogram was negative for vegetations. He did have altered mental status as well as new onset atrial fibrillation and was seen by cardiology and neurology during that stay. He was treated with oral beta blocker therapy and Eliquis for his atrial fibrillation. Neurology felt he had underlying vascular dementia with acute encephalopathy. His mentation was stable at discharge and he was planned for SNF for strengthening prior to returning home. Unfortunately, he became hypercapnic secondary to non-use  versus mis-use of his NIPPV at the facility prompting re-admission to the hospital. He was admitted to the ICU for one night and hypercapnia corrected. [facility] asked to assume care out of unit.    The patient's respiratory status was managed by Pulmonology. He was transitioned from hospital unit NIPPV to his home machine. He has been using his ResMed astral device with min EPAP 10 and max EPAP 15 and pressure support range of 10-20. He has been getting 4 L of oxygen bled in and is to use this nightly and as needed. He did have some increased infiltrate in the RLL on CXR as he has been using 3-4 L during the day. He was recently evaluated by  and is on a modified diet. He was not felt to have any acute infection and will need repeat CT chest imaging in the next 8 weeks or so. Strict aspiration precautions should be continued at rehab. He will need to follow up with pulmonology in a couple weeks.   The patient did have recent enterococcus bacteremia and completed antibiotics on 4/30. He did miss some of his vancomycin at last rehab facility so general surgery as well as ID were reconsulted. His repeat blood cultures were negative and he will need repeat cultures x 2 on 5/7/23 which is 1 week after completion of his therapy. General surgery recommends 6-8 weeks of his cholecystostomy tube and planned follow up with Dr. Strong. He will need to see her in the next month.    The patient was briefly given IV diuresis on admission but pulmonology recommended avoidance of worsening contraction alkalosis. He did have peripheral edema with his Norvasc use and low dose Lasix was added with pulmonology permission. His potassium was 3.4 today so low dose potassium 10 mEq will be added. Daughter reports he does eat a banana a day as well. His edema has improved and he will need repeat BMP in 1 week for further monitoring of his electrolytes and renal function.    He did have some marry rates during the stay and beta blocker therapy  stopped for this reason. He has done well with his HR/BP with holding of this and has been continued on Eliquis for blood thinning purposes/stroke prevention. He will need to see Cardiology in the next month for ongoing follow up.   On the day of discharge, he is tolerating a diet and denies any pain, nausea, vomiting, dyspnea, fever or chills. Family is eager for discharge to rehab and he is currently stable for this at this time. They have set up 24/7 sitters to be with him. He will need to see his PCP in the next 1-2 weeks as well.    I discussed the patient's findings and my recommendations with patient, family, nursing staff and Dr. Ashley.    Condition on Discharge: Improved.     Temp:  [98.1 °F (36.7 °C)-98.4 °F (36.9 °C)] 98.4 °F (36.9 °C)  Heart Rate:  [54-90] 71  Resp:  [17-20] 17  BP: (139-159)/(77-89) 159/89  Body mass index is 45.82 kg/m².    Physical Exam  Vitals and nursing note reviewed.   Constitutional:       Appearance: He is obese. He is ill-appearing.   Cardiovascular:      Rate and Rhythm: Normal rate. Rhythm irregular.      Pulses: Normal pulses.   Pulmonary:      Effort: Pulmonary effort is normal. No respiratory distress.      Comments: Diminished at bases bilaterally. On 3L.  Abdominal:      General: Bowel sounds are normal. There is distension (mild).      Palpations: Abdomen is soft.      Tenderness: There is no abdominal tenderness.      Comments: RUQ cholecystostomy drain in place with brownish drainage.   Musculoskeletal:         General: Swelling (2 +BLE) present. Normal range of motion.      Cervical back: Normal range of motion and neck supple.   Skin:     General: Skin is warm and dry.      Coloration: Skin is pale.      Findings: Bruising present.   Neurological:      Mental Status: He is alert and oriented to person, place, and time.      Sensory: No sensory deficit.      Coordination: Coordination normal.   Psychiatric:         Mood and Affect: Mood normal.         Behavior:  Behavior normal.        Discharge Medications      New Medications      Instructions Start Date   furosemide 20 MG tablet  Commonly known as: LASIX   20 mg, Oral, Daily   Start Date: May 4, 2023     potassium chloride 20 MEQ CR tablet  Commonly known as: K-DURKLOR-CON   10 mEq, Oral, Daily, Take with Lasix.         Changes to Medications      Instructions Start Date   amLODIPine 10 MG tablet  Commonly known as: NORVASC  What changed: how much to take   10 mg, Oral, Daily      Azelastine HCl 137 MCG/SPRAY solution  What changed: See the new instructions.   USE 1 SPRAY IN EACH NOSTRIL AS DIRECTED BY PROVIDER DAILY         Continue These Medications      Instructions Start Date   albuterol sulfate  (90 Base) MCG/ACT inhaler  Commonly known as: PROVENTIL HFA;VENTOLIN HFA;PROAIR HFA   2 puffs, Inhalation, Every 4 Hours PRN      ammonium lactate 12 % cream  Commonly known as: AMLACTIN   1 application, Topical, Every 12 Hours      apixaban 5 MG tablet tablet  Commonly known as: ELIQUIS   5 mg, Oral, Every 12 Hours Scheduled      aspirin 81 MG EC tablet   81 mg, Oral, Daily      budesonide-formoterol 160-4.5 MCG/ACT inhaler  Commonly known as: SYMBICORT   2 puffs, Inhalation, 2 Times Daily - RT      esomeprazole 40 MG capsule  Commonly known as: nexIUM   TAKE 1 CAPSULE DAILY      ezetimibe 10 MG tablet  Commonly known as: ZETIA   TAKE 1 TABLET DAILY      fluticasone 50 MCG/ACT nasal spray  Commonly known as: FLONASE   1 spray, Nasal, Daily      ipratropium-albuterol 0.5-2.5 mg/3 ml nebulizer  Commonly known as: DUO-NEB   3 mL, Nebulization, Every 6 Hours While Awake - RT      loratadine 10 MG tablet  Commonly known as: CLARITIN   Oral, Daily      miconazole 2 % powder  Commonly known as: MICOTIN   Topical, Every 12 Hours Scheduled      multivitamin with minerals tablet tablet   Oral, Daily      polyethylene glycol 17 g packet  Commonly known as: MIRALAX   17 g, Oral, Daily PRN      sennosides-docusate 8.6-50 MG per  tablet  Commonly known as: PERICOLACE   2 tablets, Oral, 2 Times Daily         Stop These Medications    metoprolol tartrate 25 MG tablet  Commonly known as: LOPRESSOR     PHARMACY TO DOSE VANCOMYCIN     vancomycin           Diet Instructions     Diet: Cardiac Diets; Healthy Heart (2-3 Na+); Soft to Chew (NDD 3); Chopped Meat; Thin (IDDSI 0)      Discharge Diet: Cardiac Diets    Cardiac Diet: Healthy Heart (2-3 Na+)    Texture: Soft to Chew (NDD 3)    Soft to Chew: Chopped Meat    Fluid Consistency: Thin (IDDSI 0)         Activity Instructions     Activity as Tolerated           Additional Instructions for the Follow-ups that You Need to Schedule     Discharge Follow-up with PCP   As directed       Currently Documented PCP:    Harry Luis MD    PCP Phone Number:    [phone]     Follow Up Details: see in 1-2 weeks         Discharge Follow-up with Specified Provider: Cardiology; 1 Month   As directed      To: Cardiology    Follow Up: 1 Month         Discharge Follow-up with Specified Provider: Dr. Tae torres General surgery; 2 Weeks   As directed      To: Dr. Tae torres General surgery    Follow Up: 2 Weeks         Discharge Follow-up with Specified Provider: Pulmonology; 2 Weeks   As directed      To: Pulmonology    Follow Up: 2 Weeks    Follow Up Details: or as advised. Needs follow-up chest imaging to ensure clearing of the right lower lobe infiltrate and this can be done in 8 weeks or so.            Contact information for follow-up providers     Harry Luis MD .    Specialty: Internal Medicine  Why: see in 1-2 weeks  Contact information:  [address] [facility] [id number] [phone]                   Contact information for after-discharge care     Destination     [facility] .    Service: Skilled Nursing  Contact information:  [address] [facility] [id number] [phone]                           Test Results Pending at Discharge     Jennifer Mercado,  APRN  05/03/23  11:35 EDT    Discharge time spent greater than 30 minutes.    Electronically signed by Jennifer Mercado APRN at 05/03/23 1135       Discharge Order (From admission, onward)     Start     Ordered    05/03/23 1109  Discharge patient  Once        Expected Discharge Date: 05/03/23    Expected Discharge Time: Afternoon    Discharge Disposition: Skilled Nursing Facility (DC - External)    Physician of Record for Attribution - Please select from Treatment Team: [facility] [[id number]]    Review needed by CMO to determine Physician of Record: No       Question Answer Comment   Physician of Record for Attribution - Please select from Treatment Team [facility]    Review needed by CMO to determine Physician of Record No        05/03/23 1116

## 2023-05-03 NOTE — CASE MANAGEMENT/SOCIAL WORK
Continued Stay Note  [facility]     Patient Name: Harry Potts  MRN: [id number]  Today's Date: 5/3/2023    Admit Date: 4/26/2023    Plan: DC to [facility] via [facility]   Discharge Plan     Row Name 05/03/23 1133       Plan    Plan DC to [facility] via [facility]    Patient/Family in Agreement with Plan yes    Plan Comments Family arranged sitters with [facility] for pt at night to start at 7pm at [facility]. DC orders obtained. [facility] arranged for 3pm. Informed Cecilia NORTON/Signature of PU time. Informed Emilie HARRISA, and pt/pt's spouse of pu time. Informed primary RN of arrangements. Packet to nurse.               Discharge Codes    No documentation.               Expected Discharge Date and Time     Expected Discharge Date Expected Discharge Time    May 3, 2023             Leeann Quiroz, RN

## 2023-05-03 NOTE — PLAN OF CARE
Goal Outcome Evaluation:  Plan of Care Reviewed With: patient, family        Progress: improving  Outcome Evaluation: Patient alert and oriented. New order for patient to d/c to [facility] at 3pm via ambulance. Report called to Michelle. Family at bedside and gathered all belongings. M/L IV removed with dressing placed and pressure helf. Tolerated well.

## 2023-05-03 NOTE — DISCHARGE SUMMARY
[facility]    Date of Admission: 4/26/2023  Date of Discharge:  5/3/2023    PCP: Harry Luis MD    Discharge Diagnosis:   Active Hospital Problems    Diagnosis  POA   • **Acute on chronic respiratory failure with hypoxia and hypercapnia [J96.21, J96.22]  Yes   • Vascular dementia without behavioral disturbance [F01.50]  Yes   • Pulmonary hypertension [I27.20]  Yes   • New onset atrial fibrillation [I48.91]  Yes   • JENISE on CPAP [G47.33, Z99.89]  Not Applicable   • Restrictive lung disease [J98.4]  Yes   • Morbid obesity [E66.01]  Yes   • Hypertension [I10]  Yes      Resolved Hospital Problems    Diagnosis Date Resolved POA   • Hypernatremia [E87.0] 05/03/2023 Unknown   • Bacteremia due to Enterococcus [R78.81, B95.2] 05/03/2023 Yes     Procedures Performed  none     Consults  Inpatient consult to general surgery  Inpatient consult to infectious disease  Inpatient consult to pulmonology    Hospital Course  Please see history and physical for details. Patient is a 89 y.o. male who presented from a nursing facility for increased lethargy and unresponsiveness. He recently had a 20 day stay here and was discharged two days prior to this admission for acute on chronic respiratory failure that was complicated by acute cholecystitis. He did require ICU stay at that time for sedation with NIPPV. His respiratory status improved, but he developed Enterococcus bacteremia secondary to cholecystitis. He was seen by ID and general surgery but not felt to be a candidate for surgery so IR guided cholecystostomy tube was placed on 4/17 with plans to complete a 14 day course of vancomycin as echocardiogram was negative for vegetations. He did have altered mental status as well as new onset atrial fibrillation and was seen by cardiology and neurology during that stay. He was treated with oral beta blocker therapy and Eliquis for his atrial fibrillation. Neurology felt he had  underlying vascular dementia with acute encephalopathy. His mentation was stable at discharge and he was planned for SNF for strengthening prior to returning home. Unfortunately, he became hypercapnic secondary to non-use versus mis-use of his NIPPV at the facility prompting re-admission to the hospital. He was admitted to the ICU for one night and hypercapnia corrected. [facility] asked to assume care out of unit.    The patient's respiratory status was managed by Pulmonology. He was transitioned from hospital unit NIPPV to his home machine. He has been using his ResMed astral device with min EPAP 10 and max EPAP 15 and pressure support range of 10-20. He has been getting 4 L of oxygen bled in and is to use this nightly and as needed. He did have some increased infiltrate in the RLL on CXR as he has been using 3-4 L during the day. He was recently evaluated by  and is on a modified diet. He was not felt to have any acute infection and will need repeat CT chest imaging in the next 8 weeks or so. Strict aspiration precautions should be continued at rehab. He will need to follow up with pulmonology in a couple weeks.   The patient did have recent enterococcus bacteremia and completed antibiotics on 4/30. He did miss some of his vancomycin at last rehab facility so general surgery as well as ID were reconsulted. His repeat blood cultures were negative and he will need repeat cultures x 2 on 5/7/23 which is 1 week after completion of his therapy. General surgery recommends 6-8 weeks of his cholecystostomy tube and planned follow up with Dr. Strong. He will need to see her in the next month.    The patient was briefly given IV diuresis on admission but pulmonology recommended avoidance of worsening contraction alkalosis. He did have peripheral edema with his Norvasc use and low dose Lasix was added with pulmonology permission. His potassium was 3.4 today so low dose potassium 10 mEq will be added. Daughter reports he does eat  a banana a day as well. His edema has improved and he will need repeat BMP in 1 week for further monitoring of his electrolytes and renal function.    He did have some marry rates during the stay and beta blocker therapy stopped for this reason. He has done well with his HR/BP with holding of this and has been continued on Eliquis for blood thinning purposes/stroke prevention. He will need to see Cardiology in the next month for ongoing follow up.   On the day of discharge, he is tolerating a diet and denies any pain, nausea, vomiting, dyspnea, fever or chills. Family is eager for discharge to rehab and he is currently stable for this at this time. They have set up 24/7 sitters to be with him. He will need to see his PCP in the next 1-2 weeks as well.    I discussed the patient's findings and my recommendations with patient, family, nursing staff and Dr. Ashley.    Condition on Discharge: Improved.     Temp:  [98.1 °F (36.7 °C)-98.4 °F (36.9 °C)] 98.4 °F (36.9 °C)  Heart Rate:  [54-90] 71  Resp:  [17-20] 17  BP: (139-159)/(77-89) 159/89  Body mass index is 45.82 kg/m².    Physical Exam  Vitals and nursing note reviewed.   Constitutional:       Appearance: He is obese. He is ill-appearing.   Cardiovascular:      Rate and Rhythm: Normal rate. Rhythm irregular.      Pulses: Normal pulses.   Pulmonary:      Effort: Pulmonary effort is normal. No respiratory distress.      Comments: Diminished at bases bilaterally. On 3L.  Abdominal:      General: Bowel sounds are normal. There is distension (mild).      Palpations: Abdomen is soft.      Tenderness: There is no abdominal tenderness.      Comments: RUQ cholecystostomy drain in place with brownish drainage.   Musculoskeletal:         General: Swelling (2 +BLE) present. Normal range of motion.      Cervical back: Normal range of motion and neck supple.   Skin:     General: Skin is warm and dry.      Coloration: Skin is pale.      Findings: Bruising present.   Neurological:       Mental Status: He is alert and oriented to person, place, and time.      Sensory: No sensory deficit.      Coordination: Coordination normal.   Psychiatric:         Mood and Affect: Mood normal.         Behavior: Behavior normal.        Discharge Medications      New Medications      Instructions Start Date   furosemide 20 MG tablet  Commonly known as: LASIX   20 mg, Oral, Daily   Start Date: May 4, 2023     potassium chloride 20 MEQ CR tablet  Commonly known as: K-DUR,KLOR-CON   10 mEq, Oral, Daily, Take with Lasix.         Changes to Medications      Instructions Start Date   amLODIPine 10 MG tablet  Commonly known as: NORVASC  What changed: how much to take   10 mg, Oral, Daily      Azelastine HCl 137 MCG/SPRAY solution  What changed: See the new instructions.   USE 1 SPRAY IN EACH NOSTRIL AS DIRECTED BY PROVIDER DAILY         Continue These Medications      Instructions Start Date   albuterol sulfate  (90 Base) MCG/ACT inhaler  Commonly known as: PROVENTIL HFA;VENTOLIN HFA;PROAIR HFA   2 puffs, Inhalation, Every 4 Hours PRN      ammonium lactate 12 % cream  Commonly known as: AMLACTIN   1 application, Topical, Every 12 Hours      apixaban 5 MG tablet tablet  Commonly known as: ELIQUIS   5 mg, Oral, Every 12 Hours Scheduled      aspirin 81 MG EC tablet   81 mg, Oral, Daily      budesonide-formoterol 160-4.5 MCG/ACT inhaler  Commonly known as: SYMBICORT   2 puffs, Inhalation, 2 Times Daily - RT      esomeprazole 40 MG capsule  Commonly known as: nexIUM   TAKE 1 CAPSULE DAILY      ezetimibe 10 MG tablet  Commonly known as: ZETIA   TAKE 1 TABLET DAILY      fluticasone 50 MCG/ACT nasal spray  Commonly known as: FLONASE   1 spray, Nasal, Daily      ipratropium-albuterol 0.5-2.5 mg/3 ml nebulizer  Commonly known as: DUO-NEB   3 mL, Nebulization, Every 6 Hours While Awake - RT      loratadine 10 MG tablet  Commonly known as: CLARITIN   Oral, Daily      miconazole 2 % powder  Commonly known as: MICOTIN    Topical, Every 12 Hours Scheduled      multivitamin with minerals tablet tablet   Oral, Daily      polyethylene glycol 17 g packet  Commonly known as: MIRALAX   17 g, Oral, Daily PRN      sennosides-docusate 8.6-50 MG per tablet  Commonly known as: PERICOLACE   2 tablets, Oral, 2 Times Daily         Stop These Medications    metoprolol tartrate 25 MG tablet  Commonly known as: LOPRESSOR     PHARMACY TO DOSE VANCOMYCIN     vancomycin           Diet Instructions     Diet: Cardiac Diets; Healthy Heart (2-3 Na+); Soft to Chew (NDD 3); Chopped Meat; Thin (IDDSI 0)      Discharge Diet: Cardiac Diets    Cardiac Diet: Healthy Heart (2-3 Na+)    Texture: Soft to Chew (NDD 3)    Soft to Chew: Chopped Meat    Fluid Consistency: Thin (IDDSI 0)         Activity Instructions     Activity as Tolerated           Additional Instructions for the Follow-ups that You Need to Schedule     Discharge Follow-up with PCP   As directed       Currently Documented PCP:    Harry Luis MD    PCP Phone Number:    [phone]     Follow Up Details: see in 1-2 weeks         Discharge Follow-up with Specified Provider: Cardiology; 1 Month   As directed      To: Cardiology    Follow Up: 1 Month         Discharge Follow-up with Specified Provider: Dr. Tae torres General surgery; 2 Weeks   As directed      To: Dr. Tae torres General surgery    Follow Up: 2 Weeks         Discharge Follow-up with Specified Provider: Pulmonology; 2 Weeks   As directed      To: Pulmonology    Follow Up: 2 Weeks    Follow Up Details: or as advised. Needs follow-up chest imaging to ensure clearing of the right lower lobe infiltrate and this can be done in 8 weeks or so.            Contact information for follow-up providers     Harry Luis MD .    Specialty: Internal Medicine  Why: see in 1-2 weeks  Contact information:  [address] [address]  [phone]                   Contact information for after-discharge care     Destination     [facility] .    Service: Skilled Nursing  Contact information:  [address]  [facility] [address]  [phone]                           Test Results Pending at Discharge     DANNIE Ramon  05/03/23  11:35 EDT    Discharge time spent greater than 30 minutes.

## 2023-05-03 NOTE — NURSING NOTE
New order for patient to discharge. Spoke with CCP,  will be at 3pm for transfer to [facility].

## 2023-05-03 NOTE — NURSING NOTE
Patient and belongings (with family) discharged from unit at this time. No acute distress at time of discharge.

## 2023-05-04 NOTE — CASE MANAGEMENT/SOCIAL WORK
Case Management Discharge Note      Final Note: Patient discharged to [facility].    Provided Post Acute Provider List?: N/A  Provided Post Acute Provider Quality & Resource List?: N/A    Selected Continued Care - Discharged on 5/3/2023 Admission date: 4/26/2023 - Discharge disposition: Skilled Nursing Facility (DC - External)    Destination Coordination complete.    Service Provider Selected Services Address Phone Fax Patient Preferred    [facility] [address] [address] [phone] --          Durable Medical Equipment    No services have been selected for the patient.              Dialysis/Infusion    No services have been selected for the patient.              Home Medical Care    No services have been selected for the patient.              Therapy    No services have been selected for the patient.              Community Resources    No services have been selected for the patient.              Community & DME    No services have been selected for the patient.                Selected Continued Care - Prior Encounters Includes continued care and service providers with selected services from prior encounters from 1/26/2023 to 5/3/2023    Discharged on 4/24/2023 Admission date: 4/4/2023 - Discharge disposition: Skilled Nursing Facility (DC - External)    Destination     Service Provider Selected Services Address Phone Fax Patient Preferred    [facility] [address]  [facility] [address] [phone] --                         Final Discharge Disposition Code: 03 - skilled nursing facility (SNF)

## 2023-05-08 NOTE — PROGRESS NOTES
Enter Query Response Below      Query Response: -acute pulmonary edema             If applicable, please update the problem list.     Patient: Harry Potts        : 1930  Account: [id number]           Admit Date:         How to Respond to this query:       a. Click New Note     b. Answer query within the yellow box.                c. Update the Problem List, if applicable.      If you have any questions about this query contact me at: [email or web address]    Dr. Nolan,     Patient admitted with acute on chronic hypercapnic and hypoxic respiratory failure.  CXR --pulmonary vascular congestion and lower lung opacities.  Per pulmonary notes patient with pulmonary edema and volume overload.    IV and PO lasix during admission.      Please clarify if the patient was treated/monitored for the following:  -acute pulmonary edema  -other___________  -unable to clinically determine     By submitting this query, we are merely seeking further clarification of documentation to accurately reflect all conditions that you are monitoring, evaluating, treating or that extend the hospitalization or utilize additional resources of care. Please utilize your independent clinical judgment when addressing the question(s) above.     This query and your response, once completed, will be entered into the legal medical record.    Sincerely,  Akanksha Kaplan RN BSN  Clinical Documentation Integrity Program

## 2023-05-09 NOTE — PROGRESS NOTES
Enter Query Response Below      Query Response:     mild vascular dementia without behavioral disturbance    Electronically signed by DANNIE Ramon, 23, 9:45 AM EDT.               If applicable, please update the problem list.     Patient: Harry Potts        : 1930  Account: [id number]           Admit Date:         How to Respond to this query:       a. Click New Note     b. Answer query within the yellow box.                c. Update the Problem List, if applicable.      If you have any questions about this query contact me at: [email or web address]    Dear Jennifer PANDEY,     Patient admitted with acute on chronic hypercapnic and hypoxic respiratory failure, altered mental status, confusion and letharg. Placed on NIPPV.  Patient noted to be disoriented to place and situation.  Upon discharge patient documented as alert and oriented x 4.  Vascular dementia noted throughout chart.  Per pulmonary:vascular dementia with behavioral disturbance.  Per attending: vascular dementia without behavioral disturbance.      Please clarify the vascular dementia and acuity being treated/monitored:  -mild vascular dementia without behavioral disturbance  -mild vascular dementia with behavioral disturbance  -vascular dementia (please specify)____________  -other__________  -unable to clinically determine     By submitting this query, we are merely seeking further clarification of documentation to accurately reflect all conditions that you are monitoring, evaluating, treating or that extend the hospitalization or utilize additional resources of care. Please utilize your independent clinical judgment when addressing the question(s) above.     This query and your response, once completed, will be entered into the legal medical record.    Sincerely,  Akanksha Kaplan RN BSN  Clinical Documentation Integrity Program

## 2023-05-09 NOTE — PROGRESS NOTES
"Enter Query Response Below      Query Response:     metabolic encephalopathy due to respiratory failure    Electronically signed by DANNIE Ramon, 23, 9:45 AM EDT.               If applicable, please update the problem list.       Patient: Harry Potts        : 1930  Account: [id number]           Admit Date:         How to Respond to this query:       a. Click New Note     b. Answer query within the yellow box.                c. Update the Problem List, if applicable.      If you have any questions about this query contact me at: [email or web address]    Dr. Mercado,     Patient admitted with acute on chronic hypercapnic and hypoxic respiratory failure, placed on NIPPV.   Patient noted to be lethargic with altered mental status and confusion on admission.  Per nursing notes \"disoriented to place, situation\".  Upon discharge patient noted to be alert and oriented x4.  Per documentation in progress note patient is monitored for waxing/waning encephalopathy.    Please clarify if the patient is treated/monitored for the following:  -metabolic encephalopathy due to respiratory failure  -metabolic encephalopathy due to (please specify)____________  -encephalopathy (please specify type and etiology)__________  -other___________  -unable to clinically determine     By submitting this query, we are merely seeking further clarification of documentation to accurately reflect all conditions that you are monitoring, evaluating, treating or that extend the hospitalization or utilize additional resources of care. Please utilize your independent clinical judgment when addressing the question(s) above.     This query and your response, once completed, will be entered into the legal medical record.    Sincerely,  Akanksha Kaplan RN BSN  Clinical Documentation Integrity Program   "

## 2023-05-24 ENCOUNTER — OFFICE VISIT (OUTPATIENT)
Dept: SURGERY | Facility: CLINIC | Age: 88
End: 2023-05-24
Payer: MEDICARE

## 2023-05-24 VITALS — OXYGEN SATURATION: 95 % | SYSTOLIC BLOOD PRESSURE: 148 MMHG | HEART RATE: 70 BPM | DIASTOLIC BLOOD PRESSURE: 80 MMHG

## 2023-05-24 DIAGNOSIS — K81.9 CHOLECYSTITIS: Primary | ICD-10-CM

## 2023-05-24 PROCEDURE — 1159F MED LIST DOCD IN RCRD: CPT | Performed by: STUDENT IN AN ORGANIZED HEALTH CARE EDUCATION/TRAINING PROGRAM

## 2023-05-24 PROCEDURE — 99212 OFFICE O/P EST SF 10 MIN: CPT | Performed by: STUDENT IN AN ORGANIZED HEALTH CARE EDUCATION/TRAINING PROGRAM

## 2023-05-24 PROCEDURE — 1160F RVW MEDS BY RX/DR IN RCRD: CPT | Performed by: STUDENT IN AN ORGANIZED HEALTH CARE EDUCATION/TRAINING PROGRAM

## 2023-05-24 NOTE — PROGRESS NOTES
General Surgery History and Physical      Summary:    Harry Potts is a 89 y.o. gentleman with acute cholecystitis s/p percutaneous cholecystostomy tube placement 4/2023. Overall has recovered quite well but I still do not believe he is a good candidate for cholecystectomy at this time. Discussed options include continuing the drain to gravity for now, which is the least aggressive plan, versus trying to clamp/tube study/working towards removal. He and his wife would like to proceed with the lowest risk, safest plan at this time due to his tenuous medical status. Will continue with tube to gravity, record output daily. Will have a follow up visit in 6 weeks to re-evaluate.     Referring Provider: No ref. provider found    Chief Complaint:    Cholecystostomy tube management    History of Present Illness:    Harry Potts is a 89 y.o. gentleman who was recently admitted to the hospital for respiratory failure. He subsequently was diagnosed with acute cholecystitis and underwent percutaneous cholecystostomy tube placement. He recovered overall fairly well and was discharged to rehab. He has overall recovered well. He has had no concerns or complaints with the tube and states it doesn't bother him at all. He is tolerating a diet. He is having regular bowel function.     Past Medical History:   Past Medical History:   Diagnosis Date   • Arthritis    • Asthma March, 2021    Oxygen at home   • Atrial fibrillation 4/18/2023   • Cancer     basal cell    • COPD (chronic obstructive pulmonary disease)    • Difficulty walking    • Elevated cholesterol    • Environmental allergies    • GERD (gastroesophageal reflux disease)    • HL (hearing loss)    • Hyperglycemia    • Hyperlipidemia    • Hypertension    • Obesity    • Pulmonary hypertension 4/20/2023   • Sleep apnea    • Spondylolysis, lumbar region       Past Surgical History:    Past Surgical History:   Procedure Laterality Date   • CARDIAC CATHETERIZATION  circa 2008     [facility]   • EYE SURGERY     • HERNIA REPAIR     • JOINT REPLACEMENT     • REPLACEMENT TOTAL KNEE BILATERAL       Family History:    Family History   Problem Relation Age of Onset   • Heart attack Father         age 80     Social History:    Social History     Socioeconomic History   • Marital status:    Tobacco Use   • Smoking status: Never   • Smokeless tobacco: Never   Vaping Use   • Vaping Use: Never used   Substance and Sexual Activity   • Alcohol use: No     Comment: caffeine use   • Drug use: No   • Sexual activity: Not Currently     Partners: Female     Allergies:   Allergies   Allergen Reactions   • Morphine And Related Itching, Rash and Confusion     Patient never wants to have Morphine again in his life   • Atorvastatin Other (See Comments)     SPASMS LEG      • Dexmedetomidine Other (See Comments)     Dex causes bradycardia and hyptension        Medications:     Current Outpatient Medications:   •  albuterol sulfate  (90 Base) MCG/ACT inhaler, Inhale 2 puffs Every 4 (Four) Hours As Needed for Wheezing., Disp: 18 g, Rfl: 5  •  amLODIPine (NORVASC) 10 MG tablet, Take 1 tablet by mouth Daily., Disp: 90 tablet, Rfl: 3  •  ammonium lactate (AMLACTIN) 12 % cream, Apply 1 g topically to the appropriate area as directed Every 12 (Twelve) Hours., Disp: , Rfl:   •  apixaban (ELIQUIS) 5 MG tablet tablet, Take 1 tablet by mouth Every 12 (Twelve) Hours. Indications: Atrial Fibrillation, Disp: 60 tablet, Rfl:   •  aspirin 81 MG EC tablet, Take 1 tablet by mouth Daily., Disp: , Rfl:   •  Azelastine HCl 137 MCG/SPRAY solution, USE 1 SPRAY IN EACH NOSTRIL AS DIRECTED BY PROVIDER DAILY (Patient taking differently: 1 spray by Each Nare route Daily.), Disp: 30 mL, Rfl: 2  •  budesonide-formoterol (SYMBICORT) 160-4.5 MCG/ACT inhaler, Inhale 2 puffs 2 (Two) Times a Day., Disp: , Rfl: 12  •  esomeprazole (nexIUM) 40 MG capsule, TAKE 1 CAPSULE DAILY, Disp: 90 capsule, Rfl: 3  •  ezetimibe (ZETIA) 10 MG tablet,  TAKE 1 TABLET DAILY, Disp: 90 tablet, Rfl: 3  •  fluticasone (FLONASE) 50 MCG/ACT nasal spray, 1 spray into the nostril(s) as directed by provider Daily., Disp: 11.1 mL, Rfl: 2  •  furosemide (LASIX) 20 MG tablet, Take 1 tablet by mouth Daily., Disp: , Rfl:   •  ipratropium-albuterol (DUO-NEB) 0.5-2.5 mg/3 ml nebulizer, Take 3 mL by nebulization Every 6 (Six) Hours While Awake., Disp: 360 mL, Rfl:   •  loratadine (CLARITIN) 10 MG tablet, Take  by mouth Daily., Disp: , Rfl:   •  miconazole (MICOTIN) 2 % powder, Apply  topically to the appropriate area as directed Every 12 (Twelve) Hours., Disp: , Rfl:   •  Multiple Vitamins-Minerals (CENTRUM ADULTS PO), Take  by mouth Daily., Disp: , Rfl:   •  polyethylene glycol (MIRALAX) 17 g packet, Take 17 g by mouth Daily As Needed (Use if senna-docusate is ineffective)., Disp: , Rfl:   •  potassium chloride (K-DUR,KLOR-CON) 20 MEQ CR tablet, Take 0.5 tablets by mouth Daily. Take with Lasix., Disp: , Rfl:   •  sennosides-docusate (PERICOLACE) 8.6-50 MG per tablet, Take 2 tablets by mouth 2 (Two) Times a Day., Disp: , Rfl:     Radiology/Endoscopy:    • 4/16/2023 CT abdomen/pelvis reviewed    Labs:    • Labs from 5/3/2023 reviewed    Review of Systems:   Influenza-like illness: no fever, no  cough, no  sore throat, no  body aches, no loss of sense of taste or smell, no known exposure to person with Covid-19.  Constitutional: Negative for fevers or chills  HENT: Negative for hearing loss or runny nose  Eyes: Negative for vision changes or scleral icterus  Respiratory: Negative for cough or shortness of breath  Cardiovascular: Negative for chest pain or heart palpitations  Gastrointestinal: Negative for abdominal pain, nausea, vomiting, constipation, melena, or hematochezia  Genitourinary: Negative for hematuria or dysuria  Musculoskeletal: Negative for joint swelling or gait instability  Neurologic: Negative for tremors or seizures  Psychiatric: Negative for suicidal ideations or  depression  All other systems reviewed and negative    Physical Exam:   • Constitutional: In a wheelchair, frail, no acute distress  • Eyes: Conjunctiva normal, sclera nonicteric  • ENMT: Hearing grossly normal, oral mucosa moist  • Neck: Supple, trachea midline  • Respiratory: Clear to auscultation, normal inspiratory effort, mild increased work of breathing  • Cardiovascular: Regular rate, no peripheral edema, no jugular venous distention  • Gastrointestinal: Soft, nontender, perc katina in place with low amount of bilious fluid  • Skin:  Warm, dry, no rash on visualized skin surfaces  • Musculoskeletal: Symmetric strength, normal gait  • Psychiatric: Alert and oriented ×3, normal affect       Meagan Strong M.D.  General and Endoscopic Surgery  [facility]    [address], [address]  [place], [place], [facility]  P: [phone]  F: [phone]

## 2023-06-12 ENCOUNTER — TELEPHONE (OUTPATIENT)
Dept: SURGERY | Facility: CLINIC | Age: 88
End: 2023-06-12
Payer: MEDICARE

## 2023-06-12 NOTE — TELEPHONE ENCOUNTER
Polly, Director of Nursing at [facility], called stating that the patient's cholecystostomy tube may have possibly become dislodged. Tube is still in tact and held in place by the suture. There is just drainage from around the tube entry site that is new. Advised that this is okay. They should continue to monitor the tube to ensure it stays in place and fully functioning. Recommended changing the bandage as needed. Polly will call with any further concerns.

## 2023-06-14 ENCOUNTER — TELEPHONE (OUTPATIENT)
Dept: SURGERY | Facility: CLINIC | Age: 88
End: 2023-06-14
Payer: MEDICARE

## 2023-06-14 NOTE — TELEPHONE ENCOUNTER
ARISTEO @ [facility] CALLED TO INFORM US THAT THE PT'S CHOLECYSTOSTOMY TUBE HAS CAME COMPLETELY OUT. SHE SAID THAT THEY MONITORED THE PT THROUGH THE NIGHT & HE APPEAR TO BE FINE. THERE'S A SMALL AMOUNT OF DRAINAGE BUT THEY JUST KEEP THE SITE COVERED. PT WILL BE GOING HOME SOON. ADVISED THAT IF HE STARTS HAVING PROBLEMS TO CALL THE OFFICE SO WE CAN TRY TO MOVE THE APPT UP ON 7/12 @ 2 PM.

## 2023-06-16 ENCOUNTER — HOME HEALTH ADMISSION (OUTPATIENT)
Dept: HOME HEALTH SERVICES | Facility: HOME HEALTHCARE | Age: 88
End: 2023-06-16
Payer: MEDICARE

## 2023-06-16 ENCOUNTER — TELEPHONE (OUTPATIENT)
Dept: SURGERY | Facility: CLINIC | Age: 88
End: 2023-06-16
Payer: MEDICARE

## 2023-06-16 NOTE — TELEPHONE ENCOUNTER
Patients' daughter, Emilie, left a voice mail to inform us his cholecystostomy tube is completely out.  The area is covered with a bandage over the tube exit site.  She would like to know if he needs to be seen sooner.  She reports no problems at this time, her Dad seems to be doing well. Patient is currently at [facility].

## 2023-06-21 ENCOUNTER — TELEPHONE (OUTPATIENT)
Dept: FAMILY MEDICINE CLINIC | Facility: CLINIC | Age: 88
End: 2023-06-21

## 2023-06-21 ENCOUNTER — TELEPHONE (OUTPATIENT)
Dept: PEDIATRICS | Facility: OTHER | Age: 88
End: 2023-06-21

## 2023-06-21 NOTE — TELEPHONE ENCOUNTER
Caller: [facility]    Relationship: Emergency Contact    Best call back number: [phone]     Who are you requesting to speak with (clinical staff, provider,  specific staff member): DR. VALDEZ OR MA    What was the call regarding: PATIENT'S WIFE STATES THEY HAD A MISSED CALL FROM THE OFFICE AND DIDN'T KNOW IF IT WAS REGARDING THE PATIENTS UPCOMING APPOINTMENT.

## 2023-06-21 NOTE — TELEPHONE ENCOUNTER
Caller: [facility]    Relationship: Home Health    Best call back number: LUIZA BAILEY     What is the best time to reach you: ANYTIME    Who are you requesting to speak with (clinical staff, provider,  specific staff member): DR VALDEZ    Do you know the name of the person who called: LUIZA    What was the call regarding: [facility] STATED THE PATIENT WILL BE DISCHARGING FROM [facility] TODAY, 06-.    [facility] STATED [facility] HAS ENTERED ORDERS FOR HOME HEALTH SKILLED NURSING, PHYSICAL THERAPY, AND OCCUPATIONAL THERAPY THROUGH [facility].    PLEASE CALL IF THERE ARE ANY QUESTIONS.

## 2023-07-03 ENCOUNTER — TELEPHONE (OUTPATIENT)
Dept: PEDIATRICS | Facility: OTHER | Age: 88
End: 2023-07-03

## 2023-07-03 NOTE — TELEPHONE ENCOUNTER
Called Emilie back and informed her that I am going to reach out to the lab and have them fax it over. Verbally understood.

## 2023-07-03 NOTE — TELEPHONE ENCOUNTER
Caller: TRAMAINE SAUCEDO [facility]    Relationship: Emergency Contact    Best call back number: [phone]     What test was performed: BLOOD WORK    When was the test performed: 06/23/23    Where was the test performed: IN OFFICE      PLEASE ADVISE

## 2023-07-06 NOTE — TELEPHONE ENCOUNTER
PATIENT'S DAUGHTER CALLED TO CHECK THE STATUS OF THIS REQUEST. SHE HAS CHECKED MYCHART AND THE REQUESTED LABS ARE STILL MISSING.    PLEASE ADVISE  [phone]

## 2023-07-26 ENCOUNTER — TELEPHONE (OUTPATIENT)
Dept: PEDIATRICS | Facility: OTHER | Age: 88
End: 2023-07-26

## 2023-07-26 ENCOUNTER — TELEPHONE (OUTPATIENT)
Dept: FAMILY MEDICINE CLINIC | Facility: CLINIC | Age: 88
End: 2023-07-26
Payer: MEDICARE

## 2023-07-26 NOTE — TELEPHONE ENCOUNTER
Caller: ALEX    Relationship: [facility] [facility]    Best call back number: [phone]    What was the call regarding: ALEX WITH [employer] [employer] STATES THEY ARE FAXING OVER A PHYSICIAN COGNITIVE HISTORY FORM FOR DR. VALDEZ TO FILL OUT AND WILL NEED IT TO BE FAXED TO: [phone], ATTENTION: LTC CLAIM

## 2023-08-02 ENCOUNTER — OFFICE VISIT (OUTPATIENT)
Dept: FAMILY MEDICINE CLINIC | Facility: CLINIC | Age: 88
End: 2023-08-02
Payer: MEDICARE

## 2023-08-02 VITALS
HEART RATE: 64 BPM | SYSTOLIC BLOOD PRESSURE: 138 MMHG | DIASTOLIC BLOOD PRESSURE: 70 MMHG | BODY MASS INDEX: 40.76 KG/M2 | HEIGHT: 68 IN | OXYGEN SATURATION: 97 %

## 2023-08-02 DIAGNOSIS — E78.00 HYPERCHOLESTEROLEMIA: ICD-10-CM

## 2023-08-02 DIAGNOSIS — I48.19 OTHER PERSISTENT ATRIAL FIBRILLATION: ICD-10-CM

## 2023-08-02 DIAGNOSIS — G47.33 OSA ON CPAP: ICD-10-CM

## 2023-08-02 DIAGNOSIS — D50.9 IRON DEFICIENCY ANEMIA, UNSPECIFIED IRON DEFICIENCY ANEMIA TYPE: Primary | ICD-10-CM

## 2023-08-02 DIAGNOSIS — Z99.89 OSA ON CPAP: ICD-10-CM

## 2023-08-02 DIAGNOSIS — I10 PRIMARY HYPERTENSION: ICD-10-CM

## 2023-08-02 PROCEDURE — 99214 OFFICE O/P EST MOD 30 MIN: CPT | Performed by: INTERNAL MEDICINE

## 2023-08-04 NOTE — PROGRESS NOTES
Subjective   Harry Potts is a 89 y.o. male. Patient is here today for   Chief Complaint   Patient presents with    Hospital Follow Up Visit          Vitals:    08/02/23 0928   BP: 138/70   Pulse: 64   SpO2: 97%     Body mass index is 40.76 kg/mý.      Past Medical History:   Diagnosis Date    Abnormal ECG 4.5.2023    new onset a fib    Allergic     Arthritis     Asthma March, 2021    Oxygen at home    Atrial fibrillation 04/18/2023    Cancer     basal cell     Cholelithiasis 4.12.23    long history of gallstones without issue, cholecystitis 4.12.23 with E.faecalis bacteremia    COPD (chronic obstructive pulmonary disease)     Coronary artery disease 1980's    htn, elevated cholesterol    Difficulty walking     Elevated cholesterol     Environmental allergies     GERD (gastroesophageal reflux disease)     History of medical problems 4/2023    new onset a fib with hospitalization for respiratory failure/cholecystitis    HL (hearing loss)     Hyperglycemia     Hyperlipidemia     Hypertension     Obesity     Pulmonary hypertension 04/20/2023    Sleep apnea     Spondylolysis, lumbar region     Stroke unknown    old infarct per CT 4.5.2023    Tremor 5/2023    s/p hypercapnia      Allergies   Allergen Reactions    Morphine And Related Itching, Rash and Confusion     Patient never wants to have Morphine again in his life    Atorvastatin Other (See Comments)     SPASMS LEG       Dexmedetomidine Other (See Comments)     Dex causes bradycardia and hyptension       Social History     Socioeconomic History    Marital status:    Tobacco Use    Smoking status: Never    Smokeless tobacco: Never   Vaping Use    Vaping Use: Never used   Substance and Sexual Activity    Alcohol use: No     Comment: caffeine use    Drug use: No    Sexual activity: Not Currently     Partners: Female     Birth control/protection: Abstinence     Comment: Not an issue at my age        Current Outpatient Medications:     albuterol sulfate   (90 Base) MCG/ACT inhaler, Inhale 2 puffs Every 4 (Four) Hours As Needed for Wheezing., Disp: 18 g, Rfl: 5    amLODIPine (NORVASC) 10 MG tablet, Take 1 tablet by mouth Daily., Disp: 90 tablet, Rfl: 3    ammonium lactate (AMLACTIN) 12 % cream, Apply 1 g topically to the appropriate area as directed Every 12 (Twelve) Hours., Disp: , Rfl:     apixaban (ELIQUIS) 5 MG tablet tablet, Take 1 tablet by mouth Every 12 (Twelve) Hours. Indications: Atrial Fibrillation, Disp: 14 tablet, Rfl: 0    aspirin 81 MG EC tablet, Take 1 tablet by mouth Daily., Disp: , Rfl:     Azelastine HCl 137 MCG/SPRAY solution, USE 1 SPRAY IN EACH NOSTRIL AS DIRECTED BY PROVIDER DAILY (Patient taking differently: 1 spray by Each Nare route Daily.), Disp: 30 mL, Rfl: 2    esomeprazole (nexIUM) 40 MG capsule, TAKE 1 CAPSULE DAILY, Disp: 90 capsule, Rfl: 3    ezetimibe (ZETIA) 10 MG tablet, TAKE 1 TABLET DAILY, Disp: 90 tablet, Rfl: 3    fluticasone (FLONASE) 50 MCG/ACT nasal spray, 1 spray into the nostril(s) as directed by provider Daily., Disp: 11.1 mL, Rfl: 2    furosemide (LASIX) 20 MG tablet, Take 1 tablet by mouth Daily., Disp: 7 tablet, Rfl: 0    loratadine (CLARITIN) 10 MG tablet, Take  by mouth Daily., Disp: , Rfl:     miconazole (MICOTIN) 2 % powder, Apply  topically to the appropriate area as directed Every 12 (Twelve) Hours., Disp: , Rfl:     Multiple Vitamins-Minerals (CENTRUM ADULTS PO), Take  by mouth Daily., Disp: , Rfl:     O2 (OXYGEN), 4 Liter O2 - CONTINUOUS (route: Oxygen), Disp: , Rfl:     potassium chloride (K-DUR,KLOR-CON) 20 MEQ CR tablet, Take 1 tablet by mouth 2 (Two) Times a Day., Disp: 14 tablet, Rfl: 0    sennosides-docusate (PERICOLACE) 8.6-50 MG per tablet, Take 2 tablets by mouth 2 (Two) Times a Day., Disp: , Rfl:     budesonide-formoterol (SYMBICORT) 160-4.5 MCG/ACT inhaler, Inhale 2 puffs 2 (Two) Times a Day., Disp: , Rfl: 12     Objective     History of Present Illness  This patient is here for a hospital follow-up  visit.    He is accompanied by his wife.     This patient was admitted to [facility] on April 26 with acute on chronic respiratory failure with hypoxia and hypercapnia, he had new onset atrial fibrillation.    He has obstructive sleep apnea is compliant with use of CPAP.    He is accompanied by his wife today and a young man that assists him when going out and I think his system in his home as well.         Review of Systems   Constitutional: Negative.    HENT: Negative.     Respiratory: Negative.     Cardiovascular: Negative.    Genitourinary: Negative.    Musculoskeletal:         He has chronic pain in his lumbar spine and bilateral lower extremity radicular pain secondary to lumbar spinal stenosis.   Psychiatric/Behavioral: Negative.       Physical Exam  Vitals and nursing note reviewed.   Constitutional:       General: He is not in acute distress.     Appearance: Normal appearance. He is obese. He is not ill-appearing, toxic-appearing or diaphoretic.      Comments: Pleasant and articulate.  He appears to be in no distress.    He does ambulate with a walker.   HENT:      Head: Normocephalic and atraumatic.   Neck:      Vascular: No carotid bruit.   Cardiovascular:      Rate and Rhythm: Regular rhythm.      Heart sounds: Normal heart sounds. No murmur heard.    No gallop.   Pulmonary:      Effort: No respiratory distress.      Breath sounds: Normal breath sounds. No wheezing or rales.   Neurological:      Mental Status: He is alert and oriented to person, place, and time.   Psychiatric:         Mood and Affect: Mood normal.         Behavior: Behavior normal.         Thought Content: Thought content normal.         Problems Addressed this Visit          Cardiac and Vasculature    Hypercholesterolemia    Hypertension    Other persistent atrial fibrillation       Hematology and Neoplasia    Anemia - Primary    Relevant Orders    Iron Profile    Reticulocytes       Sleep    JENISE on CPAP     Diagnoses          Codes Comments    Iron deficiency anemia, unspecified iron deficiency anemia type    -  Primary ICD-10-CM: D50.9  ICD-9-CM: 280.9     Primary hypertension     ICD-10-CM: I10  ICD-9-CM: 401.9     Hypercholesterolemia     ICD-10-CM: E78.00  ICD-9-CM: 272.0     JENISE on CPAP     ICD-10-CM: G47.33, Z99.89  ICD-9-CM: 327.23, V46.8     Other persistent atrial fibrillation     ICD-10-CM: I48.19  ICD-9-CM: 427.31               PLAN  He has an iron deficiency anemia.  I will check an iron profile today and reticulocyte count.    He has hypercholesterolemia with good control.    Hypertension is well controlled.    He has obstructive sleep apnea on CPAP.  He has persistent atrial fibrillation with controlled ventricular response.  He is appropriately on Eliquis 5 mg twice daily.    I asked him to follow-up with me in 3 to 4 months.  Fasting labs prior that visit should include: Lipid profile, comprehensive metabolic panel, CBC, urinalysis and iron, total iron-binding capacity   No follow-ups on file.

## 2023-08-08 ENCOUNTER — TELEPHONE (OUTPATIENT)
Dept: FAMILY MEDICINE CLINIC | Facility: CLINIC | Age: 88
End: 2023-08-08

## 2023-08-08 PROBLEM — I48.19 OTHER PERSISTENT ATRIAL FIBRILLATION: Status: ACTIVE | Noted: 2023-04-18

## 2023-08-08 PROBLEM — I49.9 IRREGULARLY IRREGULAR CARDIAC RHYTHM: Status: RESOLVED | Noted: 2022-07-19 | Resolved: 2023-08-08

## 2023-08-08 NOTE — TELEPHONE ENCOUNTER
Caller: MADDY    Relationship to patient: CLAIM DEPARTMENT    Best call back number: [phone]    Patient is needing: METTE FROM THE CLAIM DEPARTMENT REGARDING PATIENT PHYSICIAN COGNITIVE FORM IS REQUESTING IF SHE COULD GET  A CALLBACK AS SHE HAS A FEW QUESTIONS REGARDING THE CLAIM AND IF SHE COULD RECEIVE A VALID EMAIL ADDRESS OR FAX NUMBER TO FAX OVER THE CLAIM.

## 2023-08-18 NOTE — TELEPHONE ENCOUNTER
Caller: TRAMAINE SAUCEDO [facility]    Relationship: Emergency Contact    Best call back number: [phone], OK TO LEAVE A VOICEMAIL.     What was the call regarding: PATIENT'S DAUGHTER IS CALLING IN REGARDS TO THE PHYSICIAN COGNITIVE HISTORY FORM FROM [facility]. PATIENT'S DAUGHTER STATED THAT THE COMPANY HAS SENT MULTIPLE FAXES AND HAVE NOT RECEIVED IT BACK YET. PATIENT'S DAUGHTER STATED THAT THE COMPANY CONTACTED THE OFFICE ON 08/07/23 AND AGAIN ON 08/17/23. PATIENT'S DAUGHTER STATED THAT THEY WILL BE CONTACTING THE OFFICE AGAIN TODAY. PLEASE ADVISE.

## 2023-08-26 DIAGNOSIS — R53.1 WEAKNESS: ICD-10-CM

## 2023-08-28 ENCOUNTER — TELEPHONE (OUTPATIENT)
Dept: FAMILY MEDICINE CLINIC | Facility: CLINIC | Age: 88
End: 2023-08-28

## 2023-08-28 RX ORDER — POTASSIUM CHLORIDE 1500 MG/1
TABLET, EXTENDED RELEASE ORAL
Qty: 60 TABLET | Refills: 11 | Status: SHIPPED | OUTPATIENT
Start: 2023-08-28

## 2023-08-28 NOTE — TELEPHONE ENCOUNTER
Caller: SCOTT    Relationship: [facility] [employer]    Best call back number: [phone]     Who are you requesting to speak with (clinical staff, provider,  specific staff member): DR VALDEZ OR MA    What was the call regarding: SCOTT STATES THAT THEY RECEIVED ORDERS FOR AN ELECTRIC WHEELCHAIR FOR THE PATIENT, BUT THE OFFICE NOTES WERE TOO VAGUE, AND DID NOT SUPPORT THE NEED FOR THE CHAIR. REQUESTS A CALL BACK TO DISCUSS FURTHER ADDENDUMS TO THE ORDER TO CLARIFY PATIENT'S NEED

## 2023-09-04 NOTE — PROGRESS NOTES
Subjective:     Encounter Date:09/05/2023      Patient ID: Harry Potts is a 89 y.o. male.    Chief Complaint:  History of Present Illness    This is an 89-year-old with hypertension, hyperlipidemia, obesity, asthma, obstructive sleep apnea on CPAP, PVCs, persistent atrial fibrillation, who presents for follow up.     I saw him last in the office in 3/2023 at which time he reported that he cancelled his sleep medicine appointment because he and his wife did not drive at night.  He was compliant with his CPAP.  He was still taking metoprolol succinate and using oxygen as needed.  He was still having asymptomatic PACs and PVCs.      He was admitted in 4/2023 for confusion.  He was noted to be in atrial fibrillation incidentally at that time.  Marlyn underwent a couple of echocardiograms during that admission which showed normal left ventricular systolic function and wall motion with an EF of 65%, mild biatrial enlargement, mildly dilated right ventricle, and mild to moderately elevated right ventricular systolic pressure.  He was placed on anticoagulation with apixaban at that time.      He presents today for follow-up.  Following his admission he was in rehab for about 2 months.  He has been home since June.  He denies any chest pain, shortness of breath out of the ordinary, palpitations, near-syncope or syncope, or worsening lower extremity swelling.  Remains on oxygen continuously.  He is not very ambulatory because of chronic issues with his back.  He is tolerating the apixaban without any significant issues.  He is now off of the metoprolol but his rates remain well controlled.  He also still appears to be on aspirin.      Prior History:  The patient had been evaluated by Dr. Jones in 1/2006 for preoperative clearance.  He had an abnormal EKG at that time.  He ultimately underwent a stress test that showed inferior infarct and subsequently underwent a cardiac catheterization that showed mild nonobstructive  coronary artery disease.       I saw the patient initially in 10/2019 when he presented for complaints of dyspnea on exertion, bradycardia, and orthopnea.  Prior to that office visit he was evaluated by Dr. French who began treating him for asthma.  During his exam he was noted to be bradycardia.  There was also some concerns for orthopnea.  At his office visit with me he was noted to have frequent PVCs on his EKG including ventricular bigeminy.  I suspect that that this is responsible for the findings of bradycardia.  I recommended proceeding with further work-up with a stress test and an echocardiogram.  These were performed on 11/6/2019.  His echocardiogram showed normal left ventricular systolic function wall motion with an EF of 61%, mild mitral regurgitation, and grade 1 diastolic dysfunction.  His stress test was negative for ischemia.  At that time I recommended follow-up in 6 months.     Patient was lost to follow-up between 10/2019 and 8/2022.    He presented back to the office for evaluation of bradycardia.  He is closely followed by Dr. Luis.    At some point between his visits with me he had been started on oxygen which he was using continuously at the time of his visit in 8/2022.  He also reported compliance with CPAP although he had not been evaluated by sleep medicine for several years.      Prior to that office visit he suffered a fall while in the kitchen helping his wife prepare dinner.    It was unclear at that time if he lost his balance and fell or if there was any loss of consciousness.  He began checking his heart rates and blood pressure after that fall.  He noticed that his heart rates were running low in the 40s.  Because of this his daughter who is a pharmacist suggested that he decrease his metoprolol succinate to 25 mg daily.  Despite the decrease in the metoprolol his blood pressures were fairly well controlled.  He did feel better on the lower dose of metoprolol due to improvement in  fatigue and sluggishness.  I felt that his reported bradycardia was likely due to frequent PVCs that were noted at the time of his office visit.  However I agreed with keeping him on the lower dose of the metoprolol since overall he felt better.  I also encouraged the patient to see sleep medicine to ensure that he was being adequately treated for his sleep apnea in light of his ventricular ectopy.         Review of Systems   Constitutional: Negative for malaise/fatigue.   HENT:  Negative for hearing loss, hoarse voice, nosebleeds and sore throat.    Eyes:  Negative for pain.   Cardiovascular:  Negative for chest pain, claudication, cyanosis, dyspnea on exertion, irregular heartbeat, leg swelling, near-syncope, orthopnea, palpitations, paroxysmal nocturnal dyspnea and syncope.   Respiratory:  Negative for shortness of breath and snoring.    Endocrine: Negative for cold intolerance, heat intolerance, polydipsia, polyphagia and polyuria.   Skin:  Negative for itching and rash.   Musculoskeletal:  Positive for back pain. Negative for arthritis, falls, joint pain, joint swelling, muscle cramps, muscle weakness and myalgias.   Gastrointestinal:  Negative for constipation, diarrhea, dysphagia, heartburn, hematemesis, hematochezia, melena, nausea and vomiting.   Genitourinary:  Negative for frequency, hematuria and hesitancy.   Neurological:  Negative for excessive daytime sleepiness, dizziness, headaches, light-headedness, numbness and weakness.   Psychiatric/Behavioral:  Negative for depression. The patient is not nervous/anxious.        Current Outpatient Medications:     albuterol sulfate  (90 Base) MCG/ACT inhaler, Inhale 2 puffs Every 4 (Four) Hours As Needed for Wheezing., Disp: 18 g, Rfl: 5    amLODIPine (NORVASC) 10 MG tablet, Take 1 tablet by mouth Daily., Disp: 90 tablet, Rfl: 3    ammonium lactate (AMLACTIN) 12 % cream, Apply 1 g topically to the appropriate area as directed Every 12 (Twelve) Hours.,  Disp: , Rfl:     apixaban (ELIQUIS) 5 MG tablet tablet, Take 1 tablet by mouth Every 12 (Twelve) Hours. Indications: Atrial Fibrillation, Disp: 14 tablet, Rfl: 0    aspirin 81 MG EC tablet, Take 1 tablet by mouth Daily., Disp: , Rfl:     Azelastine HCl 137 MCG/SPRAY solution, USE 1 SPRAY IN EACH NOSTRIL AS DIRECTED BY PROVIDER DAILY (Patient taking differently: 1 spray by Each Nare route Daily.), Disp: 30 mL, Rfl: 2    esomeprazole (nexIUM) 40 MG capsule, TAKE 1 CAPSULE DAILY, Disp: 90 capsule, Rfl: 3    ezetimibe (ZETIA) 10 MG tablet, TAKE 1 TABLET DAILY, Disp: 90 tablet, Rfl: 3    fluticasone (FLONASE) 50 MCG/ACT nasal spray, 1 spray into the nostril(s) as directed by provider Daily., Disp: 11.1 mL, Rfl: 2    furosemide (LASIX) 20 MG tablet, Take 1 tablet by mouth Daily., Disp: 7 tablet, Rfl: 0    KLOR-CON 20 MEQ CR tablet, TAKE 1 TABLET TWICE A DAY, Disp: 60 tablet, Rfl: 11    loratadine (CLARITIN) 10 MG tablet, Take  by mouth Daily., Disp: , Rfl:     miconazole (MICOTIN) 2 % powder, Apply  topically to the appropriate area as directed Every 12 (Twelve) Hours., Disp: , Rfl:     Multiple Vitamins-Minerals (CENTRUM ADULTS PO), Take  by mouth Daily., Disp: , Rfl:     O2 (OXYGEN), 4 Liter O2 - CONTINUOUS (route: Oxygen), Disp: , Rfl:     sennosides-docusate (PERICOLACE) 8.6-50 MG per tablet, Take 2 tablets by mouth 2 (Two) Times a Day., Disp: , Rfl:     Past Medical History:   Diagnosis Date    Abnormal ECG 4.5.2023    new onset a fib    Allergic     Arthritis     Asthma March, 2021    Oxygen at home    Atrial fibrillation 04/18/2023    Cancer     basal cell     Cholelithiasis 4.12.23    long history of gallstones without issue, cholecystitis 4.12.23 with E.faecalis bacteremia    COPD (chronic obstructive pulmonary disease)     Coronary artery disease 1980's    htn, elevated cholesterol    Difficulty walking     Elevated cholesterol     Environmental allergies     GERD (gastroesophageal reflux disease)     History of  "medical problems 4/2023    new onset a fib with hospitalization for respiratory failure/cholecystitis    HL (hearing loss)     Hyperglycemia     Hyperlipidemia     Hypertension     Obesity     Pulmonary hypertension 04/20/2023    Sleep apnea     Spondylolysis, lumbar region     Stroke unknown    old infarct per CT 4.5.2023    Tremor 5/2023    s/p hypercapnia       Past Surgical History:   Procedure Laterality Date    CARDIAC CATHETERIZATION  circa 2008    [facility]    EYE SURGERY      HERNIA REPAIR      JOINT REPLACEMENT      REPLACEMENT TOTAL KNEE BILATERAL         Family History   Problem Relation Age of Onset    Heart attack Father         age 80       Social History     Tobacco Use    Smoking status: Never    Smokeless tobacco: Never   Vaping Use    Vaping Use: Never used   Substance Use Topics    Alcohol use: No     Comment: caffeine use    Drug use: No         ECG 12 Lead    Date/Time: 9/5/2023 1:10 PM  Performed by: Azra Gomes MD  Authorized by: Azra Gomes MD   Comparison: compared with previous ECG   Similar to previous ECG  Rhythm: atrial fibrillation  Conduction: right bundle branch block and left anterior fascicular block           Objective:     Visit Vitals  /80 (BP Location: Left arm, Patient Position: Sitting, Cuff Size: Adult)   Pulse 72   Ht 172.7 cm (68\")   Wt 122 kg (268 lb)   SpO2 97%   BMI 40.75 kg/m²         Constitutional:       Appearance: Normal appearance. Well-developed.   HENT:      Head: Normocephalic and atraumatic.   Neck:      Vascular: No carotid bruit or JVD.   Pulmonary:      Effort: Pulmonary effort is normal.      Breath sounds: Normal breath sounds.   Cardiovascular:      Normal rate. Irregularly irregular rhythm.      No gallop.    Pulses:     Radial: 2+ bilaterally.  Edema:     Peripheral edema absent.   Abdominal:      Palpations: Abdomen is soft.   Skin:     General: Skin is warm and dry.   Neurological:      Mental Status: Alert and oriented to person, " place, and time.           Assessment:          Diagnosis Plan   1. Other persistent atrial fibrillation        2. Hypercholesterolemia        3. Primary hypertension        4. PVC (premature ventricular contraction)        5. JENISE on CPAP               Plan:       1.  Persistent atrial fibrillation.  Rate controlled despite being off of AV elif blocking agents.  Remains on anticoagulation with apixaban.  Continue the same.    2.  Hypertension.  Well-controlled on his current regimen medications.  Continue the same.  3.  PVCs.  None present at this time.  Asymptomatic in the past.  This has been a chronic issue.  4.  Pulmonary hypertension.  In the mild to moderate range.  Likely related to his underlying lung issues.  5.  Obstructive sleep apnea.  Compliant with BiPAP.  6.  Hyperlipidemia  7.  Asthma  8.  Mild nonobstructive coronary artery disease.  No evidence of progression.  Since he is now on chronic anticoagulation I think the risk of aspirin outweigh risk.  We will have the patient discontinue the aspirin at this time.  9.  Morbid obesity.    We will plan on seeing the patient back again in 6 months.

## 2023-09-05 ENCOUNTER — OFFICE VISIT (OUTPATIENT)
Dept: CARDIOLOGY | Facility: CLINIC | Age: 88
End: 2023-09-05
Payer: MEDICARE

## 2023-09-05 VITALS
WEIGHT: 268 LBS | OXYGEN SATURATION: 97 % | SYSTOLIC BLOOD PRESSURE: 138 MMHG | DIASTOLIC BLOOD PRESSURE: 80 MMHG | HEIGHT: 68 IN | BODY MASS INDEX: 40.62 KG/M2 | HEART RATE: 72 BPM

## 2023-09-05 DIAGNOSIS — I10 PRIMARY HYPERTENSION: ICD-10-CM

## 2023-09-05 DIAGNOSIS — E78.00 HYPERCHOLESTEROLEMIA: ICD-10-CM

## 2023-09-05 DIAGNOSIS — I48.19 OTHER PERSISTENT ATRIAL FIBRILLATION: Primary | ICD-10-CM

## 2023-09-05 DIAGNOSIS — I48.91 NEW ONSET ATRIAL FIBRILLATION: ICD-10-CM

## 2023-09-05 DIAGNOSIS — I49.3 PVC (PREMATURE VENTRICULAR CONTRACTION): ICD-10-CM

## 2023-09-05 DIAGNOSIS — G47.33 OSA ON CPAP: ICD-10-CM

## 2023-09-05 DIAGNOSIS — Z99.89 OSA ON CPAP: ICD-10-CM

## 2023-09-05 PROCEDURE — 99214 OFFICE O/P EST MOD 30 MIN: CPT | Performed by: INTERNAL MEDICINE

## 2023-09-05 PROCEDURE — 1159F MED LIST DOCD IN RCRD: CPT | Performed by: INTERNAL MEDICINE

## 2023-09-05 PROCEDURE — 1160F RVW MEDS BY RX/DR IN RCRD: CPT | Performed by: INTERNAL MEDICINE

## 2023-09-05 PROCEDURE — 93000 ELECTROCARDIOGRAM COMPLETE: CPT | Performed by: INTERNAL MEDICINE

## 2023-09-13 NOTE — TELEPHONE ENCOUNTER
[employer] calling to follow up on requested orders from 8/28/23.They were informed PCP is out of office. Please follow up when he returns about completing order, thanks!

## 2023-09-19 ENCOUNTER — TRANSCRIBE ORDERS (OUTPATIENT)
Dept: ADMINISTRATIVE | Facility: HOSPITAL | Age: 88
End: 2023-09-19
Payer: MEDICARE

## 2023-09-19 DIAGNOSIS — J84.9 ILD (INTERSTITIAL LUNG DISEASE): Primary | ICD-10-CM

## 2023-09-27 ENCOUNTER — TELEPHONE (OUTPATIENT)
Dept: FAMILY MEDICINE CLINIC | Facility: CLINIC | Age: 88
End: 2023-09-27
Payer: MEDICARE

## 2023-09-27 NOTE — TELEPHONE ENCOUNTER
Hub staff attempted to follow warm transfer process and was unsuccessful     Caller: SCOTT    Relationship to patient:     Best call back number: [phone]     Patient is needing: SCOTT WITH [employer] IS CALLING REQUESTING ADDITIONAL INFORMATION FROM THE PREVIOUS OFFICE VISIT ON 08/02/23    OFFICE PICKED UP AND HUNG UP WITHOUT SAYING ANYTHING.

## 2023-09-27 NOTE — TELEPHONE ENCOUNTER
Caller: [facility] - [place] [id number] [place] [address] [phone] FX    Relationship: Pharmacy    Best call back number: [phone]    Medication needed:   Requested Prescriptions     Pending Prescriptions Disp Refills   • Ventolin  (90 Base) MCG/ACT inhaler 18 g 3     Sig: Inhale 2 puffs Every 6 (Six) Hours As Needed for Wheezing. Pt wont take d/t cost       When do you need the refill by: 04/05    What additional details did the patient provide when requesting the medication: PHARMACY CALLING TO SEE IF A NEW PRESCRIPTION CAN BE WRITTEN.  THE PATIENTS INSURANCE NO LONGER PAYS FOR THE INHALER ABOVE.  PHARMACY REQUESTING IF THE SUBSTITUTION OF ALBUTEROL PROAIR HFA INHALER 8.5 GRAM STRENGTH 90 MCG COULD BE ITS REPLACEMENT.   REF NUMBER [id number]    Does the patient have less than a 3 day supply:  [] Yes  [] No    What is the patient's preferred pharmacy:    [facility] - [place], [place] - [address] [place], [place] [id number] [facility] [phone] FX           
Caller: Harry Ventura    Relationship: Self    Best call back number: [phone]    What is the best time to reach you: ANYTIME    Who are you requesting to speak with (clinical staff, provider,  specific staff member): CLINICAL STAFF    Do you know the name of the person who called: HARRY VENTURA     What was the call regarding: PATIENT SAID HE RECEIVED AN ALERT FROM [facility] SAYING THEY COULD NOT REFILL HIS PRESCRIPTION FOR VENTOLIN WITHOUT APPROVAL FROM THE DOCTOR    Do you require a callback: YES    
[facility]  
REQUEST SENT TO DR VALDEZ  
No

## 2023-09-27 NOTE — TELEPHONE ENCOUNTER
Needs a face to face appointment or an addendum to 8/2 office visit. Let me

## 2023-10-04 ENCOUNTER — TELEPHONE (OUTPATIENT)
Dept: FAMILY MEDICINE CLINIC | Facility: CLINIC | Age: 88
End: 2023-10-04

## 2023-10-04 NOTE — TELEPHONE ENCOUNTER
Caller: SCOTT    Relationship: [employer]    Best call back number: [phone]    What is the best time to reach you: ANYTIME    Who are you requesting to speak with (clinical staff, provider,  specific staff member): JALEN    What was the call regarding: SCOTT FROM [employer] IS REQUESTING A CALLBACK FROM JALEN REGARDING THE ORDERS FOR THE PATIENT'S WHEELCHAIR AND THINGS THAT WERE NEEDING CORRECTED ON THE ORDERS. HUB ATTEMPTED TO WARMS TRANSFER BUT UNABLE. SCOTT IS REQUESTING A CALLBACK.

## 2023-10-12 NOTE — TELEPHONE ENCOUNTER
Caller: SCOTT    Relationship:     Best call back number: [phone]    What is the best time to reach you: ANY    Who are you requesting to speak with (clinical staff, provider,  specific staff member): CLINICAL STAFF    Do you know the name of the person who called:      What was the call regarding: SCOTT FROM [facility] [employer] [employer] IS REQUESTING A CALLBACK FROM [facility] REGARDING THE ORDERS FOR THE PATIENT'S WHEELCHAIR.     Is it okay if the provider responds through MyChart:

## 2023-10-24 PROBLEM — M48.062 SPINAL STENOSIS OF LUMBAR REGION WITH NEUROGENIC CLAUDICATION: Status: ACTIVE | Noted: 2023-10-24

## 2023-11-01 DIAGNOSIS — E78.00 HYPERCHOLESTEROLEMIA: ICD-10-CM

## 2023-11-01 DIAGNOSIS — D50.9 IRON DEFICIENCY ANEMIA, UNSPECIFIED IRON DEFICIENCY ANEMIA TYPE: ICD-10-CM

## 2023-11-01 DIAGNOSIS — I10 PRIMARY HYPERTENSION: Primary | ICD-10-CM

## 2023-11-03 LAB
ALBUMIN SERPL-MCNC: 4 G/DL (ref 3.6–4.6)
ALBUMIN/GLOB SERPL: 1.5 {RATIO} (ref 1.2–2.2)
ALP SERPL-CCNC: 96 IU/L (ref 44–121)
ALT SERPL-CCNC: 21 IU/L (ref 0–44)
APPEARANCE UR: CLEAR
AST SERPL-CCNC: 24 IU/L (ref 0–40)
BACTERIA #/AREA URNS HPF: NORMAL /[HPF]
BASOPHILS # BLD AUTO: 0 X10E3/UL (ref 0–0.2)
BASOPHILS NFR BLD AUTO: 1 %
BILIRUB SERPL-MCNC: 0.5 MG/DL (ref 0–1.2)
BILIRUB UR QL STRIP: NEGATIVE
BUN SERPL-MCNC: 17 MG/DL (ref 10–36)
BUN/CREAT SERPL: 19 (ref 10–24)
CALCIUM SERPL-MCNC: 9.8 MG/DL (ref 8.6–10.2)
CASTS URNS QL MICRO: NORMAL /LPF
CHLORIDE SERPL-SCNC: 99 MMOL/L (ref 96–106)
CHOLEST SERPL-MCNC: 178 MG/DL (ref 100–199)
CHOLEST/HDLC SERPL: 3.4 RATIO (ref 0–5)
CO2 SERPL-SCNC: 29 MMOL/L (ref 20–29)
COLOR UR: YELLOW
CREAT SERPL-MCNC: 0.91 MG/DL (ref 0.76–1.27)
EGFRCR SERPLBLD CKD-EPI 2021: 79 ML/MIN/1.73
EOSINOPHIL # BLD AUTO: 0.3 X10E3/UL (ref 0–0.4)
EOSINOPHIL NFR BLD AUTO: 4 %
EPI CELLS #/AREA URNS HPF: NORMAL /HPF (ref 0–10)
ERYTHROCYTE [DISTWIDTH] IN BLOOD BY AUTOMATED COUNT: 13.2 % (ref 11.6–15.4)
GLOBULIN SER CALC-MCNC: 2.7 G/DL (ref 1.5–4.5)
GLUCOSE SERPL-MCNC: 104 MG/DL (ref 70–99)
GLUCOSE UR QL STRIP: NEGATIVE
HCT VFR BLD AUTO: 41.3 % (ref 37.5–51)
HDLC SERPL-MCNC: 53 MG/DL
HGB BLD-MCNC: 13.9 G/DL (ref 13–17.7)
HGB UR QL STRIP: NEGATIVE
IMM GRANULOCYTES # BLD AUTO: 0 X10E3/UL (ref 0–0.1)
IMM GRANULOCYTES NFR BLD AUTO: 0 %
IRON SATN MFR SERPL: 28 % (ref 15–55)
IRON SERPL-MCNC: 80 UG/DL (ref 38–169)
KETONES UR QL STRIP: NEGATIVE
LDLC SERPL CALC-MCNC: 106 MG/DL (ref 0–99)
LEUKOCYTE ESTERASE UR QL STRIP: NEGATIVE
LYMPHOCYTES # BLD AUTO: 2.4 X10E3/UL (ref 0.7–3.1)
LYMPHOCYTES NFR BLD AUTO: 37 %
MCH RBC QN AUTO: 29.6 PG (ref 26.6–33)
MCHC RBC AUTO-ENTMCNC: 33.7 G/DL (ref 31.5–35.7)
MCV RBC AUTO: 88 FL (ref 79–97)
MICRO URNS: NORMAL
MICRO URNS: NORMAL
MONOCYTES # BLD AUTO: 0.5 X10E3/UL (ref 0.1–0.9)
MONOCYTES NFR BLD AUTO: 8 %
NEUTROPHILS # BLD AUTO: 3.4 X10E3/UL (ref 1.4–7)
NEUTROPHILS NFR BLD AUTO: 50 %
NITRITE UR QL STRIP: NEGATIVE
PH UR STRIP: 6.5 [PH] (ref 5–7.5)
PLATELET # BLD AUTO: 178 X10E3/UL (ref 150–450)
POTASSIUM SERPL-SCNC: 4.7 MMOL/L (ref 3.5–5.2)
PROT SERPL-MCNC: 6.7 G/DL (ref 6–8.5)
PROT UR QL STRIP: NEGATIVE
RBC # BLD AUTO: 4.69 X10E6/UL (ref 4.14–5.8)
RBC #/AREA URNS HPF: NORMAL /HPF (ref 0–2)
SODIUM SERPL-SCNC: 142 MMOL/L (ref 134–144)
SP GR UR STRIP: 1.01 (ref 1–1.03)
TIBC SERPL-MCNC: 281 UG/DL (ref 250–450)
TRIGL SERPL-MCNC: 103 MG/DL (ref 0–149)
UIBC SERPL-MCNC: 201 UG/DL (ref 111–343)
UROBILINOGEN UR STRIP-MCNC: 0.2 MG/DL (ref 0.2–1)
VLDLC SERPL CALC-MCNC: 19 MG/DL (ref 5–40)
WBC # BLD AUTO: 6.7 X10E3/UL (ref 3.4–10.8)
WBC #/AREA URNS HPF: NORMAL /HPF (ref 0–5)

## 2023-11-08 ENCOUNTER — OFFICE VISIT (OUTPATIENT)
Dept: FAMILY MEDICINE CLINIC | Facility: CLINIC | Age: 88
End: 2023-11-08
Payer: MEDICARE

## 2023-11-08 VITALS
HEIGHT: 68 IN | RESPIRATION RATE: 20 BRPM | DIASTOLIC BLOOD PRESSURE: 74 MMHG | WEIGHT: 270 LBS | BODY MASS INDEX: 40.92 KG/M2 | TEMPERATURE: 98.2 F | HEART RATE: 54 BPM | OXYGEN SATURATION: 96 % | SYSTOLIC BLOOD PRESSURE: 136 MMHG

## 2023-11-08 DIAGNOSIS — M47.16 OSTEOARTHRITIS OF LUMBAR SPINE WITH MYELOPATHY: ICD-10-CM

## 2023-11-08 DIAGNOSIS — G47.33 OSA ON CPAP: ICD-10-CM

## 2023-11-08 DIAGNOSIS — R53.1 WEAKNESS: ICD-10-CM

## 2023-11-08 DIAGNOSIS — J98.4 RESTRICTIVE LUNG DISEASE: ICD-10-CM

## 2023-11-08 DIAGNOSIS — M48.062 SPINAL STENOSIS OF LUMBAR REGION WITH NEUROGENIC CLAUDICATION: ICD-10-CM

## 2023-11-08 DIAGNOSIS — E66.01 MORBID OBESITY: ICD-10-CM

## 2023-11-08 DIAGNOSIS — I10 PRIMARY HYPERTENSION: Primary | ICD-10-CM

## 2023-11-09 ENCOUNTER — HOSPITAL ENCOUNTER (OUTPATIENT)
Dept: CT IMAGING | Facility: HOSPITAL | Age: 88
Discharge: HOME | End: 2023-11-09
Admitting: INTERNAL MEDICINE
Payer: MEDICARE

## 2023-11-09 DIAGNOSIS — J84.9 ILD (INTERSTITIAL LUNG DISEASE): ICD-10-CM

## 2023-11-09 PROCEDURE — 71250 CT THORAX DX C-: CPT

## 2023-11-20 ENCOUNTER — TRANSCRIBE ORDERS (OUTPATIENT)
Dept: ADMINISTRATIVE | Facility: HOSPITAL | Age: 88
End: 2023-11-20
Payer: MEDICARE

## 2023-11-20 DIAGNOSIS — R91.1 LUNG NODULE: Primary | ICD-10-CM

## 2023-11-21 NOTE — PROGRESS NOTES
Answers submitted by the patient for this visit:  Primary Reason for Visit (Submitted on 11/6/2023)  What is the primary reason for your visit?: Other  Other (Submitted on 11/6/2023)  Please describe your symptoms.: Power wheelchair face to face office visit mobility evaluation progress note needed, , Refills needed for continuity of care through Jan 2024  Have you had these symptoms before?: Yes  How long have you been having these symptoms?: Greater than 2 weeks  Please list any medications you are currently taking for this condition.: my chart is accurate  Subjective   Harry Potts is a 89 y.o. male. Patient is here today for   Chief Complaint   Patient presents with    Follow-up     3 month f/u with fasting labs need refills of furosemide, apixaban, zetia, esomeprazole, and azelastine all 90 day supplies           Vitals:    11/08/23 1400   BP: 136/74   Pulse: 54   Resp: 20   Temp: 98.2 °F (36.8 °C)   SpO2: 96%     Body mass index is 41.06 kg/m².      Past Medical History:   Diagnosis Date    Abnormal ECG 4.5.2023    new onset a fib    Allergic     Arthritis     Asthma March, 2021    Oxygen at home    Atrial fibrillation 04/18/2023    Cancer     basal cell     Cholelithiasis 4.12.23    long history of gallstones without issue, cholecystitis 4.12.23 with E.faecalis bacteremia    COPD (chronic obstructive pulmonary disease)     Coronary artery disease 1980's    htn, elevated cholesterol    Difficulty walking     Elevated cholesterol     Environmental allergies     GERD (gastroesophageal reflux disease)     History of medical problems 4/2023    new onset a fib with hospitalization for respiratory failure/cholecystitis    HL (hearing loss)     Hyperglycemia     Hyperlipidemia     Hypertension     Obesity     Pulmonary hypertension 04/20/2023    Sleep apnea     Spondylolysis, lumbar region     Stroke unknown    old infarct per CT 4.5.2023    Tremor 5/2023    s/p hypercapnia      Allergies   Allergen Reactions     Morphine And Related Itching, Rash and Confusion     Patient never wants to have Morphine again in his life    Atorvastatin Other (See Comments)     SPASMS LEG       Dexmedetomidine Other (See Comments)     Dex causes bradycardia and hyptension       Social History     Socioeconomic History    Marital status:    Tobacco Use    Smoking status: Never     Passive exposure: Never    Smokeless tobacco: Never   Vaping Use    Vaping Use: Never used   Substance and Sexual Activity    Alcohol use: No     Comment: caffeine use    Drug use: No    Sexual activity: Not Currently     Partners: Female     Birth control/protection: Abstinence     Comment: Not an issue at my age        Current Outpatient Medications:     albuterol sulfate  (90 Base) MCG/ACT inhaler, Inhale 2 puffs Every 4 (Four) Hours As Needed for Wheezing., Disp: 18 g, Rfl: 5    amLODIPine (NORVASC) 10 MG tablet, Take 1 tablet by mouth Daily., Disp: 90 tablet, Rfl: 3    ammonium lactate (AMLACTIN) 12 % cream, Apply 1 g topically to the appropriate area as directed Every 12 (Twelve) Hours., Disp: , Rfl:     apixaban (ELIQUIS) 5 MG tablet tablet, Take 1 tablet by mouth Every 12 (Twelve) Hours. Indications: Atrial Fibrillation, Disp: 180 tablet, Rfl: 1    Azelastine HCl 137 MCG/SPRAY solution, USE 1 SPRAY IN EACH NOSTRIL AS DIRECTED BY PROVIDER DAILY (Patient taking differently: 1 spray by Each Nare route Daily.), Disp: 30 mL, Rfl: 2    esomeprazole (nexIUM) 40 MG capsule, TAKE 1 CAPSULE DAILY, Disp: 90 capsule, Rfl: 3    ezetimibe (ZETIA) 10 MG tablet, TAKE 1 TABLET DAILY, Disp: 90 tablet, Rfl: 3    fluticasone (FLONASE) 50 MCG/ACT nasal spray, 1 spray into the nostril(s) as directed by provider Daily., Disp: 11.1 mL, Rfl: 2    furosemide (LASIX) 20 MG tablet, Take 1 tablet by mouth Daily., Disp: 7 tablet, Rfl: 0    KLOR-CON 20 MEQ CR tablet, TAKE 1 TABLET TWICE A DAY, Disp: 60 tablet, Rfl: 11    loratadine (CLARITIN) 10 MG tablet, Take  by mouth  Daily., Disp: , Rfl:     miconazole (MICOTIN) 2 % powder, Apply  topically to the appropriate area as directed Every 12 (Twelve) Hours., Disp: , Rfl:     Multiple Vitamins-Minerals (CENTRUM ADULTS PO), Take  by mouth Daily., Disp: , Rfl:     O2 (OXYGEN), 4 Liter O2 - CONTINUOUS (route: Oxygen), Disp: , Rfl:     sennosides-docusate (PERICOLACE) 8.6-50 MG per tablet, Take 2 tablets by mouth 2 (Two) Times a Day., Disp: , Rfl:      Objective     History of Present Illness  This pleasant, articulate 89-year-old man is here with his wife and an assistant who drives for them.    He has end-stage lumbar spine stenosis with bilateral lower extremity radicular pain, morbid obesity, restrictive lung disease, pulmonary hypertension on CPAP to treat his obstructive sleep apnea.    He is here for couple things today but primarily to take care of documentation required by Medicare for him to receive a wheelchair.       Review of Systems   Constitutional: Negative.    HENT: Negative.     Respiratory: Negative.     Cardiovascular: Negative.    Musculoskeletal:         Severe chronic lumbar spine pain secondary to lumbar spinal stenosis and bilateral lower extremity radicular pain.   Psychiatric/Behavioral: Negative.         Physical Exam  Vitals and nursing note reviewed.   Constitutional:       Appearance: Normal appearance. He is obese.      Comments: Pleasant, neatly groomed, in no distress.  BMI 41.    He is in a wheelchair today.   Cardiovascular:      Rate and Rhythm: Regular rhythm.      Heart sounds: Normal heart sounds. No murmur heard.     No gallop.   Pulmonary:      Effort: No respiratory distress.      Breath sounds: Normal breath sounds. No wheezing or rales.   Neurological:      Mental Status: He is alert and oriented to person, place, and time.   Psychiatric:         Mood and Affect: Mood normal.         Behavior: Behavior normal.         Thought Content: Thought content normal.           Problems Addressed this  Visit          Cardiac and Vasculature    Hypertension - Primary       Endocrine and Metabolic    Morbid obesity       Neuro    Osteoarthritis of lumbar spine with myelopathy    Spinal stenosis of lumbar region with neurogenic claudication       Pulmonary and Pneumonias    Restrictive lung disease       Sleep    JENISE on CPAP       Symptoms and Signs    Weakness     Diagnoses         Codes Comments    Primary hypertension    -  Primary ICD-10-CM: I10  ICD-9-CM: 401.9     Morbid obesity     ICD-10-CM: E66.01  ICD-9-CM: 278.01     Spinal stenosis of lumbar region with neurogenic claudication     ICD-10-CM: M48.062  ICD-9-CM: 724.03     Osteoarthritis of lumbar spine with myelopathy     ICD-10-CM: M47.16  ICD-9-CM: 721.42     Restrictive lung disease     ICD-10-CM: J98.4  ICD-9-CM: 518.89     JENISE on CPAP     ICD-10-CM: G47.33  ICD-9-CM: 327.23     Weakness     ICD-10-CM: R53.1  ICD-9-CM: 780.79               PLAN  His hypertension is relatively well controlled.    He has obstructive sleep apnea on CPAP.    The combination of his lumbar spinal stenosis, neurogenic claudication, restrictive lung disease, morbid obesity, and chronic diffuse weakness create a situation where in order to be mobile to almost any degree he will require a wheelchair.    Please see other documentation for this today in media.  No follow-ups on file.

## 2023-11-28 ENCOUNTER — HOSPITAL ENCOUNTER (OUTPATIENT)
Dept: PET IMAGING | Facility: HOSPITAL | Age: 88
Discharge: HOME | End: 2023-11-28
Payer: MEDICARE

## 2023-11-28 DIAGNOSIS — R91.1 LUNG NODULE: ICD-10-CM

## 2023-11-28 LAB — GLUCOSE BLDC GLUCOMTR-MCNC: 103 MG/DL (ref 70–130)

## 2023-11-28 PROCEDURE — 0 FLUDEOXYGLUCOSE F18 SOLUTION: Performed by: INTERNAL MEDICINE

## 2023-11-28 PROCEDURE — 82948 REAGENT STRIP/BLOOD GLUCOSE: CPT

## 2023-11-28 PROCEDURE — A9552 F18 FDG: HCPCS | Performed by: INTERNAL MEDICINE

## 2023-11-28 PROCEDURE — 78815 PET IMAGE W/CT SKULL-THIGH: CPT

## 2023-11-28 RX ADMIN — FLUDEOXYGLUCOSE F 18 1 DOSE: 200 INJECTION, SOLUTION INTRAVENOUS at 10:32

## 2023-12-11 ENCOUNTER — TRANSCRIBE ORDERS (OUTPATIENT)
Dept: ADMINISTRATIVE | Facility: HOSPITAL | Age: 88
End: 2023-12-11
Payer: MEDICARE

## 2023-12-11 DIAGNOSIS — R91.1 PULMONARY NODULE: Primary | ICD-10-CM

## 2023-12-26 DIAGNOSIS — I10 PRIMARY HYPERTENSION: ICD-10-CM

## 2023-12-27 RX ORDER — FUROSEMIDE 20 MG/1
20 TABLET ORAL DAILY
Qty: 90 TABLET | Refills: 3 | Status: SHIPPED | OUTPATIENT
Start: 2023-12-27

## 2024-01-08 RX ORDER — ESOMEPRAZOLE MAGNESIUM 40 MG/1
CAPSULE, DELAYED RELEASE ORAL
Qty: 90 CAPSULE | Refills: 1 | Status: SHIPPED | OUTPATIENT
Start: 2024-01-08

## 2024-01-08 RX ORDER — EZETIMIBE 10 MG/1
TABLET ORAL
Qty: 90 TABLET | Refills: 1 | Status: SHIPPED | OUTPATIENT
Start: 2024-01-08

## 2024-02-14 ENCOUNTER — OFFICE VISIT (OUTPATIENT)
Dept: FAMILY MEDICINE CLINIC | Facility: CLINIC | Age: 89
End: 2024-02-14
Payer: MEDICARE

## 2024-02-14 VITALS
DIASTOLIC BLOOD PRESSURE: 88 MMHG | OXYGEN SATURATION: 95 % | RESPIRATION RATE: 18 BRPM | HEART RATE: 60 BPM | BODY MASS INDEX: 41.06 KG/M2 | TEMPERATURE: 95.9 F | SYSTOLIC BLOOD PRESSURE: 150 MMHG | HEIGHT: 68 IN

## 2024-02-14 DIAGNOSIS — I48.91 NEW ONSET ATRIAL FIBRILLATION: ICD-10-CM

## 2024-02-14 DIAGNOSIS — G47.33 OSA ON CPAP: ICD-10-CM

## 2024-02-14 DIAGNOSIS — M48.062 SPINAL STENOSIS OF LUMBAR REGION WITH NEUROGENIC CLAUDICATION: ICD-10-CM

## 2024-02-14 DIAGNOSIS — I48.19 OTHER PERSISTENT ATRIAL FIBRILLATION: ICD-10-CM

## 2024-02-14 DIAGNOSIS — M25.512 CHRONIC PAIN OF BOTH SHOULDERS: ICD-10-CM

## 2024-02-14 DIAGNOSIS — I27.20 PULMONARY HYPERTENSION: ICD-10-CM

## 2024-02-14 DIAGNOSIS — J98.4 RESTRICTIVE LUNG DISEASE: ICD-10-CM

## 2024-02-14 DIAGNOSIS — M25.511 CHRONIC PAIN OF BOTH SHOULDERS: ICD-10-CM

## 2024-02-14 DIAGNOSIS — I10 PRIMARY HYPERTENSION: Primary | ICD-10-CM

## 2024-02-14 DIAGNOSIS — E78.00 HYPERCHOLESTEROLEMIA: ICD-10-CM

## 2024-02-14 DIAGNOSIS — G89.29 CHRONIC PAIN OF BOTH SHOULDERS: ICD-10-CM

## 2024-02-14 DIAGNOSIS — Z00.00 MEDICARE ANNUAL WELLNESS VISIT, SUBSEQUENT: ICD-10-CM

## 2024-02-14 DIAGNOSIS — E66.9 OBESITY (BMI 30-39.9): ICD-10-CM

## 2024-02-16 PROBLEM — M25.512 CHRONIC PAIN OF BOTH SHOULDERS: Status: ACTIVE | Noted: 2024-02-16

## 2024-02-16 PROBLEM — M25.511 CHRONIC PAIN OF BOTH SHOULDERS: Status: ACTIVE | Noted: 2024-02-16

## 2024-02-16 PROBLEM — G89.29 CHRONIC PAIN OF BOTH SHOULDERS: Status: ACTIVE | Noted: 2024-02-16

## 2024-02-24 DIAGNOSIS — I48.91 NEW ONSET ATRIAL FIBRILLATION: ICD-10-CM

## 2024-02-26 RX ORDER — APIXABAN 5 MG/1
TABLET, FILM COATED ORAL
Qty: 180 TABLET | Refills: 3 | Status: SHIPPED | OUTPATIENT
Start: 2024-02-26

## 2024-03-13 ENCOUNTER — HOSPITAL ENCOUNTER (OUTPATIENT)
Dept: CT IMAGING | Facility: HOSPITAL | Age: 89
Discharge: HOME | End: 2024-03-13
Admitting: INTERNAL MEDICINE
Payer: MEDICARE

## 2024-03-13 DIAGNOSIS — R91.1 PULMONARY NODULE: ICD-10-CM

## 2024-03-13 PROCEDURE — 71250 CT THORAX DX C-: CPT

## 2024-03-14 ENCOUNTER — OFFICE VISIT (OUTPATIENT)
Dept: CARDIOLOGY | Facility: CLINIC | Age: 89
End: 2024-03-14
Payer: MEDICARE

## 2024-03-14 VITALS
DIASTOLIC BLOOD PRESSURE: 82 MMHG | WEIGHT: 270 LBS | BODY MASS INDEX: 42.38 KG/M2 | HEIGHT: 67 IN | SYSTOLIC BLOOD PRESSURE: 148 MMHG | HEART RATE: 103 BPM

## 2024-03-14 DIAGNOSIS — E78.00 HYPERCHOLESTEROLEMIA: Primary | ICD-10-CM

## 2024-03-14 DIAGNOSIS — I49.3 PVC (PREMATURE VENTRICULAR CONTRACTION): ICD-10-CM

## 2024-03-14 DIAGNOSIS — I48.19 OTHER PERSISTENT ATRIAL FIBRILLATION: ICD-10-CM

## 2024-03-14 NOTE — PROGRESS NOTES
Subjective:     Encounter Date:03/14/2024      Patient ID: Harry Potts is a 89 y.o. male.    Chief Complaint:follow up HTN, afib  History of Present Illness  This is a 89 y/o man who follows with Dr. Gomes and is new to me today. He has a pmhx of hypertension, hyperlipidemia, obesity, asthma, JENISE on CPAP, PVCs and persistent atrial fibrillation.     He is here today for a follow up visit. He is doing well from a cardiac standpoint. He walks up and down the hallway in his patio home several times a day. He says his shortness of breath is about the same overall. He wears 2L O2 during the day and 4L at night. He denies any chest pain, palpitations, dizziness or syncope. He denies any swelling in his lower extremities, orthopnea or PND. His blood pressure and heart rate both are typically well controlled at home. He denies any bleeding issues with apixaban.    Prior history:  He was previously a patient of Dr. Jones whom he saw for preoperative clearance in 2006. He had an abnormal EKG and underwent a stress that showed an inferior infarct. He subsequently underwent a cardiac catheterization that showed mild nonobstructive coronary artery disease.normal left ventricular systolic function wall motion with an EF of 61%, mild mitral regurgitation, and grade 1 diastolic dysfunction.  His stress test was negative for ischemia.       Dr. Gomes began seeing the patient in 2019 when he presented with complaints of dyspnea, bradycardia and orthopnea. He was noted to have frequent PVCs and ventricular bigeminy. Echocardiogram showed   I have reviewed and updated as appropriate allergies, current medications, past family history, past medical history, past surgical history and problem list. He was also being treated for asthma at that time.    He was lost to follow up until 2022. He had suffered a fall while in the kitchen helping his wife prepare dinner. Because of the fall, he began monitoring his heart rates and blood  pressure. He noticed that his rates were in the low 40s. His daughter, who is a pharmacist suggested decreasing metoprolol succinate to 25 mg daily. Dr. Gomes felt his bradycardia was likely due to frequent PVCs however, she agreed with keeping him on the lower dose since he felt better.    In April 2023, he was admitted for confusion and was noted to be in atrial fibrillation. Echocardiogram showed normal left ventricular systolic function and wall motion with an EF of 65%, mild biatrial enlargement, mildly dilated right ventricle, and mild to moderately elevated right ventricular systolic pressure. He was placed on anticoagulation with apixaban at that time. He was discharged to rehab for about 2 months.     He followed up with Dr Gomes in September 2023 and was doing ok. He was noted to be off metoprolol completely and rates were remaining well controlled. He was not very ambulatory because of chronic issues with his back. He was taken off aspirin since he was on chronic anticoagulation and the risks of bleeding were felt to be greater than the benefit.      Review of Systems   Constitutional: Negative for fever, malaise/fatigue, weight gain and weight loss.   HENT:  Negative for congestion, hoarse voice and sore throat.    Eyes:  Negative for blurred vision and double vision.   Cardiovascular:  Positive for dyspnea on exertion. Negative for chest pain, leg swelling, orthopnea, palpitations and syncope.   Respiratory:  Positive for shortness of breath. Negative for cough and wheezing.    Gastrointestinal:  Negative for abdominal pain, hematemesis, hematochezia and melena.   Genitourinary:  Negative for dysuria and hematuria.   Neurological:  Negative for dizziness, headaches, light-headedness and numbness.   Psychiatric/Behavioral:  Negative for depression. The patient is not nervous/anxious.          Current Outpatient Medications:     albuterol sulfate  (90 Base) MCG/ACT inhaler, Inhale 2 puffs Every 4  (Four) Hours As Needed for Wheezing., Disp: 18 g, Rfl: 5    amLODIPine (NORVASC) 10 MG tablet, Take 1 tablet by mouth Daily., Disp: 90 tablet, Rfl: 3    ammonium lactate (AMLACTIN) 12 % cream, Apply 1 g topically to the appropriate area as directed Every 12 (Twelve) Hours., Disp: , Rfl:     Azelastine HCl 137 MCG/SPRAY solution, USE 1 SPRAY IN EACH NOSTRIL AS DIRECTED BY PROVIDER DAILY (Patient taking differently: 1 spray by Each Nare route Daily.), Disp: 30 mL, Rfl: 2    Diclofenac Sodium (VOLTAREN) 1 % gel gel, Apply 4 g topically to the appropriate area as directed 3 (Three) Times a Day., Disp: 150 g, Rfl: 1    Eliquis 5 MG tablet tablet, TAKE 1 TABLET EVERY 12 HOURS. INDICATIONS: ATRIAL FIBRILLATION, Disp: 180 tablet, Rfl: 3    esomeprazole (nexIUM) 40 MG capsule, TAKE 1 CAPSULE DAILY, Disp: 90 capsule, Rfl: 1    ezetimibe (ZETIA) 10 MG tablet, TAKE 1 TABLET DAILY, Disp: 90 tablet, Rfl: 1    fluticasone (FLONASE) 50 MCG/ACT nasal spray, 1 spray into the nostril(s) as directed by provider Daily., Disp: 11.1 mL, Rfl: 2    furosemide (LASIX) 20 MG tablet, TAKE 1 TABLET DAILY, Disp: 90 tablet, Rfl: 3    KLOR-CON 20 MEQ CR tablet, TAKE 1 TABLET TWICE A DAY, Disp: 60 tablet, Rfl: 11    loratadine (CLARITIN) 10 MG tablet, Take  by mouth Daily., Disp: , Rfl:     miconazole (MICOTIN) 2 % powder, Apply  topically to the appropriate area as directed Every 12 (Twelve) Hours., Disp: , Rfl:     Multiple Vitamins-Minerals (CENTRUM ADULTS PO), Take  by mouth Daily., Disp: , Rfl:     O2 (OXYGEN), 4 Liter O2 - CONTINUOUS (route: Oxygen), Disp: , Rfl:     sennosides-docusate (PERICOLACE) 8.6-50 MG per tablet, Take 2 tablets by mouth 2 (Two) Times a Day., Disp: , Rfl:     Past Medical History:   Diagnosis Date    Abnormal ECG 4.5.2023    new onset a fib    Allergic     Arthritis     Asthma March, 2021    Oxygen at home    Atrial fibrillation 04/18/2023    Cancer     basal cell     Cholelithiasis 4.12.23    long history of  "gallstones without issue, cholecystitis 4.12.23 with E.faecalis bacteremia    COPD (chronic obstructive pulmonary disease)     Coronary artery disease 1980's    htn, elevated cholesterol    Difficulty walking     Elevated cholesterol     Environmental allergies     GERD (gastroesophageal reflux disease)     History of medical problems 4/2023    new onset a fib with hospitalization for respiratory failure/cholecystitis    HL (hearing loss)     Hyperglycemia     Hyperlipidemia     Hypertension     Obesity     Pulmonary hypertension 04/20/2023    Sleep apnea     Spondylolysis, lumbar region     Stroke unknown    old infarct per CT 4.5.2023    Tremor 5/2023    s/p hypercapnia       Past Surgical History:   Procedure Laterality Date    CARDIAC CATHETERIZATION  circa 2008    [facility]    EYE SURGERY      HERNIA REPAIR      JOINT REPLACEMENT      REPLACEMENT TOTAL KNEE BILATERAL         Family History   Problem Relation Age of Onset    Heart attack Father         age 80       Social History     Tobacco Use    Smoking status: Never     Passive exposure: Never    Smokeless tobacco: Never   Vaping Use    Vaping status: Never Used   Substance Use Topics    Alcohol use: No     Comment: caffeine use    Drug use: No         ECG 12 Lead    Date/Time: 3/15/2024 10:16 AM  Performed by: Samaria Andres APRN    Authorized by: Samaria Andres APRN  Comparison: compared with previous ECG from 9/5/2023  Similar to previous ECG  Rhythm: atrial fibrillation  Ectopy: unifocal PVCs  Conduction: right bundle branch block             Objective:     Visit Vitals  /82   Pulse 103   Ht 170.2 cm (67\")   Wt 122 kg (270 lb)   BMI 42.29 kg/m²             Physical Exam  Constitutional:       Appearance: Normal appearance. He is obese.   HENT:      Head: Normocephalic.   Neck:      Vascular: No carotid bruit.   Cardiovascular:      Rate and Rhythm: Normal rate. Rhythm irregularly irregular.      Chest Wall: PMI is not displaced.      Pulses: " Normal pulses.           Radial pulses are 2+ on the right side and 2+ on the left side.        Posterior tibial pulses are 2+ on the right side and 2+ on the left side.      Heart sounds: Normal heart sounds. No murmur heard.     No friction rub. No gallop.   Pulmonary:      Effort: Pulmonary effort is normal.      Breath sounds: Normal breath sounds.   Abdominal:      General: Bowel sounds are normal. There is no distension.      Palpations: Abdomen is soft.   Musculoskeletal:      Right lower leg: No edema.      Left lower leg: No edema.   Skin:     General: Skin is warm and dry.      Capillary Refill: Capillary refill takes less than 2 seconds.   Neurological:      Mental Status: He is alert and oriented to person, place, and time.   Psychiatric:         Mood and Affect: Mood normal.         Behavior: Behavior normal.         Thought Content: Thought content normal.          Lab Review:   Lipid Panel          11/2/2023    11:02   Lipid Panel   Total Cholesterol 178    Triglycerides 103    HDL Cholesterol 53    VLDL Cholesterol 19    LDL Cholesterol  106          Cardiac Procedures:       Assessment:         Diagnoses and all orders for this visit:    1. Hypercholesterolemia (Primary)    2. Other persistent atrial fibrillation    3. PVC (premature ventricular contraction)    Other orders  -     ECG 12 Lead            Plan:       Persistant atrial fibrillation: rates relatively well controlled at home off metoprolol. No reported bleeding issues with apixaban. Continue with current treatment plan.  HLD:on Zetia. Goal LDL < 70. His most recent was 106 in 11/2023. Intolerant to statins. Given his age and stable EKG, I would not recommend any additional medications for treatment of his LDL.  HTN: blood pressure stable. No changes.  PVCs: asymptomatic  JENISE: treated  Pulmonary hypertension  Mild CAD: no longer on aspirin. EKG is stable. No anginal symptoms reported  Morbid ovesity    Thank you for allowing me to  participate in this patient's care. Please call with any questions or concerns. Mr. Potts will follow up with Dr. Gomes in 6 months.          Your medication list            Accurate as of March 14, 2024 11:59 PM. If you have any questions, ask your nurse or doctor.                CHANGE how you take these medications        Instructions Last Dose Given Next Dose Due   Azelastine HCl 137 MCG/SPRAY solution  What changed: See the new instructions.      USE 1 SPRAY IN EACH NOSTRIL AS DIRECTED BY PROVIDER DAILY              CONTINUE taking these medications        Instructions Last Dose Given Next Dose Due   albuterol sulfate  (90 Base) MCG/ACT inhaler  Commonly known as: PROVENTIL HFA;VENTOLIN HFA;PROAIR HFA      Inhale 2 puffs Every 4 (Four) Hours As Needed for Wheezing.       amLODIPine 10 MG tablet  Commonly known as: NORVASC      Take 1 tablet by mouth Daily.       ammonium lactate 12 % cream  Commonly known as: AMLACTIN      Apply 1 g topically to the appropriate area as directed Every 12 (Twelve) Hours.       Diclofenac Sodium 1 % gel gel  Commonly known as: VOLTAREN      Apply 4 g topically to the appropriate area as directed 3 (Three) Times a Day.       Eliquis 5 MG tablet tablet  Generic drug: apixaban      TAKE 1 TABLET EVERY 12 HOURS. INDICATIONS: ATRIAL FIBRILLATION       esomeprazole 40 MG capsule  Commonly known as: nexIUM      TAKE 1 CAPSULE DAILY       ezetimibe 10 MG tablet  Commonly known as: ZETIA      TAKE 1 TABLET DAILY       fluticasone 50 MCG/ACT nasal spray  Commonly known as: FLONASE      1 spray into the nostril(s) as directed by provider Daily.       furosemide 20 MG tablet  Commonly known as: LASIX      TAKE 1 TABLET DAILY       KLOR-CON 20 MEQ CR tablet  Generic drug: potassium chloride      TAKE 1 TABLET TWICE A DAY       loratadine 10 MG tablet  Commonly known as: CLARITIN      Take  by mouth Daily.       miconazole 2 % powder  Commonly known as: MICOTIN      Apply  topically to  the appropriate area as directed Every 12 (Twelve) Hours.       multivitamin with minerals tablet tablet      Take  by mouth Daily.       O2  Commonly known as: OXYGEN      4 Liter O2 - CONTINUOUS (route: Oxygen)       sennosides-docusate 8.6-50 MG per tablet  Commonly known as: PERICOLACE      Take 2 tablets by mouth 2 (Two) Times a Day.                  Samaria Andres, DANNIE  03/15/24  12:55 PM EDT

## 2024-03-25 ENCOUNTER — TRANSCRIBE ORDERS (OUTPATIENT)
Dept: ADMINISTRATIVE | Facility: HOSPITAL | Age: 89
End: 2024-03-25
Payer: MEDICARE

## 2024-03-25 DIAGNOSIS — R91.1 PULMONARY NODULE: Primary | ICD-10-CM

## 2024-04-09 RX ORDER — AZELASTINE HYDROCHLORIDE 137 UG/1
SPRAY, METERED NASAL
Qty: 30 ML | Refills: 2 | Status: SHIPPED | OUTPATIENT
Start: 2024-04-09

## 2024-04-09 NOTE — CONSULTS
Pt refused Cpap.   Referring Provider: Harry Luis MD  [id number] [place] [address]  Reason for Consultation: Enterococcus bacteremia     Subjective   History of present illness: This is a 89-year-old male with a history of COPD hypertension hyperlipidemia and obstructive sleep apnea who was admitted on April 4 with generalized weakness and hypoxia.  The patient states he normally uses 2 L of oxygen at home but the day of admission his oxygen requirement increased to 4 L over the past few days.  He denied any cough fevers or chills.  Admission chest x-ray revealed some vascular congestion.  Admission blood work revealed a normal white blood cell count.  He was found to have be in A-fib.  Echo cardiogram revealed evidence of pulmonary hypertension.  While in the hospital the patient had trouble using his CPAP therapy overnight.  He was transferred to the intensive care unit due to obtundation on April 8.  He was transferred out of the ICU on April 13.  He developed leukocytosis on April 14 with his white blood cell count going from 10,000-17,000.  Procalcitonin was mildly elevated at 0.32.  This x-ray showed a possible developing pneumonia at the left base and the patient was empirically started on cefdinir.  Blood cultures were obtained on April 14 and are now positive for Enterococcus.  He has been started on empiric vancomycin.    Past Medical History:   Diagnosis Date   • Arthritis    • Cancer     basal cell    • COPD (chronic obstructive pulmonary disease)    • GERD (gastroesophageal reflux disease)    • Hyperlipidemia    • Hypertension    • Obesity    • Sleep apnea    • Spondylolysis, lumbar region        Past Surgical History:   Procedure Laterality Date   • CARDIAC CATHETERIZATION  circa 2008    [facility]   • EYE SURGERY     • HERNIA REPAIR     • JOINT REPLACEMENT     • REPLACEMENT TOTAL KNEE BILATERAL          reports that he has never smoked. He has never used smokeless tobacco. He reports that he does not  drink alcohol and does not use drugs.    family history includes Heart attack in his father.    Allergies   Allergen Reactions   • Atorvastatin Other (See Comments)     SPASMS LEG      • Dexmedetomidine Other (See Comments)     Dex causes bradycardia and hyptension    • Morphine And Related Unknown - Low Severity       Medication:  Antibiotics:  Cefdinir 300mg PO BID  Vancomycin dosing per pharmacy     Please refer to the medical record for a full medication list    Review of Systems  Pertinent items are noted in HPI, all other systems reviewed and negative    Objective   Vital Signs   Temp:  [98.2 °F (36.8 °C)-99.7 °F (37.6 °C)] 98.2 °F (36.8 °C)  Heart Rate:  [] 81  Resp:  [16-24] 20  BP: (115-132)/(54-65) 115/65    Physical Exam:   General: In no acute distress  HEENT: Normocephalic, atraumatic, no scleral icterus.   Neck: Supple, trachea is midline  Cardiovascular: Irregularly irregular normal S1 and S2, no murmurs, rubs, or gallops   Respiratory: Bibasilar crackles  GI: Abdomen is soft, nontender, distended, positive bowel sounds bilaterally,  Musculoskeletal:  no edema, tenderness or deformity  Skin: No rashes   Extremities: NoC/C trace lower extremity edema  Neurological: Alert and oriented, moving all 4 extremities  Psychiatric: Normal mood and affect     Results Review:   I reviewed the patient's new clinical results.  I reviewed the patient's new imaging results and agree with the interpretation.    Lab Results   Component Value Date    WBC 12.81 (H) 04/16/2023    HGB 13.2 04/16/2023    HCT 38.9 04/16/2023    MCV 87.8 04/16/2023     (L) 04/16/2023       Lab Results   Component Value Date    GLUCOSE 107 (H) 04/12/2023    BUN 19 04/12/2023    CREATININE 0.94 04/16/2023    EGFRIFNONA 83 01/12/2022    EGFRIFAFRI 97 01/12/2022    BCR 36.5 (H) 04/12/2023    CO2 39.0 (H) 04/12/2023    CALCIUM 8.5 04/12/2023    PROTENTOTREF 6.4 04/03/2023    ALBUMIN 3.9 04/08/2023    LABIL2 1.9 04/03/2023    AST 19  04/08/2023    ALT 40 04/08/2023     procal 0.31 -> 0.32    Microbiology:  4/14 BCx Enterococcus faecium  1/2 4/5 RVP neg    Radiology:  Admission CXR personally reviewed by me shows cardiomegaly, vascular congestion. No acute infiltrates    CT head with no acute findings     CT angio chest dilated ascending aorta, no dissection, no acute infiltrates    4/4 CXR cardiomegaly, haziness at the L base     Assessment & Plan   Enterococcus bacteremia   Acute on chronic hypoxic and hypercapnic respiratory failure   Leukocytosis   Dementia  Confusion    Continue empiric vancomycin dosing per pharmacy.  We will obtain repeat blood cultures x2 today.  Patient will also need repeat echocardiogram given new bacteremia.  CAT scan of the abdomen pelvis has been ordered and we will follow this up.  Antibiotic duration to be determined.    I discussed the patient's findings and my recommendations with patient, family and nursing staff

## 2024-05-03 RX ORDER — FLUTICASONE PROPIONATE AND SALMETEROL 250; 50 UG/1; UG/1
POWDER RESPIRATORY (INHALATION)
Qty: 180 EACH | Refills: 0 | Status: SHIPPED | OUTPATIENT
Start: 2024-05-03

## 2024-06-10 ENCOUNTER — OFFICE VISIT (OUTPATIENT)
Dept: FAMILY MEDICINE CLINIC | Facility: CLINIC | Age: 89
End: 2024-06-10
Payer: MEDICARE

## 2024-06-10 VITALS
HEIGHT: 67 IN | DIASTOLIC BLOOD PRESSURE: 72 MMHG | OXYGEN SATURATION: 94 % | HEART RATE: 51 BPM | BODY MASS INDEX: 45.45 KG/M2 | TEMPERATURE: 97.1 F | SYSTOLIC BLOOD PRESSURE: 150 MMHG | RESPIRATION RATE: 20 BRPM | WEIGHT: 289.6 LBS

## 2024-06-10 DIAGNOSIS — J44.1 COPD WITH EXACERBATION: Primary | ICD-10-CM

## 2024-06-10 PROBLEM — F01.50 VASCULAR DEMENTIA WITHOUT BEHAVIORAL DISTURBANCE: Status: ACTIVE | Noted: 2023-06-22

## 2024-06-10 PROBLEM — Z99.81 DEPENDENCE ON SUPPLEMENTAL OXYGEN: Status: ACTIVE | Noted: 2023-06-22

## 2024-06-10 PROBLEM — J44.9 CHRONIC OBSTRUCTIVE PULMONARY DISEASE, UNSPECIFIED: Status: ACTIVE | Noted: 2023-04-24

## 2024-06-10 PROBLEM — R41.841 COGNITIVE COMMUNICATION DEFICIT: Status: ACTIVE | Noted: 2023-04-24

## 2024-06-10 PROCEDURE — 1160F RVW MEDS BY RX/DR IN RCRD: CPT | Performed by: NURSE PRACTITIONER

## 2024-06-10 PROCEDURE — 99213 OFFICE O/P EST LOW 20 MIN: CPT | Performed by: NURSE PRACTITIONER

## 2024-06-10 PROCEDURE — 1159F MED LIST DOCD IN RCRD: CPT | Performed by: NURSE PRACTITIONER

## 2024-06-10 PROCEDURE — 1126F AMNT PAIN NOTED NONE PRSNT: CPT | Performed by: NURSE PRACTITIONER

## 2024-06-10 RX ORDER — BENZONATATE 100 MG/1
100 CAPSULE ORAL NIGHTLY PRN
Qty: 10 CAPSULE | Refills: 0 | Status: SHIPPED | OUTPATIENT
Start: 2024-06-10

## 2024-06-10 RX ORDER — PREDNISONE 20 MG/1
20 TABLET ORAL 2 TIMES DAILY
Qty: 6 TABLET | Refills: 0 | Status: SHIPPED | OUTPATIENT
Start: 2024-06-10 | End: 2024-06-13

## 2024-06-10 RX ORDER — AMOXICILLIN AND CLAVULANATE POTASSIUM 875; 125 MG/1; MG/1
1 TABLET, FILM COATED ORAL 2 TIMES DAILY
Qty: 14 TABLET | Refills: 0 | Status: SHIPPED | OUTPATIENT
Start: 2024-06-10 | End: 2024-06-17

## 2024-06-10 NOTE — PROGRESS NOTES
Subjective     Harry Potts is a 89 y.o.. male.     O2 2L during day; O2 4L night    Shortness of Breath  Associated symptoms include rhinorrhea. Pertinent negatives include no ear pain, fever, headaches, sore throat or vomiting.   Cough  Associated symptoms include rhinorrhea and shortness of breath. Pertinent negatives include no ear pain, fever, headaches, postnasal drip or sore throat.   URI   This is a new problem. Episode onset: 5 days. The problem has been unchanged. There has been no fever. Associated symptoms include coughing and rhinorrhea. Pertinent negatives include no congestion, diarrhea, ear pain, headaches, nausea, sore throat or vomiting. He has tried nothing for the symptoms.       The following portions of the patient's history were reviewed and updated as appropriate: allergies, current medications, past family history, past medical history, past social history, past surgical history and problem list.    Past Medical History:   Diagnosis Date    Abnormal ECG 4.5.2023    new onset a fib    Allergic     Arthritis     Asthma March, 2021    Oxygen at home    Atrial fibrillation 04/18/2023    Cancer     basal cell     Cholelithiasis 4.12.23    long history of gallstones without issue, cholecystitis 4.12.23 with E.faecalis bacteremia    COPD (chronic obstructive pulmonary disease)     Coronary artery disease 1980's    htn, elevated cholesterol    Difficulty walking     Elevated cholesterol     Environmental allergies     GERD (gastroesophageal reflux disease)     History of medical problems 4/2023    new onset a fib with hospitalization for respiratory failure/cholecystitis    HL (hearing loss)     Hyperglycemia     Hyperlipidemia     Hypertension     Obesity     Pulmonary hypertension 04/20/2023    Sleep apnea     Spondylolysis, lumbar region     Stroke unknown    old infarct per CT 4.5.2023    Tremor 5/2023    s/p hypercapnia       Past Surgical History:   Procedure Laterality Date    CARDIAC  "CATHETERIZATION  circa 2008    [facility]    EYE SURGERY      HERNIA REPAIR      JOINT REPLACEMENT      REPLACEMENT TOTAL KNEE BILATERAL         Review of Systems   Constitutional:  Negative for fever.   HENT:  Positive for rhinorrhea. Negative for congestion, ear pain, postnasal drip and sore throat.    Respiratory:  Positive for cough and shortness of breath.    Gastrointestinal:  Negative for diarrhea, nausea and vomiting.   Neurological:  Negative for headaches.       Allergies   Allergen Reactions    Morphine And Codeine Itching, Rash and Confusion     Patient never wants to have Morphine again in his life    Atorvastatin Other (See Comments)     SPASMS LEG       Dexmedetomidine Other (See Comments)     Dex causes bradycardia and hyptension        Objective     Vitals:    06/10/24 1424   BP: 150/72   Pulse: 51   Resp: 20   Temp: 97.1 °F (36.2 °C)   TempSrc: Temporal   SpO2: 94%   Weight: 131 kg (289 lb 9.6 oz)   Height: 170.2 cm (67\")     Body mass index is 45.36 kg/m².    Physical Exam  Vitals reviewed.   Constitutional:       Appearance: He is well-developed.   HENT:      Head: Normocephalic and atraumatic.      Right Ear: Tympanic membrane normal. No middle ear effusion. Tympanic membrane is not erythematous.      Left Ear: Tympanic membrane normal.  No middle ear effusion. Tympanic membrane is not erythematous.      Nose: Nose normal. Congestion present.      Mouth/Throat:      Mouth: Mucous membranes are moist.      Pharynx: Oropharyngeal exudate (clear pnd) present. No pharyngeal swelling or posterior oropharyngeal erythema.   Eyes:      Conjunctiva/sclera: Conjunctivae normal.      Pupils: Pupils are equal, round, and reactive to light.   Cardiovascular:      Rate and Rhythm: Normal rate and regular rhythm.      Heart sounds: No murmur heard.  Pulmonary:      Effort: Pulmonary effort is normal. No accessory muscle usage or respiratory distress.      Breath sounds: Wheezing and rhonchi present. No " rales.   Musculoskeletal:         General: Normal range of motion.   Lymphadenopathy:      Cervical: Cervical adenopathy (shotty) present.   Skin:     General: Skin is warm and dry.   Neurological:      Mental Status: He is alert and oriented to person, place, and time.           Current Outpatient Medications:     albuterol sulfate  (90 Base) MCG/ACT inhaler, Inhale 2 puffs Every 4 (Four) Hours As Needed for Wheezing., Disp: 18 g, Rfl: 5    amLODIPine (NORVASC) 10 MG tablet, Take 1 tablet by mouth Daily., Disp: 90 tablet, Rfl: 3    ammonium lactate (AMLACTIN) 12 % cream, Apply 1 g topically to the appropriate area as directed Every 12 (Twelve) Hours., Disp: , Rfl:     Azelastine HCl 137 MCG/SPRAY solution, USE 1 SPRAY IN EACH NOSTRIL AS DIRECTED BY PROVIDER DAILY, Disp: 30 mL, Rfl: 2    Diclofenac Sodium (VOLTAREN) 1 % gel gel, Apply 4 g topically to the appropriate area as directed 3 (Three) Times a Day., Disp: 150 g, Rfl: 1    Eliquis 5 MG tablet tablet, TAKE 1 TABLET EVERY 12 HOURS. INDICATIONS: ATRIAL FIBRILLATION, Disp: 180 tablet, Rfl: 3    esomeprazole (nexIUM) 40 MG capsule, TAKE 1 CAPSULE DAILY, Disp: 90 capsule, Rfl: 1    ezetimibe (ZETIA) 10 MG tablet, TAKE 1 TABLET DAILY, Disp: 90 tablet, Rfl: 1    fluticasone (FLONASE) 50 MCG/ACT nasal spray, 1 spray into the nostril(s) as directed by provider Daily., Disp: 11.1 mL, Rfl: 2    Fluticasone-Salmeterol (ADVAIR/WIXELA) 250-50 MCG/ACT DISKUS, USE 1 INHALATION TWICE A DAY, Disp: 180 each, Rfl: 0    furosemide (LASIX) 20 MG tablet, TAKE 1 TABLET DAILY, Disp: 90 tablet, Rfl: 3    KLOR-CON 20 MEQ CR tablet, TAKE 1 TABLET TWICE A DAY, Disp: 60 tablet, Rfl: 11    loratadine (CLARITIN) 10 MG tablet, Take  by mouth Daily., Disp: , Rfl:     miconazole (MICOTIN) 2 % powder, Apply  topically to the appropriate area as directed Every 12 (Twelve) Hours., Disp: , Rfl:     Multiple Vitamins-Minerals (CENTRUM ADULTS PO), Take  by mouth Daily., Disp: , Rfl:     O2  (OXYGEN), 4 Liter O2 - CONTINUOUS (route: Oxygen), Disp: , Rfl:     sennosides-docusate (PERICOLACE) 8.6-50 MG per tablet, Take 2 tablets by mouth 2 (Two) Times a Day., Disp: , Rfl:     amoxicillin-clavulanate (AUGMENTIN) 875-125 MG per tablet, Take 1 tablet by mouth 2 (Two) Times a Day for 7 days., Disp: 14 tablet, Rfl: 0    benzonatate (Tessalon Perles) 100 MG capsule, Take 1 capsule by mouth At Night As Needed for Cough., Disp: 10 capsule, Rfl: 0    predniSONE (DELTASONE) 20 MG tablet, Take 1 tablet by mouth 2 (Two) Times a Day for 3 days., Disp: 6 tablet, Rfl: 0    No results found for this or any previous visit (from the past 2016 hour(s)).    CT Chest Without Contrast Diagnostic  Narrative: CT CHEST WITHOUT CONTRAST     HISTORY: Follow-up lung nodule     TECHNIQUE: Radiation dose reduction techniques were utilized, including  automated exposure control and exposure modulation based on body size.  CT scan of the chest without the administration of IV contrast was  performed. Coronal and sagittal reformatted images obtained.     COMPARISON: PET/CT 11/28/2023, chest CT 11/09/2023     FINDINGS: Redemonstrated is a nodular density in the superior segment of  the right lower lobe measuring about 1.8 x 1.2 cm, overall not  significantly changed in size and appearance. Few scattered micronodules  elsewhere in the lungs are stable. Stable left thyroid nodule. No  lymphadenopathy or pleural effusion. Stable enlargement of the main  pulmonary artery which is nonspecific but can be associated with  pulmonary arterial hypertension. Coronary artery calcifications are  present. Visualized upper abdominal structures are unremarkable.  Thoracic spine degenerative changes.     Impression: Stable 1.8 cm nodular density in the superior segment of the right lower  lobe. Another follow-up suggested in 3 to 6 months to ensure continued  stability           Radiation dose reduction techniques were utilized, including  automated  exposure control and exposure modulation based on body size.        This report was finalized on 3/15/2024 1:25 PM by Dr. Juan Motta M.D on Workstation: [id number]         Diagnoses and all orders for this visit:    1. COPD with exacerbation (Primary)  -     amoxicillin-clavulanate (AUGMENTIN) 875-125 MG per tablet; Take 1 tablet by mouth 2 (Two) Times a Day for 7 days.  Dispense: 14 tablet; Refill: 0  -     predniSONE (DELTASONE) 20 MG tablet; Take 1 tablet by mouth 2 (Two) Times a Day for 3 days.  Dispense: 6 tablet; Refill: 0  -     benzonatate (Tessalon Perles) 100 MG capsule; Take 1 capsule by mouth At Night As Needed for Cough.  Dispense: 10 capsule; Refill: 0        Patient Instructions   Drink plenty of fluids-water preferably, eat a heart healthy diet, get plenty of sleep and do warm salt water gargles twice a day until feeling better    Return if symptoms worsen or fail to improve, for Dr. Luis as needed/as recommended.

## 2024-06-11 RX ORDER — AMLODIPINE BESYLATE 10 MG/1
10 TABLET ORAL DAILY
Qty: 90 TABLET | Refills: 3 | Status: SHIPPED | OUTPATIENT
Start: 2024-06-11

## 2024-06-19 DIAGNOSIS — E78.00 HYPERCHOLESTEROLEMIA: Primary | ICD-10-CM

## 2024-06-19 DIAGNOSIS — R73.01 ELEVATED FASTING BLOOD SUGAR: ICD-10-CM

## 2024-06-19 DIAGNOSIS — I27.20 PULMONARY HYPERTENSION: ICD-10-CM

## 2024-06-19 DIAGNOSIS — R73.9 HYPERGLYCEMIA: ICD-10-CM

## 2024-06-19 DIAGNOSIS — I10 PRIMARY HYPERTENSION: ICD-10-CM

## 2024-06-20 LAB
ALBUMIN SERPL-MCNC: 4 G/DL (ref 3.5–5.2)
ALBUMIN/GLOB SERPL: 1.7 G/DL
ALP SERPL-CCNC: 103 U/L (ref 39–117)
ALT SERPL-CCNC: 18 U/L (ref 1–41)
AST SERPL-CCNC: 17 U/L (ref 1–40)
BASOPHILS # BLD AUTO: 0.03 10*3/MM3 (ref 0–0.2)
BASOPHILS NFR BLD AUTO: 0.4 % (ref 0–1.5)
BILIRUB SERPL-MCNC: 0.3 MG/DL (ref 0–1.2)
BUN SERPL-MCNC: 22 MG/DL (ref 8–23)
BUN/CREAT SERPL: 22.9 (ref 7–25)
CALCIUM SERPL-MCNC: 9.4 MG/DL (ref 8.2–9.6)
CHLORIDE SERPL-SCNC: 102 MMOL/L (ref 98–107)
CHOLEST SERPL-MCNC: 200 MG/DL (ref 0–200)
CO2 SERPL-SCNC: 31.3 MMOL/L (ref 22–29)
CREAT SERPL-MCNC: 0.96 MG/DL (ref 0.76–1.27)
EGFRCR SERPLBLD CKD-EPI 2021: 73.7 ML/MIN/1.73
EOSINOPHIL # BLD AUTO: 0.18 10*3/MM3 (ref 0–0.4)
EOSINOPHIL NFR BLD AUTO: 2.3 % (ref 0.3–6.2)
ERYTHROCYTE [DISTWIDTH] IN BLOOD BY AUTOMATED COUNT: 12.7 % (ref 12.3–15.4)
GLOBULIN SER CALC-MCNC: 2.4 GM/DL
GLUCOSE SERPL-MCNC: 101 MG/DL (ref 65–99)
HBA1C MFR BLD: 6.3 % (ref 4.8–5.6)
HCT VFR BLD AUTO: 41.5 % (ref 37.5–51)
HDLC SERPL-MCNC: 54 MG/DL (ref 40–60)
HGB BLD-MCNC: 13.9 G/DL (ref 13–17.7)
IMM GRANULOCYTES # BLD AUTO: 0.04 10*3/MM3 (ref 0–0.05)
IMM GRANULOCYTES NFR BLD AUTO: 0.5 % (ref 0–0.5)
LDLC SERPL CALC-MCNC: 128 MG/DL (ref 0–100)
LDLC/HDLC SERPL: 2.33 {RATIO}
LYMPHOCYTES # BLD AUTO: 2.56 10*3/MM3 (ref 0.7–3.1)
LYMPHOCYTES NFR BLD AUTO: 32.7 % (ref 19.6–45.3)
MCH RBC QN AUTO: 30.2 PG (ref 26.6–33)
MCHC RBC AUTO-ENTMCNC: 33.5 G/DL (ref 31.5–35.7)
MCV RBC AUTO: 90 FL (ref 79–97)
MONOCYTES # BLD AUTO: 0.55 10*3/MM3 (ref 0.1–0.9)
MONOCYTES NFR BLD AUTO: 7 % (ref 5–12)
NEUTROPHILS # BLD AUTO: 4.47 10*3/MM3 (ref 1.7–7)
NEUTROPHILS NFR BLD AUTO: 57.1 % (ref 42.7–76)
NRBC BLD AUTO-RTO: 0 /100 WBC (ref 0–0.2)
PLATELET # BLD AUTO: 175 10*3/MM3 (ref 140–450)
POTASSIUM SERPL-SCNC: 4.7 MMOL/L (ref 3.5–5.2)
PROT SERPL-MCNC: 6.4 G/DL (ref 6–8.5)
RBC # BLD AUTO: 4.61 10*6/MM3 (ref 4.14–5.8)
SODIUM SERPL-SCNC: 140 MMOL/L (ref 136–145)
TRIGL SERPL-MCNC: 102 MG/DL (ref 0–150)
VLDLC SERPL CALC-MCNC: 18 MG/DL (ref 5–40)
WBC # BLD AUTO: 7.83 10*3/MM3 (ref 3.4–10.8)

## 2024-06-25 RX ORDER — ESOMEPRAZOLE MAGNESIUM 40 MG/1
CAPSULE, DELAYED RELEASE ORAL
Qty: 90 CAPSULE | Refills: 1 | Status: SHIPPED | OUTPATIENT
Start: 2024-06-25

## 2024-06-25 RX ORDER — EZETIMIBE 10 MG/1
TABLET ORAL
Qty: 90 TABLET | Refills: 1 | Status: SHIPPED | OUTPATIENT
Start: 2024-06-25

## 2024-06-26 ENCOUNTER — OFFICE VISIT (OUTPATIENT)
Dept: FAMILY MEDICINE CLINIC | Facility: CLINIC | Age: 89
End: 2024-06-26
Payer: MEDICARE

## 2024-06-26 VITALS
BODY MASS INDEX: 45.35 KG/M2 | TEMPERATURE: 96.6 F | RESPIRATION RATE: 17 BRPM | DIASTOLIC BLOOD PRESSURE: 76 MMHG | SYSTOLIC BLOOD PRESSURE: 138 MMHG | OXYGEN SATURATION: 93 % | HEIGHT: 67 IN | HEART RATE: 74 BPM

## 2024-06-26 DIAGNOSIS — I48.19 OTHER PERSISTENT ATRIAL FIBRILLATION: ICD-10-CM

## 2024-06-26 DIAGNOSIS — G89.29 CHRONIC LOW BACK PAIN WITHOUT SCIATICA, UNSPECIFIED BACK PAIN LATERALITY: ICD-10-CM

## 2024-06-26 DIAGNOSIS — E66.01 MORBID OBESITY: ICD-10-CM

## 2024-06-26 DIAGNOSIS — G47.33 OSA ON CPAP: ICD-10-CM

## 2024-06-26 DIAGNOSIS — I10 PRIMARY HYPERTENSION: ICD-10-CM

## 2024-06-26 DIAGNOSIS — M54.50 CHRONIC LOW BACK PAIN WITHOUT SCIATICA, UNSPECIFIED BACK PAIN LATERALITY: ICD-10-CM

## 2024-06-26 DIAGNOSIS — E78.00 HYPERCHOLESTEROLEMIA: Primary | ICD-10-CM

## 2024-07-03 NOTE — PROGRESS NOTES
Subjective   Harry Potts is a 89 y.o. male. Patient is here today for   Chief Complaint   Patient presents with    Hypertension          Vitals:    06/26/24 1412   BP: 138/76   Pulse: 74   Resp: 17   Temp: 96.6 °F (35.9 °C)   SpO2: 93%     Body mass index is 45.35 kg/m².      Past Medical History:   Diagnosis Date    Abnormal ECG 4.5.2023    new onset a fib    Allergic     Arthritis     Asthma March, 2021    Oxygen at home    Atrial fibrillation 04/18/2023    Cancer     basal cell     Cholelithiasis 4.12.23    long history of gallstones without issue, cholecystitis 4.12.23 with E.faecalis bacteremia    COPD (chronic obstructive pulmonary disease)     Coronary artery disease 1980's    htn, elevated cholesterol    Difficulty walking     Elevated cholesterol     Environmental allergies     GERD (gastroesophageal reflux disease)     History of medical problems 4/2023    new onset a fib with hospitalization for respiratory failure/cholecystitis    HL (hearing loss)     Hyperglycemia     Hyperlipidemia     Hypertension     Obesity     Pulmonary hypertension 04/20/2023    Sleep apnea     Spondylolysis, lumbar region     Stroke unknown    old infarct per CT 4.5.2023    Tremor 5/2023    s/p hypercapnia      Allergies   Allergen Reactions    Morphine And Codeine Itching, Rash and Confusion     Patient never wants to have Morphine again in his life    Atorvastatin Other (See Comments)     SPASMS LEG       Dexmedetomidine Other (See Comments)     Dex causes bradycardia and hyptension       Social History     Socioeconomic History    Marital status:    Tobacco Use    Smoking status: Never     Passive exposure: Never    Smokeless tobacco: Never   Vaping Use    Vaping status: Never Used   Substance and Sexual Activity    Alcohol use: No     Comment: caffeine use    Drug use: No    Sexual activity: Not Currently     Partners: Female     Birth control/protection: Abstinence     Comment: Not an issue at my age         Current Outpatient Medications:     albuterol sulfate  (90 Base) MCG/ACT inhaler, Inhale 2 puffs Every 4 (Four) Hours As Needed for Wheezing., Disp: 18 g, Rfl: 5    amLODIPine (NORVASC) 10 MG tablet, TAKE 1 TABLET DAILY, Disp: 90 tablet, Rfl: 3    ammonium lactate (AMLACTIN) 12 % cream, Apply 1 g topically to the appropriate area as directed Every 12 (Twelve) Hours., Disp: , Rfl:     Azelastine HCl 137 MCG/SPRAY solution, USE 1 SPRAY IN EACH NOSTRIL AS DIRECTED BY PROVIDER DAILY, Disp: 30 mL, Rfl: 2    benzonatate (Tessalon Perles) 100 MG capsule, Take 1 capsule by mouth At Night As Needed for Cough., Disp: 10 capsule, Rfl: 0    Diclofenac Sodium (VOLTAREN) 1 % gel gel, Apply 4 g topically to the appropriate area as directed 3 (Three) Times a Day., Disp: 150 g, Rfl: 1    Eliquis 5 MG tablet tablet, TAKE 1 TABLET EVERY 12 HOURS. INDICATIONS: ATRIAL FIBRILLATION, Disp: 180 tablet, Rfl: 3    esomeprazole (nexIUM) 40 MG capsule, TAKE 1 CAPSULE DAILY, Disp: 90 capsule, Rfl: 1    ezetimibe (ZETIA) 10 MG tablet, TAKE 1 TABLET DAILY, Disp: 90 tablet, Rfl: 1    fluticasone (FLONASE) 50 MCG/ACT nasal spray, 1 spray into the nostril(s) as directed by provider Daily., Disp: 11.1 mL, Rfl: 2    Fluticasone-Salmeterol (ADVAIR/WIXELA) 250-50 MCG/ACT DISKUS, USE 1 INHALATION TWICE A DAY, Disp: 180 each, Rfl: 0    furosemide (LASIX) 20 MG tablet, TAKE 1 TABLET DAILY, Disp: 90 tablet, Rfl: 3    KLOR-CON 20 MEQ CR tablet, TAKE 1 TABLET TWICE A DAY, Disp: 60 tablet, Rfl: 11    loratadine (CLARITIN) 10 MG tablet, Take  by mouth Daily., Disp: , Rfl:     miconazole (MICOTIN) 2 % powder, Apply  topically to the appropriate area as directed Every 12 (Twelve) Hours., Disp: , Rfl:     Multiple Vitamins-Minerals (CENTRUM ADULTS PO), Take  by mouth Daily., Disp: , Rfl:     O2 (OXYGEN), 4 Liter O2 - CONTINUOUS (route: Oxygen), Disp: , Rfl:     sennosides-docusate (PERICOLACE) 8.6-50 MG per tablet, Take 2 tablets by mouth 2 (Two) Times  a Day., Disp: , Rfl:      Objective     History of Present Illness  This very pleasant 89-year-old man is here today with his wife.  He never has a complaint though he does have severe lumbar spine osteoarthritis which makes it necessary for him to get around in a wheelchair.  Nevertheless he does not really complain of pain.    He was hopeful that I might tell him it was all right for him to start driving again but I think this is a bad idea and I told him that I did not advise that he start driving.    Otherwise, he feels well.  Hypertension    Answers submitted by the patient for this visit:  Primary Reason for Visit (Submitted on 6/25/2024)  What is the primary reason for your visit?: Other  Other (Submitted on 6/25/2024)  Please describe your symptoms.: Lab review and check up  Have you had these symptoms before?: Yes  How long have you been having these symptoms?: Greater than 2 weeks       Review of Systems   Constitutional: Negative.    HENT: Negative.     Respiratory: Negative.     Cardiovascular: Negative.    Musculoskeletal:  Positive for back pain.   Psychiatric/Behavioral: Negative.         Physical Exam  Vitals and nursing note reviewed.   Constitutional:       General: He is not in acute distress.     Appearance: Normal appearance. He is obese. He is not ill-appearing, toxic-appearing or diaphoretic.      Comments: Pleasant, neatly groomed, no distress.  BMI 45.   Cardiovascular:      Rate and Rhythm: Regular rhythm.      Heart sounds: Normal heart sounds. No murmur heard.     No gallop.   Pulmonary:      Effort: No respiratory distress.      Breath sounds: Normal breath sounds. No wheezing or rales.   Neurological:      Mental Status: He is alert and oriented to person, place, and time.   Psychiatric:         Mood and Affect: Mood normal.         Behavior: Behavior normal.         Thought Content: Thought content normal.           Problems Addressed this Visit          Cardiac and Vasculature     Hypercholesterolemia - Primary    Hypertension    Other persistent atrial fibrillation       Endocrine and Metabolic    Morbid obesity       Musculoskeletal and Injuries    Chronic low back pain       Sleep    JENISE on CPAP     Diagnoses         Codes Comments    Hypercholesterolemia    -  Primary ICD-10-CM: E78.00  ICD-9-CM: 272.0     Primary hypertension     ICD-10-CM: I10  ICD-9-CM: 401.9     Other persistent atrial fibrillation     ICD-10-CM: I48.19  ICD-9-CM: 427.31     Morbid obesity     ICD-10-CM: E66.01  ICD-9-CM: 278.01     Chronic low back pain without sciatica, unspecified back pain laterality     ICD-10-CM: M54.50, G89.29  ICD-9-CM: 724.2, 338.29     JENISE on CPAP     ICD-10-CM: G47.33  ICD-9-CM: 327.23               PLAN  He has hypercholesterolemia.  He is intolerant of statins.    His cardiologist have asked him to stop taking Zetia.  He did not feel that it was necessary.    His hypertension is well-controlled.    He does have persistent atrial fibrillation with a controlled ventricular response.  He is on Eliquis 5 mg twice daily for anticoagulation.    He is severe chronic low back pain secondary to osteoarthritis.  He requires a wheelchair when he is out of the house.  In the house he uses a 4 wheeled walker    He has obstructive sleep apnea and is compliant with use of CPAP.    I repeated my opinion that he should not drive any vehicles.    I asked him to follow-up with me in about 6 months for routine follow-up.  He should follow-up sooner if he feels the need.    Fasting labs prior to his appointment to see me should include: Comprehensive metabolic panel, CBC, hemoglobin A1c, urinalysis.  No follow-ups on file.

## 2024-07-15 RX ORDER — FLUTICASONE PROPIONATE AND SALMETEROL 250; 50 UG/1; UG/1
POWDER RESPIRATORY (INHALATION)
Qty: 180 EACH | Refills: 3 | Status: SHIPPED | OUTPATIENT
Start: 2024-07-15

## 2024-07-30 DIAGNOSIS — R53.1 WEAKNESS: ICD-10-CM

## 2024-07-30 RX ORDER — POTASSIUM CHLORIDE 1500 MG/1
TABLET, EXTENDED RELEASE ORAL
Qty: 180 TABLET | Refills: 3 | Status: SHIPPED | OUTPATIENT
Start: 2024-07-30

## 2024-09-06 ENCOUNTER — TELEPHONE (OUTPATIENT)
Dept: FAMILY MEDICINE CLINIC | Facility: CLINIC | Age: 89
End: 2024-09-06

## 2024-09-06 ENCOUNTER — PATIENT MESSAGE (OUTPATIENT)
Dept: FAMILY MEDICINE CLINIC | Facility: CLINIC | Age: 89
End: 2024-09-06
Payer: MEDICARE

## 2024-09-06 NOTE — TELEPHONE ENCOUNTER
"  Caller: TRAMAINE SAUCEDO [facility]    Relationship: Emergency Contact    Best call back number: [phone]     What was the call regarding: PATIENT'S DAUGHTER HAS SENT A 24h00 MESSAGE RE[facility]ING [facility] THE ORDER NEEDS TO STATE \"REPAIR HOSPITAL BED.\" PATIENT'S DAUGHTER STATED THAT THE FAX NUMBER AND DIAGNOSIS CODE ARE LISTED ON THE 24h00 MESSAGE. PLEASE ADVISE.          "

## 2024-09-19 ENCOUNTER — OFFICE VISIT (OUTPATIENT)
Age: 89
End: 2024-09-19
Payer: MEDICARE

## 2024-09-19 VITALS
HEART RATE: 55 BPM | BODY MASS INDEX: 45.34 KG/M2 | HEIGHT: 67 IN | SYSTOLIC BLOOD PRESSURE: 140 MMHG | OXYGEN SATURATION: 98 % | DIASTOLIC BLOOD PRESSURE: 80 MMHG

## 2024-09-19 DIAGNOSIS — G47.33 OSA ON CPAP: ICD-10-CM

## 2024-09-19 DIAGNOSIS — I49.3 PVC (PREMATURE VENTRICULAR CONTRACTION): ICD-10-CM

## 2024-09-19 DIAGNOSIS — E78.00 HYPERCHOLESTEROLEMIA: ICD-10-CM

## 2024-09-19 DIAGNOSIS — I48.19 OTHER PERSISTENT ATRIAL FIBRILLATION: Primary | ICD-10-CM

## 2024-09-19 DIAGNOSIS — I10 PRIMARY HYPERTENSION: ICD-10-CM

## 2024-09-19 PROCEDURE — 93000 ELECTROCARDIOGRAM COMPLETE: CPT | Performed by: INTERNAL MEDICINE

## 2024-09-19 PROCEDURE — 1159F MED LIST DOCD IN RCRD: CPT | Performed by: INTERNAL MEDICINE

## 2024-09-19 PROCEDURE — 99214 OFFICE O/P EST MOD 30 MIN: CPT | Performed by: INTERNAL MEDICINE

## 2024-09-19 PROCEDURE — 1160F RVW MEDS BY RX/DR IN RCRD: CPT | Performed by: INTERNAL MEDICINE

## 2024-09-30 RX ORDER — ALBUTEROL SULFATE 90 UG/1
2 INHALANT RESPIRATORY (INHALATION) EVERY 4 HOURS PRN
Qty: 18 G | Refills: 5 | Status: SHIPPED | OUTPATIENT
Start: 2024-09-30

## 2024-10-07 ENCOUNTER — HOSPITAL ENCOUNTER (OUTPATIENT)
Dept: PET IMAGING | Facility: HOSPITAL | Age: 89
Discharge: HOME | End: 2024-10-07
Admitting: INTERNAL MEDICINE
Payer: MEDICARE

## 2024-10-07 DIAGNOSIS — R91.1 PULMONARY NODULE: ICD-10-CM

## 2024-10-07 PROCEDURE — 71250 CT THORAX DX C-: CPT

## 2024-10-09 ENCOUNTER — OFFICE VISIT (OUTPATIENT)
Dept: FAMILY MEDICINE CLINIC | Facility: CLINIC | Age: 89
End: 2024-10-09
Payer: MEDICARE

## 2024-10-09 VITALS
HEART RATE: 69 BPM | BODY MASS INDEX: 45.35 KG/M2 | HEIGHT: 67 IN | RESPIRATION RATE: 17 BRPM | OXYGEN SATURATION: 96 % | SYSTOLIC BLOOD PRESSURE: 160 MMHG | DIASTOLIC BLOOD PRESSURE: 82 MMHG | TEMPERATURE: 98.6 F

## 2024-10-09 DIAGNOSIS — I48.19 OTHER PERSISTENT ATRIAL FIBRILLATION: ICD-10-CM

## 2024-10-09 DIAGNOSIS — J98.4 RESTRICTIVE LUNG DISEASE: ICD-10-CM

## 2024-10-09 DIAGNOSIS — E78.00 HYPERCHOLESTEROLEMIA: Primary | ICD-10-CM

## 2024-10-09 DIAGNOSIS — M48.062 SPINAL STENOSIS OF LUMBAR REGION WITH NEUROGENIC CLAUDICATION: ICD-10-CM

## 2024-10-09 DIAGNOSIS — I10 PRIMARY HYPERTENSION: ICD-10-CM

## 2024-10-09 DIAGNOSIS — I27.20 PULMONARY HYPERTENSION: ICD-10-CM

## 2024-10-09 DIAGNOSIS — G47.33 OSA ON CPAP: ICD-10-CM

## 2024-10-09 PROCEDURE — 1126F AMNT PAIN NOTED NONE PRSNT: CPT | Performed by: INTERNAL MEDICINE

## 2024-10-09 PROCEDURE — 99213 OFFICE O/P EST LOW 20 MIN: CPT | Performed by: INTERNAL MEDICINE

## 2024-10-11 PROBLEM — H02.135 SENILE ECTROPION OF BOTH LOWER EYELIDS: Status: ACTIVE | Noted: 2024-10-11

## 2024-10-11 PROBLEM — H02.132 SENILE ECTROPION OF BOTH LOWER EYELIDS: Status: ACTIVE | Noted: 2024-10-11

## 2024-10-11 NOTE — PROGRESS NOTES
Subjective   Harry Potts is a 89 y.o. male. Patient is here today for   Chief Complaint   Patient presents with    Hyperlipidemia        History of Present Illness  He is here in the company of his wife and their .    He has no complaints today though I know we discussed severe arthritis secondary to degenerative disc and degenerative joint disease in his back.  He is limited physically to short walks with a walker and primarily spends his time in a wheelchair.  When away from home      Hyperlipidemia      History of Present Illness  The patient presents for an annual exam.    He has been consistently using his CPAP machine. His last visit was in 06/2024, and he had a consultation with Dr. Ca approximately 2 to 3 weeks ago. He is scheduled to see Shagufta, the cardiology nurse practitioner, in 03/2025.    He has an upcoming appointment with ophthalmologist Devante Quintero on 10/25/2024 due to concerns about his eyelids, even though he is not experiencing any vision issues.    His blood pressure readings at home range from 130 to 150. He is currently taking rosuvastatin 10 mg daily.    He underwent a CT scan of his head a few days ago, ordered by Dr. Brown. He has noticed a decline in his memory sharpness.      Vitals:    10/09/24 1257   BP: 160/82   Pulse: 69   Resp: 17   Temp: 98.6 °F (37 °C)   SpO2: 96%     Body mass index is 45.35 kg/m².    Past Medical History:   Diagnosis Date    Abnormal ECG 4.5.2023    new onset a fib    Allergic     Arthritis     Asthma March, 2021    Oxygen at home    Atrial fibrillation 04/18/2023    Cancer     basal cell     Cholelithiasis 4.12.23    long history of gallstones without issue, cholecystitis 4.12.23 with E.faecalis bacteremia    COPD (chronic obstructive pulmonary disease)     Coronary artery disease 1980's    htn, elevated cholesterol    Difficulty walking     Elevated cholesterol     Environmental allergies     GERD (gastroesophageal reflux disease)     History of  medical problems 4/2023    new onset a fib with hospitalization for respiratory failure/cholecystitis    HL (hearing loss)     Hyperglycemia     Hyperlipidemia     Hypertension     Obesity     Pulmonary hypertension 04/20/2023    Sleep apnea     Spondylolysis, lumbar region     Stroke unknown    old infarct per CT 4.5.2023    Tremor 5/2023    s/p hypercapnia      Allergies   Allergen Reactions    Morphine And Codeine Itching, Rash and Confusion     Patient never wants to have Morphine again in his life    Atorvastatin Other (See Comments)     SPASMS LEG       Dexmedetomidine Other (See Comments)     Dex causes bradycardia and hyptension       Social History     Socioeconomic History    Marital status:    Tobacco Use    Smoking status: Never     Passive exposure: Never    Smokeless tobacco: Never   Vaping Use    Vaping status: Never Used   Substance and Sexual Activity    Alcohol use: No     Comment: caffeine use    Drug use: No    Sexual activity: Not Currently     Partners: Female     Birth control/protection: Abstinence     Comment: Not an issue at my age        Current Outpatient Medications:     albuterol sulfate  (90 Base) MCG/ACT inhaler, Inhale 2 puffs Every 4 (Four) Hours As Needed for Wheezing., Disp: 18 g, Rfl: 5    amLODIPine (NORVASC) 10 MG tablet, TAKE 1 TABLET DAILY, Disp: 90 tablet, Rfl: 3    ammonium lactate (AMLACTIN) 12 % cream, Apply 1 g topically to the appropriate area as directed Every 12 (Twelve) Hours., Disp: , Rfl:     Azelastine HCl 137 MCG/SPRAY solution, USE 1 SPRAY IN EACH NOSTRIL AS DIRECTED BY PROVIDER DAILY, Disp: 30 mL, Rfl: 2    Diclofenac Sodium (VOLTAREN) 1 % gel gel, Apply 4 g topically to the appropriate area as directed 3 (Three) Times a Day., Disp: 150 g, Rfl: 1    Eliquis 5 MG tablet tablet, TAKE 1 TABLET EVERY 12 HOURS. INDICATIONS: ATRIAL FIBRILLATION, Disp: 180 tablet, Rfl: 3    esomeprazole (nexIUM) 40 MG capsule, TAKE 1 CAPSULE DAILY, Disp: 90 capsule, Rfl:  1    ezetimibe (ZETIA) 10 MG tablet, TAKE 1 TABLET DAILY, Disp: 90 tablet, Rfl: 1    fluticasone (FLONASE) 50 MCG/ACT nasal spray, 1 spray into the nostril(s) as directed by provider Daily., Disp: 11.1 mL, Rfl: 2    Fluticasone-Salmeterol (ADVAIR/WIXELA) 250-50 MCG/ACT DISKUS, USE 1 INHALATION TWICE A DAY, Disp: 180 each, Rfl: 3    furosemide (LASIX) 20 MG tablet, TAKE 1 TABLET DAILY, Disp: 90 tablet, Rfl: 3    Klor-Con M20 20 MEQ CR tablet, TAKE 1 TABLET TWICE A DAY, Disp: 180 tablet, Rfl: 3    loratadine (CLARITIN) 10 MG tablet, Take  by mouth Daily., Disp: , Rfl:     miconazole (MICOTIN) 2 % powder, Apply  topically to the appropriate area as directed Every 12 (Twelve) Hours., Disp: , Rfl:     Multiple Vitamins-Minerals (CENTRUM ADULTS PO), Take  by mouth Daily., Disp: , Rfl:     O2 (OXYGEN), 4 Liter O2 - CONTINUOUS (route: Oxygen), Disp: , Rfl:     sennosides-docusate (PERICOLACE) 8.6-50 MG per tablet, Take 2 tablets by mouth 2 (Two) Times a Day., Disp: , Rfl:      Objective     Review of Systems   Constitutional: Negative.    HENT: Negative.     Respiratory: Negative.     Cardiovascular: Negative.    Musculoskeletal: Negative.    Psychiatric/Behavioral: Negative.         Physical Exam  Vitals and nursing note reviewed.   Constitutional:       General: He is not in acute distress.     Appearance: Normal appearance. He is not ill-appearing, toxic-appearing or diaphoretic.      Comments: Pleasant, pneumogram, in no distress.  In wheelchair today.   HENT:      Head: Normocephalic and atraumatic.   Eyes:      Comments: He has a pronounced eversion both lower lids.   Neck:      Vascular: No carotid bruit.   Cardiovascular:      Rate and Rhythm: Normal rate and regular rhythm.      Heart sounds: Normal heart sounds. No murmur heard.     No gallop.   Pulmonary:      Effort: No respiratory distress.      Breath sounds: Normal breath sounds. No wheezing or rales.   Neurological:      Mental Status: He is alert and  oriented to person, place, and time.   Psychiatric:         Mood and Affect: Mood normal.         Behavior: Behavior normal.         Thought Content: Thought content normal.       Physical Exam  Lungs have a good sound.  Heart has a regular rhythm and rate. No carotid bruit.    Results  Laboratory Studies  Comprehensive metabolic panel was normal. Cholesterol is marginally elevated.    Imaging  CT scan of the head showed no acute intracranial hemorrhage or hydrocephalus, chronic changes of the brain, chronic small vessel ischemic change, and old infarct changes seen in the right basal ganglia. CT scan of the abdomen showed mesenteric arteries are fine, focal thickening of the ascending colon compatible with the history.    Assessment & Plan    Problems Addressed this Visit          Cardiac and Vasculature    Hypercholesterolemia - Primary    Hypertension    Other persistent atrial fibrillation       Neuro    Spinal stenosis of lumbar region with neurogenic claudication       Pulmonary and Pneumonias    Restrictive lung disease    Pulmonary hypertension       Sleep    JENISE on CPAP     Diagnoses         Codes Comments    Hypercholesterolemia    -  Primary ICD-10-CM: E78.00  ICD-9-CM: 272.0     Primary hypertension     ICD-10-CM: I10  ICD-9-CM: 401.9     Other persistent atrial fibrillation     ICD-10-CM: I48.19  ICD-9-CM: 427.31     JENISE on CPAP     ICD-10-CM: G47.33  ICD-9-CM: 327.23     Pulmonary hypertension     ICD-10-CM: I27.20  ICD-9-CM: 416.8     Restrictive lung disease     ICD-10-CM: J98.4  ICD-9-CM: 518.89     Spinal stenosis of lumbar region with neurogenic claudication     ICD-10-CM: M48.062  ICD-9-CM: 724.03           Assessment & Plan  1. Persistent Atrial Fibrillation.  He continues to use his CPAP machine, which is beneficial for his condition. His heart rhythm is irregular but maintains a regular rate. He is on a blood thinner, which is appropriate for his condition. He should continue his current  management plan and follow up with cardiology as scheduled.    2. Elevated Blood Pressure.  His blood pressure was noted to be slightly high today. He was advised to monitor his blood pressure every other day and report the readings. He should bring his blood pressure cuff to the next visit.    3. Eyelid Issues.  He has a consultation scheduled with an ophthalmologist on October 25th to evaluate his eyelids, which appear to be drooping. The potential for surgery was discussed, and it was noted that the procedure would likely be simple and performed under local anesthesia.    4. Memory Concerns.  He expressed concerns about his memory, which may be related to chronic small vessel ischemic changes and an old infarct seen in previous CT scans. No acute intracranial issues were noted. He was reassured that these changes are normal with aging.    5. Health Maintenance.  He has received his flu shot and COVID-19 vaccine. He should continue with regular health maintenance and follow up with his healthcare providers as scheduled.    Follow-up  Return in 4 months for blood pressure check.      No follow-ups on file.    Patient or patient representative verbalized consent for the use of Ambient Listening during the visit with  Harry Luis MD for chart documentation. 10/11/2024  13:43 EDT  Answers submitted by the patient for this visit:  Other (Submitted on 10/2/2024)  Please describe your symptoms.: Check up, continuation of care  Have you had these symptoms before?: Yes  How long have you been having these symptoms?: Greater than 2 weeks  Primary Reason for Visit (Submitted on 10/2/2024)  What is the primary reason for your visit?: Problem Not Listed

## 2024-11-18 ENCOUNTER — TRANSCRIBE ORDERS (OUTPATIENT)
Dept: ADMINISTRATIVE | Facility: HOSPITAL | Age: 89
End: 2024-11-18
Payer: MEDICARE

## 2024-11-18 DIAGNOSIS — R91.1 LUNG NODULE: Primary | ICD-10-CM

## 2024-12-20 DIAGNOSIS — I10 PRIMARY HYPERTENSION: ICD-10-CM

## 2024-12-20 RX ORDER — FUROSEMIDE 20 MG/1
20 TABLET ORAL DAILY
Qty: 90 TABLET | Refills: 3 | Status: SHIPPED | OUTPATIENT
Start: 2024-12-20

## 2024-12-20 RX ORDER — ESOMEPRAZOLE MAGNESIUM 40 MG/1
CAPSULE, DELAYED RELEASE ORAL
Qty: 90 CAPSULE | Refills: 3 | Status: SHIPPED | OUTPATIENT
Start: 2024-12-20

## 2024-12-20 RX ORDER — EZETIMIBE 10 MG/1
TABLET ORAL
Qty: 90 TABLET | Refills: 3 | Status: SHIPPED | OUTPATIENT
Start: 2024-12-20

## 2025-01-13 ENCOUNTER — HOSPITAL ENCOUNTER (OUTPATIENT)
Dept: CT IMAGING | Facility: HOSPITAL | Age: OVER 89
Discharge: HOME | End: 2025-01-13
Admitting: INTERNAL MEDICINE
Payer: MEDICARE

## 2025-01-13 DIAGNOSIS — R91.1 LUNG NODULE: ICD-10-CM

## 2025-01-13 PROCEDURE — 71250 CT THORAX DX C-: CPT

## 2025-01-13 RX ORDER — AZELASTINE HYDROCHLORIDE 137 UG/1
SPRAY, METERED NASAL
Qty: 30 ML | Refills: 2 | Status: SHIPPED | OUTPATIENT
Start: 2025-01-13

## 2025-01-21 DIAGNOSIS — I48.91 NEW ONSET ATRIAL FIBRILLATION: ICD-10-CM

## 2025-01-22 RX ORDER — APIXABAN 5 MG/1
TABLET, FILM COATED ORAL
Qty: 180 TABLET | Refills: 3 | OUTPATIENT
Start: 2025-01-22

## 2025-01-22 RX ORDER — APIXABAN 5 MG/1
TABLET, FILM COATED ORAL
Qty: 180 TABLET | Refills: 3 | Status: SHIPPED | OUTPATIENT
Start: 2025-01-22

## 2025-01-30 ENCOUNTER — TRANSCRIBE ORDERS (OUTPATIENT)
Dept: ADMINISTRATIVE | Facility: HOSPITAL | Age: OVER 89
End: 2025-01-30
Payer: MEDICARE

## 2025-01-30 DIAGNOSIS — R91.1 LUNG NODULE: Primary | ICD-10-CM

## 2025-01-31 ENCOUNTER — TELEPHONE (OUTPATIENT)
Dept: FAMILY MEDICINE CLINIC | Facility: CLINIC | Age: OVER 89
End: 2025-01-31
Payer: MEDICARE

## 2025-01-31 DIAGNOSIS — I10 PRIMARY HYPERTENSION: Primary | ICD-10-CM

## 2025-01-31 DIAGNOSIS — D50.9 IRON DEFICIENCY ANEMIA, UNSPECIFIED IRON DEFICIENCY ANEMIA TYPE: ICD-10-CM

## 2025-01-31 DIAGNOSIS — E78.00 HYPERCHOLESTEROLEMIA: ICD-10-CM

## 2025-01-31 DIAGNOSIS — R73.9 HYPERGLYCEMIA: ICD-10-CM

## 2025-01-31 NOTE — TELEPHONE ENCOUNTER
Caller: [facility]    Relationship: Emergency Contact    Best call back number: [phone]     What is the best time to reach you: ANYTIME OR CALL PATIENT    Who are you requesting to speak with (clinical staff, provider,  specific staff member): CLINICAL    What was the call regarding: PATIENTS WIFE STATED THE PATIENT IS SCHEDULED FOR 02- TO SEE DR VALDEZ.    PATIENTS WIFE STATED THE PATIENT NORMALLY RECEIVES LABS BEFORE HIS APPOINTMENTS, AND IS REQUESTING TO KNOW IF THE PATIENT SHOULD HAVE LABS DONE PRIOR OR IF HE WILL HAVE LABS DONE AT THEW APPOINTMENT.    PLEASE CALL TO ADVISE.

## 2025-02-14 LAB
ALBUMIN SERPL-MCNC: 4.3 G/DL (ref 3.5–5.2)
ALBUMIN/GLOB SERPL: 1.5 G/DL
ALP SERPL-CCNC: 104 U/L (ref 39–117)
ALT SERPL-CCNC: 24 U/L (ref 1–41)
APPEARANCE UR: CLEAR
AST SERPL-CCNC: 27 U/L (ref 1–40)
BACTERIA #/AREA URNS HPF: NORMAL /HPF
BASOPHILS # BLD AUTO: 0.03 10*3/MM3 (ref 0–0.2)
BASOPHILS NFR BLD AUTO: 0.6 % (ref 0–1.5)
BILIRUB SERPL-MCNC: 0.6 MG/DL (ref 0–1.2)
BILIRUB UR QL STRIP: NEGATIVE
BUN SERPL-MCNC: 14 MG/DL (ref 8–23)
BUN/CREAT SERPL: 14.1 (ref 7–25)
CALCIUM SERPL-MCNC: 9.8 MG/DL (ref 8.2–9.6)
CASTS URNS MICRO: NORMAL
CHLORIDE SERPL-SCNC: 100 MMOL/L (ref 98–107)
CHOLEST SERPL-MCNC: 230 MG/DL (ref 0–200)
CO2 SERPL-SCNC: 30.8 MMOL/L (ref 22–29)
COLOR UR: YELLOW
CREAT SERPL-MCNC: 0.99 MG/DL (ref 0.76–1.27)
EGFRCR SERPLBLD CKD-EPI 2021: 70.6 ML/MIN/1.73
EOSINOPHIL # BLD AUTO: 0.13 10*3/MM3 (ref 0–0.4)
EOSINOPHIL NFR BLD AUTO: 2.4 % (ref 0.3–6.2)
EPI CELLS #/AREA URNS HPF: NORMAL /HPF
ERYTHROCYTE [DISTWIDTH] IN BLOOD BY AUTOMATED COUNT: 12.6 % (ref 12.3–15.4)
GLOBULIN SER CALC-MCNC: 2.9 GM/DL
GLUCOSE SERPL-MCNC: 115 MG/DL (ref 65–99)
GLUCOSE UR QL STRIP: NEGATIVE
HBA1C MFR BLD: 6.3 % (ref 4.8–5.6)
HCT VFR BLD AUTO: 46.1 % (ref 37.5–51)
HDLC SERPL-MCNC: 55 MG/DL (ref 40–60)
HGB BLD-MCNC: 15.7 G/DL (ref 13–17.7)
HGB UR QL STRIP: NEGATIVE
IMM GRANULOCYTES # BLD AUTO: 0.01 10*3/MM3 (ref 0–0.05)
IMM GRANULOCYTES NFR BLD AUTO: 0.2 % (ref 0–0.5)
KETONES UR QL STRIP: NEGATIVE
LDLC SERPL CALC-MCNC: 149 MG/DL (ref 0–100)
LDLC/HDLC SERPL: 2.66 {RATIO}
LEUKOCYTE ESTERASE UR QL STRIP: NEGATIVE
LYMPHOCYTES # BLD AUTO: 1.96 10*3/MM3 (ref 0.7–3.1)
LYMPHOCYTES NFR BLD AUTO: 36.4 % (ref 19.6–45.3)
MCH RBC QN AUTO: 30 PG (ref 26.6–33)
MCHC RBC AUTO-ENTMCNC: 34.1 G/DL (ref 31.5–35.7)
MCV RBC AUTO: 88.1 FL (ref 79–97)
MONOCYTES # BLD AUTO: 0.43 10*3/MM3 (ref 0.1–0.9)
MONOCYTES NFR BLD AUTO: 8 % (ref 5–12)
NEUTROPHILS # BLD AUTO: 2.82 10*3/MM3 (ref 1.7–7)
NEUTROPHILS NFR BLD AUTO: 52.4 % (ref 42.7–76)
NITRITE UR QL STRIP: NEGATIVE
NRBC BLD AUTO-RTO: 0 /100 WBC (ref 0–0.2)
PH UR STRIP: 8 [PH] (ref 5–8)
PLATELET # BLD AUTO: 137 10*3/MM3 (ref 140–450)
POTASSIUM SERPL-SCNC: 4.4 MMOL/L (ref 3.5–5.2)
PROT SERPL-MCNC: 7.2 G/DL (ref 6–8.5)
PROT UR QL STRIP: ABNORMAL
RBC # BLD AUTO: 5.23 10*6/MM3 (ref 4.14–5.8)
RBC #/AREA URNS HPF: NORMAL /HPF
SODIUM SERPL-SCNC: 141 MMOL/L (ref 136–145)
SP GR UR STRIP: 1.02 (ref 1–1.03)
TRIGL SERPL-MCNC: 143 MG/DL (ref 0–150)
UROBILINOGEN UR STRIP-MCNC: ABNORMAL MG/DL
VLDLC SERPL CALC-MCNC: 26 MG/DL (ref 5–40)
WBC # BLD AUTO: 5.38 10*3/MM3 (ref 3.4–10.8)
WBC #/AREA URNS HPF: NORMAL /HPF

## 2025-02-17 ENCOUNTER — OFFICE VISIT (OUTPATIENT)
Dept: FAMILY MEDICINE CLINIC | Facility: CLINIC | Age: OVER 89
End: 2025-02-17
Payer: MEDICARE

## 2025-02-17 VITALS
HEART RATE: 67 BPM | BODY MASS INDEX: 45.35 KG/M2 | RESPIRATION RATE: 18 BRPM | SYSTOLIC BLOOD PRESSURE: 160 MMHG | DIASTOLIC BLOOD PRESSURE: 80 MMHG | HEIGHT: 67 IN | TEMPERATURE: 96.8 F | OXYGEN SATURATION: 94 %

## 2025-02-17 DIAGNOSIS — H02.132 SENILE ECTROPION OF BOTH LOWER EYELIDS: ICD-10-CM

## 2025-02-17 DIAGNOSIS — H02.135 SENILE ECTROPION OF BOTH LOWER EYELIDS: ICD-10-CM

## 2025-02-17 DIAGNOSIS — I48.19 OTHER PERSISTENT ATRIAL FIBRILLATION: ICD-10-CM

## 2025-02-17 DIAGNOSIS — M48.062 SPINAL STENOSIS OF LUMBAR REGION WITH NEUROGENIC CLAUDICATION: ICD-10-CM

## 2025-02-17 DIAGNOSIS — E66.01 MORBID OBESITY: ICD-10-CM

## 2025-02-17 DIAGNOSIS — G47.33 OSA ON CPAP: ICD-10-CM

## 2025-02-17 DIAGNOSIS — E78.00 HYPERCHOLESTEROLEMIA: ICD-10-CM

## 2025-02-17 DIAGNOSIS — I10 PRIMARY HYPERTENSION: Primary | ICD-10-CM

## 2025-02-17 RX ORDER — DOXAZOSIN 4 MG/1
4 TABLET ORAL DAILY
COMMUNITY

## 2025-02-17 RX ORDER — METOPROLOL SUCCINATE 25 MG/1
25 TABLET, EXTENDED RELEASE ORAL DAILY
COMMUNITY

## 2025-02-18 NOTE — PROGRESS NOTES
Answers submitted by the patient for this visit:  Shortness of Breath Questionnaire (Submitted on 2/10/2025)  Chief Complaint: Shortness of breath  chest congestion: No  dry cough: No  Subjective   Harry Potts is a 89 y.o. male. Patient is here today for   Chief Complaint   Patient presents with    Hypertension        History of Present Illness  Always a pleasure to see this patient again.  Mr. Tejada is a 89-year-old man and he is here today with his wife and young man they have hired to drive him.    He is about to have the surgery done for his ectropion of his lower lids.    He is in a wheelchair today as he is accustomed to being.  He is always pleasant but he has a little bit of trouble with his memory.      Shortness of Breath  This is a chronic problem. The current episode started more than 1 year ago. The problem occurs intermittently. The problem has been unchanged. Pertinent negatives include no chest pain, fever, headaches, hemoptysis, leg pain, leg swelling, orthopnea, PND, sore throat, sputum production, swollen glands, syncope, vomiting or wheezing. The symptoms are aggravated by any activity and pollens. His past medical history is significant for COPD. There is no history of asthma. There has been no fever. Most recent home oxygen level percentage is 95    History of Present Illness  The patient is a 89-year-old male who presents for evaluation of cholesterol management, sleep apnea, and health maintenance.    He has been under the care of Dr. Chamberlain, with whom he had a consultation approximately 1 month ago. Dr. Chamberlain recommended the continuation of injections as part of his treatment regimen. He has an upcoming appointment scheduled with Dr. Ca on 03/19/2025. He has previously been prescribed Lipitor for cholesterol management.    He utilizes a CPAP machine nightly for sleep apnea management.    He is scheduled to see Dr. Lindquist for an ophthalmology surgery on  03/24/2025.    MEDICATIONS  Current: Zetia, CPAP machine  Past: Lipitor      Vitals:    02/17/25 1019   BP: 160/80   Pulse: 67   Resp: 18   Temp: 96.8 °F (36 °C)   SpO2: 94%     Body mass index is 45.35 kg/m².    Past Medical History:   Diagnosis Date    Abnormal ECG 4.5.2023    new onset a fib    Allergic     Arthritis     Asthma March, 2021    Oxygen at home    Atrial fibrillation 04/18/2023    Cancer     basal cell     Cholelithiasis 4.12.23    long history of gallstones without issue, cholecystitis 4.12.23 with E.faecalis bacteremia    COPD (chronic obstructive pulmonary disease)     Coronary artery disease 1980's    htn, elevated cholesterol    Difficulty walking     Elevated cholesterol     Environmental allergies     GERD (gastroesophageal reflux disease)     History of medical problems 4/2023    new onset a fib with hospitalization for respiratory failure/cholecystitis    HL (hearing loss)     Hyperglycemia     Hyperlipidemia     Hypertension     Obesity     Pulmonary hypertension 04/20/2023    Sleep apnea     Spondylolysis, lumbar region     Stroke unknown    old infarct per CT 4.5.2023    Tremor 5/2023    s/p hypercapnia      Allergies   Allergen Reactions    Morphine And Codeine Itching, Rash and Confusion     Patient never wants to have Morphine again in his life    Atorvastatin Other (See Comments)     SPASMS LEG       Dexmedetomidine Other (See Comments)     Dex causes bradycardia and hyptension       Social History     Socioeconomic History    Marital status:    Tobacco Use    Smoking status: Never     Passive exposure: Never    Smokeless tobacco: Never   Vaping Use    Vaping status: Never Used   Substance and Sexual Activity    Alcohol use: No     Comment: caffeine use    Drug use: No    Sexual activity: Not Currently     Partners: Female     Birth control/protection: Abstinence     Comment: Not an issue at my age        Current Outpatient Medications:     albuterol sulfate  (90 Base)  MCG/ACT inhaler, Inhale 2 puffs Every 4 (Four) Hours As Needed for Wheezing., Disp: 18 g, Rfl: 5    amLODIPine (NORVASC) 10 MG tablet, TAKE 1 TABLET DAILY, Disp: 90 tablet, Rfl: 3    ammonium lactate (AMLACTIN) 12 % cream, Apply 1 g topically to the appropriate area as directed Every 12 (Twelve) Hours., Disp: , Rfl:     Azelastine HCl 137 MCG/SPRAY solution, USE 1 SPRAY IN EACH NOSTRIL AS DIRECTED BY PROVIDER DAILY, Disp: 30 mL, Rfl: 2    Diclofenac Sodium (VOLTAREN) 1 % gel gel, Apply 4 g topically to the appropriate area as directed 3 (Three) Times a Day., Disp: 150 g, Rfl: 1    Eliquis 5 MG tablet tablet, TAKE 1 TABLET EVERY 12 HOURS. INDICATIONS: ATRIAL FIBRILLATION, Disp: 180 tablet, Rfl: 3    esomeprazole (nexIUM) 40 MG capsule, TAKE 1 CAPSULE DAILY, Disp: 90 capsule, Rfl: 3    ezetimibe (ZETIA) 10 MG tablet, TAKE 1 TABLET DAILY, Disp: 90 tablet, Rfl: 3    fluticasone (FLONASE) 50 MCG/ACT nasal spray, 1 spray into the nostril(s) as directed by provider Daily., Disp: 11.1 mL, Rfl: 2    Fluticasone-Salmeterol (ADVAIR/WIXELA) 250-50 MCG/ACT DISKUS, USE 1 INHALATION TWICE A DAY, Disp: 180 each, Rfl: 3    furosemide (LASIX) 20 MG tablet, TAKE 1 TABLET DAILY, Disp: 90 tablet, Rfl: 3    Klor-Con M20 20 MEQ CR tablet, TAKE 1 TABLET TWICE A DAY, Disp: 180 tablet, Rfl: 3    loratadine (CLARITIN) 10 MG tablet, Take  by mouth Daily., Disp: , Rfl:     miconazole (MICOTIN) 2 % powder, Apply  topically to the appropriate area as directed Every 12 (Twelve) Hours., Disp: , Rfl:     Multiple Vitamins-Minerals (CENTRUM ADULTS PO), Take  by mouth Daily., Disp: , Rfl:     O2 (OXYGEN), 4 Liter O2 - CONTINUOUS (route: Oxygen), Disp: , Rfl:     sennosides-docusate (PERICOLACE) 8.6-50 MG per tablet, Take 2 tablets by mouth 2 (Two) Times a Day., Disp: , Rfl:     doxazosin (CARDURA) 4 MG tablet, Take 1 tablet by mouth Daily. (Patient not taking: Reported on 2/17/2025), Disp: , Rfl:     metoprolol succinate XL (TOPROL-XL) 25 MG 24 hr  tablet, Take 1 tablet by mouth Daily. (Patient not taking: Reported on 2/17/2025), Disp: , Rfl:      Objective     Review of Systems   Constitutional:  Negative for fever.   HENT:  Negative for congestion and sore throat.    Respiratory:  Positive for shortness of breath. Negative for hemoptysis, sputum production and wheezing.    Cardiovascular:  Negative for chest pain, orthopnea, leg swelling, syncope and PND.   Gastrointestinal:  Negative for vomiting.   Neurological:  Negative for headaches.       Physical Exam  Vitals and nursing note reviewed.   Constitutional:       Comments: Pleasant, neatly groomed, no distress.   HENT:      Head: Normocephalic and atraumatic.   Neck:      Vascular: No carotid bruit.   Cardiovascular:      Heart sounds: Normal heart sounds.   Pulmonary:      Effort: No respiratory distress.      Breath sounds: Normal breath sounds. No wheezing or rales.   Neurological:      Mental Status: He is oriented to person, place, and time.   Psychiatric:         Mood and Affect: Mood normal.         Behavior: Behavior normal.         Thought Content: Thought content normal.       Physical Exam  Irregular heart rhythm.    Results  Laboratory Studies  Glucose was 115. Hemoglobin A1c was 6.3. LDL cholesterol was 149.    Assessment & Plan    Problems Addressed this Visit          Cardiac and Vasculature    Hypercholesterolemia    Hypertension - Primary    Relevant Medications    doxazosin (CARDURA) 4 MG tablet    metoprolol succinate XL (TOPROL-XL) 25 MG 24 hr tablet    Other persistent atrial fibrillation    Relevant Medications    metoprolol succinate XL (TOPROL-XL) 25 MG 24 hr tablet       Endocrine and Metabolic    Morbid obesity       Eye    Senile ectropion of both lower eyelids       Neuro    Spinal stenosis of lumbar region with neurogenic claudication       Sleep    JENISE on CPAP     Diagnoses         Codes Comments    Primary hypertension    -  Primary ICD-10-CM: I10  ICD-9-CM: 401.9     Other  persistent atrial fibrillation     ICD-10-CM: I48.19  ICD-9-CM: 427.31     Hypercholesterolemia     ICD-10-CM: E78.00  ICD-9-CM: 272.0     Morbid obesity     ICD-10-CM: E66.01  ICD-9-CM: 278.01     Senile ectropion of both lower eyelids     ICD-10-CM: H02.132, H02.135  ICD-9-CM: 374.11     Spinal stenosis of lumbar region with neurogenic claudication     ICD-10-CM: M48.062  ICD-9-CM: 724.03     JENISE on CPAP     ICD-10-CM: G47.33  ICD-9-CM: 327.23           Assessment & Plan  1. Cholesterol management.  His LDL cholesterol level is elevated at 149 mg/dL. He is currently on Zetia for cholesterol management. He has tried other cholesterol medications like Lipitor in the past. He will continue with the current medication regimen. He has an appointment with Dr. Ca on 03/19/2025 to further discuss his cholesterol management.    2. Sleep apnea.  He is using a CPAP machine every night. He will continue using the CPAP machine as prescribed.    3. Health maintenance.  He is scheduled to see Dr. Lindquist for an ophthalmology surgery on 03/24/2025.    Follow-up  The patient will follow up in 4 months.      No follow-ups on file.    Patient or patient representative verbalized consent for the use of Ambient Listening during the visit with  Harry Luis MD for chart documentation. 2/18/2025  12:54 EST

## 2025-03-19 ENCOUNTER — OFFICE VISIT (OUTPATIENT)
Dept: CARDIOLOGY | Facility: CLINIC | Age: OVER 89
End: 2025-03-19
Payer: MEDICARE

## 2025-03-19 VITALS
DIASTOLIC BLOOD PRESSURE: 80 MMHG | HEIGHT: 67 IN | SYSTOLIC BLOOD PRESSURE: 142 MMHG | HEART RATE: 102 BPM | BODY MASS INDEX: 45.36 KG/M2

## 2025-03-19 DIAGNOSIS — I10 PRIMARY HYPERTENSION: ICD-10-CM

## 2025-03-19 DIAGNOSIS — E78.00 HYPERCHOLESTEROLEMIA: ICD-10-CM

## 2025-03-19 DIAGNOSIS — I49.3 PVC (PREMATURE VENTRICULAR CONTRACTION): Primary | ICD-10-CM

## 2025-03-19 DIAGNOSIS — I48.19 OTHER PERSISTENT ATRIAL FIBRILLATION: ICD-10-CM

## 2025-03-19 PROCEDURE — 99214 OFFICE O/P EST MOD 30 MIN: CPT | Performed by: NURSE PRACTITIONER

## 2025-03-19 PROCEDURE — 93000 ELECTROCARDIOGRAM COMPLETE: CPT | Performed by: NURSE PRACTITIONER

## 2025-03-19 NOTE — PROGRESS NOTES
Subjective:     Encounter Date:03/14/2024      Patient ID: Harry Potts is a 89 y.o. male.    Chief Complaint:follow up HTN, afib  History of Present Illness  This is a 89 y/o man who follows with Dr. Gomes and is new to me today. He has a pmhx of hypertension, hyperlipidemia, obesity, asthma, JENISE on CPAP, PVCs and persistent atrial fibrillation.     He is here today for a follow up visit. He is doing well from a cardiac standpoint.  He feels his shortness of breath is stable.  He continues to wear 2 L of supplemental O2 throughout the day and 4 L at night.  No complaints of chest pain, palpitations, dizziness or syncope.  He denies any swelling in his legs.  Blood pressures have been well-controlled.  He is not very happy with his blood pressure 142/80 today and says it is typically lower than that.  He is getting ready to undergo surgery on his lower eyelids for repair of his ectropion.  This will be performed at [facility] next week.    Prior history:  He was previously a patient of Dr. Jones whom he saw for preoperative clearance in 2006. He had an abnormal EKG and underwent a stress that showed an inferior infarct. He subsequently underwent a cardiac catheterization that showed mild nonobstructive coronary artery disease.normal left ventricular systolic function wall motion with an EF of 61%, mild mitral regurgitation, and grade 1 diastolic dysfunction.  His stress test was negative for ischemia.       Dr. Gomes began seeing the patient in 2019 when he presented with complaints of dyspnea, bradycardia and orthopnea. He was noted to have frequent PVCs and ventricular bigeminy. Echocardiogram showed   I have reviewed and updated as appropriate allergies, current medications, past family history, past medical history, past surgical history and problem list. He was also being treated for asthma at that time.    He was lost to follow up until 2022. He had suffered a fall while in the kitchen  helping his wife prepare dinner. Because of the fall, he began monitoring his heart rates and blood pressure. He noticed that his rates were in the low 40s. His daughter, who is a pharmacist suggested decreasing metoprolol succinate to 25 mg daily. Dr. Gomes felt his bradycardia was likely due to frequent PVCs however, she agreed with keeping him on the lower dose since he felt better.    In April 2023, he was admitted for confusion and was noted to be in atrial fibrillation. Echocardiogram showed normal left ventricular systolic function and wall motion with an EF of 65%, mild biatrial enlargement, mildly dilated right ventricle, and mild to moderately elevated right ventricular systolic pressure. He was placed on anticoagulation with apixaban at that time. He was discharged to rehab for about 2 months.     He followed up with Dr Gomes in September 2023 and was doing ok. He was noted to be off metoprolol completely and rates were remaining well controlled. He was not very ambulatory because of chronic issues with his back. He was taken off aspirin since he was on chronic anticoagulation and the risks of bleeding were felt to be greater than the benefit.    He was seen by me in March 2024 and was doing fairly well at that time.  I did not make any changes.  He was last seen by Dr. Gomes in September and I did not see significant issues.  No changes were made.      Review of Systems   Constitutional: Negative for fever, malaise/fatigue, weight gain and weight loss.   HENT:  Negative for congestion, hoarse voice and sore throat.    Eyes:  Negative for blurred vision and double vision.   Cardiovascular:  Negative for chest pain, dyspnea on exertion, leg swelling, orthopnea, palpitations and syncope.   Respiratory:  Positive for shortness of breath. Negative for cough and wheezing.    Gastrointestinal:  Negative for abdominal pain, hematemesis, hematochezia and melena.   Genitourinary:  Negative for dysuria and  hematuria.   Neurological:  Negative for dizziness, headaches, light-headedness and numbness.   Psychiatric/Behavioral:  Negative for depression. The patient is not nervous/anxious.          Current Outpatient Medications:     albuterol sulfate  (90 Base) MCG/ACT inhaler, Inhale 2 puffs Every 4 (Four) Hours As Needed for Wheezing., Disp: 18 g, Rfl: 5    amLODIPine (NORVASC) 10 MG tablet, TAKE 1 TABLET DAILY, Disp: 90 tablet, Rfl: 3    ammonium lactate (AMLACTIN) 12 % cream, Apply 1 g topically to the appropriate area as directed Every 12 (Twelve) Hours., Disp: , Rfl:     Azelastine HCl 137 MCG/SPRAY solution, USE 1 SPRAY IN EACH NOSTRIL AS DIRECTED BY PROVIDER DAILY, Disp: 30 mL, Rfl: 2    Eliquis 5 MG tablet tablet, TAKE 1 TABLET EVERY 12 HOURS. INDICATIONS: ATRIAL FIBRILLATION, Disp: 180 tablet, Rfl: 3    esomeprazole (nexIUM) 40 MG capsule, TAKE 1 CAPSULE DAILY, Disp: 90 capsule, Rfl: 3    ezetimibe (ZETIA) 10 MG tablet, TAKE 1 TABLET DAILY, Disp: 90 tablet, Rfl: 3    fluticasone (FLONASE) 50 MCG/ACT nasal spray, 1 spray into the nostril(s) as directed by provider Daily., Disp: 11.1 mL, Rfl: 2    Fluticasone-Salmeterol (ADVAIR/WIXELA) 250-50 MCG/ACT DISKUS, USE 1 INHALATION TWICE A DAY, Disp: 180 each, Rfl: 3    furosemide (LASIX) 20 MG tablet, TAKE 1 TABLET DAILY, Disp: 90 tablet, Rfl: 3    Klor-Con M20 20 MEQ CR tablet, TAKE 1 TABLET TWICE A DAY, Disp: 180 tablet, Rfl: 3    sennosides-docusate (PERICOLACE) 8.6-50 MG per tablet, Take 2 tablets by mouth 2 (Two) Times a Day., Disp: , Rfl:     Diclofenac Sodium (VOLTAREN) 1 % gel gel, Apply 4 g topically to the appropriate area as directed 3 (Three) Times a Day. (Patient not taking: Reported on 3/19/2025), Disp: 150 g, Rfl: 1    doxazosin (CARDURA) 4 MG tablet, Take 1 tablet by mouth Daily. (Patient not taking: Reported on 2/17/2025), Disp: , Rfl:     loratadine (CLARITIN) 10 MG tablet, Take  by mouth Daily., Disp: , Rfl:     metoprolol succinate XL  (TOPROL-XL) 25 MG 24 hr tablet, Take 1 tablet by mouth Daily. (Patient not taking: Reported on 2/17/2025), Disp: , Rfl:     miconazole (MICOTIN) 2 % powder, Apply  topically to the appropriate area as directed Every 12 (Twelve) Hours., Disp: , Rfl:     Multiple Vitamins-Minerals (CENTRUM ADULTS PO), Take  by mouth Daily., Disp: , Rfl:     O2 (OXYGEN), 4 Liter O2 - CONTINUOUS (route: Oxygen), Disp: , Rfl:     Past Medical History:   Diagnosis Date    Abnormal ECG 4.5.2023    new onset a fib    Allergic     Arthritis     Asthma March, 2021    Oxygen at home    Atrial fibrillation 04/18/2023    Cancer     basal cell     Cholelithiasis 4.12.23    long history of gallstones without issue, cholecystitis 4.12.23 with E.faecalis bacteremia    COPD (chronic obstructive pulmonary disease)     Coronary artery disease 1980's    htn, elevated cholesterol    Difficulty walking     Elevated cholesterol     Environmental allergies     GERD (gastroesophageal reflux disease)     History of medical problems 4/2023    new onset a fib with hospitalization for respiratory failure/cholecystitis    HL (hearing loss)     Hyperglycemia     Hyperlipidemia     Hypertension     Obesity     Pulmonary hypertension 04/20/2023    Sleep apnea     Spondylolysis, lumbar region     Stroke unknown    old infarct per CT 4.5.2023    Tremor 5/2023    s/p hypercapnia       Past Surgical History:   Procedure Laterality Date    CARDIAC CATHETERIZATION  circa 2008    [facility]    EYE SURGERY      HERNIA REPAIR      JOINT REPLACEMENT      REPLACEMENT TOTAL KNEE BILATERAL         Family History   Problem Relation Age of Onset    Heart attack Father         age 80       Social History     Tobacco Use    Smoking status: Never     Passive exposure: Never    Smokeless tobacco: Never   Vaping Use    Vaping status: Never Used   Substance Use Topics    Alcohol use: No     Comment: caffeine use    Drug use: No         ECG 12 Lead    Date/Time: 3/19/2025 3:03  "PM  Performed by: Samaria Andres APRN    Authorized by: Samaria Andres APRN  Comparison: compared with previous ECG from 9/19/2024  Similar to previous ECG  Rhythm: atrial fibrillation  Conduction: right bundle branch block             Objective:     Visit Vitals  /80 (BP Location: Right arm, Patient Position: Sitting, Cuff Size: Adult)   Pulse 102   Ht 170.2 cm (67\")   BMI 45.36 kg/m²             Physical Exam  Constitutional:       Appearance: Normal appearance. He is obese.   HENT:      Head: Normocephalic.   Neck:      Vascular: No carotid bruit.   Cardiovascular:      Rate and Rhythm: Normal rate. Rhythm irregularly irregular.      Chest Wall: PMI is not displaced.      Pulses: Normal pulses.           Radial pulses are 2+ on the right side and 2+ on the left side.        Posterior tibial pulses are 2+ on the right side and 2+ on the left side.      Heart sounds: Normal heart sounds. No murmur heard.     No friction rub. No gallop.   Pulmonary:      Effort: Pulmonary effort is normal.      Breath sounds: Normal breath sounds.   Abdominal:      General: Bowel sounds are normal. There is no distension.      Palpations: Abdomen is soft.   Musculoskeletal:      Right lower leg: No edema.      Left lower leg: No edema.   Skin:     General: Skin is warm and dry.      Capillary Refill: Capillary refill takes less than 2 seconds.   Neurological:      Mental Status: He is alert and oriented to person, place, and time.   Psychiatric:         Mood and Affect: Mood normal.         Behavior: Behavior normal.         Thought Content: Thought content normal.          Lab Review:   Lipid Panel          6/19/2024    10:14 10/2/2024    09:52 2/13/2025    10:13   Lipid Panel   Total Cholesterol 200  220  230    Triglycerides 102  153  143    HDL Cholesterol 54  52  55    VLDL Cholesterol 18  27  26    LDL Cholesterol  128  141  149    LDL/HDL Ratio 2.33  2.64  2.66          Cardiac Procedures:       Assessment:     "     Diagnoses and all orders for this visit:    1. PVC (premature ventricular contraction) (Primary)    2. Other persistent atrial fibrillation    3. Primary hypertension    4. Hypercholesterolemia    Other orders  -     ECG 12 Lead              Plan:       Persistant atrial fibrillation: rates relatively well controlled at home off metoprolol. No reported bleeding issues with apixaban. Continue with current treatment plan.  HLD:on Zetia. Goal LDL < 70. His most recent was 149 in 02/13/2025. Intolerant to statins.   HTN: blood pressure stable. No changes.  PVCs: asymptomatic  JENISE: treated  Pulmonary hypertension  Mild CAD: no longer on aspirin. EKG is stable. No anginal symptoms reported  Morbid ovesity    Thank you for allowing me to participate in this patient's care. Please call with any questions or concerns. Mr. Potts will follow up with Dr. Gomes in 6 months.          Your medication list            Accurate as of March 19, 2025  3:05 PM. If you have any questions, ask your nurse or doctor.                CONTINUE taking these medications        Instructions Last Dose Given Next Dose Due   albuterol sulfate  (90 Base) MCG/ACT inhaler  Commonly known as: PROVENTIL HFA;VENTOLIN HFA;PROAIR HFA      Inhale 2 puffs Every 4 (Four) Hours As Needed for Wheezing.       amLODIPine 10 MG tablet  Commonly known as: NORVASC      TAKE 1 TABLET DAILY       ammonium lactate 12 % cream  Commonly known as: AMLACTIN      Apply 1 g topically to the appropriate area as directed Every 12 (Twelve) Hours.       Azelastine HCl 137 MCG/SPRAY solution      USE 1 SPRAY IN EACH NOSTRIL AS DIRECTED BY PROVIDER DAILY       Diclofenac Sodium 1 % gel gel  Commonly known as: VOLTAREN      Apply 4 g topically to the appropriate area as directed 3 (Three) Times a Day.       doxazosin 4 MG tablet  Commonly known as: CARDURA      Take 1 tablet by mouth Daily.       Eliquis 5 MG tablet tablet  Generic drug: apixaban      TAKE 1 TABLET EVERY  12 HOURS. INDICATIONS: ATRIAL FIBRILLATION       esomeprazole 40 MG capsule  Commonly known as: nexIUM      TAKE 1 CAPSULE DAILY       ezetimibe 10 MG tablet  Commonly known as: ZETIA      TAKE 1 TABLET DAILY       fluticasone 50 MCG/ACT nasal spray  Commonly known as: FLONASE      1 spray into the nostril(s) as directed by provider Daily.       Fluticasone-Salmeterol 250-50 MCG/ACT DISKUS  Commonly known as: ADVAIR/WIXELA      USE 1 INHALATION TWICE A DAY       furosemide 20 MG tablet  Commonly known as: LASIX      TAKE 1 TABLET DAILY       Klor-Con M20 20 MEQ CR tablet  Generic drug: potassium chloride      TAKE 1 TABLET TWICE A DAY       loratadine 10 MG tablet  Commonly known as: CLARITIN      Take  by mouth Daily.       metoprolol succinate XL 25 MG 24 hr tablet  Commonly known as: TOPROL-XL      Take 1 tablet by mouth Daily.       miconazole 2 % powder  Commonly known as: MICOTIN      Apply  topically to the appropriate area as directed Every 12 (Twelve) Hours.       multivitamin with minerals tablet tablet      Take  by mouth Daily.       O2  Commonly known as: OXYGEN      4 Liter O2 - CONTINUOUS (route: Oxygen)       sennosides-docusate 8.6-50 MG per tablet  Commonly known as: PERICOLACE      Take 2 tablets by mouth 2 (Two) Times a Day.                  DANNIE Sparks  03/19/25  12:55 PM EDT

## 2025-04-18 ENCOUNTER — APPOINTMENT (OUTPATIENT)
Dept: GENERAL RADIOLOGY | Facility: HOSPITAL | Age: OVER 89
End: 2025-04-18
Payer: MEDICARE

## 2025-04-18 ENCOUNTER — HOSPITAL ENCOUNTER (EMERGENCY)
Facility: HOSPITAL | Age: OVER 89
Discharge: HOME | End: 2025-04-18
Attending: STUDENT IN AN ORGANIZED HEALTH CARE EDUCATION/TRAINING PROGRAM
Payer: MEDICARE

## 2025-04-18 ENCOUNTER — OFFICE VISIT (OUTPATIENT)
Dept: FAMILY MEDICINE CLINIC | Facility: CLINIC | Age: OVER 89
End: 2025-04-18
Payer: MEDICARE

## 2025-04-18 VITALS
DIASTOLIC BLOOD PRESSURE: 80 MMHG | SYSTOLIC BLOOD PRESSURE: 190 MMHG | HEART RATE: 87 BPM | HEIGHT: 67 IN | OXYGEN SATURATION: 95 % | BODY MASS INDEX: 45.36 KG/M2 | WEIGHT: 289 LBS

## 2025-04-18 VITALS
HEIGHT: 68 IN | DIASTOLIC BLOOD PRESSURE: 94 MMHG | RESPIRATION RATE: 16 BRPM | BODY MASS INDEX: 43.5 KG/M2 | OXYGEN SATURATION: 95 % | WEIGHT: 287 LBS | TEMPERATURE: 97.9 F | HEART RATE: 74 BPM | SYSTOLIC BLOOD PRESSURE: 174 MMHG

## 2025-04-18 DIAGNOSIS — I48.19 OTHER PERSISTENT ATRIAL FIBRILLATION: ICD-10-CM

## 2025-04-18 DIAGNOSIS — J44.1 COPD EXACERBATION: Primary | ICD-10-CM

## 2025-04-18 DIAGNOSIS — I10 PRIMARY HYPERTENSION: Primary | ICD-10-CM

## 2025-04-18 DIAGNOSIS — I49.3 FREQUENT PVCS: ICD-10-CM

## 2025-04-18 PROBLEM — H02.119 CICATRICIAL ECTROPION: Status: ACTIVE | Noted: 2024-10-25

## 2025-04-18 PROBLEM — R41.841 COGNITIVE COMMUNICATION DISORDER: Status: ACTIVE | Noted: 2023-04-24

## 2025-04-18 PROBLEM — J30.9 ALLERGIC RHINITIS: Status: ACTIVE | Noted: 2023-04-24

## 2025-04-18 PROBLEM — Z79.01 LONG TERM CURRENT USE OF ANTICOAGULANT: Status: ACTIVE | Noted: 2023-04-24

## 2025-04-18 PROBLEM — M62.81 MUSCLE WEAKNESS: Status: ACTIVE | Noted: 2023-04-24

## 2025-04-18 PROBLEM — H02.139 SENILE ECTROPION: Status: ACTIVE | Noted: 2024-10-25

## 2025-04-18 PROBLEM — K59.00 CONSTIPATION: Status: ACTIVE | Noted: 2023-04-24

## 2025-04-18 PROBLEM — M19.90 OSTEOARTHRITIS: Status: ACTIVE | Noted: 2023-04-24

## 2025-04-18 LAB
ALBUMIN SERPL-MCNC: 4.2 G/DL (ref 3.5–5.2)
ALBUMIN/GLOB SERPL: 1.4 G/DL
ALP SERPL-CCNC: 109 U/L (ref 39–117)
ALT SERPL W P-5'-P-CCNC: 20 U/L (ref 1–41)
ANION GAP SERPL CALCULATED.3IONS-SCNC: 10.2 MMOL/L (ref 5–15)
AST SERPL-CCNC: 20 U/L (ref 1–40)
BASOPHILS # BLD AUTO: 0.02 10*3/MM3 (ref 0–0.2)
BASOPHILS NFR BLD AUTO: 0.2 % (ref 0–1.5)
BILIRUB SERPL-MCNC: 0.5 MG/DL (ref 0–1.2)
BUN SERPL-MCNC: 14 MG/DL (ref 8–23)
BUN/CREAT SERPL: 16.5 (ref 7–25)
CALCIUM SPEC-SCNC: 9.4 MG/DL (ref 8.2–9.6)
CHLORIDE SERPL-SCNC: 98 MMOL/L (ref 98–107)
CO2 SERPL-SCNC: 30.8 MMOL/L (ref 22–29)
CREAT SERPL-MCNC: 0.85 MG/DL (ref 0.76–1.27)
DEPRECATED RDW RBC AUTO: 46.3 FL (ref 37–54)
EGFRCR SERPLBLD CKD-EPI 2021: 80.5 ML/MIN/1.73
EOSINOPHIL # BLD AUTO: 0.06 10*3/MM3 (ref 0–0.4)
EOSINOPHIL NFR BLD AUTO: 0.7 % (ref 0.3–6.2)
ERYTHROCYTE [DISTWIDTH] IN BLOOD BY AUTOMATED COUNT: 13.3 % (ref 12.3–15.4)
GEN 5 1HR TROPONIN T REFLEX: 21 NG/L
GLOBULIN UR ELPH-MCNC: 3.1 GM/DL
GLUCOSE SERPL-MCNC: 149 MG/DL (ref 65–99)
HCT VFR BLD AUTO: 46.7 % (ref 37.5–51)
HGB BLD-MCNC: 14.5 G/DL (ref 13–17.7)
HOLD SPECIMEN: NORMAL
HOLD SPECIMEN: NORMAL
IMM GRANULOCYTES # BLD AUTO: 0.01 10*3/MM3 (ref 0–0.05)
IMM GRANULOCYTES NFR BLD AUTO: 0.1 % (ref 0–0.5)
INR PPP: 1.1
LYMPHOCYTES # BLD AUTO: 2.23 10*3/MM3 (ref 0.7–3.1)
LYMPHOCYTES NFR BLD AUTO: 26.3 % (ref 19.6–45.3)
MCH RBC QN AUTO: 28.9 PG (ref 26.6–33)
MCHC RBC AUTO-ENTMCNC: 31 G/DL (ref 31.5–35.7)
MCV RBC AUTO: 93.2 FL (ref 79–97)
MONOCYTES # BLD AUTO: 0.59 10*3/MM3 (ref 0.1–0.9)
MONOCYTES NFR BLD AUTO: 7 % (ref 5–12)
NEUTROPHILS NFR BLD AUTO: 5.56 10*3/MM3 (ref 1.7–7)
NEUTROPHILS NFR BLD AUTO: 65.7 % (ref 42.7–76)
NT-PROBNP SERPL-MCNC: 584.2 PG/ML (ref 0–1800)
PLATELET # BLD AUTO: 149 10*3/MM3 (ref 140–450)
PMV BLD AUTO: 10.3 FL (ref 6–12)
POTASSIUM SERPL-SCNC: 4.1 MMOL/L (ref 3.5–5.2)
PROT SERPL-MCNC: 7.3 G/DL (ref 6–8.5)
PROTHROMBIN TIME: 13.3 SECONDS (ref 11–15)
RBC # BLD AUTO: 5.01 10*6/MM3 (ref 4.14–5.8)
SODIUM SERPL-SCNC: 139 MMOL/L (ref 136–145)
TROPONIN T % DELTA: -5
TROPONIN T NUMERIC DELTA: -1 NG/L
TROPONIN T SERPL HS-MCNC: 22 NG/L
WBC NRBC COR # BLD AUTO: 8.47 10*3/MM3 (ref 3.4–10.8)
WHOLE BLOOD HOLD SPECIMEN: NORMAL

## 2025-04-18 PROCEDURE — 93005 ELECTROCARDIOGRAM TRACING: CPT

## 2025-04-18 PROCEDURE — 36415 COLL VENOUS BLD VENIPUNCTURE: CPT

## 2025-04-18 PROCEDURE — 99284 EMERGENCY DEPT VISIT MOD MDM: CPT

## 2025-04-18 PROCEDURE — 83880 ASSAY OF NATRIURETIC PEPTIDE: CPT | Performed by: STUDENT IN AN ORGANIZED HEALTH CARE EDUCATION/TRAINING PROGRAM

## 2025-04-18 PROCEDURE — 93010 ELECTROCARDIOGRAM REPORT: CPT | Performed by: INTERNAL MEDICINE

## 2025-04-18 PROCEDURE — 84484 ASSAY OF TROPONIN QUANT: CPT | Performed by: STUDENT IN AN ORGANIZED HEALTH CARE EDUCATION/TRAINING PROGRAM

## 2025-04-18 PROCEDURE — 1126F AMNT PAIN NOTED NONE PRSNT: CPT | Performed by: NURSE PRACTITIONER

## 2025-04-18 PROCEDURE — 71045 X-RAY EXAM CHEST 1 VIEW: CPT

## 2025-04-18 PROCEDURE — 96374 THER/PROPH/DIAG INJ IV PUSH: CPT

## 2025-04-18 PROCEDURE — 80053 COMPREHEN METABOLIC PANEL: CPT | Performed by: STUDENT IN AN ORGANIZED HEALTH CARE EDUCATION/TRAINING PROGRAM

## 2025-04-18 PROCEDURE — 85610 PROTHROMBIN TIME: CPT

## 2025-04-18 PROCEDURE — 1159F MED LIST DOCD IN RCRD: CPT | Performed by: NURSE PRACTITIONER

## 2025-04-18 PROCEDURE — 99284 EMERGENCY DEPT VISIT MOD MDM: CPT | Performed by: STUDENT IN AN ORGANIZED HEALTH CARE EDUCATION/TRAINING PROGRAM

## 2025-04-18 PROCEDURE — 99212 OFFICE O/P EST SF 10 MIN: CPT | Performed by: NURSE PRACTITIONER

## 2025-04-18 PROCEDURE — 1160F RVW MEDS BY RX/DR IN RCRD: CPT | Performed by: NURSE PRACTITIONER

## 2025-04-18 PROCEDURE — 25010000002 DEXAMETHASONE PER 1 MG: Performed by: STUDENT IN AN ORGANIZED HEALTH CARE EDUCATION/TRAINING PROGRAM

## 2025-04-18 PROCEDURE — 85025 COMPLETE CBC W/AUTO DIFF WBC: CPT | Performed by: STUDENT IN AN ORGANIZED HEALTH CARE EDUCATION/TRAINING PROGRAM

## 2025-04-18 RX ORDER — SODIUM CHLORIDE 0.9 % (FLUSH) 0.9 %
10 SYRINGE (ML) INJECTION AS NEEDED
Status: DISCONTINUED | OUTPATIENT
Start: 2025-04-18 | End: 2025-04-18 | Stop reason: HOSPADM

## 2025-04-18 RX ORDER — METOPROLOL SUCCINATE 25 MG/1
25 TABLET, EXTENDED RELEASE ORAL DAILY
Qty: 30 TABLET | Refills: 0 | Status: SHIPPED | OUTPATIENT
Start: 2025-04-18 | End: 2025-05-18

## 2025-04-18 RX ORDER — DEXAMETHASONE SODIUM PHOSPHATE 10 MG/ML
10 INJECTION, SOLUTION INTRAMUSCULAR; INTRAVENOUS ONCE
Status: CANCELLED | OUTPATIENT
Start: 2025-04-18 | End: 2025-04-18

## 2025-04-18 RX ORDER — DEXAMETHASONE SODIUM PHOSPHATE 4 MG/ML
4 INJECTION, SOLUTION INTRA-ARTICULAR; INTRALESIONAL; INTRAMUSCULAR; INTRAVENOUS; SOFT TISSUE ONCE
Status: COMPLETED | OUTPATIENT
Start: 2025-04-18 | End: 2025-04-18

## 2025-04-18 RX ADMIN — DEXAMETHASONE SODIUM PHOSPHATE 4 MG: 4 INJECTION, SOLUTION INTRAMUSCULAR; INTRAVENOUS at 21:04

## 2025-04-18 NOTE — PROGRESS NOTES
Subjective     Harry Potts is a 89 y.o.. male.     Patient here today with c/o ongoing cough. Patient denies shortness of breath. Patient having extremely high b/p upon arrival. Patient denies chest pain, shortness of breath, dizziness at this time. Patient in wheel chair and denies exertion before coming up to office. Patient having care taker with him that says he lifts him up out of car and places him in wheel chair.         The following portions of the patient's history were reviewed and updated as appropriate: allergies, current medications, past family history, past medical history, past social history, past surgical history and problem list.    Past Medical History:   Diagnosis Date    Abnormal ECG 4.5.2023    new onset a fib    Allergic     Aneurysm 4.5.2023    Incidental finding on CT    Arthritis     Asthma March, 2021    Oxygen at home    Atrial fibrillation 04/18/2023    Cancer     basal cell     Cholelithiasis 4.12.23    long history of gallstones without issue, cholecystitis 4.12.23 with E.faecalis bacteremia    COPD (chronic obstructive pulmonary disease)     Coronary artery disease 1980's    htn, elevated cholesterol    Difficulty walking     Elevated cholesterol     Environmental allergies     GERD (gastroesophageal reflux disease)     History of medical problems 4/2023    new onset a fib with hospitalization for respiratory failure/cholecystitis    HL (hearing loss)     Hyperglycemia     Hyperlipidemia     Hypertension     Low back pain 1970’s    Limiting mobility    Obesity     Pulmonary hypertension 04/20/2023    Sleep apnea     Spondylolysis, lumbar region     Stroke unknown    old infarct per CT 4.5.2023    Tremor 5/2023    s/p hypercapnia    Visual impairment 4/2023    Bottom of eyes skin sagging       Past Surgical History:   Procedure Laterality Date    CARDIAC CATHETERIZATION  circa 2008    [facility]    EYE SURGERY      HERNIA REPAIR      JOINT REPLACEMENT      REPLACEMENT TOTAL KNEE  "BILATERAL         Review of Systems   Respiratory:  Positive for cough. Negative for shortness of breath.    Cardiovascular:  Negative for chest pain and palpitations.   Neurological:  Negative for dizziness, facial asymmetry, speech difficulty and light-headedness.       Allergies   Allergen Reactions    Morphine And Codeine Itching, Rash and Confusion     Patient never wants to have Morphine again in his life    Atorvastatin Other (See Comments)     SPASMS LEG       Dexmedetomidine Other (See Comments)     Dex causes bradycardia and hyptension        Objective     Vitals:    04/18/25 1549 04/18/25 1605 04/18/25 1606   BP: (!) 232/112 (!) 190/90 (!) 190/80   Pulse: 87     SpO2: 95%     Weight: 131 kg (289 lb)     Height: 170.2 cm (67.02\")       Body mass index is 45.24 kg/m².    Physical Exam  Vitals reviewed.   HENT:      Head: Normocephalic.   Eyes:      Pupils: Pupils are equal, round, and reactive to light.   Cardiovascular:      Rate and Rhythm: Normal rate. Rhythm irregular.   Pulmonary:      Effort: No respiratory distress.      Breath sounds: No stridor. No wheezing, rhonchi or rales.   Musculoskeletal:      Comments: Wheel chair   Skin:     General: Skin is warm and dry.   Neurological:      Mental Status: He is alert.           Current Outpatient Medications:     albuterol sulfate  (90 Base) MCG/ACT inhaler, Inhale 2 puffs Every 4 (Four) Hours As Needed for Wheezing., Disp: 18 g, Rfl: 5    amLODIPine (NORVASC) 10 MG tablet, TAKE 1 TABLET DAILY, Disp: 90 tablet, Rfl: 3    ammonium lactate (AMLACTIN) 12 % cream, Apply 1 g topically to the appropriate area as directed Every 12 (Twelve) Hours., Disp: , Rfl:     Azelastine HCl 137 MCG/SPRAY solution, USE 1 SPRAY IN EACH NOSTRIL AS DIRECTED BY PROVIDER DAILY, Disp: 30 mL, Rfl: 2    Diclofenac Sodium (VOLTAREN) 1 % gel gel, Apply 4 g topically to the appropriate area as directed 3 (Three) Times a Day., Disp: 150 g, Rfl: 1    Eliquis 5 MG tablet tablet, " TAKE 1 TABLET EVERY 12 HOURS. INDICATIONS: ATRIAL FIBRILLATION, Disp: 180 tablet, Rfl: 3    esomeprazole (nexIUM) 40 MG capsule, TAKE 1 CAPSULE DAILY, Disp: 90 capsule, Rfl: 3    ezetimibe (ZETIA) 10 MG tablet, TAKE 1 TABLET DAILY, Disp: 90 tablet, Rfl: 3    fluticasone (FLONASE) 50 MCG/ACT nasal spray, 1 spray into the nostril(s) as directed by provider Daily., Disp: 11.1 mL, Rfl: 2    Fluticasone-Salmeterol (ADVAIR/WIXELA) 250-50 MCG/ACT DISKUS, USE 1 INHALATION TWICE A DAY, Disp: 180 each, Rfl: 3    furosemide (LASIX) 20 MG tablet, TAKE 1 TABLET DAILY, Disp: 90 tablet, Rfl: 3    Klor-Con M20 20 MEQ CR tablet, TAKE 1 TABLET TWICE A DAY, Disp: 180 tablet, Rfl: 3    loratadine (CLARITIN) 10 MG tablet, Take  by mouth Daily., Disp: , Rfl:     miconazole (MICOTIN) 2 % powder, Apply  topically to the appropriate area as directed Every 12 (Twelve) Hours., Disp: , Rfl:     Multiple Vitamins-Minerals (CENTRUM ADULTS PO), Take  by mouth Daily., Disp: , Rfl:     O2 (OXYGEN), 4 Liter O2 - CONTINUOUS (route: Oxygen), Disp: , Rfl:     sennosides-docusate (PERICOLACE) 8.6-50 MG per tablet, Take 2 tablets by mouth 2 (Two) Times a Day., Disp: , Rfl:     doxazosin (CARDURA) 4 MG tablet, Take 1 tablet by mouth Daily. (Patient not taking: Reported on 2/17/2025), Disp: , Rfl:     metoprolol succinate XL (TOPROL-XL) 25 MG 24 hr tablet, Take 1 tablet by mouth Daily. (Patient not taking: Reported on 2/17/2025), Disp: , Rfl:       Diagnoses and all orders for this visit:    1. Primary hypertension (Primary)  Comments:  uncontrolled, severely elevated    2. Other persistent atrial fibrillation        Patient Instructions   Sent Patient to ER for further eval    Return for pt escorted down stairs with caregiver and staff member.

## 2025-04-18 NOTE — FSED PROVIDER NOTE
Subjective   History of Present Illness  Is a 89-year-old male with a past medical history of frequent PVCs noted to see Dr. Gomes, cardiologist, and has had frequent PVCs for many years has been metoprolol and has had gradual dose reductions secondary to bradycardia.  Patient was being seen in urgent care today, he was complaining of a cough, it was dry, not wet, no recent leg swelling, abdominal swelling, he states that he takes water pills regularly and is compliant.  No oliguria.  He states that he otherwise feels fine no chest pain no shortness of breath no diaphoresis nausea or vomiting.        Review of Systems    Past Medical History:   Diagnosis Date    Abnormal ECG 4.5.2023    new onset a fib    Allergic     Aneurysm 4.5.2023    Incidental finding on CT    Arthritis     Asthma March, 2021    Oxygen at home    Atrial fibrillation 04/18/2023    Cancer     basal cell     Cholelithiasis 4.12.23    long history of gallstones without issue, cholecystitis 4.12.23 with E.faecalis bacteremia    COPD (chronic obstructive pulmonary disease)     Coronary artery disease 1980's    htn, elevated cholesterol    Difficulty walking     Elevated cholesterol     Environmental allergies     GERD (gastroesophageal reflux disease)     History of medical problems 4/2023    new onset a fib with hospitalization for respiratory failure/cholecystitis    HL (hearing loss)     Hyperglycemia     Hyperlipidemia     Hypertension     Low back pain 1970’s    Limiting mobility    Obesity     Pulmonary hypertension 04/20/2023    Sleep apnea     Spondylolysis, lumbar region     Stroke unknown    old infarct per CT 4.5.2023    Tremor 5/2023    s/p hypercapnia    Visual impairment 4/2023    Bottom of eyes skin sagging       Allergies   Allergen Reactions    Morphine And Codeine Itching, Rash and Confusion     Patient never wants to have Morphine again in his life    Atorvastatin Other (See Comments)     SPASMS LEG       Dexmedetomidine Other  (See Comments)     Dex causes bradycardia and hyptension        Past Surgical History:   Procedure Laterality Date    CARDIAC CATHETERIZATION  circa 2008    [facility]    EYE SURGERY      HERNIA REPAIR      JOINT REPLACEMENT      REPLACEMENT TOTAL KNEE BILATERAL         Family History   Problem Relation Age of Onset    Heart attack Father         age 80       Social History     Socioeconomic History    Marital status:    Tobacco Use    Smoking status: Never     Passive exposure: Never    Smokeless tobacco: Never   Vaping Use    Vaping status: Never Used   Substance and Sexual Activity    Alcohol use: No     Comment: caffeine use    Drug use: No    Sexual activity: Not Currently     Partners: Female     Birth control/protection: Abstinence     Comment: Not an issue at my age           Objective   Physical Exam  Vitals and nursing note reviewed. Exam conducted with a chaperone present.   Constitutional:       General: He is not in acute distress.     Appearance: He is obese. He is not ill-appearing, toxic-appearing or diaphoretic.      Comments: Chronically ill-appearing   HENT:      Head: Normocephalic and atraumatic.      Nose: Nose normal. No congestion or rhinorrhea.      Mouth/Throat:      Pharynx: Oropharynx is clear. No oropharyngeal exudate or posterior oropharyngeal erythema.   Eyes:      Extraocular Movements: Extraocular movements intact.      Conjunctiva/sclera: Conjunctivae normal.      Pupils: Pupils are equal, round, and reactive to light.   Cardiovascular:      Rate and Rhythm: Normal rate and regular rhythm.      Heart sounds: No murmur heard.  Pulmonary:      Effort: Pulmonary effort is normal. No respiratory distress.      Breath sounds: No stridor. Wheezing present. No rhonchi or rales.   Abdominal:      General: Abdomen is flat. Bowel sounds are normal. There is no distension.      Tenderness: There is no abdominal tenderness. There is no right CVA tenderness, left CVA tenderness,  guarding or rebound.   Musculoskeletal:         General: No swelling, tenderness, deformity or signs of injury. Normal range of motion.      Cervical back: Normal range of motion. No rigidity or tenderness.      Comments: Wearing compression socks   Skin:     General: Skin is warm and dry.      Capillary Refill: Capillary refill takes less than 2 seconds.      Findings: No erythema or rash.   Neurological:      General: No focal deficit present.      Mental Status: He is alert and oriented to person, place, and time. Mental status is at baseline.      Cranial Nerves: No cranial nerve deficit.      Sensory: No sensory deficit.      Motor: No weakness.   Psychiatric:         Mood and Affect: Mood normal.         Procedures           ED Course  ED Course as of 04/18/25 2104 Fri Apr 18, 2025 1922 Call out to cardiology for recommendations regarding frequent PVCs.  Monitor suggest runs of V. tach, I do see what appears to be PVCs in pairs but I do not see a sustained run of V. tach on the cardiac monitor. [AK]   1935 Spoke with Dr. Posadas, on-call cardiologist and reviewed previous EKGs, and discussed history including discontinuation of metoprolol and current cardiac monitor suggesting 2-3 PVCs in a row and concern for when does the patient meet criteria for Vtach. Dr. Acevedo believes this is secondary to A-fib with aberrant ventricular conduction.  I do agree that it appears that when the patient has a PVC close to a prior QRS complex that patient has what appears to be a tachycardia mediated bundle branch block morphology and this could be the cause of the aberrant ventricular conduction.  Patient is well-appearing and I agree that he is not having any chest pain or shortness of breath.  Given that he does not he appear to be symptomatic from his PVCs and patient only presents with a cough.  From the standpoint of management of his PVCs we will place him back on metoprolol succinate and recommended close follow-up  with Dr. Gomes, his cardiologist.  Noted elevated blood pressure   Will give him extra blood pressure medicine but not manage as hypertensive emergency as he is not having any symptoms other than a cough.  Only other recommendation from cardiology was to check potassium and magnesium and manage as needed.  Dr. Acevedo did not recommend observation for cardiac monitoring as patient has had known asymptomatic frequent PVCs for many years [AK]   2102 Spoke with daughter at bedside, patient having increased feeding production, increased work of breathing, concerns for COPD exacerbation.  Offered albuterol nebulizer, however patient and family requesting to go home as the helper who helps him get in bed is running over on his work time.  I find this reasonable because the daughter is a pharmacist, reliable, and said that she will give him 4 to 6 puffs of his albuterol at home to help for his breathing.  Will give him Decadron for long-term relief at discharge and refill his metoprolol with recommendation for close cardiology follow-up for frequent PVCs.  He remains asymptomatic from a PVC standpoint at the time of discharge and his blood pressure was 170 and he did not have symptoms of hypertensive emergency.  Also had shared decision making with daughter regarding antibiotics for COPD exacerbation.  He is satting 95%, no respiratory distress on examination but there is a component of anxiety as reported by family as he breathes faster when I am in the room.  I do confirm that he breathes slower when I am not in the room by monitoring him on the cardiac monitor.  Patient will hold off on antibiotics as he already has a borderline QTc and azithromycin and doxycycline would likely exacerbate this within the setting of concerns for frequent PVCs. [AK]      ED Course User Index  [AK] Fish Fang MD                                           Medical Decision Making  This is a 89-year-old male here with a dry cough.  He  was swabbed sears and sent downstairs and staff was concerned for V. tach but on my read the patient has frequent PVCs.  He is in atrial fibrillation, I see no signs of acute ischemia.  He is not complaining of any palpitations or any chest pain.  He was on metoprolol but according to family he no longer takes it.  I believe this is why he has such a high PVC burden.  Will place a call out to cardiology.  Believe this patient would likely need to be admitted for cardiac monitoring    Problems Addressed:  COPD exacerbation: complicated acute illness or injury  Frequent PVCs: complicated acute illness or injury    Amount and/or Complexity of Data Reviewed  Labs: ordered.  Radiology: ordered.  ECG/medicine tests: ordered.    Risk  Prescription drug management.        Final diagnoses:   COPD exacerbation   Frequent PVCs       ED Disposition  ED Disposition       ED Disposition   Discharge    Condition   Stable    Comment   --               Harry Luis MD  [address] [id number] [phone]    In 3 days      Please follow-up with your cardiologist, Dr. Gomes for metoprolol titration for frequent PVCs          Psychiatric  [id number] [place] [phone]  Go to   If symptoms worsen         Medication List        New Prescriptions      metoprolol succinate XL 25 MG 24 hr tablet  Commonly known as: TOPROL-XL  Take 1 tablet by mouth Daily for 30 days.               Where to Get Your Medications        These medications were sent to [facility] [id number] - [place], [place] - [address] [facility] [address] [facility] [address] - [phone]  - [phone]   [address] [facility], [facility] [address]      Phone: [phone]   metoprolol succinate XL 25 MG 24 hr tablet

## 2025-04-21 ENCOUNTER — HOSPITAL ENCOUNTER (OUTPATIENT)
Dept: CT IMAGING | Facility: HOSPITAL | Age: OVER 89
Discharge: HOME | End: 2025-04-21
Admitting: INTERNAL MEDICINE
Payer: MEDICARE

## 2025-04-21 ENCOUNTER — TELEPHONE (OUTPATIENT)
Age: OVER 89
End: 2025-04-21

## 2025-04-21 DIAGNOSIS — R91.1 LUNG NODULE: ICD-10-CM

## 2025-04-21 LAB
QT INTERVAL: 405 MS
QTC INTERVAL: 490 MS

## 2025-04-21 PROCEDURE — 71250 CT THORAX DX C-: CPT

## 2025-04-21 NOTE — TELEPHONE ENCOUNTER
Caller: Harry Potts    Relationship: Self    Best call back number: [phone]    PATIENT WAS IN THE HOSPITAL, NEEDING A FOLLOW UP

## 2025-04-23 ENCOUNTER — OFFICE VISIT (OUTPATIENT)
Dept: FAMILY MEDICINE CLINIC | Facility: CLINIC | Age: OVER 89
End: 2025-04-23
Payer: MEDICARE

## 2025-04-23 VITALS
RESPIRATION RATE: 18 BRPM | OXYGEN SATURATION: 95 % | HEIGHT: 68 IN | BODY MASS INDEX: 43.65 KG/M2 | DIASTOLIC BLOOD PRESSURE: 90 MMHG | SYSTOLIC BLOOD PRESSURE: 160 MMHG | TEMPERATURE: 96.4 F | HEART RATE: 103 BPM

## 2025-04-23 DIAGNOSIS — I48.19 OTHER PERSISTENT ATRIAL FIBRILLATION: ICD-10-CM

## 2025-04-23 DIAGNOSIS — E66.01 MORBID OBESITY: ICD-10-CM

## 2025-04-23 DIAGNOSIS — I10 PRIMARY HYPERTENSION: Primary | ICD-10-CM

## 2025-04-23 PROBLEM — J44.1 COPD WITH EXACERBATION: Status: ACTIVE | Noted: 2023-04-24

## 2025-04-23 NOTE — PROGRESS NOTES
Subjective   Harry Potts is a 89 y.o. male. Patient is here today for   Chief Complaint   Patient presents with    Hypertension       (Not on file)-  Risk for Readmission (LACE) No data recorded         Vitals:    04/23/25 1052   BP: 160/90   Pulse: 103   Resp: 18   Temp: 96.4 °F (35.8 °C)   SpO2: 95%     The following portions of the patient's history were reviewed and updated as appropriate: allergies, current medications, past family history, past medical history, past social history, past surgical history and problem list.    Past Medical History:   Diagnosis Date    Abnormal ECG 4.5.2023    new onset a fib    Allergic     Aneurysm 4.5.2023    Incidental finding on CT    Arthritis     Asthma March, 2021    Oxygen at home    Atrial fibrillation 04/18/2023    Cancer     basal cell     Cholelithiasis 4.12.23    long history of gallstones without issue, cholecystitis 4.12.23 with E.faecalis bacteremia    COPD (chronic obstructive pulmonary disease)     Coronary artery disease 1980's    htn, elevated cholesterol    Difficulty walking     Elevated cholesterol     Environmental allergies     GERD (gastroesophageal reflux disease)     History of medical problems 4/2023    new onset a fib with hospitalization for respiratory failure/cholecystitis    HL (hearing loss)     Hyperglycemia     Hyperlipidemia     Hypertension     Low back pain 1970’s    Limiting mobility    Obesity     Pulmonary hypertension 04/20/2023    Sleep apnea     Spondylolysis, lumbar region     Stroke unknown    old infarct per CT 4.5.2023    Tremor 5/2023    s/p hypercapnia    Visual impairment 4/2023    Bottom of eyes skin sagging      Allergies   Allergen Reactions    Morphine And Codeine Itching, Rash and Confusion     Patient never wants to have Morphine again in his life    Atorvastatin Other (See Comments)     SPASMS LEG       Dexmedetomidine Other (See Comments)     Dex causes bradycardia and hyptension       Social History      Socioeconomic History    Marital status:    Tobacco Use    Smoking status: Never     Passive exposure: Never    Smokeless tobacco: Never   Vaping Use    Vaping status: Never Used   Substance and Sexual Activity    Alcohol use: No     Comment: caffeine use    Drug use: No    Sexual activity: Not Currently     Partners: Female     Birth control/protection: Abstinence     Comment: Not an issue at my age        Current Outpatient Medications:     albuterol sulfate  (90 Base) MCG/ACT inhaler, Inhale 2 puffs Every 4 (Four) Hours As Needed for Wheezing., Disp: 18 g, Rfl: 5    amLODIPine (NORVASC) 10 MG tablet, TAKE 1 TABLET DAILY, Disp: 90 tablet, Rfl: 3    ammonium lactate (AMLACTIN) 12 % cream, Apply 1 g topically to the appropriate area as directed Every 12 (Twelve) Hours., Disp: , Rfl:     Azelastine HCl 137 MCG/SPRAY solution, USE 1 SPRAY IN EACH NOSTRIL AS DIRECTED BY PROVIDER DAILY, Disp: 30 mL, Rfl: 2    Diclofenac Sodium (VOLTAREN) 1 % gel gel, Apply 4 g topically to the appropriate area as directed 3 (Three) Times a Day., Disp: 150 g, Rfl: 1    Eliquis 5 MG tablet tablet, TAKE 1 TABLET EVERY 12 HOURS. INDICATIONS: ATRIAL FIBRILLATION, Disp: 180 tablet, Rfl: 3    esomeprazole (nexIUM) 40 MG capsule, TAKE 1 CAPSULE DAILY, Disp: 90 capsule, Rfl: 3    ezetimibe (ZETIA) 10 MG tablet, TAKE 1 TABLET DAILY, Disp: 90 tablet, Rfl: 3    fluticasone (FLONASE) 50 MCG/ACT nasal spray, 1 spray into the nostril(s) as directed by provider Daily., Disp: 11.1 mL, Rfl: 2    Fluticasone-Salmeterol (ADVAIR/WIXELA) 250-50 MCG/ACT DISKUS, USE 1 INHALATION TWICE A DAY, Disp: 180 each, Rfl: 3    furosemide (LASIX) 20 MG tablet, TAKE 1 TABLET DAILY, Disp: 90 tablet, Rfl: 3    Klor-Con M20 20 MEQ CR tablet, TAKE 1 TABLET TWICE A DAY, Disp: 180 tablet, Rfl: 3    loratadine (CLARITIN) 10 MG tablet, Take  by mouth Daily., Disp: , Rfl:     metoprolol succinate XL (TOPROL-XL) 25 MG 24 hr tablet, Take 1 tablet by mouth Daily for  30 days., Disp: 30 tablet, Rfl: 0    miconazole (MICOTIN) 2 % powder, Apply  topically to the appropriate area as directed Every 12 (Twelve) Hours., Disp: , Rfl:     Multiple Vitamins-Minerals (CENTRUM ADULTS PO), Take  by mouth Daily., Disp: , Rfl:     O2 (OXYGEN), 4 Liter O2 - CONTINUOUS (route: Oxygen), Disp: , Rfl:     sennosides-docusate (PERICOLACE) 8.6-50 MG per tablet, Take 2 tablets by mouth 2 (Two) Times a Day., Disp: , Rfl:     doxazosin (CARDURA) 4 MG tablet, Take 1 tablet by mouth Daily. (Patient not taking: Reported on 4/23/2025), Disp: , Rfl:      Objective     History of Present Illness  This patient was seen by my colleague Mary Gómez on 4/18/2025.  He had a very high blood pressure at that time and was sent downstairs to the emergency room for further assessment and evaluation.    He was assessed by Dr. Fish Fang in the emergency room and others.  It was noted that he had a history of frequent PVCs for many years and that Dr. Gomes's cardiologist had gradually reduced his dose of metoprolol secondary to bradycardia that he was experiencing.    Also, the patient complained of a cough.  He did not have any shortness of breath or diaphoresis or chest pain.    On the monitor he had frequent PVCs and his emergency room physician called out to cardiology to discuss this issue.  A conversation was carried on with Dr. Posadas who is on-call for cardiology.    There was concern that the patient was having many PVCs on the monitor and a question of whether or not he had very brief runs of V. tach.    Dr. Acevedo believe that his EKG morphology was secondary to atrial fibrillation with aberrant ventricular conduction.    It was not felt that the patient was being symptomatic from his frequent PVCs.    He was placed back on metoprolol succinate and was asked to follow-up closely with Dr. Gomes.    Attention was turned back to his elevated blood pressure.  The on-call cardiologist did not recommend  observation for cardiac monitoring as the patient had been known to have asymptomatic frequent PVCs for many years.    There have been concerns that the patient may have been having an exacerbation of his COPD.    His daughter, pharmacist indicated that she would give him 4 to 6 puffs of albuterol at home to help with his breathing.  He was also given Decadron .    By the time of his discharge from the emergency room his systolic blood pressure was 170.    It was felt that antibiotics were not required for his COPD exacerbation.  He is asked to follow-up with me which is the reason for his visit today.         History of Present Illness  The patient presents for evaluation of irregular heart rhythm, cough, and elevated blood pressure. He is accompanied by a family member.    Irregular heart rhythm is noted, and follow-up with the cardiologist, Dr. Gomes, for metoprolol adjustment is pending. An appointment has not yet been scheduled, although contact with the  is anticipated soon.    Albuterol is being used at home, and no wheezing is reported. Sleep is described as good, with a consistent pattern of going to bed at 10:30 PM and waking at 7:00 AM. Outdoor activities are frequent, including riding an electric chair around the community and sitting in the garage.    A recent hospitalization included a Decadron shot, and no current steroid use is indicated. Kidney and liver functions are reported as normal, blood sugar levels are stable, and no anemia is present. No antibiotics were prescribed during the hospital stay, and no adverse reactions to medications were noted.    An occasional cough is reported, attributed to a cold. Inquiry about over-the-counter remedies led to a recommendation for Delsym, which is considered safe to use with current medications.    An upcoming appointment with Dr. Chamberlain is scheduled for Tuesday, and lab work is planned for 06/19/2025.    Review of Systems   Constitutional:  Negative.    HENT: Negative.     Respiratory: Negative.     Cardiovascular: Negative.    Musculoskeletal: Negative.    Psychiatric/Behavioral: Negative.         Physical Exam  Vitals and nursing note reviewed.   Constitutional:       General: He is not in acute distress.     Appearance: Normal appearance. He is obese. He is not toxic-appearing.      Comments: Pleasant, neatly groomed, no distress.  Obese, in wheelchair.   Cardiovascular:      Heart sounds: Normal heart sounds. No murmur heard.     No gallop.      Comments: He has an irregularly irregular rhythm with a regular rate.  Pulmonary:      Effort: No respiratory distress.      Breath sounds: Normal breath sounds. No wheezing or rales.   Neurological:      Mental Status: He is alert and oriented to person, place, and time.      Gait: Gait normal.   Psychiatric:         Mood and Affect: Mood normal.         Behavior: Behavior normal.       Physical Exam  Respiratory: Clear to auscultation, no wheezing, rales or rhonchi  Cardiovascular: Irregular heart rhythm noted    Results  Labs   - Kidney function: Normal   - Liver function: Normal   - Blood sugar: Normal   - Hemoglobin A1c: 5.3%    ASSESSMENT  Assessment & Plan    Problems Addressed this Visit          Cardiac and Vasculature    Hypertension - Primary    Other persistent atrial fibrillation       Endocrine and Metabolic    Morbid obesity     Diagnoses         Codes Comments      Primary hypertension    -  Primary ICD-10-CM: I10  ICD-9-CM: 401.9       Morbid obesity     ICD-10-CM: E66.01  ICD-9-CM: 278.01       Other persistent atrial fibrillation     ICD-10-CM: I48.19  ICD-9-CM: 427.31           Assessment & Plan  1. Irregular heart rhythm.  - He has an irregular heart rhythm, which is a known condition.  - Advised to follow up with his cardiologist, Dr. Gomes.    - No immediate changes to current management.    2. Elevated blood pressure.  - Blood pressure, although elevated at 160, has shown  improvement.  - No immediate adjustments to blood pressure medications will be made.      3. Cough.  - Reports an occasional cough, likely due to a cold.  - No significant findings on lung examination.  - Over-the-counter Delsym is recommended if the cough becomes bothersome or interferes with sleep.  - No further intervention required at this time.        Follow-up  The patient will follow up on 06/24/2025.      Current outpatient and discharge medications have been reconciled for the patient.  Reviewed by: Harry Luis MD      There are no Patient Instructions on file for this visit.  No follow-ups on file.  Patient or patient representative verbalized consent for the use of Ambient Listening during the visit with  Harry Luis MD for chart documentation. 4/24/2025  10:33 EDT

## 2025-05-01 NOTE — PROGRESS NOTES
Subjective:     Encounter Date:05/02/2025      Patient ID: Harry Potts is a 89 y.o. male.    Chief Complaint:  History of Present Illness    This is an 89-year-old with hypertension, hyperlipidemia, obesity, asthma, obstructive sleep apnea on CPAP, PVCs, persistent atrial fibrillation, who presents for follow up.     At an office visit in 3/2023 he reported that he cancelled his sleep medicine appointment because he and his wife did not drive at night.  He was compliant with his CPAP.  He was still taking metoprolol succinate and using oxygen as needed.  He was still having asymptomatic PACs and PVCs.       He was admitted in 4/2023 for confusion.  He was noted to be in atrial fibrillation incidentally at that time.  Hhe underwent a couple of echocardiograms during that admission which showed normal left ventricular systolic function and wall motion with an EF of 65%, mild biatrial enlargement, mildly dilated right ventricle, and mild to moderately elevated right ventricular systolic pressure.  He was placed on anticoagulation with apixaban at that time.       When I saw him back in follow-up in 9/2023 he was still in atrial fibrillation with good controlled rates.  Since then he has largely been fairly stable.    I saw the patient last in 9/2024.  At that time rates were still well-controlled in atrial fibrillation.  He was not having any new significant cardiac issues.    He saw Addie Lennox, APRN in 3/2025.  Again he appeared to be fairly stable from a cardiac standpoint no changes were made.    The patient was in the emergency room in 4/18/2025 with complaints of frequent PVCs including couplets and triplets.  He denied appear to be having any symptoms from the PVCs.  He was started on metoprolol succinate.  There are some concerns for COPD exacerbation.    He send saw Dr. Luis in routine follow-up.  He also saw pulmonary and follow-up he was felt to be stable from their standpoint and no changes were  made.    Patient presents today with his daughter disease who is a pharmacist) and his caregiver.  His daughter reports that he was taken to the emergency room because of a productive cough of white sputum.  She reports that the productive cough stopped after he started the metoprolol.  He began experiencing fatigue after starting the metoprolol so ended up stopping it for a few days.  After he saw Dr. Chamberlain he was encouraged to resume the metoprolol.  Dr. Chamberlain was confirmed that the white sputum may be representative of some heart failure symptoms.  He just restarted it in the few days ago.  He does report that the fatigue improved when he stopped taking the metoprolol.    He otherwise denies any palpitations, lightheadedness, near-syncope or syncope, chest pain, or lower extremity swelling.     Prior History:  The patient had been evaluated by Dr. Jones in 1/2006 for preoperative clearance.  He had an abnormal EKG at that time.  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       I saw the patient initially in 10/2019 when he presented for complaints of dyspnea on exertion, bradycardia, and orthopnea.  Prior to that office visit he was evaluated by Dr. French who began treating him for asthma.  During his exam he was noted to be bradycardia.  There was also some concerns for orthopnea.  At his office visit with me he was noted to have frequent PVCs on his EKG including ventricular bigeminy.  I suspect that that this is responsible for the findings of bradycardia.  I recommended proceeding with further work-up with a stress test and an echocardiogram.  These were performed on 11/6/2019.  His echocardiogram showed normal left ventricular systolic function wall motion with an EF of 61%, mild mitral regurgitation, and grade 1 diastolic dysfunction.  His stress test was negative for  ischemia.  At that time I recommended follow-up in 6 months.     Patient was lost to follow-up between 10/2019 and 8/2022.    He presented back to the office for evaluation of bradycardia.  He is closely followed by Dr. Luis.    At some point between his visits with me he had been started on oxygen which he was using continuously at the time of his visit in 8/2022.  He also reported compliance with CPAP although he had not been evaluated by sleep medicine for several years.      Prior to that office visit he suffered a fall while in the kitchen helping his wife prepare dinner.    It was unclear at that time if he lost his balance and fell or if there was any loss of consciousness.  He began checking his heart rates and blood pressure after that fall.  He noticed that his heart rates were running low in the 40s.  Because of this his daughter who is a pharmacist suggested that he decrease his metoprolol succinate to 25 mg daily.  Despite the decrease in the metoprolol his blood pressures were fairly well controlled.  He did feel better on the lower dose of metoprolol due to improvement in fatigue and sluggishness.  I felt that his reported bradycardia was likely due to frequent PVCs that were noted at the time of his office visit.  However I agreed with keeping him on the lower dose of the metoprolol since overall he felt better.  I also encouraged the patient to see sleep medicine to ensure that he was being adequately treated for his sleep apnea in light of his ventricular ectopy.    Review of Systems   Constitutional: Positive for malaise/fatigue.   HENT:  Negative for hearing loss, hoarse voice, nosebleeds and sore throat.    Eyes:  Negative for pain.   Cardiovascular:  Positive for dyspnea on exertion. Negative for chest pain, claudication, cyanosis, irregular heartbeat, leg swelling, near-syncope, orthopnea, palpitations, paroxysmal nocturnal dyspnea and syncope.   Respiratory:  Negative for shortness of breath  and snoring.    Endocrine: Negative for cold intolerance, heat intolerance, polydipsia, polyphagia and polyuria.   Skin:  Negative for itching and rash.   Musculoskeletal:  Negative for arthritis, falls, joint pain, joint swelling, muscle cramps, muscle weakness and myalgias.   Gastrointestinal:  Negative for constipation, diarrhea, dysphagia, heartburn, hematemesis, hematochezia, melena, nausea and vomiting.   Genitourinary:  Negative for frequency, hematuria and hesitancy.   Neurological:  Negative for excessive daytime sleepiness, dizziness, headaches, light-headedness, numbness and weakness.   Psychiatric/Behavioral:  Negative for depression. The patient is not nervous/anxious.          Current Outpatient Medications:   •  albuterol sulfate  (90 Base) MCG/ACT inhaler, Inhale 2 puffs Every 4 (Four) Hours As Needed for Wheezing., Disp: 18 g, Rfl: 5  •  amLODIPine (NORVASC) 10 MG tablet, TAKE 1 TABLET DAILY, Disp: 90 tablet, Rfl: 3  •  ammonium lactate (AMLACTIN) 12 % cream, Apply 1 g topically to the appropriate area as directed Every 12 (Twelve) Hours., Disp: , Rfl:   •  Azelastine HCl 137 MCG/SPRAY solution, USE 1 SPRAY IN EACH NOSTRIL AS DIRECTED BY PROVIDER DAILY, Disp: 30 mL, Rfl: 2  •  Diclofenac Sodium (VOLTAREN) 1 % gel gel, Apply 4 g topically to the appropriate area as directed 3 (Three) Times a Day., Disp: 150 g, Rfl: 1  •  Eliquis 5 MG tablet tablet, TAKE 1 TABLET EVERY 12 HOURS. INDICATIONS: ATRIAL FIBRILLATION, Disp: 180 tablet, Rfl: 3  •  esomeprazole (nexIUM) 40 MG capsule, TAKE 1 CAPSULE DAILY, Disp: 90 capsule, Rfl: 3  •  ezetimibe (ZETIA) 10 MG tablet, TAKE 1 TABLET DAILY, Disp: 90 tablet, Rfl: 3  •  fluticasone (FLONASE) 50 MCG/ACT nasal spray, 1 spray into the nostril(s) as directed by provider Daily., Disp: 11.1 mL, Rfl: 2  •  Fluticasone-Salmeterol (ADVAIR/WIXELA) 250-50 MCG/ACT DISKUS, USE 1 INHALATION TWICE A DAY, Disp: 180 each, Rfl: 3  •  furosemide (LASIX) 20 MG tablet, TAKE 1  TABLET DAILY, Disp: 90 tablet, Rfl: 3  •  Klor-Con M20 20 MEQ CR tablet, TAKE 1 TABLET TWICE A DAY, Disp: 180 tablet, Rfl: 3  •  loratadine (CLARITIN) 10 MG tablet, Take  by mouth Daily., Disp: , Rfl:   •  metoprolol succinate XL (TOPROL-XL) 25 MG 24 hr tablet, Take 1 tablet by mouth Daily for 30 days., Disp: 30 tablet, Rfl: 0  •  miconazole (MICOTIN) 2 % powder, Apply  topically to the appropriate area as directed Every 12 (Twelve) Hours., Disp: , Rfl:   •  Multiple Vitamins-Minerals (CENTRUM ADULTS PO), Take  by mouth Daily., Disp: , Rfl:   •  O2 (OXYGEN), 4 Liter O2 - CONTINUOUS (route: Oxygen), Disp: , Rfl:   •  sennosides-docusate (PERICOLACE) 8.6-50 MG per tablet, Take 2 tablets by mouth 2 (Two) Times a Day., Disp: , Rfl:     Past Medical History:   Diagnosis Date   • Abnormal ECG 4.5.2023    new onset a fib   • Allergic    • Aneurysm 4.5.2023    Incidental finding on CT   • Arthritis    • Asthma March, 2021    Oxygen at home   • Atrial fibrillation 04/18/2023   • Cancer     basal cell    • Cholelithiasis 4.12.23    long history of gallstones without issue, cholecystitis 4.12.23 with E.faecalis bacteremia   • COPD (chronic obstructive pulmonary disease)    • Coronary artery disease 1980's    htn, elevated cholesterol   • Difficulty walking    • Elevated cholesterol    • Environmental allergies    • GERD (gastroesophageal reflux disease)    • History of medical problems 4/2023    new onset a fib with hospitalization for respiratory failure/cholecystitis   • HL (hearing loss)    • Hyperglycemia    • Hyperlipidemia    • Hypertension    • Low back pain 1970’s    Limiting mobility   • Obesity    • Pulmonary hypertension 04/20/2023   • Sleep apnea    • Spondylolysis, lumbar region    • Stroke unknown    old infarct per CT 4.5.2023   • Tremor 5/2023    s/p hypercapnia   • Visual impairment 4/2023    Bottom of eyes skin sagging       Past Surgical History:   Procedure Laterality Date   • CARDIAC CATHETERIZATION  circa  "2008    [facility]   • EYE SURGERY     • HERNIA REPAIR     • JOINT REPLACEMENT     • REPLACEMENT TOTAL KNEE BILATERAL         Family History   Problem Relation Age of Onset   • Heart attack Father         age 80       Social History     Tobacco Use   • Smoking status: Never     Passive exposure: Never   • Smokeless tobacco: Never   Vaping Use   • Vaping status: Never Used   Substance Use Topics   • Alcohol use: No     Comment: caffeine use   • Drug use: No         ECG 12 Lead    Date/Time: 5/2/2025 10:56 AM  Performed by: Azra Gomes MD    Authorized by: Azra Gomes MD  Comparison: compared with previous ECG   Similar to previous ECG  Rhythm: atrial fibrillation  Ectopy: unifocal PVCs  Conduction: right bundle branch block and left anterior fascicular block           Objective:     Visit Vitals  /82   Pulse 77   Ht 172.7 cm (67.99\")   Wt 136 kg (300 lb)   SpO2 95%   BMI 45.63 kg/m²         Constitutional:       Appearance: Normal appearance. Well-developed.   HENT:      Head: Normocephalic and atraumatic.   Neck:      Vascular: No carotid bruit or JVD.   Pulmonary:      Effort: Pulmonary effort is normal.      Breath sounds: Normal breath sounds.   Cardiovascular:      Normal rate. Irregularly irregular rhythm.      No gallop.    Pulses:     Radial: 2+ bilaterally.  Edema:     Peripheral edema absent.   Abdominal:      Palpations: Abdomen is soft.   Skin:     General: Skin is warm and dry.   Neurological:      Mental Status: Alert and oriented to person, place, and time.           Assessment:          Diagnosis Plan   1. Other persistent atrial fibrillation        2. PVC (premature ventricular contraction)        3. Primary hypertension        4. Hypercholesterolemia        5. Long term current use of anticoagulant        6. JENISE on CPAP        7. Pulmonary hypertension               Plan:       1.  Persistent atrial fibrillation.  Rate controlled with the metoprolol.  On chronic anticoagulation " with apixaban.  2.  Ventricular ectopy.  Still with some PVCs on his EKG although not the couplets and triplets that he was experiencing previously.  This appears to be helped by the metoprolol.  Discussed options with the patient and his daughter.  Ultimately we opted to continue the metoprolol for a while longer to see if his fatigue improves.  Also advised him to try taking it at nighttime.  If he continues to struggle with fatigue on the metoprolol could consider switching him to diltiazem but that would require stopping his amlodipine and finding an alternative second antihypertensive medication.  3.  Hypertension.  Improved overall.  At his age I think keeping his systolic pressure between 120-160 is acceptable.  I do not think we need to be more aggressive about his blood pressure control.  4.  Hyperlipidemia.  On ezetimibe.  5.  Obstructive sleep apnea.  On CPAP.  6.  Pulmonary hypertension.    Will plan on seeing the patient as scheduled in September.

## 2025-05-02 ENCOUNTER — OFFICE VISIT (OUTPATIENT)
Age: OVER 89
End: 2025-05-02
Payer: MEDICARE

## 2025-05-02 VITALS
DIASTOLIC BLOOD PRESSURE: 82 MMHG | WEIGHT: 300 LBS | SYSTOLIC BLOOD PRESSURE: 160 MMHG | OXYGEN SATURATION: 95 % | HEART RATE: 77 BPM | BODY MASS INDEX: 45.47 KG/M2 | HEIGHT: 68 IN

## 2025-05-02 DIAGNOSIS — I49.3 PVC (PREMATURE VENTRICULAR CONTRACTION): ICD-10-CM

## 2025-05-02 DIAGNOSIS — I10 PRIMARY HYPERTENSION: ICD-10-CM

## 2025-05-02 DIAGNOSIS — I27.20 PULMONARY HYPERTENSION: ICD-10-CM

## 2025-05-02 DIAGNOSIS — Z79.01 LONG TERM CURRENT USE OF ANTICOAGULANT: ICD-10-CM

## 2025-05-02 DIAGNOSIS — G47.33 OSA ON CPAP: ICD-10-CM

## 2025-05-02 DIAGNOSIS — E78.00 HYPERCHOLESTEROLEMIA: ICD-10-CM

## 2025-05-02 DIAGNOSIS — I48.19 OTHER PERSISTENT ATRIAL FIBRILLATION: Primary | ICD-10-CM

## 2025-05-02 RX ORDER — METOPROLOL SUCCINATE 25 MG/1
25 TABLET, EXTENDED RELEASE ORAL DAILY
Qty: 90 TABLET | Refills: 1 | Status: SHIPPED | OUTPATIENT
Start: 2025-05-02 | End: 2025-10-29

## 2025-05-02 NOTE — LETTER
May 2, 2025     Harry Luis MD  [id number] [facility] [id number]    Patient: Harry Potts   YOB: 1930   Date of Visit: 5/2/2025       Dear Harry Luis MD    Harry Potts was in my office today. Below is a copy of my note.    If you have questions, please do not hesitate to call me. I look forward to following Harry along with you.         Sincerely,        Azra Gomes MD        CC: No Recipients        Subjective:     Encounter Date:05/02/2025      Patient ID: Harry Potts is a 89 y.o. male.    Chief Complaint:  History of Present Illness    This is an 89-year-old with hypertension, hyperlipidemia, obesity, asthma, obstructive sleep apnea on CPAP, PVCs, persistent atrial fibrillation, who presents for follow up.     At an office visit in 3/2023 he reported that he cancelled his sleep medicine appointment because he and his wife did not drive at night.  He was compliant with his CPAP.  He was still taking metoprolol succinate and using oxygen as needed.  He was still having asymptomatic PACs and PVCs.       He was admitted in 4/2023 for confusion.  He was noted to be in atrial fibrillation incidentally at that time.  [facility] underwent a couple of echocardiograms during that admission which showed normal left ventricular systolic function and wall motion with an EF of 65%, mild biatrial enlargement, mildly dilated right ventricle, and mild to moderately elevated right ventricular systolic pressure.  He was placed on anticoagulation with apixaban at that time.       When I saw him back in follow-up in 9/2023 he was still in atrial fibrillation with good controlled rates.  Since then he has largely been fairly stable.    I saw the patient last in 9/2024.  At that time rates were still well-controlled in atrial fibrillation.  He was not having any new significant cardiac issues.    He saw Addie Lennox, APRN in 3/2025.  Again he appeared to be fairly stable from a cardiac  standpoint no changes were made.    The patient was in the emergency room in 4/18/2025 with complaints of frequent PVCs including couplets and triplets.  He denied appear to be having any symptoms from the PVCs.  He was started on metoprolol succinate.  There are some concerns for COPD exacerbation.    He send saw Dr. Luis in routine follow-up.  He also saw pulmonary and follow-up he was felt to be stable from their standpoint and no changes were made.    Patient presents today with his daughter disease who is a pharmacist) and his caregiver.  His daughter reports that he was taken to the emergency room because of a productive cough of white sputum.  She reports that the productive cough stopped after he started the metoprolol.  He began experiencing fatigue after starting the metoprolol so ended up stopping it for a few days.  After he saw Dr. Chamberlain he was encouraged to resume the metoprolol.  Dr. Chamberlain was confirmed that the white sputum may be representative of some heart failure symptoms.  He just restarted it in the few days ago.  He does report that the fatigue improved when he stopped taking the metoprolol.    He otherwise denies any palpitations, lightheadedness, near-syncope or syncope, chest pain, or lower extremity swelling.     Prior History:  The patient had been evaluated by Dr. Jones in 1/2006 for preoperative clearance.  He had an abnormal EKG at that time.  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       I saw the patient initially in 10/2019 when he presented for complaints of dyspnea on exertion, bradycardia, and orthopnea.  Prior to that office visit he was evaluated by Dr. French who began treating him for asthma.  During his exam he was noted to be bradycardia.  There was also some concerns for orthopnea.  At his office visit with me he was noted to have  frequent PVCs on his EKG including ventricular bigeminy.  I suspect that that this is responsible for the findings of bradycardia.  I recommended proceeding with further work-up with a stress test and an echocardiogram.  These were performed on 11/6/2019.  His echocardiogram showed normal left ventricular systolic function wall motion with an EF of 61%, mild mitral regurgitation, and grade 1 diastolic dysfunction.  His stress test was negative for ischemia.  At that time I recommended follow-up in 6 months.     Patient was lost to follow-up between 10/2019 and 8/2022.    He presented back to the office for evaluation of bradycardia.  He is closely followed by Dr. Luis.    At some point between his visits with me he had been started on oxygen which he was using continuously at the time of his visit in 8/2022.  He also reported compliance with CPAP although he had not been evaluated by sleep medicine for several years.      Prior to that office visit he suffered a fall while in the kitchen helping his wife prepare dinner.    It was unclear at that time if he lost his balance and fell or if there was any loss of consciousness.  He began checking his heart rates and blood pressure after that fall.  He noticed that his heart rates were running low in the 40s.  Because of this his daughter who is a pharmacist suggested that he decrease his metoprolol succinate to 25 mg daily.  Despite the decrease in the metoprolol his blood pressures were fairly well controlled.  He did feel better on the lower dose of metoprolol due to improvement in fatigue and sluggishness.  I felt that his reported bradycardia was likely due to frequent PVCs that were noted at the time of his office visit.  However I agreed with keeping him on the lower dose of the metoprolol since overall he felt better.  I also encouraged the patient to see sleep medicine to ensure that he was being adequately treated for his sleep apnea in light of his ventricular  ectopy.    Review of Systems   Constitutional: Positive for malaise/fatigue.   HENT:  Negative for hearing loss, hoarse voice, nosebleeds and sore throat.    Eyes:  Negative for pain.   Cardiovascular:  Positive for dyspnea on exertion. Negative for chest pain, claudication, cyanosis, irregular heartbeat, leg swelling, near-syncope, orthopnea, palpitations, paroxysmal nocturnal dyspnea and syncope.   Respiratory:  Negative for shortness of breath and snoring.    Endocrine: Negative for cold intolerance, heat intolerance, polydipsia, polyphagia and polyuria.   Skin:  Negative for itching and rash.   Musculoskeletal:  Negative for arthritis, falls, joint pain, joint swelling, muscle cramps, muscle weakness and myalgias.   Gastrointestinal:  Negative for constipation, diarrhea, dysphagia, heartburn, hematemesis, hematochezia, melena, nausea and vomiting.   Genitourinary:  Negative for frequency, hematuria and hesitancy.   Neurological:  Negative for excessive daytime sleepiness, dizziness, headaches, light-headedness, numbness and weakness.   Psychiatric/Behavioral:  Negative for depression. The patient is not nervous/anxious.          Current Outpatient Medications:   •  albuterol sulfate  (90 Base) MCG/ACT inhaler, Inhale 2 puffs Every 4 (Four) Hours As Needed for Wheezing., Disp: 18 g, Rfl: 5  •  amLODIPine (NORVASC) 10 MG tablet, TAKE 1 TABLET DAILY, Disp: 90 tablet, Rfl: 3  •  ammonium lactate (AMLACTIN) 12 % cream, Apply 1 g topically to the appropriate area as directed Every 12 (Twelve) Hours., Disp: , Rfl:   •  Azelastine HCl 137 MCG/SPRAY solution, USE 1 SPRAY IN EACH NOSTRIL AS DIRECTED BY PROVIDER DAILY, Disp: 30 mL, Rfl: 2  •  Diclofenac Sodium (VOLTAREN) 1 % gel gel, Apply 4 g topically to the appropriate area as directed 3 (Three) Times a Day., Disp: 150 g, Rfl: 1  •  Eliquis 5 MG tablet tablet, TAKE 1 TABLET EVERY 12 HOURS. INDICATIONS: ATRIAL FIBRILLATION, Disp: 180 tablet, Rfl: 3  •   esomeprazole (nexIUM) 40 MG capsule, TAKE 1 CAPSULE DAILY, Disp: 90 capsule, Rfl: 3  •  ezetimibe (ZETIA) 10 MG tablet, TAKE 1 TABLET DAILY, Disp: 90 tablet, Rfl: 3  •  fluticasone (FLONASE) 50 MCG/ACT nasal spray, 1 spray into the nostril(s) as directed by provider Daily., Disp: 11.1 mL, Rfl: 2  •  Fluticasone-Salmeterol (ADVAIR/WIXELA) 250-50 MCG/ACT DISKUS, USE 1 INHALATION TWICE A DAY, Disp: 180 each, Rfl: 3  •  furosemide (LASIX) 20 MG tablet, TAKE 1 TABLET DAILY, Disp: 90 tablet, Rfl: 3  •  Klor-Con M20 20 MEQ CR tablet, TAKE 1 TABLET TWICE A DAY, Disp: 180 tablet, Rfl: 3  •  loratadine (CLARITIN) 10 MG tablet, Take  by mouth Daily., Disp: , Rfl:   •  metoprolol succinate XL (TOPROL-XL) 25 MG 24 hr tablet, Take 1 tablet by mouth Daily for 30 days., Disp: 30 tablet, Rfl: 0  •  miconazole (MICOTIN) 2 % powder, Apply  topically to the appropriate area as directed Every 12 (Twelve) Hours., Disp: , Rfl:   •  Multiple Vitamins-Minerals (CENTRUM ADULTS PO), Take  by mouth Daily., Disp: , Rfl:   •  O2 (OXYGEN), 4 Liter O2 - CONTINUOUS (route: Oxygen), Disp: , Rfl:   •  sennosides-docusate (PERICOLACE) 8.6-50 MG per tablet, Take 2 tablets by mouth 2 (Two) Times a Day., Disp: , Rfl:     Past Medical History:   Diagnosis Date   • Abnormal ECG 4.5.2023    new onset a fib   • Allergic    • Aneurysm 4.5.2023    Incidental finding on CT   • Arthritis    • Asthma March, 2021    Oxygen at home   • Atrial fibrillation 04/18/2023   • Cancer     basal cell    • Cholelithiasis 4.12.23    long history of gallstones without issue, cholecystitis 4.12.23 with E.faecalis bacteremia   • COPD (chronic obstructive pulmonary disease)    • Coronary artery disease 1980's    htn, elevated cholesterol   • Difficulty walking    • Elevated cholesterol    • Environmental allergies    • GERD (gastroesophageal reflux disease)    • History of medical problems 4/2023    new onset a fib with hospitalization for respiratory failure/cholecystitis   • HL  "(hearing loss)    • Hyperglycemia    • Hyperlipidemia    • Hypertension    • Low back pain 1970’s    Limiting mobility   • Obesity    • Pulmonary hypertension 04/20/2023   • Sleep apnea    • Spondylolysis, lumbar region    • Stroke unknown    old infarct per CT 4.5.2023   • Tremor 5/2023    s/p hypercapnia   • Visual impairment 4/2023    Bottom of eyes skin sagging       Past Surgical History:   Procedure Laterality Date   • CARDIAC CATHETERIZATION  circa 2008    [facility]   • EYE SURGERY     • HERNIA REPAIR     • JOINT REPLACEMENT     • REPLACEMENT TOTAL KNEE BILATERAL         Family History   Problem Relation Age of Onset   • Heart attack Father         age 80       Social History     Tobacco Use   • Smoking status: Never     Passive exposure: Never   • Smokeless tobacco: Never   Vaping Use   • Vaping status: Never Used   Substance Use Topics   • Alcohol use: No     Comment: caffeine use   • Drug use: No         ECG 12 Lead    Date/Time: 5/2/2025 10:56 AM  Performed by: Azra Gomes MD    Authorized by: Azra Gomes MD  Comparison: compared with previous ECG   Similar to previous ECG  Rhythm: atrial fibrillation  Ectopy: unifocal PVCs  Conduction: right bundle branch block and left anterior fascicular block           Objective:     Visit Vitals  /82   Pulse 77   Ht 172.7 cm (67.99\")   Wt 136 kg (300 lb)   SpO2 95%   BMI 45.63 kg/m²         Constitutional:       Appearance: Normal appearance. Well-developed.   HENT:      Head: Normocephalic and atraumatic.   Neck:      Vascular: No carotid bruit or JVD.   Pulmonary:      Effort: Pulmonary effort is normal.      Breath sounds: Normal breath sounds.   Cardiovascular:      Normal rate. Irregularly irregular rhythm.      No gallop.    Pulses:     Radial: 2+ bilaterally.  Edema:     Peripheral edema absent.   Abdominal:      Palpations: Abdomen is soft.   Skin:     General: Skin is warm and dry.   Neurological:      Mental Status: Alert and oriented to " person, place, and time.           Assessment:          Diagnosis Plan   1. Other persistent atrial fibrillation        2. PVC (premature ventricular contraction)        3. Primary hypertension        4. Hypercholesterolemia        5. Long term current use of anticoagulant        6. JENISE on CPAP        7. Pulmonary hypertension               Plan:       1.  Persistent atrial fibrillation.  Rate controlled with the metoprolol.  On chronic anticoagulation with apixaban.  2.  Ventricular ectopy.  Still with some PVCs on his EKG although not the couplets and triplets that he was experiencing previously.  This appears to be helped by the metoprolol.  Discussed options with the patient and his daughter.  Ultimately we opted to continue the metoprolol for a while longer to see if his fatigue improves.  Also advised him to try taking it at nighttime.  If he continues to struggle with fatigue on the metoprolol could consider switching him to diltiazem but that would require stopping his amlodipine and finding an alternative second antihypertensive medication.  3.  Hypertension.  Improved overall.  At his age I think keeping his systolic pressure between 120-160 is acceptable.  I do not think we need to be more aggressive about his blood pressure control.  4.  Hyperlipidemia.  On ezetimibe.  5.  Obstructive sleep apnea.  On CPAP.  6.  Pulmonary hypertension.    Will plan on seeing the patient as scheduled in September.

## 2025-06-06 RX ORDER — AMLODIPINE BESYLATE 10 MG/1
10 TABLET ORAL DAILY
Qty: 90 TABLET | Refills: 1 | Status: SHIPPED | OUTPATIENT
Start: 2025-06-06

## 2025-06-24 ENCOUNTER — OFFICE VISIT (OUTPATIENT)
Dept: FAMILY MEDICINE CLINIC | Facility: CLINIC | Age: OVER 89
End: 2025-06-24
Payer: MEDICARE

## 2025-06-24 VITALS
HEART RATE: 71 BPM | TEMPERATURE: 96.5 F | BODY MASS INDEX: 45.63 KG/M2 | SYSTOLIC BLOOD PRESSURE: 130 MMHG | DIASTOLIC BLOOD PRESSURE: 80 MMHG | RESPIRATION RATE: 18 BRPM | OXYGEN SATURATION: 95 % | HEIGHT: 68 IN

## 2025-06-24 DIAGNOSIS — E78.00 HYPERCHOLESTEROLEMIA: ICD-10-CM

## 2025-06-24 DIAGNOSIS — L57.0 ACTINIC KERATOSIS: ICD-10-CM

## 2025-06-24 DIAGNOSIS — G47.33 OSA ON CPAP: ICD-10-CM

## 2025-06-24 DIAGNOSIS — Z00.00 MEDICARE ANNUAL WELLNESS VISIT, SUBSEQUENT: ICD-10-CM

## 2025-06-24 DIAGNOSIS — J98.4 RESTRICTIVE LUNG DISEASE: ICD-10-CM

## 2025-06-24 DIAGNOSIS — M15.0 PRIMARY OSTEOARTHRITIS INVOLVING MULTIPLE JOINTS: ICD-10-CM

## 2025-06-24 DIAGNOSIS — I10 PRIMARY HYPERTENSION: ICD-10-CM

## 2025-06-24 DIAGNOSIS — F01.50 VASCULAR DEMENTIA WITHOUT BEHAVIORAL DISTURBANCE: ICD-10-CM

## 2025-06-24 DIAGNOSIS — I27.20 PULMONARY HYPERTENSION: ICD-10-CM

## 2025-06-24 DIAGNOSIS — M48.062 SPINAL STENOSIS OF LUMBAR REGION WITH NEUROGENIC CLAUDICATION: ICD-10-CM

## 2025-06-24 DIAGNOSIS — I48.19 OTHER PERSISTENT ATRIAL FIBRILLATION: Primary | ICD-10-CM

## 2025-06-24 DIAGNOSIS — Z99.81 DEPENDENCE ON SUPPLEMENTAL OXYGEN: ICD-10-CM

## 2025-06-24 DIAGNOSIS — M47.896 OTHER OSTEOARTHRITIS OF SPINE, LUMBAR REGION: ICD-10-CM

## 2025-06-24 NOTE — PROGRESS NOTES
Subjective   The ABCs of the Annual Wellness Visit  Medicare Wellness Visit      Harry Potts is a 89 y.o. patient who presents for a Medicare Wellness Visit.    The following portions of the patient's history were reviewed and   updated as appropriate: allergies, current medications, past family history, past medical history, past social history, past surgical history, and problem list.    Compared to one year ago, the patient's physical   health is better.  Compared to one year ago, the patient's mental   health is better.    Recent Hospitalizations:  He was not admitted to the hospital during the last year.     Current Medical Providers:  Patient Care Team:  Harry Luis MD as PCP - General (Internal Medicine)    Outpatient Medications Prior to Visit   Medication Sig Dispense Refill    albuterol sulfate  (90 Base) MCG/ACT inhaler Inhale 2 puffs Every 4 (Four) Hours As Needed for Wheezing. 18 g 5    amLODIPine (NORVASC) 10 MG tablet TAKE 1 TABLET DAILY 90 tablet 1    ammonium lactate (AMLACTIN) 12 % cream Apply 1 g topically to the appropriate area as directed Every 12 (Twelve) Hours.      Azelastine HCl 137 MCG/SPRAY solution USE 1 SPRAY IN EACH NOSTRIL AS DIRECTED BY PROVIDER DAILY 30 mL 2    Diclofenac Sodium (VOLTAREN) 1 % gel gel Apply 4 g topically to the appropriate area as directed 3 (Three) Times a Day. 150 g 1    Eliquis 5 MG tablet tablet TAKE 1 TABLET EVERY 12 HOURS. INDICATIONS: ATRIAL FIBRILLATION 180 tablet 3    esomeprazole (nexIUM) 40 MG capsule TAKE 1 CAPSULE DAILY 90 capsule 3    ezetimibe (ZETIA) 10 MG tablet TAKE 1 TABLET DAILY 90 tablet 3    fluticasone (FLONASE) 50 MCG/ACT nasal spray 1 spray into the nostril(s) as directed by provider Daily. 11.1 mL 2    Fluticasone-Salmeterol (ADVAIR/WIXELA) 250-50 MCG/ACT DISKUS USE 1 INHALATION TWICE A  each 3    furosemide (LASIX) 20 MG tablet TAKE 1 TABLET DAILY 90 tablet 3    Klor-Con M20 20 MEQ CR tablet TAKE 1 TABLET TWICE A   tablet 3    loratadine (CLARITIN) 10 MG tablet Take  by mouth Daily.      metoprolol succinate XL (TOPROL-XL) 25 MG 24 hr tablet Take 1 tablet by mouth Daily for 180 days. 90 tablet 1    miconazole (MICOTIN) 2 % powder Apply  topically to the appropriate area as directed Every 12 (Twelve) Hours.      Multiple Vitamins-Minerals (CENTRUM ADULTS PO) Take  by mouth Daily.      O2 (OXYGEN) 4 Liter O2 - CONTINUOUS (route: Oxygen)      sennosides-docusate (PERICOLACE) 8.6-50 MG per tablet Take 2 tablets by mouth 2 (Two) Times a Day.       No facility-administered medications prior to visit.     No opioid medication identified on active medication list. I have reviewed chart for other potential  high risk medication/s and harmful drug interactions in the elderly.      Aspirin is not on active medication list.  Aspirin use is not indicated based on review of current medical condition/s. Risk of harm outweighs potential benefits.  .    Patient Active Problem List   Diagnosis    Dysfunction of eustachian tube    Gastroesophageal reflux disease    Hyperglycemia    Hypercholesterolemia    Hypertension    Morbid obesity    Osteoarthritis of lumbar spine with myelopathy    Osteoarthritis of lumbar spine    Bronchitis    Viral URI with cough    PVC (premature ventricular contraction)    Non-traumatic rhabdomyolysis    Obesity (BMI 30-39.9)    Chronic low back pain    Weakness    Edema, bilateral lower extremities    Restrictive lung disease    JENISE on CPAP    Medicare annual wellness visit, subsequent    Hypoxia    Acute on chronic respiratory failure with hypoxia and hypercapnia    Acute cholecystitis    Other persistent atrial fibrillation    Pulmonary hypertension    Metabolic encephalopathy    Vascular dementia without behavioral disturbance    Dysphagia    Anemia    Spinal stenosis of lumbar region with neurogenic claudication    Chronic pain of both shoulders    Dependence on supplemental oxygen    Vascular dementia  "without behavioral disturbance    COPD with exacerbation    Cognitive communication deficit    Senile ectropion of both lower eyelids    Long term current use of anticoagulant    Allergic rhinitis    Cicatricial ectropion    Senile ectropion    Cognitive communication disorder    Constipation    Muscle weakness    Osteoarthritis    Actinic keratosis     Advance Care Planning Advance Directive is on file.  ACP discussion was held with the patient during this visit. Patient has an advance directive in EMR which is still valid.             Objective   Vitals:    06/24/25 1044   BP: 130/80   BP Location: Right arm   Patient Position: Sitting   Cuff Size: Large Adult   Pulse: 71   Resp: 18   Temp: 96.5 °F (35.8 °C)   TempSrc: Temporal   SpO2: 95%   Height: 172.7 cm (67.99\")       Estimated body mass index is 45.63 kg/m² as calculated from the following:    Height as of this encounter: 172.7 cm (67.99\").    Weight as of 5/2/25: 136 kg (300 lb).    Class 3 Severe Obesity (BMI >=40). Obesity-related health conditions include the following: obstructive sleep apnea, hypertension, and coronary heart disease. Obesity is unchanged. BMI is is above average; BMI management plan is completed. We discussed portion control and increasing exercise.           Does the patient have evidence of cognitive impairment? Yes  Lab Results   Component Value Date    CHLPL 203 (H) 06/19/2025    TRIG 110 06/19/2025    HDL 53 06/19/2025     (H) 06/19/2025    VLDL 20 06/19/2025    HGBA1C 6.30 (H) 06/19/2025                                                                                                Health  Risk Assessment    Smoking Status:  Social History     Tobacco Use   Smoking Status Never    Passive exposure: Never   Smokeless Tobacco Never     Alcohol Consumption:  Social History     Substance and Sexual Activity   Alcohol Use No    Comment: caffeine use       Fall Risk Screen  STEADI Fall Risk Assessment was completed, and patient " is at HIGH risk for falls. Assessment completed on:2025    Depression Screening   Little interest or pleasure in doing things? Not at all   Feeling down, depressed, or hopeless? Not at all   PHQ-2 Total Score 0      Health Habits and Functional and Cognitive Screenin/22/2025    11:51 AM   Functional & Cognitive Status   Do you have difficulty preparing food and eating? Yes   Do you have difficulty bathing yourself, getting dressed or grooming yourself? Yes   Do you have difficulty using the toilet? Yes   Do you have difficulty moving around from place to place? Yes   Do you have trouble with steps or getting out of a bed or a chair? Yes   Current Diet Well Balanced Diet   Dental Exam Up to date   Eye Exam Up to date   Exercise (times per week) 3 times per week   Current Exercises Include Stationary Bicycling/Spin Class   Do you need help using the phone?  No   Are you deaf or do you have serious difficulty hearing?  Yes   Do you need help to go to places out of walking distance? Yes   Do you need help shopping? Yes   Do you need help preparing meals?  Yes   Do you need help with housework?  Yes   Do you need help with laundry? Yes   Do you need help taking your medications? No   Do you need help managing money? Yes   Do you ever drive or ride in a car without wearing a seat belt? No   Have you felt unusual fatigue (could be tiredness), stress, anger or loneliness in the last month? No    Who do you live with? Spouse   If you need help, do you have trouble finding someone available to you? No   Have you been bothered in the last four weeks by sexual problems? No   Do you have difficulty concentrating, remembering or making decisions? Yes       Data saved with a previous flowsheet row definition           Age-appropriate Screening Schedule:  Refer to the list below for future screening recommendations based on patient's age, sex and/or medical conditions. Orders for these recommended tests are listed in  the plan section. The patient has been provided with a written plan.    Health Maintenance List  Health Maintenance   Topic Date Due    COVID-19 Vaccine (8 - 2024-25 season) 09/01/2024    INFLUENZA VACCINE  07/01/2025    LIPID PANEL  06/19/2026    ANNUAL WELLNESS VISIT  06/24/2026    TDAP/TD VACCINES (2 - Td or Tdap) 10/04/2027    RSV Vaccine - Adults  Completed    Pneumococcal Vaccine 50+  Completed    ZOSTER VACCINE  Completed                                                                                                                                                CMS Preventative Services Quick Reference  Risk Factors Identified During Encounter  Inactivity/Sedentary: Patient was advised to exercise at least 150 minutes a week per CDC recommendations.    The above risks/problems have been discussed with the patient.  Pertinent information has been shared with the patient in the After Visit Summary.  An After Visit Summary and PPPS were made available to the patient.    Follow Up:   Next Medicare Wellness visit to be scheduled in 1 year.         Additional E&M Note during same encounter follows:  Patient has additional, significant, and separately identifiable condition(s)/problem(s) that require work above and beyond the Medicare Wellness Visit     Chief Complaint  Medicare Wellness-subsequent    Subjective    He is accompanied by his wife and a healthcare assistant who accompanies him most of the time.    Is always a pleasure to see Mr. Olvera.  I have known him for many years.    He has recently had a blepharoplasty I believe on both upper and lower lids.  He is pleased with the outcome.             The patient presents for a wellness visit.    He reports an improvement in his overall health compared to the previous year. He has been using oxygen as per his pulmonologist's instructions, with a recent reading of 95. He has a scheduled appointment with his pulmonologist in approximately one month. 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 He has never smoked.    He experiences difficulty in walking and driving due to severe arthritis, which has led to the disposal of his car. Despite these challenges, he maintains an active lifestyle, spending time outdoors and engaging in physical exercise for about 30 minutes daily, including cycling. He also uses a wheelchair for mobility within his home.    He had an emergency room visit in 04/2025 due to a dry cough but was not admitted overnight. He is currently on Eliquis and occasionally takes Tylenol for pain management. He does not take aspirin.    He has an advanced directive and living will in place. He has a history of eye surgery, which has improved his vision. He has regular dermatology appointments twice a year. He has a scheduled appointment with his ophthalmologist on 07/30/2025.    PAST SURGICAL HISTORY:  Eye surgery    SOCIAL HISTORY  He does not drink alcohol. He has never smoked.    FAMILY HISTORY  His grandmother had diabetes.  Review of Systems   Constitutional: Negative.    HENT: Negative.     Respiratory: Negative.     Cardiovascular: Negative.    Genitourinary: Negative.    Musculoskeletal:         He has severe lumbar spine stenosis with radicular pain.  This has been ongoing for many years.  He feels his pain is relatively well-controlled.  He generally gets around with the assistance of a electric wheelchair recliner chair or walker even at home.  He tells me he is able to transfer himself out of his bed into his wheelchair on his own but I suspect he does need some significant assistance most of the time.   Neurological: Negative.    Hematological: Negative.    Psychiatric/Behavioral: Negative.            Objective   Vital Signs:  /80 (BP Location: Right arm, Patient Position: Sitting, Cuff Size: Large Adult)   Pulse 71   Temp 96.5 °F (35.8 °C) (Temporal)   Resp 18   Ht 172.7 cm (67.99\")   SpO2 95% "   BMI 45.63 kg/m²   Physical Exam  Vitals and nursing note reviewed.   Constitutional:       General: He is not in acute distress.     Appearance: Normal appearance. He is obese. He is not ill-appearing, toxic-appearing or diaphoretic.      Comments: Pleasant, neatly groomed, no distress.  Obese.  The patient was not weighed today as he was in his wheelchair.   HENT:      Head: Normocephalic and atraumatic.      Right Ear: Tympanic membrane, ear canal and external ear normal. There is no impacted cerumen.      Left Ear: Tympanic membrane, ear canal and external ear normal. There is no impacted cerumen.      Nose: Nose normal.      Mouth/Throat:      Mouth: Mucous membranes are moist.   Cardiovascular:      Rate and Rhythm: Normal rate and regular rhythm.   Pulmonary:      Breath sounds: Normal breath sounds.   Abdominal:      Palpations: Abdomen is soft.   Skin:     Comments: He has several actinic keratosis present on both arms.   Neurological:      Mental Status: He is alert and oriented to person, place, and time.      Motor: Weakness present.   Psychiatric:         Mood and Affect: Mood normal.         Behavior: Behavior normal.         Thought Content: Thought content normal.           Respiratory: Clear to auscultation, no wheezing, rales or rhonchi  Other: Blood pressure reading is 132/80            Results  Labs   - Blood sugar: 114 mg/dL   - Hemoglobin A1c: 6.3%           Assessment and Plan        1. Wellness visit.  - His blood glucose level was slightly elevated at 114, but his hemoglobin A1c remained stable at 6.3%.  - Blood pressure readings were within the normal range today.  - Advised to continue current medication regimen and maintain a balanced diet.  - An insulin level test will be conducted during his next lab work session.    2. Chronic obstructive pulmonary disease (COPD).  - Currently using oxygen as directed by his pulmonologist.  - Oxygen saturation was 95% during the visit.  - Advised to  continue following pulmonologist's recommendations.  - Upcoming appointment with pulmonologist in another month.    3. Arthritis.  - Experiences significant difficulty walking due to severe arthritis, particularly in his back.  - Advised to continue using wheelchair and avoid driving.  - Tylenol recommended for managing aches and pains as it does not interfere with anticoagulation therapy.    4. Premature ventricular contractions (PVCs).  - Had an emergency room visit in April due to PVCs and a dry cough; was not admitted to the hospital.  - Currently taking Eliquis and advised against using aspirin as it could interfere with anticoagulation therapy.  - No current cough reported.    5. Actinic keratosis.  - Has a precancerous skin lesion, likely actinic keratosis.  - Advised to continue seeing dermatologist twice a year for monitoring and management.         Follow Up   No follow-ups on file.  Patient was given instructions and counseling regarding his condition or for health maintenance advice. Please see specific information pulled into the AVS if appropriate.  Patient or patient representative verbalized consent for the use of Ambient Listening during the visit with  Harry Luis MD for chart documentation. 6/27/2025  11:22 EDT

## 2025-06-27 PROBLEM — L57.0 ACTINIC KERATOSIS: Status: ACTIVE | Noted: 2025-06-27

## 2025-07-10 RX ORDER — FLUTICASONE PROPIONATE AND SALMETEROL 250; 50 UG/1; UG/1
1 POWDER RESPIRATORY (INHALATION) 2 TIMES DAILY
Qty: 180 EACH | Refills: 1 | Status: SHIPPED | OUTPATIENT
Start: 2025-07-10

## 2025-07-28 DIAGNOSIS — R53.1 WEAKNESS: ICD-10-CM

## 2025-07-29 RX ORDER — METOPROLOL SUCCINATE 25 MG/1
25 TABLET, EXTENDED RELEASE ORAL DAILY
Qty: 90 TABLET | Refills: 1 | Status: SHIPPED | OUTPATIENT
Start: 2025-07-29 | End: 2026-01-25

## 2025-07-29 RX ORDER — AZELASTINE HYDROCHLORIDE 137 UG/1
1 SPRAY, METERED NASAL DAILY
Qty: 30 ML | Refills: 2 | Status: SHIPPED | OUTPATIENT
Start: 2025-07-29

## 2025-07-29 RX ORDER — POTASSIUM CHLORIDE 1500 MG/1
20 TABLET, EXTENDED RELEASE ORAL 2 TIMES DAILY
Qty: 180 TABLET | Refills: 1 | Status: SHIPPED | OUTPATIENT
Start: 2025-07-29